# Patient Record
Sex: MALE | Race: WHITE | NOT HISPANIC OR LATINO | Employment: OTHER | URBAN - METROPOLITAN AREA
[De-identification: names, ages, dates, MRNs, and addresses within clinical notes are randomized per-mention and may not be internally consistent; named-entity substitution may affect disease eponyms.]

---

## 2017-02-21 ENCOUNTER — TRANSFERRED RECORDS (OUTPATIENT)
Dept: HEALTH INFORMATION MANAGEMENT | Facility: CLINIC | Age: 67
End: 2017-02-21

## 2017-02-22 ENCOUNTER — HOSPITAL ENCOUNTER (OUTPATIENT)
Dept: ULTRASOUND IMAGING | Facility: CLINIC | Age: 67
Discharge: HOME | End: 2017-02-22
Attending: FAMILY MEDICINE | Admitting: FAMILY MEDICINE
Payer: MEDICARE

## 2017-02-22 DIAGNOSIS — L08.9 LEFT FOOT INFECTION: ICD-10-CM

## 2017-02-22 DIAGNOSIS — F17.200 SMOKER: ICD-10-CM

## 2017-02-22 DIAGNOSIS — R03.0: ICD-10-CM

## 2017-02-22 DIAGNOSIS — M79.89 OTHER SPECIFIED SOFT TISSUE DISORDERS: ICD-10-CM

## 2017-02-22 PROCEDURE — 93925 LOWER EXTREMITY STUDY: CPT

## 2017-02-23 ENCOUNTER — TRANSFERRED RECORDS (OUTPATIENT)
Dept: HEALTH INFORMATION MANAGEMENT | Facility: CLINIC | Age: 67
End: 2017-02-23

## 2017-03-01 ENCOUNTER — TELEPHONE (OUTPATIENT)
Dept: OTHER | Facility: CLINIC | Age: 67
End: 2017-03-01

## 2017-03-01 DIAGNOSIS — I73.9 PAD (PERIPHERAL ARTERY DISEASE) (H): Primary | ICD-10-CM

## 2017-03-01 NOTE — TELEPHONE ENCOUNTER
Pt referred to [facility] by  ([facility]) for PAD.    Pt had bilateral LE arterial US 2/22/17 because of a non-healing sore on his left foot:  IMPRESSION:  1. Although per velocity criteria, no definite significant stenosis is  visualized in the sampled arteries of the left lower extremity,  poststenotic waveforms in the left popliteal artery and distally would  indicate a significant steno-occlusive lesion at the junction between  the superficial femoral artery and popliteal artery. Given nonhealing  wound in the left foot, consider further evaluation and possible  intervention with angiography.  2. Probable right anterior tibial arterial disease.    Pt needs to be scheduled for DAVID with exercise and consult with Vascular Surgery or IR.  Will route to scheduling to coordinate an appointment ASAP.    Rosalina Yeh RN BSN

## 2017-03-03 ENCOUNTER — OFFICE VISIT (OUTPATIENT)
Dept: OTHER | Facility: CLINIC | Age: 67
End: 2017-03-03
Attending: SURGERY
Payer: MEDICARE

## 2017-03-03 ENCOUNTER — HOSPITAL ENCOUNTER (OUTPATIENT)
Dept: ULTRASOUND IMAGING | Facility: CLINIC | Age: 67
Discharge: HOME | End: 2017-03-03
Attending: SURGERY | Admitting: SURGERY
Payer: MEDICARE

## 2017-03-03 VITALS
BODY MASS INDEX: 25.23 KG/M2 | WEIGHT: 157 LBS | HEIGHT: 66 IN | DIASTOLIC BLOOD PRESSURE: 93 MMHG | HEART RATE: 81 BPM | SYSTOLIC BLOOD PRESSURE: 182 MMHG

## 2017-03-03 DIAGNOSIS — I73.9 PAD (PERIPHERAL ARTERY DISEASE) (H): ICD-10-CM

## 2017-03-03 DIAGNOSIS — I70.229 CRITICAL LOWER LIMB ISCHEMIA (H): Primary | ICD-10-CM

## 2017-03-03 PROCEDURE — 99204 OFFICE O/P NEW MOD 45 MIN: CPT | Mod: ZP | Performed by: SURGERY

## 2017-03-03 PROCEDURE — 99211 OFF/OP EST MAY X REQ PHY/QHP: CPT

## 2017-03-03 PROCEDURE — 93922 UPR/L XTREMITY ART 2 LEVELS: CPT

## 2017-03-03 RX ORDER — SULFAMETHOXAZOLE/TRIMETHOPRIM 800-160 MG
TABLET ORAL
Refills: 0 | Status: ON HOLD | COMMUNITY
Start: 2017-02-21 | End: 2017-03-08

## 2017-03-03 RX ORDER — LOSARTAN POTASSIUM AND HYDROCHLOROTHIAZIDE 12.5; 5 MG/1; MG/1
TABLET ORAL
Refills: 0 | Status: ON HOLD | COMMUNITY
Start: 2017-02-20 | End: 2017-03-14

## 2017-03-03 RX ORDER — ATORVASTATIN CALCIUM 40 MG/1
TABLET, FILM COATED ORAL
Refills: 3 | Status: ON HOLD | COMMUNITY
Start: 2017-02-23 | End: 2017-03-14

## 2017-03-03 RX ORDER — TRIAMCINOLONE ACETONIDE 1 MG/G
CREAM TOPICAL
Refills: 5 | Status: ON HOLD | COMMUNITY
Start: 2016-11-29 | End: 2021-04-19

## 2017-03-03 RX ORDER — METOPROLOL SUCCINATE 50 MG/1
TABLET, EXTENDED RELEASE ORAL
Refills: 0 | Status: ON HOLD | COMMUNITY
Start: 2017-02-20 | End: 2017-03-14

## 2017-03-03 RX ORDER — METFORMIN HCL 500 MG
TABLET, EXTENDED RELEASE 24 HR ORAL
Refills: 0 | Status: ON HOLD | COMMUNITY
Start: 2017-02-23 | End: 2017-03-08

## 2017-03-03 NOTE — MR AVS SNAPSHOT
After Visit Summary   3/3/2017    Zia Hopkins    MRN: [id number]           Patient Information     Date Of Birth          1950        Visit Information        Provider Department      3/3/2017 8:45 AM Surjit Hamlin MD [facility]      Today's Diagnoses     Critical lower limb ischemia    -  1       Follow-ups after your visit        Your next 10 appointments already scheduled     Mar 08, 2017  8:00 AM CST   IR LOWER EXTREMITY ANGIOGRAM LEFT with SHIR2   [facility] ([facility])    [address] [place] [id number]   [phone]           1. Your doctor will need to do a history and physical within 30 days before this procedure. 2. Your doctor will determine whether you need a 12 lead EKG, as well as which medications should not be taken the morning of the exam. 3. Laboratory tests are to be obtained by your doctor prior to the exam (creatinine, Hgb/Hct, platelet count, and INR) 4. If you have allergies to x-ray contrast or iodine, contact your doctor or a Radiology nurse prior to the exam day for instructions. 5. Someone will need to drive you to and from the hospital. 6. Bring a list of all drugs you are taking; include supplements and over-the-counter medications. 7. Wear comfortable clothes and leave your valuables at home. 8. If you are or may be pregnant, contact your doctor or a Radiology nurse prior to the day of the exam. 9.  If you have diabetes, check with your doctor or a Radiology nurse to see if your insulin needs to be adjusted for the exam. 10. If you are taking Coumadin (to thin you blood) please contact your doctor or a Radiology nurse at least 5 days before the exam for special instructions. 11. You should not have received barium (x-ray contrast) within 48 hours of this exam. 12. The day before your exam you may eat your regular diet and  are encouraged to drink at least 2 quarts of clear liquids. Drink no alcoholic beverages for 24 hours prior to the exam. 13. If you have a colostomy you will need to irrigate it with tap water at 8PM the evening before and again at 6AM the morning of the exam. 14. Do not smoke for 24 hours prior to the procedure. 15. Do not eat any solid food or milk products for 6 hours prior to the exam. You may drink clear liquids until 2 hours prior to the exam. Clear liquids include the following: water, Jell-O, clear broth, apple juice or any non-carbonated drink that you can see through (no pop!) 16. The morning of the exam you may brush your teeth and take medications as directed with a sip of water. 17. Tell the Radiology nurse if you have any allergies. 18. You will be asked to empty your bladder before the exam begins. 19. Following the exam you will need to remain on complete bedrest for 4-6 hours. The nurse will monitor your vital signs, puncture site, and the pulses and temperature of the arm or leg that was punctured. 20. When discharged, you cannot drive until morning, and an adult must be with you until then. You should stay in the [facility] area overnight.            Mar 08, 2017  8:00 AM CST   [facility] IR Suite with Surjit Hamlin MD   [facility] ([facility])    [facility] ([facility])   [phone]              Who to contact     If you have questions or need follow up information about today's clinic visit or your schedule please contact [facility] directly at [phone].  Normal or non-critical lab and imaging results will be communicated to you by MyChart, letter or phone within 4 business days after the clinic has received the results. If you do not hear from us within 7 days, please contact the clinic through MyChart or phone. If you have a critical or abnormal lab result, we  "will notify you by phone as soon as possible.  Submit refill requests through Good Thing or call your pharmacy and they will forward the refill request to us. Please allow 3 business days for your refill to be completed.          Additional Information About Your Visit        [email or web address] Information     Good Thing lets you send messages to your doctor, view your test results, renew your prescriptions, schedule appointments and more. To sign up, go to [email or web address] . Click on \"Log in\" on the left side of the screen, which will take you to the Welcome page. Then click on \"Sign up Now\" on the right side of the page.     You will be asked to enter the access code listed below, as well as some personal information. Please follow the directions to create your username and password.     Your access code is: [email or web address]  Expires: 2017  8:28 AM     Your access code will  in 90 days. If you need help or a new code, please call your [place] clinic or [phone].        Care EveryWhere ID     This is your Care EveryWhere ID. This could be used by other organizations to access your [place] medical records  [id number]        Your Vitals Were     Pulse Height BMI (Body Mass Index)             81 5' 6\" (1.676 m) 25.34 kg/m2          Blood Pressure from Last 3 Encounters:   17 (!) 182/93    Weight from Last 3 Encounters:   17 157 lb (71.2 kg)              Today, you had the following     No orders found for display       Primary Care Provider Office Phone # Fax #    Reid Reese [phone]       [facility] [address]    [facility] [address]        Thank you!     Thank you for choosing [facility] [place]  for your care. Our goal is always to provide you with excellent care. Hearing back from our patients is one way we can continue to improve our services. Please take a few minutes to complete the written survey that you may receive in the mail after " your visit with us. Thank you!             Your Updated Medication List - Protect others around you: Learn how to safely use, store and throw away your medicines at www.disposemymeds.org.          This list is accurate as of: 3/3/17 11:59 PM.  Always use your most recent med list.                   Brand Name Dispense Instructions for use    aspirin 81 MG tablet      Take 81 mg by mouth daily       atorvastatin 40 MG tablet    LIPITOR     TK 1 T PO HS       losartan-hydrochlorothiazide 50-12.5 MG per tablet    HYZAAR     TK 1 T PO  D       metFORMIN 500 MG 24 hr tablet    GLUCOPHAGE-XR     TK 1 T PO D       metoprolol 50 MG 24 hr tablet    TOPROL-XL     TK 1 T PO  D       sulfamethoxazole-trimethoprim 800-160 MG per tablet    BACTRIM DS/SEPTRA DS     TK 1 T PO BID FOR 10 DAYS       triamcinolone 0.1 % cream    KENALOG     Reported on 3/3/2017

## 2017-03-03 NOTE — NURSING NOTE
"Chief Complaint   Patient presents with     Consult     PAD referred by gerry Vaca of non-healing left foot sore       Initial BP (!) 182/93 (BP Location: Left arm, Patient Position: Chair, Cuff Size: Adult Regular)  Pulse 81  Ht 5' 6\" (1.676 m)  Wt 157 lb (71.2 kg)  BMI 25.34 kg/m2 Estimated body mass index is 25.34 kg/(m^2) as calculated from the following:    Height as of this encounter: 5' 6\" (1.676 m).    Weight as of this encounter: 157 lb (71.2 kg).  Medication Reconciliation: complete    Face to face time: 7 minutes    Bertha Anaya CMA    "

## 2017-03-06 NOTE — PROGRESS NOTES
[facility]      PRESENTING COMPLAINT:  Nonhealing sore in the left foot.      HISTORY OF PRESENTING ILLNESS:  Mr. Zia Hopkins is a 67-year-old gentleman whom we have kindly been asked to see by Dr. Langford with regard to nonhealing wounds in his left foot.  The patient tells me that he has had palmar/plantar psoriasis for a long time and occasionally gets flareups when he will have significant desquamation of the skin on his toes.  During this last recent episode, he has had in addition to desquamation a significant amount of discoloration and pain along with progressive discoloration of his forefoot, which now bears a very significant red hue.  He does not have any problems with claudication.      PAST MEDICAL HISTORY:  Hypertension.      PAST SURGICAL HISTORY:  The patient has not had any previous surgeries.      SOCIAL HISTORY:  He continues to smoke.  He has quit many times and has resumed many times, and is smoking about 1 pack of cigarettes per day.  He is originally from [place].  He is a retired  by profession.  He also consumes wine on an occasional basis.      FAMILY HISTORY:  None relevant to the current presentation.      REVIEW OF SYSTEMS:  As noted in History of Presenting Illness, otherwise negative.      PHYSICAL EXAMINATION:   GENERAL:  He appears comfortable.  He is in no acute distress.   VITAL SIGNS:  Reviewed.  Blood pressure noted to be elevated.  His right arm blood pressure is 182/90, left 182/92, but he tells me at home it was 150/80 last night.   HEENT:  Head is atraumatic, normocephalic, mucosa are pink.   EYES:  Extraocular motions are intact.  Sclerae are anicteric.   MENTAL:  Alert and oriented.  Judgment and insight are good.   LYMPHATIC:  No supraclavicular or cervical adenopathy.   CARDIOVASCULAR:  Regular rate and rhythm.  S1 plus S2+0.   RESPIRATORY:  Good air entry bilaterally.  Good respiratory effort.  No accessory muscles are being used.   ABDOMEN:  Soft,  nontender, no hepatosplenomegaly noted.   EXTREMITIES:  No clubbing is noted.  There is desquamation of his left toes with escharification and dry gangrene changes. The left little toe has dry gangrene setting in. He also has a significant dependent rubor in his left foot.   VASCULAR:  No carotid bruits.  3+ bilateral radial and femoral pulses.  Right dorsalis pedis and posterior tibial are biphasic.  Left are monophasic.      IMAGING DATA:  He underwent noninvasive studies.  His left ankle-brachial index is 0.4 and on the right is 0.7.      DIAGNOSIS:  Peripheral arterial disease with critical limb ischemia, Witherbee grade 5, left lower extremity.      PLAN:  I explained to him the gravity of the situation.  He is on a beta blocker for his blood pressure.  He is also on a statin.  I have asked him to start taking an aspirin as well.  I am concerned about his nonhealing wounds and his risk of limb loss.  My recommendation would be to proceed with a left lower extremity arteriogram and intervention as necessary for limb salvage.  I have given him an absolute ultimatum with regard to his smoking habit, and that he absolutely needs to quit smoking if he is to have a chance at salvaging this situation.  He has verbalized understanding.  He will be scheduled for arteriogram next week.         IAN YATES MD             D: 2017 08:27   T: 2017 10:14   MT: west      Name:     SKY GAYTAN   MRN:      [id number]        Account:      [id number]   :      1950           Visit Date:   2017      Document: [id number]

## 2017-03-08 ENCOUNTER — APPOINTMENT (OUTPATIENT)
Dept: SURGERY | Facility: PHYSICIAN GROUP | Age: 67
End: 2017-03-08
Payer: COMMERCIAL

## 2017-03-08 ENCOUNTER — HOSPITAL ENCOUNTER (OUTPATIENT)
Facility: CLINIC | Age: 67
Discharge: HOME | End: 2017-03-08
Attending: SURGERY | Admitting: SURGERY
Payer: MEDICARE

## 2017-03-08 ENCOUNTER — APPOINTMENT (OUTPATIENT)
Dept: INTERVENTIONAL RADIOLOGY/VASCULAR | Facility: CLINIC | Age: 67
End: 2017-03-08
Attending: SURGERY
Payer: MEDICARE

## 2017-03-08 VITALS
DIASTOLIC BLOOD PRESSURE: 75 MMHG | HEART RATE: 71 BPM | BODY MASS INDEX: 24.93 KG/M2 | RESPIRATION RATE: 16 BRPM | WEIGHT: 155.1 LBS | TEMPERATURE: 98.1 F | SYSTOLIC BLOOD PRESSURE: 163 MMHG | HEIGHT: 66 IN | OXYGEN SATURATION: 97 %

## 2017-03-08 DIAGNOSIS — I70.245 ATHEROSCLEROSIS OF NATIVE ARTERIES OF LEFT LEG WITH ULCERATION OF OTHER PART OF FOOT (H): ICD-10-CM

## 2017-03-08 DIAGNOSIS — E78.5 HYPERLIPIDEMIA LDL GOAL <70: Primary | ICD-10-CM

## 2017-03-08 DIAGNOSIS — Z72.0 TOBACCO ABUSE: ICD-10-CM

## 2017-03-08 LAB
APTT PPP: 30 SEC (ref 22–37)
CHOLEST SERPL-MCNC: 170 MG/DL
CREAT SERPL-MCNC: 0.72 MG/DL (ref 0.66–1.25)
ERYTHROCYTE [DISTWIDTH] IN BLOOD BY AUTOMATED COUNT: 12.2 % (ref 10–15)
GFR SERPL CREATININE-BSD FRML MDRD: NORMAL ML/MIN/1.7M2
GLUCOSE BLDC GLUCOMTR-MCNC: 194 MG/DL (ref 70–99)
HBA1C MFR BLD: 8.6 % (ref 4.3–6)
HCT VFR BLD AUTO: 42.8 % (ref 40–53)
HDLC SERPL-MCNC: 53 MG/DL
HGB BLD-MCNC: 15.1 G/DL (ref 13.3–17.7)
INR PPP: 0.87 (ref 0.86–1.14)
LDLC SERPL CALC-MCNC: 95 MG/DL
MCH RBC QN AUTO: 31.9 PG (ref 26.5–33)
MCHC RBC AUTO-ENTMCNC: 35.3 G/DL (ref 31.5–36.5)
MCV RBC AUTO: 91 FL (ref 78–100)
NONHDLC SERPL-MCNC: 117 MG/DL
PLATELET # BLD AUTO: 240 10E9/L (ref 150–450)
RBC # BLD AUTO: 4.73 10E12/L (ref 4.4–5.9)
TRIGL SERPL-MCNC: 109 MG/DL
WBC # BLD AUTO: 8.4 10E9/L (ref 4–11)

## 2017-03-08 PROCEDURE — C1769 GUIDE WIRE: HCPCS

## 2017-03-08 PROCEDURE — 27210845 ZZH DEVICE INFLATION CR5

## 2017-03-08 PROCEDURE — 27210906 ZZH KIT CR8

## 2017-03-08 PROCEDURE — 27210886 ZZH ACCESSORY CR5

## 2017-03-08 PROCEDURE — 82565 ASSAY OF CREATININE: CPT | Performed by: SURGERY

## 2017-03-08 PROCEDURE — 85347 COAGULATION TIME ACTIVATED: CPT

## 2017-03-08 PROCEDURE — C1788 PORT, INDWELLING, IMP: HCPCS

## 2017-03-08 PROCEDURE — 40000853 ZZH STATISTIC ANGIOGRAM, STENT, VERTEBRO PLASTY

## 2017-03-08 PROCEDURE — 85730 THROMBOPLASTIN TIME PARTIAL: CPT | Performed by: SURGERY

## 2017-03-08 PROCEDURE — 99204 OFFICE O/P NEW MOD 45 MIN: CPT | Performed by: INTERNAL MEDICINE

## 2017-03-08 PROCEDURE — 80061 LIPID PANEL: CPT | Performed by: SURGERY

## 2017-03-08 PROCEDURE — 25000128 H RX IP 250 OP 636: Performed by: SURGERY

## 2017-03-08 PROCEDURE — 37226 ZZHC REVASCULARIZE FEM/POP ARTERY, ANGIOPLASTY/STENT: CPT | Mod: LT | Performed by: SURGERY

## 2017-03-08 PROCEDURE — 75710 ARTERY X-RAYS ARM/LEG: CPT | Mod: LT

## 2017-03-08 PROCEDURE — C1876 STENT, NON-COA/NON-COV W/DEL: HCPCS

## 2017-03-08 PROCEDURE — 85610 PROTHROMBIN TIME: CPT | Performed by: SURGERY

## 2017-03-08 PROCEDURE — 75625 CONTRAST EXAM ABDOMINL AORTA: CPT | Mod: 26 | Performed by: SURGERY

## 2017-03-08 PROCEDURE — 75710 ARTERY X-RAYS ARM/LEG: CPT | Mod: 26 | Performed by: SURGERY

## 2017-03-08 PROCEDURE — 83036 HEMOGLOBIN GLYCOSYLATED A1C: CPT | Performed by: SURGERY

## 2017-03-08 PROCEDURE — 27210808 ZZH SHEATH CR7

## 2017-03-08 PROCEDURE — 25500064 ZZH RX 255 OP 636: Performed by: SURGERY

## 2017-03-08 PROCEDURE — 27210742 ZZH CATH CR1

## 2017-03-08 PROCEDURE — 85027 COMPLETE CBC AUTOMATED: CPT | Performed by: SURGERY

## 2017-03-08 PROCEDURE — C2623 CATH, TRANSLUMIN, DRUG-COAT: HCPCS

## 2017-03-08 PROCEDURE — 82962 GLUCOSE BLOOD TEST: CPT

## 2017-03-08 PROCEDURE — 25000125 ZZHC RX 250: Performed by: SURGERY

## 2017-03-08 PROCEDURE — 76937 US GUIDE VASCULAR ACCESS: CPT | Mod: 26 | Performed by: SURGERY

## 2017-03-08 RX ORDER — METFORMIN HCL 500 MG
1000 TABLET, EXTENDED RELEASE 24 HR ORAL
Qty: 180 TABLET | Refills: 0 | Status: ON HOLD
Start: 2017-03-08 | End: 2017-03-14

## 2017-03-08 RX ORDER — HEPARIN SODIUM 1000 [USP'U]/ML
INJECTION, SOLUTION INTRAVENOUS; SUBCUTANEOUS
Status: DISCONTINUED
Start: 2017-03-08 | End: 2017-03-08 | Stop reason: HOSPADM

## 2017-03-08 RX ORDER — HEPARIN SODIUM 1000 [USP'U]/ML
500-6000 INJECTION, SOLUTION INTRAVENOUS; SUBCUTANEOUS
Status: COMPLETED | OUTPATIENT
Start: 2017-03-08 | End: 2017-03-08

## 2017-03-08 RX ORDER — ACETAMINOPHEN 500 MG
500-1000 TABLET ORAL EVERY 6 HOURS PRN
Status: DISCONTINUED | OUTPATIENT
Start: 2017-03-08 | End: 2017-03-08 | Stop reason: HOSPADM

## 2017-03-08 RX ORDER — FLUMAZENIL 0.1 MG/ML
0.2 INJECTION, SOLUTION INTRAVENOUS
Status: DISCONTINUED | OUTPATIENT
Start: 2017-03-08 | End: 2017-03-08 | Stop reason: HOSPADM

## 2017-03-08 RX ORDER — SODIUM CHLORIDE 9 MG/ML
INJECTION, SOLUTION INTRAVENOUS CONTINUOUS
Status: DISCONTINUED | OUTPATIENT
Start: 2017-03-08 | End: 2017-03-08 | Stop reason: HOSPADM

## 2017-03-08 RX ORDER — FENTANYL CITRATE 50 UG/ML
INJECTION, SOLUTION INTRAMUSCULAR; INTRAVENOUS
Status: DISCONTINUED
Start: 2017-03-08 | End: 2017-03-08 | Stop reason: HOSPADM

## 2017-03-08 RX ORDER — LIDOCAINE HYDROCHLORIDE 10 MG/ML
1-30 INJECTION, SOLUTION EPIDURAL; INFILTRATION; INTRACAUDAL; PERINEURAL
Status: COMPLETED | OUTPATIENT
Start: 2017-03-08 | End: 2017-03-08

## 2017-03-08 RX ORDER — ASPIRIN 325 MG
325 TABLET ORAL DAILY
Status: ON HOLD | COMMUNITY
End: 2017-03-08

## 2017-03-08 RX ORDER — PROTAMINE SULFATE 10 MG/ML
INJECTION, SOLUTION INTRAVENOUS
Status: DISCONTINUED
Start: 2017-03-08 | End: 2017-03-08 | Stop reason: HOSPADM

## 2017-03-08 RX ORDER — EZETIMIBE 10 MG/1
10 TABLET ORAL DAILY
Qty: 90 TABLET | Refills: 3 | Status: ON HOLD | OUTPATIENT
Start: 2017-03-08 | End: 2019-07-30

## 2017-03-08 RX ORDER — NALOXONE HYDROCHLORIDE 0.4 MG/ML
.1-.4 INJECTION, SOLUTION INTRAMUSCULAR; INTRAVENOUS; SUBCUTANEOUS
Status: DISCONTINUED | OUTPATIENT
Start: 2017-03-08 | End: 2017-03-08 | Stop reason: HOSPADM

## 2017-03-08 RX ORDER — CLOPIDOGREL BISULFATE 75 MG/1
75 TABLET ORAL DAILY
Qty: 90 TABLET | Refills: 1 | Status: ON HOLD | OUTPATIENT
Start: 2017-03-08 | End: 2017-03-19

## 2017-03-08 RX ORDER — PROTAMINE SULFATE 10 MG/ML
30 INJECTION, SOLUTION INTRAVENOUS ONCE
Status: COMPLETED | OUTPATIENT
Start: 2017-03-08 | End: 2017-03-08

## 2017-03-08 RX ORDER — IODIXANOL 320 MG/ML
150 INJECTION, SOLUTION INTRAVASCULAR ONCE
Status: COMPLETED | OUTPATIENT
Start: 2017-03-08 | End: 2017-03-08

## 2017-03-08 RX ORDER — FENTANYL CITRATE 50 UG/ML
25-50 INJECTION, SOLUTION INTRAMUSCULAR; INTRAVENOUS EVERY 5 MIN PRN
Status: DISCONTINUED | OUTPATIENT
Start: 2017-03-08 | End: 2017-03-08 | Stop reason: HOSPADM

## 2017-03-08 RX ORDER — CLOPIDOGREL BISULFATE 75 MG/1
300 TABLET ORAL ONCE
Status: DISCONTINUED | OUTPATIENT
Start: 2017-03-08 | End: 2017-03-08 | Stop reason: HOSPADM

## 2017-03-08 RX ORDER — NICOTINE 21 MG/24HR
1 PATCH, TRANSDERMAL 24 HOURS TRANSDERMAL EVERY 24 HOURS
Qty: 30 PATCH | Refills: 0 | Status: ON HOLD | OUTPATIENT
Start: 2017-03-08 | End: 2017-03-14

## 2017-03-08 RX ORDER — LIDOCAINE 40 MG/G
CREAM TOPICAL
Status: DISCONTINUED | OUTPATIENT
Start: 2017-03-08 | End: 2017-03-08 | Stop reason: HOSPADM

## 2017-03-08 RX ADMIN — MIDAZOLAM HYDROCHLORIDE 1 MG: 1 INJECTION, SOLUTION INTRAMUSCULAR; INTRAVENOUS at 08:48

## 2017-03-08 RX ADMIN — IODIXANOL 100 ML: 320 INJECTION, SOLUTION INTRAVASCULAR at 09:26

## 2017-03-08 RX ADMIN — LIDOCAINE HYDROCHLORIDE 100 MG: 10 INJECTION, SOLUTION EPIDURAL; INFILTRATION; INTRACAUDAL; PERINEURAL at 08:36

## 2017-03-08 RX ADMIN — FENTANYL CITRATE 50 MCG: 50 INJECTION, SOLUTION INTRAMUSCULAR; INTRAVENOUS at 08:29

## 2017-03-08 RX ADMIN — HEPARIN SODIUM 4000 UNITS: 1000 INJECTION, SOLUTION INTRAVENOUS; SUBCUTANEOUS at 08:35

## 2017-03-08 RX ADMIN — HEPARIN SODIUM 4000 UNITS: 1000 INJECTION, SOLUTION INTRAVENOUS; SUBCUTANEOUS at 08:44

## 2017-03-08 RX ADMIN — SODIUM CHLORIDE: 9 INJECTION, SOLUTION INTRAVENOUS at 07:36

## 2017-03-08 RX ADMIN — MIDAZOLAM HYDROCHLORIDE 1 MG: 1 INJECTION, SOLUTION INTRAMUSCULAR; INTRAVENOUS at 08:29

## 2017-03-08 RX ADMIN — FENTANYL CITRATE 50 MCG: 50 INJECTION, SOLUTION INTRAMUSCULAR; INTRAVENOUS at 08:48

## 2017-03-08 RX ADMIN — PROTAMINE SULFATE 30 MG: 10 INJECTION, SOLUTION INTRAVENOUS at 09:30

## 2017-03-08 RX ADMIN — MIDAZOLAM HYDROCHLORIDE 1 MG: 1 INJECTION, SOLUTION INTRAMUSCULAR; INTRAVENOUS at 08:23

## 2017-03-08 RX ADMIN — FENTANYL CITRATE 50 MCG: 50 INJECTION, SOLUTION INTRAMUSCULAR; INTRAVENOUS at 08:23

## 2017-03-08 NOTE — PROGRESS NOTES
Returned from IR with sheath in place to right groin. IR staff pulling line at this time. VSS, denies pain. Awake and alert. Doppler left pulses, DP is stronger with doppler.

## 2017-03-08 NOTE — PROGRESS NOTES
Interventional Radiology Intra-procedural Nursing Note    Patient Name: Zia Hopkins  Medical Record Number: [id number]  Today's Date: March 8, 2017    Start Time: 0823  End of procedure time: 0920   Procedure: Left Lower Extremity Angiogram with Possible Intervention with Dr. Hamlin.  Cordis Smart Stent 6 mm x 150 mm Lot [id number]/ Ref [id number] Exp 03/31/17  Report given to: CAIN Bailey  Time pt departs:  0945 0815 - Pt transferred to procedure table into supine position.  Skin prepped with chloroprep (CHG 2%) and draped.   0830 - Procedure started with Dr. Hamlin.  0920 - EOC.  Pt tolerated well.  VSS.  0921- ACT drawn and result was 213. Result given to Dr. Hamlin.   0935 - ACT drawn and result was 138. Result given to Dr. Hamlin.  0945 - Patient transferred to care suites via bed. Sheath remains in and covered with tegaderm.    0948 - Sheath discontinued, fully intact, manual pressure held.  1005 -Hemastasis achieved, site covered with gauze and tegaderm.  No oozing, bleeding, or hematoma noted to site. Pt educated on care site and importance to lay flat until further instructed. Report given and site checked with receiving RN.  No bleeding, oozing, or hematoma noted to site.  Pt denies any symptoms.        Other Notes:     Lety Montaño RN

## 2017-03-08 NOTE — DISCHARGE INSTRUCTIONS
Peripheral Angiogram Discharge Instructions - Femoral     After you go home:      Have an adult stay with you until tomorrow.    Drink extra fluids for 2 days.    You may resume your normal diet.    No smoking       For 24 hours - due to the sedation you received:    Relax and take it easy.    Do NOT make any important or legal decisions.    Do NOT drive or operate machines at home or at work.    Do NOT drink alcohol.    Care of Groin Puncture Site:      For the first 24 hrs - check the puncture site every 1-2 hours while awake.    For 2 days, when you cough, sneeze, laugh or move your bowels, hold your hand over the puncture site and press firmly.    Remove the bandaid after 24 hours. If there is minor oozing, apply another bandaid and remove it after 12 hours.    It is normal to have a small bruise or pea size lump at the site.    You may shower tomorrow.  Do NOT take a bath, or use a hot tub or pool for at least 3 days. Do NOT scrub the site. Do not use lotion or powder near the puncture site.     Activity:            For 2 days:    No stooping or squatting    Do NOT do any heavy activity such as exercise, lifting, or straining.     No housework, yard work or any activity that make you sweat    Do NOT lift more than 10 pounds    Bleeding:      If you start bleeding from the site in your groin, lie down flat and press firmly on/above the site for 10 minutes.     Once bleeding stops, lay flat for 2 hours.     Call the [facility] as soon as you can.       Call 911 right away if you have heavy bleeding or bleeding that does not stop.      Medicines:      Wait 48 hours before taking medicines that contain Metformin (Glucophage or Glucovance).    If you have kidney problems, do not restart the Metformin until you have a blood test (creatinine) within 2 to 3 days after discharge.  After you have your blood drawn, you may restart the Metformin.    If you are taking antiplatelet medications such as Plavix, do  not stop taking it until you talk to your provider.       Take your medications, including blood thinners, unless your provider tells you not to.  If you take Coumadin (Warfarin), have your INR checked by your provider in  3-5 days. Call your clinic to schedule this.    If you have stopped any other medicines, check with your provider about when to restart them.        Follow Up Appointments:      Follow up with [facility] as directed.    Call the clinic if:      You have increased pain or a large or growing hard lump around the site.    The site is red, swollen, hot or tender.    Blood or fluid is draining from the site.    You have chills or a fever greater than 101 F (38 C).    Your leg turns feels numb, cool or changes color.    You have hives, a rash or unusual itching.    New pain in the back or belly that you cannot control with Tylenol.    Any questions or concerns.    Other Instructions:      If you received a stent - carry your stent card with you at all times.      If you have questions or your original symptoms do not improve, call:         [facility] @ [phone]

## 2017-03-08 NOTE — CONSULTS
[facility]    Vascular Medicine Consultation     Attestation: I have examined the patient independently of Shaina Norton PA-C and agree with the examination and plan as delineated below.    Sohan Velazquez MD      Date of Admission:  3/8/2017  Date of Consult (When I saw the patient): 03/08/17    Assessment & Plan   1. Peripheral arterial disease with critical limb ischemia of a non-healing wound s/p angiogram today.    Post-procedure cares and follow-up per Dr. Hamlin. He has been initiated on Plavix. He should decrease his 325 mg aspirin to an 81 mg aspirin while on Plavix. He needs to quit smoking and get better control of his diabetes and lipids to prevent further progression of his peripheral arterial disease.     2. Ongoing tobacco use    He has been smoking approximately 1 pack of cigarettes per day. The importance of smoking cessation was stressed and he understands how critical it is for him to quit. He is willing to try quitting utilizing nicotine patches. He will be prescribed 21 mg patches daily for 30 days, followed by 14 mg daily for 30 days, and then 7 mg daily for 30 days.     3. Hyperlipidemia    His lipid panel revealed an LDL of 95, HDL 53, triglycerides 109, and total cholesterol 170. He was just started on Lipitor 40 mg daily about 2.5 weeks ago. Prior to being on this, his LDL was 160. Given his vascular disease and diabetes, he should ideally have an LDL of less than 70. It is felt to be very unlikely that Lipitor 40 mg daily will be able to reduce his LDL by over 70% overall to get to goal. Therefore, it is recommended that he also take Zetia 10 mg daily. He should then have a lipid panel repeated in 3 months through his primary care provider to ascertain whether or not he is at goal on this regimen.     4. Type 2 diabetes mellitus    He was just diagnosed with Type 2 diabetes a few weeks ago. At that time he was started on Metformin- mg daily. He is tolerating  this well. His A1C was obtained today and was 8.6%. Given his vascular disease, he should be treated more aggressively to an A1C of less than 7.0%. It is unlikely that Metformin at that low of a dose will get him to goal. As he is tolerating it well without side effects, it was recommended that he increased his dose to 1000 mg daily for one week. If he is tolerating that well, then he can increase this further to 2000 mg daily. He should have an A1C rechecked in 3 months through his primary care provider. If his A1C is still not at goal, Januvia 100 mg daily should be added to his regimen. It was advised that he get his creatinine rechecked in 2 days prior to restarting his metformin due to the contrast from the procedure.     Reason for Consult   Reason for consult: Asked by Dr. Hamlin to evaluate and help manage vascular risk factors in this 67 year old male smoker with recently diagnosed diabetes and hyperlipidemia who has developed critical limb ischemia of a non-healing left foot wound and presented for an angiogram today.     Primary Care Physician   Reid Reese      History of Present Illness   Zia Hopkins is a 67 year old male smoker who was recently evaluated for necrotic changes to his left toes. He had a history of palmar/plantar psoriasis for a long time and occasionally gets flare up with desquamation of the skin. During this last episode, he noted significant discoloration and pain. He had not been to a physician in years. At that visit he was noted to be a newly diagnosed diabetic and had hyperlipidemia and hypertension. He was placed on medication for all of this. DAVID's were undertaken and were significant for 0.4 on the left and 0.7 on the right at rest. He was evaluated by Dr. Hamlin and an angiogram was recommended for further evaluation. This was undertaken today.     Past Medical History   Past Medical History   Diagnosis Date     Diabetes mellitus type 2, noninsulin dependent (H)       Hyperlipidemia LDL goal <70      Hypertension      PAD (peripheral artery disease) (H)        Past Surgical History   No past surgical history on file.    Prior to Admission Medications   Prior to Admission Medications   Prescriptions Last Dose Informant Patient Reported? Taking?   aspirin 325 MG tablet 3/7/2017 at Unknown time  Yes Yes   Si mg daily   atorvastatin (LIPITOR) 40 MG tablet Past Week at Unknown time  Yes Yes   Sig: TK 1 T PO HS   losartan-hydrochlorothiazide (HYZAAR) 50-12.5 MG per tablet 3/7/2017 at Unknown time  Yes Yes   Sig: TK 1 T PO  D   metFORMIN (GLUCOPHAGE-XR) 500 MG 24 hr tablet 3/7/2017 at Unknown time  Yes Yes   Sig: TK 1 T PO D   metoprolol (TOPROL-XL) 50 MG 24 hr tablet 3/7/2017 at Unknown time  Yes Yes   Sig: TK 1 T PO  D   triamcinolone (KENALOG) 0.1 % cream More than a month at Unknown time  Yes No   Sig: Reported on 3/3/2017      Facility-Administered Medications: None     Allergies   No Known Allergies    Social History   Zia Hopkins  reports that he has been smoking Cigarettes.  He has been smoking about 0.75 - 1 pack per day. He does not have any smokeless tobacco history on file. He reports that he drinks alcohol. He reports that he does not use illicit drugs.    Family History   Family History   Problem Relation Age of Onset     DIABETES Father        Review of Systems   The 10 point Review of Systems is negative other than noted in the HPI or here.    Physical Exam   Temp: 98.1  F (36.7  C) Temp src: Oral BP: 163/82 Pulse: 71   Resp: 16 SpO2: 98 % O2 Device: None (Room air) Oxygen Delivery: 2 LPM  Vital Signs with Ranges  Temp:  [98.1  F (36.7  C)] 98.1  F (36.7  C)  Pulse:  [70-88] 71  Resp:  [16-18] 16  BP: (143-200)/() 163/82  SpO2:  [93 %-100 %] 98 %  155 lbs 1.6 oz    Constitutional: awake, alert, cooperative, no apparent distress, and appears stated age  Eyes: Lids and lashes normal, pupils equal, round and reactive to light, extra ocular muscles intact,  sclera clear, conjunctiva normal  ENT: normocepalic, without obvious abnormality, oropharynx pink and moist  Hematologic / Lymphatic: no lymphadenopathy  Respiratory: No increased work of breathing, good air exchange, clear to auscultation bilaterally, no crackles or wheezing  Cardiovascular: regular rate and rhythm, normal S1 and S2 and no murmur noted  GI: Normal bowel sounds, soft, non-distended, non-tender  Skin: no redness, warmth, or swelling, no rashes. Necrotic changes about toes of left foot.   Musculoskeletal: There is no redness, warmth, or swelling of the joints.    Neurologic: Awake, alert, oriented to name, place and time.  Cranial nerves II-XII are grossly intact.  Neuropsychiatric:  Normal affect, memory, insight.  Pulses: Biphasic DP/PT on left. No carotid bruits appreciated.     Data   Most Recent 3 CBC's:  Recent Labs   Lab Test  03/08/17   0720   WBC  8.4   HGB  15.1   MCV  91   PLT  240     Most Recent 3 BMP's:  Recent Labs   Lab Test  03/08/17   0720   CR  0.72     Most Recent 3 INR's:  Recent Labs   Lab Test  03/08/17   0720   INR  0.87     Most Recent Cholesterol Panel:  Recent Labs   Lab Test  03/08/17   0720   CHOL  170   LDL  95   HDL  53   TRIG  109     Most Recent Hemoglobin A1c:  Recent Labs   Lab Test  03/08/17   0720   A1C  8.6*

## 2017-03-08 NOTE — PROGRESS NOTES
1610 Drsg CDI to right groin puncture site. No oozing or hematoma noted. Area soft & flat. Pt denies pain. Pt instructed on activity restrictions while on bedrest. Verbal understanding received from pt. Pt's wife at bedside.   1615 OOB - steady on feet. Ambulated in halls to bathroom with good hussain. No change in puncture site assessment with activity.  1625 Discharge teaching & instructions reinforced with both pt & wife. Verbal understanding received.  1645 Pt discharged per w/c to private vehicle. All personal belongings sent w/ pt.

## 2017-03-08 NOTE — IP AVS SNAPSHOT
MRN:[id number]                      After Visit Summary   3/8/2017    Zia Hopkins    MRN: [id number]           Visit Information        Department      3/8/2017  6:24 AM [facility]          Review of your medicines      START taking        Dose / Directions    clopidogrel 75 MG tablet   Commonly known as:  PLAVIX   Used for:  Atherosclerosis of native arteries of left leg with ulceration of other part of foot (H)        Dose:  75 mg   Take 1 tablet (75 mg) by mouth daily Start taking medication the day after the procedure   Quantity:  90 tablet   Refills:  1       ezetimibe 10 MG tablet   Commonly known as:  ZETIA   Used for:  Hyperlipidemia LDL goal <70        Dose:  10 mg   Take 1 tablet (10 mg) by mouth daily   Quantity:  90 tablet   Refills:  3       * nicotine 21 MG/24HR 24 hr patch   Commonly known as:  NICODERM CQ   Used for:  Tobacco abuse        Dose:  1 patch   Place 1 patch onto the skin every 24 hours Step 1   Quantity:  30 patch   Refills:  0       * nicotine 14 MG/24HR 24 hr patch   Commonly known as:  NICODERM CQ   Used for:  Tobacco abuse        Dose:  1 patch   Place 1 patch onto the skin every 24 hours Step 2. Start after completing 21 mg patches.   Quantity:  30 patch   Refills:  0       * nicotine 7 MG/24HR 24 hr patch   Commonly known as:  NICODERM CQ   Used for:  Tobacco abuse        Dose:  1 patch   Place 1 patch onto the skin every 24 hours Step 3. Start after completing 14 mg patches.   Quantity:  30 patch   Refills:  0       * Notice:  This list has 3 medication(s) that are the same as other medications prescribed for you. Read the directions carefully, and ask your doctor or other care provider to review them with you.      CONTINUE these medicines which may have CHANGED, or have new prescriptions. If we are uncertain of the size of tablets/capsules you have at home, strength may be listed as something that might have changed.        Dose /  Directions    aspirin 81 MG tablet   This may have changed:    - medication strength  - how much to take  - how to take this   Used for:  Atherosclerosis of native arteries of left leg with ulceration of other part of foot (H)        Dose:  81 mg   Take 1 tablet (81 mg) by mouth daily   Quantity:  90 tablet   Refills:  1       metFORMIN 500 MG 24 hr tablet   Commonly known as:  GLUCOPHAGE-XR   This may have changed:  See the new instructions.   Used for:  Atherosclerosis of native arteries of left leg with ulceration of other part of foot (H)        Dose:  1000 mg   Take 2 tablets (1,000 mg) by mouth daily (with dinner)   Quantity:  180 tablet   Refills:  0         CONTINUE these medicines which have NOT CHANGED        Dose / Directions    atorvastatin 40 MG tablet   Commonly known as:  LIPITOR        TK 1 T PO HS   Refills:  3       losartan-hydrochlorothiazide 50-12.5 MG per tablet   Commonly known as:  HYZAAR        TK 1 T PO  D   Refills:  0       metoprolol 50 MG 24 hr tablet   Commonly known as:  TOPROL-XL        TK 1 T PO  D   Refills:  0       triamcinolone 0.1 % cream   Commonly known as:  KENALOG        Reported on 3/3/2017   Refills:  5            Where to get your medicines      These medications were sent to [facility] [id number]  [address] [facility] [facility] [place] [address]     Phone:  [phone]     aspirin 81 MG tablet    ezetimibe 10 MG tablet    nicotine 14 MG/24HR 24 hr patch    nicotine 21 MG/24HR 24 hr patch    nicotine 7 MG/24HR 24 hr patch         Some of these will need a paper prescription and others can be bought over the counter. Ask your nurse if you have questions.     Bring a paper prescription for each of these medications     clopidogrel 75 MG tablet       You don't need a prescription for these medications     metFORMIN 500 MG 24 hr tablet               Prescriptions were sent or printed at these locations (7  Prescriptions)                   [facility] [address]  [place], [place] - [address] [facility] [address] [place] [address]    Telephone:  [phone]   Fax:  [phone]   Hours:                  E-Prescribed (5 of 7)         aspirin 81 MG tablet               ezetimibe (ZETIA) 10 MG tablet               nicotine (NICODERM CQ) 21 MG/24HR 24 hr patch               nicotine (NICODERM CQ) 14 MG/24HR 24 hr patch               nicotine (NICODERM CQ) 7 MG/24HR 24 hr patch                 Not Printed or Sent (1 of 7)         metFORMIN (GLUCOPHAGE-XR) 500 MG 24 hr tablet                 Printed at Department/Unit printer (1 of 7)         clopidogrel (PLAVIX) 75 MG tablet                 Protect others around you: Learn how to safely use, store and throw away your medicines at www.disposemymeds.org.         Follow-ups after your visit         Care Instructions        After Care Instructions     Discharge Instructions       1. Start taking Zetia in addition to your Lipitor for your cholesterol. You should then have your cholesterol rechecked in 3 months through your primary care provider with a goal LDL (bad cholesterol) of less than 70.    2. Stop Smoking. Utilize nicotine patches to help with this. Start with 21 mg patches daily for 30 days, followed by 14 mg patches for 30 days, and then finally 7 mg patches for 30 days.     3. For your diabetes, increase your metformin to 1000 mg daily (take 2 tablets of what you have). Do this for one week. If you tolerate this, after one week increase to 2000 mg per day (4 tablets of what you have). You should follow up with your primary care provider regarding your diabetes. If your A1C is still above 7.0% in 3 months, you should have Januvia 100 mg daily added to your medication regimen.     4. You will need to go and have your kidney function checked through your primary care office on Friday, 3/10/17. Hold your metformin until then and then  ask them if you may resume it after your lab comes back.    5. Decrease your aspirin to an 81 mg aspirin (baby aspirin)daily while also on Plavix.                  Further instructions from your care team       Peripheral Angiogram Discharge Instructions - Femoral     After you go home:      Have an adult stay with you until tomorrow.    Drink extra fluids for 2 days.    You may resume your normal diet.    No smoking       For 24 hours - due to the sedation you received:    Relax and take it easy.    Do NOT make any important or legal decisions.    Do NOT drive or operate machines at home or at work.    Do NOT drink alcohol.    Care of Groin Puncture Site:      For the first 24 hrs - check the puncture site every 1-2 hours while awake.    For 2 days, when you cough, sneeze, laugh or move your bowels, hold your hand over the puncture site and press firmly.    Remove the bandaid after 24 hours. If there is minor oozing, apply another bandaid and remove it after 12 hours.    It is normal to have a small bruise or pea size lump at the site.    You may shower tomorrow.  Do NOT take a bath, or use a hot tub or pool for at least 3 days. Do NOT scrub the site. Do not use lotion or powder near the puncture site.     Activity:            For 2 days:    No stooping or squatting    Do NOT do any heavy activity such as exercise, lifting, or straining.     No housework, yard work or any activity that make you sweat    Do NOT lift more than 10 pounds    Bleeding:      If you start bleeding from the site in your groin, lie down flat and press firmly on/above the site for 10 minutes.     Once bleeding stops, lay flat for 2 hours.     [facility] as soon as you can.       Call 911 right away if you have heavy bleeding or bleeding that does not stop.      Medicines:      Wait 48 hours before taking medicines that contain Metformin (Glucophage or Glucovance).    If you have kidney problems, do not restart the  "Metformin until you have a blood test (creatinine) within 2 to 3 days after discharge.  After you have your blood drawn, you may restart the Metformin.    If you are taking antiplatelet medications such as Plavix, do not stop taking it until you talk to your provider.       Take your medications, including blood thinners, unless your provider tells you not to.  If you take Coumadin (Warfarin), have your INR checked by your provider in  3-5 days. Call your clinic to schedule this.    If you have stopped any other medicines, check with your provider about when to restart them.        Follow Up Appointments:      Follow up with [facility] as directed.    Call the clinic if:      You have increased pain or a large or growing hard lump around the site.    The site is red, swollen, hot or tender.    Blood or fluid is draining from the site.    You have chills or a fever greater than 101 F (38 C).    Your leg turns feels numb, cool or changes color.    You have hives, a rash or unusual itching.    New pain in the back or belly that you cannot control with Tylenol.    Any questions or concerns.    Other Instructions:      If you received a stent - carry your stent card with you at all times.      If you have questions or your original symptoms do not improve, call:         [facility] @ [phone]         Additional Information About Your Visit        [employer] Information     [employer] lets you send messages to your doctor, view your test results, renew your prescriptions, schedule appointments and more. To sign up, go to [email or web address] . Click on \"Log in\" on the left side of the screen, which will take you to the Welcome page. Then click on \"Sign up Now\" on the right side of the page.     You will be asked to enter the access code listed below, as well as some personal information. Please follow the directions to create your username and password.     Your access code is: " [email or web address]  Expires: 2017  8:28 AM     Your access code will  in 90 days. If you need help or a new code, please call your [facility] clinic or [phone].        Care EveryWhere ID     This is your Care EveryWhere ID. This could be used by other organizations to access your [facility] medical records  [id number]        Your Vitals Were     Blood Pressure Pulse Temperature Respirations Height Weight    136/68 71 98.1  F (36.7  C) (Oral) 18 1.676 m (5' 6\") 70.4 kg (155 lb 1.6 oz)    Pulse Oximetry BMI (Body Mass Index)                97% 25.03 kg/m2           Primary Care Provider Office Phone # Fax #    Reid Reese [phone]      Thank you!     Thank you for choosing [facility] for your care. Our goal is always to provide you with excellent care. Hearing back from our patients is one way we can continue to improve our services. Please take a few minutes to complete the written survey that you may receive in the mail after you visit with us. Thank you!             Medication List: This is a list of all your medications and when to take them. Check marks below indicate your daily home schedule. Keep this list as a reference.      Medications           Morning Afternoon Evening Bedtime As Needed    aspirin 81 MG tablet   Take 1 tablet (81 mg) by mouth daily                                atorvastatin 40 MG tablet   Commonly known as:  LIPITOR   TK 1 T PO HS                                clopidogrel 75 MG tablet   Commonly known as:  PLAVIX   Take 1 tablet (75 mg) by mouth daily Start taking medication the day after the procedure                                ezetimibe 10 MG tablet   Commonly known as:  ZETIA   Take 1 tablet (10 mg) by mouth daily                                losartan-hydrochlorothiazide 50-12.5 MG per tablet   Commonly known as:  HYZAAR   TK 1 T PO  D                                metFORMIN 500 MG 24 hr tablet   Commonly known as:  GLUCOPHAGE-XR   Take 2 tablets " (1,000 mg) by mouth daily (with dinner)                                metoprolol 50 MG 24 hr tablet   Commonly known as:  TOPROL-XL   TK 1 T PO  D                                * nicotine 21 MG/24HR 24 hr patch   Commonly known as:  NICODERM CQ   Place 1 patch onto the skin every 24 hours Step 1                                * nicotine 14 MG/24HR 24 hr patch   Commonly known as:  NICODERM CQ   Place 1 patch onto the skin every 24 hours Step 2. Start after completing 21 mg patches.                                * nicotine 7 MG/24HR 24 hr patch   Commonly known as:  NICODERM CQ   Place 1 patch onto the skin every 24 hours Step 3. Start after completing 14 mg patches.                                triamcinolone 0.1 % cream   Commonly known as:  KENALOG   Reported on 3/3/2017                                * Notice:  This list has 3 medication(s) that are the same as other medications prescribed for you. Read the directions carefully, and ask your doctor or other care provider to review them with you.

## 2017-03-08 NOTE — PROGRESS NOTES
Here with wife for Left leg angiogram. NPO, NKA, Not a fall risk. Reviewed contrast instructions states understanding. Doppler peripheral pulses, Last 2 toes on left foot are black, dry. Small area on anterior of middle toe has a black area. Reviewed procedure with pt and wife, questions answered. Consent obtained by Dr. Hamlin.

## 2017-03-08 NOTE — IP AVS SNAPSHOT
[place] [address]    [place] [address]    Phone:  [phone]                                       After Visit Summary   3/8/2017    Zia Hopkins    MRN: [id number]           After Visit Summary Signature Page     I have received my discharge instructions, and my questions have been answered. I have discussed any challenges I see with this plan with the nurse or doctor.    ..........................................................................................................................................  Patient/Patient Representative Signature      ..........................................................................................................................................  Patient Representative Print Name and Relationship to Patient    ..................................................               ................................................  Date                                            Time    ..........................................................................................................................................  Reviewed by Signature/Title    ...................................................              ..............................................  Date                                                            Time

## 2017-03-09 LAB
KCT BLD-ACNC: 138 SEC (ref 105–167)
KCT BLD-ACNC: 213 SEC (ref 105–167)

## 2017-03-14 ENCOUNTER — TELEPHONE (OUTPATIENT)
Dept: OTHER | Facility: CLINIC | Age: 67
End: 2017-03-14

## 2017-03-14 ENCOUNTER — OFFICE VISIT (OUTPATIENT)
Dept: PODIATRY | Facility: CLINIC | Age: 67
End: 2017-03-14
Payer: COMMERCIAL

## 2017-03-14 ENCOUNTER — RADIANT APPOINTMENT (OUTPATIENT)
Dept: GENERAL RADIOLOGY | Facility: CLINIC | Age: 67
End: 2017-03-14
Attending: PODIATRIST
Payer: COMMERCIAL

## 2017-03-14 ENCOUNTER — APPOINTMENT (OUTPATIENT)
Dept: MRI IMAGING | Facility: CLINIC | Age: 67
DRG: 617 | End: 2017-03-14
Attending: INTERNAL MEDICINE
Payer: MEDICARE

## 2017-03-14 ENCOUNTER — OFFICE VISIT (OUTPATIENT)
Dept: OTHER | Facility: CLINIC | Age: 67
DRG: 617 | End: 2017-03-14
Attending: SURGERY
Payer: MEDICARE

## 2017-03-14 ENCOUNTER — HOSPITAL ENCOUNTER (INPATIENT)
Facility: CLINIC | Age: 67
LOS: 5 days | Discharge: HOME | DRG: 617 | End: 2017-03-19
Attending: INTERNAL MEDICINE | Admitting: INTERNAL MEDICINE
Payer: MEDICARE

## 2017-03-14 VITALS — DIASTOLIC BLOOD PRESSURE: 101 MMHG | SYSTOLIC BLOOD PRESSURE: 185 MMHG | HEART RATE: 97 BPM

## 2017-03-14 DIAGNOSIS — Q74.2 TOE ANOMALY: Primary | ICD-10-CM

## 2017-03-14 DIAGNOSIS — I96 GANGRENE OF TOE (H): Primary | ICD-10-CM

## 2017-03-14 DIAGNOSIS — L97.529 FOOT ULCER, LEFT (H): Primary | ICD-10-CM

## 2017-03-14 DIAGNOSIS — I70.245 ATHEROSCLEROSIS OF NATIVE ARTERIES OF LEFT LEG WITH ULCERATION OF OTHER PART OF FOOT (H): ICD-10-CM

## 2017-03-14 DIAGNOSIS — I96 GANGRENE (H): Primary | ICD-10-CM

## 2017-03-14 DIAGNOSIS — I73.9 PAD (PERIPHERAL ARTERY DISEASE) (H): ICD-10-CM

## 2017-03-14 DIAGNOSIS — L03.119 CELLULITIS OF FOOT: ICD-10-CM

## 2017-03-14 DIAGNOSIS — I96 TOE GANGRENE (H): ICD-10-CM

## 2017-03-14 LAB
ANION GAP SERPL CALCULATED.3IONS-SCNC: 7 MMOL/L (ref 3–14)
BASOPHILS # BLD AUTO: 0 10E9/L (ref 0–0.2)
BASOPHILS NFR BLD AUTO: 0.2 %
BUN SERPL-MCNC: 15 MG/DL (ref 7–30)
CALCIUM SERPL-MCNC: 9.3 MG/DL (ref 8.5–10.1)
CHLORIDE SERPL-SCNC: 99 MMOL/L (ref 94–109)
CO2 SERPL-SCNC: 30 MMOL/L (ref 20–32)
CREAT SERPL-MCNC: 0.72 MG/DL (ref 0.66–1.25)
DIFFERENTIAL METHOD BLD: ABNORMAL
EOSINOPHIL # BLD AUTO: 0.2 10E9/L (ref 0–0.7)
EOSINOPHIL NFR BLD AUTO: 1.7 %
ERYTHROCYTE [DISTWIDTH] IN BLOOD BY AUTOMATED COUNT: 11.9 % (ref 10–15)
GFR SERPL CREATININE-BSD FRML MDRD: ABNORMAL ML/MIN/1.7M2
GLUCOSE BLDC GLUCOMTR-MCNC: 230 MG/DL (ref 70–99)
GLUCOSE BLDC GLUCOMTR-MCNC: 236 MG/DL (ref 70–99)
GLUCOSE SERPL-MCNC: 233 MG/DL (ref 70–99)
HCT VFR BLD AUTO: 39.6 % (ref 40–53)
HGB BLD-MCNC: 14.1 G/DL (ref 13.3–17.7)
IMM GRANULOCYTES # BLD: 0 10E9/L (ref 0–0.4)
IMM GRANULOCYTES NFR BLD: 0.3 %
LYMPHOCYTES # BLD AUTO: 1.4 10E9/L (ref 0.8–5.3)
LYMPHOCYTES NFR BLD AUTO: 11.9 %
MCH RBC QN AUTO: 32.3 PG (ref 26.5–33)
MCHC RBC AUTO-ENTMCNC: 35.6 G/DL (ref 31.5–36.5)
MCV RBC AUTO: 91 FL (ref 78–100)
MONOCYTES # BLD AUTO: 0.7 10E9/L (ref 0–1.3)
MONOCYTES NFR BLD AUTO: 6.3 %
NEUTROPHILS # BLD AUTO: 9.2 10E9/L (ref 1.6–8.3)
NEUTROPHILS NFR BLD AUTO: 79.6 %
NRBC # BLD AUTO: 0 10*3/UL
NRBC BLD AUTO-RTO: 0 /100
PLATELET # BLD AUTO: 237 10E9/L (ref 150–450)
POTASSIUM SERPL-SCNC: 4.8 MMOL/L (ref 3.4–5.3)
RBC # BLD AUTO: 4.37 10E12/L (ref 4.4–5.9)
SODIUM SERPL-SCNC: 136 MMOL/L (ref 133–144)
WBC # BLD AUTO: 11.5 10E9/L (ref 4–11)

## 2017-03-14 PROCEDURE — 85025 COMPLETE CBC W/AUTO DIFF WBC: CPT | Performed by: INTERNAL MEDICINE

## 2017-03-14 PROCEDURE — 99211 OFF/OP EST MAY X REQ PHY/QHP: CPT

## 2017-03-14 PROCEDURE — 73630 X-RAY EXAM OF FOOT: CPT | Mod: LT

## 2017-03-14 PROCEDURE — 25000132 ZZH RX MED GY IP 250 OP 250 PS 637: Mod: GY | Performed by: INTERNAL MEDICINE

## 2017-03-14 PROCEDURE — 99205 OFFICE O/P NEW HI 60 MIN: CPT | Performed by: PODIATRIST

## 2017-03-14 PROCEDURE — 80048 BASIC METABOLIC PNL TOTAL CA: CPT | Performed by: INTERNAL MEDICINE

## 2017-03-14 PROCEDURE — 73720 MRI LWR EXTREMITY W/O&W/DYE: CPT | Mod: LT

## 2017-03-14 PROCEDURE — A9270 NON-COVERED ITEM OR SERVICE: HCPCS | Mod: GY | Performed by: INTERNAL MEDICINE

## 2017-03-14 PROCEDURE — 99223 1ST HOSP IP/OBS HIGH 75: CPT | Mod: AI | Performed by: INTERNAL MEDICINE

## 2017-03-14 PROCEDURE — 25500064 ZZH RX 255 OP 636: Performed by: INTERNAL MEDICINE

## 2017-03-14 PROCEDURE — 00000146 ZZHCL STATISTIC GLUCOSE BY METER IP

## 2017-03-14 PROCEDURE — 99212 OFFICE O/P EST SF 10 MIN: CPT | Mod: ZP | Performed by: SURGERY

## 2017-03-14 PROCEDURE — 36415 COLL VENOUS BLD VENIPUNCTURE: CPT | Performed by: INTERNAL MEDICINE

## 2017-03-14 PROCEDURE — A9585 GADOBUTROL INJECTION: HCPCS | Performed by: INTERNAL MEDICINE

## 2017-03-14 PROCEDURE — 25000128 H RX IP 250 OP 636: Performed by: INTERNAL MEDICINE

## 2017-03-14 PROCEDURE — 12000007 ZZH R&B INTERMEDIATE

## 2017-03-14 PROCEDURE — 25000131 ZZH RX MED GY IP 250 OP 636 PS 637: Mod: GY | Performed by: INTERNAL MEDICINE

## 2017-03-14 PROCEDURE — 25000125 ZZHC RX 250: Performed by: INTERNAL MEDICINE

## 2017-03-14 RX ORDER — LOSARTAN POTASSIUM AND HYDROCHLOROTHIAZIDE 12.5; 5 MG/1; MG/1
1 TABLET ORAL DAILY
Status: ON HOLD | COMMUNITY
End: 2019-07-30

## 2017-03-14 RX ORDER — NALOXONE HYDROCHLORIDE 0.4 MG/ML
.1-.4 INJECTION, SOLUTION INTRAMUSCULAR; INTRAVENOUS; SUBCUTANEOUS
Status: DISCONTINUED | OUTPATIENT
Start: 2017-03-14 | End: 2017-03-19 | Stop reason: HOSPADM

## 2017-03-14 RX ORDER — AMOXICILLIN 250 MG
1-2 CAPSULE ORAL 2 TIMES DAILY
Status: DISCONTINUED | OUTPATIENT
Start: 2017-03-14 | End: 2017-03-19 | Stop reason: HOSPADM

## 2017-03-14 RX ORDER — HYDRALAZINE HYDROCHLORIDE 20 MG/ML
10 INJECTION INTRAMUSCULAR; INTRAVENOUS EVERY 4 HOURS PRN
Status: DISCONTINUED | OUTPATIENT
Start: 2017-03-14 | End: 2017-03-19 | Stop reason: HOSPADM

## 2017-03-14 RX ORDER — ONDANSETRON 4 MG/1
4 TABLET, ORALLY DISINTEGRATING ORAL EVERY 6 HOURS PRN
Status: DISCONTINUED | OUTPATIENT
Start: 2017-03-14 | End: 2017-03-19 | Stop reason: HOSPADM

## 2017-03-14 RX ORDER — ATORVASTATIN CALCIUM 40 MG/1
40 TABLET, FILM COATED ORAL AT BEDTIME
Status: DISCONTINUED | OUTPATIENT
Start: 2017-03-14 | End: 2017-03-19 | Stop reason: HOSPADM

## 2017-03-14 RX ORDER — HYDROMORPHONE HYDROCHLORIDE 1 MG/ML
0.2 INJECTION, SOLUTION INTRAMUSCULAR; INTRAVENOUS; SUBCUTANEOUS
Status: DISCONTINUED | OUTPATIENT
Start: 2017-03-14 | End: 2017-03-19 | Stop reason: HOSPADM

## 2017-03-14 RX ORDER — EZETIMIBE 10 MG/1
10 TABLET ORAL DAILY
Status: DISCONTINUED | OUTPATIENT
Start: 2017-03-14 | End: 2017-03-19 | Stop reason: HOSPADM

## 2017-03-14 RX ORDER — SULFAMETHOXAZOLE/TRIMETHOPRIM 800-160 MG
1 TABLET ORAL 2 TIMES DAILY
Qty: 14 TABLET | Refills: 0 | Status: ON HOLD | OUTPATIENT
Start: 2017-03-14 | End: 2017-03-14

## 2017-03-14 RX ORDER — METFORMIN HCL 500 MG
500 TABLET, EXTENDED RELEASE 24 HR ORAL
COMMUNITY
End: 2021-02-11 | Stop reason: DRUGHIGH

## 2017-03-14 RX ORDER — GADOBUTROL 604.72 MG/ML
7 INJECTION INTRAVENOUS ONCE
Status: COMPLETED | OUTPATIENT
Start: 2017-03-14 | End: 2017-03-14

## 2017-03-14 RX ORDER — OXYCODONE HYDROCHLORIDE 5 MG/1
5-10 TABLET ORAL
Status: DISCONTINUED | OUTPATIENT
Start: 2017-03-14 | End: 2017-03-19 | Stop reason: HOSPADM

## 2017-03-14 RX ORDER — ONDANSETRON 2 MG/ML
4 INJECTION INTRAMUSCULAR; INTRAVENOUS EVERY 6 HOURS PRN
Status: DISCONTINUED | OUTPATIENT
Start: 2017-03-14 | End: 2017-03-19 | Stop reason: HOSPADM

## 2017-03-14 RX ORDER — PIPERACILLIN SODIUM, TAZOBACTAM SODIUM 3; .375 G/15ML; G/15ML
3.38 INJECTION, POWDER, LYOPHILIZED, FOR SOLUTION INTRAVENOUS EVERY 6 HOURS
Status: DISCONTINUED | OUTPATIENT
Start: 2017-03-14 | End: 2017-03-19 | Stop reason: HOSPADM

## 2017-03-14 RX ORDER — METOPROLOL SUCCINATE 50 MG/1
50 TABLET, EXTENDED RELEASE ORAL DAILY
Status: DISCONTINUED | OUTPATIENT
Start: 2017-03-15 | End: 2017-03-19 | Stop reason: HOSPADM

## 2017-03-14 RX ORDER — NICOTINE POLACRILEX 4 MG
15-30 LOZENGE BUCCAL
Status: DISCONTINUED | OUTPATIENT
Start: 2017-03-14 | End: 2017-03-19 | Stop reason: HOSPADM

## 2017-03-14 RX ORDER — DEXTROSE MONOHYDRATE 25 G/50ML
25-50 INJECTION, SOLUTION INTRAVENOUS
Status: DISCONTINUED | OUTPATIENT
Start: 2017-03-14 | End: 2017-03-19 | Stop reason: HOSPADM

## 2017-03-14 RX ORDER — LOSARTAN POTASSIUM AND HYDROCHLOROTHIAZIDE 12.5; 5 MG/1; MG/1
1 TABLET ORAL DAILY
Status: DISCONTINUED | OUTPATIENT
Start: 2017-03-15 | End: 2017-03-19 | Stop reason: HOSPADM

## 2017-03-14 RX ORDER — DEXTROSE MONOHYDRATE, SODIUM CHLORIDE, AND POTASSIUM CHLORIDE 50; .745; 4.5 G/1000ML; G/1000ML; G/1000ML
INJECTION, SOLUTION INTRAVENOUS CONTINUOUS
Status: DISCONTINUED | OUTPATIENT
Start: 2017-03-15 | End: 2017-03-14

## 2017-03-14 RX ORDER — SODIUM CHLORIDE 9 MG/ML
INJECTION, SOLUTION INTRAVENOUS CONTINUOUS
Status: DISCONTINUED | OUTPATIENT
Start: 2017-03-14 | End: 2017-03-16

## 2017-03-14 RX ORDER — ACETAMINOPHEN 325 MG/1
650 TABLET ORAL EVERY 4 HOURS PRN
Status: DISCONTINUED | OUTPATIENT
Start: 2017-03-14 | End: 2017-03-19 | Stop reason: HOSPADM

## 2017-03-14 RX ADMIN — PIPERACILLIN SODIUM,TAZOBACTAM SODIUM 3.38 G: 3; .375 INJECTION, POWDER, FOR SOLUTION INTRAVENOUS at 19:59

## 2017-03-14 RX ADMIN — INSULIN ASPART 2 UNITS: 100 INJECTION, SOLUTION INTRAVENOUS; SUBCUTANEOUS at 19:08

## 2017-03-14 RX ADMIN — SODIUM CHLORIDE: 9 INJECTION, SOLUTION INTRAVENOUS at 14:43

## 2017-03-14 RX ADMIN — HYDRALAZINE HYDROCHLORIDE 10 MG: 20 INJECTION INTRAMUSCULAR; INTRAVENOUS at 17:47

## 2017-03-14 RX ADMIN — PIPERACILLIN SODIUM,TAZOBACTAM SODIUM 3.38 G: 3; .375 INJECTION, POWDER, FOR SOLUTION INTRAVENOUS at 14:42

## 2017-03-14 RX ADMIN — GADOBUTROL 7 ML: 604.72 INJECTION INTRAVENOUS at 22:44

## 2017-03-14 RX ADMIN — ATORVASTATIN CALCIUM 40 MG: 40 TABLET, FILM COATED ORAL at 22:55

## 2017-03-14 RX ADMIN — EZETIMIBE 10 MG: 10 TABLET ORAL at 22:55

## 2017-03-14 NOTE — PLAN OF CARE
Problem: Tissue Perfusion, Ineffective Peripheral (Adult)  Goal: Identify Related Risk Factors and Signs and Symptoms  Related risk factors and signs and symptoms are identified upon initiation of Human Response Clinical Practice Guideline (CPG)  Patient A&0x4. Up independently. Lungs diminished. +2 edema in left foot and ankle. +1 edema in left leg. Left foot is red and left little toe is necrotic. Scabs noted on other toes. Denies pain.

## 2017-03-14 NOTE — NURSING NOTE
"Chief Complaint   Patient presents with     RECHECK     concerns about left foot swelling and changes; history of left leg angiogram on 3/8/17       Initial BP (!) 200/98 (BP Location: Left arm, Patient Position: Chair, Cuff Size: Adult Regular)  Pulse 98 Estimated body mass index is 25.03 kg/(m^2) as calculated from the following:    Height as of 3/8/17: 5' 6\" (1.676 m).    Weight as of 3/8/17: 155 lb 1.6 oz (70.4 kg).  Medication Reconciliation: complete     Face to face nursing time: 8 minutes    Kyleigh Santoyo MA     "

## 2017-03-14 NOTE — Clinical Note
Dr. Hamlin,  Thank you for the consult.  My note is available for your review.   I did admit Mr. Hopkins to [facility] given extent of infection, need for more urgent surgery, HTN.   Nilay Ann, KEMAR, FACFAS

## 2017-03-14 NOTE — PHARMACY-ADMISSION MEDICATION HISTORY
Admission medication history interview status for the 3/14/2017  admission is complete. See EPIC admission navigator for prior to admission medications     Medication history source reliability:Good    Actions taken by pharmacist (provider contacted, etc):Interviewed pt     Additional medication history information not noted on PTA med list :Pt completed 7 day RX for Yousuf DS on 3-2-17    Medication reconciliation/reorder completed by provider prior to medication history? No    Time spent in this activity: 15 minutes    Prior to Admission medications    Medication Sig Last Dose Taking? Auth Provider   Atorvastatin Calcium (LIPITOR PO) Take 40 mg by mouth At Bedtime 3/13/2017 at HS Yes Unknown, Entered By History   losartan-hydrochlorothiazide (HYZAAR) 50-12.5 MG per tablet Take 1 tablet by mouth daily 3/14/2017 at AM Yes Unknown, Entered By History   metFORMIN (GLUCOPHAGE-XR) 500 MG 24 hr tablet Take 500 mg by mouth daily (with breakfast) 3/14/2017 at AM Yes Unknown, Entered By History   Metoprolol Succinate (TOPROL XL PO) Take 50 mg by mouth daily 3/14/2017 at AM Yes Unknown, Entered By History   clopidogrel (PLAVIX) 75 MG tablet Take 1 tablet (75 mg) by mouth daily Start taking medication the day after the procedure 3/14/2017 at AM Yes Surjit Hamlin MD   aspirin 81 MG tablet Take 1 tablet (81 mg) by mouth daily 3/14/2017 at AM Yes Shaina Norton PA-C   ezetimibe (ZETIA) 10 MG tablet Take 1 tablet (10 mg) by mouth daily 3/13/2017 at HS Yes Shaina Norton PA-C   triamcinolone (KENALOG) 0.1 % cream Apply to affected area as needed PRN Yes Reported, Patient

## 2017-03-14 NOTE — PROGRESS NOTES
Mr. Hopkins underwent left SFA and popliteal balloon agioplasty/stenting last week. He noticed increased redness and swelling. On exam he has significantly improved arterial signals in left PT and DP consistent with the recent revascularization. His left little toe has dry gangrene with increased surrounding erythema. Left little toe should be amputated and we will request Dr. Ann from podiatry and see him. We will get non invasive studies in two weeks as part of the surveillance. He will need antibiotics and I sent a prescription to his pharmacy.

## 2017-03-14 NOTE — CONSULTS
Vascular Surg Consult  Pt seen at 1500    HPI: Mr. Zia Hopkins is a 68yo male who presents with increasing L foot swelling and erythema. Underwent LLE angio w/ plasty and stenting without complication. Doing well at home until he noticed above changes over past several days. Saw Dr. Hamlin in the office today along with podiatry. Podiatry recommend admission and possible intervention (amputation) for necrotic changes presents    ROS:  Increased redness and swelling of left foot. Denies fever, chills, nausea, vomiting, chest pain, constipation, diarrhea, dysuria, neurological deficits, weight changes, and new skin lesions.    PMH/PSH: s/p left leg angiogram w/ plasty of popliteal and stenting of SFA March 8th, 2017  Past Medical History   Diagnosis Date     Diabetes mellitus type 2, noninsulin dependent (H)      Hyperlipidemia LDL goal <70      Hypertension      PAD (peripheral artery disease) (H)          Meds:   Current Facility-Administered Medications   Medication     atorvastatin (LIPITOR) tablet 40 mg     ezetimibe (ZETIA) tablet 10 mg     [START ON 3/15/2017] losartan-hydrochlorothiazide (HYZAAR) 50-12.5 MG per tablet 1 tablet     [START ON 3/15/2017] metoprolol (TOPROL-XL) 24 hr tablet 50 mg     glucose 40 % gel 15-30 g    Or     dextrose 50 % injection 25-50 mL    Or     glucagon injection 1 mg     naloxone (NARCAN) injection 0.1-0.4 mg     0.9% sodium chloride infusion     oxyCODONE (ROXICODONE) IR tablet 5-10 mg     acetaminophen (TYLENOL) tablet 650 mg     HYDROmorphone (PF) (DILAUDID) injection 0.2 mg     senna-docusate (SENOKOT-S;PERICOLACE) 8.6-50 MG per tablet 1-2 tablet     ondansetron (ZOFRAN-ODT) ODT tab 4 mg    Or     ondansetron (ZOFRAN) injection 4 mg     piperacillin-tazobactam (ZOSYN) 3.375 g vial to attach to  mL bag     insulin aspart (NovoLOG) inj (RAPID ACTING)     insulin aspart (NovoLOG) inj (RAPID ACTING)     vancomycin (VANCOCIN) 1500 mg in 0.9% NaCl 250 mL PREMIX      hydrALAZINE (APRESOLINE) injection 10 mg       Allg: NKDA     FH: DM    SH: Former smoker. Occ EtOH. No illicit drug use. .     PE  Vitals: B/P: 175/97, T: 98.4, P: Data Unavailable, R: Data Unavailable  Gen: middle age male, nad, wife present, both pleasant  HEENT: facial features symmetrical  CV: RRR  Resp: no labored breathing  Abd: s/nt/nd  Neuro: A&Ox4, gross motor/sensory intact  Ext: palpable bilateral femora/popliteal/R pedal pulses; multiphasic L DP/PT signal; left foot w/ distal erythema and associated necrosis/eschar to toes    Labs: Most recent reviewed.   Hemoglobin   Date Value Ref Range Status   03/08/2017 15.1 13.3 - 17.7 g/dL Final     Platelet Count   Date Value Ref Range Status   03/08/2017 240 150 - 450 10e9/L Final     INR   Date Value Ref Range Status   03/08/2017 0.87 0.86 - 1.14 Final     Creatinine   Date Value Ref Range Status   03/08/2017 0.72 0.66 - 1.25 mg/dL Final     Imaging: Most recent reviewed.     A/P: 68yo male with LLE CLI is currently stable and neurovascularly intact.  -discussed the following w/ staff  -no acute vascular intervention  -surgical intervention per podiatry; anticipate that current vascular exam would support adequate wound healing  -will follow peripherally; call w/ questions/concerns    Maurice Denis MD  Pg #[phone]     I met with Mr. Hopkins as he was on his way to his podiatry procedure. He is well known to me from his recent left lower extremity arterial intervention from critical limb ischemia. I also saw him in the office few hours prior to his current hospital admission. No further recommendations from my side at this point. OK to proceed with foot surgery as indicated.

## 2017-03-14 NOTE — IP AVS SNAPSHOT
[facility]    [address] [place] [address]    Phone:  [phone]                                       After Visit Summary   3/14/2017    Zia Hopkins    MRN: [id number]           After Visit Summary Signature Page     I have received my discharge instructions, and my questions have been answered. I have discussed any challenges I see with this plan with the nurse or doctor.    ..........................................................................................................................................  Patient/Patient Representative Signature      ..........................................................................................................................................  Patient Representative Print Name and Relationship to Patient    ..................................................               ................................................  Date                                            Time    ..........................................................................................................................................  Reviewed by Signature/Title    ...................................................              ..............................................  Date                                                            Time

## 2017-03-14 NOTE — IP AVS SNAPSHOT
MRN:[id number]                      After Visit Summary   3/14/2017    Zia Hopkins    MRN: [id number]           Thank you!     Thank you for choosing [facility] for your care. Our goal is always to provide you with excellent care. Hearing back from our patients is one way we can continue to improve our services. Please take a few minutes to complete the written survey that you may receive in the mail after you visit with us. Thank you!        Patient Information     Date Of Birth          1950        About your hospital stay     You were admitted on:  March 14, 2017 You last received care in the:  [facility] [address] [facility]    You were discharged on:  March 19, 2017        Reason for your hospital stay       Infected left 5th toe and cellulitis.                  Who to Call     For medical emergencies, please call 911.  For non-urgent questions about your medical care, please call your primary care provider or clinic, [phone]  For questions related to your surgery, please call your surgery clinic        Attending Provider     Provider Specialty    Emre Samuel MD Internal Medicine       Primary Care Provider Office Phone # Fax #    Reid Reese [phone]       [facility] [address]    [place] [address]        After Care Instructions     Activity       Your activity upon discharge: minimal heel weight bearing in post op boot/post op shoe            Diet       Follow this diet upon discharge:       Consistent Carbohydrate Diet 6504-4174 Calories: Moderate Consistent CHO (4-6 CHO units/meal)            Wound care and dressings       Instructions to care for your wound at home: Keep foot and dressing dry. Will change at patients first clinic visit.                  Follow-up Appointments     Follow Up and recommended labs and tests       Follow up with Dr. Mauricio in 1 week from discharge from hospital. To make appointment,  "call:  [phone].    For questions or concerns, call:  [phone]                  Your next 10 appointments already scheduled     Apr 07, 2017  9:00 AM CDT   (Arrive by 8:45 AM)   US DAVID DOPPLER NO EXERCISE with SHVUS1   [facility] ([facility])    [address]  [place] [address]   [phone]           Please bring a list of your medicines (including vitamins, minerals and over-the-counter drugs). Also, tell your doctor about any allergies you may have. Wear comfortable clothes and leave your valuables at home.  No caffeine or tobacco for 1 hour prior to exam.  Please call the Imaging Department at your exam site with any questions.            Apr 07, 2017  9:30 AM CDT   Post Op with Surjit Hamlin MD   [facility] ([facility])    [address]  [place] [id number] [phone]              Pending Results     Date and Time Order Name Status Description    3/15/2017 1613 Anaerobic bacterial culture Preliminary     3/15/2017 1610 Anaerobic bacterial culture Preliminary             Statement of Approval     Ordered          03/19/17 1235  I have reviewed and agree with all the recommendations and orders detailed in this document.  EFFECTIVE NOW     Approved and electronically signed by:  Dm Monroe MD             Admission Information     Date & Time Provider Department Dept. Phone    3/14/2017 Emre Samuel MD [facility] [phone]      Your Vitals Were     Blood Pressure Pulse Temperature Respirations Height Weight    151/78 (BP Location: Left arm) 66 98.8  F (37.1  C) (Oral) 16 1.676 m (5' 6\") 71.1 kg (156 lb 12 oz)    Pulse Oximetry BMI (Body Mass Index)                94% 25.3 kg/m2          MyChart Information     mYwindow lets you send messages to your doctor, view your test " "results, renew your prescriptions, schedule appointments and more. To sign up, go to [email or web address] . Click on \"Log in\" on the left side of the screen, which will take you to the Welcome page. Then click on \"Sign up Now\" on the right side of the page.     You will be asked to enter the access code listed below, as well as some personal information. Please follow the directions to create your username and password.     Your access code is: [email or web address]  Expires: 2017  9:28 AM     Your access code will  in 90 days. If you need help or a new code, please call your [facility] clinic or [phone].        Care EveryWhere ID     This is your Care EveryWhere ID. This could be used by other organizations to access your [facility] medical records  [id number]           Review of your medicines      START taking        Dose / Directions    cephALEXin 500 MG capsule   Commonly known as:  KEFLEX        Dose:  500 mg   Take 1 capsule (500 mg) by mouth 4 times daily for 14 days   Quantity:  56 capsule   Refills:  0       order for DME        Equipment being ordered: Crutches () Treatment Diagnosis: Impaired gait   Quantity:  1 each   Refills:  0         CONTINUE these medicines which have NOT CHANGED        Dose / Directions    aspirin 81 MG tablet   Used for:  Atherosclerosis of native arteries of left leg with ulceration of other part of foot (H)        Dose:  81 mg   Start taking on:  3/20/2017   Take 1 tablet (81 mg) by mouth daily   Quantity:  90 tablet   Refills:  1       clopidogrel 75 MG tablet   Commonly known as:  PLAVIX   Used for:  Atherosclerosis of native arteries of left leg with ulceration of other part of foot (H)        Dose:  75 mg   Start taking on:  3/20/2017   Take 1 tablet (75 mg) by mouth daily Start taking medication the day after the procedure   Quantity:  90 tablet   Refills:  1       ezetimibe 10 MG tablet   Commonly known as:  ZETIA   Used for:  Hyperlipidemia LDL goal <70     "    Dose:  10 mg   Take 1 tablet (10 mg) by mouth daily   Quantity:  90 tablet   Refills:  3       LIPITOR PO        Dose:  40 mg   Take 40 mg by mouth At Bedtime   Refills:  0       losartan-hydrochlorothiazide 50-12.5 MG per tablet   Commonly known as:  HYZAAR        Dose:  1 tablet   Take 1 tablet by mouth daily   Refills:  0       metFORMIN 500 MG 24 hr tablet   Commonly known as:  GLUCOPHAGE-XR        Dose:  500 mg   Take 500 mg by mouth daily (with breakfast)   Refills:  0       TOPROL XL PO        Dose:  50 mg   Take 50 mg by mouth daily   Refills:  0       triamcinolone 0.1 % cream   Commonly known as:  KENALOG        Apply to affected area as needed   Refills:  5            Where to get your medicines      These medications were sent to [facility] [address] [facility] [address] [facility] [place] [address]     Phone:  [phone]     aspirin 81 MG tablet    cephALEXin 500 MG capsule    clopidogrel 75 MG tablet         Some of these will need a paper prescription and others can be bought over the counter. Ask your nurse if you have questions.     Bring a paper prescription for each of these medications     order for DME                Protect others around you: Learn how to safely use, store and throw away your medicines at www.disposemymeds.org.             Medication List: This is a list of all your medications and when to take them. Check marks below indicate your daily home schedule. Keep this list as a reference.      Medications           Morning Afternoon Evening Bedtime As Needed    aspirin 81 MG tablet   Take 1 tablet (81 mg) by mouth daily   Start taking on:  3/20/2017   Next Dose Due:  3/20/17 morning                                   cephALEXin 500 MG capsule   Commonly known as:  KEFLEX   Take 1 capsule (500 mg) by mouth 4 times daily for 14 days   Next Dose Due:  Take 2 doses today, 3/19/17 afternoon and evening                                              clopidogrel 75 MG tablet   Commonly known as:  PLAVIX   Take 1 tablet (75 mg) by mouth daily Start taking medication the day after the procedure   Start taking on:  3/20/2017                                   ezetimibe 10 MG tablet   Commonly known as:  ZETIA   Take 1 tablet (10 mg) by mouth daily   Last time this was given:  10 mg on 3/18/2017  9:20 PM   Next Dose Due:  Tonight, 3/19/17 bedtime                                   LIPITOR PO   Take 40 mg by mouth At Bedtime   Last time this was given:  40 mg on 3/18/2017  9:20 PM   Next Dose Due:  Tonight, 3/19/17 bedtime                                   losartan-hydrochlorothiazide 50-12.5 MG per tablet   Commonly known as:  HYZAAR   Take 1 tablet by mouth daily   Last time this was given:  1 tablet on 3/19/2017  9:20 AM   Next Dose Due:  3/20/17 morning                                   metFORMIN 500 MG 24 hr tablet   Commonly known as:  GLUCOPHAGE-XR   Take 500 mg by mouth daily (with breakfast)   Next Dose Due:  3/20/17 morning                                   order for DME   Equipment being ordered: Crutches () Treatment Diagnosis: Impaired gait                                TOPROL XL PO   Take 50 mg by mouth daily   Last time this was given:  50 mg on 3/19/2017  9:20 AM   Next Dose Due:  3/20/17 morning                                   triamcinolone 0.1 % cream   Commonly known as:  KENALOG   Apply to affected area as needed                                             More Information        Crutch Walking    Crutch adjustment  Make sure the crutches you use are adjusted to fit you. When you stand, there should be room to fit 2 to 3 fingers between the top of the crutch and your armpit. Your elbow should be slightly bent when holding the hand . When your arms hang down, the crutch handle should be at the top of your hip.   Crutch walking  Place the crutches forward about 1 foot in front of you. The crutches should be a little  farther apart than your body. Lean your weight forward as you push down on the hand . Make sure your weight is on your hands and your strong leg, not your armpits. Let your body swing forward, landing on the strong leg. Move the crutches forward again. The crutch and your injured leg should move together.  Going up steps with no handrails  (Up with the good leg)    With both crutches (under each armpit) on the same step as your feet, push down on the hand .    Balancing with very light pressure on the weak leg, let your hands support your weight. Raise your strong leg onto the next higher step.    Transfer all your weight to your strong leg (still bent). Move the crutches up to the next step, next to your strong leg.    Keep your weight evenly balanced on the two crutches and your strong leg. Straighten your strong knee as you raise your weak leg up to the next step.  Going down steps with no handrails  (Down with the bad leg)    With both crutches (under each armpit) on the same step as your feet, push down on the hand .    Keep your weight evenly balanced on the two crutches and your strong leg. Bend your strong knee as you lower your weak leg down to the next step.    Let your strong leg support you (still bent) as you move the crutches down next to the weak leg.    Transfer your weight to your hands. Balance with very light pressure on your weak leg as you lower your strong leg next to your weak leg  Going up steps with handrails  (Up with the good leg)    Face the stairs, holding the handrail with one hand. Place both crutches under your armpit on the opposite side. Push down on the hand .    Balancing with very light pressure on the weak leg, let your hands support your weight. Raise your strong leg onto the next higher step.    Transfer all your weight to your strong leg (still bent) as you move the crutches up (while holding on to the handrail) to the next step next to the strong  leg.    Keep your weight evenly balanced on the handrail, the crutches (still under the same armpit opposite the handrail), and your strong leg. Straighten your strong knee as you raise the weak leg up to the next step.  Going down steps with handrails  (Down with the bad leg)    Face the stairs, holding the handrail with one hand. Place both crutches under your armpit on the opposite side. Push down on the hand .    Balance your weight evenly on the crutches, handrail, and your strong leg. Then bend your strong knee as you lower the weak leg down to the next step.    Let the handrail and your strong leg support you (still bent) as you move the crutches down alongside the weak leg.    While holding on to the handrail and crutches (under the same armpit on the other side), transfer your weight to your hands, balancing with very light pressure on the weak leg as you lower your strong leg alongside your weak leg  Tip: If you are worried about falling or you feel unsteady, try sitting when going up or down stairs instead. Sit on the bottom step and keep your injured leg out in front of you. Hold your crutches flat against the stairs. Then slide up to the next step on your bottom. Use your free hand and good leg for support. Face the same way when going down stairs.    8220-2824 [employer]. [address] [place] [address], [place] [address]. All rights reserved. This information is not intended as a substitute for professional medical care. Always follow your healthcare professional's instructions.

## 2017-03-14 NOTE — PROGRESS NOTES
Foot & Ankle Surgery  March 14, 2017    Admit from Dr Ann's clinic, referred from Dr Hamlin's clinic for gangrenous left 5th toe.  MRI pending. ID has seen the patient.    To OR tomorrow for partial left foot/5th toe amputation with Dr Mauricio, 2:00PM    I met with patient and family at bedside this PM, discussed in detail the pathology, surgery, pros/cons and risks.      PE - gangrenous L 5th toe, dry with slight serous drainage from medial aspect of toes.  Superficial eschar lesser toes and plantar medial 1st MPJ as well.  Pulses not readily palpable.  No purulence, no soft tissue crepitus.      NPO after midnight.    All questions answered, patient happy with plan.    Erik Baez DPM   Podiatric Foot & Ankle Surgeon  [facility]  [phone]

## 2017-03-14 NOTE — MR AVS SNAPSHOT
"              After Visit Summary   3/14/2017    Zia Hopkins    MRN: [id number]           Patient Information     Date Of Birth          1950        Visit Information        Provider Department      3/14/2017 11:00 AM Nilay Ann DPM [facility] [place]        Today's Diagnoses     Gangrene (H)    -  1    PAD (peripheral artery disease) (H)        Cellulitis of foot           Follow-ups after your visit        Who to contact     If you have questions or need follow up information about today's clinic visit or your schedule please contact [facility] directly at [phone].  Normal or non-critical lab and imaging results will be communicated to you by Vehrityhart, letter or phone within 4 business days after the clinic has received the results. If you do not hear from us within 7 days, please contact the clinic through Vehrityhart or phone. If you have a critical or abnormal lab result, we will notify you by phone as soon as possible.  Submit refill requests through Sinobpo or call your pharmacy and they will forward the refill request to us. Please allow 3 business days for your refill to be completed.          Additional Information About Your Visit        MyChart Information     Sinobpo lets you send messages to your doctor, view your test results, renew your prescriptions, schedule appointments and more. To sign up, go to [email or web address] . Click on \"Log in\" on the left side of the screen, which will take you to the Welcome page. Then click on \"Sign up Now\" on the right side of the page.     You will be asked to enter the access code listed below, as well as some personal information. Please follow the directions to create your username and password.     Your access code is: [email or web address]  Expires: 2017  9:28 AM     Your access code will  in 90 days. If you need help or a new code, please call your [facility] or [phone].        Care EveryWhere ID     This is " your Care EveryWhere ID. This could be used by other organizations to access your [facility] medical records  [id number]         Blood Pressure from Last 3 Encounters:   03/14/17 (!) (P) 190/104   03/14/17 (!) 185/101   03/08/17 163/75    Weight from Last 3 Encounters:   03/14/17 (P) 155 lb (70.3 kg)   03/08/17 155 lb 1.6 oz (70.4 kg)   03/03/17 157 lb (71.2 kg)              We Performed the Following     XR Foot Left G/E 3 Views          Today's Medication Changes          These changes are accurate as of: 3/14/17 12:04 PM.  If you have any questions, ask your nurse or doctor.               Start taking these medicines.        Dose/Directions    sulfamethoxazole-trimethoprim 800-160 MG per tablet   Commonly known as:  BACTRIM DS/SEPTRA DS   Used for:  PAD (peripheral artery disease) (H)   Started by:  Surjit Hamlin MD        Dose:  1 tablet   Take 1 tablet by mouth 2 times daily   Quantity:  14 tablet   Refills:  0            Where to get your medicines      These medications were sent to [facility] [address] [facility] [address] [facility]  [address]  [facility] [place] [address]     Phone:  [phone]     sulfamethoxazole-trimethoprim 800-160 MG per tablet                Primary Care Provider Office Phone # Fax #    Reid Reese [phone]       [facility] [address]    [facility] [address]        Thank you!     Thank you for choosing [facility]  for your care. Our goal is always to provide you with excellent care. Hearing back from our patients is one way we can continue to improve our services. Please take a few minutes to complete the written survey that you may receive in the mail after your visit with us. Thank you!             Your Updated Medication List - Protect others around you: Learn how to safely use, store and throw away your medicines at www.disposemymeds.org.          This list is accurate as of:  3/14/17 12:04 PM.  Always use your most recent med list.                   Brand Name Dispense Instructions for use    aspirin 81 MG tablet     90 tablet    Take 1 tablet (81 mg) by mouth daily       atorvastatin 40 MG tablet    LIPITOR     TK 1 T PO HS       clopidogrel 75 MG tablet    PLAVIX    90 tablet    Take 1 tablet (75 mg) by mouth daily Start taking medication the day after the procedure       ezetimibe 10 MG tablet    ZETIA    90 tablet    Take 1 tablet (10 mg) by mouth daily       losartan-hydrochlorothiazide 50-12.5 MG per tablet    HYZAAR     TK 1 T PO  D       metFORMIN 500 MG 24 hr tablet    GLUCOPHAGE-XR    180 tablet    Take 2 tablets (1,000 mg) by mouth daily (with dinner)       metoprolol 50 MG 24 hr tablet    TOPROL-XL     TK 1 T PO  D       * nicotine 21 MG/24HR 24 hr patch    NICODERM CQ    30 patch    Place 1 patch onto the skin every 24 hours Step 1       * nicotine 14 MG/24HR 24 hr patch    NICODERM CQ    30 patch    Place 1 patch onto the skin every 24 hours Step 2. Start after completing 21 mg patches.       * nicotine 7 MG/24HR 24 hr patch    NICODERM CQ    30 patch    Place 1 patch onto the skin every 24 hours Step 3. Start after completing 14 mg patches.       sulfamethoxazole-trimethoprim 800-160 MG per tablet    BACTRIM DS/SEPTRA DS    14 tablet    Take 1 tablet by mouth 2 times daily       triamcinolone 0.1 % cream    KENALOG     Reported on 3/3/2017       * Notice:  This list has 3 medication(s) that are the same as other medications prescribed for you. Read the directions carefully, and ask your doctor or other care provider to review them with you.

## 2017-03-14 NOTE — MR AVS SNAPSHOT
After Visit Summary   3/14/2017    Zia Hopkins    MRN: [id number]           Patient Information     Date Of Birth          1950        Visit Information        Provider Department      3/14/2017 10:00 AM Surjit Hamlin MD [facility]  [facility] Center      Today's Diagnoses     Toe anomaly    -  1    PAD (peripheral artery disease) (H)           Follow-ups after your visit        Additional Services     PODIATRY/FOOT & ANKLE SURGERY REFERRAL       Your provider has referred you to: [facility] [place] [phone]   [email or web address]    Please be aware that coverage of these services is subject to the terms and limitations of your health insurance plan.  Call member services at your health plan with any benefit or coverage questions.      Please bring the following to your appointment:  >>   Any x-rays, CTs or MRIs which have been performed.  Contact the facility where they were done to arrange for  prior to your scheduled appointment.    >>   List of current medications   >>   This referral request   >>   Any documents/labs given to you for this referral                  Future tests that were ordered for you today     Open Standing Orders        Priority Remaining Interval Expires Ordered    Creatinine Routine 4/4 DAILY  3/14/2017    Glucose monitor nursing POCT- IF PO (eating meals or on bolus enteral feedings), within 30 minutes prior to each meal and at bedtime. Routine 60/62 4 TIMES DAILY BEFORE MEALS & AT BEDTIME  3/14/2017    Activity: Up ad darshan Routine [id number] PRN  3/14/2017    Daily weights Routine 1/1 DAILY  3/14/2017    Glucose monitor nursing POCT Routine 100/100 CONDITIONAL (SPECIFY)  3/14/2017    Glucose monitor nursing POCT Routine 100/100 CONDITIONAL (SPECIFY)  3/14/2017    Notify Provider Routine [id number] PRN  3/14/2017            Who to contact     If you have  "questions or need follow up information about today's clinic visit or your schedule please contact [facility] directly at [phone].  Normal or non-critical lab and imaging results will be communicated to you by MyChart, letter or phone within 4 business days after the clinic has received the results. If you do not hear from us within 7 days, please contact the clinic through [employer] or phone. If you have a critical or abnormal lab result, we will notify you by phone as soon as possible.  Submit refill requests through [employer] or call your pharmacy and they will forward the refill request to us. Please allow 3 business days for your refill to be completed.          Additional Information About Your Visit        [employer] Information     [employer] lets you send messages to your doctor, view your test results, renew your prescriptions, schedule appointments and more. To sign up, go to [email or web address] . Click on \"Log in\" on the left side of the screen, which will take you to the Welcome page. Then click on \"Sign up Now\" on the right side of the page.     You will be asked to enter the access code listed below, as well as some personal information. Please follow the directions to create your username and password.     Your access code is: [email or web address]  Expires: 2017  9:28 AM     Your access code will  in 90 days. If you need help or a new code, please call your [place] clinic or [phone].        Care EveryWhere ID     This is your Care EveryWhere ID. This could be used by other organizations to access your [place] medical records  [id number]        Your Vitals Were     Pulse                   97            Blood Pressure from Last 3 Encounters:   17 (!) 175/97   17 (!) (P) 190/104   17 (!) 185/101    Weight from Last 3 Encounters:   17 (P) 155 lb (70.3 kg)   17 155 lb 1.6 oz (70.4 kg)   17 157 lb (71.2 kg)              We Performed the " Following     PODIATRY/FOOT & ANKLE SURGERY REFERRAL          Today's Medication Changes          These changes are accurate as of: 3/14/17 12:55 PM.  If you have any questions, ask your nurse or doctor.               Start taking these medicines.        Dose/Directions    sulfamethoxazole-trimethoprim 800-160 MG per tablet   Commonly known as:  BACTRIM DS/SEPTRA DS   Used for:  PAD (peripheral artery disease) (H)   Started by:  Surjit Hamlin MD        Dose:  1 tablet   Take 1 tablet by mouth 2 times daily   Quantity:  14 tablet   Refills:  0            Where to get your medicines      These medications were sent to [id number] - [place], [place] - [address]  [address] [place] [address]     Phone:  [phone]     sulfamethoxazole-trimethoprim 800-160 MG per tablet                Primary Care Provider Office Phone # Fax #    Reid Reese [phone]       [facility] [address]    [place] [address]        Thank you!     Thank you for choosing [facility] [place]  for your care. Our goal is always to provide you with excellent care. Hearing back from our patients is one way we can continue to improve our services. Please take a few minutes to complete the written survey that you may receive in the mail after your visit with us. Thank you!             Your Updated Medication List - Protect others around you: Learn how to safely use, store and throw away your medicines at www.disposemymeds.org.          This list is accurate as of: 3/14/17 12:55 PM.  Always use your most recent med list.                   Brand Name Dispense Instructions for use    aspirin 81 MG tablet     90 tablet    Take 1 tablet (81 mg) by mouth daily       clopidogrel 75 MG tablet    PLAVIX    90 tablet    Take 1 tablet (75 mg) by mouth daily Start taking medication the day after the procedure       ezetimibe 10 MG tablet     ZETIA    90 tablet    Take 1 tablet (10 mg) by mouth daily       sulfamethoxazole-trimethoprim 800-160 MG per tablet    BACTRIM DS/SEPTRA DS    14 tablet    Take 1 tablet by mouth 2 times daily       triamcinolone 0.1 % cream    KENALOG     Apply to affected area as needed

## 2017-03-14 NOTE — PROGRESS NOTES
PATIENT HISTORY:  Zia Hopkins is a 67 year old male who presents to clinic for left foot redness, nonhealing wounds.  Pt was referred urgently from Dr. Hamlin's clinic today for possible 5th toe amputation.  Pt reports 7 wks of worsening left foot wounds, redness, black tissue.  Pt has undergone revascularization on 3/8/16 by Dr. Hamlin with improvement of flow to foot noted, but the foot has been worsening per pt.  Reports new drainage from 2nd toe, 5th toe.  Pt denies pain in the foot currently.  Pt was prescribed Bactrim.  Hx of smoking, DM, HTN.        Review of Systems:  Patient denies fever, chills, rash, wound, stiffness, limping, numbness, weakness, heart burn, blood in stool, chest pain with activity, calf pain when walking, shortness of breath with activity, chronic cough, easy bleeding/bruising, swelling of ankles, excessive thirst, fatigue, depression.  Patient admits to anxiety.     PAST MEDICAL HISTORY:   Past Medical History   Diagnosis Date     Diabetes mellitus type 2, noninsulin dependent (H)      Hyperlipidemia LDL goal <70      Hypertension      PAD (peripheral artery disease) (H)      PAST SURGICAL HISTORY: LLE angio 3/2017     MEDICATIONS:   Current Outpatient Prescriptions:      clopidogrel (PLAVIX) 75 MG tablet, Take 1 tablet (75 mg) by mouth daily Start taking medication the day after the procedure, Disp: 90 tablet, Rfl: 1     aspirin 81 MG tablet, Take 1 tablet (81 mg) by mouth daily, Disp: 90 tablet, Rfl: 1     metFORMIN (GLUCOPHAGE-XR) 500 MG 24 hr tablet, Take 2 tablets (1,000 mg) by mouth daily (with dinner), Disp: 180 tablet, Rfl: 0     ezetimibe (ZETIA) 10 MG tablet, Take 1 tablet (10 mg) by mouth daily, Disp: 90 tablet, Rfl: 3     nicotine (NICODERM CQ) 21 MG/24HR 24 hr patch, Place 1 patch onto the skin every 24 hours Step 1, Disp: 30 patch, Rfl: 0     nicotine (NICODERM CQ) 14 MG/24HR 24 hr patch, Place 1 patch onto the skin every 24 hours Step 2. Start after completing 21 mg  "patches., Disp: 30 patch, Rfl: 0     nicotine (NICODERM CQ) 7 MG/24HR 24 hr patch, Place 1 patch onto the skin every 24 hours Step 3. Start after completing 14 mg patches., Disp: 30 patch, Rfl: 0     atorvastatin (LIPITOR) 40 MG tablet, TK 1 T PO HS, Disp: , Rfl: 3     losartan-hydrochlorothiazide (HYZAAR) 50-12.5 MG per tablet, TK 1 T PO  D, Disp: , Rfl: 0     metoprolol (TOPROL-XL) 50 MG 24 hr tablet, TK 1 T PO  D, Disp: , Rfl: 0     triamcinolone (KENALOG) 0.1 % cream, Reported on 3/3/2017, Disp: , Rfl: 5     ALLERGIES:  No Known Allergies     SOCIAL HISTORY:   Social History     Social History     Marital status:      Spouse name: N/A     Number of children: N/A     Years of education: N/A     Occupational History     Not on file.     Social History Main Topics     Smoking status: Current Every Day Smoker     Packs/day: 0.75     Types: Cigarettes     Smokeless tobacco: Not on file      Comment: Quit Friday March 3 2017     Alcohol use Yes      Comment: wine     Drug use: No     Sexual activity: Yes     Partners: Female     Other Topics Concern     Not on file     Social History Narrative        FAMILY HISTORY:   Family History   Problem Relation Age of Onset     DIABETES Father         EXAM:Vitals: BP (!) (P) 190/104  Ht (P) 5' 6\" (1.676 m)  Wt (P) 155 lb (70.3 kg)  BMI (P) 25.02 kg/m2  BMI= Body mass index is 25.02 kg/(m^2) (pended).    General appearance: Patient is alert and fully cooperative with history & exam.  No sign of distress is noted during the visit.     Psychiatric: Affect is pleasant & appropriate.  Patient appears motivated to improve health.     Respiratory: Breathing is regular & unlabored while sitting.     HEENT: Hearing is intact to spoken word.  Speech is clear.  No gross evidence of visual impairment that would impact ambulation.     Dermatologic: Left foot with dry gangrenous appearing eschars to dorsum of toes 2-4.  Very small eschar noted over plantar medial 1st MPJ area.  Left " "5th toe with more advanced dry gangrenous changes of the entire toe with some serous appearing drainage interdigitally.  Also some mild purulence from tip of left 2nd toe.  Cellulitis of forefoot noted that persists with elevation of the foot.     Vascular: I could not palpate pedal pulses on the left, but they are audible with doppler.  L foot edema. CFT delayed.     Neurologic: Lower extremity sensation is diminished to light touch.     Musculoskeletal: Patient is ambulatory without assistive device or brace.  No gross ankle deformity noted.  No foot or ankle joint effusion is noted.     XRs of left foot reviewed with pt.  No obvious bony erosions or gas noted.  Angiogram reviewed:  \"There was excellent blood flow throughout the  superficial femoral and popliteal arteries. And there was  significantly improved filling distally in the tibials as well with  much improved flow in the toes.\"    ASSESSMENT:   Left foot cellulitis, ulceration, gangrene  PAD  DM type II     PLAN:  Reviewed patient's chart in epic.    Discussed condition and treatment options including risks, benefits, alternatives.    Pt has a serious potentially limb threatening condition. Extensive PAD with smoking hx.  DM also complicates his ability to heal.    Pt will need at least the 5th toe amputated along with local debridement.  Question infection beyond this area.  Discussed potential for deep infection including bone infection.  Concern regarding some early wet gangrenous changes.  Also question of healing ability at the digital level despite image indicated improvement s/p revascularization.  Possibility of further amputation including more proximal amputation discussed.    I advised inpatient admission given extent of infection, need for surgery, acute HTN.  This will also facilitate IV abx.  Direct admit arranged.  I spoke with the hospitalist at [facility].  Recommended MRI of the foot, and Vascular consult so they are informed/involved.  My " colleague(s) will see him in house.    Pt agrees with plan for admission.      Nilay Ann DPM, FACFAS    Billing based on time today (>60 minutes) with >50% spent on counseling and coordination of care.

## 2017-03-14 NOTE — H&P
CODE STATUS:  Full code.      CHIEF COMPLAINT:  Left foot gangrene.      HISTORY OF PRESENT ILLNESS:  The patient Zia Hopkins is a pleasant 67-year-old male with a past medical history of peripheral artery disease, Type 2 diabetes mellitus, hypertension and hyperlipidemia.  He is also a current every-day smoker.  The patient has had developing redness    and areas of necrotic damage of the left foot, mainly the third through fifth digits.  He has numerous small ulcers not only in the gangrene areas, but in the non-necrotic areas as well.  He was diagnosed with peripheral artery disease and has seen Dr. Hamlin with Vascular Surgery.  Dr. Hamlin placed a stent in the patient's left leg approximately 5 days ago.  The patient reports he had improvement in his symptoms  Prior to the stenting he had decreased sensation in the foot, and now he has some sensation back.        The patient saw Dr. Ann from Podiatry today, and Dr. Peterson called me to directly admit    the patient.  He is pretty convinced that the patient has wet gangrene and likely osteomyelitis and needs work-up and possible surgical amputation of the affected area of the foot.  The patient denies fever    or chills.  He says that he is not in any significant pain secondary to this and has no complaints other than the foot wounds.  He tells me that he was recently diagnosed with Type 2 diabetes.  He has been    on metformin since approximately 02/22/2017.  He does not remember what his hemoglobin A1c was at that time, but he says it was very high.      ALLERGIES:  No known drug allergies.      HOME MEDICATIONS:   1.  Lipitor 40 mg at bedtime.   2.  Hyzaar 50/12.5 mg tablet daily.   3.  Metformin 500 mg daily.   4.  Topral-XL 50 mg daily.   5.  Plavix 75 mg a day.   6.  Aspirin 81 mg a day.   7.  Zetia 10 mg a day.   8.  Triamcinolone cream to the foot p.r.n.      PAST MEDICAL HISTORY:   1.  Peripheral artery disease.   2.  Hypertension.   3.   Hyperlipidemia.   4.  Type 2 non insulin-dependent diabetes mellitus.      PAST SURGICAL HISTORY:  None.        FAMILY HISTORY:  Father is , age unknown, had many complications related to diabetes.  The patient's family history is otherwise noncontributory.      SOCIAL HISTORY:  The patient smokes three-quarters of a pack of cigarettes a day, but says that    he quit on 2017 after smoking for many decades, but did not approximate for me.  He drinks    an occasional glass of wine once or twice a week with no history of abuse and no illicit drug use.     He is , retired, and the patient and his wife live independently.      REVIEW OF SYSTEMS:  A 10-system review was conducted with the patient and is negative other than those systems listed in the History of Present Illness above.      PHYSICAL EXAMINATION:   VITAL SIGNS:  Blood pressure 175/97, O2 saturations 98% on room air, heart rate 82,    temperature 98.4 degrees Fahrenheit orally.   GENERAL:  This is a well-nourished-appearing male who is in no apparent distress, alert and    oriented x4.   HEENT:  Head is normocephalic, atraumatic, no oropharyngeal erythema.   NECK:  No cervical lymphadenopathy, no thyromegaly.   RESPIRATORY:  Lungs clear to auscultation bilaterally, no wheezes, rales or rhonchi.   CARDIOVASCULAR:  Regular rate and rhythm, no murmurs, rubs or gallops, 2+ pulses palpable    in all 4 extremities.   ABDOMEN:  Soft, nontender, nondistended, no hepatosplenomegaly or masses palpable, positive    bowel sounds.   EXTREMITIES:  The left foot does display a very large area of necrotic tissue covering the fifth through the third digits and extending up onto the dorsum.  There is an area of erythema that extends across    the lateral dorsum of the foot to near the lateral malleolus.  There is no pus or exudate.  There    are several small, round, oval-appearing superficial ulcers in the non-necrotic areas of the medial aspect of the  dorsum of the left foot; these measure approximately 0.5 cm.  The right foot appears normal.  There is no edema of either leg.   NEUROLOGIC:  Cranial nerves II-XII intact, 5/5 strength in all 4 extremities, sensation intact diffusely, normal coordination on bilateral finger-nose test.      LABORATORY DATA/IMAGING (I do not have labs from today, and most recent labs are    from 03/08/2017):   1.  Hemoglobin A1c of 8.6.     2.  Complete blood count values all normal with white count at that time of 8.4, hemoglobin 15.1 and platelets 240.     3.  Nonfasting glucose 194, no metabolic panel ordered.   4.  X-ray of the left foot/3 view - There is no official interpretation available at this time.  It was communicated to me by the Podiatrist that there were signs of possible osteomyelitis.      ASSESSMENT:  The patient is a 67-year-old man with peripheral artery disease, hypertension, hyperlipidemia and Type 2 non insulin-dependent diabetes mellitus.  He has been developing increasing areas of necrosis on the left lateral foot which are suspicious for wet gangrene.  There is also evidence    of cellulitis in the areas adjacent to the gangrenous areas.  He is being admitted for further work-up    of the left foot with likely surgical amputation of affected areas.      PLAN:   1.  Wet gangrene and cellulitis of the left foot.  The patient has peripheral artery disease, hyperlipidemia and Type 2 diabetes which are all big risk factors.  His hemoglobin A1c is 8.6 which means he has    room for better control.  We will put the patient on IV vancomycin in case he has osteomyelitis    and IV Zosyn for the cellulitis.  We will get an MRI of the foot to see if there is progression to the bone.  We will ask Podiatry, Vascular Surgery and Infectious Disease to follow the patient while he is here.     I will ask Infectious Disease to help with antibiotic guidance for his Inpatient status and for his discharge in a few days.  I am not  sure at this time if the patient is going to need a PICC line.  We will get Physical Therapy and Occupational Therapy on board as the patient will most likely have some limitations afterwards and could possibly need placement or home or outpatient physical therapy.   2.  Type 2 diabetes mellitus.  The patient has just been placed on metformin.  His hemoglobin A1c    was 8.6 as of last week.  At this time we have to stop the metformin because we will be using    IV contrast.  We will put him on medium sliding scale insulin with meals.  I would like to try to gauge how much basal insulin we should order off of his NovoLog needs over the next 24 hours.  I will put him on a moderate calorie carbohydrate consistent diet.  The goal for his perioperative glucose should    be less than 180.   3.  Hypertension.  The patient is somewhat hypertensive today with systolic readings from 175 to 200.  He did miss some medication this morning and says he has some discomfort.  In addition to his home complement of Hyzaar And Topral-XL I will order IV hydralazine.   4.  Peripheral artery disease.  The patient had a stent placed in the left leg just 5 days ago by Dr. Hamlin from Vascular Surgery.  We will ask Dr. Hamlin to see the patient before any planned surgery    on the foot at the request of Podiatrist Dr. Ann.  We will also hold Plavix and aspirin for now    in case he needs to go to surgery tomorrow or in the immediate future.   5.  History of hyperlipidemia.  We will continue patient on home Zetia and Lipitor.   6.  Deep venous thrombosis prophylaxis.  I anticipate the patient is probably going to need surgery    in the next 1-2 days.  We will keep him on pneumatic compression devices for right now.   7.  Full code status.   8.  Disposition.  I anticipate 3-5 days for surgical stabilization and safe disposition planning.         POOJA BAXTER MD             D: 03/14/2017 14:58   T: 03/14/2017 16:12   MT: [id number]       Name:     SKY GAYTAN   MRN:      [id number]        Account:      [id number]   :      1950           Admitted:     [id number]      Document: [id number]       cc: Reid Hamlin MD

## 2017-03-15 ENCOUNTER — ANESTHESIA EVENT (OUTPATIENT)
Dept: SURGERY | Facility: CLINIC | Age: 67
DRG: 617 | End: 2017-03-15
Payer: MEDICARE

## 2017-03-15 ENCOUNTER — ANESTHESIA (OUTPATIENT)
Dept: SURGERY | Facility: CLINIC | Age: 67
DRG: 617 | End: 2017-03-15
Payer: MEDICARE

## 2017-03-15 DIAGNOSIS — I73.9 PAD (PERIPHERAL ARTERY DISEASE) (H): Primary | ICD-10-CM

## 2017-03-15 LAB
CREAT SERPL-MCNC: 0.82 MG/DL (ref 0.66–1.25)
GFR SERPL CREATININE-BSD FRML MDRD: NORMAL ML/MIN/1.7M2
GLUCOSE BLDC GLUCOMTR-MCNC: 193 MG/DL (ref 70–99)
GLUCOSE BLDC GLUCOMTR-MCNC: 217 MG/DL (ref 70–99)
GLUCOSE BLDC GLUCOMTR-MCNC: 252 MG/DL (ref 70–99)
GLUCOSE BLDC GLUCOMTR-MCNC: 262 MG/DL (ref 70–99)
GLUCOSE BLDC GLUCOMTR-MCNC: 265 MG/DL (ref 70–99)
GLUCOSE BLDC GLUCOMTR-MCNC: 290 MG/DL (ref 70–99)
GLUCOSE BLDC GLUCOMTR-MCNC: 298 MG/DL (ref 70–99)

## 2017-03-15 PROCEDURE — 36415 COLL VENOUS BLD VENIPUNCTURE: CPT | Performed by: INTERNAL MEDICINE

## 2017-03-15 PROCEDURE — 25000125 ZZHC RX 250: Performed by: NURSE ANESTHETIST, CERTIFIED REGISTERED

## 2017-03-15 PROCEDURE — 25000125 ZZHC RX 250: Performed by: PODIATRIST

## 2017-03-15 PROCEDURE — 88300 SURGICAL PATH GROSS: CPT | Mod: 26 | Performed by: PODIATRIST

## 2017-03-15 PROCEDURE — 25000128 H RX IP 250 OP 636: Performed by: ANESTHESIOLOGY

## 2017-03-15 PROCEDURE — 0Y6Y0Z0 DETACHMENT AT LEFT 5TH TOE, COMPLETE, OPEN APPROACH: ICD-10-PCS | Performed by: PODIATRIST

## 2017-03-15 PROCEDURE — 28820 AMPUTATION OF TOE: CPT | Mod: T4 | Performed by: PODIATRIST

## 2017-03-15 PROCEDURE — 36000067 ZZH SURGERY LEVEL 5 1ST 30 MIN: Performed by: PODIATRIST

## 2017-03-15 PROCEDURE — 40000170 ZZH STATISTIC PRE-PROCEDURE ASSESSMENT II: Performed by: PODIATRIST

## 2017-03-15 PROCEDURE — A9270 NON-COVERED ITEM OR SERVICE: HCPCS | Mod: GY | Performed by: INTERNAL MEDICINE

## 2017-03-15 PROCEDURE — 88300 SURGICAL PATH GROSS: CPT | Performed by: PODIATRIST

## 2017-03-15 PROCEDURE — 93005 ELECTROCARDIOGRAM TRACING: CPT

## 2017-03-15 PROCEDURE — 12000007 ZZH R&B INTERMEDIATE

## 2017-03-15 PROCEDURE — 27210995 ZZH RX 272: Performed by: PODIATRIST

## 2017-03-15 PROCEDURE — 27210794 ZZH OR GENERAL SUPPLY STERILE: Performed by: PODIATRIST

## 2017-03-15 PROCEDURE — 93010 ELECTROCARDIOGRAM REPORT: CPT | Performed by: INTERNAL MEDICINE

## 2017-03-15 PROCEDURE — S0020 INJECTION, BUPIVICAINE HYDRO: HCPCS | Performed by: PODIATRIST

## 2017-03-15 PROCEDURE — 25800025 ZZH RX 258: Performed by: PODIATRIST

## 2017-03-15 PROCEDURE — 87070 CULTURE OTHR SPECIMN AEROBIC: CPT | Performed by: PODIATRIST

## 2017-03-15 PROCEDURE — 99232 SBSQ HOSP IP/OBS MODERATE 35: CPT | Performed by: INTERNAL MEDICINE

## 2017-03-15 PROCEDURE — 71000012 ZZH RECOVERY PHASE 1 LEVEL 1 FIRST HR: Performed by: PODIATRIST

## 2017-03-15 PROCEDURE — 25000132 ZZH RX MED GY IP 250 OP 250 PS 637: Mod: GY | Performed by: INTERNAL MEDICINE

## 2017-03-15 PROCEDURE — 82565 ASSAY OF CREATININE: CPT | Performed by: INTERNAL MEDICINE

## 2017-03-15 PROCEDURE — 99207 ZZC CDG-CHARGE/DX NOT SELECTED BY PROVIDER: CPT | Performed by: INTERNAL MEDICINE

## 2017-03-15 PROCEDURE — 36000069 ZZH SURGERY LEVEL 5 EA 15 ADDTL MIN: Performed by: PODIATRIST

## 2017-03-15 PROCEDURE — 87186 SC STD MICRODIL/AGAR DIL: CPT | Performed by: PODIATRIST

## 2017-03-15 PROCEDURE — 87075 CULTR BACTERIA EXCEPT BLOOD: CPT | Performed by: INTERNAL MEDICINE

## 2017-03-15 PROCEDURE — 25000128 H RX IP 250 OP 636: Performed by: INTERNAL MEDICINE

## 2017-03-15 PROCEDURE — S5010 5% DEXTROSE AND 0.45% SALINE: HCPCS | Performed by: PODIATRIST

## 2017-03-15 PROCEDURE — 37000008 ZZH ANESTHESIA TECHNICAL FEE, 1ST 30 MIN: Performed by: PODIATRIST

## 2017-03-15 PROCEDURE — 00000146 ZZHCL STATISTIC GLUCOSE BY METER IP

## 2017-03-15 PROCEDURE — 87186 SC STD MICRODIL/AGAR DIL: CPT | Performed by: INTERNAL MEDICINE

## 2017-03-15 PROCEDURE — 87070 CULTURE OTHR SPECIMN AEROBIC: CPT | Performed by: INTERNAL MEDICINE

## 2017-03-15 PROCEDURE — 87176 TISSUE HOMOGENIZATION CULTR: CPT | Performed by: INTERNAL MEDICINE

## 2017-03-15 PROCEDURE — 87077 CULTURE AEROBIC IDENTIFY: CPT | Performed by: PODIATRIST

## 2017-03-15 PROCEDURE — 87075 CULTR BACTERIA EXCEPT BLOOD: CPT | Performed by: PODIATRIST

## 2017-03-15 PROCEDURE — 11042 DBRDMT SUBQ TIS 1ST 20SQCM/<: CPT | Mod: 59 | Performed by: PODIATRIST

## 2017-03-15 PROCEDURE — 0JBR0ZZ EXCISION OF LEFT FOOT SUBCUTANEOUS TISSUE AND FASCIA, OPEN APPROACH: ICD-10-PCS | Performed by: PODIATRIST

## 2017-03-15 PROCEDURE — 37000009 ZZH ANESTHESIA TECHNICAL FEE, EACH ADDTL 15 MIN: Performed by: PODIATRIST

## 2017-03-15 PROCEDURE — 87077 CULTURE AEROBIC IDENTIFY: CPT | Performed by: INTERNAL MEDICINE

## 2017-03-15 PROCEDURE — 25000128 H RX IP 250 OP 636: Performed by: NURSE ANESTHETIST, CERTIFIED REGISTERED

## 2017-03-15 RX ORDER — PROPOFOL 10 MG/ML
INJECTION, EMULSION INTRAVENOUS PRN
Status: DISCONTINUED | OUTPATIENT
Start: 2017-03-15 | End: 2017-03-15

## 2017-03-15 RX ORDER — MAGNESIUM HYDROXIDE 1200 MG/15ML
LIQUID ORAL PRN
Status: DISCONTINUED | OUTPATIENT
Start: 2017-03-15 | End: 2017-03-15 | Stop reason: HOSPADM

## 2017-03-15 RX ORDER — ONDANSETRON 2 MG/ML
INJECTION INTRAMUSCULAR; INTRAVENOUS PRN
Status: DISCONTINUED | OUTPATIENT
Start: 2017-03-15 | End: 2017-03-15

## 2017-03-15 RX ORDER — PROPOFOL 10 MG/ML
INJECTION, EMULSION INTRAVENOUS CONTINUOUS PRN
Status: DISCONTINUED | OUTPATIENT
Start: 2017-03-15 | End: 2017-03-15

## 2017-03-15 RX ORDER — HYDRALAZINE HYDROCHLORIDE 20 MG/ML
INJECTION INTRAMUSCULAR; INTRAVENOUS PRN
Status: DISCONTINUED | OUTPATIENT
Start: 2017-03-15 | End: 2017-03-15

## 2017-03-15 RX ORDER — SODIUM CHLORIDE 9 MG/ML
INJECTION, SOLUTION INTRAVENOUS CONTINUOUS
Status: DISCONTINUED | OUTPATIENT
Start: 2017-03-15 | End: 2017-03-15 | Stop reason: HOSPADM

## 2017-03-15 RX ORDER — HYDROMORPHONE HYDROCHLORIDE 1 MG/ML
.3-.5 INJECTION, SOLUTION INTRAMUSCULAR; INTRAVENOUS; SUBCUTANEOUS EVERY 5 MIN PRN
Status: DISCONTINUED | OUTPATIENT
Start: 2017-03-15 | End: 2017-03-15 | Stop reason: HOSPADM

## 2017-03-15 RX ORDER — BUPIVACAINE HYDROCHLORIDE 5 MG/ML
INJECTION, SOLUTION EPIDURAL; INTRACAUDAL PRN
Status: DISCONTINUED | OUTPATIENT
Start: 2017-03-15 | End: 2017-03-15 | Stop reason: HOSPADM

## 2017-03-15 RX ORDER — LABETALOL HYDROCHLORIDE 5 MG/ML
10 INJECTION, SOLUTION INTRAVENOUS
Status: DISCONTINUED | OUTPATIENT
Start: 2017-03-15 | End: 2017-03-15 | Stop reason: HOSPADM

## 2017-03-15 RX ORDER — FENTANYL CITRATE 50 UG/ML
25-50 INJECTION, SOLUTION INTRAMUSCULAR; INTRAVENOUS EVERY 5 MIN PRN
Status: DISCONTINUED | OUTPATIENT
Start: 2017-03-15 | End: 2017-03-15 | Stop reason: HOSPADM

## 2017-03-15 RX ORDER — SODIUM CHLORIDE, SODIUM LACTATE, POTASSIUM CHLORIDE, CALCIUM CHLORIDE 600; 310; 30; 20 MG/100ML; MG/100ML; MG/100ML; MG/100ML
INJECTION, SOLUTION INTRAVENOUS CONTINUOUS
Status: DISCONTINUED | OUTPATIENT
Start: 2017-03-15 | End: 2017-03-15 | Stop reason: ALTCHOICE

## 2017-03-15 RX ORDER — CEFAZOLIN SODIUM 2 G/100ML
2 INJECTION, SOLUTION INTRAVENOUS
Status: DISCONTINUED | OUTPATIENT
Start: 2017-03-15 | End: 2017-03-15 | Stop reason: HOSPADM

## 2017-03-15 RX ORDER — LIDOCAINE HYDROCHLORIDE 20 MG/ML
INJECTION, SOLUTION INFILTRATION; PERINEURAL PRN
Status: DISCONTINUED | OUTPATIENT
Start: 2017-03-15 | End: 2017-03-15

## 2017-03-15 RX ORDER — CEFAZOLIN SODIUM 1 G/3ML
1 INJECTION, POWDER, FOR SOLUTION INTRAMUSCULAR; INTRAVENOUS SEE ADMIN INSTRUCTIONS
Status: DISCONTINUED | OUTPATIENT
Start: 2017-03-15 | End: 2017-03-15 | Stop reason: HOSPADM

## 2017-03-15 RX ORDER — ONDANSETRON 4 MG/1
4 TABLET, ORALLY DISINTEGRATING ORAL EVERY 30 MIN PRN
Status: DISCONTINUED | OUTPATIENT
Start: 2017-03-15 | End: 2017-03-15 | Stop reason: HOSPADM

## 2017-03-15 RX ORDER — ONDANSETRON 2 MG/ML
4 INJECTION INTRAMUSCULAR; INTRAVENOUS EVERY 30 MIN PRN
Status: DISCONTINUED | OUTPATIENT
Start: 2017-03-15 | End: 2017-03-15 | Stop reason: HOSPADM

## 2017-03-15 RX ORDER — FENTANYL CITRATE 50 UG/ML
INJECTION, SOLUTION INTRAMUSCULAR; INTRAVENOUS PRN
Status: DISCONTINUED | OUTPATIENT
Start: 2017-03-15 | End: 2017-03-15

## 2017-03-15 RX ORDER — SODIUM CHLORIDE, SODIUM LACTATE, POTASSIUM CHLORIDE, CALCIUM CHLORIDE 600; 310; 30; 20 MG/100ML; MG/100ML; MG/100ML; MG/100ML
INJECTION, SOLUTION INTRAVENOUS CONTINUOUS
Status: DISCONTINUED | OUTPATIENT
Start: 2017-03-15 | End: 2017-03-15 | Stop reason: HOSPADM

## 2017-03-15 RX ADMIN — SENNOSIDES AND DOCUSATE SODIUM 1 TABLET: 8.6; 5 TABLET ORAL at 21:10

## 2017-03-15 RX ADMIN — PIPERACILLIN SODIUM,TAZOBACTAM SODIUM 3.38 G: 3; .375 INJECTION, POWDER, FOR SOLUTION INTRAVENOUS at 15:26

## 2017-03-15 RX ADMIN — FENTANYL CITRATE 50 MCG: 50 INJECTION, SOLUTION INTRAMUSCULAR; INTRAVENOUS at 15:47

## 2017-03-15 RX ADMIN — POTASSIUM CHLORIDE: 149 INJECTION, SOLUTION, CONCENTRATE INTRAVENOUS at 10:17

## 2017-03-15 RX ADMIN — ONDANSETRON 4 MG: 2 INJECTION INTRAMUSCULAR; INTRAVENOUS at 16:02

## 2017-03-15 RX ADMIN — PIPERACILLIN SODIUM,TAZOBACTAM SODIUM 3.38 G: 3; .375 INJECTION, POWDER, FOR SOLUTION INTRAVENOUS at 09:05

## 2017-03-15 RX ADMIN — FENTANYL CITRATE 50 MCG: 50 INJECTION, SOLUTION INTRAMUSCULAR; INTRAVENOUS at 15:59

## 2017-03-15 RX ADMIN — MIDAZOLAM HYDROCHLORIDE 2 MG: 1 INJECTION, SOLUTION INTRAMUSCULAR; INTRAVENOUS at 15:47

## 2017-03-15 RX ADMIN — HYDRALAZINE HYDROCHLORIDE 10 MG: 20 INJECTION INTRAMUSCULAR; INTRAVENOUS at 15:48

## 2017-03-15 RX ADMIN — SODIUM CHLORIDE: 9 INJECTION, SOLUTION INTRAVENOUS at 14:13

## 2017-03-15 RX ADMIN — INSULIN ASPART 2 UNITS: 100 INJECTION, SOLUTION INTRAVENOUS; SUBCUTANEOUS at 18:10

## 2017-03-15 RX ADMIN — SODIUM CHLORIDE: 9 INJECTION, SOLUTION INTRAVENOUS at 21:14

## 2017-03-15 RX ADMIN — LOSARTAN POTASSIUM AND HYDROCHLOROTHIAZIDE 1 TABLET: 50; 12.5 TABLET, FILM COATED ORAL at 09:18

## 2017-03-15 RX ADMIN — EZETIMIBE 10 MG: 10 TABLET ORAL at 21:10

## 2017-03-15 RX ADMIN — PIPERACILLIN SODIUM,TAZOBACTAM SODIUM 3.38 G: 3; .375 INJECTION, POWDER, FOR SOLUTION INTRAVENOUS at 02:15

## 2017-03-15 RX ADMIN — METOPROLOL SUCCINATE 50 MG: 50 TABLET, EXTENDED RELEASE ORAL at 09:17

## 2017-03-15 RX ADMIN — LIDOCAINE HYDROCHLORIDE 40 MG: 20 INJECTION, SOLUTION INFILTRATION; PERINEURAL at 15:47

## 2017-03-15 RX ADMIN — POTASSIUM CHLORIDE: 149 INJECTION, SOLUTION, CONCENTRATE INTRAVENOUS at 00:49

## 2017-03-15 RX ADMIN — ATORVASTATIN CALCIUM 40 MG: 40 TABLET, FILM COATED ORAL at 21:10

## 2017-03-15 RX ADMIN — PROPOFOL 30 MCG/KG/MIN: 10 INJECTION, EMULSION INTRAVENOUS at 15:57

## 2017-03-15 RX ADMIN — PIPERACILLIN SODIUM,TAZOBACTAM SODIUM 3.38 G: 3; .375 INJECTION, POWDER, FOR SOLUTION INTRAVENOUS at 21:10

## 2017-03-15 RX ADMIN — PROPOFOL 50 MG: 10 INJECTION, EMULSION INTRAVENOUS at 15:47

## 2017-03-15 ASSESSMENT — LIFESTYLE VARIABLES: TOBACCO_USE: 1

## 2017-03-15 NOTE — PLAN OF CARE
Problem: Goal Outcome Summary  Goal: Goal Outcome Summary  Outcome: No Change  Presently in OR.

## 2017-03-15 NOTE — PLAN OF CARE
Problem: Goal Outcome Summary  Goal: Goal Outcome Summary  OT: Order received, per chart review pt scheduled for surgery 3/15. Will hold OT eval until appropriate post-op surgery.

## 2017-03-15 NOTE — PLAN OF CARE
Problem: Goal Outcome Summary  Goal: Goal Outcome Summary  Outcome: No Change  A&O. SBA. VSS, /74. LS diminished. Groin incision WDL, open to air. NPO since midnight. L foot red, toes black/red.  mL/hr. Surgery 3/15 at 1400. Will continue to monitor.

## 2017-03-15 NOTE — BRIEF OP NOTE
[facility] Brief Operative Note    Pre-operative diagnosis: Gangrene, left 5th toe; ischemic wounds left foot   Post-operative diagnosis * No post-op diagnosis entered *   Procedure: Procedure(s):  PARTIAL LEFT FOOT AMPUTATION - 5TH TOE  DEBRIDMENT OF MULTIPLE WOUNDS LEFT FOOT - Wound Class: III-Contaminated   - Wound Class: III-Contaminated   Surgeon: Emre Mauricio DPM   Assistants(s):    Estimated blood loss: 1 ml    Specimens: Bone from the proximal phalanx was sent for aerobic and anaerobic culture.   Findings: Dry ischemic necrosis on the surface; extensive wet necrosis and purulence inside the toe near the interphalangeal joint.      I debrided the dry eschar off of the dorsal aspects of the 2nd, 3rd, 4th toes.  I did not visualized any exposed bones.  Wound on 2nd toe near proximal interphalangeal joint. Wound on 3rd near the distal joint. Wound on 4th spans both joints.       Emre Mauricio DPM, FACFAS, MS    [place] Department of Podiatry/Foot & Ankle Surgery

## 2017-03-15 NOTE — ANESTHESIA PREPROCEDURE EVALUATION
Anesthesia Evaluation     .        ROS/MED HX    ENT/Pulmonary:     (+)tobacco use, Current use , . .   (-) sleep apnea   Neurologic:       Cardiovascular:     (+) Dyslipidemia, hypertension-Peripheral Vascular Disease---. : . . . :. .       METS/Exercise Tolerance:     Hematologic:         Musculoskeletal:         GI/Hepatic:        (-) GERD   Renal/Genitourinary:         Endo:     (+) type II DM .      Psychiatric:         Infectious Disease:         Malignancy:         Other:               Physical Exam  Normal systems: cardiovascular, pulmonary and dental    Airway   Mallampati: II  TM distance: >3 FB  Neck ROM: full    Dental     Cardiovascular       Pulmonary                     Anesthesia Plan      History & Physical Review  History and physical reviewed and following examination; no interval change.    ASA Status:  3 .    NPO Status:  > 8 hours    Plan for MAC   PONV prophylaxis:  Ondansetron (or other 5HT-3)       Postoperative Care  Postoperative pain management:  IV analgesics.      Consents  Anesthetic plan, risks, benefits and alternatives discussed with:  Patient..                          .

## 2017-03-15 NOTE — PLAN OF CARE
Problem: Goal Outcome Summary  Goal: Goal Outcome Summary  Outcome: No Change  Pt is A/O. SBA, ambulated in bernal x 2. AVSS ex /89 hydralazine administered BP went down to 154/74. LS diminished, Pt was educated on IS. Using it up to 2500. BS are active, passing flatus, had a BM, voiding. Pt is scheduled to have a surgery tomorrow left toe amputation, NPO at midnight. Pulses are all intact with doppler. Left foot has some redness, and necrotic tissue on small toe. Pt received some education on insulin pens and diabetes, denied to continue due to overwhelming changes/news

## 2017-03-15 NOTE — PROGRESS NOTES
[facility]    Hospitalist Progress Note  Dm Monroe MD    Assessment & Plan      67-year-old man with peripheral artery disease, hypertension, hyperlipidemia, smoker and Type 2 non insulin-dependent diabetes mellitus, who was admitted on 3/14/17, due to left foot wounds. He was admitted due to suspicion for wet gangrene and cellulitis of the left lateral foot and osteomyelitis of the left foot. He underwent left lower extremity angiogram, balloon angioplasty and stent placement on 3/8/17. He was being admitted for further work-up   of the left foot with likely surgical amputation of affected areas. Work up done on 3/14/17 revealed, normal BMP. CBC revealed, WBC was 11.5. HbA1C was 8.6% on 3/8/17. Left foot x-rays on 3/14/17 revealed, no soft tissue air. Diffuse midfoot and forefoot soft tissue swelling. No bony abnormality         1. Left foot wet gangrene and cellulitis: for review by Podiatry, Vascular Surgery and ID. Continue IV Zosyn for the cellulitis. MRI left foot on 3/14/17 revealed, Fifth toe proximal phalangeal diffuse intramedullary edema and  enhancement with suspected adjacent soft tissue wound, highly suspicious for osteomyelitis. Mild ill-defined edema without enhancement, involving the fourth toe middle and distal phalanges and second/third toe distal phalanges.  This is nonspecific and could represent reactive edema versus additional sites of osteomyelitis. No kushal osseous destruction is demonstrated.No MR evidence of rim-enhancing fluid collection or abscess within the forefoot.    2. Type 2 diabetes mellitus: BG was 262 this am. HbA1C was 8.6% on 3/8/17. Metformin is on hold. Continue   Medium dose insulin aspart sliding scale prn. Continue low dose carbohydrate diet.    3. Hypertension: continue Toprol XL and Hyzaar. For IV Hydralazine prn.     4. Peripheral artery disease: S/P stent placement in left lower extremity on 3/18/17. Plavix and aspirin are on hold.      5. Hyperlipidemia: continue Zetia and Lipitor.          DVT Prophylaxis: Pneumatic Compression Devices  Code Status: Full Code    Disposition: Expected discharge in 2-3 days.      Interval History   The pt was taken to the OR today.     -Data reviewed today: I reviewed all new labs and imaging results over the last 24 hours. I personally reviewed no images or EKG's today.    Physical Exam   Temp: 98.4  F (36.9  C) Temp src: Oral BP: 147/83 Pulse: 82 Heart Rate: 74 Resp: 18 SpO2: 91 % O2 Device: None (Room air)    Vitals:    03/14/17 2034 03/15/17 0500   Weight: 69 kg (152 lb 1.6 oz) 68.9 kg (151 lb 14.4 oz)     Vital Signs with Ranges  Temp:  [98  F (36.7  C)-98.7  F (37.1  C)] 98.4  F (36.9  C)  Pulse:  [72-98] 82  Heart Rate:  [74-83] 74  Resp:  [16-18] 18  BP: (147-200)/() 147/83  SpO2:  [91 %-99 %] 91 %  I/O last 3 completed shifts:  In: 1797 [P.O.:400; I.V.:1397]  Out: -     Constitutional: Adult white male, awake, cooperative, no apparent distress, O2 Sats 95% on RA  Respiratory: BS vesicular bilaterally, no crackles or wheezing  Cardiovascular: S1 and S2, reg, no murmur noted  GI: Soft, non-distended, non-tender, no masses, BS present+  Skin/Integumen: No rashes  Other:    Medications     NaCl 100 mL/hr at 03/14/17 1443     IV infusion builder WITH LARGE additive list 125 mL/hr at 03/15/17 0049       atorvastatin (LIPITOR) tablet 40 mg  40 mg Oral At Bedtime     ezetimibe  10 mg Oral Daily     losartan-hydrochlorothiazide  1 tablet Oral Daily     metoprolol (TOPROL-XL) 24 hr tablet 50 mg  50 mg Oral Daily     senna-docusate  1-2 tablet Oral BID     piperacillin-tazobactam  3.375 g Intravenous Q6H     insulin aspart  1-7 Units Subcutaneous TID AC     insulin aspart  1-5 Units Subcutaneous At Bedtime       Data     Recent Labs  Lab 03/15/17  0710 03/14/17  1520   WBC  --  11.5*   HGB  --  14.1   MCV  --  91   PLT  --  237   NA  --  136   POTASSIUM  --  4.8   CHLORIDE  --  99   CO2  --  30   BUN  --  15    CR 0.82 0.72   ANIONGAP  --  7   TIN  --  9.3   GLC  --  233*       Recent Results (from the past 24 hour(s))   XR Foot Left G/E 3 Views    Narrative    LEFT FOOT FOUR VIEWS  3/14/2017 11:46 AM     HISTORY: Gangrene.    COMPARISON: None.      Impression    IMPRESSION: No soft tissue air. Diffuse midfoot and forefoot soft  tissue swelling. No bony abnormality.    BREEZY OLIVEIRA MD

## 2017-03-15 NOTE — PLAN OF CARE
Problem: Goal Outcome Summary  Goal: Goal Outcome Summary  PT: Order received, per chart review pt scheduled for surgery today, 3/15. Appropriate to hold PT evaluation until after surgery.

## 2017-03-15 NOTE — ANESTHESIA CARE TRANSFER NOTE
Patient: Zia Hopkins    Procedure(s):  PARTIAL LEFT FOOT AMPUTATION - 5TH TOE  DEBRIDMENT OF MULTIPLE WOUNDS LEFT FOOT - Wound Class: III-Contaminated   - Wound Class: III-Contaminated    Diagnosis: Unknown  Diagnosis Additional Information: No value filed.    Anesthesia Type:   MAC     Note:  Airway :Nasal Cannula  Patient transferred to:PACU  Comments: To PACU: Awake/alert, VSS  Report to RN      Vitals: (Last set prior to Anesthesia Care Transfer)    CRNA VITALS  3/15/2017 1558 - 3/15/2017 1628      3/15/2017             Resp Rate (set): 10                Electronically Signed By: CLAUDINE Hickey CRNA  March 15, 2017  4:28 PM

## 2017-03-15 NOTE — ANESTHESIA POSTPROCEDURE EVALUATION
Patient: Zia Hopkins    Procedure(s):  PARTIAL LEFT FOOT AMPUTATION - 5TH TOE  DEBRIDMENT OF MULTIPLE WOUNDS LEFT FOOT - Wound Class: III-Contaminated   - Wound Class: III-Contaminated    Diagnosis:Unknown  Diagnosis Additional Information: No value filed.    Anesthesia Type:  MAC    Note:  Anesthesia Post Evaluation    Patient location during evaluation: PACU  Patient participation: Able to fully participate in evaluation  Level of consciousness: awake  Pain management: adequate  Airway patency: patent  Cardiovascular status: acceptable  Respiratory status: acceptable  Hydration status: acceptable  PONV: none     Anesthetic complications: None          Last vitals:  Vitals:    03/15/17 1640 03/15/17 1650 03/15/17 1710   BP: 147/71 154/71 161/70   Pulse:      Resp: 12 15 16   Temp:   36.8  C (98.3  F)   SpO2: 98% 95% 96%         Electronically Signed By: MAE GIBBONS MD  March 15, 2017  5:48 PM

## 2017-03-15 NOTE — OP NOTE
DATE OF PROCEDURE:  03/15/2017       PREOPERATIVE DIAGNOSES:     1.  Ischemic necrosis of the left fifth toe.     2.  Ischemic wounds on toes 2, 3 and 4, left foot.      POSTOPERATIVE DIAGNOSES:     1.  Ischemic necrosis of the left fifth toe.     2.  Ischemic wound on toes 2, 3 and 4, left foot.       PROCEDURES:   1.  Amputation of the left fifth toe at the level of the metatarsophalangeal joint.   2.  Excisional debridement to the layer of the subcutaneous tissue, dorsal toes 2, 3, 4 on the left.      PATHOLOGY:  Bone from the left fifth toe was sent for aerobic and anaerobic culture.      ANESTHESIA:  MAC with local.      HEMOSTASIS:  None.  There was very little bleeding.      ESTIMATED BLOOD LOSS:  1  mL.      MATERIALS:  Nonabsorbable suture material.      INJECTABLES:  0.5% Marcaine plain injected preoperatively.      COMPLICATIONS:  None apparent.      INTRAOPERATIVE FINDINGS:  The majority of the exterior of the left fifth toe was a dry gangrene.  However, internally the toe showed wet necrosis with purulence.  This was largely around the interphalangeal joint.  All tissues at the level of amputation appeared viable and clear of infection.  However, there was very little bleeding.  The wounds debrided on toes 2, 3 and 4 did not reveal exposed bone.  No purulence.      INDICATIONS FOR SURGERY:  Zia Hopkins is a 67-year-old male with type 2 diabetes and peripheral neuropathy.  He is 1 week status post left lower extremity angioplasty with stenting by Dr. Hamlin.  He was referred to Dr. Ann due to dry gangrene of the left fifth toe.  On presentation to Dr. Ann's office yesterday, there was concern that the toe was converting to a wet gangrene.  The patient was admitted to [facility].  At this time, Vascular Surgery is not planning any additional intervention, but will monitor from the periphery.  Due to the appearance of the toe, amputation was recommended.  The patient was  agreeable to this.  An MRI was ordered supporting osteomyelitis of the left fifth toe.  It did not indicate any more proximal infection.  There was some marrow edema seen in the second and fourth toes, but not conclusive for osteomyelitis.  Prior to surgery, I reviewed the procedure with the patient and his wife.  Based on the necrosis of the left fifth toe alone, amputation is warranted.  I also consented him to debridement of the wounds on the other toes.  The purpose was to aid in healing and see if there is any hidden abscess or exposed bone.  He was agreeable to this.      DESCRIPTION OF PROCEDURE:  Zia Hopkins was transported to the operating room and placed supine on the operating table.  IV sedation was initiated.  Local anesthetic was injected into the left foot.  The left foot was then prepped and draped in the normal aseptic fashion.  The timeout was called.      The standard fish-mouth type incision was made around the base of the left fifth toe proximal to the necrotic tissues.  The incision was taken straight through full thickness to the level of bone.  The toe was disarticulated at the metatarsophalangeal joint level.  The area was irrigated with a copious amount of normal sterile saline.  Based on the viable and clean appearance of the soft tissues, primary closure was performed.  Skin edges were approximated using 3-0 Prolene.      Next, excisional debridement of the level of the subcutaneous tissue was performed on the dorsal aspect of the left second, third and fourth toes.  < 20 square cm debrided.  Using a #15 scalpel, I removed the necrotic eschar to better visualize the deeper tissues.  I did not find any purulence nor did I see exposed bone.  There was minimal bleeding.      A well-padded compressive dressing was placed to the left foot.  Mr. Hopkins tolerated the anesthesia and procedure well and was transported to the post-anesthesia care unit in.      PLAN:  We will need to continue  to monitor his toes and see if there is further demarcation.  Given MRI findings and some dry necrotic changes in the other toes, there is risk for additional amputations.  I harvested bone from the proximal phalanx of the left fifth toe for aerobic and anaerobic culture.  I am fairly confident all infection has been removed from that area.  We will continue to monitor.  If there are any ongoing signs of nonhealing or ischemia, Vascular Surgery will be notified.  Ongoing monitoring of his toes might involve weeks and certainly future dry gangrenous changes can be dealt with on an outpatient setting when appropriate.         POOJA TALBERT DPM             D: 03/15/2017 16:40   T: 03/15/2017 17:14   MT: [id number]      Name:     SKY GAYTAN   MRN:      [id number]        Account:        [id number]   :      1950           Procedure Date: 03/15/2017      Document: [id number]

## 2017-03-15 NOTE — PROGRESS NOTES
[facility]    Infectious Disease Progress Note    Date of Service (when I saw the patient): 03/15/2017     Assessment & Plan   Zia Hopkins is a 67 year old male who was admitted on 3/14/2017.    IMPRESSION:   1. A 67-year-old male with diabetes mellitus.   2. Peripheral vascular disease. Status post left SFA and popliteal balloon angioplasty and stenting 1 week ago.   3. Admitted on this occasion for development of dry gangrene of the left fifth toe with surrounding erythema.       RECOMMENDATIONS:   1. Will continue coverage with IV Zosyn.   2. Discontinued vancomycin to minimize the chance of nephrotoxicity.   3. Amputation planned for today.   4. Will follow up on cultures.    Дмитрий Moya MD    Interval History    Afebrile, to OR today     Physical Exam   Temp: 98.4  F (36.9  C) Temp src: Oral BP: 147/83 Pulse: 82 Heart Rate: 74 Resp: 18 SpO2: 91 % O2 Device: None (Room air)    Vitals:    03/14/17 2034 03/15/17 0500   Weight: 69 kg (152 lb 1.6 oz) 68.9 kg (151 lb 14.4 oz)     Vital Signs with Ranges  Temp:  [98  F (36.7  C)-98.7  F (37.1  C)] 98.4  F (36.9  C)  Pulse:  [72-98] 82  Heart Rate:  [74-83] 74  Resp:  [16-18] 18  BP: (147-200)/() 147/83  SpO2:  [91 %-99 %] 91 %    Constitutional: Awake, alert, cooperative, no apparent distress  Lungs: Clear to auscultation bilaterally, no crackles or wheezing  Cardiovascular: Regular rate and rhythm, normal S1 and S2, and no murmur noted  Abdomen: Normal bowel sounds, soft, non-distended, non-tender  Skin: No rashes, no cyanosis, no edema  Other:    Medications     NaCl 100 mL/hr at 03/14/17 1443     IV infusion builder WITH LARGE additive list 125 mL/hr at 03/15/17 0049       atorvastatin (LIPITOR) tablet 40 mg  40 mg Oral At Bedtime     ezetimibe  10 mg Oral Daily     losartan-hydrochlorothiazide  1 tablet Oral Daily     metoprolol (TOPROL-XL) 24 hr tablet 50 mg  50 mg Oral Daily     senna-docusate  1-2 tablet Oral BID      piperacillin-tazobactam  3.375 g Intravenous Q6H     insulin aspart  1-7 Units Subcutaneous TID AC     insulin aspart  1-5 Units Subcutaneous At Bedtime       Data   All microbiology laboratory data reviewed.  Recent Labs   Lab Test  03/14/17   1520  03/08/17   0720   WBC  11.5*  8.4   HGB  14.1  15.1   HCT  39.6*  42.8   MCV  91  91   PLT  237  240     Recent Labs   Lab Test  03/15/17   0710  03/14/17   1520  03/08/17   0720   CR  0.82  0.72  0.72     No lab results found.  No lab results found.    Invalid input(s): PRESLEY

## 2017-03-16 ENCOUNTER — APPOINTMENT (OUTPATIENT)
Dept: PHYSICAL THERAPY | Facility: CLINIC | Age: 67
DRG: 617 | End: 2017-03-16
Attending: PODIATRIST
Payer: MEDICARE

## 2017-03-16 ENCOUNTER — APPOINTMENT (OUTPATIENT)
Dept: PHYSICAL THERAPY | Facility: CLINIC | Age: 67
DRG: 617 | End: 2017-03-16
Attending: INTERNAL MEDICINE
Payer: MEDICARE

## 2017-03-16 DIAGNOSIS — Z53.9 ERRONEOUS ENCOUNTER--DISREGARD: Primary | ICD-10-CM

## 2017-03-16 LAB
ANION GAP SERPL CALCULATED.3IONS-SCNC: 7 MMOL/L (ref 3–14)
BUN SERPL-MCNC: 9 MG/DL (ref 7–30)
CALCIUM SERPL-MCNC: 8.3 MG/DL (ref 8.5–10.1)
CHLORIDE SERPL-SCNC: 104 MMOL/L (ref 94–109)
CO2 SERPL-SCNC: 26 MMOL/L (ref 20–32)
CREAT SERPL-MCNC: 0.71 MG/DL (ref 0.66–1.25)
ERYTHROCYTE [DISTWIDTH] IN BLOOD BY AUTOMATED COUNT: 12 % (ref 10–15)
GFR SERPL CREATININE-BSD FRML MDRD: ABNORMAL ML/MIN/1.7M2
GLUCOSE BLDC GLUCOMTR-MCNC: 192 MG/DL (ref 70–99)
GLUCOSE BLDC GLUCOMTR-MCNC: 219 MG/DL (ref 70–99)
GLUCOSE BLDC GLUCOMTR-MCNC: 268 MG/DL (ref 70–99)
GLUCOSE SERPL-MCNC: 182 MG/DL (ref 70–99)
HCT VFR BLD AUTO: 35.4 % (ref 40–53)
HGB BLD-MCNC: 12.3 G/DL (ref 13.3–17.7)
INTERPRETATION ECG - MUSE: NORMAL
MCH RBC QN AUTO: 31.5 PG (ref 26.5–33)
MCHC RBC AUTO-ENTMCNC: 34.7 G/DL (ref 31.5–36.5)
MCV RBC AUTO: 91 FL (ref 78–100)
PLATELET # BLD AUTO: 219 10E9/L (ref 150–450)
POTASSIUM SERPL-SCNC: 3.8 MMOL/L (ref 3.4–5.3)
RBC # BLD AUTO: 3.9 10E12/L (ref 4.4–5.9)
SODIUM SERPL-SCNC: 137 MMOL/L (ref 133–144)
WBC # BLD AUTO: 9.6 10E9/L (ref 4–11)

## 2017-03-16 PROCEDURE — 97116 GAIT TRAINING THERAPY: CPT | Mod: GP | Performed by: PHYSICAL THERAPIST

## 2017-03-16 PROCEDURE — 25000132 ZZH RX MED GY IP 250 OP 250 PS 637: Mod: GY | Performed by: INTERNAL MEDICINE

## 2017-03-16 PROCEDURE — 36415 COLL VENOUS BLD VENIPUNCTURE: CPT | Performed by: INTERNAL MEDICINE

## 2017-03-16 PROCEDURE — L3260 AMBULATORY SURGICAL BOOT EAC: HCPCS

## 2017-03-16 PROCEDURE — 00000146 ZZHCL STATISTIC GLUCOSE BY METER IP

## 2017-03-16 PROCEDURE — 25000128 H RX IP 250 OP 636: Performed by: INTERNAL MEDICINE

## 2017-03-16 PROCEDURE — 85027 COMPLETE CBC AUTOMATED: CPT | Performed by: INTERNAL MEDICINE

## 2017-03-16 PROCEDURE — 99232 SBSQ HOSP IP/OBS MODERATE 35: CPT | Performed by: INTERNAL MEDICINE

## 2017-03-16 PROCEDURE — A9270 NON-COVERED ITEM OR SERVICE: HCPCS | Mod: GY | Performed by: INTERNAL MEDICINE

## 2017-03-16 PROCEDURE — 99231 SBSQ HOSP IP/OBS SF/LOW 25: CPT | Performed by: PODIATRIST

## 2017-03-16 PROCEDURE — 40000193 ZZH STATISTIC PT WARD VISIT: Performed by: PHYSICAL THERAPIST

## 2017-03-16 PROCEDURE — 97161 PT EVAL LOW COMPLEX 20 MIN: CPT | Mod: GP | Performed by: PHYSICAL THERAPIST

## 2017-03-16 PROCEDURE — 12000007 ZZH R&B INTERMEDIATE

## 2017-03-16 PROCEDURE — 82565 ASSAY OF CREATININE: CPT | Performed by: INTERNAL MEDICINE

## 2017-03-16 PROCEDURE — 80048 BASIC METABOLIC PNL TOTAL CA: CPT | Performed by: INTERNAL MEDICINE

## 2017-03-16 RX ADMIN — OXYCODONE HYDROCHLORIDE 5 MG: 5 TABLET ORAL at 09:05

## 2017-03-16 RX ADMIN — EZETIMIBE 10 MG: 10 TABLET ORAL at 20:32

## 2017-03-16 RX ADMIN — SODIUM CHLORIDE: 9 INJECTION, SOLUTION INTRAVENOUS at 09:17

## 2017-03-16 RX ADMIN — SENNOSIDES AND DOCUSATE SODIUM 1 TABLET: 8.6; 5 TABLET ORAL at 09:05

## 2017-03-16 RX ADMIN — ACETAMINOPHEN 650 MG: 325 TABLET, FILM COATED ORAL at 20:32

## 2017-03-16 RX ADMIN — PIPERACILLIN SODIUM,TAZOBACTAM SODIUM 3.38 G: 3; .375 INJECTION, POWDER, FOR SOLUTION INTRAVENOUS at 14:40

## 2017-03-16 RX ADMIN — INSULIN ASPART 1 UNITS: 100 INJECTION, SOLUTION INTRAVENOUS; SUBCUTANEOUS at 09:13

## 2017-03-16 RX ADMIN — INSULIN ASPART 2 UNITS: 100 INJECTION, SOLUTION INTRAVENOUS; SUBCUTANEOUS at 18:41

## 2017-03-16 RX ADMIN — PIPERACILLIN SODIUM,TAZOBACTAM SODIUM 3.38 G: 3; .375 INJECTION, POWDER, FOR SOLUTION INTRAVENOUS at 01:43

## 2017-03-16 RX ADMIN — PIPERACILLIN SODIUM,TAZOBACTAM SODIUM 3.38 G: 3; .375 INJECTION, POWDER, FOR SOLUTION INTRAVENOUS at 09:09

## 2017-03-16 RX ADMIN — METOPROLOL SUCCINATE 50 MG: 50 TABLET, EXTENDED RELEASE ORAL at 09:03

## 2017-03-16 RX ADMIN — PIPERACILLIN SODIUM,TAZOBACTAM SODIUM 3.38 G: 3; .375 INJECTION, POWDER, FOR SOLUTION INTRAVENOUS at 20:38

## 2017-03-16 RX ADMIN — LOSARTAN POTASSIUM AND HYDROCHLOROTHIAZIDE 1 TABLET: 50; 12.5 TABLET, FILM COATED ORAL at 09:03

## 2017-03-16 RX ADMIN — ATORVASTATIN CALCIUM 40 MG: 40 TABLET, FILM COATED ORAL at 20:32

## 2017-03-16 NOTE — PROGRESS NOTES
[facility]    Infectious Disease Progress Note    Date of Service (when I saw the patient): 03/16/2017     Assessment & Plan   Zia Hopkins is a 67 year old male who was admitted on 3/14/2017.    IMPRESSION:   1. A 67-year-old male with diabetes mellitus.   2. Peripheral vascular disease. Status post left SFA and popliteal balloon angioplasty and stenting 1 week ago.   3. Admitted on this occasion for development of dry gangrene of the left fifth toe with surrounding erythema. S/P Amputation of the left fifth toe at the level of the metatarsophalangeal joint and Excisional debridement to the layer of the subcutaneous tissue, dorsal toes 2, 3, 4 on the left.       RECOMMENDATIONS:   1. Will continue coverage with IV Zosyn.   2. Discontinued vancomycin to minimize the chance of nephrotoxicity.   3. Will follow up on cultures.    Дмитрий Moya MD    Interval History    Afebrile, s/p Amputation of the left fifth toe at the level of the metatarsophalangeal joint and Excisional debridement to the layer of the subcutaneous tissue, dorsal toes 2, 3, 4 on the left.     Physical Exam   Temp: 99.2  F (37.3  C) Temp src: Oral BP: 176/82 Pulse: 72 Heart Rate: 73 Resp: 16 SpO2: 93 % O2 Device: None (Room air) Oxygen Delivery: 3 LPM  Vitals:    03/14/17 2034 03/15/17 0500 03/16/17 0556   Weight: 69 kg (152 lb 1.6 oz) 68.9 kg (151 lb 14.4 oz) 70.1 kg (154 lb 8.7 oz)     Vital Signs with Ranges  Temp:  [98  F (36.7  C)-100  F (37.8  C)] 99.2  F (37.3  C)  Pulse:  [72] 72  Heart Rate:  [66-79] 73  Resp:  [11-18] 16  BP: (140-176)/(65-82) 176/82  SpO2:  [92 %-99 %] 93 %    Constitutional: Awake, alert, cooperative, no apparent distress  Lungs: Clear to auscultation bilaterally, no crackles or wheezing  Cardiovascular: Regular rate and rhythm, normal S1 and S2, and no murmur noted  Abdomen: Normal bowel sounds, soft, non-distended, non-tender  Skin: No rashes, no cyanosis, no edema  Other:    Medications     NaCl  100 mL/hr at 03/15/17 2114     IV infusion builder WITH LARGE additive list 125 mL/hr at 03/15/17 1017       atorvastatin (LIPITOR) tablet 40 mg  40 mg Oral At Bedtime     ezetimibe  10 mg Oral Daily     losartan-hydrochlorothiazide  1 tablet Oral Daily     metoprolol (TOPROL-XL) 24 hr tablet 50 mg  50 mg Oral Daily     senna-docusate  1-2 tablet Oral BID     piperacillin-tazobactam  3.375 g Intravenous Q6H     insulin aspart  1-7 Units Subcutaneous TID AC     insulin aspart  1-5 Units Subcutaneous At Bedtime       Data   All microbiology laboratory data reviewed.  Recent Labs   Lab Test  03/16/17   0742  03/14/17   1520  03/08/17   0720   WBC  9.6  11.5*  8.4   HGB  12.3*  14.1  15.1   HCT  35.4*  39.6*  42.8   MCV  91  91  91   PLT  219  237  240     Recent Labs   Lab Test  03/16/17   0742  03/15/17   0710  03/14/17   1520   CR  0.71  0.82  0.72     No lab results found.  Recent Labs   Lab Test  03/15/17   1608   CULT  Pending  Pending     All cultures:    Recent Labs  Lab 03/15/17  1608   CULT Pending  Pending

## 2017-03-16 NOTE — PROGRESS NOTES
S: Pt was seen in the [facility] for del of offloading shoe dh2 shoe for the left foot due to 5th toe amputation.  O/G: Provide relief on the 5th toe.  A: Fit and del a large dh2 shoe for the left foot with relief made on the 5th toe. Pt was informed of proper don, doff and maintenance of shoe. Pt stated that his foot was very sensitive on the toes so the large size dh2 shoe was fitted. Pt is aware that when the bandages comes off, the shoe might be slightly looser. The pt is ok with this result.   P: Pt to wear the shoe and call with concerns.

## 2017-03-16 NOTE — PLAN OF CARE
Problem: Goal Outcome Summary  Goal: Goal Outcome Summary  OT: attempted but pt was busy speaking with MD and then nursing in for cares.

## 2017-03-16 NOTE — PROGRESS NOTES
03/16/17 1059   Quick Adds   Type of Visit Initial PT Evaluation   Living Environment   Lives With spouse   Living Arrangements house   Home Accessibility bed and bath are not on the first floor;stairs to enter home;stairs within home   Number of Stairs to Enter Home 2  (2 in front without rail. 4 to deck in back with rail. )   Number of Stairs Within Home (Patient and wife report multiple steps all with rails inside)   Transportation Available car;family or friend will provide   Living Environment Comment Patient is retired. Active and independent.    Self-Care   Usual Activity Tolerance good   Current Activity Tolerance fair  (Limited by left foot pain. )   Regular Exercise yes   Activity/Exercise Type walking   Equipment Currently Used at Home none   Functional Level Prior   Ambulation 0-->independent   Transferring 0-->independent   Toileting 0-->independent   Bathing 0-->independent   Dressing 0-->independent   Eating 0-->independent   Communication 0-->understands/communicates without difficulty   Swallowing 0-->swallows foods/liquids without difficulty   Cognition 0 - no cognition issues reported   Fall history within last six months no   Which of the above functional risks had a recent onset or change? ambulation   General Information   Onset of Illness/Injury or Date of Surgery - Date 03/14/17   Referring Physician Emre Samuel   Patient/Family Goals Statement To go home.    Pertinent History of Current Problem (include personal factors and/or comorbidities that impact the POC) Patient admitted with  ischemic necrosis in left fifth toe and ischemic wounds on left 2nd 3rd and 4th toes. Left 5th toe amputated and 2nd 3rd and 4th toes debrided.    Precautions/Limitations fall precautions   Weight-Bearing Status - LUE full weight-bearing   Weight-Bearing Status - RUE full weight-bearing   Weight-Bearing Status - LLE other (see comments)  (Heel weight bearing. )   Weight-Bearing Status - RLE full  weight-bearing   General Observations Patient very anxious to amb. Needed constant reminders to maintain weight bearing status.    Cognitive Status Examination   Orientation orientation to person, place and time   Level of Consciousness alert   Follows Commands and Answers Questions 100% of the time   Personal Safety and Judgment intact   Memory intact   Pain Assessment   Patient Currently in Pain Yes, see Vital Sign flowsheet  (Left foot that increased by end of session. )   Integumentary/Edema   Integumentary/Edema Comments Bandage on left foot clean and intact.    Posture    Posture Comments no   Range of Motion (ROM)   ROM Comment No deficits noted except left toes bandaged.    Strength   Strength Comments No deficits noted except unable to assess left toes.    Bed Mobility   Bed Mobility Comments independent   Transfer Skills   Transfer Comments Sit to stand with supervision and cues for weight bearing status.    Gait   Gait Comments Gait with ww with constant cues for WB status. Needed cues repeated constantly.    Balance   Balance Comments Good static and dynamic sitting balance and good static standing balance with ww.    Sensory Examination   Sensory Perception no deficits were identified   Clinical Impression   Criteria for Skilled Therapeutic Intervention yes, treatment indicated   PT Diagnosis impaired gait   Influenced by the following impairments Left foot pain, Limited WBing on the left.    Functional limitations due to impairments Needed assist and constant cues for maintaining WBing on the left.    Clinical Presentation Stable/Uncomplicated   Clinical Decision Making (Complexity) Low complexity   Therapy Frequency` 2 times/day   Predicted Duration of Therapy Intervention (days/wks) 3 days   Anticipated Equipment Needs at Discharge (May need an AD. Will continue to assess. )   Anticipated Discharge Disposition Home with Assist  (Wife to provide assist as needed. )   Risk & Benefits of therapy have  "been explained Yes   Patient, Family & other staff in agreement with plan of care Yes   [facility] AM-PAC TM \"6 Clicks\"   2016, Trustees of [facility], under license to [employer].  All rights reserved.   6 Clicks Short Forms Basic Mobility Inpatient Short Form   [facility] AM-PAC  \"6 Clicks\" V.2 Basic Mobility Inpatient Short Form   1. Turning from your back to your side while in a flat bed without using bedrails? 4 - None   2. Moving from lying on your back to sitting on the side of a flat bed without using bedrails? 4 - None   3. Moving to and from a bed to a chair (including a wheelchair)? 3 - A Little   4. Standing up from a chair using your arms (e.g., wheelchair, or bedside chair)? 3 - A Little   5. To walk in hospital room? 3 - A Little   6. Climbing 3-5 steps with a railing? 3 - A Little   Basic Mobility Raw Score (Score out of 24.Lower scores equate to lower levels of function) 20   Total Evaluation Time   Total Evaluation Time (Minutes) 18     "

## 2017-03-16 NOTE — PLAN OF CARE
Problem: Goal Outcome Summary  Goal: Goal Outcome Summary  Outcome: No Change  VSS, ex HTN. Dressing on left foot CDI. Denies any pain. BS hypo, no gas.  Bedrest tonight and using urinal. Urinating well. Tolerating diabetic diet. BG checks with coverage needed.

## 2017-03-16 NOTE — PLAN OF CARE
Problem: Goal Outcome Summary  Goal: Goal Outcome Summary  Outcome: No Change  A&Ox4. VSS. Assist of 1. Left foot dressing CDI. Denies any pain. Bedrest overnight. Utilized urinal at bedside with adequate urine output. Diet: diabetic consistant carb. Overnight .

## 2017-03-16 NOTE — PLAN OF CARE
Problem: Goal Outcome Summary  Goal: Goal Outcome Summary  PT-Patient seen for initial eval and treatment initiated. Patient lives in a multi-level house with his wife. There are no rails on the front outside steps but there are rails on the steps to the deck in the back. The patient is retired and typically active and independent. The patients wife works. Currently the patient is to be heel weight bearing only on the left. He is to get an off loading shoe but it was not in his room when therapist arrived and therapist called orthotics but got the answering machine. Educated patient on how to maintain heel weight bearing even without off loading shoe. Patient amb with a walker and therapist educated him consistently on how to keep weight off front half of foot. He needed constant reminders to keep his right foot behind the left. Anticipate patient will be safe for discharge home with wife. Unsure if he will need an AD. Will continue to assess.

## 2017-03-16 NOTE — PROGRESS NOTES
"[facility]  March 16, 2017    A/P:  68 y/o DM male with PAD, s/p:  1. Amputation of the left fifth toe at the level of the metatarsophalangeal joint.   2. Excisional debridement to the layer of the subcutaneous tissue, dorsal toes 2, 3, 4 on the left.   POD #1    Sterile dressing changed.  Minimal WBAT on heel in post op shoe.  Discussed possibility of further surgery pending progress of tissue viability/infection; this can include further amputation.  Discussed progress with Dr. Denis of Vascular Surgery.  Dr. Whelan will recheck tomorrow.    Nilay Ann, KEMAR, FACFAS  Pager: [phone]    S:  Pt seen at bedside.  No acute concerns.  Feeling well.    O:  /74 (BP Location: Right arm)  Pulse 72  Temp 98.4  F (36.9  C) (Oral)  Resp 16  Ht 1.676 m (5' 6\")  Wt 70.1 kg (154 lb 8.7 oz)  SpO2 92%  BMI 24.94 kg/m2  A&Ox3, NAD.  LLE:  Dressing c/d/i.  Minimal bleeding on dressing.  Dorsal toe wounds 2-4 with stable appearing eschars noted.  5th toe amputation site coapted with some mild early edge necrosis.  Minimal local erythema.  Foot is warm, appears perfused.    Lab Results   Component Value Date    WBC 9.6 03/16/2017     Lab Results   Component Value Date    RBC 3.90 03/16/2017     Lab Results   Component Value Date    HGB 12.3 03/16/2017     Lab Results   Component Value Date    HCT 35.4 03/16/2017     No components found for: MCT  Lab Results   Component Value Date    MCV 91 03/16/2017     Lab Results   Component Value Date    MCH 31.5 03/16/2017     Lab Results   Component Value Date    MCHC 34.7 03/16/2017     Lab Results   Component Value Date    RDW 12.0 03/16/2017     Lab Results   Component Value Date     03/16/2017                 "

## 2017-03-16 NOTE — PLAN OF CARE
Problem: Goal Outcome Summary  Goal: Goal Outcome Summary  Outcome: Improving  VSS, A &O X4, had prn pain med once, otherwise denies pain. Lung sounds clear. Left foot is still wrapped with dressing, no drainage noticed. IV  infusion is discontinued.

## 2017-03-16 NOTE — CONSULTS
"BRIEF NUTRITION ASSESSMENT      REASON FOR ASSESSMENT:    Admission screen - Reduced oral intake over the last month    NUTRITION HISTORY:  Pt follow a regular diet, denies decreased intake, \"that's all I do is eat!\"  He states he was recently dx'd with DM but hasn't had any education yet.    CURRENT DIET AND INTAKE:  Diet:  Mod consistent carb.  Good appetite, ate 100% at breakfast.                ANTHROPOMETRICS:  Height: 5' 6\"  Weight: 70.1 kg  BMI: 24.94 kg/m2  IBW:  64.5 kg +/- 10%  Weight Status: Normal BMI  %IBW: 109%  Weight History:   Wt Readings from Last 10 Encounters:   03/16/17 70.1 kg (154 lb 8.7 oz)   03/14/17 (P) 70.3 kg (155 lb)   03/08/17 70.4 kg (155 lb 1.6 oz)   03/03/17 71.2 kg (157 lb)     LABS:  Labs noted    MALNUTRITION:  Patient does not meet two of the following criteria necessary for diagnosing malnutrition: significant weight loss, reduced intake, subcutaneous fat loss, muscle loss or fluid retention    NUTRITION INTERVENTION:  Nutrition Diagnosis:  No nutrition diagnosis at this time.    Implementation:  Nutrition Education:   Recommend referral to OP diabetes education program    FOLLOW UP/MONITORING:   Will re-evaluate in 7 days, or sooner, if re-consulted.    Betty Dawn RD  Pager [phone] (M-F)            [phone] (W/E & Hol)          "

## 2017-03-17 ENCOUNTER — APPOINTMENT (OUTPATIENT)
Dept: PHYSICAL THERAPY | Facility: CLINIC | Age: 67
DRG: 617 | End: 2017-03-17
Attending: INTERNAL MEDICINE
Payer: MEDICARE

## 2017-03-17 ENCOUNTER — APPOINTMENT (OUTPATIENT)
Dept: OCCUPATIONAL THERAPY | Facility: CLINIC | Age: 67
DRG: 617 | End: 2017-03-17
Attending: INTERNAL MEDICINE
Payer: MEDICARE

## 2017-03-17 LAB
COPATH REPORT: NORMAL
CREAT SERPL-MCNC: 0.75 MG/DL (ref 0.66–1.25)
GFR SERPL CREATININE-BSD FRML MDRD: NORMAL ML/MIN/1.7M2
GLUCOSE BLDC GLUCOMTR-MCNC: 152 MG/DL (ref 70–99)
GLUCOSE BLDC GLUCOMTR-MCNC: 153 MG/DL (ref 70–99)
GLUCOSE BLDC GLUCOMTR-MCNC: 156 MG/DL (ref 70–99)
GLUCOSE BLDC GLUCOMTR-MCNC: 226 MG/DL (ref 70–99)
GLUCOSE BLDC GLUCOMTR-MCNC: 312 MG/DL (ref 70–99)

## 2017-03-17 PROCEDURE — 97535 SELF CARE MNGMENT TRAINING: CPT | Mod: GO | Performed by: OCCUPATIONAL THERAPIST

## 2017-03-17 PROCEDURE — A9270 NON-COVERED ITEM OR SERVICE: HCPCS | Mod: GY | Performed by: INTERNAL MEDICINE

## 2017-03-17 PROCEDURE — 12000007 ZZH R&B INTERMEDIATE

## 2017-03-17 PROCEDURE — 99232 SBSQ HOSP IP/OBS MODERATE 35: CPT | Performed by: INTERNAL MEDICINE

## 2017-03-17 PROCEDURE — 40000133 ZZH STATISTIC OT WARD VISIT: Performed by: OCCUPATIONAL THERAPIST

## 2017-03-17 PROCEDURE — 82565 ASSAY OF CREATININE: CPT | Performed by: PODIATRIST

## 2017-03-17 PROCEDURE — 36415 COLL VENOUS BLD VENIPUNCTURE: CPT | Performed by: PODIATRIST

## 2017-03-17 PROCEDURE — 25000128 H RX IP 250 OP 636: Performed by: INTERNAL MEDICINE

## 2017-03-17 PROCEDURE — 97165 OT EVAL LOW COMPLEX 30 MIN: CPT | Mod: GO | Performed by: OCCUPATIONAL THERAPIST

## 2017-03-17 PROCEDURE — 97116 GAIT TRAINING THERAPY: CPT | Mod: GP | Performed by: PHYSICAL THERAPIST

## 2017-03-17 PROCEDURE — 00000146 ZZHCL STATISTIC GLUCOSE BY METER IP

## 2017-03-17 PROCEDURE — 25000132 ZZH RX MED GY IP 250 OP 250 PS 637: Mod: GY | Performed by: INTERNAL MEDICINE

## 2017-03-17 PROCEDURE — 40000193 ZZH STATISTIC PT WARD VISIT: Performed by: PHYSICAL THERAPIST

## 2017-03-17 RX ADMIN — EZETIMIBE 10 MG: 10 TABLET ORAL at 21:15

## 2017-03-17 RX ADMIN — SENNOSIDES AND DOCUSATE SODIUM 1 TABLET: 8.6; 5 TABLET ORAL at 08:57

## 2017-03-17 RX ADMIN — METOPROLOL SUCCINATE 50 MG: 50 TABLET, EXTENDED RELEASE ORAL at 08:57

## 2017-03-17 RX ADMIN — ATORVASTATIN CALCIUM 40 MG: 40 TABLET, FILM COATED ORAL at 21:15

## 2017-03-17 RX ADMIN — PIPERACILLIN SODIUM,TAZOBACTAM SODIUM 3.38 G: 3; .375 INJECTION, POWDER, FOR SOLUTION INTRAVENOUS at 16:28

## 2017-03-17 RX ADMIN — PIPERACILLIN SODIUM,TAZOBACTAM SODIUM 3.38 G: 3; .375 INJECTION, POWDER, FOR SOLUTION INTRAVENOUS at 21:16

## 2017-03-17 RX ADMIN — PIPERACILLIN SODIUM,TAZOBACTAM SODIUM 3.38 G: 3; .375 INJECTION, POWDER, FOR SOLUTION INTRAVENOUS at 07:52

## 2017-03-17 RX ADMIN — INSULIN ASPART 1 UNITS: 100 INJECTION, SOLUTION INTRAVENOUS; SUBCUTANEOUS at 17:34

## 2017-03-17 RX ADMIN — PIPERACILLIN SODIUM,TAZOBACTAM SODIUM 3.38 G: 3; .375 INJECTION, POWDER, FOR SOLUTION INTRAVENOUS at 02:12

## 2017-03-17 RX ADMIN — LOSARTAN POTASSIUM AND HYDROCHLOROTHIAZIDE 1 TABLET: 50; 12.5 TABLET, FILM COATED ORAL at 08:57

## 2017-03-17 RX ADMIN — INSULIN ASPART 1 UNITS: 100 INJECTION, SOLUTION INTRAVENOUS; SUBCUTANEOUS at 07:52

## 2017-03-17 NOTE — PROGRESS NOTES
"Podiatry / Foot and Ankle Surgery Progress Note    March 17, 2017    Subject: Patient was seen at bedside.  Notes pain to foot. Wife at bedside. Notes he does not like the shoe. Denies fever, chills, nausa.     Objective:  Vitals: /88 (BP Location: Left arm)  Pulse 73  Temp 98.9  F (37.2  C) (Oral)  Resp 16  Ht 1.676 m (5' 6\")  Wt 71.1 kg (156 lb 12 oz)  SpO2 94%  BMI 25.3 kg/m2  BMI= Body mass index is 25.3 kg/(m^2).    General:  Patient is alert and orientated.  NAD  Dressing c/d/i. Minimal bleeding on dressing. Dorsal toe wounds 2-4 with stable appearing eschars noted. 5th toe amputation site coapted with some mild early edge necrosis. Minimal local erythema. Foot is warm, appears perfused.    Assessment: 68 y/o DM male with PAD, s/p:  1. Amputation of the left fifth toe at the level of the metatarsophalangeal joint.   2. Excisional debridement to the layer of the subcutaneous tissue, dorsal toes 2, 3, 4 on the left.   POD #2    Plan:    -Dressing changed  -Keep foot and dressing dry  -Minimal WBAT on heel in post op shoe.  -Discussed possibility of further surgery pending progress of tissue viability/infection; this can include further amputation, likely outpatient.  -At this point, no further surgery is planned.  Patient okay to discharge from podiatry standpoint. Discharge pending medicine, vascular, and PT.  -patient to follow up with Dr. Mauricio in 1 week from discharged. Orders and recs placed.  -Podiatry will sign off. Please call with questions or concerns.     Purvi Whelan DPM, Podiatry/Foot and Ankle Surgery  3:57 PM    "

## 2017-03-17 NOTE — PROGRESS NOTES
[facility]    Hospitalist Progress Note  Dm Monroe MD    Assessment & Plan      67-year-old man with peripheral artery disease, hypertension, hyperlipidemia, smoker and Type 2 non insulin-dependent diabetes mellitus, who was admitted on 3/14/17, due to left foot wounds. He was admitted due to suspicion for wet gangrene and cellulitis of the left lateral foot and osteomyelitis of the left foot. He underwent left lower extremity angiogram, balloon angioplasty and stent placement on 3/8/17. He was being admitted for further work-up   of the left foot with likely surgical amputation of affected areas. Work up done on 3/14/17 revealed, normal BMP. CBC revealed, WBC was 11.5. HbA1C was 8.6% on 3/8/17. Left foot x-rays on 3/14/17 revealed, no soft tissue air. Diffuse midfoot and forefoot soft tissue swelling. No bony abnormality         1. POD# 1 Amputation of left 5th toe at the metatarsophalangeal joint and debridement of the subcutaneous tissue: Appreciate Podiatry inputs, Vascular Surgery and ID. Continue IV Zosyn for the cellulitis. MRI left foot on 3/14/17 revealed, Fifth toe proximal phalangeal diffuse intramedullary edema and  enhancement with suspected adjacent soft tissue wound, highly suspicious for osteomyelitis. Mild ill-defined edema without enhancement, involving the fourth toe middle and distal phalanges and second/third toe distal phalanges. This is nonspecific and could represent reactive edema versus additional sites of osteomyelitis.   No kushal osseous destruction is demonstrated. No MR evidence of rim-enhancing fluid collection or abscess within the forefoot.    2. Type 2 diabetes mellitus: BG was 262 this am. HbA1C was 8.6% on 3/8/17. Metformin is on hold. Continue   Medium dose insulin aspart sliding scale prn. Continue low dose carbohydrate diet.    3. Hypertension: continue Toprol XL and Hyzaar. For IV Hydralazine prn.     4. Peripheral artery disease: S/P stent  "placement in left lower extremity on 3/18/17. Plavix and aspirin are on hold.     5. Hyperlipidemia: continue Zetia and Lipitor.      6. Tobacco use disorder: pt reported quitting smoking \"cold turkey\" in early March 2017.      DVT Prophylaxis: Pneumatic Compression Devices  Code Status: Full Code    Disposition: Expected discharge in 1-2 days.      Interval History   The pt was reported having intermittent mild twitches in the his left foot, but no overt pain      -Data reviewed today: I reviewed all new labs and imaging results over the last 24 hours. I personally reviewed no images or EKG's today.    Physical Exam   Temp: 99.6  F (37.6  C) Temp src: Oral BP: 174/78 (reported to nurse) Pulse: 77 Heart Rate: 78 Resp: 16 SpO2: 94 % O2 Device: None (Room air)    Vitals:    03/14/17 2034 03/15/17 0500 03/16/17 0556   Weight: 69 kg (152 lb 1.6 oz) 68.9 kg (151 lb 14.4 oz) 70.1 kg (154 lb 8.7 oz)     Vital Signs with Ranges  Temp:  [98  F (36.7  C)-100  F (37.8  C)] 99.6  F (37.6  C)  Pulse:  [72-77] 77  Heart Rate:  [72-80] 78  Resp:  [16] 16  BP: (157-191)/(65-89) 174/78  SpO2:  [92 %-98 %] 94 %  I/O last 3 completed shifts:  In: 2603 [P.O.:740; I.V.:1863]  Out: 1301 [Urine:1300; Blood:1]    Constitutional: Adult male, awake, cooperative, no apparent distress, O2 Sats 95% on RA  Respiratory: BS vesicular bilaterally, no crackles or wheezing  Cardiovascular: S1 and S2, reg, no murmur noted  GI: Soft, non-distended, non-tender, no masses, BS present+  Skin/Integumen: No rashes  Extremities: Guaze dressing over left foot+    Medications        atorvastatin (LIPITOR) tablet 40 mg  40 mg Oral At Bedtime     ezetimibe  10 mg Oral Daily     losartan-hydrochlorothiazide  1 tablet Oral Daily     metoprolol (TOPROL-XL) 24 hr tablet 50 mg  50 mg Oral Daily     senna-docusate  1-2 tablet Oral BID     piperacillin-tazobactam  3.375 g Intravenous Q6H     insulin aspart  1-7 Units Subcutaneous TID AC     insulin aspart  1-5 Units " Subcutaneous At Bedtime       Data     Recent Labs  Lab 03/16/17  0742 03/15/17  0710 03/14/17  1520   WBC 9.6  --  11.5*   HGB 12.3*  --  14.1   MCV 91  --  91     --  237     --  136   POTASSIUM 3.8  --  4.8   CHLORIDE 104  --  99   CO2 26  --  30   BUN 9  --  15   CR 0.71 0.82 0.72   ANIONGAP 7  --  7   TIN 8.3*  --  9.3   *  --  233*       No results found for this or any previous visit (from the past 24 hour(s)).

## 2017-03-17 NOTE — PLAN OF CARE
Problem: Goal Outcome Summary  Goal: Goal Outcome Summary  Outcome: No Change  VSS except HTN. Denies pain. L foot dressing CDI. Ambulating SBA. Voiding per urinal. LS clear, BS active, denies passing flatus.

## 2017-03-17 NOTE — PROGRESS NOTES
[facility]    Infectious Disease Progress Note    Date of Service (when I saw the patient): 03/17/2017     Assessment & Plan   Zia Hopkins is a 67 year old male who was admitted on 3/14/2017.    IMPRESSION:   1. A 67-year-old male with diabetes mellitus.   2. Peripheral vascular disease. Status post left SFA and popliteal balloon angioplasty and stenting 1 week ago.   3. Admitted on this occasion for development of dry gangrene of the left fifth toe with surrounding erythema. S/P Amputation of the left fifth toe at the level of the metatarsophalangeal joint and Excisional debridement to the layer of the subcutaneous tissue, dorsal toes 2, 3, 4 on the left.   4. Staph Aureus in the culture pending Irina.       RECOMMENDATIONS:   1. Will continue coverage with IV Zosyn.       Дмитрий Moya MD    Interval History    Afebrile, s/p Amputation of the left fifth toe at the level of the metatarsophalangeal joint and Excisional debridement to the layer of the subcutaneous tissue, dorsal toes 2, 3, 4 on the left.     Physical Exam   Temp: 97.9  F (36.6  C) Temp src: Oral BP: 164/75 Pulse: 71 Heart Rate: 69 Resp: 16 SpO2: 92 % O2 Device: None (Room air)    Vitals:    03/15/17 0500 03/16/17 0556 03/17/17 0717   Weight: 68.9 kg (151 lb 14.4 oz) 70.1 kg (154 lb 8.7 oz) 71.1 kg (156 lb 12 oz)     Vital Signs with Ranges  Temp:  [97.9  F (36.6  C)-99.6  F (37.6  C)] 97.9  F (36.6  C)  Pulse:  [71-77] 71  Heart Rate:  [64-80] 69  Resp:  [16] 16  BP: (152-191)/(66-89) 164/75  SpO2:  [92 %-95 %] 92 %    Constitutional: Awake, alert, cooperative, no apparent distress  Lungs: Clear to auscultation bilaterally, no crackles or wheezing  Cardiovascular: Regular rate and rhythm, normal S1 and S2, and no murmur noted  Abdomen: Normal bowel sounds, soft, non-distended, non-tender  Skin: No rashes, no cyanosis, no edema  Other:    Medications        atorvastatin (LIPITOR) tablet 40 mg  40 mg Oral At Bedtime     ezetimibe   10 mg Oral Daily     losartan-hydrochlorothiazide  1 tablet Oral Daily     metoprolol (TOPROL-XL) 24 hr tablet 50 mg  50 mg Oral Daily     senna-docusate  1-2 tablet Oral BID     piperacillin-tazobactam  3.375 g Intravenous Q6H     insulin aspart  1-7 Units Subcutaneous TID AC     insulin aspart  1-5 Units Subcutaneous At Bedtime       Data   All microbiology laboratory data reviewed.  Recent Labs   Lab Test  03/16/17   0742  03/14/17   1520  03/08/17   0720   WBC  9.6  11.5*  8.4   HGB  12.3*  14.1  15.1   HCT  35.4*  39.6*  42.8   MCV  91  91  91   PLT  219  237  240     Recent Labs   Lab Test  03/17/17   0734  03/16/17   0742  03/15/17   0710   CR  0.75  0.71  0.82     No lab results found.  Recent Labs   Lab Test  03/15/17   1610  03/15/17   1608   CULT  Moderate growth Gram positive cocci  Culture in progress  *  Culture negative monitoring continues  Moderate growth Staphylococcus aureus  Culture in progress  *  Culture negative monitoring continues     All cultures:    Recent Labs  Lab 03/15/17  1610 03/15/17  1608   CULT Moderate growth Gram positive cocciCulture in progress*  Culture negative monitoring continues Moderate growth Staphylococcus aureusCulture in progress*  Culture negative monitoring continues

## 2017-03-17 NOTE — PLAN OF CARE
Problem: Goal Outcome Summary  Goal: Goal Outcome Summary  Outcome: Improving  A/O, VSS ex htn, SBA, BS, no flatus (pt said normal for him), left foot dressing CDI, pt would like podiatry to file/cut toe nails if possible (sticky note), inspiratory wheezes, IS 2000, Tylenol for mild pain.

## 2017-03-17 NOTE — PLAN OF CARE
Problem: Perioperative Period (Adult)  Goal: Signs and Symptoms of Listed Potential Problems Will be Absent or Manageable (Perioperative Period)  Signs and symptoms of listed potential problems will be absent or manageable by discharge/transition of care (reference Perioperative Period (Adult) CPG).   Outcome: Improving  Afebrile, slightly hypertensive, prn hydralazine available, dressing CDI, dressing changed by podiatrist. Pt denies pain, CMS intact.

## 2017-03-17 NOTE — PLAN OF CARE
Problem: Goal Outcome Summary  Goal: Goal Outcome Summary  OT:Evaluation/treatment completed. Pt. resides w/his spouse, who is avaiable to assist at discharge. Pt. is retired, typically indep. W/ ADL's/IADL's. Currently, pt. doing well, has off-loading shoe for L foot. Pt. able to demo. bed mobility=indep.;sit-stand transfer=supervision/SBA. Pt. ambulated to bathroom w/ supervision/SBA, vc's for WW safety/hand placement w/transitions(new to walker, would like to trial crutches, will defer t PT);Pt. completed toilet transfer w/simulated home set-up=indep.;instructed in safe shower transfer tech.--able to verbalize understanding, has built-in shower bench, and plans to get grab bars(has own resource. Pt. Indep. W/dressing skills, minimal pain reported throughout, fatigue after activity. Ed. In EC/WS techs. To implement at home as needed. No further skilled OT intervention needed a this time--goals met. Rec. DC home w/spouse assist.    Occupational Therapy Discharge Summary    Reason for therapy discharge:    All goals and outcomes met, no further needs identified.    Progress towards therapy goal(s). See goals on Care Plan in [facility] electronic health record for goal details.  Goals met    Therapy recommendation(s):    No further therapy is recommended.

## 2017-03-17 NOTE — PROGRESS NOTES
03/17/17 1000   Quick Adds   Type of Visit Initial Occupational Therapy Evaluation   Living Environment   Lives With spouse   Living Arrangements house   Home Accessibility bed and bath are not on the first floor;stairs to enter home;stairs within home   Number of Stairs to Enter Home 2   Transportation Available car;family or friend will provide   Living Environment Comment Patient is retired. Active and independent. Pt. reports he has a walk-in shhower w/built-in bench, has resource to obtain grab bars;standard toilet. Pt. reports spouse is able to assist at home(does not have to go into work).   Self-Care   Dominant Hand right   Usual Activity Tolerance good   Current Activity Tolerance moderate   Regular Exercise yes   Activity/Exercise Type walking   Equipment Currently Used at Home none   Activity/Exercise/Self-Care Comment Pt. typically indep. w/ADL's/IADL's.    Functional Level Prior   Ambulation 0-->independent   Transferring 0-->independent   Toileting 0-->independent   Bathing 0-->independent   Dressing 0-->independent   Eating 0-->independent   Communication 0-->understands/communicates without difficulty   Swallowing 0-->swallows foods/liquids without difficulty   Cognition 0 - no cognition issues reported   Fall history within last six months no   Which of the above functional risks had a recent onset or change? ambulation;transferring;toileting;bathing   General Information   Onset of Illness/Injury or Date of Surgery - Date 03/14/17   Referring Physician Dr. Samuel   Patient/Family Goals Statement Plans to DC home   Additional Occupational Profile Info/Pertinent History of Current Problem OT:Patient admitted with  ischemic necrosis in left fifth toe and ischemic wounds on left 2nd 3rd and 4th toes. Left 5th toe amputated and 2nd 3rd and 4th toes debrided.    Precautions/Limitations fall precautions   Weight-Bearing Status - LLE other (see comments)  (heel-WB L;has off-loading shoe)   General  Observations Pt. pleasant and cooperative, agreeable to OT. No pain reported at rest.   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   Cognitive Status Examination   Orientation orientation to person, place and time   Level of Consciousness alert   Able to Follow Commands WNL/WFL   Personal Safety (Cognitive) WNL/WFL   Memory intact  (3/3 STM recall)   Visual Perception   Visual Perception Comments No vision problems noted/reported/has glasses   Sensory Examination   Sensory Comments No numbness/tingling reported;pt. does state t is hard to tell in L foot, as bandages/shoe re so compacting   Pain Assessment   Patient Currently in Pain No  (reports burning sensation w/movement)   Range of Motion (ROM)   ROM Comment WNL   Strength   Strength Comments WNL'good UE strength   Hand Strength   Hand Strength Comments WNL   Mobility   Bed Mobility Comments indep. w/bed mobility   Transfer Skill: Sit to Stand   Level of [place]: Sit/Stand stand-by assist  (supervision)   Physical Assist/Nonphysical Assist: Sit/Stand verbal cues   Assistive Device for Transfer: Sit/Stand rolling walker   Toilet Transfer   Toilet Transfer Comments pt. reports standard toilet at home, support on 1-side in 2/4 bathrooms   Tub/Shower Transfer   Tub/Shower Transfer Comments walk-inshower w/built-in bench;pt. is considering installation of grab bars(has resource).   Balance   Balance Comments Good balance w/use of WW--supervision/SBA for room mobility;pt. wouldl like o trial crutches--adeel defer to PT   Lower Body Dressing   Level of [place]: Dress Lower Body independent   Instrumental Activities of Daily Living (IADL)   Previous Responsibilities (spouse available to assist)   Activities of Daily Living Analysis   Impairments Contributing to Impaired Activities of Daily Living balance impaired;pain;post surgical precautions;strength decreased   General Therapy Interventions  "  Planned Therapy Interventions ADL retraining   Clinical Impression   Criteria for Skilled Therapeutic Interventions Met yes, treatment indicated   OT Diagnosis Decline in ADl performance   Influenced by the following impairments impaired balance/pot-urgical precs., pain, weakness   Assessment of Occupational Performance 1-3 Performance Deficits   Identified Performance Deficits Pt. overall supervision-SBA for ADL's/safe transfers, room mobility   Clinical Decision Making (Complexity) Low complexity   Therapy Frequency (Eval/1 session only)   Predicted Duration of Therapy Intervention (days/wks) (eval./1 session only)   Anticipated Discharge Disposition Home;Home with Assist   Risks and Benefits of Treatment have been explained. Yes   Patient, Family & other staff in agreement with plan of care Yes   [facility] TM \"6 Clicks\"   2016, Trustees of [facility], under license to [employer].  All rights reserved.   6 Clicks Short Forms Daily Activity Inpatient Short Form   [facility]  \"6 Clicks\" Daily Activity Inpatient Short Form   1. Putting on and taking off regular lower body clothing? 4 - None   2. Bathing (including washing, rinsing, drying)? 3 - A Little   3. Toileting, which includes using toilet, bedpan or urinal? 4 - None   4. Putting on and taking off regular upper body clothing? 4 - None   5. Taking care of personal grooming such as brushing teeth? 4 - None   6. Eating meals? 4 - None   Daily Activity Raw Score (Score out of 24.Lower scores equate to lower levels of function) 23   Total Evaluation Time   Total Evaluation Time (Minutes) 12     "

## 2017-03-18 ENCOUNTER — APPOINTMENT (OUTPATIENT)
Dept: PHYSICAL THERAPY | Facility: CLINIC | Age: 67
DRG: 617 | End: 2017-03-18
Attending: INTERNAL MEDICINE
Payer: MEDICARE

## 2017-03-18 LAB
BACTERIA SPEC CULT: ABNORMAL
CREAT SERPL-MCNC: 0.75 MG/DL (ref 0.66–1.25)
GFR SERPL CREATININE-BSD FRML MDRD: NORMAL ML/MIN/1.7M2
GLUCOSE BLDC GLUCOMTR-MCNC: 176 MG/DL (ref 70–99)
GLUCOSE BLDC GLUCOMTR-MCNC: 187 MG/DL (ref 70–99)
GLUCOSE BLDC GLUCOMTR-MCNC: 217 MG/DL (ref 70–99)
GLUCOSE BLDC GLUCOMTR-MCNC: 221 MG/DL (ref 70–99)
GLUCOSE BLDC GLUCOMTR-MCNC: 261 MG/DL (ref 70–99)
GLUCOSE BLDC GLUCOMTR-MCNC: 311 MG/DL (ref 70–99)
MICRO REPORT STATUS: ABNORMAL
MICROORGANISM SPEC CULT: ABNORMAL
MICROORGANISM SPEC CULT: ABNORMAL
SPECIMEN SOURCE: ABNORMAL

## 2017-03-18 PROCEDURE — 25000132 ZZH RX MED GY IP 250 OP 250 PS 637: Mod: GY | Performed by: INTERNAL MEDICINE

## 2017-03-18 PROCEDURE — 00000146 ZZHCL STATISTIC GLUCOSE BY METER IP

## 2017-03-18 PROCEDURE — 12000007 ZZH R&B INTERMEDIATE

## 2017-03-18 PROCEDURE — 36415 COLL VENOUS BLD VENIPUNCTURE: CPT | Performed by: PODIATRIST

## 2017-03-18 PROCEDURE — A9270 NON-COVERED ITEM OR SERVICE: HCPCS | Mod: GY | Performed by: INTERNAL MEDICINE

## 2017-03-18 PROCEDURE — 25000128 H RX IP 250 OP 636: Performed by: INTERNAL MEDICINE

## 2017-03-18 PROCEDURE — 82565 ASSAY OF CREATININE: CPT | Performed by: PODIATRIST

## 2017-03-18 PROCEDURE — 99232 SBSQ HOSP IP/OBS MODERATE 35: CPT | Performed by: INTERNAL MEDICINE

## 2017-03-18 PROCEDURE — 40000193 ZZH STATISTIC PT WARD VISIT: Performed by: PHYSICAL THERAPIST

## 2017-03-18 PROCEDURE — 97116 GAIT TRAINING THERAPY: CPT | Mod: GP | Performed by: PHYSICAL THERAPIST

## 2017-03-18 RX ADMIN — ACETAMINOPHEN 650 MG: 325 TABLET, FILM COATED ORAL at 09:57

## 2017-03-18 RX ADMIN — LOSARTAN POTASSIUM AND HYDROCHLOROTHIAZIDE 1 TABLET: 50; 12.5 TABLET, FILM COATED ORAL at 09:58

## 2017-03-18 RX ADMIN — PIPERACILLIN SODIUM,TAZOBACTAM SODIUM 3.38 G: 3; .375 INJECTION, POWDER, FOR SOLUTION INTRAVENOUS at 02:33

## 2017-03-18 RX ADMIN — ATORVASTATIN CALCIUM 40 MG: 40 TABLET, FILM COATED ORAL at 21:20

## 2017-03-18 RX ADMIN — INSULIN ASPART 4 UNITS: 100 INJECTION, SOLUTION INTRAVENOUS; SUBCUTANEOUS at 18:41

## 2017-03-18 RX ADMIN — INSULIN ASPART 1 UNITS: 100 INJECTION, SOLUTION INTRAVENOUS; SUBCUTANEOUS at 09:57

## 2017-03-18 RX ADMIN — METOPROLOL SUCCINATE 50 MG: 50 TABLET, EXTENDED RELEASE ORAL at 09:58

## 2017-03-18 RX ADMIN — PIPERACILLIN SODIUM,TAZOBACTAM SODIUM 3.38 G: 3; .375 INJECTION, POWDER, FOR SOLUTION INTRAVENOUS at 21:20

## 2017-03-18 RX ADMIN — PIPERACILLIN SODIUM,TAZOBACTAM SODIUM 3.38 G: 3; .375 INJECTION, POWDER, FOR SOLUTION INTRAVENOUS at 09:57

## 2017-03-18 RX ADMIN — EZETIMIBE 10 MG: 10 TABLET ORAL at 21:20

## 2017-03-18 RX ADMIN — INSULIN ASPART 3 UNITS: 100 INJECTION, SOLUTION INTRAVENOUS; SUBCUTANEOUS at 13:23

## 2017-03-18 RX ADMIN — PIPERACILLIN SODIUM,TAZOBACTAM SODIUM 3.38 G: 3; .375 INJECTION, POWDER, FOR SOLUTION INTRAVENOUS at 16:29

## 2017-03-18 NOTE — PLAN OF CARE
Problem: Perioperative Period (Adult)  Goal: Signs and Symptoms of Listed Potential Problems Will be Absent or Manageable (Perioperative Period)  Signs and symptoms of listed potential problems will be absent or manageable by discharge/transition of care (reference Perioperative Period (Adult) CPG).   Afebrile, minimal pain, dressing CDI, independent with activities using crutches. Possible d/c tomorrow.

## 2017-03-18 NOTE — PROGRESS NOTES
[facility]    Hospitalist Progress Note  Dm Monroe MD    Assessment & Plan      67-year-old man with peripheral artery disease, hypertension, hyperlipidemia, smoker and Type 2 non insulin-dependent diabetes mellitus, who was admitted on 3/14/17, due to left foot wounds. He was admitted due to suspicion for wet gangrene and cellulitis of the left lateral foot and osteomyelitis of the left foot. He underwent left lower extremity angiogram, balloon angioplasty and stent placement on 3/8/17. He was being admitted for further work-up of the left foot with likely surgical amputation of affected areas. Work up done on 3/14/17 revealed, normal BMP. CBC revealed, WBC was 11.5. HbA1C was 8.6% on 3/8/17. Left foot x-rays on 3/14/17 revealed, no soft tissue air. Diffuse midfoot and forefoot soft tissue swelling. No bony abnormality.       1. POD# 3 Amputation of left 5th toe at the metatarsophalangeal joint and debridement of the subcutaneous tissue: Appreciate Podiatry inputs, Vascular Surgery and ID. Continue IV Zosyn. MRI left foot   on 3/14/17 revealed, Fifth toe proximal phalangeal diffuse intramedullary edema and enhancement with suspected adjacent soft tissue wound, highly suspicious for osteomyelitis. Mild ill-defined edema without enhancement, involving the fourth toe middle and distal phalanges and second/third toe distal phalanges.   This is nonspecific and could represent reactive edema versus additional sites of osteomyelitis.   No kushal osseous destruction is demonstrated. No MR evidence of rim-enhancing fluid collection or abscess within the forefoot. Left 5th toe bone culture on 3/15/17 revealed, moderate growth Staph aureus. ID to please recommend oral antibiotics and duration of therapy.    2. Type 2 diabetes mellitus: BG was 176 this am. HbA1C was 8.6% on 3/8/17. Metformin is on hold. Continue   Medium dose insulin aspart sliding scale prn. Continue low dose carbohydrate  "diet. He is to minimal weightbear on his left foot in postop shoe.    3. Hypertension: continue Toprol XL and Hyzaar. For IV Hydralazine prn.     4. Peripheral artery disease: S/P stent placement in left lower extremity on 3/18/17. Plavix and aspirin are on hold.     5. Hyperlipidemia: continue Zetia and Lipitor.      6. Tobacco use disorder: pt reported quitting smoking \"cold turkey\" in early March 2017.      DVT Prophylaxis: Pneumatic Compression Devices  Code Status: Full Code    Disposition: Expected discharge in 1 day      Interval History   The pt had no complaints today.       -Data reviewed today: I reviewed all new labs and imaging results over the last 24 hours. I personally reviewed no images or EKG's today.    Physical Exam   Temp: 98.6  F (37  C) Temp src: Oral BP: 155/74 Pulse: 71 Heart Rate: 67 Resp: 16 SpO2: 96 % O2 Device: None (Room air)    Vitals:    03/15/17 0500 03/16/17 0556 03/17/17 0717   Weight: 68.9 kg (151 lb 14.4 oz) 70.1 kg (154 lb 8.7 oz) 71.1 kg (156 lb 12 oz)     Vital Signs with Ranges  Temp:  [98.6  F (37  C)-99.5  F (37.5  C)] 98.6  F (37  C)  Pulse:  [70-77] 71  Heart Rate:  [67-77] 67  Resp:  [16] 16  BP: (141-172)/(56-82) 155/74  SpO2:  [91 %-96 %] 96 %       Constitutional: Adult male, awake, cooperative, no apparent distress, O2 Sats 96% on RA  Respiratory: BS vesicular bilaterally, no crackles or wheezing  Cardiovascular: S1 and S2, reg, no murmur noted  GI: Soft, non-distended, non-tender, no masses, BS present+  Skin/Integumen: No rashes  Extremities: Ace bandage over left foot+    Medications        atorvastatin (LIPITOR) tablet 40 mg  40 mg Oral At Bedtime     ezetimibe  10 mg Oral Daily     losartan-hydrochlorothiazide  1 tablet Oral Daily     metoprolol (TOPROL-XL) 24 hr tablet 50 mg  50 mg Oral Daily     senna-docusate  1-2 tablet Oral BID     piperacillin-tazobactam  3.375 g Intravenous Q6H     insulin aspart  1-7 Units Subcutaneous TID AC     insulin aspart  1-5 " Units Subcutaneous At Bedtime       Data     Recent Labs  Lab 03/18/17  0740 03/17/17  0734 03/16/17  0742  03/14/17  1520   WBC  --   --  9.6  --  11.5*   HGB  --   --  12.3*  --  14.1   MCV  --   --  91  --  91   PLT  --   --  219  --  237   NA  --   --  137  --  136   POTASSIUM  --   --  3.8  --  4.8   CHLORIDE  --   --  104  --  99   CO2  --   --  26  --  30   BUN  --   --  9  --  15   CR 0.75 0.75 0.71  < > 0.72   ANIONGAP  --   --  7  --  7   TIN  --   --  8.3*  --  9.3   GLC  --   --  182*  --  233*   < > = values in this interval not displayed.    No results found for this or any previous visit (from the past 24 hour(s)).

## 2017-03-18 NOTE — PLAN OF CARE
Problem: Goal Outcome Summary  Goal: Goal Outcome Summary  Outcome: No Change  Tmax 99.5. A&O x4. Denies pain. Left foot dressing CDI. CMS intact. Minimal heel weight bearing in post-op shoe which pt refused to wear. Up SBA + crutches. Voiding adequately.

## 2017-03-18 NOTE — PLAN OF CARE
Problem: Goal Outcome Summary  Goal: Goal Outcome Summary  PT-Patient is modified independent with crutches on level surface and on steps with crutches and one rail. He is able to maintain WBing precautions on stairs and on level surface. Patient will need to be issued crutches prior to discharge. Order placed in computer. Please call [phone] to get therapy to issue crutches on day of discharge. Recommend disch to home with assist of wife as needed.

## 2017-03-18 NOTE — PROGRESS NOTES
[facility]    Hospitalist Progress Note  Dm Monroe MD    Assessment & Plan      67-year-old man with peripheral artery disease, hypertension, hyperlipidemia, smoker and Type 2 non insulin-dependent diabetes mellitus, who was admitted on 3/14/17, due to left foot wounds. He was admitted due to suspicion for wet gangrene and cellulitis of the left lateral foot and osteomyelitis of the left foot. He underwent left lower extremity angiogram, balloon angioplasty and stent placement on 3/8/17. He was being admitted for further work-up of the left foot with likely surgical amputation of affected areas. Work up done on 3/14/17 revealed, normal BMP. CBC revealed, WBC was 11.5. HbA1C was 8.6% on 3/8/17. Left foot x-rays on 3/14/17 revealed, no soft tissue air. Diffuse midfoot and forefoot soft tissue swelling. No bony abnormality         1. POD# 2 Amputation of left 5th toe at the metatarsophalangeal joint and debridement of the subcutaneous tissue: Appreciate Podiatry inputs, Vascular Surgery and ID. Continue IV Zosyn. MRI left foot   on 3/14/17 revealed, Fifth toe proximal phalangeal diffuse intramedullary edema and  enhancement with suspected adjacent soft tissue wound, highly suspicious for osteomyelitis. Mild ill-defined edema without enhancement, involving the fourth toe middle and distal phalanges and second/third toe distal phalanges. This is nonspecific and could represent reactive edema versus additional sites of osteomyelitis.   No kushal osseous destruction is demonstrated. No MR evidence of rim-enhancing fluid collection or abscess within the forefoot. Left 5th toe bone culture on 3/15/17 revealed, moderate growth Staph aureus    2. Type 2 diabetes mellitus: BG was 153 this am. HbA1C was 8.6% on 3/8/17. Metformin is on hold. Continue   Medium dose insulin aspart sliding scale prn. Continue low dose carbohydrate diet. He is to minimal weightbear on his left foot in postop  "shoe.    3. Hypertension: continue Toprol XL and Hyzaar. For IV Hydralazine prn.     4. Peripheral artery disease: S/P stent placement in left lower extremity on 3/18/17. Plavix and aspirin are on hold.     5. Hyperlipidemia: continue Zetia and Lipitor.      6. Tobacco use disorder: pt reported quitting smoking \"cold turkey\" in early March 2017.      DVT Prophylaxis: Pneumatic Compression Devices  Code Status: Full Code    Disposition: Expected discharge in 1 day      Interval History   The pt was reported having intermittent mild pain in the his left foot, when he stands.       -Data reviewed today: I reviewed all new labs and imaging results over the last 24 hours. I personally reviewed no images or EKG's today.    Physical Exam   Temp: 98.9  F (37.2  C) Temp src: Oral BP: 172/82 Pulse: 73 Heart Rate: 71 Resp: 16 SpO2: 94 % O2 Device: None (Room air)    Vitals:    03/15/17 0500 03/16/17 0556 03/17/17 0717   Weight: 68.9 kg (151 lb 14.4 oz) 70.1 kg (154 lb 8.7 oz) 71.1 kg (156 lb 12 oz)     Vital Signs with Ranges  Temp:  [97.9  F (36.6  C)-99  F (37.2  C)] 98.9  F (37.2  C)  Pulse:  [71-73] 73  Heart Rate:  [64-74] 71  Resp:  [16] 16  BP: (152-184)/(66-88) 172/82  SpO2:  [92 %-95 %] 94 %  I/O last 3 completed shifts:  In: 1521 [P.O.:300; I.V.:1221]  Out: 975 [Urine:975]    Constitutional: Adult male, awake, cooperative, no apparent distress, O2 Sats 96% on RA  Respiratory: BS vesicular bilaterally, no crackles or wheezing  Cardiovascular: S1 and S2, reg, no murmur noted  GI: Soft, non-distended, non-tender, no masses, BS present+  Skin/Integumen: No rashes  Extremities: Ace dressing over left foot+    Medications        atorvastatin (LIPITOR) tablet 40 mg  40 mg Oral At Bedtime     ezetimibe  10 mg Oral Daily     losartan-hydrochlorothiazide  1 tablet Oral Daily     metoprolol (TOPROL-XL) 24 hr tablet 50 mg  50 mg Oral Daily     senna-docusate  1-2 tablet Oral BID     piperacillin-tazobactam  3.375 g Intravenous " Q6H     insulin aspart  1-7 Units Subcutaneous TID AC     insulin aspart  1-5 Units Subcutaneous At Bedtime       Data     Recent Labs  Lab 03/17/17  0734 03/16/17  0742 03/15/17  0710 03/14/17  1520   WBC  --  9.6  --  11.5*   HGB  --  12.3*  --  14.1   MCV  --  91  --  91   PLT  --  219  --  237   NA  --  137  --  136   POTASSIUM  --  3.8  --  4.8   CHLORIDE  --  104  --  99   CO2  --  26  --  30   BUN  --  9  --  15   CR 0.75 0.71 0.82 0.72   ANIONGAP  --  7  --  7   TIN  --  8.3*  --  9.3   GLC  --  182*  --  233*       No results found for this or any previous visit (from the past 24 hour(s)).

## 2017-03-19 VITALS
TEMPERATURE: 98.8 F | HEART RATE: 66 BPM | SYSTOLIC BLOOD PRESSURE: 151 MMHG | RESPIRATION RATE: 16 BRPM | OXYGEN SATURATION: 94 % | DIASTOLIC BLOOD PRESSURE: 78 MMHG | WEIGHT: 156.75 LBS | HEIGHT: 66 IN | BODY MASS INDEX: 25.19 KG/M2

## 2017-03-19 PROBLEM — M86.172 ACUTE OSTEOMYELITIS OF LEFT FOOT (H): Status: ACTIVE | Noted: 2017-03-19

## 2017-03-19 LAB
BACTERIA SPEC CULT: ABNORMAL
GLUCOSE BLDC GLUCOMTR-MCNC: 170 MG/DL (ref 70–99)
GLUCOSE BLDC GLUCOMTR-MCNC: 175 MG/DL (ref 70–99)
GLUCOSE BLDC GLUCOMTR-MCNC: 250 MG/DL (ref 70–99)
Lab: ABNORMAL
MICRO REPORT STATUS: ABNORMAL
MICROORGANISM SPEC CULT: ABNORMAL
MICROORGANISM SPEC CULT: ABNORMAL
SPECIMEN SOURCE: ABNORMAL

## 2017-03-19 PROCEDURE — A9270 NON-COVERED ITEM OR SERVICE: HCPCS | Mod: GY | Performed by: INTERNAL MEDICINE

## 2017-03-19 PROCEDURE — 00000146 ZZHCL STATISTIC GLUCOSE BY METER IP

## 2017-03-19 PROCEDURE — 99238 HOSP IP/OBS DSCHRG MGMT 30/<: CPT | Performed by: INTERNAL MEDICINE

## 2017-03-19 PROCEDURE — 25000132 ZZH RX MED GY IP 250 OP 250 PS 637: Mod: GY | Performed by: INTERNAL MEDICINE

## 2017-03-19 PROCEDURE — 99207 ZZC CDG-CHARGE/DX NOT SELECTED BY PROVIDER: CPT | Performed by: INTERNAL MEDICINE

## 2017-03-19 PROCEDURE — 25000128 H RX IP 250 OP 636: Performed by: INTERNAL MEDICINE

## 2017-03-19 RX ORDER — CLOPIDOGREL BISULFATE 75 MG/1
75 TABLET ORAL DAILY
Qty: 90 TABLET | Refills: 1 | Status: SHIPPED | OUTPATIENT
Start: 2017-03-20 | End: 2018-05-04

## 2017-03-19 RX ORDER — CEPHALEXIN 500 MG/1
500 CAPSULE ORAL 4 TIMES DAILY
Qty: 56 CAPSULE | Refills: 0 | Status: SHIPPED | OUTPATIENT
Start: 2017-03-19 | End: 2017-04-02

## 2017-03-19 RX ADMIN — ACETAMINOPHEN 650 MG: 325 TABLET, FILM COATED ORAL at 10:34

## 2017-03-19 RX ADMIN — METOPROLOL SUCCINATE 50 MG: 50 TABLET, EXTENDED RELEASE ORAL at 09:20

## 2017-03-19 RX ADMIN — PIPERACILLIN SODIUM,TAZOBACTAM SODIUM 3.38 G: 3; .375 INJECTION, POWDER, FOR SOLUTION INTRAVENOUS at 09:21

## 2017-03-19 RX ADMIN — LOSARTAN POTASSIUM AND HYDROCHLOROTHIAZIDE 1 TABLET: 50; 12.5 TABLET, FILM COATED ORAL at 09:20

## 2017-03-19 RX ADMIN — INSULIN ASPART 1 UNITS: 100 INJECTION, SOLUTION INTRAVENOUS; SUBCUTANEOUS at 09:25

## 2017-03-19 RX ADMIN — PIPERACILLIN SODIUM,TAZOBACTAM SODIUM 3.38 G: 3; .375 INJECTION, POWDER, FOR SOLUTION INTRAVENOUS at 03:08

## 2017-03-19 NOTE — PLAN OF CARE
Problem: Goal Outcome Summary  Goal: Goal Outcome Summary  Outcome: Adequate for Discharge Date Met:  03/19/17  VSS, not in pain at time of discharge. Pt states all belongings and paperwork are accounted for. Medications have been e-scribed to [facility] per pt request. Crutches with pt at time of discharge. No further questions on discharge information. Family soon to arrive to drive pt home. Pt verbalizes understanding of plan of care and who to call if he has questions/concerns.

## 2017-03-19 NOTE — PLAN OF CARE
Problem: Goal Outcome Summary  Goal: Goal Outcome Summary  Outcome: No Change  VSS. A&O x4. Denies pain. Left foot dressing CDI. CMS intact. Pt refusing to wear post-op shoe. Up SBA + crutches. Awaiting infectious disease input regarding antibiotics prior to discharge.

## 2017-03-19 NOTE — DISCHARGE SUMMARY
[facility]    Discharge Summary  Hospitalist  Dm Monroe MD    Date of Admission:  3/14/2017  Date of Discharge:  3/19/2017  Discharging Provider: Dm Monroe    Discharge Diagnoses   1. Left 5th toe osteomyelitis, S/P amputation on 3/15/17.    History of Present Illness   Zia Hopkins is an 67 year old male, who presented with infected left foot wounds.    Hospital Course   67-year-old man, with peripheral artery disease, hypertension, hyperlipidemia, smoker and Type 2   diabetes mellitus, who was admitted on 3/14/17, due to infected left foot wounds. He was admitted due to suspicion for wet gangrene and cellulitis of the left lateral foot and osteomyelitis of the left foot.   He had underwent left lower extremity angiogram, balloon angioplasty and stent placement on 3/8/17. Work up done on 3/14/17 revealed, normal BMP. CBC revealed, WBC was 11.5. HbA1C was 8.6% on 3/8/17. Left foot x-rays on 3/14/17 revealed, no soft tissue air. Diffuse midfoot and forefoot soft tissue swelling. No bony abnormality.      He was reviewed by the Podiatrist. MRI left foot on 3/14/17 revealed, fifth toe proximal phalangeal diffuse intramedullary edema and enhancement with suspected adjacent soft tissue wound, highly suspicious for osteomyelitis. Mild ill-defined edema without enhancement, involving the fourth toe middle and distal phalanges and second/third toe distal phalanges. This is nonspecific and could represent reactive edema versus additional sites of osteomyelitis. No kushal osseous destruction is demonstrated. No MR evidence of rim-enhancing fluid collection or abscess within the forefoot. He underwent amputation of left 5th toe at the metatarsophalangeal joint and debridement of the subcutaneous tissue. He was reviewed by the Vascular Surgeon and Infectious disease Physician. He was managed with IV Zosyn.  Left 5th toe bone culture on 3/15/17 revealed, moderate growth Staph  aureus. He was fitted for a postop shoe. He was switched to Keflex for 2 weeks at discharge. He is to follow up with his Podiatrist and Vascular Surgeon as scheduled.     Significant Results and Procedures   See above.    Pending Results   None.  Unresulted Labs Ordered in the Past 30 Days of this Admission     Date and Time Order Name Status Description    3/15/2017 1613 Anaerobic bacterial culture Preliminary     3/15/2017 1610 Anaerobic bacterial culture Preliminary           Code Status   Full Code       Primary Care Physician   Reid Reese    Physical Exam   Temp: 98.8  F (37.1  C) Temp src: Oral BP: 151/78 Pulse: 66 Heart Rate: 67 Resp: 16 SpO2: 94 % O2 Device: None (Room air)    Vitals:    03/15/17 0500 03/16/17 0556 03/17/17 0717   Weight: 68.9 kg (151 lb 14.4 oz) 70.1 kg (154 lb 8.7 oz) 71.1 kg (156 lb 12 oz)     Constitutional: Adult male, awake, cooperative, no apparent distress, O2 Sats 94% on RA  Respiratory: BS vesicular bilaterally, no crackles or wheezing  Cardiovascular: S1 and S2, reg, no murmur noted  GI: Soft, non-distended, non-tender, no masses, BS present+  Skin/Integumen: No rashes  Extremities: Ace bandage over left foot+    Discharge Disposition   Discharged to home  Condition at discharge: Stable    Consultations This Hospital Stay   PHYSICAL THERAPY ADULT IP CONSULT  OCCUPATIONAL THERAPY ADULT IP CONSULT  PHARMACY TO DOSE VANCO  PODIATRY IP CONSULT  VASCULAR SURGERY IP CONSULT  INFECTIOUS DISEASES IP CONSULT  INFECTIOUS DISEASES IP CONSULT  ORTHOSIS EXTREMITY LOWER REFERRAL IP CONSULT    Time Spent on this Encounter   ДМИТРИЙ, Dm Monroe, personally saw the patient today and spent less than or equal to 30 minutes discharging this patient.    Discharge Orders     Follow Up and recommended labs and tests   Follow up with Dr. Mauricio in 1 week from discharge from hospital. To make appointment, call:  [phone].    For questions or concerns, call:  [phone]     Activity   Your  activity upon discharge: minimal heel weight bearing in post op boot/post op shoe     Wound care and dressings   Instructions to care for your wound at home: Keep foot and dressing dry. Will change at patients first clinic visit.     Reason for your hospital stay   Infected left 5th toe and cellulitis.     Full Code     Diet   Follow this diet upon discharge:      Consistent Carbohydrate Diet 3891-8485 Calories: Moderate Consistent CHO (4-6 CHO units/meal)       Discharge Medications   Current Discharge Medication List      START taking these medications    Details   cephALEXin (KEFLEX) 500 MG capsule Take 1 capsule (500 mg) by mouth 4 times daily for 14 days  Qty: 56 capsule, Refills: 0    Associated Diagnoses: Gangrene of toe (H); Toe amputation status, left (H)      order for DME Equipment being ordered: Crutches ()  Treatment Diagnosis: Impaired gait  Qty: 1 each, Refills: 0    Associated Diagnoses: Gangrene of toe (H)         CONTINUE these medications which have CHANGED    Details   aspirin 81 MG tablet Take 1 tablet (81 mg) by mouth daily  Qty: 90 tablet, Refills: 1    Associated Diagnoses: Atherosclerosis of native arteries of left leg with ulceration of other part of foot (H)      clopidogrel (PLAVIX) 75 MG tablet Take 1 tablet (75 mg) by mouth daily Start taking medication the day after the procedure  Qty: 90 tablet, Refills: 1    Associated Diagnoses: Atherosclerosis of native arteries of left leg with ulceration of other part of foot (H)         CONTINUE these medications which have NOT CHANGED    Details   Atorvastatin Calcium (LIPITOR PO) Take 40 mg by mouth At Bedtime      losartan-hydrochlorothiazide (HYZAAR) 50-12.5 MG per tablet Take 1 tablet by mouth daily      metFORMIN (GLUCOPHAGE-XR) 500 MG 24 hr tablet Take 500 mg by mouth daily (with breakfast)      Metoprolol Succinate (TOPROL XL PO) Take 50 mg by mouth daily      ezetimibe (ZETIA) 10 MG tablet Take 1 tablet (10 mg) by mouth daily  Qty:  90 tablet, Refills: 3    Associated Diagnoses: Hyperlipidemia LDL goal <70      triamcinolone (KENALOG) 0.1 % cream Apply to affected area as needed  Refills: 5           Allergies   No Known Allergies  Data   Most Recent 3 CBC's:  Recent Labs   Lab Test  03/16/17   0742  03/14/17   1520  03/08/17   0720   WBC  9.6  11.5*  8.4   HGB  12.3*  14.1  15.1   MCV  91  91  91   PLT  219  237  240      Most Recent 3 BMP's:  Recent Labs   Lab Test  03/18/17   0740  03/17/17   0734  03/16/17   0742   03/14/17   1520   NA   --    --   137   --   136   POTASSIUM   --    --   3.8   --   4.8   CHLORIDE   --    --   104   --   99   CO2   --    --   26   --   30   BUN   --    --   9   --   15   CR  0.75  0.75  0.71   < >  0.72   ANIONGAP   --    --   7   --   7   TIN   --    --   8.3*   --   9.3   GLC   --    --   182*   --   233*    < > = values in this interval not displayed.     Most Recent 2 LFT's:No lab results found.  Most Recent INR's and Anticoagulation Dosing History:  Anticoagulation Dose History     Recent Dosing and Labs Latest Ref Rng & Units 3/8/2017    INR 0.86 - 1.14 0.87        Most Recent 3 Troponin's:No lab results found.  Most Recent Cholesterol Panel:  Recent Labs   Lab Test  03/08/17   0720   CHOL  170   LDL  95   HDL  53   TRIG  109     Most Recent 6 Bacteria Isolates From Any Culture (See EPIC Reports for Culture Details):  Recent Labs   Lab Test  03/15/17   1610  03/15/17   1608   CULT  Moderate growth Staphylococcus aureus  Moderate growth Strain 2 Staphylococcus aureus  *  Culture negative monitoring continues  Moderate growth Staphylococcus aureus  Moderate growth Strain 2 Staphylococcus aureus  *  Culture negative monitoring continues     Most Recent TSH, T4 and A1c Labs:  Recent Labs   Lab Test  03/08/17   0720   A1C  8.6*       Results for orders placed or performed during the hospital encounter of 03/14/17   MR Foot Left w/o & w Contrast    Narrative    MR FOOT LEFT WITH AND WITHOUT CONTRAST March  14, 2017 10:33 PM     HISTORY: Wet gangrene of left foot, left third through fifth toes.    DOSE: 7mL Gadavist.    FINDINGS: Ill-defined marrow edema is noted in the fifth toe proximal  phalanx. This is associated with ill-defined intramedullary  enhancement as well. Given the presence of a probable overlying dorsal  soft tissue wound, this is suspicious for osteomyelitis. Mild marrow  edema without enhancement. Ill-defined subcutaneous edema is noted  along the dorsum of the foot which may be reactive. Edema is also  noted diffusely within the fourth toe middle and distal phalanges.  Ill-defined mild marrow edema may be present in the second and third  toe distal phalanges as well. Subchondral marrow edema is noted in the  plantar medial aspect of the first metatarsal head and within the  adjacent medial hallux sesamoid, likely reactive and degenerative in  origin. There is a small first metatarsophalangeal joint effusion.  There otherwise appears to be a physiologic amount of fluid in the  metatarsophalangeal joints. Soft tissue edema is noted within the  fifth toe and along the dorsum of the forefoot tracking proximally to  the midfoot. Deep soft tissue edema without enhancement is also noted  within the forefoot. No discrete rim-enhancing fluid collection is  demonstrated within the forefoot. Intermediate signal intensity within  the plantar fat beneath the fifth metatarsal head could represent  developing adventitious bursitis but is nonspecific. Small  talonavicular joint effusion is noted on the proximal edge of the  scan.      Impression    IMPRESSION:  1. Fifth toe proximal phalangeal diffuse intramedullary edema and  enhancement with suspected adjacent soft tissue wound, highly  suspicious for osteomyelitis.  2. Mild ill-defined edema without enhancement involving the fourth toe  middle and distal phalanges and second/third toe distal phalanges.  This is nonspecific and could represent reactive edema  versus  additional sites of osteomyelitis. No kushal osseous destruction is  demonstrated.  3. First metatarsophalangeal joint degenerative arthrosis with mild  subchondral marrow edema about the articulation between the first  metatarsal head and medial hallux sesamoid.  4. Dorsal and deeper soft tissue edema within the forefoot. MR cannot  distinguish reactive edema from cellulitis.  5. Decreased signal intensity within the plantar fat beneath the fifth  metatarsal head. This could represent developing adventitious bursitis  but is nonspecific.  6. No MR evidence of rim-enhancing fluid collection or abscess within  the forefoot.    OSWALDO DURAND MD

## 2017-03-20 ENCOUNTER — CARE COORDINATION (OUTPATIENT)
Dept: CASE MANAGEMENT | Facility: CLINIC | Age: 67
End: 2017-03-20

## 2017-03-22 LAB
BACTERIA SPEC CULT: NORMAL
BACTERIA SPEC CULT: NORMAL
Lab: NORMAL
Lab: NORMAL
MICRO REPORT STATUS: NORMAL
MICRO REPORT STATUS: NORMAL
SPECIMEN SOURCE: NORMAL
SPECIMEN SOURCE: NORMAL

## 2017-03-28 ENCOUNTER — OFFICE VISIT (OUTPATIENT)
Dept: PODIATRY | Facility: CLINIC | Age: 67
End: 2017-03-28
Payer: COMMERCIAL

## 2017-03-28 VITALS
TEMPERATURE: 97.2 F | DIASTOLIC BLOOD PRESSURE: 87 MMHG | HEIGHT: 66 IN | WEIGHT: 156.75 LBS | BODY MASS INDEX: 25.19 KG/M2 | HEART RATE: 77 BPM | SYSTOLIC BLOOD PRESSURE: 198 MMHG

## 2017-03-28 DIAGNOSIS — T81.31XA DEHISCENCE OF SURGICAL WOUND, INITIAL ENCOUNTER: ICD-10-CM

## 2017-03-28 DIAGNOSIS — Z98.890 POST-OPERATIVE STATE: ICD-10-CM

## 2017-03-28 DIAGNOSIS — I73.9 PAD (PERIPHERAL ARTERY DISEASE) (H): ICD-10-CM

## 2017-03-28 DIAGNOSIS — L97.522 ULCER OF FOOT, LEFT, WITH FAT LAYER EXPOSED (H): Primary | ICD-10-CM

## 2017-03-28 PROCEDURE — 11042 DBRDMT SUBQ TIS 1ST 20SQCM/<: CPT | Mod: 79 | Performed by: PODIATRIST

## 2017-03-28 PROCEDURE — 99024 POSTOP FOLLOW-UP VISIT: CPT | Performed by: PODIATRIST

## 2017-03-28 RX ORDER — CEPHALEXIN 500 MG/1
500 CAPSULE ORAL 4 TIMES DAILY
Qty: 28 CAPSULE | Refills: 0 | Status: SHIPPED | OUTPATIENT
Start: 2017-04-02 | End: 2017-04-09

## 2017-03-28 NOTE — MR AVS SNAPSHOT
After Visit Summary   3/28/2017    Zia Hopkins    MRN: [id number]           Patient Information     Date Of Birth          1950        Visit Information        Provider Department      3/28/2017 8:00 AM Nilay Ann DPM [facility]        Today's Diagnoses     Ulcer of foot, left, with fat layer exposed (H)    -  1    PAD (peripheral artery disease) (H)        Post-operative state          Care Instructions    Wound Care Recommendations:    1)  Keep the wound covered by a bandage when bathing.    2)  Gently clean the wound with soap water, separate from bath/shower water.  Dry well.      3)  Each day, apply dry gauze, gauze roll, ACE bandage.    4)  Please seek immediate medical attention if any increasing redness, drainage, smell, or pain related to the wound.    5)  Please return to clinic in the period of time requested.      Follow up with Dr. Mauricio next week.          Follow-ups after your visit        Your next 10 appointments already scheduled     Apr 07, 2017  9:00 AM CDT   (Arrive by 8:45 AM)   US DAVID DOPPLER NO EXERCISE with SHVUS1   [facility] ([facility])    [address]  [facility] [address]   [phone]           Please bring a list of your medicines (including vitamins, minerals and over-the-counter drugs). Also, tell your doctor about any allergies you may have. Wear comfortable clothes and leave your valuables at home.  No caffeine or tobacco for 1 hour prior to exam.  Please call the Imaging Department at your exam site with any questions.            Apr 07, 2017  9:30 AM CDT   Post Op with Surjit Hamlin MD   [facility] ([facility])    [address]  [facility] [address] [phone]              Who to contact     If you have questions or need follow up information about today's clinic  "visit or your schedule please contact [facility] directly at [phone].  Normal or non-critical lab and imaging results will be communicated to you by MyChart, letter or phone within 4 business days after the clinic has received the results. If you do not hear from us within 7 days, please contact the clinic through [employer] or phone. If you have a critical or abnormal lab result, we will notify you by phone as soon as possible.  Submit refill requests through [employer] or call your pharmacy and they will forward the refill request to us. Please allow 3 business days for your refill to be completed.          Additional Information About Your Visit        MyChart Information     [employer] lets you send messages to your doctor, view your test results, renew your prescriptions, schedule appointments and more. To sign up, go to [email or web address] . Click on \"Log in\" on the left side of the screen, which will take you to the Welcome page. Then click on \"Sign up Now\" on the right side of the page.     You will be asked to enter the access code listed below, as well as some personal information. Please follow the directions to create your username and password.     Your access code is: [email or web address]  Expires: 2017  9:28 AM     Your access code will  in 90 days. If you need help or a new code, please call your [place] clinic or [phone].        Care EveryWhere ID     This is your Care EveryWhere ID. This could be used by other organizations to access your [facility] medical records  [id number]        Your Vitals Were     Pulse Temperature Height BMI (Body Mass Index)          77 97.2  F (36.2  C) (Tympanic) 1.676 m (5' 6\") 25.3 kg/m2         Blood Pressure from Last 3 Encounters:   17 198/87   17 151/78   17 (!) (P) 190/104    Weight from Last 3 Encounters:   17 71.1 kg (156 lb 12 oz)   17 71.1 kg (156 lb 12 oz)   17 (P) 70.3 kg (155 lb)              Today, " you had the following     No orders found for display         Today's Medication Changes          These changes are accurate as of: 3/28/17  8:36 AM.  If you have any questions, ask your nurse or doctor.               These medicines have changed or have updated prescriptions.        Dose/Directions    * cephALEXin 500 MG capsule   Commonly known as:  KEFLEX   This may have changed:  Another medication with the same name was added. Make sure you understand how and when to take each.   Used for:  Gangrene of toe (H), Toe amputation status, left (H)        Dose:  500 mg   Take 1 capsule (500 mg) by mouth 4 times daily for 14 days   Quantity:  56 capsule   Refills:  0       * cephALEXin 500 MG capsule   Commonly known as:  KEFLEX   This may have changed:  You were already taking a medication with the same name, and this prescription was added. Make sure you understand how and when to take each.   Used for:  Ulcer of foot, left, with fat layer exposed (H), PAD (peripheral artery disease) (H), Post-operative state   Changed by:  Nilay Ann DPM        Dose:  500 mg   Start taking on:  4/2/2017   Take 1 capsule (500 mg) by mouth 4 times daily for 7 days   Quantity:  28 capsule   Refills:  0       * Notice:  This list has 2 medication(s) that are the same as other medications prescribed for you. Read the directions carefully, and ask your doctor or other care provider to review them with you.         Where to get your medicines      These medications were sent to [facility] [address] [facility] [place] - [address] AT [facility] [address]  [facility] [place] [address]     Phone:  [phone]     cephALEXin 500 MG capsule                Primary Care Provider Office Phone # Fax #    Reid Reese [phone]       [facility] [address]    [facility] [address]        Thank you!     Thank you for choosing [facility]  for your care. Our goal is  always to provide you with excellent care. Hearing back from our patients is one way we can continue to improve our services. Please take a few minutes to complete the written survey that you may receive in the mail after your visit with us. Thank you!             Your Updated Medication List - Protect others around you: Learn how to safely use, store and throw away your medicines at www.disposemymeds.org.          This list is accurate as of: 3/28/17  8:36 AM.  Always use your most recent med list.                   Brand Name Dispense Instructions for use    aspirin 81 MG tablet     90 tablet    Take 1 tablet (81 mg) by mouth daily       * cephALEXin 500 MG capsule    KEFLEX    56 capsule    Take 1 capsule (500 mg) by mouth 4 times daily for 14 days       * cephALEXin 500 MG capsule   Start taking on:  4/2/2017    KEFLEX    28 capsule    Take 1 capsule (500 mg) by mouth 4 times daily for 7 days       clopidogrel 75 MG tablet    PLAVIX    90 tablet    Take 1 tablet (75 mg) by mouth daily Start taking medication the day after the procedure       ezetimibe 10 MG tablet    ZETIA    90 tablet    Take 1 tablet (10 mg) by mouth daily       LIPITOR PO      Take 40 mg by mouth At Bedtime       losartan-hydrochlorothiazide 50-12.5 MG per tablet    HYZAAR     Take 1 tablet by mouth daily       metFORMIN 500 MG 24 hr tablet    GLUCOPHAGE-XR     Take 500 mg by mouth daily (with breakfast)       order for DME     1 each    Equipment being ordered: Bridger () Treatment Diagnosis: Impaired gait       TOPROL XL PO      Take 50 mg by mouth daily       triamcinolone 0.1 % cream    KENALOG     Apply to affected area as needed       * Notice:  This list has 2 medication(s) that are the same as other medications prescribed for you. Read the directions carefully, and ask your doctor or other care provider to review them with you.

## 2017-03-28 NOTE — NURSING NOTE
"Chief Complaint   Patient presents with     Surgical Followup     PO DOS: 3/15/17 L foot 5th toe amputation       Initial /87 (BP Location: Right arm, Patient Position: Chair, Cuff Size: Adult Large)  Pulse 77  Temp 97.2  F (36.2  C) (Tympanic)  Ht 1.676 m (5' 6\")  Wt 71.1 kg (156 lb 12 oz)  BMI 25.3 kg/m2 Estimated body mass index is 25.3 kg/(m^2) as calculated from the following:    Height as of this encounter: 1.676 m (5' 6\").    Weight as of this encounter: 71.1 kg (156 lb 12 oz).  Medication Reconciliation: tony El MA March 28, 2017 8:03 AM      "

## 2017-03-28 NOTE — PROGRESS NOTES
"PATIENT HISTORY:  Zia Hopkins is a 67 year old male who presents to clinic for recheck following L 5th toe amputation for gangrene/infection and wound debridements by Dr. Mauricio on 3/15/17.  Pt doing well overall.  Pain is minimal.  Reports some pain in the left heel with walking, 3/10.  On Keflex.  Denies calf pain, SOB, fever, chills.  Diabetic with significant PAD.  Pt reports getting the dressing wet this AM stepping into a puddle in his house and he changed it.     EXAM:Vitals: /87 (BP Location: Right arm, Patient Position: Chair, Cuff Size: Adult Large)  Pulse 77  Temp 97.2  F (36.2  C) (Tympanic)  Ht 5' 6\" (1.676 m)  Wt 156 lb 12 oz (71.1 kg)  BMI 25.3 kg/m2  BMI= Body mass index is 25.3 kg/(m^2).    General appearance: Patient is alert and fully cooperative with history & exam.  No sign of distress is noted during the visit.     Dermatologic:  Left 5th toe amputation site with partial dehiscence of medial portion with necrotic tissue noted.  Remainder appears to be healing.  Approx 5mm x 10mm wound area.  No deep probing.  Surrounding skin with dry, stable appearing dark eschar noted.  Dry dorsal eschars to lesser toes, left lateral heel area.   Erythema of foot appears very nearly resolved.  Some dependent rubor noted.       Vascular: DP & PT pulses are absent on the left. No significant edema or varicosities noted.  CFT delayed.     Neurologic: Lower extremity sensation is intact to light touch.  No evidence of weakness or contracture in the lower extremities.      Musculoskeletal: s/p left 5th toe amputation.  Patient is ambulatory without assistive device or brace.  No gross ankle deformity noted.  No foot or ankle joint effusion is noted.     ASSESSMENT:   S/p left 5th toe amputation and debridement of wounds DOS 3/15/17  Dehiscence of wound with exposed fat layer  PAD  DM     PLAN:  Reviewed patient's chart in epic.  Discussed condition and treatment options including pros and " cons.    Infection appears greatly improved, but pt is having clear difficulty with wound healing.  PAD remains a factor.  He has been revascularized, but has close f/u with Vascular.    With consent I removed 2 sutures from the area of dehiscence that were not functional, and performed excisional debridement on the wound up to and including sub q with a tissue nipper to healthy bleeding tissue.  Sterile gauze dressing applied.      I would like him to start daily dressing changes given open wound.  Instructions reviewed:    Wound Care Recommendations:    1)  Keep the wound covered by a bandage when bathing.    2)  Gently clean the wound with soap water, separate from bath/shower water.  Dry well.      3)  Each day, apply dry gauze, gauze roll, ACE bandage.    4)  Please seek immediate medical attention if any increasing redness, drainage, smell, or pain related to the wound.    5)  Please return to clinic in the period of time requested.      Pt has offloading shoe.  Minimize WB.  He has been using crutches.      Pt has f/u with Vascular next week.    Extended Keflex as a precaution to get him to f/u.    F/u with Dr. Mauricio next week.    We discussed risk of future infection, nonhealing wounds, amputation.       Nilay Ann DPM, FACFAS

## 2017-03-28 NOTE — LETTER
"  3/28/2017       RE: Zia Hopkins  [address] [place] [address]           Dear Colleague,    Thank you for referring your patient, Zia Hopkins, to the [facility]. Please see a copy of my visit note below.    PATIENT HISTORY:  Zia Hopkins is a 67 year old male who presents to clinic for recheck following L 5th toe amputation for gangrene/infection and wound debridements by Dr. Mauricio on 3/15/17.  Pt doing well overall.  Pain is minimal.  Reports some pain in the left heel with walking, 3/10.  On Keflex.  Denies calf pain, SOB, fever, chills.  Diabetic with significant PAD.  Pt reports getting the dressing wet this AM stepping into a puddle in his house and he changed it.     EXAM:Vitals: /87 (BP Location: Right arm, Patient Position: Chair, Cuff Size: Adult Large)  Pulse 77  Temp 97.2  F (36.2  C) (Tympanic)  Ht 5' 6\" (1.676 m)  Wt 156 lb 12 oz (71.1 kg)  BMI 25.3 kg/m2  BMI= Body mass index is 25.3 kg/(m^2).    General appearance: Patient is alert and fully cooperative with history & exam.  No sign of distress is noted during the visit.     Dermatologic:  Left 5th toe amputation site with partial dehiscence of medial portion with necrotic tissue noted.  Remainder appears to be healing.  Approx 5mm x 10mm wound area.  No deep probing.  Surrounding skin with dry, stable appearing dark eschar noted.  Dry dorsal eschars to lesser toes, left lateral heel area.   Erythema of foot appears very nearly resolved.  Some dependent rubor noted.       Vascular: DP & PT pulses are absent on the left. No significant edema or varicosities noted.  CFT delayed.     Neurologic: Lower extremity sensation is intact to light touch.  No evidence of weakness or contracture in the lower extremities.      Musculoskeletal: s/p left 5th toe amputation.  Patient is ambulatory without assistive device or brace.  No gross ankle deformity noted.  No foot or ankle joint effusion is noted.     ASSESSMENT: "   S/p left 5th toe amputation and debridement of wounds DOS 3/15/17  Dehiscence of wound with exposed fat layer  PAD  DM     PLAN:  Reviewed patient's chart in epic.  Discussed condition and treatment options including pros and cons.    Infection appears greatly improved, but pt is having clear difficulty with wound healing.  PAD remains a factor.  He has been revascularized, but has close f/u with Vascular.    With consent I removed 2 sutures from the area of dehiscence that were not functional, and performed excisional debridement on the wound up to and including sub q with a tissue nipper to healthy bleeding tissue.  Sterile gauze dressing applied.      I would like him to start daily dressing changes given open wound.  Instructions reviewed:    Wound Care Recommendations:    1)  Keep the wound covered by a bandage when bathing.    2)  Gently clean the wound with soap water, separate from bath/shower water.  Dry well.      3)  Each day, apply dry gauze, gauze roll, ACE bandage.    4)  Please seek immediate medical attention if any increasing redness, drainage, smell, or pain related to the wound.    5)  Please return to clinic in the period of time requested.      Pt has offloading shoe.  Minimize WB.  He has been using crutches.      Pt has f/u with Vascular next week.    Extended Keflex as a precaution to get him to f/u.    F/u with Dr. Mauricio next week.    We discussed risk of future infection, nonhealing wounds, amputation.       Nilay Ann DPM, FACFAS          BMI is normal.  CHAS El MA March 28, 2017 8:04 AM        Again, thank you for allowing me to participate in the care of your patient.        Sincerely,              Nilay Ann DPM

## 2017-03-28 NOTE — PATIENT INSTRUCTIONS
Wound Care Recommendations:    1)  Keep the wound covered by a bandage when bathing.    2)  Gently clean the wound with soap water, separate from bath/shower water.  Dry well.      3)  Each day, apply dry gauze, gauze roll, ACE bandage.    4)  Please seek immediate medical attention if any increasing redness, drainage, smell, or pain related to the wound.    5)  Please return to clinic in the period of time requested.      Follow up with Dr. Mauricio next week.

## 2017-04-03 ENCOUNTER — CARE COORDINATION (OUTPATIENT)
Dept: CASE MANAGEMENT | Facility: CLINIC | Age: 67
End: 2017-04-03

## 2017-04-03 ENCOUNTER — OFFICE VISIT (OUTPATIENT)
Dept: PODIATRY | Facility: CLINIC | Age: 67
End: 2017-04-03
Payer: COMMERCIAL

## 2017-04-03 VITALS
HEIGHT: 66 IN | DIASTOLIC BLOOD PRESSURE: 100 MMHG | WEIGHT: 156 LBS | SYSTOLIC BLOOD PRESSURE: 170 MMHG | BODY MASS INDEX: 25.07 KG/M2

## 2017-04-03 DIAGNOSIS — T81.31XD WOUND DEHISCENCE, SUBSEQUENT ENCOUNTER: ICD-10-CM

## 2017-04-03 DIAGNOSIS — Z09 SURGERY FOLLOW-UP EXAMINATION: Primary | ICD-10-CM

## 2017-04-03 DIAGNOSIS — I73.9 PAD (PERIPHERAL ARTERY DISEASE) (H): ICD-10-CM

## 2017-04-03 PROCEDURE — 99024 POSTOP FOLLOW-UP VISIT: CPT | Performed by: PODIATRIST

## 2017-04-03 NOTE — NURSING NOTE
"Chief Complaint   Patient presents with     Surgical Followup     PO DOS: 3/15/17 L foot 5th toe amputation       Initial BP (!) 170/100 (BP Location: Right arm, Patient Position: Chair, Cuff Size: Adult Regular)  Ht 5' 6\" (1.676 m)  Wt 156 lb (70.8 kg)  BMI 25.18 kg/m2 Estimated body mass index is 25.18 kg/(m^2) as calculated from the following:    Height as of this encounter: 5' 6\" (1.676 m).    Weight as of this encounter: 156 lb (70.8 kg).  Medication Reconciliation: complete   Nora Phoenix MA      "

## 2017-04-03 NOTE — LETTER
4/3/2017       RE: Zia Hopkins  [address] [place] [address]           Dear Colleague,    Thank you for referring your patient, Zia Hopkins, to the [facility]. Please see a copy of my visit note below.    Subjective:   Zia Hopkins is a 67 year old male who presents today in follow up after left 5th toe amputation.  He was hospitalized at the time.  Please see my summary/ indications for surgery below. This is from my post op note 3/15/17.      Zia Hopkins is a 67-year-old male with type 2 diabetes and peripheral neuropathy. He is 1 week status post left lower extremity angioplasty with stenting by Dr. Hamlin. He was referred to Dr. Ann due to dry gangrene of the left fifth toe. On presentation to Dr. Ann's office yesterday, there was concern that the toe was converting to a wet gangrene. The patient was admitted to [facility]. At this time, Vascular Surgery is not planning any additional intervention, but will monitor from the periphery. Due to the appearance of the toe, amputation was recommended. The patient was agreeable to this. An MRI was ordered supporting osteomyelitis of the left fifth toe. It did not indicate any more proximal infection. There was some marrow edema seen in the second and fourth toes, but not conclusive for osteomyelitis. Prior to surgery, I reviewed the procedure with the patient and his wife. Based on the necrosis of the left fifth toe alone, amputation is warranted. I also consented him to debridement of the wounds on the other toes. The purpose was to aid in healing and see if there is any hidden abscess or exposed bone. He was agreeable to this.      Since discharge, he followed up with my colleague, Dr. Ann.  There was some wound dehiscence noted.     Patient Active Problem List    Diagnosis Date Noted     Toe amputation status, left (H) 03/19/2017     Priority: Medium     Acute osteomyelitis of left  foot (H) 03/19/2017     Priority: Medium     Gangrene of toe (H) 03/14/2017     Priority: Medium         Objective:    B/P: 170/100, T: Data Unavailable, P: Data Unavailable, R: Data Unavailable  Body mass index is 25.18 kg/(m^2).    Constitutional:  Pt is in no apparent distress,  asks appropriate questions, appears well nourished and healthy.    Pedal pulses are non palpable on the left.    Light touch sensation itact b/l for the L4, L5, S1 distributions. No evidence of neuromuscular-related contractures or spasticity.     Skin:  Areas of skin exfoliation, peeling, left forefoot.  Incision remains coapted except for at the most medial aspect. At this location there is a 0.5cm round, granular open wound. No purulence. No malodor.  The plantar skin flap is dark.      Areas of dry eschar dorsal left 3rd and 4th toes.  There is also a dry ischemic type wound on the plantar heel and medial forefoot.      Normal LE major muscle group strength. No gross deformities.  Left 5th toe absent.  Adquate ankle and STJ ROM.    Assessment/Plan:     Encounter Diagnoses   Name Primary?     Surgery follow-up examination Yes     Wound dehiscence, subsequent encounter      PAD (peripheral artery disease) (H)      No clinical signs of infection.      His foot is stable.  My opinion on his prognosis is guarded.  I think he will likely need some more foot surgery, but we need to give the soft tissues more time to demarcate.  The surgical site is at risk, as are the 3rd and 4th toes.  I explained this to him.  If there is ongoing necrosis or re-infection, he might need additional toe amputations and/ or an transmetatarsal amputation.   There is not much more I can offer at this time beyond monitoring and wound cares.       Follow up with Vascular Surgery as scheduled.    Continue current wound cares.    Continue off loading shoe    Follow up in 2 weeks.    Body mass index is 25.18 kg/(m^2).      Emre Mauricio DPM, FACANGELA, MS [place]  Department of Podiatry/Foot & Ankle Surgery            Again, thank you for allowing me to participate in the care of your patient.        Sincerely,              Emre Mauricio DPM

## 2017-04-03 NOTE — MR AVS SNAPSHOT
After Visit Summary   4/3/2017    Zia Hopkins    MRN: [id number]           Patient Information     Date Of Birth          1950        Visit Information        Provider Department      4/3/2017 2:30 PM Emre Mauricio DPM [facility]        Today's Diagnoses     Surgery follow-up examination    -  1    Wound dehiscence, subsequent encounter        PAD (peripheral artery disease) (H)           Follow-ups after your visit        Your next 10 appointments already scheduled     Apr 07, 2017  9:00 AM CDT   (Arrive by 8:45 AM)   US DAVID DOPPLER NO EXERCISE with SHVUS1   [facility] ([facility])    [address]  [place] [address]   [phone]           Please bring a list of your medicines (including vitamins, minerals and over-the-counter drugs). Also, tell your doctor about any allergies you may have. Wear comfortable clothes and leave your valuables at home.  No caffeine or tobacco for 1 hour prior to exam.  Please call the Imaging Department at your exam site with any questions.            Apr 07, 2017  9:30 AM CDT   Post Op with Surjit Hamlin MD   [facility] ([facility])    [address]  [place] [id number] [phone]              Who to contact     If you have questions or need follow up information about today's clinic visit or your schedule please contact [facility] directly at [phone].  Normal or non-critical lab and imaging results will be communicated to you by MyChart, letter or phone within 4 business days after the clinic has received the results. If you do not hear from us within 7 days, please contact the clinic through MyChart or phone. If you have a critical or abnormal lab result, we will notify you by phone as soon as possible.  Submit refill requests  "through [employer] or call your pharmacy and they will forward the refill request to us. Please allow 3 business days for your refill to be completed.          Additional Information About Your Visit        [employer] Information     [employer] lets you send messages to your doctor, view your test results, renew your prescriptions, schedule appointments and more. To sign up, go to [email or web address] . Click on \"Log in\" on the left side of the screen, which will take you to the Welcome page. Then click on \"Sign up Now\" on the right side of the page.     You will be asked to enter the access code listed below, as well as some personal information. Please follow the directions to create your username and password.     Your access code is: [email or web address]  Expires: 2017  9:28 AM     Your access code will  in 90 days. If you need help or a new code, please call your [place] clinic or [phone].        Care EveryWhere ID     This is your Care EveryWhere ID. This could be used by other organizations to access your [place] medical records  [id number]        Your Vitals Were     Height BMI (Body Mass Index)                5' 6\" (1.676 m) 25.18 kg/m2           Blood Pressure from Last 3 Encounters:   17 (!) 170/100   17 198/87   17 151/78    Weight from Last 3 Encounters:   17 156 lb (70.8 kg)   17 156 lb 12 oz (71.1 kg)   17 156 lb 12 oz (71.1 kg)              Today, you had the following     No orders found for display       Primary Care Provider Office Phone # Fax #    Reid Reese [phone]       [facility] [address] [address]        Thank you!     Thank you for choosing [facility]  for your care. Our goal is always to provide you with excellent care. Hearing back from our patients is one way we can continue to improve our services. Please take a few minutes to complete the written survey that you " may receive in the mail after your visit with us. Thank you!             Your Updated Medication List - Protect others around you: Learn how to safely use, store and throw away your medicines at www.disposemymeds.org.          This list is accurate as of: 4/3/17 11:59 PM.  Always use your most recent med list.                   Brand Name Dispense Instructions for use    aspirin 81 MG tablet     90 tablet    Take 1 tablet (81 mg) by mouth daily       cephALEXin 500 MG capsule    KEFLEX    28 capsule    Take 1 capsule (500 mg) by mouth 4 times daily for 7 days       clopidogrel 75 MG tablet    PLAVIX    90 tablet    Take 1 tablet (75 mg) by mouth daily Start taking medication the day after the procedure       ezetimibe 10 MG tablet    ZETIA    90 tablet    Take 1 tablet (10 mg) by mouth daily       LIPITOR PO      Take 40 mg by mouth At Bedtime       losartan-hydrochlorothiazide 50-12.5 MG per tablet    HYZAAR     Take 1 tablet by mouth daily       metFORMIN 500 MG 24 hr tablet    GLUCOPHAGE-XR     Take 500 mg by mouth daily (with breakfast)       order for DME     1 each    Equipment being ordered: Bridger () Treatment Diagnosis: Impaired gait       TOPROL XL PO      Take 50 mg by mouth daily       triamcinolone 0.1 % cream    KENALOG     Apply to affected area as needed

## 2017-04-03 NOTE — PROGRESS NOTES
Subjective:   Zia Hopkins is a 67 year old male who presents today in follow up after left 5th toe amputation.  He was hospitalized at the time.  Please see my summary/ indications for surgery below. This is from my post op note 3/15/17.      Zia Hopkins is a 67-year-old male with type 2 diabetes and peripheral neuropathy. He is 1 week status post left lower extremity angioplasty with stenting by Dr. Hamlin. He was referred to Dr. Ann due to dry gangrene of the left fifth toe. On presentation to Dr. Ann's office yesterday, there was concern that the toe was converting to a wet gangrene. The patient was admitted to [facility]. At this time, Vascular Surgery is not planning any additional intervention, but will monitor from the periphery. Due to the appearance of the toe, amputation was recommended. The patient was agreeable to this. An MRI was ordered supporting osteomyelitis of the left fifth toe. It did not indicate any more proximal infection. There was some marrow edema seen in the second and fourth toes, but not conclusive for osteomyelitis. Prior to surgery, I reviewed the procedure with the patient and his wife. Based on the necrosis of the left fifth toe alone, amputation is warranted. I also consented him to debridement of the wounds on the other toes. The purpose was to aid in healing and see if there is any hidden abscess or exposed bone. He was agreeable to this.      Since discharge, he followed up with my colleague, Dr. Ann.  There was some wound dehiscence noted.     Patient Active Problem List    Diagnosis Date Noted     Toe amputation status, left (H) 03/19/2017     Priority: Medium     Acute osteomyelitis of left foot (H) 03/19/2017     Priority: Medium     Gangrene of toe (H) 03/14/2017     Priority: Medium         Objective:    B/P: 170/100, T: Data Unavailable, P: Data Unavailable, R: Data Unavailable  Body mass index is 25.18 kg/(m^2).    Constitutional:   Pt is in no apparent distress,  asks appropriate questions, appears well nourished and healthy.    Pedal pulses are non palpable on the left.    Light touch sensation itact b/l for the L4, L5, S1 distributions. No evidence of neuromuscular-related contractures or spasticity.     Skin:  Areas of skin exfoliation, peeling, left forefoot.  Incision remains coapted except for at the most medial aspect. At this location there is a 0.5cm round, granular open wound. No purulence. No malodor.  The plantar skin flap is dark.      Areas of dry eschar dorsal left 3rd and 4th toes.  There is also a dry ischemic type wound on the plantar heel and medial forefoot.      Normal LE major muscle group strength. No gross deformities.  Left 5th toe absent.  Adquate ankle and STJ ROM.    Assessment/Plan:     Encounter Diagnoses   Name Primary?     Surgery follow-up examination Yes     Wound dehiscence, subsequent encounter      PAD (peripheral artery disease) (H)      No clinical signs of infection.      His foot is stable.  My opinion on his prognosis is guarded.  I think he will likely need some more foot surgery, but we need to give the soft tissues more time to demarcate.  The surgical site is at risk, as are the 3rd and 4th toes.  I explained this to him.  If there is ongoing necrosis or re-infection, he might need additional toe amputations and/ or an transmetatarsal amputation.   There is not much more I can offer at this time beyond monitoring and wound cares.       Follow up with Vascular Surgery as scheduled.    Continue current wound cares.    Continue off loading shoe    Follow up in 2 weeks.    Body mass index is 25.18 kg/(m^2).      Emre Mauricio DPM, FACFAS, MS    [facility] Department of Podiatry/Foot & Ankle Surgery

## 2017-04-04 PROBLEM — I73.9 PAD (PERIPHERAL ARTERY DISEASE) (H): Status: ACTIVE | Noted: 2017-04-04

## 2017-04-07 ENCOUNTER — HOSPITAL ENCOUNTER (OUTPATIENT)
Dept: ULTRASOUND IMAGING | Facility: CLINIC | Age: 67
Discharge: HOME | End: 2017-04-07
Attending: SURGERY | Admitting: SURGERY
Payer: MEDICARE

## 2017-04-07 ENCOUNTER — OFFICE VISIT (OUTPATIENT)
Dept: OTHER | Facility: CLINIC | Age: 67
End: 2017-04-07
Attending: SURGERY
Payer: MEDICARE

## 2017-04-07 VITALS — HEART RATE: 73 BPM | SYSTOLIC BLOOD PRESSURE: 181 MMHG | DIASTOLIC BLOOD PRESSURE: 90 MMHG

## 2017-04-07 DIAGNOSIS — I73.9 PAD (PERIPHERAL ARTERY DISEASE) (H): ICD-10-CM

## 2017-04-07 DIAGNOSIS — I73.9 PAD (PERIPHERAL ARTERY DISEASE) (H): Primary | ICD-10-CM

## 2017-04-07 PROCEDURE — 99024 POSTOP FOLLOW-UP VISIT: CPT | Mod: ZP | Performed by: PHYSICIAN ASSISTANT

## 2017-04-07 PROCEDURE — 93922 UPR/L XTREMITY ART 2 LEVELS: CPT

## 2017-04-07 PROCEDURE — 99211 OFF/OP EST MAY X REQ PHY/QHP: CPT

## 2017-04-07 NOTE — NURSING NOTE
"Chief Complaint   Patient presents with     RECHECK     DAVID w/o exercise (VHC 9:00; KMK 9:30) *Status post superficial femoral artery angioplasty with stent; 2 weeks follow up to 3-14-17 appointment with Dr. Hamlin       Initial /90 (BP Location: Right arm, Patient Position: Chair, Cuff Size: Adult Regular)  Pulse 73 Estimated body mass index is 25.18 kg/(m^2) as calculated from the following:    Height as of 4/3/17: 5' 6\" (1.676 m).    Weight as of 4/3/17: 156 lb (70.8 kg).  Medication Reconciliation: complete     Face to face nursing time: 8 minutes    Kyleigh Santoyo MA     "

## 2017-04-07 NOTE — MR AVS SNAPSHOT
"              After Visit Summary   2017    iZa Hopkins    MRN: [id number]           Patient Information     Date Of Birth          1950        Visit Information        Provider Department      2017 9:30 AM Siva Sanders PA-C [facility]      Today's Diagnoses     PAD (peripheral artery disease) (H)    -  1       Follow-ups after your visit        Who to contact     If you have questions or need follow up information about today's clinic visit or your schedule please contact [facility] directly at [phone].  Normal or non-critical lab and imaging results will be communicated to you by [employer], letter or phone within 4 business days after the clinic has received the results. If you do not hear from us within 7 days, please contact the clinic through [employer] or phone. If you have a critical or abnormal lab result, we will notify you by phone as soon as possible.  Submit refill requests through [employer] or call your pharmacy and they will forward the refill request to us. Please allow 3 business days for your refill to be completed.          Additional Information About Your Visit        MyChart Information     [employer] lets you send messages to your doctor, view your test results, renew your prescriptions, schedule appointments and more. To sign up, go to [email or web address] . Click on \"Log in\" on the left side of the screen, which will take you to the Welcome page. Then click on \"Sign up Now\" on the right side of the page.     You will be asked to enter the access code listed below, as well as some personal information. Please follow the directions to create your username and password.     Your access code is: [email or web address]  Expires: 2017  9:28 AM     Your access code will  in 90 days. If you need help or a new code, please call your [place] clinic or [phone].        Care EveryWhere ID  "    This is your Care EveryWhere ID. This could be used by other organizations to access your [place] medical records  [id number]        Your Vitals Were     Pulse                   73            Blood Pressure from Last 3 Encounters:   04/17/17 (!) 218/84   04/07/17 181/90   04/03/17 (!) 170/100    Weight from Last 3 Encounters:   04/17/17 70.8 kg (156 lb)   04/03/17 70.8 kg (156 lb)   03/28/17 71.1 kg (156 lb 12 oz)              Today, you had the following     No orders found for display       Primary Care Provider Office Phone # Fax #    Reid Reese [phone]       [facility] [address]    [place] [address]        Thank you!     Thank you for choosing [facility] [place]  for your care. Our goal is always to provide you with excellent care. Hearing back from our patients is one way we can continue to improve our services. Please take a few minutes to complete the written survey that you may receive in the mail after your visit with us. Thank you!             Your Updated Medication List - Protect others around you: Learn how to safely use, store and throw away your medicines at www.disposemymeds.org.          This list is accurate as of: 4/7/17 11:59 PM.  Always use your most recent med list.                   Brand Name Dispense Instructions for use    aspirin 81 MG tablet     90 tablet    Take 1 tablet (81 mg) by mouth daily       cephALEXin 500 MG capsule    KEFLEX    28 capsule    Take 1 capsule (500 mg) by mouth 4 times daily for 7 days       clopidogrel 75 MG tablet    PLAVIX    90 tablet    Take 1 tablet (75 mg) by mouth daily Start taking medication the day after the procedure       ezetimibe 10 MG tablet    ZETIA    90 tablet    Take 1 tablet (10 mg) by mouth daily       LIPITOR PO      Take 40 mg by mouth At Bedtime       losartan-hydrochlorothiazide 50-12.5 MG per tablet    HYZAAR     Take 1 tablet by mouth daily       metFORMIN 500 MG 24  hr tablet    GLUCOPHAGE-XR     Take 500 mg by mouth daily (with breakfast)       order for DME     1 each    Equipment being ordered: Tamraches () Treatment Diagnosis: Impaired gait       TOPROL XL PO      Take 50 mg by mouth daily       triamcinolone 0.1 % cream    KENALOG     Apply to affected area as needed

## 2017-04-10 ENCOUNTER — TELEPHONE (OUTPATIENT)
Dept: PODIATRY | Facility: CLINIC | Age: 67
End: 2017-04-10

## 2017-04-10 NOTE — TELEPHONE ENCOUNTER
Reason for Call:  Other call back    Detailed comments: pt is done with Klefex for infection pt wants to know if he should have another round of medicine? Please call    Phone Number Patient can be reached at: Home number on file [phone] (home)    Best Time: any    Can we leave a detailed message on this number? YES    Call taken on 4/10/2017 at 11:30 AM by Kristel Hills

## 2017-04-10 NOTE — TELEPHONE ENCOUNTER
Spoke with pt and informed him of the note below. Also scheduled a follow up appt for 4/17/17 at 10:15am with Dr. Mauricio.  Mikala Magana CMA ([facility])  Podiatry/Foot & Ankle Surgery  [facility]

## 2017-04-10 NOTE — TELEPHONE ENCOUNTER
His foot looked stable when I saw him last week.  I think we should wait and see how the foot looks at his next appointment.  I do not want to place him on additional antibiotics if there are no clinical signs of infection.       Emre Mauricio DPM, FACFAS, MS [place] Department of Podiatry/Foot & Ankle Surgery

## 2017-04-17 ENCOUNTER — OFFICE VISIT (OUTPATIENT)
Dept: PODIATRY | Facility: CLINIC | Age: 67
End: 2017-04-17
Payer: COMMERCIAL

## 2017-04-17 ENCOUNTER — RADIANT APPOINTMENT (OUTPATIENT)
Dept: GENERAL RADIOLOGY | Facility: CLINIC | Age: 67
End: 2017-04-17
Attending: PODIATRIST
Payer: COMMERCIAL

## 2017-04-17 VITALS
BODY MASS INDEX: 25.07 KG/M2 | SYSTOLIC BLOOD PRESSURE: 218 MMHG | HEIGHT: 66 IN | DIASTOLIC BLOOD PRESSURE: 84 MMHG | WEIGHT: 156 LBS

## 2017-04-17 DIAGNOSIS — I73.9 PAD (PERIPHERAL ARTERY DISEASE) (H): ICD-10-CM

## 2017-04-17 DIAGNOSIS — Z89.422 STATUS POST AMPUTATION OF TOE OF LEFT FOOT (H): ICD-10-CM

## 2017-04-17 DIAGNOSIS — L97.522 ULCER OF LEFT FOOT, WITH FAT LAYER EXPOSED (H): ICD-10-CM

## 2017-04-17 DIAGNOSIS — Z09 SURGERY FOLLOW-UP EXAMINATION: Primary | ICD-10-CM

## 2017-04-17 PROCEDURE — 11042 DBRDMT SUBQ TIS 1ST 20SQCM/<: CPT | Performed by: PODIATRIST

## 2017-04-17 PROCEDURE — 73630 X-RAY EXAM OF FOOT: CPT | Mod: LT

## 2017-04-17 PROCEDURE — 99212 OFFICE O/P EST SF 10 MIN: CPT | Mod: 24 | Performed by: PODIATRIST

## 2017-04-17 NOTE — NURSING NOTE
"Chief Complaint   Patient presents with     Surgical Followup     PO DOS: 3/15/17 L foot 5th toe amputation       Initial BP (!) 218/84 (BP Location: Right arm, Patient Position: Chair, Cuff Size: Adult Regular)  Ht 5' 6\" (1.676 m)  Wt 156 lb (70.8 kg)  BMI 25.18 kg/m2 Estimated body mass index is 25.18 kg/(m^2) as calculated from the following:    Height as of this encounter: 5' 6\" (1.676 m).    Weight as of this encounter: 156 lb (70.8 kg).  Medication Reconciliation: complete   Nora Phoenix MA      "

## 2017-04-17 NOTE — MR AVS SNAPSHOT
"              After Visit Summary   4/17/2017    Zia Hopkins    MRN: [id number]           Patient Information     Date Of Birth          1950        Visit Information        Provider Department      4/17/2017 10:15 AM Emre Mauricio DPM [facility]        Care Instructions    Wound Care Recommendations:   1) Keep the wound covered by a bandage when bathing.   2) Gently clean the wound with soap water, separate from bath/shower water. May      also use baby wipe.  3) Each day, apply a topical antibiotic ointment to the wound (Neosporin, Triple antibiotic, Bacitracin). Cover with large band-aid or gauze.   5) Please seek immediate medical attention if any increasing redness, drainage, smell, or pain related to the wound.   6) Please return to clinic in the period of time requested by Dr. Mauricio.               Follow-ups after your visit        Who to contact     If you have questions or need follow up information about today's clinic visit or your schedule please contact [facility] directly at [phone].  Normal or non-critical lab and imaging results will be communicated to you by [employer], letter or phone within 4 business days after the clinic has received the results. If you do not hear from us within 7 days, please contact the clinic through [employer] or phone. If you have a critical or abnormal lab result, we will notify you by phone as soon as possible.  Submit refill requests through [employer] or call your pharmacy and they will forward the refill request to us. Please allow 3 business days for your refill to be completed.          Additional Information About Your Visit        [employer] Information     [employer] lets you send messages to your doctor, view your test results, renew your prescriptions, schedule appointments and more. To sign up, go to [email or web address] . Click on \"Log in\" on the left side of the screen, which will take you to the " "Welcome page. Then click on \"Sign up Now\" on the right side of the page.     You will be asked to enter the access code listed below, as well as some personal information. Please follow the directions to create your username and password.     Your access code is: [email or web address]  Expires: 2017  9:28 AM     Your access code will  in 90 days. If you need help or a new code, please call your [place] clinic or [phone].        Care EveryWhere ID     This is your Care EveryWhere ID. This could be used by other organizations to access your [place] medical records  [id number]        Your Vitals Were     Height BMI (Body Mass Index)                5' 6\" (1.676 m) 25.18 kg/m2           Blood Pressure from Last 3 Encounters:   17 (!) 218/84   17 181/90   17 (!) 170/100    Weight from Last 3 Encounters:   17 156 lb (70.8 kg)   17 156 lb (70.8 kg)   17 156 lb 12 oz (71.1 kg)              Today, you had the following     No orders found for display       Primary Care Provider Office Phone # Fax #    Reid Reese [phone]       [facility] [address]    [place] [address]        Thank you!     Thank you for choosing [facility]  for your care. Our goal is always to provide you with excellent care. Hearing back from our patients is one way we can continue to improve our services. Please take a few minutes to complete the written survey that you may receive in the mail after your visit with us. Thank you!             Your Updated Medication List - Protect others around you: Learn how to safely use, store and throw away your medicines at www.disposemymeds.org.          This list is accurate as of: 17 10:38 AM.  Always use your most recent med list.                   Brand Name Dispense Instructions for use    aspirin 81 MG tablet     90 tablet    Take 1 tablet (81 mg) by mouth daily       clopidogrel 75 MG tablet "    PLAVIX    90 tablet    Take 1 tablet (75 mg) by mouth daily Start taking medication the day after the procedure       ezetimibe 10 MG tablet    ZETIA    90 tablet    Take 1 tablet (10 mg) by mouth daily       LIPITOR PO      Take 40 mg by mouth At Bedtime       losartan-hydrochlorothiazide 50-12.5 MG per tablet    HYZAAR     Take 1 tablet by mouth daily       metFORMIN 500 MG 24 hr tablet    GLUCOPHAGE-XR     Take 500 mg by mouth daily (with breakfast)       order for DME     1 each    Equipment being ordered: Bridger () Treatment Diagnosis: Impaired gait       TOPROL XL PO      Take 50 mg by mouth daily       triamcinolone 0.1 % cream    KENALOG     Apply to affected area as needed

## 2017-04-17 NOTE — PATIENT INSTRUCTIONS
Wound Care Recommendations:   1) Keep the wound covered by a bandage when bathing.   2) Gently clean the wound with soap water, separate from bath/shower water. May      also use baby wipe.  3) Each day, apply a topical antibiotic ointment to the wound (Neosporin, Triple antibiotic, Bacitracin). Cover with large band-aid or gauze.   5) Please seek immediate medical attention if any increasing redness, drainage, smell, or pain related to the wound.   6) Please return to clinic in the period of time requested by Dr. Mauricio.

## 2017-04-17 NOTE — PROGRESS NOTES
Subjective:   Zia Hopkins is a 67 year old male who presents today in follow up after left 5th toe amputation.  He was hospitalized at the time.  Please see my summary/ indications for surgery below. This is from my post op note 3/15/17.      Zia Hopkins is a 67-year-old male with type 2 diabetes and peripheral neuropathy. He is 1 week status post left lower extremity angioplasty with stenting by Dr. Hamlin. He was referred to Dr. Ann due to dry gangrene of the left fifth toe. On presentation to Dr. Ann's office yesterday, there was concern that the toe was converting to a wet gangrene. The patient was admitted to [facility]. At this time, Vascular Surgery is not planning any additional intervention, but will monitor from the periphery. Due to the appearance of the toe, amputation was recommended. The patient was agreeable to this. An MRI was ordered supporting osteomyelitis of the left fifth toe. It did not indicate any more proximal infection. There was some marrow edema seen in the second and fourth toes, but not conclusive for osteomyelitis. Prior to surgery, I reviewed the procedure with the patient and his wife. Based on the necrosis of the left fifth toe alone, amputation is warranted. I also consented him to debridement of the wounds on the other toes. The purpose was to aid in healing and see if there is any hidden abscess or exposed bone. He was agreeable to this.      On follow up with me and Dr. Ann, there was a concern for wound dehiscence.  Mr. Hopkins followed up with Dr. Hamlin's office since his last visit.  I do not yet see any documentation available for my review.  Per patient report, the circulation in his left lower extremity is as good as the right.       Patient Active Problem List    Diagnosis Date Noted     PAD (peripheral artery disease) (H) 04/04/2017     Priority: Medium     Toe amputation status, left (H) 03/19/2017     Priority: Medium      Acute osteomyelitis of left foot (H) 03/19/2017     Priority: Medium     Gangrene of toe (H) 03/14/2017     Priority: Medium       Objective:      Pedal pulses are non palpable on the left.  Interval reduction in edema.    Light touch sensation itact b/l for the L4, L5, S1 distributions. No evidence of neuromuscular-related contractures or spasticity.     Skin:  Areas of skin exfoliation, peeling, left forefoot.  Incision remains coapted except for at the most medial aspect.  Post debridement, the wound is now 1.5cm L x 0.2cm D.  It does not probe to bone.     No purulence. No malodor.  Eschar around this area.  I was able to remove some of it, revealing healthier underlying skin.    Areas of dry eschar dorsal left 3rd and 4th toes.  There is also a dry ischemic type wound on the plantar heel and medial forefoot.  The eschar over the medial forefoot was sloughing and I removed it. This revealed a superficial ulceration.     Normal LE major muscle group strength. No gross deformities.  Left 5th toe absent.  Adquate ankle and STJ ROM.    X-ray, left foot:  There appears to be some erosive changes of the distal medial 5th met head. This is consistent with area of wound dehiscence/ ulceration.      Assessment/Plan:   Encounter Diagnoses   Name Primary?     Surgery follow-up examination Yes     PAD (peripheral artery disease) (H)      Status post amputation of toe of left foot (H)      Ulcer of left foot, with fat layer exposed (H)      No clinical signs of infection.  Foot is stable.  X-ray concerning for osteomyelitis, left 5th metatarsal head.     Multiple ischemic ulcers, left foot, separate from the surgical site.     His foot is stable.  My opinion on his prognosis is guarded.  I think he will likely need some more foot surgery, but we need to give the soft tissues more time to demarcate.  The surgical site is at risk, as are the 3rd and 4th toes.  I explained this to him.  If there is ongoing necrosis or  "re-infection, he might need additional toe amputations and/ or an transmetatarsal amputation.   There is not much more I can offer at this time beyond monitoring and wound cares.       Continue current wound cares:  Betadine and dry gauze to dry eschar areas; cleansing, topical antibiotic and gauze to the two open wounds.    Continue off loading shoe    Follow up in 2 weeks.      Excisional debridment procedure discussed.  Goals of removing non-viable tissue, evaluating full extent of the wounds, and promoting wound healing were explained.  Pt provided verbal and written consent.  A \"Time Out\" was called.     Using a sterile #15 blade and tissue nippers, excisional debridment of the ulcer and areas of sloughing eschar was performed.  Ulcer debridement was full-thickness to the level of, and including, the subcutaneous tissue.  The hyperkeratotic eschar was removed, by excising necrotic eschar off of healthier underlying skin .  Other non-viable tissue was excised. The ulcer base was scraped to remove bioburden and promote healing.  There was moderate bleeding with the procedure. .  Sterile dressing applied.      Body mass index is 25.18 kg/(m^2).    Emre Mauricio DPM, FACFAS, MS    [facility] of Podiatry/Foot & Ankle Surgery          "

## 2017-04-17 NOTE — LETTER
4/17/2017       RE: Zia Hopkins  [address] [place] [address]           Dear Colleague,    Thank you for referring your patient, Zia Hopkins, to the [facility]. Please see a copy of my visit note below.    Subjective:   Zia Hopkins is a 67 year old male who presents today in follow up after left 5th toe amputation.  He was hospitalized at the time.  Please see my summary/ indications for surgery below. This is from my post op note 3/15/17.      Zia Hopkins is a 67-year-old male with type 2 diabetes and peripheral neuropathy. He is 1 week status post left lower extremity angioplasty with stenting by Dr. Hamlin. He was referred to Dr. Ann due to dry gangrene of the left fifth toe. On presentation to Dr. Ann's office yesterday, there was concern that the toe was converting to a wet gangrene. The patient was admitted to [facility]. At this time, Vascular Surgery is not planning any additional intervention, but will monitor from the periphery. Due to the appearance of the toe, amputation was recommended. The patient was agreeable to this. An MRI was ordered supporting osteomyelitis of the left fifth toe. It did not indicate any more proximal infection. There was some marrow edema seen in the second and fourth toes, but not conclusive for osteomyelitis. Prior to surgery, I reviewed the procedure with the patient and his wife. Based on the necrosis of the left fifth toe alone, amputation is warranted. I also consented him to debridement of the wounds on the other toes. The purpose was to aid in healing and see if there is any hidden abscess or exposed bone. He was agreeable to this.      On follow up with me and Dr. Ann, there was a concern for wound dehiscence.  Mr. Hpokins followed up with Dr. Hamlin's office since his last visit.  I do not yet see any documentation available for my review.  Per patient report, the circulation in his  left lower extremity is as good as the right.       Patient Active Problem List    Diagnosis Date Noted     PAD (peripheral artery disease) (H) 04/04/2017     Priority: Medium     Toe amputation status, left (H) 03/19/2017     Priority: Medium     Acute osteomyelitis of left foot (H) 03/19/2017     Priority: Medium     Gangrene of toe (H) 03/14/2017     Priority: Medium       Objective:      Pedal pulses are non palpable on the left.  Interval reduction in edema.    Light touch sensation itact b/l for the L4, L5, S1 distributions. No evidence of neuromuscular-related contractures or spasticity.     Skin:  Areas of skin exfoliation, peeling, left forefoot.  Incision remains coapted except for at the most medial aspect.  Post debridement, the wound is now 1.5cm L x 0.2cm D.  It does not probe to bone.     No purulence. No malodor.  Eschar around this area.  I was able to remove some of it, revealing healthier underlying skin.    Areas of dry eschar dorsal left 3rd and 4th toes.  There is also a dry ischemic type wound on the plantar heel and medial forefoot.  The eschar over the medial forefoot was sloughing and I removed it. This revealed a superficial ulceration.     Normal LE major muscle group strength. No gross deformities.  Left 5th toe absent.  Adquate ankle and STJ ROM.    X-ray, left foot:  There appears to be some erosive changes of the distal medial 5th met head. This is consistent with area of wound dehiscence/ ulceration.      Assessment/Plan:   Encounter Diagnoses   Name Primary?     Surgery follow-up examination Yes     PAD (peripheral artery disease) (H)      Status post amputation of toe of left foot (H)      Ulcer of left foot, with fat layer exposed (H)      No clinical signs of infection.  Foot is stable.  X-ray concerning for osteomyelitis, left 5th metatarsal head.     Multiple ischemic ulcers, left foot, separate from the surgical site.     His foot is stable.  My opinion on his prognosis is  "guarded.  I think he will likely need some more foot surgery, but we need to give the soft tissues more time to demarcate.  The surgical site is at risk, as are the 3rd and 4th toes.  I explained this to him.  If there is ongoing necrosis or re-infection, he might need additional toe amputations and/ or an transmetatarsal amputation.   There is not much more I can offer at this time beyond monitoring and wound cares.       Continue current wound cares:  Betadine and dry gauze to dry eschar areas; cleansing, topical antibiotic and gauze to the two open wounds.    Continue off loading shoe    Follow up in 2 weeks.      Excisional debridment procedure discussed.  Goals of removing non-viable tissue, evaluating full extent of the wounds, and promoting wound healing were explained.  Pt provided verbal and written consent.  A \"Time Out\" was called.     Using a sterile #15 blade and tissue nippers, excisional debridment of the ulcer and areas of sloughing eschar was performed.  Ulcer debridement was full-thickness to the level of, and including, the subcutaneous tissue.  The hyperkeratotic eschar was removed, by excising necrotic eschar off of healthier underlying skin .  Other non-viable tissue was excised. The ulcer base was scraped to remove bioburden and promote healing.  There was moderate bleeding with the procedure. .  Sterile dressing applied.      Body mass index is 25.18 kg/(m^2).    Emre Mauricio DPM, FACFAS, MS    [place] [facility] of Podiatry/Foot & Ankle Surgery            Again, thank you for allowing me to participate in the care of your patient.        Sincerely,              Emre Mauricio DPM    "

## 2017-04-18 ENCOUNTER — TELEPHONE (OUTPATIENT)
Dept: PODIATRY | Facility: CLINIC | Age: 67
End: 2017-04-18

## 2017-04-18 DIAGNOSIS — M86.172 ACUTE OSTEOMYELITIS OF LEFT FOOT (H): Primary | ICD-10-CM

## 2017-04-18 DIAGNOSIS — L97.522 ULCER OF LEFT FOOT, WITH FAT LAYER EXPOSED (H): ICD-10-CM

## 2017-04-18 DIAGNOSIS — L08.9 FOOT INFECTION: Primary | ICD-10-CM

## 2017-04-19 RX ORDER — CLINDAMYCIN HCL 150 MG
150 CAPSULE ORAL 3 TIMES DAILY
Qty: 30 CAPSULE | Refills: 0 | Status: SHIPPED | OUTPATIENT
Start: 2017-04-19 | End: 2018-05-04

## 2017-04-19 NOTE — TELEPHONE ENCOUNTER
I called and spoke with Mr Hopkins.  Rx for Clindamycin 150mg TID x 10 days sent to his pharmacy.  All questions answered, patient happy with plan.    Erik Baez DPM   Podiatric Foot & Ankle Surgeon  [facility]

## 2017-04-21 ENCOUNTER — HOSPITAL ENCOUNTER (OUTPATIENT)
Dept: MRI IMAGING | Facility: CLINIC | Age: 67
Discharge: HOME | End: 2017-04-21
Attending: PODIATRIST | Admitting: PODIATRIST
Payer: MEDICARE

## 2017-04-21 DIAGNOSIS — M86.172 ACUTE OSTEOMYELITIS OF LEFT FOOT (H): ICD-10-CM

## 2017-04-21 DIAGNOSIS — L97.522 ULCER OF LEFT FOOT, WITH FAT LAYER EXPOSED (H): ICD-10-CM

## 2017-04-21 PROCEDURE — 73720 MRI LWR EXTREMITY W/O&W/DYE: CPT | Mod: LT

## 2017-04-21 PROCEDURE — A9585 GADOBUTROL INJECTION: HCPCS | Performed by: PODIATRIST

## 2017-04-21 PROCEDURE — 25500064 ZZH RX 255 OP 636: Performed by: PODIATRIST

## 2017-04-21 RX ORDER — GADOBUTROL 604.72 MG/ML
7 INJECTION INTRAVENOUS ONCE
Status: COMPLETED | OUTPATIENT
Start: 2017-04-21 | End: 2017-04-21

## 2017-04-21 RX ADMIN — GADOBUTROL 7 ML: 604.72 INJECTION INTRAVENOUS at 11:52

## 2017-04-24 NOTE — PROGRESS NOTES
[facility] Note     CC: Post-op check     Subjective:  Zia Hopkins returns following lower extremity angiogram by Dr. Hamlin on 2007.  He is a pleasant 67-year-old gentleman with multiple risk factors for atherosclerotic disease including diabetes type 2, hypertension and hyperlipidemia.  He has been walking more recently.  He denies any pain in his legs.  He sees Dr. He for his foot care.  He underwent amputation of his right fourth and fifth toes.  He reports achieving smoking cessation one month ago.    Objective:  Blood pressure 181/90, pulse 73.   Neck - No bruits bilaterally  Ext - 2+ radial pulses bilaterally.  Right le+ femoral pulse, 1+ popliteal pulse.  Left le+ femoral pulse, 2+ popliteal pulse.  Right fourth and fifth 20 patient's sutures are intact.  There is dry necrosis of the dorsal second and third toes.    ULTRASOUND DAVID DOPPLER NO EXERCISE 2017 at 0901 hours     HISTORY: 67-year-old patient with left mid SFA stent and left SFA and  popliteal arterial angioplasties.     COMPARISON: March 3, 2017.     FINDINGS: Resting DAVID in the right lower extremity is 0.72, previously  0.7. Resting DAVID in the left lower extremity is 0.57, previously 0.4.  Distal waveforms are biphasic/monophasic bilaterally, improved in the  left lower extremity relative to previous exam.     IMPRESSION:  1. Mild arterial insufficiency in the right lower extremity,  unchanged.  2. Mild to moderate arterial insufficiency in the left lower  extremity, though both resting DAVID value and waveforms improved since  previous exam.    Assessment:  S/p lower extremity angiogram for multilevel arterial insufficiency.     Plan:  Patient is recovering as anticipated.    He has optimized her lower extremity perfusion and I anticipate he'll be able to heal his toe wounds.  The ankle-brachial index performed today demonstrates improved indices, however he does have significant underlying  peripheral arterial disease.  He will follow-up in six months for a repeat ankle-brachial index.    Siva Sanders PA-C    Please route or send letter to:  *None*

## 2017-04-25 DIAGNOSIS — I73.9 PAD (PERIPHERAL ARTERY DISEASE) (H): Primary | ICD-10-CM

## 2017-05-05 ENCOUNTER — OFFICE VISIT (OUTPATIENT)
Dept: OTHER | Facility: CLINIC | Age: 67
End: 2017-05-05
Attending: SURGERY
Payer: MEDICARE

## 2017-05-05 VITALS — DIASTOLIC BLOOD PRESSURE: 99 MMHG | HEART RATE: 82 BPM | SYSTOLIC BLOOD PRESSURE: 182 MMHG

## 2017-05-05 DIAGNOSIS — S91.109A NON-HEALING OPEN WOUND OF TOE: Primary | ICD-10-CM

## 2017-05-05 DIAGNOSIS — I73.9 PAD (PERIPHERAL ARTERY DISEASE) (H): Primary | ICD-10-CM

## 2017-05-05 DIAGNOSIS — I73.9 PAD (PERIPHERAL ARTERY DISEASE) (H): ICD-10-CM

## 2017-05-05 PROCEDURE — 99211 OFF/OP EST MAY X REQ PHY/QHP: CPT

## 2017-05-05 PROCEDURE — 99212 OFFICE O/P EST SF 10 MIN: CPT | Mod: ZP | Performed by: SURGERY

## 2017-05-05 RX ORDER — CEPHALEXIN 500 MG/1
500 CAPSULE ORAL 4 TIMES DAILY
Qty: 30 CAPSULE | Refills: 0 | Status: SHIPPED | OUTPATIENT
Start: 2017-05-05 | End: 2018-05-04

## 2017-05-05 NOTE — MR AVS SNAPSHOT
"              After Visit Summary   2017    Zia Hopkins    MRN: [id number]           Patient Information     Date Of Birth          1950        Visit Information        Provider Department      2017 8:00 AM Surjit Hamlin MD [facility]      Today's Diagnoses     PAD (peripheral artery disease) (H)    -  1       Follow-ups after your visit        Who to contact     If you have questions or need follow up information about today's clinic visit or your schedule please contact [facility] directly at [phone].  Normal or non-critical lab and imaging results will be communicated to you by Hedvighart, letter or phone within 4 business days after the clinic has received the results. If you do not hear from us within 7 days, please contact the clinic through Hedvighart or phone. If you have a critical or abnormal lab result, we will notify you by phone as soon as possible.  Submit refill requests through mymxlog or call your pharmacy and they will forward the refill request to us. Please allow 3 business days for your refill to be completed.          Additional Information About Your Visit        MyChart Information     mymxlog lets you send messages to your doctor, view your test results, renew your prescriptions, schedule appointments and more. To sign up, go to [email or web address] . Click on \"Log in\" on the left side of the screen, which will take you to the Welcome page. Then click on \"Sign up Now\" on the right side of the page.     You will be asked to enter the access code listed below, as well as some personal information. Please follow the directions to create your username and password.     Your access code is: [email or web address]  Expires: 2017  9:28 AM     Your access code will  in 90 days. If you need help or a new code, please call your [place] clinic or [phone].        Care EveryWhere " ID     This is your Care EveryWhere ID. This could be used by other organizations to access your [facility] medical records  [id number]        Your Vitals Were     Pulse                   82            Blood Pressure from Last 3 Encounters:   05/05/17 (!) 182/99   04/17/17 (!) 218/84   04/07/17 181/90    Weight from Last 3 Encounters:   04/17/17 156 lb (70.8 kg)   04/03/17 156 lb (70.8 kg)   03/28/17 156 lb 12 oz (71.1 kg)              Today, you had the following     No orders found for display         Today's Medication Changes          These changes are accurate as of: 5/5/17  9:10 AM.  If you have any questions, ask your nurse or doctor.               Start taking these medicines.        Dose/Directions    cephALEXin 500 MG capsule   Commonly known as:  KEFLEX   Used for:  PAD (peripheral artery disease) (H)   Started by:  Surjit Hamlin MD        Dose:  500 mg   Take 1 capsule (500 mg) by mouth 4 times daily   Quantity:  30 capsule   Refills:  0            Where to get your medicines      These medications were sent to [facility] [id number]  [place] - [address] [facility]  [address]  [facility], [place] [address]     Phone:  [phone]     cephALEXin 500 MG capsule                Primary Care Provider Office Phone # Fax #    Reid Reese [phone]       [facility] [address]    [place] [address]        Thank you!     Thank you for choosing [facility]  for your care. Our goal is always to provide you with excellent care. Hearing back from our patients is one way we can continue to improve our services. Please take a few minutes to complete the written survey that you may receive in the mail after your visit with us. Thank you!             Your Updated Medication List - Protect others around you: Learn how to safely use, store and throw away your medicines at www.disposemymeds.org.          This list is  accurate as of: 5/5/17  9:10 AM.  Always use your most recent med list.                   Brand Name Dispense Instructions for use    aspirin 81 MG tablet     90 tablet    Take 1 tablet (81 mg) by mouth daily       cephALEXin 500 MG capsule    KEFLEX    30 capsule    Take 1 capsule (500 mg) by mouth 4 times daily       clindamycin 150 MG capsule    CLEOCIN    30 capsule    Take 1 capsule (150 mg) by mouth 3 times daily       clopidogrel 75 MG tablet    PLAVIX    90 tablet    Take 1 tablet (75 mg) by mouth daily Start taking medication the day after the procedure       ezetimibe 10 MG tablet    ZETIA    90 tablet    Take 1 tablet (10 mg) by mouth daily       LIPITOR PO      Take 40 mg by mouth At Bedtime       losartan-hydrochlorothiazide 50-12.5 MG per tablet    HYZAAR     Take 1 tablet by mouth daily       metFORMIN 500 MG 24 hr tablet    GLUCOPHAGE-XR     Take 500 mg by mouth daily (with breakfast)       order for DME     1 each    Equipment being ordered: Bridger () Treatment Diagnosis: Impaired gait       TOPROL XL PO      Take 50 mg by mouth daily       triamcinolone 0.1 % cream    KENALOG     Apply to affected area as needed

## 2017-05-05 NOTE — NURSING NOTE
"Chief Complaint   Patient presents with     RECHECK     Discuss options that were given prior to surgery        Initial BP (!) 182/99 (BP Location: Left arm, Patient Position: Chair, Cuff Size: Adult Large)  Pulse 82 Estimated body mass index is 25.18 kg/(m^2) as calculated from the following:    Height as of 4/17/17: 5' 6\" (1.676 m).    Weight as of 4/17/17: 156 lb (70.8 kg).  Medication Reconciliation: complete     Face to face nursing time: 8 minutes    Kyleigh Santoyo MA     "

## 2017-05-05 NOTE — PROGRESS NOTES
Mr. Hopkins returns to clinic today.  He is a very pleasant 67-year-old male that presented with non-healing wound in his left fourth.  This involved his little toe.  Since then he has undergone balloon angioplasty and stenting of the superficial femoral and popliteal arteries on the left side. He also has severe disease of his tibial vessels.  I again reviewed the arteriogram from his initial presentation.  His anterior tibial artery is occluded at its origin but reconstitutes in the mid leg and has flow into the foot.  His posterior tibial artery is heavily diseased at multiple points and there is a short segment of occlusion as well.  But does have in-line flow into the foot.  He has been doing well otherwise. Sensation in the foot is back, he is able to walk without any problems.  On examination he has a monophasic signal in his dorsalis pedis and a biphasic left posterior tibial.  He still has a small open area at the amputation site of his little toe.  There is some element of a cellulitis over there.  We will give one week of cephalexin and the antibiotic prescription was sent to his preferred pharmacy.  He will apply antibiotic ointment to that area and leave it open to air. He has remain tobacco free.  I think he'll certainly benefit from hyperbaric oxygen and different will be made for the same.  I'll see him back in about 6 weeks.

## 2017-06-02 ENCOUNTER — TELEPHONE (OUTPATIENT)
Dept: OTHER | Facility: CLINIC | Age: 67
End: 2017-06-02

## 2017-06-02 NOTE — TELEPHONE ENCOUNTER
Left VM for pt stating that pt needs to see a podiatrist before the f/u appt with Dr. Hamlin on 6/16/17 at 8am.     Nyasia Cee, RN, BSN.

## 2017-06-12 ENCOUNTER — OFFICE VISIT (OUTPATIENT)
Dept: PODIATRY | Facility: CLINIC | Age: 67
End: 2017-06-12
Payer: COMMERCIAL

## 2017-06-12 DIAGNOSIS — Z89.422 STATUS POST AMPUTATION OF TOE OF LEFT FOOT (H): ICD-10-CM

## 2017-06-12 DIAGNOSIS — I73.9 PAD (PERIPHERAL ARTERY DISEASE) (H): Primary | ICD-10-CM

## 2017-06-12 DIAGNOSIS — L97.422 ULCER OF HEEL AND MIDFOOT, LEFT, WITH FAT LAYER EXPOSED (H): ICD-10-CM

## 2017-06-12 PROCEDURE — 11042 DBRDMT SUBQ TIS 1ST 20SQCM/<: CPT | Mod: 79 | Performed by: PODIATRIST

## 2017-06-12 PROCEDURE — 99213 OFFICE O/P EST LOW 20 MIN: CPT | Mod: 24 | Performed by: PODIATRIST

## 2017-06-12 NOTE — PROGRESS NOTES
Subjective:   Zia Hopkins is a 67 year old male with PAD who presents today in follow up after left 5th toe amputation 3/15/17.  He was hospitalized at the time.  He has followed up with Dr. Hamlin of vascular surgery and is now enrolled in a hyperbaric oxygen program at [facility].  He reports interval resolution of foot pain and improvement in the appearance of his foot.  His last visit with me was 4/17/17.       Please see my summary/ indications for surgery below. This is from my post op note 3/15/17.      Zia Hopkins is a 67-year-old male with type 2 diabetes and peripheral neuropathy. He is 1 week status post left lower extremity angioplasty with stenting by Dr. Hamlin. He was referred to Dr. Ann due to dry gangrene of the left fifth toe. On presentation to Dr. Ann's office yesterday, there was concern that the toe was converting to a wet gangrene. The patient was admitted to [facility]. At this time, Vascular Surgery is not planning any additional intervention, but will monitor from the periphery. Due to the appearance of the toe, amputation was recommended. The patient was agreeable to this. An MRI was ordered supporting osteomyelitis of the left fifth toe. It did not indicate any more proximal infection. There was some marrow edema seen in the second and fourth toes, but not conclusive for osteomyelitis. Prior to surgery, I reviewed the procedure with the patient and his wife. Based on the necrosis of the left fifth toe alone, amputation is warranted. I also consented him to debridement of the wounds on the other toes. The purpose was to aid in healing and see if there is any hidden abscess or exposed bone. He was agreeable to this.        Patient Active Problem List    Diagnosis Date Noted     PAD (peripheral artery disease) (H) 04/04/2017     Priority: Medium     Toe amputation status, left (H) 03/19/2017     Priority: Medium     Acute osteomyelitis of left foot (H)  03/19/2017     Priority: Medium     Gangrene of toe (H) 03/14/2017     Priority: Medium       Objective:      Pedal pulses are non palpable on the left.  Interval reduction in edema.    Light touch sensation itact b/l for the L4, L5, S1 distributions. No evidence of neuromuscular-related contractures or spasticity.     Skin:  Most areas of previous, extensive skin exfoliation, peeling, left forefoot have resolved. Much of the skin is healthy, pink.      Areas of dry eschar dorsal left  4th toe and left 5th toe amputation site.    There is also a dry ischemic type wound on the plantar lateral left heel.   This eschar was noted to be loose and therefore it was debrided.  Post debridement, healthy, bleeding tissue.  It does not probe to bone.     Normal LE major muscle group strength. No gross deformities.  Left 5th toe absent.  Adquate ankle and STJ ROM.    4/17/17 X-ray, left foot:  There appears to be some erosive changes of the distal medial 5th met head. This is consistent with area of wound dehiscence/ ulceration.      4/21/17 MRI:  IMPRESSION:  1. Interval amputation of the fifth digit at the metatarsophalangeal  joint and interval development of bone marrow edema in the fifth  metatarsal bone suspicious for osteomyelitis.  2. Adjacent small fluid collection may represent an abscess.  3. Minimal new reactive bone marrow edema at the lateral aspect of the  fourth metatarsal head. Subtle, probably reactive bone marrow edema in  the fourth middle and distal phalanges is unchanged.     LEONIE FLORES MD    Assessment/Plan:   Encounter Diagnoses   Name Primary?     PAD (peripheral artery disease) (H) Yes     Status post amputation of toe of left foot (H)      Ulcer of heel and midfoot, left, with fat layer exposed (H)        NOTE:  At time of appointment, Epic was not working and I was not able to review his previous note.   I did not remember the MRI and need for repeat x-ray.  He will be asked to return for an  "x-ray.      No clinical signs of infection.  Foot is stable.  Previous X-ray concerning for osteomyelitis, left 5th metatarsal head.     Multiple ischemic areas, left foot, separate from the surgical site.     His foot is stable.  My opinion on his prognosis is guarded.  I think he will likely need some more foot surgery, but we need to give the soft tissues more time to demarcate.  The surgical site is at risk, as the 4th toe.  However, the overall appearance of his foot has improved - notably the 3rd toe.      Continue current wound cares:  Betadine and dry gauze to dry eschar areas; cleansing, topical antibiotic and gauze to the plantar lateral heel wound.    Continue off loading shoe    Follow up in 3 weeks.      Excisional debridment procedure discussed.  Goals of removing non-viable tissue, evaluating full extent of the wounds, and promoting wound healing were explained.  Pt provided verbal and written consent.  A \"Time Out\" was called.     Using a sterile #15 blade and tissue nippers, excisional debridment of the left heel ulcer was performed.  Ulcer debridement was full-thickness to the level of, and including, the subcutaneous tissue.  The hyperkeratotic eschar was removed, by excising necrotic eschar off of healthier underlying skin .  Other non-viable tissue was excised. The ulcer base was scraped to remove bioburden and promote healing.  There was moderate bleeding with the procedure.Sterile dressing applied.    There is no height or weight on file to calculate BMI.      Emre Mauricio DPM, FACFAS, MS    [place] [facility] Pod[facility]/Foot & Ankle Surgery          "

## 2017-06-12 NOTE — LETTER
6/12/2017       RE: Zia Hopkins  [address] [place] [address]           Dear Colleague,    Thank you for referring your patient, Zia Hopkins, to the [facility]. Please see a copy of my visit note below.    Subjective:   Zia Hopkins is a 67 year old male with PAD who presents today in follow up after left 5th toe amputation 3/15/17.  He was hospitalized at the time.  He has followed up with Dr. Hamlin of vascular surgery and is now enrolled in a hyperbaric oxygen program at [facility].  He reports interval resolution of foot pain and improvement in the appearance of his foot.  His last visit with me was 4/17/17.       Please see my summary/ indications for surgery below. This is from my post op note 3/15/17.      Zia Hopkins is a 67-year-old male with type 2 diabetes and peripheral neuropathy. He is 1 week status post left lower extremity angioplasty with stenting by Dr. Hamlin. He was referred to Dr. Ann due to dry gangrene of the left fifth toe. On presentation to Dr. Ann's office yesterday, there was concern that the toe was converting to a wet gangrene. The patient was admitted to [facility]. At this time, Vascular Surgery is not planning any additional intervention, but will monitor from the periphery. Due to the appearance of the toe, amputation was recommended. The patient was agreeable to this. An MRI was ordered supporting osteomyelitis of the left fifth toe. It did not indicate any more proximal infection. There was some marrow edema seen in the second and fourth toes, but not conclusive for osteomyelitis. Prior to surgery, I reviewed the procedure with the patient and his wife. Based on the necrosis of the left fifth toe alone, amputation is warranted. I also consented him to debridement of the wounds on the other toes. The purpose was to aid in healing and see if there is any hidden abscess or exposed bone. He was agreeable to this.         Patient Active Problem List    Diagnosis Date Noted     PAD (peripheral artery disease) (H) 04/04/2017     Priority: Medium     Toe amputation status, left (H) 03/19/2017     Priority: Medium     Acute osteomyelitis of left foot (H) 03/19/2017     Priority: Medium     Gangrene of toe (H) 03/14/2017     Priority: Medium       Objective:      Pedal pulses are non palpable on the left.  Interval reduction in edema.    Light touch sensation itact b/l for the L4, L5, S1 distributions. No evidence of neuromuscular-related contractures or spasticity.     Skin:  Most areas of previous, extensive skin exfoliation, peeling, left forefoot have resolved. Much of the skin is healthy, pink.      Areas of dry eschar dorsal left  4th toe and left 5th toe amputation site.    There is also a dry ischemic type wound on the plantar lateral left heel.   This eschar was noted to be loose and therefore it was debrided.  Post debridement, healthy, bleeding tissue.  It does not probe to bone.     Normal LE major muscle group strength. No gross deformities.  Left 5th toe absent.  Adquate ankle and STJ ROM.    4/17/17 X-ray, left foot:  There appears to be some erosive changes of the distal medial 5th met head. This is consistent with area of wound dehiscence/ ulceration.      4/21/17 MRI:  IMPRESSION:  1. Interval amputation of the fifth digit at the metatarsophalangeal  joint and interval development of bone marrow edema in the fifth  metatarsal bone suspicious for osteomyelitis.  2. Adjacent small fluid collection may represent an abscess.  3. Minimal new reactive bone marrow edema at the lateral aspect of the  fourth metatarsal head. Subtle, probably reactive bone marrow edema in  the fourth middle and distal phalanges is unchanged.     LEONIE FLORES MD    Assessment/Plan:   Encounter Diagnoses   Name Primary?     PAD (peripheral artery disease) (H) Yes     Status post amputation of toe of left foot (H)      Ulcer of heel and  "midfoot, left, with fat layer exposed (H)        NOTE:  At time of appointment, Epic was not working and I was not able to review his previous note.   I did not remember the MRI and need for repeat x-ray.  He will be asked to return for an x-ray.      No clinical signs of infection.  Foot is stable.  Previous X-ray concerning for osteomyelitis, left 5th metatarsal head.     Multiple ischemic areas, left foot, separate from the surgical site.     His foot is stable.  My opinion on his prognosis is guarded.  I think he will likely need some more foot surgery, but we need to give the soft tissues more time to demarcate.  The surgical site is at risk, as the 4th toe.  However, the overall appearance of his foot has improved - notably the 3rd toe.      Continue current wound cares:  Betadine and dry gauze to dry eschar areas; cleansing, topical antibiotic and gauze to the plantar lateral heel wound.    Continue off loading shoe    Follow up in 3 weeks.      Excisional debridment procedure discussed.  Goals of removing non-viable tissue, evaluating full extent of the wounds, and promoting wound healing were explained.  Pt provided verbal and written consent.  A \"Time Out\" was called.     Using a sterile #15 blade and tissue nippers, excisional debridment of the left heel ulcer was performed.  Ulcer debridement was full-thickness to the level of, and including, the subcutaneous tissue.  The hyperkeratotic eschar was removed, by excising necrotic eschar off of healthier underlying skin .  Other non-viable tissue was excised. The ulcer base was scraped to remove bioburden and promote healing.  There was moderate bleeding with the procedure.Sterile dressing applied.    There is no height or weight on file to calculate BMI.      Emre Mauricio DPM, FACFAS, MS    [place] [facility] Podiatry/Foot & Ankle Surgery            Again, thank you for allowing me to participate in the care of your patient.  "       Sincerely,              Emre Mauricio, RANULFOM

## 2017-06-12 NOTE — MR AVS SNAPSHOT
After Visit Summary   6/12/2017    Zia Hopkins    MRN: [id number]           Patient Information     Date Of Birth          1950        Visit Information        Provider Department      6/12/2017 1:45 PM Emre Mauricio DPM [facility]        Today's Diagnoses     PAD (peripheral artery disease) (H)    -  1    Status post amputation of toe of left foot (H)        Ulcer of heel and midfoot, left, with fat layer exposed (H)           Follow-ups after your visit        Your next 10 appointments already scheduled     Jun 16, 2017  8:00 AM CDT   Return Visit with Surjit Hamlin MD   [facility] ([facility])    [address]  [facility] [id number] [phone]              Future tests that were ordered for you today     Open Future Orders        Priority Expected Expires Ordered    XR Foot Left G/E 3 Views Routine 6/12/2017 7/3/2017 6/12/2017            Who to contact     If you have questions or need follow up information about today's clinic visit or your schedule please contact [facility] directly at [phone].  Normal or non-critical lab and imaging results will be communicated to you by MyChart, letter or phone within 4 business days after the clinic has received the results. If you do not hear from us within 7 days, please contact the clinic through [employer] or phone. If you have a critical or abnormal lab result, we will notify you by phone as soon as possible.  Submit refill requests through [employer] or call your pharmacy and they will forward the refill request to us. Please allow 3 business days for your refill to be completed.          Additional Information About Your Visit        [employer] Information     [employer] lets you send messages to your doctor, view your test results, renew your prescriptions, schedule appointments and more. To sign up,  "go to [email or web address] . Click on \"Log in\" on the left side of the screen, which will take you to the Welcome page. Then click on \"Sign up Now\" on the right side of the page.     You will be asked to enter the access code listed below, as well as some personal information. Please follow the directions to create your username and password.     Your access code is: [email or web address]  Expires: 9/10/2017  4:08 PM     Your access code will  in 90 days. If you need help or a new code, please call your [facility] clinic or [phone].        Care EveryWhere ID     This is your Care EveryWhere ID. This could be used by other organizations to access your [facility] medical records  [id number]         Blood Pressure from Last 3 Encounters:   17 (!) 182/99   17 (!) 218/84   17 181/90    Weight from Last 3 Encounters:   17 156 lb (70.8 kg)   17 156 lb (70.8 kg)   17 156 lb 12 oz (71.1 kg)              We Performed the Following     DEBRIDE SKIN/SUBQ TISSUE        Primary Care Provider Office Phone # Fax #    Reid Reese [phone]       [facility] [address]    [place] [address]        Thank you!     Thank you for choosing [facility]  for your care. Our goal is always to provide you with excellent care. Hearing back from our patients is one way we can continue to improve our services. Please take a few minutes to complete the written survey that you may receive in the mail after your visit with us. Thank you!             Your Updated Medication List - Protect others around you: Learn how to safely use, store and throw away your medicines at www.disposemymeds.org.          This list is accurate as of: 17  4:08 PM.  Always use your most recent med list.                   Brand Name Dispense Instructions for use    aspirin 81 MG tablet     90 tablet    Take 1 tablet (81 mg) by mouth daily       cephALEXin 500 MG " capsule    KEFLEX    30 capsule    Take 1 capsule (500 mg) by mouth 4 times daily       clindamycin 150 MG capsule    CLEOCIN    30 capsule    Take 1 capsule (150 mg) by mouth 3 times daily       clopidogrel 75 MG tablet    PLAVIX    90 tablet    Take 1 tablet (75 mg) by mouth daily Start taking medication the day after the procedure       ezetimibe 10 MG tablet    ZETIA    90 tablet    Take 1 tablet (10 mg) by mouth daily       LIPITOR PO      Take 40 mg by mouth At Bedtime       losartan-hydrochlorothiazide 50-12.5 MG per tablet    HYZAAR     Take 1 tablet by mouth daily       metFORMIN 500 MG 24 hr tablet    GLUCOPHAGE-XR     Take 500 mg by mouth daily (with breakfast)       order for DME     1 each    Equipment being ordered: Tamraches () Treatment Diagnosis: Impaired gait       TOPROL XL PO      Take 50 mg by mouth daily       triamcinolone 0.1 % cream    KENALOG     Apply to affected area as needed

## 2017-06-13 ENCOUNTER — RADIANT APPOINTMENT (OUTPATIENT)
Dept: GENERAL RADIOLOGY | Facility: CLINIC | Age: 67
End: 2017-06-13
Attending: PODIATRIST
Payer: COMMERCIAL

## 2017-06-13 PROCEDURE — 73630 X-RAY EXAM OF FOOT: CPT | Mod: LT

## 2017-06-23 ENCOUNTER — OFFICE VISIT (OUTPATIENT)
Dept: OTHER | Facility: CLINIC | Age: 67
End: 2017-06-23
Attending: SURGERY
Payer: MEDICARE

## 2017-06-23 VITALS — DIASTOLIC BLOOD PRESSURE: 103 MMHG | SYSTOLIC BLOOD PRESSURE: 207 MMHG | HEART RATE: 66 BPM

## 2017-06-23 DIAGNOSIS — L08.9 SOFT TISSUE INFECTION OF FOOT: Primary | ICD-10-CM

## 2017-06-23 DIAGNOSIS — T14.8XXD WOUND HEALING, DELAYED: Primary | ICD-10-CM

## 2017-06-23 PROCEDURE — 99211 OFF/OP EST MAY X REQ PHY/QHP: CPT

## 2017-06-23 PROCEDURE — 99212 OFFICE O/P EST SF 10 MIN: CPT | Mod: ZP | Performed by: SURGERY

## 2017-06-23 RX ORDER — CEPHALEXIN 500 MG/1
500 CAPSULE ORAL 3 TIMES DAILY
Qty: 30 CAPSULE | Refills: 0 | Status: SHIPPED | OUTPATIENT
Start: 2017-06-23 | End: 2018-05-04

## 2017-06-23 NOTE — PROGRESS NOTES
Mr. Hopkins returns to clinic today at the has completed his hyperbaric oxygen treatment.  I had discussed this with his hyperbaric treatment physician this week.  There is an excellent healing of all wounds in his left foot.  Today he has biphasic signal in dorsalis pedis and posterior tibial.  He is going to [place] for a month.  I will see him back after he comes back.

## 2017-06-23 NOTE — NURSING NOTE
"Chief Complaint   Patient presents with     RECHECK     f/u from 5/5 appt. Hx of  balloon angioplasty and stenting of the superficial femoral and popliteal arteries on the left side.       Initial BP (!) 207/103 (BP Location: Right arm, Patient Position: Chair, Cuff Size: Adult Regular)  Pulse 66 Estimated body mass index is 25.18 kg/(m^2) as calculated from the following:    Height as of 4/17/17: 5' 6\" (1.676 m).    Weight as of 4/17/17: 156 lb (70.8 kg).  Medication Reconciliation: complete     Face to face nursing time: 8 minutes    Kyleigh Santoyo MA     "

## 2017-06-23 NOTE — MR AVS SNAPSHOT
"              After Visit Summary   2017    Zia Hopkins    MRN: [id number]           Patient Information     Date Of Birth          1950        Visit Information        Provider Department      2017 9:30 AM Surjit Hamlin MD [facility]      Today's Diagnoses     Soft tissue infection of foot    -  1       Follow-ups after your visit        Who to contact     If you have questions or need follow up information about today's clinic visit or your schedule please contact [facility] directly at [phone].  Normal or non-critical lab and imaging results will be communicated to you by MyChart, letter or phone within 4 business days after the clinic has received the results. If you do not hear from us within 7 days, please contact the clinic through Dimehart or phone. If you have a critical or abnormal lab result, we will notify you by phone as soon as possible.  Submit refill requests through [employer] or call your pharmacy and they will forward the refill request to us. Please allow 3 business days for your refill to be completed.          Additional Information About Your Visit        MyChart Information     [employer] lets you send messages to your doctor, view your test results, renew your prescriptions, schedule appointments and more. To sign up, go to [email or web address] . Click on \"Log in\" on the left side of the screen, which will take you to the Welcome page. Then click on \"Sign up Now\" on the right side of the page.     You will be asked to enter the access code listed below, as well as some personal information. Please follow the directions to create your username and password.     Your access code is: [email or web address]  Expires: 9/10/2017  4:08 PM     Your access code will  in 90 days. If you need help or a new code, please call your [place] clinic or [phone].        Care EveryWhere ID "     This is your Care EveryWhere ID. This could be used by other organizations to access your [place] medical records  [id number]        Your Vitals Were     Pulse                   66            Blood Pressure from Last 3 Encounters:   06/23/17 (!) 207/103   05/05/17 (!) 182/99   04/17/17 (!) 218/84    Weight from Last 3 Encounters:   04/17/17 156 lb (70.8 kg)   04/03/17 156 lb (70.8 kg)   03/28/17 156 lb 12 oz (71.1 kg)              Today, you had the following     No orders found for display         Today's Medication Changes          These changes are accurate as of: 6/23/17 10:17 AM.  If you have any questions, ask your nurse or doctor.               Start taking these medicines.        Dose/Directions    Cadexomer Iodine (topical) 0.9% 0.9 % Gel gel   Commonly known as:  IODOSORB   Used for:  Wound healing, delayed   Started by:  Surjit Hamlin MD        Dose:  40 g   Apply 40 g topically daily as needed for other   Quantity:  1 Tube   Refills:  1         These medicines have changed or have updated prescriptions.        Dose/Directions    * cephALEXin 500 MG capsule   Commonly known as:  KEFLEX   This may have changed:  Another medication with the same name was added. Make sure you understand how and when to take each.   Used for:  PAD (peripheral artery disease) (H)   Changed by:  Surjit Hamlin MD        Dose:  500 mg   Take 1 capsule (500 mg) by mouth 4 times daily   Quantity:  30 capsule   Refills:  0       * cephALEXin 500 MG capsule   Commonly known as:  KEFLEX   This may have changed:  You were already taking a medication with the same name, and this prescription was added. Make sure you understand how and when to take each.   Used for:  Soft tissue infection of foot   Changed by:  Surjit Hamlin MD        Dose:  500 mg   Take 1 capsule (500 mg) by mouth 3 times daily   Quantity:  30 capsule   Refills:  0       * Notice:  This list has 2 medication(s) that are the same as  other medications prescribed for you. Read the directions carefully, and ask your doctor or other care provider to review them with you.         Where to get your medicines      These medications were sent to [id number] - [place], [place] - [address] AT [facility] [address]  [address] [address]     Phone:  [phone]     Cadexomer Iodine (topical) 0.9% 0.9 % Gel gel    cephALEXin 500 MG capsule                Primary Care Provider Office Phone # Fax #    Reid Reese [phone]       [facility] [address]    [place] [address]        Equal Access to Services     LISA PADILLA : Hadii irma kuhn hadasho Soverenaali, waaxda luqadaha, qaybta kaalmada adeegyada, cj mohan . So [facility] [phone].    ATENCIÓN: Si habla español, tiene a davidson disposición servicios gratuitos de asistencia lingüística. [facility] [phone].    We comply with applicable federal civil rights laws and [place] laws. We do not discriminate on the basis of race, color, national origin, age, disability sex, sexual orientation or gender identity.            Thank you!     Thank you for choosing [facility] [place]  for your care. Our goal is always to provide you with excellent care. Hearing back from our patients is one way we can continue to improve our services. Please take a few minutes to complete the written survey that you may receive in the mail after your visit with us. Thank you!             Your Updated Medication List - Protect others around you: Learn how to safely use, store and throw away your medicines at www.disposemymeds.org.          This list is accurate as of: 6/23/17 10:17 AM.  Always use your most recent med list.                   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)       Cadexomer Iodine (topical) 0.9% 0.9 % Gel gel    IODOSORB    1 Tube    Apply 40 g topically daily as needed for other    Wound healing, delayed       * cephALEXin 500 MG capsule    KEFLEX    30 capsule    Take 1 capsule (500 mg) by mouth 4 times daily    PAD (peripheral artery disease) (H)       * cephALEXin 500 MG capsule    KEFLEX    30 capsule    Take 1 capsule (500 mg) by mouth 3 times daily    Soft tissue infection of foot       clindamycin 150 MG capsule    CLEOCIN    30 capsule    Take 1 capsule (150 mg) by mouth 3 times daily    Foot infection       clopidogrel 75 MG tablet    PLAVIX    90 tablet    Take 1 tablet (75 mg) by mouth daily Start taking medication the day after the procedure    Atherosclerosis of native arteries of left leg with ulceration of other part of foot (H)       ezetimibe 10 MG tablet    ZETIA    90 tablet    Take 1 tablet (10 mg) by mouth daily    Hyperlipidemia LDL goal <70       LIPITOR PO      Take 40 mg by mouth At Bedtime        losartan-hydrochlorothiazide 50-12.5 MG per tablet    HYZAAR     Take 1 tablet by mouth daily        metFORMIN 500 MG 24 hr tablet    GLUCOPHAGE-XR     Take 500 mg by mouth daily (with breakfast)        order for DME     1 each    Equipment being ordered: Crutches () Treatment Diagnosis: Impaired gait    Gangrene of toe (H)       TOPROL XL PO      Take 100 mg by mouth daily        triamcinolone 0.1 % cream    KENALOG     Apply to affected area as needed        * Notice:  This list has 2 medication(s) that are the same as other medications prescribed for you. Read the directions carefully, and ask your doctor or other care provider to review them with you.

## 2017-10-20 ENCOUNTER — HOSPITAL ENCOUNTER (OUTPATIENT)
Dept: ULTRASOUND IMAGING | Facility: CLINIC | Age: 67
Discharge: HOME | End: 2017-10-20
Attending: PHYSICIAN ASSISTANT | Admitting: PHYSICIAN ASSISTANT
Payer: MEDICARE

## 2017-10-20 ENCOUNTER — OFFICE VISIT (OUTPATIENT)
Dept: OTHER | Facility: CLINIC | Age: 67
End: 2017-10-20
Attending: SURGERY
Payer: MEDICARE

## 2017-10-20 VITALS — DIASTOLIC BLOOD PRESSURE: 116 MMHG | SYSTOLIC BLOOD PRESSURE: 219 MMHG | HEART RATE: 79 BPM

## 2017-10-20 DIAGNOSIS — I73.9 PAD (PERIPHERAL ARTERY DISEASE) (H): Primary | ICD-10-CM

## 2017-10-20 DIAGNOSIS — I73.9 PAD (PERIPHERAL ARTERY DISEASE) (H): ICD-10-CM

## 2017-10-20 PROCEDURE — 99211 OFF/OP EST MAY X REQ PHY/QHP: CPT

## 2017-10-20 PROCEDURE — 93922 UPR/L XTREMITY ART 2 LEVELS: CPT

## 2017-10-20 PROCEDURE — 99212 OFFICE O/P EST SF 10 MIN: CPT | Mod: ZP | Performed by: SURGERY

## 2017-10-20 RX ORDER — CLOPIDOGREL BISULFATE 75 MG/1
75 TABLET ORAL DAILY
Qty: 90 TABLET | Refills: 3 | Status: SHIPPED | OUTPATIENT
Start: 2017-10-20 | End: 2019-01-07

## 2017-10-20 NOTE — PROGRESS NOTES
Mr. Hopkins is doing quite well and his left foot wounds have healed and he does not have any pain. Left ABIs are 0.58, so unchanged. I will see him back in 6 months with ABIs of both feet as part of his surveillance. We will also duplex the left SFA stent in 6 months.

## 2017-10-20 NOTE — LETTER
2017    Re: Zia Hopkins - 1950    Mr. Hopkins is doing quite well and his left foot wounds have healed and he does not have any pain. Left ABIs are 0.58, so unchanged. I will see him back in 6 months with ABIs of both feet as part of his surveillance. We will also duplex the left SFA stent in 6 months.    IAN YATES MD

## 2017-10-20 NOTE — MR AVS SNAPSHOT
"              After Visit Summary   10/20/2017    Zia Hopkins    MRN: [id number]           Patient Information     Date Of Birth          1950        Visit Information        Provider Department      10/20/2017 9:30 AM Surjit Hamlin MD [facility] [place] [facility] [place] [facility]      Today's Diagnoses     PAD (peripheral artery disease) (H)    -  1       Follow-ups after your visit        Who to contact     If you have questions or need follow up information about today's clinic visit or your schedule please contact [facility] directly at [phone].  Normal or non-critical lab and imaging results will be communicated to you by [email or web address], letter or phone within 4 business days after the clinic has received the results. If you do not hear from us within 7 days, please contact the clinic through [email or web address] or phone. If you have a critical or abnormal lab result, we will notify you by phone as soon as possible.  Submit refill requests through [employer] or call your pharmacy and they will forward the refill request to us. Please allow 3 business days for your refill to be completed.          Additional Information About Your Visit        MyChart Information     [employer] lets you send messages to your doctor, view your test results, renew your prescriptions, schedule appointments and more. To sign up, go to [email or web address] . Click on \"Log in\" on the left side of the screen, which will take you to the Welcome page. Then click on \"Sign up Now\" on the right side of the page.     You will be asked to enter the access code listed below, as well as some personal information. Please follow the directions to create your username and password.     Your access code is: [email or web address]  Expires: 2018 12:42 PM     Your access code will  in 90 days. If you need help or a new code, please call your [place] clinic or [phone].        Care " EveryWhere ID     This is your Care EveryWhere ID. This could be used by other organizations to access your [place] medical records  [id number]        Your Vitals Were     Pulse                   79            Blood Pressure from Last 3 Encounters:   10/20/17 (!) 219/116   06/23/17 (!) 207/103   05/05/17 (!) 182/99    Weight from Last 3 Encounters:   04/17/17 156 lb (70.8 kg)   04/03/17 156 lb (70.8 kg)   03/28/17 156 lb 12 oz (71.1 kg)              Today, you had the following     No orders found for display         Today's Medication Changes          These changes are accurate as of: 10/20/17 12:42 PM.  If you have any questions, ask your nurse or doctor.               These medicines have changed or have updated prescriptions.        Dose/Directions    * clopidogrel 75 MG tablet   Commonly known as:  PLAVIX   This may have changed:  Another medication with the same name was added. Make sure you understand how and when to take each.   Used for:  Atherosclerosis of native arteries of left leg with ulceration of other part of foot (H)        Dose:  75 mg   Take 1 tablet (75 mg) by mouth daily Start taking medication the day after the procedure   Quantity:  90 tablet   Refills:  1       * clopidogrel 75 MG tablet   Commonly known as:  PLAVIX   This may have changed:  You were already taking a medication with the same name, and this prescription was added. Make sure you understand how and when to take each.   Used for:  PAD (peripheral artery disease) (H)   Changed by:  Surjit Hamlin MD        Dose:  75 mg   Take 1 tablet (75 mg) by mouth daily   Quantity:  90 tablet   Refills:  3       * Notice:  This list has 2 medication(s) that are the same as other medications prescribed for you. Read the directions carefully, and ask your doctor or other care provider to review them with you.         Where to get your medicines      [facility] [address] [place] [address] [address] AT [facility] [address]  [address] [place] [address]     Phone:  [phone]     clopidogrel 75 MG tablet                Primary Care Provider Office Phone # Fax #    Reid Reese [phone] [address]  [place] [address]        Equal Access to Services     [facility] : Hadii aad ku hadasho Soomaali, waaxda luqadaha, qaybta kaalmada adeegyada, waxay idiin hayaan adeeg kharash la'aan ah. So [facility] [phone].    ATENCIÓN: Si habla español, tiene a davidson disposición servicios gratuitos de asistencia lingüística. [facility] [phone].    We comply with applicable federal civil rights laws and [place] laws. We do not discriminate on the basis of race, color, national origin, age, disability, sex, sexual orientation, or gender identity.            Thank you!     Thank you for choosing [facility] [place]  for your care. Our goal is always to provide you with excellent care. Hearing back from our patients is one way we can continue to improve our services. Please take a few minutes to complete the written survey that you may receive in the mail after your visit with us. Thank you!             Your Updated Medication List - Protect others around you: Learn how to safely use, store and throw away your medicines at www.disposemymeds.org.          This list is accurate as of: 10/20/17 12:42 PM.  Always use your most recent med list.                   Brand Name Dispense Instructions for use Diagnosis    aspirin 81 MG tablet     90 tablet    Take 1 tablet (81 mg) by mouth daily    Atherosclerosis of native arteries of left leg with ulceration of other part of foot (H)       Cadexomer Iodine (topical) 0.9% 0.9 % Gel gel    IODOSORB    1 Tube    Apply 40 g topically daily as needed for other    Wound healing, delayed       * cephALEXin 500 MG capsule    KEFLEX    30 capsule    Take 1 capsule (500 mg) by mouth 4 times daily    PAD (peripheral artery  disease) (H)       * cephALEXin 500 MG capsule    KEFLEX    30 capsule    Take 1 capsule (500 mg) by mouth 3 times daily    Soft tissue infection of foot       clindamycin 150 MG capsule    CLEOCIN    30 capsule    Take 1 capsule (150 mg) by mouth 3 times daily    Foot infection       * clopidogrel 75 MG tablet    PLAVIX    90 tablet    Take 1 tablet (75 mg) by mouth daily Start taking medication the day after the procedure    Atherosclerosis of native arteries of left leg with ulceration of other part of foot (H)       * clopidogrel 75 MG tablet    PLAVIX    90 tablet    Take 1 tablet (75 mg) by mouth daily    PAD (peripheral artery disease) (H)       ezetimibe 10 MG tablet    ZETIA    90 tablet    Take 1 tablet (10 mg) by mouth daily    Hyperlipidemia LDL goal <70       LIPITOR PO      Take 40 mg by mouth At Bedtime        losartan-hydrochlorothiazide 50-12.5 MG per tablet    HYZAAR     Take 1 tablet by mouth daily        metFORMIN 500 MG 24 hr tablet    GLUCOPHAGE-XR     Take 500 mg by mouth daily (with breakfast)        order for DME     1 each    Equipment being ordered: Crutches () Treatment Diagnosis: Impaired gait    Gangrene of toe (H)       TOPROL XL PO      Take 100 mg by mouth daily        triamcinolone 0.1 % cream    KENALOG     Apply to affected area as needed        * Notice:  This list has 4 medication(s) that are the same as other medications prescribed for you. Read the directions carefully, and ask your doctor or other care provider to review them with you.

## 2017-10-20 NOTE — NURSING NOTE
"Chief Complaint   Patient presents with     RECHECK     6 month F/U. (9:00 [facility], 9:30 KMK) History of left superficial femoral artery stent placement 3/8/17.       Initial BP (!) 219/116 (BP Location: Right arm, Patient Position: Chair, Cuff Size: Adult Regular)  Pulse 79 Estimated body mass index is 25.18 kg/(m^2) as calculated from the following:    Height as of 4/17/17: 5' 6\" (1.676 m).    Weight as of 4/17/17: 156 lb (70.8 kg).  Medication Reconciliation: complete     Face to face nursing time: 8 minutes    Kyleigh Santoyo MA     "

## 2017-11-01 DIAGNOSIS — I73.9 PAD (PERIPHERAL ARTERY DISEASE) (H): Primary | ICD-10-CM

## 2018-05-04 ENCOUNTER — HOSPITAL ENCOUNTER (OUTPATIENT)
Dept: ULTRASOUND IMAGING | Facility: CLINIC | Age: 68
End: 2018-05-04
Attending: SURGERY
Payer: MEDICARE

## 2018-05-04 ENCOUNTER — HOSPITAL ENCOUNTER (OUTPATIENT)
Dept: ULTRASOUND IMAGING | Facility: CLINIC | Age: 68
Discharge: HOME | End: 2018-05-04
Attending: SURGERY | Admitting: SURGERY
Payer: MEDICARE

## 2018-05-04 ENCOUNTER — OFFICE VISIT (OUTPATIENT)
Dept: OTHER | Facility: CLINIC | Age: 68
End: 2018-05-04
Attending: SURGERY
Payer: MEDICARE

## 2018-05-04 VITALS — SYSTOLIC BLOOD PRESSURE: 190 MMHG | HEART RATE: 80 BPM | DIASTOLIC BLOOD PRESSURE: 90 MMHG

## 2018-05-04 DIAGNOSIS — I73.9 PAD (PERIPHERAL ARTERY DISEASE) (H): Primary | ICD-10-CM

## 2018-05-04 DIAGNOSIS — I73.9 PAD (PERIPHERAL ARTERY DISEASE) (H): ICD-10-CM

## 2018-05-04 PROCEDURE — 99213 OFFICE O/P EST LOW 20 MIN: CPT | Mod: ZP | Performed by: SURGERY

## 2018-05-04 PROCEDURE — G0463 HOSPITAL OUTPT CLINIC VISIT: HCPCS

## 2018-05-04 PROCEDURE — 93922 UPR/L XTREMITY ART 2 LEVELS: CPT

## 2018-05-04 PROCEDURE — 93926 LOWER EXTREMITY STUDY: CPT | Mod: LT

## 2018-05-04 NOTE — MR AVS SNAPSHOT
"              After Visit Summary   5/4/2018    Zia Hopkins    MRN: [id number]           Patient Information     Date Of Birth          1950        Visit Information        Provider Department      5/4/2018 9:15 AM Surjit Hamlin MD [facility] [place] [facility]  [facility] Center      Today's Diagnoses     PAD (peripheral artery disease) (H)    -  1      Care Instructions    Patient to follow up with Primary Care provider regarding elevated blood pressure.  Stephany Cavazos MA          Follow-ups after your visit        Future tests that were ordered for you today     Open Future Orders        Priority Expected Expires Ordered    US DAVID Doppler No Exercise Routine 4/1/2018 11/1/2018 11/1/2017            Who to contact     If you have questions or need follow up information about today's clinic visit or your schedule please contact [facility] [place] [facility] directly at [phone].  Normal or non-critical lab and imaging results will be communicated to you by MyChart, letter or phone within 4 business days after the clinic has received the results. If you do not hear from us within 7 days, please contact the clinic through MyChart or phone. If you have a critical or abnormal lab result, we will notify you by phone as soon as possible.  Submit refill requests through [employer] or call your pharmacy and they will forward the refill request to us. Please allow 3 business days for your refill to be completed.          Additional Information About Your Visit        MyChart Information     [employer] lets you send messages to your doctor, view your test results, renew your prescriptions, schedule appointments and more. To sign up, go to [email or web address] . Click on \"Log in\" on the left side of the screen, which will take you to the Welcome page. Then click on \"Sign up Now\" on the right side of the page.     You will be asked to enter the access code listed below, as " well as some personal information. Please follow the directions to create your username and password.     Your access code is: [email or web address]  Expires: 2018 11:24 AM     Your access code will  in 90 days. If you need help or a new code, please call your [place] clinic or [phone].        Care EveryWhere ID     This is your Care EveryWhere ID. This could be used by other organizations to access your [place] medical records  [id number]        Your Vitals Were     Pulse                   80            Blood Pressure from Last 3 Encounters:   18 190/90   10/20/17 (!) 219/116   17 (!) 207/103    Weight from Last 3 Encounters:   17 156 lb (70.8 kg)   17 156 lb (70.8 kg)   17 156 lb 12 oz (71.1 kg)              Today, you had the following     No orders found for display       Primary Care Provider Office Phone # Fax #    Reid Reese [phone] [address] [address]        Equal Access to Services     [facility]: Hadii aad ku hadasho Soomaali, waaxda luqadaha, qaybta kaalmada adedanielayada, cj mohan . So [facility] [phone].    ATENCIÓN: Si habla español, tiene a davidson disposición servicios gratuitos de asistencia lingüística. Llame al [phone].    We comply with applicable federal civil rights laws and [place] laws. We do not discriminate on the basis of race, color, national origin, age, disability, sex, sexual orientation, or gender identity.            Thank you!     Thank you for choosing [facility] [place]  for your care. Our goal is always to provide you with excellent care. Hearing back from our patients is one way we can continue to improve our services. Please take a few minutes to complete the written survey that you may receive in the mail after your visit with us. Thank you!             Your Updated Medication List - Protect others around you: Learn how to safely use, store  and throw away your medicines at www.disposemymeds.org.          This list is accurate as of 5/4/18 11:24 AM.  Always use your most recent med list.                   Brand Name Dispense Instructions for use Diagnosis    aspirin 81 MG tablet     90 tablet    Take 1 tablet (81 mg) by mouth daily    Atherosclerosis of native arteries of left leg with ulceration of other part of foot (H)       Cadexomer Iodine (topical) 0.9% 0.9 % Gel gel    IODOSORB    1 Tube    Apply 40 g topically daily as needed for other    Wound healing, delayed       clopidogrel 75 MG tablet    PLAVIX    90 tablet    Take 1 tablet (75 mg) by mouth daily    PAD (peripheral artery disease) (H)       ezetimibe 10 MG tablet    ZETIA    90 tablet    Take 1 tablet (10 mg) by mouth daily    Hyperlipidemia LDL goal <70       LIPITOR PO      Take 40 mg by mouth At Bedtime        losartan-hydrochlorothiazide 50-12.5 MG per tablet    HYZAAR     Take 1 tablet by mouth daily        metFORMIN 500 MG 24 hr tablet    GLUCOPHAGE-XR     Take 500 mg by mouth daily (with breakfast)        TOPROL XL PO      Take 100 mg by mouth daily        triamcinolone 0.1 % cream    KENALOG     Apply to affected area as needed

## 2018-05-04 NOTE — PATIENT INSTRUCTIONS
Patient to follow up with Primary Care provider regarding elevated blood pressure.  Stephany Cavazos MA

## 2018-05-04 NOTE — PROGRESS NOTES
Mr. Hopkins is a 68-year-old male who was undergone left superficial femoral artery and popliteal artery intervention for nonhealing wounds.  Acidophilus significant amount of tibial disease.  And he is able to walk without any problems.    I personally reviewed all the imaging myself.  His ankle-brachial indices remain stable and there is patent.  Left mid superficial femoral stent.  We will see him back in 6 months with repeat arterial duplex of his left femoral artery stent and ankle-brachial indices.     It should be noted that his blood pressure was quite elevated.  When he was in the clinic and we repeated his blood pressure prior to him leaving and it was somewhat less at he tells me that he recently been also started on amlodipine for his blood pressure.  He is now on 3 drugs.  I went back and looked at his aortogram and there is no evidence of renal artery stenosis to attribute to the high blood pressure. Basically, this is essential hypertension.  Asked him to check her blood pressure again at home when he is more relaxed.  He also knows that his blood pressure goes up every time he has a visit with the physician.  I did my best to make him feel relaxed and sooth his anxiety regarding his peripheral arterial disease.

## 2018-05-04 NOTE — LETTER
[facility] [address]  [place] [address]  Phone: [phone]  Fax: [phone]    May 4, 2018    Re: Zia Hopkins, : 1950    Mr. Hopkins is a 68-year-old male who was undergone left superficial femoral artery and popliteal artery intervention for nonhealing wounds.  Acidophilus significant amount of tibial disease.  And he is able to walk without any problems.     I personally reviewed all the imaging myself.  His ankle-brachial indices remain stable and there is patent.  Left mid superficial femoral stent.  We will see him back in 6 months with repeat arterial duplex of his left femoral artery stent and ankle-brachial indices.      It should be noted that his blood pressure was quite elevated.  When he was in the clinic and we repeated his blood pressure prior to him leaving and it was somewhat less at he tells me that he recently been also started on amlodipine for his blood pressure.  He is now on 3 drugs. I went back and looked at his aortogram and there is no evidence of renal artery stenosis to attribute to the high blood pressure. Basically, this is essential hypertension.  Asked him to check her blood pressure again at home when he is more relaxed.  He also knows that his blood pressure goes up every time he has a visit with the physician.  I did my best to make him feel relaxed and sooth his anxiety regarding his peripheral arterial disease.    Surjit Hamlin MD

## 2018-05-07 DIAGNOSIS — I73.9 PAD (PERIPHERAL ARTERY DISEASE) (H): Primary | ICD-10-CM

## 2018-05-07 NOTE — NURSING NOTE
"Zia Hopkins is a 68 year old male who presents for:  Chief Complaint   Patient presents with     RECHECK     L SFA & DAVID w/ ex (8:00 [facility]; 9:15 [facility]) History of Peripheral artery disease, left superficial femoral artery stent         Initial Vitals:  /90 (BP Location: Right arm, Patient Position: Chair, Cuff Size: Adult Regular)  Pulse 80 Estimated body mass index is 25.18 kg/(m^2) as calculated from the following:    Height as of 4/17/17: 5' 6\" (1.676 m).    Weight as of 4/17/17: 156 lb (70.8 kg).         Kristel Parkinson CMA       "

## 2018-12-04 DIAGNOSIS — I73.9 PAD (PERIPHERAL ARTERY DISEASE) (H): ICD-10-CM

## 2018-12-04 NOTE — TELEPHONE ENCOUNTER
Routing to Dr. Hamlin for approval as pt refill request does not meet refill protocol.     Alanna Fam, JOHNN, RN

## 2018-12-11 RX ORDER — CLOPIDOGREL BISULFATE 75 MG/1
75 TABLET ORAL DAILY
Qty: 90 TABLET | Refills: 3 | OUTPATIENT
Start: 2018-12-11

## 2019-01-07 ENCOUNTER — HOSPITAL ENCOUNTER (OUTPATIENT)
Dept: ULTRASOUND IMAGING | Facility: CLINIC | Age: 69
Discharge: HOME | End: 2019-01-07
Attending: SURGERY | Admitting: SURGERY
Payer: COMMERCIAL

## 2019-01-07 ENCOUNTER — OFFICE VISIT (OUTPATIENT)
Dept: OTHER | Facility: CLINIC | Age: 69
End: 2019-01-07
Attending: SURGERY
Payer: COMMERCIAL

## 2019-01-07 ENCOUNTER — HOSPITAL ENCOUNTER (OUTPATIENT)
Dept: ULTRASOUND IMAGING | Facility: CLINIC | Age: 69
End: 2019-01-07
Attending: SURGERY
Payer: COMMERCIAL

## 2019-01-07 VITALS — DIASTOLIC BLOOD PRESSURE: 102 MMHG | SYSTOLIC BLOOD PRESSURE: 198 MMHG | HEART RATE: 98 BPM

## 2019-01-07 DIAGNOSIS — I73.9 PAD (PERIPHERAL ARTERY DISEASE) (H): ICD-10-CM

## 2019-01-07 DIAGNOSIS — I73.9 PAD (PERIPHERAL ARTERY DISEASE) (H): Primary | ICD-10-CM

## 2019-01-07 PROCEDURE — 93926 LOWER EXTREMITY STUDY: CPT | Mod: LT

## 2019-01-07 PROCEDURE — G0463 HOSPITAL OUTPT CLINIC VISIT: HCPCS

## 2019-01-07 PROCEDURE — 93922 UPR/L XTREMITY ART 2 LEVELS: CPT

## 2019-01-07 PROCEDURE — 99213 OFFICE O/P EST LOW 20 MIN: CPT | Mod: ZP | Performed by: SURGERY

## 2019-01-07 RX ORDER — CLOPIDOGREL BISULFATE 75 MG/1
75 TABLET ORAL DAILY
Qty: 90 TABLET | Refills: 3 | Status: SHIPPED | OUTPATIENT
Start: 2019-01-07 | End: 2020-01-14

## 2019-01-07 NOTE — LETTER
[facility] [address]  [place] [address]  Phone: [phone]  Fax: [phone]      2019       Re: Zia Hopkins - 1950     Mr. Hopkins is a very pleasant 68-year-old gentleman who has been under my care since 2017.  At that time he presented with nonhealing wounds in his left foot.  He underwent balloon angioplasty of the left popliteal and superficial femoral artery followed by placement of a self-expanding stent.     He has no complaints.  He does not have any claudication or recurrence of ulcers.     He happily recalls a recent wedding of his son.  He is also looking forward to a trip back to his home in [place] in 2019.     I reviewed the noninvasive study with him in detail.  His ABIs as well as the duplex sonography of his left lower extremity are stable.     I renewed his Plavix.     We will see him back in 6 months with arterial duplex of the left lower extremity and bilateral resting ankle-brachial indices.  All questions answered.      IAN YATES MD

## 2019-01-07 NOTE — PROGRESS NOTES
Mr. Hopkins is a very pleasant 68-year-old gentleman who has been under my care since March 2017.  At that time he presented with nonhealing wounds in his left foot.  He underwent balloon angioplasty of the left popliteal and superficial femoral artery followed by placement of a self-expanding stent.    He has no complaints.  He does not have any claudication or recurrence of ulcers.    He happily recalls a recent wedding of his son.  He is also looking forward to a trip back to his home in [place] in February 2019.    I reviewed the noninvasive study with him in detail.  His ABIs as well as the duplex sonography of his left lower extremity are stable.    I renewed his Plavix.    We will see him back in 6 months with arterial duplex of the left lower extremity and bilateral resting ankle-brachial indices.  All questions answered.

## 2019-01-08 DIAGNOSIS — I73.9 PAD (PERIPHERAL ARTERY DISEASE) (H): Primary | ICD-10-CM

## 2019-07-15 ENCOUNTER — HOSPITAL ENCOUNTER (OUTPATIENT)
Dept: ULTRASOUND IMAGING | Facility: CLINIC | Age: 69
Discharge: HOME | End: 2019-07-15
Attending: SURGERY | Admitting: SURGERY
Payer: COMMERCIAL

## 2019-07-15 ENCOUNTER — HOSPITAL ENCOUNTER (OUTPATIENT)
Dept: ULTRASOUND IMAGING | Facility: CLINIC | Age: 69
End: 2019-07-15
Attending: SURGERY
Payer: COMMERCIAL

## 2019-07-15 ENCOUNTER — OFFICE VISIT (OUTPATIENT)
Dept: OTHER | Facility: CLINIC | Age: 69
End: 2019-07-15
Attending: SURGERY
Payer: COMMERCIAL

## 2019-07-15 VITALS — HEART RATE: 63 BPM | OXYGEN SATURATION: 97 % | DIASTOLIC BLOOD PRESSURE: 76 MMHG | SYSTOLIC BLOOD PRESSURE: 180 MMHG

## 2019-07-15 DIAGNOSIS — I73.9 PAD (PERIPHERAL ARTERY DISEASE) (H): ICD-10-CM

## 2019-07-15 DIAGNOSIS — I73.9 PAD (PERIPHERAL ARTERY DISEASE) (H): Primary | ICD-10-CM

## 2019-07-15 PROBLEM — E11.9 DIABETES MELLITUS, TYPE 2 (H): Status: ACTIVE | Noted: 2019-07-15

## 2019-07-15 PROCEDURE — 99214 OFFICE O/P EST MOD 30 MIN: CPT | Mod: ZP | Performed by: SURGERY

## 2019-07-15 PROCEDURE — 93926 LOWER EXTREMITY STUDY: CPT | Mod: LT

## 2019-07-15 PROCEDURE — 93922 UPR/L XTREMITY ART 2 LEVELS: CPT

## 2019-07-15 PROCEDURE — G0463 HOSPITAL OUTPT CLINIC VISIT: HCPCS | Mod: 25

## 2019-07-15 RX ORDER — LOSARTAN POTASSIUM AND HYDROCHLOROTHIAZIDE 12.5; 5 MG/1; MG/1
1 TABLET ORAL DAILY
Qty: 90 TABLET | Refills: 3 | Status: ON HOLD | OUTPATIENT
Start: 2019-07-15 | End: 2019-07-30

## 2019-07-15 RX ORDER — METOPROLOL SUCCINATE 100 MG/1
100 TABLET, EXTENDED RELEASE ORAL DAILY
Qty: 30 TABLET | Refills: 11 | Status: ON HOLD | OUTPATIENT
Start: 2019-07-15 | End: 2019-07-30

## 2019-07-15 RX ORDER — METOPROLOL SUCCINATE 100 MG/1
100 TABLET, EXTENDED RELEASE ORAL DAILY
Qty: 90 TABLET | Refills: 3 | Status: ON HOLD | OUTPATIENT
Start: 2019-07-15 | End: 2024-07-21

## 2019-07-15 NOTE — PROGRESS NOTES
"Zia Hopkins is a 69 year old male who presents for:  Chief Complaint   Patient presents with     RECHECK     L fem-pop & DAVID (8:00 [facility]; 9:00 [facility]) Hx of Left Superficial femoral artery to popliteal stent; 6 month follow up to 1-7-19 appointment with Dr. Hamlin *phoebe        Vitals:    Vitals:    07/15/19 0840   BP: (!) 229/93   BP Location: Left arm   Patient Position: Chair   Cuff Size: Adult Regular   Pulse: 86       BMI:  Estimated body mass index is 25.18 kg/m  as calculated from the following:    Height as of 4/17/17: 5' 6\" (1.676 m).    Weight as of 4/17/17: 156 lb (70.8 kg).    Pain Score:  Data Unavailable        Jenifer Little    "

## 2019-07-15 NOTE — PROGRESS NOTES
Mr. Hopkins is a very pleasant 69-year-old male who is known well to myself.  He underwent left surgical femoral artery balloon angioplasty and stenting in March 2017.  He returns to clinic today for follow-up.  He tells me that he has no complaints pertaining to the left lower extremity.    When we took his initial blood pressure in the clinic it was 229 x 93 mmHg.  We checked it again and it was greater than 230 robin-meters of mercury systolic.  He reports that he is asymptomatic from that.  He denies any headache, chest pain, visual disturbance or shortness of breath.  He also tells me that he has been under a lot of stress due to various life situations.  He had been on Hyzaar  Which he took earlier today.  He has previously been on metoprolol 100 mg daily.  He tells me that that prescription was not renewed and he has not been on it for quite some time.  He also tells me that at home his blood pressure has been around the 160-170 systolic range.    I reordered his 100 mg metoprolol and sent it down to the pharmacy downstairs from our office.  He went and collected it and took it with a sip of water.  After that we kept him in our clinic and his blood pressure gradually came down to where it is now less than 180 mmHg systolic.    We are going to let him go and I have asked him to carefully monitor blood pressure.  Even at 160 mmHg, which he claims to be his baseline at home, it is unacceptably high.  We will check with him next week to see how his blood pressures have been running.  It is quite likely that we will be adding a third agent to his regimen.    Duplex sonography shows that there is in-stent stenosis in the proximal aspect of the left superficial femoral artery stent.  The particular area in question measured 3.1 mm 6 months ago and now measures 2.5 mm.  In regards to that I would recommend arteriogram with intravascular ultrasonography and intervention to prolong patency.    Total time spent: 50  minutes, > 50 % on face to face counseling.

## 2019-07-15 NOTE — NURSING NOTE
Vitals:    Vitals:    07/15/19 1000 07/15/19 1015 07/15/19 1044 07/15/19 1115   BP: (!) 212/84 200/89 193/85 180/76   BP Location: Left arm Left arm Left arm Left arm   Patient Position: Chair Chair Chair Chair   Cuff Size: Adult Regular Adult Regular Adult Regular Adult Regular   Pulse: 84 84 84 63   SpO2:    97%     Pts BP now 180/76, HR63, 97% RA    Reviewed with Dr. Hamlin, he spoke with pt and pt now able to go home. I will call pt tomorrow to check status of BP.  Pt notes understanding.     Alanna Fam, JOHNN, RN

## 2019-07-15 NOTE — LETTER
[facility] [address]  [place] [address]  Phone: [phone]  Fax: [phone]      July 15, 2019       Re: Zia Hopkins - 1950     Mr. Hopkins is a very pleasant 69-year-old male who is known well to myself.  He underwent left surgical femoral artery balloon angioplasty and stenting in 2017.  He returns to clinic today for follow-up.  He tells me that he has no complaints pertaining to the left lower extremity.     When we took his initial blood pressure in the clinic it was 229 x 93 mmHg.  We checked it again and it was greater than 230 robin-meters of mercury systolic.  He reports that he is asymptomatic from that.  He denies any headache, chest pain, visual disturbance or shortness of breath.  He also tells me that he has been under a lot of stress due to various life situations.  He had been on Hyzaar  Which he took earlier today.  He has previously been on metoprolol 100 mg daily.  He tells me that that prescription was not renewed and he has not been on it for quite some time.  He also tells me that at home his blood pressure has been around the 160-170 systolic range.     I reordered his 100 mg metoprolol and sent it down to the pharmacy downstairs from our office. He went and collected it and took it with a sip of water.  After that we kept him in our clinic and his blood pressure gradually came down to where it is now less than 180 mmHg systolic.     We are going to let him go and I have asked him to carefully monitor blood pressure.  Even at 160 mmHg, which he claims to be his baseline at home, it is unacceptably high.  We will check with him next week to see how his blood pressures have been running.  It is quite likely that we will be adding a third agent to his regimen.     Duplex sonography shows that there is in-stent stenosis in the proximal aspect of the left superficial femoral artery stent.  The particular area in question  measured 3.1 mm 6 months ago and now measures 2.5 mm.  In regards to that I would recommend arteriogram with intravascular ultrasonography and intervention to prolong patency.    IAN YATES MD

## 2019-07-15 NOTE — NURSING NOTE
Patient Education    Procedure: left lower extremity angiogram  Diagnosis: PAD  Anticoagulation Instruction: continue meds. No hold.  Pre-Operative Physical Exam: You need to have a pre-op physical exam within 30 days of your procedure. Your procedure may be cancelled if you do not have a current History and Physical. Call your PCP's office to schedule.  Allergies:  Updated in Epic  Bowel Prep: n/a  Post Procedure Education: [facility] post-procedure fact sheet reviewed with patient.    Learner(s):patient  Method: Listening  Barriers to Learning:No Barrier  Outcome: Patient did verbalize understanding of above education.    Alanna Fam, JOHNN, RN

## 2019-07-15 NOTE — NURSING NOTE
Obtaining frequent BP checks to monitor pts hypertension while in clinic.     Pt obtained MD ordered prescription for [facility]oprolol at [facility] pharmacy, took Metoprolol at 10:15a.m.   Pt in chair with legs elevated, BP coming down post Metoprolol intake.     Per Dr. Hamlin once BP at 180 or less and asymptomatic, pt may then go home.     Alanna Fam, JOHNN, RN

## 2019-07-16 ENCOUNTER — TELEPHONE (OUTPATIENT)
Dept: OTHER | Facility: CLINIC | Age: 69
End: 2019-07-16

## 2019-07-16 NOTE — TELEPHONE ENCOUNTER
Pt called to f/u and report on BP readings.   7/15/19:   259pm 183/87  338pm 173/85  5pm 172/78  6pm 167/84    Pt took Metoprolol and also started Cozaar this morning.     7/16/19:  944am 160/70  1224pm 163/79  144pm 159/78    Pt reports HR maintaining at 60-63 each check.     Routing to Dr. Hamlin to update him of this.     Alanna Fam, BSN, RN

## 2019-07-17 ENCOUNTER — TELEPHONE (OUTPATIENT)
Dept: OTHER | Facility: CLINIC | Age: 69
End: 2019-07-17

## 2019-07-17 NOTE — TELEPHONE ENCOUNTER
Type of surgery: LEFT LOWER EXTREMITY ARTERIOGRAM, IVUS, POSSIBLE INTERVENTION  Location of surgery: [facility]  Date and time of surgery: 073019 at 8am  Surgeon: DR IAN YATES  Pre-Op Appt Date: UNK  Post-Op Appt Date: UNK   Packet sent out: GIVEN 7/15/19  Pre-cert/Authorization completed:  SENT  Date: [id number] Maryellen Raymundo -  at [facility]

## 2019-07-18 NOTE — TELEPHONE ENCOUNTER
Discussed with Dr. Hamlin and he is aware of this. No further orders at this time.     Alanna Fam, BSN, RN

## 2019-07-19 ENCOUNTER — TELEPHONE (OUTPATIENT)
Dept: OTHER | Facility: CLINIC | Age: 69
End: 2019-07-19

## 2019-07-19 NOTE — TELEPHONE ENCOUNTER
Late charting    Pt called with BP readings:    7/17/19:  715am 158/70, HR 70  256pm 160/77 HR 62  402pm 162/83 HR61    7/18/19:  917am 163/79 HR 59  1120am 166/75 HR 64  1129am 163/79 HR 61    Pt denies HA/dizziness/lightheadedness or any other symptoms.     Pt reports his pre-op exam is on 7/23/19.     Routing to Dr. Hamlin as crispin.     Alanna Fam, JOHNN, RN

## 2019-07-22 ENCOUNTER — TRANSFERRED RECORDS (OUTPATIENT)
Dept: HEALTH INFORMATION MANAGEMENT | Facility: CLINIC | Age: 69
End: 2019-07-22

## 2019-07-22 LAB
CREAT SERPL-MCNC: 0.74 MG/DL (ref 0.76–1.27)
GFR SERPL CREATININE-BSD FRML MDRD: 93 ML/MIN/1.73M2
GLUCOSE SERPL-MCNC: 170 MG/DL (ref 65–99)
HBA1C MFR BLD: 6.7 % (ref 4.8–5.6)
POTASSIUM SERPL-SCNC: 4.9 MMOL/L (ref 3.5–5.2)

## 2019-07-29 ENCOUNTER — TRANSFERRED RECORDS (OUTPATIENT)
Dept: HEALTH INFORMATION MANAGEMENT | Facility: CLINIC | Age: 69
End: 2019-07-29

## 2019-07-30 ENCOUNTER — TELEPHONE (OUTPATIENT)
Dept: OTHER | Facility: CLINIC | Age: 69
End: 2019-07-30

## 2019-07-30 ENCOUNTER — APPOINTMENT (OUTPATIENT)
Dept: SURGERY | Facility: PHYSICIAN GROUP | Age: 69
End: 2019-07-30
Payer: COMMERCIAL

## 2019-07-30 ENCOUNTER — APPOINTMENT (OUTPATIENT)
Dept: INTERVENTIONAL RADIOLOGY/VASCULAR | Facility: CLINIC | Age: 69
End: 2019-07-30
Attending: SURGERY
Payer: COMMERCIAL

## 2019-07-30 ENCOUNTER — HOSPITAL ENCOUNTER (OUTPATIENT)
Facility: CLINIC | Age: 69
Discharge: HOME | End: 2019-07-30
Attending: SURGERY | Admitting: SURGERY
Payer: COMMERCIAL

## 2019-07-30 VITALS
HEIGHT: 66 IN | BODY MASS INDEX: 24.75 KG/M2 | OXYGEN SATURATION: 95 % | SYSTOLIC BLOOD PRESSURE: 133 MMHG | DIASTOLIC BLOOD PRESSURE: 63 MMHG | WEIGHT: 154 LBS | HEART RATE: 59 BPM | RESPIRATION RATE: 16 BRPM

## 2019-07-30 DIAGNOSIS — T82.856A STENOSIS OF PERIPHERAL VASCULAR STENT, INITIAL ENCOUNTER (H): ICD-10-CM

## 2019-07-30 DIAGNOSIS — E78.5 HYPERLIPIDEMIA LDL GOAL <70: ICD-10-CM

## 2019-07-30 DIAGNOSIS — I70.245 ATHEROSCLEROSIS OF NATIVE ARTERY OF LEFT LOWER EXTREMITY WITH ULCERATION OF OTHER PART OF FOOT (H): ICD-10-CM

## 2019-07-30 DIAGNOSIS — I70.245 ATHEROSCLEROSIS OF NATIVE ARTERY OF LEFT LOWER EXTREMITY WITH ULCERATION OF OTHER PART OF FOOT (H): Primary | ICD-10-CM

## 2019-07-30 DIAGNOSIS — I10 BENIGN ESSENTIAL HYPERTENSION: Primary | ICD-10-CM

## 2019-07-30 DIAGNOSIS — I73.9 PAD (PERIPHERAL ARTERY DISEASE) (H): ICD-10-CM

## 2019-07-30 LAB
APTT PPP: 28 SEC (ref 22–37)
CHOLEST SERPL-MCNC: 196 MG/DL
CREAT SERPL-MCNC: 0.8 MG/DL (ref 0.66–1.25)
ERYTHROCYTE [DISTWIDTH] IN BLOOD BY AUTOMATED COUNT: 12.5 % (ref 10–15)
GFR SERPL CREATININE-BSD FRML MDRD: 90 ML/MIN/{1.73_M2}
GLUCOSE BLDC GLUCOMTR-MCNC: 197 MG/DL (ref 70–99)
HBA1C MFR BLD: 6.9 % (ref 0–5.6)
HCT VFR BLD AUTO: 43.5 % (ref 40–53)
HDLC SERPL-MCNC: 57 MG/DL
HGB BLD-MCNC: 15 G/DL (ref 13.3–17.7)
INR PPP: 0.9 (ref 0.86–1.14)
KCT BLD-ACNC: 215 SEC (ref 75–150)
LDLC SERPL CALC-MCNC: 116 MG/DL
MCH RBC QN AUTO: 31.8 PG (ref 26.5–33)
MCHC RBC AUTO-ENTMCNC: 34.5 G/DL (ref 31.5–36.5)
MCV RBC AUTO: 92 FL (ref 78–100)
NONHDLC SERPL-MCNC: 139 MG/DL
PLATELET # BLD AUTO: 215 10E9/L (ref 150–450)
RBC # BLD AUTO: 4.71 10E12/L (ref 4.4–5.9)
TRIGL SERPL-MCNC: 113 MG/DL
WBC # BLD AUTO: 5.7 10E9/L (ref 4–11)

## 2019-07-30 PROCEDURE — 27210804 ZZH SHEATH CR3

## 2019-07-30 PROCEDURE — 85347 COAGULATION TIME ACTIVATED: CPT

## 2019-07-30 PROCEDURE — 27210742 ZZH CATH CR1

## 2019-07-30 PROCEDURE — 75710 ARTERY X-RAYS ARM/LEG: CPT | Mod: LT

## 2019-07-30 PROCEDURE — C1760 CLOSURE DEV, VASC: HCPCS

## 2019-07-30 PROCEDURE — 27210743 ZZH CATH CR11

## 2019-07-30 PROCEDURE — 36415 COLL VENOUS BLD VENIPUNCTURE: CPT | Performed by: SURGERY

## 2019-07-30 PROCEDURE — 27210808 ZZH SHEATH CR7

## 2019-07-30 PROCEDURE — 83036 HEMOGLOBIN GLYCOSYLATED A1C: CPT | Performed by: SURGERY

## 2019-07-30 PROCEDURE — 25000125 ZZHC RX 250: Performed by: SURGERY

## 2019-07-30 PROCEDURE — 37226 ZZHC REVASCULARIZE FEM/POP ARTERY, ANGIOPLASTY/STENT: CPT | Mod: LT | Performed by: SURGERY

## 2019-07-30 PROCEDURE — 85027 COMPLETE CBC AUTOMATED: CPT | Performed by: SURGERY

## 2019-07-30 PROCEDURE — C1769 GUIDE WIRE: HCPCS

## 2019-07-30 PROCEDURE — 75710 ARTERY X-RAYS ARM/LEG: CPT | Mod: 26 | Performed by: SURGERY

## 2019-07-30 PROCEDURE — 25500064 ZZH RX 255 OP 636: Performed by: SURGERY

## 2019-07-30 PROCEDURE — 27210886 ZZH ACCESSORY CR5

## 2019-07-30 PROCEDURE — 40000853 ZZH STATISTIC ANGIOGRAM, STENT, VERTEBRO PLASTY

## 2019-07-30 PROCEDURE — C1874 STENT, COATED/COV W/DEL SYS: HCPCS

## 2019-07-30 PROCEDURE — 82565 ASSAY OF CREATININE: CPT | Performed by: SURGERY

## 2019-07-30 PROCEDURE — 76937 US GUIDE VASCULAR ACCESS: CPT | Mod: 26 | Performed by: SURGERY

## 2019-07-30 PROCEDURE — 80061 LIPID PANEL: CPT | Performed by: SURGERY

## 2019-07-30 PROCEDURE — 85730 THROMBOPLASTIN TIME PARTIAL: CPT | Performed by: SURGERY

## 2019-07-30 PROCEDURE — 27210845 ZZH DEVICE INFLATION CR5

## 2019-07-30 PROCEDURE — 36415 COLL VENOUS BLD VENIPUNCTURE: CPT

## 2019-07-30 PROCEDURE — 99152 MOD SED SAME PHYS/QHP 5/>YRS: CPT | Performed by: SURGERY

## 2019-07-30 PROCEDURE — 82962 GLUCOSE BLOOD TEST: CPT

## 2019-07-30 PROCEDURE — 25800030 ZZH RX IP 258 OP 636: Performed by: SURGERY

## 2019-07-30 PROCEDURE — 85610 PROTHROMBIN TIME: CPT | Performed by: SURGERY

## 2019-07-30 PROCEDURE — 25000128 H RX IP 250 OP 636: Performed by: SURGERY

## 2019-07-30 PROCEDURE — 99214 OFFICE O/P EST MOD 30 MIN: CPT | Performed by: INTERNAL MEDICINE

## 2019-07-30 RX ORDER — NALOXONE HYDROCHLORIDE 0.4 MG/ML
.1-.4 INJECTION, SOLUTION INTRAMUSCULAR; INTRAVENOUS; SUBCUTANEOUS
Status: DISCONTINUED | OUTPATIENT
Start: 2019-07-30 | End: 2019-07-30 | Stop reason: HOSPADM

## 2019-07-30 RX ORDER — ATORVASTATIN CALCIUM 80 MG/1
80 TABLET, FILM COATED ORAL AT BEDTIME
Qty: 90 TABLET | Refills: 3 | Status: SHIPPED | OUTPATIENT
Start: 2019-07-30 | End: 2020-08-03

## 2019-07-30 RX ORDER — NICOTINE POLACRILEX 4 MG
15-30 LOZENGE BUCCAL
Status: DISCONTINUED | OUTPATIENT
Start: 2019-07-30 | End: 2019-07-30 | Stop reason: HOSPADM

## 2019-07-30 RX ORDER — FLUMAZENIL 0.1 MG/ML
0.2 INJECTION, SOLUTION INTRAVENOUS
Status: DISCONTINUED | OUTPATIENT
Start: 2019-07-30 | End: 2019-07-30 | Stop reason: HOSPADM

## 2019-07-30 RX ORDER — HEPARIN SODIUM 1000 [USP'U]/ML
5000 INJECTION, SOLUTION INTRAVENOUS; SUBCUTANEOUS ONCE
Status: COMPLETED | OUTPATIENT
Start: 2019-07-30 | End: 2019-07-30

## 2019-07-30 RX ORDER — FENTANYL CITRATE 50 UG/ML
INJECTION, SOLUTION INTRAMUSCULAR; INTRAVENOUS
Status: DISCONTINUED
Start: 2019-07-30 | End: 2019-07-30 | Stop reason: HOSPADM

## 2019-07-30 RX ORDER — PROTAMINE SULFATE 10 MG/ML
INJECTION, SOLUTION INTRAVENOUS
Status: DISCONTINUED
Start: 2019-07-30 | End: 2019-07-30 | Stop reason: HOSPADM

## 2019-07-30 RX ORDER — LIDOCAINE 40 MG/G
CREAM TOPICAL
Status: DISCONTINUED | OUTPATIENT
Start: 2019-07-30 | End: 2019-07-30 | Stop reason: HOSPADM

## 2019-07-30 RX ORDER — FENTANYL CITRATE 50 UG/ML
25-50 INJECTION, SOLUTION INTRAMUSCULAR; INTRAVENOUS EVERY 5 MIN PRN
Status: DISCONTINUED | OUTPATIENT
Start: 2019-07-30 | End: 2019-07-30 | Stop reason: HOSPADM

## 2019-07-30 RX ORDER — SODIUM CHLORIDE 9 MG/ML
INJECTION, SOLUTION INTRAVENOUS CONTINUOUS
Status: DISCONTINUED | OUTPATIENT
Start: 2019-07-30 | End: 2019-07-30 | Stop reason: HOSPADM

## 2019-07-30 RX ORDER — DEXTROSE MONOHYDRATE 25 G/50ML
25-50 INJECTION, SOLUTION INTRAVENOUS
Status: DISCONTINUED | OUTPATIENT
Start: 2019-07-30 | End: 2019-07-30 | Stop reason: HOSPADM

## 2019-07-30 RX ORDER — EZETIMIBE 10 MG/1
10 TABLET ORAL DAILY
Qty: 90 TABLET | Refills: 3 | Status: SHIPPED | OUTPATIENT
Start: 2019-07-30 | End: 2020-10-19

## 2019-07-30 RX ORDER — LIDOCAINE HYDROCHLORIDE 10 MG/ML
INJECTION, SOLUTION INFILTRATION; PERINEURAL
Status: DISCONTINUED
Start: 2019-07-30 | End: 2019-07-30 | Stop reason: HOSPADM

## 2019-07-30 RX ORDER — ACETAMINOPHEN 500 MG
500 TABLET ORAL EVERY 6 HOURS PRN
Status: DISCONTINUED | OUTPATIENT
Start: 2019-07-30 | End: 2019-07-30 | Stop reason: HOSPADM

## 2019-07-30 RX ORDER — HEPARIN SODIUM 1000 [USP'U]/ML
INJECTION, SOLUTION INTRAVENOUS; SUBCUTANEOUS
Status: DISCONTINUED
Start: 2019-07-30 | End: 2019-07-30 | Stop reason: HOSPADM

## 2019-07-30 RX ORDER — IODIXANOL 320 MG/ML
150 INJECTION, SOLUTION INTRAVASCULAR ONCE
Status: COMPLETED | OUTPATIENT
Start: 2019-07-30 | End: 2019-07-30

## 2019-07-30 RX ORDER — LOSARTAN POTASSIUM AND HYDROCHLOROTHIAZIDE 25; 100 MG/1; MG/1
1 TABLET ORAL DAILY
Qty: 90 TABLET | Refills: 3 | Status: ON HOLD | OUTPATIENT
Start: 2019-07-30 | End: 2021-02-03

## 2019-07-30 RX ORDER — PROTAMINE SULFATE 10 MG/ML
30 INJECTION, SOLUTION INTRAVENOUS ONCE
Status: COMPLETED | OUTPATIENT
Start: 2019-07-30 | End: 2019-07-30

## 2019-07-30 RX ADMIN — HEPARIN SODIUM 5000 UNITS: 1000 INJECTION INTRAVENOUS; SUBCUTANEOUS at 08:43

## 2019-07-30 RX ADMIN — MIDAZOLAM HYDROCHLORIDE 0.5 MG: 1 INJECTION, SOLUTION INTRAMUSCULAR; INTRAVENOUS at 09:18

## 2019-07-30 RX ADMIN — MIDAZOLAM HYDROCHLORIDE 0.5 MG: 1 INJECTION, SOLUTION INTRAMUSCULAR; INTRAVENOUS at 08:33

## 2019-07-30 RX ADMIN — FENTANYL CITRATE 25 MCG: 50 INJECTION, SOLUTION INTRAMUSCULAR; INTRAVENOUS at 08:56

## 2019-07-30 RX ADMIN — FENTANYL CITRATE 25 MCG: 50 INJECTION, SOLUTION INTRAMUSCULAR; INTRAVENOUS at 08:39

## 2019-07-30 RX ADMIN — PROTAMINE SULFATE 30 MG: 10 INJECTION, SOLUTION INTRAVENOUS at 09:15

## 2019-07-30 RX ADMIN — FENTANYL CITRATE 25 MCG: 50 INJECTION, SOLUTION INTRAMUSCULAR; INTRAVENOUS at 08:33

## 2019-07-30 RX ADMIN — LIDOCAINE HYDROCHLORIDE 10 ML: 10 INJECTION, SOLUTION INFILTRATION; PERINEURAL at 09:16

## 2019-07-30 RX ADMIN — MIDAZOLAM HYDROCHLORIDE 0.5 MG: 1 INJECTION, SOLUTION INTRAMUSCULAR; INTRAVENOUS at 08:39

## 2019-07-30 RX ADMIN — FENTANYL CITRATE 25 MCG: 50 INJECTION, SOLUTION INTRAMUSCULAR; INTRAVENOUS at 09:18

## 2019-07-30 RX ADMIN — SODIUM CHLORIDE: 9 INJECTION, SOLUTION INTRAVENOUS at 07:32

## 2019-07-30 RX ADMIN — HEPARIN SODIUM 10000 UNITS: 10000 INJECTION INTRAVENOUS; SUBCUTANEOUS at 08:28

## 2019-07-30 RX ADMIN — MIDAZOLAM HYDROCHLORIDE 0.5 MG: 1 INJECTION, SOLUTION INTRAMUSCULAR; INTRAVENOUS at 08:56

## 2019-07-30 RX ADMIN — IODIXANOL 36 ML: 320 INJECTION, SOLUTION INTRAVASCULAR at 09:24

## 2019-07-30 ASSESSMENT — MIFFLIN-ST. JEOR: SCORE: 1396.29

## 2019-07-30 NOTE — CONSULTS
[facility]    Vascular Medicine Consultation     Date of Admission:  7/30/2019  Date of Consult (When I saw the patient): 07/30/19         Physician Supervisory Attestation:   I have reviewed and discussed with the physician assistant their history, physical and plan and independently interviewed and examined Zia Hopkins and agree with the plan as stated in the physician assistant note.    Zia Hopkins is a 69 year old male former smoker with hypertension, hyperlipidemia, and diabetes who in 2017 developed necrotic changes to his left toes. He underwent an angiogram on 3/8/17 with left SFA and popliteal artery angioplasty and left SFA stenting by Dr. Hamlin. He appeared to do well and continued to follow up on a routine basis. He presented for his routine follow up with Dr. Hamlin on 7/15/19 and was noted to have in-stent stenosis on arterial duplex. ABIs were unable to be obtained due to non-compressible vessels.  It was recommended that he undergo an angiogram for further evaluation and possible treatment of his focal in-stent stenosis. He denies any claudication.      It has been noted that his blood pressure has been running high. It was elevated to over 220 systolic when he just recently saw Dr. Hamlin. He had apparently ran out of his metoprolol at that time so more was ordered for him with some improvement. However, his blood pressure still remains higher than desired today. He denies any signs/symptoms of stroke.    He developed in-stent stenosis underwent angiogram and stent inside the stent today .  Groin access site looks good  Tolerated the procedure well    At present our recommendations,    Follow post angiogram protocol  Monitor access site  IV fluids  He will benefit with aggressive control of blood pressure and increase losartan/hydrochlorothiazide dose 100/25 daily  Continue remaining outpatient blood pressure medications same  Continue aspirin Plavix  Educated on  benefits of walking  Increase atorvastatin dose to 80 mg daily and continue Zetia.  Therapeutic lifestyle modification suggested  Hold metformin per angiogram protocol and restart same  Follow-up with Dr. Hamlin  for stent surveillance.    This note was dictated by utilizing dragon software    Thank you for the consultation  Copy of this dictation to primary care physician and Dr. Yakelin Juan MD, [facility]  Vascular Medicine   7/30/2019          Assessment & Plan   1. Peripheral arterial disease s/p left popliteal angioplasty and left SFA stenting 2017 now with in-stent stenosis s/p stent placement 7/30/19     Post-procedure cares and follow-up per Dr. Hamlin. He will be continued on Plavix and aspirin 81 mg daily. He needs to get better control of his blood pressure and lipids to prevent further progression of his peripheral arterial disease.      2. Hyperlipidemia     His lipid panel revealed an LDL of 116, HDL 57, triglycerides 113, and total cholesterol 196. He has been on Lipitor 40 mg daily. Zetia had been prescribed in past, but he had run out of it and stopped it recently. Given his vascular disease and diabetes, he should ideally have an LDL of less than 70 while being on a high dose of a maximally efficacious statin. Therefore, his Atorvastatin dose was increased to 80 mg daily and Zetia 10 mg daily will be prescribed once again. He should then have a lipid panel repeated in 3 months through his primary care provider to ascertain whether or not he is at goal on this regimen.      3. Type 2 diabetes mellitus     He was diagnosed with Type 2 diabetes in 2017. His A1C is 6.9% this admission, indicating that his diabetes is well controlled as an outpatient on Metformin. He should continue the same, with the exception of holding his metformin for the next 48 hours given the contrast he received for his procedure.      4. History of tobacco use    He quit smoking just over 2 years ago.  He was congratulated and the importance of smoking cessation was stressed.     5. Hypertension    His blood pressure has been severely elevated, with systolic blood pressure the other week of over 220. He had stopped taking his metoprolol as he had run out. He had continued on his losartan-hydrochlorothiazide. His blood pressure has been monitored today while in the hospital and has remained higher than desired. As such, his metoprolol dose will remain the same but it is recommended that he increase his Losartan-Hydrochlorothiazide to .       Reason for Consult   Reason for consult: Asked by Dr. Hamlin to evaluate and help manage vascular risk factors in this 69 year old male former smoker with hyperlipidemia, hypertension, diabetes, and PAD s/p angioplasty of left popliteal artery and SFA with stenting of left SFA 7/15/19 now presenting with in-stent stenosis.     Primary Care Physician   Reid Reese      History of Present Illness   Zia Hopkins is a 69 year old male former smoker with hypertension, hyperlipidemia, and diabetes who in 2017 developed necrotic changes to his left toes. He underwent an angiogram on 3/8/17 with left SFA and popliteal artery angioplasty and left SFA stenting by Dr. Hamlin. He appeared to do well and continued to follow up on a routine basis. He presented for his routine follow up with Dr. Hamlin on 7/15/19 and was noted to have in-stent stenosis on arterial duplex. ABIs were unable to be obtained due to non-compressible vessels.  It was recommended that he undergo an angiogram for further evaluation and possible treatment of his focal in-stent stenosis. He denies any claudication.      It has been noted that his blood pressure has been running high. It was elevated to over 220 systolic when he just recently saw Dr. Hamlin. He had apparently ran out of his metoprolol at that time so more was ordered for him with some improvement. However, his blood pressure still remains  higher than desired today. He denies any signs/symptoms of stroke.     Past Medical History   Past Medical History:   Diagnosis Date     Diabetes mellitus type 2, noninsulin dependent (H)      Hyperlipidemia LDL goal <70      Hypertension      PAD (peripheral artery disease) (H)        Past Surgical History   Past Surgical History:   Procedure Laterality Date     AMPUTATE FOOT Left 3/15/2017    Procedure: AMPUTATE FOOT;  Surgeon: Emre Mauricio DPM;  Location: [facility] OR     IR LOWER EXTREMITY ANGIOGRAM LEFT  7/30/2019     IRRIGATION AND DEBRIDEMENT FOOT, COMBINED Left 3/15/2017    Procedure: COMBINED IRRIGATION AND DEBRIDEMENT FOOT;  Surgeon: Emre Mauricio DPM;  Location: [facility] OR       Prior to Admission Medications   Prior to Admission Medications   Prescriptions Last Dose Informant Patient Reported? Taking?   Cadexomer Iodine, topical, 0.9% (IODOSORB) 0.9 % GEL gel Unknown at Unknown time  No No   Sig: Apply 40 g topically daily as needed for other   Patient not taking: Reported on 7/15/2019   aspirin 81 MG tablet 7/30/2019 at 0330  No Yes   Sig: Take 1 tablet (81 mg) by mouth daily   clopidogrel (PLAVIX) 75 MG tablet 7/30/2019 at 0330  No Yes   Sig: Take 1 tablet (75 mg) by mouth daily   losartan-hydrochlorothiazide (HYZAAR) 50-12.5 MG tablet 7/30/2019 at 0330  No No   Sig: Take 1 tablet by mouth daily   metFORMIN (GLUCOPHAGE-XR) 500 MG 24 hr tablet 7/29/2019 at Unknown time Self Yes Yes   Sig: Take 500 mg by mouth daily (with breakfast)   metoprolol succinate ER (TOPROL-XL) 100 MG 24 hr tablet 7/30/2019 at 0330  No Yes   Sig: Take 1 tablet (100 mg) by mouth daily   triamcinolone (KENALOG) 0.1 % cream Past Week at Unknown time Self Yes Yes   Sig: Apply to affected area as needed      Facility-Administered Medications: None     Allergies   No Known Allergies    Social History   Zia Hopkins  reports that he quit smoking about 2 years ago. His smoking use included cigarettes. He smoked 0.75 packs per day.  He has never used smokeless tobacco. He reports that he drinks alcohol. He reports that he does not use drugs.    Family History   Family History   Problem Relation Age of Onset     Diabetes Father        Review of Systems   The 10 point Review of Systems is negative other than noted in the HPI or here.     Physical Exam       BP: 139/70 Pulse: 58 Heart Rate: 56 Resp: 16 SpO2: 94 % O2 Device: Nasal cannula Oxygen Delivery: 2 LPM  Vital Signs with Ranges  Pulse:  [55-62] 58  Heart Rate:  [56-60] 56  Resp:  [16-27] 16  BP: (135-194)/(66-94) 139/70  SpO2:  [93 %-100 %] 94 %  154 lbs 0 oz    Constitutional: awake, alert, cooperative, no apparent distress, and appears stated age  Eyes: Lids and lashes normal, pupils equal, round and reactive to light, extra ocular muscles intact, sclera clear, conjunctiva normal  ENT: normocepalic, without obvious abnormality, oropharynx pink and moist  Hematologic / Lymphatic: no lymphadenopathy  Respiratory: No increased work of breathing, good air exchange, clear to auscultation bilaterally, no crackles or wheezing  Cardiovascular: regular rate and rhythm, normal S1 and S2 and no murmur noted  GI: Normal bowel sounds, soft, non-distended, non-tender  Skin: no redness, warmth, or swelling, no rashes and no lesions  Musculoskeletal: There is no redness, warmth, or swelling of the joints.  Full range of motion noted.  Motor strength is 5 out of 5 all extremities bilaterally.  Tone is normal.  Neurologic: Awake, alert, oriented to name, place and time.  Cranial nerves II-XII are grossly intact.  Motor is 5 out of 5 bilaterally.    Neuropsychiatric:  Normal affect, memory, insight.  Pulses: Palpable DP pulses bilaterally. No carotid bruits appreciated.     Data   Most Recent 3 CBC's:  Recent Labs   Lab Test 07/30/19  0712 03/16/17  0742 03/14/17  1520   WBC 5.7 9.6 11.5*   HGB 15.0 12.3* 14.1   MCV 92 91 91    219 237     Most Recent 3 BMP's:  Recent Labs   Lab Test 07/30/19  0712  03/18/17  0740 03/17/17  0734 03/16/17  0742  03/14/17  1520   NA  --   --   --  137  --  136   POTASSIUM  --   --   --  3.8  --  4.8   CHLORIDE  --   --   --  104  --  99   CO2  --   --   --  26  --  30   BUN  --   --   --  9  --  15   CR 0.80 0.75 0.75 0.71   < > 0.72   ANIONGAP  --   --   --  7  --  7   TIN  --   --   --  8.3*  --  9.3   GLC  --   --   --  182*  --  233*    < > = values in this interval not displayed.     Most Recent 3 INR's:  Recent Labs   Lab Test 07/30/19  0712 03/08/17  0720   INR 0.90 0.87     Most Recent Cholesterol Panel:  Recent Labs   Lab Test 07/30/19 0712   CHOL 196   *   HDL 57   TRIG 113     Most Recent Hemoglobin A1c:  Recent Labs   Lab Test 07/30/19 0712   A1C 6.9*

## 2019-07-30 NOTE — TELEPHONE ENCOUNTER
Per Dr. Hamlin, pt to f/u on 8/12/19 with left LE arterial duplex and OV.     Routing to  to coordinate.     Pt currently inpt.     JOHN العراقيN, RN

## 2019-07-30 NOTE — DISCHARGE INSTRUCTIONS
Peripheral Angiogram Discharge Instructions - Femoral     After you go home:      Have an adult stay with you until tomorrow.    Drink extra fluids for 2 days.    You may resume your normal diet.    No smoking       For 24 hours - due to the sedation you received:    Relax and take it easy.    Do NOT make any important or legal decisions.    Do NOT drive or operate machines at home or at work.    Do NOT drink alcohol.    Care of Groin Puncture Site:      For the first 24 hrs - check the puncture site every 1-2 hours while awake.    For 2 days, when you cough, sneeze, laugh or move your bowels, hold your hand over the puncture site and press firmly.    Remove the bandaid after 24 hours. If there is minor oozing, apply another bandaid and remove it after 12 hours.    It is normal to have a small bruise or pea size lump at the site.    You may shower tomorrow.  Do NOT take a bath, or use a hot tub or pool for at least 3 days. Do NOT scrub the site. Do not use lotion or powder near the puncture site.     Activity:            For 2 days:    No stooping or squatting    Do NOT do any heavy activity such as exercise, lifting, or straining.     No housework, yard work or any activity that make you sweat    Do NOT lift more than 10 pounds    Bleeding:      If you start bleeding from the site in your groin, lie down flat and press firmly on/above the site for 10 minutes.     Once bleeding stops, lay flat for 2 hours.     [facility] as soon as you can.       Call 911 right away if you have heavy bleeding or bleeding that does not stop.      Medicines:      If you are on Metformin (Glucophage) and your GFR (kidney function level) is >30, you may continue taking your Metformin.    If you are on Metformin (Glucophage) and your GFR (kidney function level) is <30, do not restart the Metformin for 48 hours after your procedure. Check with your primary care giver before restarting the Metformin to see if you need  to have blood drawn to recheck your kidney function (GFR).    If you are taking an antiplatelet medication such as Plavix, do not stop taking it until you talk to your provider.       Take your medications, including blood thinners, unless your provider tells you not to.  If you take Coumadin (Warfarin), have your INR checked by your provider in  3-5 days. Call your clinic to schedule this.    If you have stopped any medicines, check with your provider about when to restart them.        Follow Up Appointments:      Follow up with [facility] as directed.    Call the clinic if:      You have increased pain or a large or growing hard lump around the site.    The site is red, swollen, hot or tender.    Blood or fluid is draining from the site.    You have chills or a fever greater than 101 F (38 C).    Your leg feels numb, cool or changes color.    You have hives, a rash or unusual itching.    New pain in the back or belly that you cannot control with Tylenol.    Any questions or concerns.    Other Instructions:      If you received a stent - carry your stent card with you at all times.      If you have questions or your original symptoms do not improve, call:         [facility] @ [phone]

## 2019-07-30 NOTE — PROGRESS NOTES
Care Suites Discharge Nursing Note    Education/questions answered: Yes.  AVS/Discharge instructions, stent card, angioseal pamphlet given and reviewed and patient verbalized understanding.  Up to bathroom, voided, tolerated PO.  Ambulated around nurses station.  No dizziness/lightheadedness and steady on feet.  Right groin access site remained C/D/I, no swelling.  Patient DC location: Home  Accompanied by: Spouse, Le  CS discharge time: 1240  Via WC to .

## 2019-07-30 NOTE — PROGRESS NOTES
patient Name:  Zia Hopkins    Medical Record Number: [id number]  Today's Date:  July 30, 2019  Procedure: left lower extremity angiogram with stenting of left superficial femoral artery   Start Time: 0833  End of procedure time: 0917    Report given to: care suite RN   Time pt departs:  0930       Notes:       Pt transferred to IR table. Prepped and draped appropriately. Monitoring equipment applied. NC applied. No complaints of pain at this time. Timeout recorded.       VSS. Pt remains on RA. No c/o pain at this time. RtGroin site closed with angioseal, site CDI, soft. Bedrest for 2 hours till 1115. No further questions from RN or pt.    Versed 2mg, Fentanyl 100mcg.

## 2019-07-30 NOTE — PROGRESS NOTES
Care Suites Admission Nursing Note    Reason for admission: Left leg angiogram and possible angioplasty/stent  CS arrival time: 0635  Accompanied by: Le (Maria De Jesus) spouse dropped him off and will pick him up when ready to discharge to home  Name/phone of DC : Maria De Jesus [phone]  Medications held: Metformin  Consent signed: Yes  Abnormal assessment/labs: No  If abnormal, provider notified: NA  Education/questions answered: Yes.  Discussed/reviewed Left leg angiogram procedure as well as Contrast dye discharge/home care instructions.  All questions and concerns addressed.  Patient verbalizes understanding.  Plan: Proceed.

## 2019-07-30 NOTE — PROGRESS NOTES
Care Suites Post-Procedure Note    Procedure: left leg angiogram and left superficial femoral artery stent.  CS arrival time: 0940  Accompanied by: Eder  Concerns/abnormal assessment after procedure: None.  Denies pain.  Pulses are present with doppler.  VSS.  Plan: Bedrest until 1145.  Reviewed post procedure activity restrictions.  Patient verbalizes understanding.  Plan discharge today around 1215.

## 2019-08-07 ENCOUNTER — TELEPHONE (OUTPATIENT)
Dept: OTHER | Facility: CLINIC | Age: 69
End: 2019-08-07

## 2019-08-07 NOTE — TELEPHONE ENCOUNTER
Patient called and is wondering if he should be concerned about a bruise on the inside of his leg?  He had a stent procedure done on 07/30/19 with Dr Hamlin.  The bruising around the incision is gone and no pain except for maybe at the end of the day.

## 2019-08-12 ENCOUNTER — HOSPITAL ENCOUNTER (OUTPATIENT)
Dept: ULTRASOUND IMAGING | Facility: CLINIC | Age: 69
Discharge: HOME | End: 2019-08-12
Attending: SURGERY | Admitting: SURGERY
Payer: COMMERCIAL

## 2019-08-12 ENCOUNTER — OFFICE VISIT (OUTPATIENT)
Dept: OTHER | Facility: CLINIC | Age: 69
End: 2019-08-12
Attending: SURGERY
Payer: COMMERCIAL

## 2019-08-12 VITALS
HEIGHT: 66 IN | HEART RATE: 68 BPM | WEIGHT: 154 LBS | BODY MASS INDEX: 24.75 KG/M2 | RESPIRATION RATE: 16 BRPM | DIASTOLIC BLOOD PRESSURE: 76 MMHG | SYSTOLIC BLOOD PRESSURE: 186 MMHG

## 2019-08-12 DIAGNOSIS — T82.856A STENOSIS OF PERIPHERAL VASCULAR STENT, INITIAL ENCOUNTER (H): ICD-10-CM

## 2019-08-12 DIAGNOSIS — I73.9 PAD (PERIPHERAL ARTERY DISEASE) (H): Primary | ICD-10-CM

## 2019-08-12 DIAGNOSIS — I70.245 ATHEROSCLEROSIS OF NATIVE ARTERY OF LEFT LOWER EXTREMITY WITH ULCERATION OF OTHER PART OF FOOT (H): ICD-10-CM

## 2019-08-12 PROCEDURE — 93926 LOWER EXTREMITY STUDY: CPT | Mod: LT

## 2019-08-12 PROCEDURE — G0463 HOSPITAL OUTPT CLINIC VISIT: HCPCS

## 2019-08-12 PROCEDURE — 99212 OFFICE O/P EST SF 10 MIN: CPT | Mod: ZP | Performed by: SURGERY

## 2019-08-12 ASSESSMENT — MIFFLIN-ST. JEOR: SCORE: 1396.29

## 2019-08-12 NOTE — PROGRESS NOTES
"Zia Hopkins is a 69 year old male who presents for:  Chief Complaint   Patient presents with     RECHECK     MANNY ORTIZ (10:00 [facility]; 11:00 KMK) Hx of in-stent stenosis in the proximal aspect of the left superficial femoral artery stent, angiogram; follow up on 8/12/19 to 7/30/19 angiogram with Dr. Hamlin *jmw        Vitals:    Vitals:    08/12/19 1044   BP: (!) 186/76   BP Location: Left arm   Patient Position: Chair   Cuff Size: Adult Regular   Pulse: 68   Resp: 16   Weight: 154 lb (69.9 kg)   Height: 5' 6\" (1.676 m)       BMI:  Estimated body mass index is 24.86 kg/m  as calculated from the following:    Height as of this encounter: 5' 6\" (1.676 m).    Weight as of this encounter: 154 lb (69.9 kg).    Pain Score:  Data Unavailable        Jenifer Little    "

## 2019-08-13 NOTE — PROGRESS NOTES
Mr. Hopkins is a very pleasant 69-year-old gentleman who has been under my care for left lower extremity peripheral arterial disease.  He has undergone a left superficial femoral artery balloon angioplasty and stenting in March 2017.  On follow-up imaging recently he was found to have high-grade stenosis within the stent.  On 30 July 2019 he underwent left lower extremity arteriogram and stenting with a 6.5 cm [place] Vi[place]ahn stent graft to treat the in-stent stenosis.    He has no complaints.    Duplex sonography today shows widely patent left superficial femoral artery stent.    I have reassured him of the findings.  We will see him back in 6 months with left lower extremity arterial duplex.

## 2019-08-15 DIAGNOSIS — I70.245 ATHEROSCLEROSIS OF NATIVE ARTERY OF LEFT LOWER EXTREMITY WITH ULCERATION OF OTHER PART OF FOOT (H): Primary | ICD-10-CM

## 2019-08-15 DIAGNOSIS — I73.9 PAD (PERIPHERAL ARTERY DISEASE) (H): ICD-10-CM

## 2019-08-15 DIAGNOSIS — T82.856A STENOSIS OF PERIPHERAL VASCULAR STENT, INITIAL ENCOUNTER (H): ICD-10-CM

## 2019-08-16 DIAGNOSIS — I70.245 ATHEROSCLEROSIS OF NATIVE ARTERY OF LEFT LOWER EXTREMITY WITH ULCERATION OF OTHER PART OF FOOT (H): ICD-10-CM

## 2019-08-16 DIAGNOSIS — T82.856A STENOSIS OF PERIPHERAL VASCULAR STENT, INITIAL ENCOUNTER (H): Primary | ICD-10-CM

## 2020-01-14 ENCOUNTER — TELEPHONE (OUTPATIENT)
Dept: OTHER | Facility: CLINIC | Age: 70
End: 2020-01-14

## 2020-01-14 DIAGNOSIS — I73.9 PAD (PERIPHERAL ARTERY DISEASE) (H): ICD-10-CM

## 2020-01-14 RX ORDER — CLOPIDOGREL BISULFATE 75 MG/1
75 TABLET ORAL DAILY
Qty: 90 TABLET | Refills: 0 | Status: SHIPPED | OUTPATIENT
Start: 2020-01-14 | End: 2020-04-13

## 2020-01-14 NOTE — TELEPHONE ENCOUNTER
Requested Prescriptions   Pending Prescriptions Disp Refills     clopidogrel (PLAVIX) 75 MG tablet 90 tablet 3     Sig: Take 1 tablet (75 mg) by mouth daily       There is no refill protocol information for this order          DAFNE العراقي, RN  [facility]

## 2020-01-14 NOTE — TELEPHONE ENCOUNTER
Pt due for follow up with Dr. Hamlin in 2/2020.     History of left superficial femoral artery stents; 6 month follow up to 8/12/19 appointment with Dr. Hamlin.    Routing to  to coordinate.     JOHN العراقيN, RN  [facility]

## 2020-01-14 NOTE — TELEPHONE ENCOUNTER
Pt due for follow up with Dr. Hamlin in 2/2020.     History of left superficial femoral artery stents.    Dr. Hamlin to recommend if Plavix to continue past next office visit.    JOHN العراقيN, RN  [facility]

## 2020-01-15 NOTE — TELEPHONE ENCOUNTER
January 15, 2020    Spoke with Zia regarding six month follow-up appointment with Dr. Hamlin.     Patient explained that since his visit in August 2019, he is feeling much better.   He further explained that he is leaving the country for a trip at the time his follow-up appointment is due and is requesting that he schedule the appointment in April when he returns (returning 4/6/2020).     Upon looking at Dr. Hamlin's schedule, it appears that he may be off the schedule on Monday, April 13, 2020 and will not be back until April 20, 2020.     Requesting RN review of patient s case and advise scheduling on whether or not it would be appropriate to schedule this patient this far out or if patient needs to be scheduled for follow-up appointment before leaving the country in February 2020.     Dianna Linares  Appointment Scheduling   [facility]  Office: [phone]  Fax: [phone]  Direct: [phone]

## 2020-01-16 NOTE — TELEPHONE ENCOUNTER
January 16, 2020    Patient is scheduled for Follow-Up appointment with Dr. Hamlin on 4/27/2020 at [facility].     Dianna Linares  Appointment Scheduling   [facility]  Office: [phone]  Fax: [phone]  Direct: [phone]

## 2020-01-16 NOTE — TELEPHONE ENCOUNTER
Dr. Hamlin has availability in February for pt to see him. If pt declines and wants to wait until April, that is per pt request.     Pt has not had any recent interventions nor per tele enc is stating concerns/symtpoms.     Thus, if pt declines February appt and requesting April then scheduled for April.     Alanna Fam, JOHNN, RN  [facility] [place]

## 2020-02-21 NOTE — TELEPHONE ENCOUNTER
9 yr old male arrives to ED with his mother with a left wrist injury. Injured the left wrist 1 week ago and then today while playing football in the street he fell and another kid fell on top of him. Swelling to left wrist area.   Per Dr. Hamlin, pt to be scheduled with podiatrist as soon as possible for eval and treat for potential little toe amputation.     Called and spoke to  Denisa, pt appt set for today at 11:00am with Dr. Ann.     Pt notified and notes understanding.     Alanna Fam, RN, BSN.

## 2020-04-13 DIAGNOSIS — I73.9 PAD (PERIPHERAL ARTERY DISEASE) (H): ICD-10-CM

## 2020-04-13 RX ORDER — CLOPIDOGREL BISULFATE 75 MG/1
75 TABLET ORAL DAILY
Qty: 90 TABLET | Refills: 1 | Status: SHIPPED | OUTPATIENT
Start: 2020-04-13 | End: 2020-10-19

## 2020-04-13 NOTE — TELEPHONE ENCOUNTER
"Last Written Prescription Date:  1/14/20  Last Fill Quantity: 90,  # refills: 0   Last office visit: No previous visit found with prescribing provider:  8/12/19   Future Office Visit:    Requested Prescriptions   Pending Prescriptions Disp Refills     clopidogrel (PLAVIX) 75 MG tablet 90 tablet 1     Sig: Take 1 tablet (75 mg) by mouth daily       Plavix Passed - 4/13/2020 11:26 AM        Passed - No active PPI on record unless is Protonix        Passed - Normal HGB on file in past 12 months     Recent Labs   Lab Test 07/30/19  0712   HGB 15.0               Passed - Normal Platelets on file in past 12 months     Recent Labs   Lab Test 07/30/19  0712                  Passed - Recent (12 mo) or future (30 days) visit within the authorizing provider's specialty     Patient has had an office visit with the authorizing provider or a provider within the authorizing providers department within the previous 12 mos or has a future within next 30 days. See \"Patient Info\" tab in inbasket, or \"Choose Columns\" in Meds & Orders section of the refill encounter.              Passed - Medication is active on med list        Passed - Patient is age 18 or older           Prescription approved per [facility] Refill Protocol.    Deepali LEOS, RN    [facility]  Office: [phone]  Fax: [phone]        "

## 2020-05-11 ENCOUNTER — TELEPHONE (OUTPATIENT)
Dept: OTHER | Facility: CLINIC | Age: 70
End: 2020-05-11

## 2020-05-11 NOTE — TELEPHONE ENCOUNTER
May 11, 2020    Patient called [facility] requesting a refill of his 100mg Metoprolol.     Routing to RN for review and correspondence.     Dianna Linares    [facility]  Office: [phone]  Fax [phone]

## 2020-05-11 NOTE — TELEPHONE ENCOUNTER
I called pt back, explained Metoprolol will need to be refilled by PCP. Pt notes understanding.     JOHN العراقيN, RN  [facility] [place]

## 2020-07-19 DIAGNOSIS — I10 BENIGN ESSENTIAL HYPERTENSION: ICD-10-CM

## 2020-07-20 RX ORDER — LOSARTAN POTASSIUM AND HYDROCHLOROTHIAZIDE 25; 100 MG/1; MG/1
1 TABLET ORAL DAILY
Qty: 90 TABLET | Refills: 0 | OUTPATIENT
Start: 2020-07-20

## 2020-07-20 NOTE — TELEPHONE ENCOUNTER
Patient chamberlain not appear to being followed by Shaina Norton PA-C. Med refused- pt needs to go through PCP now.     Deepali LOPEZN, RN    [facility]  [facility]  Office: [phone]  Fax: [phone]

## 2020-08-03 DIAGNOSIS — E78.5 HYPERLIPIDEMIA LDL GOAL <70: ICD-10-CM

## 2020-08-03 RX ORDER — ATORVASTATIN CALCIUM 80 MG/1
TABLET, FILM COATED ORAL
Qty: 90 TABLET | Refills: 3 | Status: SHIPPED | OUTPATIENT
Start: 2020-08-03

## 2020-08-03 NOTE — TELEPHONE ENCOUNTER
atorvastatin (LIPITOR) 80 MG tablet  Last Written Prescription Date:  7/30/19  Last Fill Quantity: 90,  # refills: 3     Last office visit: 8/12/2019     Follow up due:   6 months     Unable to fill per [facility] protocol.  Statins Protocol Failed    08/03/2020 11:06 AM    LDL on file in past 12 months      Medication and pharmacy loaded.    Lluvia BARLOW BSN  [facility]  [phone]

## 2020-08-10 DIAGNOSIS — E78.5 HYPERLIPIDEMIA LDL GOAL <70: ICD-10-CM

## 2020-08-10 RX ORDER — EZETIMIBE 10 MG/1
TABLET ORAL
Qty: 90 TABLET | Refills: 3 | OUTPATIENT
Start: 2020-08-10

## 2020-08-10 NOTE — TELEPHONE ENCOUNTER
ezetimibe (ZETIA) 10 MG tablet  Last Written Prescription Date:  7/30/19  Last Fill Quantity: 90,  # refills: 3     Patient needs to go through PCP.    Lluvia BARLOW BSN  [facility]  [phone]

## 2020-08-26 DIAGNOSIS — I10 BENIGN ESSENTIAL HYPERTENSION: ICD-10-CM

## 2020-08-26 RX ORDER — LOSARTAN POTASSIUM AND HYDROCHLOROTHIAZIDE 25; 100 MG/1; MG/1
1 TABLET ORAL DAILY
Qty: 90 TABLET | Refills: 3 | OUTPATIENT
Start: 2020-08-26

## 2020-08-26 NOTE — TELEPHONE ENCOUNTER
losartan-hydrochlorothiazide (HYZAAR) 100-25 MG tablet  Last Written Prescription Date:  7/30/19  Last Fill Quantity: 90,  # refills: 3     Last office visit: 7/30/19 (in hospital consult)    Rx denied - defer to primary care provider.    Lluvia BARLOW BSN  [facility]  [phone]

## 2020-10-05 DIAGNOSIS — I73.9 PAD (PERIPHERAL ARTERY DISEASE) (H): ICD-10-CM

## 2020-10-05 RX ORDER — CLOPIDOGREL BISULFATE 75 MG/1
TABLET ORAL
Qty: 90 TABLET | Refills: 1 | OUTPATIENT
Start: 2020-10-05

## 2020-10-05 NOTE — TELEPHONE ENCOUNTER
clopidogrel (PLAVIX) 75 MG tablet  Last Written Prescription Date:  4/13/20  Last Fill Quantity: 90,  # refills: 1     Last office visit: 8/12/2019   Follow up due:  February 2020 with Dr. Hamlin    Rx denied - needs appointment.    Lluvia BARLOW BSN  [facility]  [phone]

## 2020-10-15 DIAGNOSIS — I73.9 PAD (PERIPHERAL ARTERY DISEASE) (H): ICD-10-CM

## 2020-10-15 DIAGNOSIS — E78.5 HYPERLIPIDEMIA LDL GOAL <70: ICD-10-CM

## 2020-10-16 NOTE — TELEPHONE ENCOUNTER
Unable to refill per [facility] protocol.  Med and pharm loaded.    Deepali LOPEZN, RN    [facility]  [facility]  Office: [phone]  Fax: [phone]

## 2020-10-19 RX ORDER — CLOPIDOGREL BISULFATE 75 MG/1
TABLET ORAL
Qty: 90 TABLET | Refills: 1 | Status: SHIPPED | OUTPATIENT
Start: 2020-10-19 | End: 2021-04-16

## 2020-10-19 RX ORDER — EZETIMIBE 10 MG/1
TABLET ORAL
Qty: 90 TABLET | Refills: 3 | Status: SHIPPED | OUTPATIENT
Start: 2020-10-19

## 2021-02-01 ENCOUNTER — OFFICE VISIT (OUTPATIENT)
Dept: OTHER | Facility: CLINIC | Age: 71
End: 2021-02-01
Attending: SURGERY
Payer: COMMERCIAL

## 2021-02-01 ENCOUNTER — TELEPHONE (OUTPATIENT)
Dept: OTHER | Facility: CLINIC | Age: 71
End: 2021-02-01

## 2021-02-01 ENCOUNTER — HOSPITAL ENCOUNTER (OUTPATIENT)
Dept: ULTRASOUND IMAGING | Facility: CLINIC | Age: 71
End: 2021-02-01
Attending: SURGERY
Payer: COMMERCIAL

## 2021-02-01 VITALS — HEART RATE: 81 BPM | DIASTOLIC BLOOD PRESSURE: 75 MMHG | TEMPERATURE: 97.7 F | SYSTOLIC BLOOD PRESSURE: 170 MMHG

## 2021-02-01 DIAGNOSIS — I70.245 ATHEROSCLEROSIS OF NATIVE ARTERY OF LEFT LOWER EXTREMITY WITH ULCERATION OF OTHER PART OF FOOT (H): ICD-10-CM

## 2021-02-01 DIAGNOSIS — I73.9 PAD (PERIPHERAL ARTERY DISEASE) (H): Primary | ICD-10-CM

## 2021-02-01 DIAGNOSIS — T82.856A STENOSIS OF PERIPHERAL VASCULAR STENT, INITIAL ENCOUNTER (H): ICD-10-CM

## 2021-02-01 DIAGNOSIS — Z11.59 ENCOUNTER FOR SCREENING FOR OTHER VIRAL DISEASES: Primary | ICD-10-CM

## 2021-02-01 DIAGNOSIS — I73.9 PAD (PERIPHERAL ARTERY DISEASE) (H): ICD-10-CM

## 2021-02-01 DIAGNOSIS — I70.229 CRITICAL LOWER LIMB ISCHEMIA (H): ICD-10-CM

## 2021-02-01 PROCEDURE — G0463 HOSPITAL OUTPT CLINIC VISIT: HCPCS | Mod: 25

## 2021-02-01 PROCEDURE — 93924 LWR XTR VASC STDY BILAT: CPT | Mod: 26 | Performed by: SURGERY

## 2021-02-01 PROCEDURE — 93926 LOWER EXTREMITY STUDY: CPT | Mod: 26 | Performed by: SURGERY

## 2021-02-01 PROCEDURE — 93924 LWR XTR VASC STDY BILAT: CPT

## 2021-02-01 PROCEDURE — 99213 OFFICE O/P EST LOW 20 MIN: CPT | Performed by: SURGERY

## 2021-02-01 PROCEDURE — 93926 LOWER EXTREMITY STUDY: CPT | Mod: LT

## 2021-02-01 RX ORDER — AMLODIPINE BESYLATE 10 MG/1
10 TABLET ORAL DAILY
COMMUNITY

## 2021-02-01 RX ORDER — LOSARTAN POTASSIUM 100 MG/1
100 TABLET ORAL DAILY
COMMUNITY
Start: 2020-11-23

## 2021-02-01 RX ORDER — CHLORTHALIDONE 50 MG/1
50 TABLET ORAL DAILY
COMMUNITY
Start: 2020-11-23

## 2021-02-01 NOTE — TELEPHONE ENCOUNTER
Pt history left SFA balloon and Stent angioplasty on 3/2017, left LE stent angioplasty with [place] in 7/2019.     Left leg discoloration Started five days ago, darker pink.   Psoriasis toenail on left shifted, black around it, keeps it clean.     Denies LE numbness/tingling.  No difficulty walking.     DAVID/TBI needed as well.       JOHN العراقيN, RN    [facility]  Office:  [phone] Fax: [phone]

## 2021-02-01 NOTE — NURSING NOTE
"Zia Hopkins is a 70 year old male who presents for:  Chief Complaint   Patient presents with     RECHECK     History of left SFA balloon and Stent angioplasty on 3/2017, left LE stent angioplasty with [place] in 7/2019; Pt history left SFA balloon and Stent angioplasty on 3/2017, left LE stent angioplasty with [place] in 7/2019. Left leg discoloration Started five days ago, darker pink. Psoriasis toenail on left shifted, black around it, keeps it clean.        Vitals:    Vitals:    02/01/21 1507   BP: (!) 170/75   BP Location: Right arm   Patient Position: Chair   Cuff Size: Adult Regular   Pulse: 81   Temp: 97.7  F (36.5  C)   TempSrc: Temporal       BMI:  Estimated body mass index is 24.86 kg/m  as calculated from the following:    Height as of 8/12/19: 5' 6\" (1.676 m).    Weight as of 8/12/19: 154 lb (69.9 kg).    Pain Score:  Data Unavailable        Stephany Cavazos MA    "

## 2021-02-01 NOTE — TELEPHONE ENCOUNTER
Patient called to report discoloration in skin for about 7days ago. Patient stated he was scheduled in 4/2020 and was told appt will be postponed and he would get a call back to schedule at a later time.     Informed will route new symptom to nurse to review and call back when information/instruction is given.       Luz ESCALERA

## 2021-02-01 NOTE — PROGRESS NOTES
Mr. Hopkins returns to clinic today.  He is a very pleasant 70-year-old male with left lower extremity critical limb ischemia.  I have known him since March 2017.  He had presented at that time with a nonhealing sore in his left foot.  He underwent an arteriogram which showed multilevel femoral-popliteal and tibial vessel disease.  He underwent femoropopliteal balloon angioplasty with stent placement.  This improved the circulation and that he was able to heal sores in his feet.  When he came back in the summer 2019 he had developed stenosis in the previously placed superficial femoral artery stent.  We had to place an additional stent in that.    His follow-up in the year 2020 was delayed due to him being in [place] and then because of the pandemic.  Now he comes back reporting that his left great toe has turned black.  On noninvasive imaging that great toe pressure on the left side is 0.  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    I have alerted him to the very possibility of him having developed recurrent left lower extremity critical limb ischemia.  We will proceed with vein mapping and arteriogram of the left lower extremity.

## 2021-02-01 NOTE — TELEPHONE ENCOUNTER
See previous tele encounter, pt had pushed out follow up due to travel plan and COVID concerns.     Pt is overdue for 6 month follow up from 8/12/19 with left lower extremity arterial duplex.     Also needs DAVID/TBI.     Routing to  to coordinate in person OV and left lower extrmeity arterial duplex, DAVID/TBI as soon as possible.     JOHN العراقيN, RN    [facility]  Office:  [phone] Fax: [phone]

## 2021-02-02 ENCOUNTER — HOSPITAL ENCOUNTER (OUTPATIENT)
Dept: ULTRASOUND IMAGING | Facility: CLINIC | Age: 71
Discharge: HOME | End: 2021-02-02
Attending: SURGERY | Admitting: SURGERY
Payer: COMMERCIAL

## 2021-02-02 ENCOUNTER — TELEPHONE (OUTPATIENT)
Dept: INTERVENTIONAL RADIOLOGY/VASCULAR | Facility: CLINIC | Age: 71
End: 2021-02-02

## 2021-02-02 ENCOUNTER — TELEPHONE (OUTPATIENT)
Dept: OTHER | Facility: CLINIC | Age: 71
End: 2021-02-02

## 2021-02-02 DIAGNOSIS — Z11.59 ENCOUNTER FOR SCREENING FOR OTHER VIRAL DISEASES: ICD-10-CM

## 2021-02-02 DIAGNOSIS — I73.9 PAD (PERIPHERAL ARTERY DISEASE) (H): ICD-10-CM

## 2021-02-02 LAB
LABORATORY COMMENT REPORT: NORMAL
SARS-COV-2 RNA RESP QL NAA+PROBE: NEGATIVE
SARS-COV-2 RNA RESP QL NAA+PROBE: NORMAL
SPECIMEN SOURCE: NORMAL
SPECIMEN SOURCE: NORMAL

## 2021-02-02 PROCEDURE — 93970 EXTREMITY STUDY: CPT | Mod: 26 | Performed by: SURGERY

## 2021-02-02 PROCEDURE — U0005 INFEC AGEN DETEC AMPLI PROBE: HCPCS | Performed by: SPECIALIST

## 2021-02-02 PROCEDURE — 99207 PR NO CHARGE LOS: CPT

## 2021-02-02 PROCEDURE — U0003 INFECTIOUS AGENT DETECTION BY NUCLEIC ACID (DNA OR RNA); SEVERE ACUTE RESPIRATORY SYNDROME CORONAVIRUS 2 (SARS-COV-2) (CORONAVIRUS DISEASE [COVID-19]), AMPLIFIED PROBE TECHNIQUE, MAKING USE OF HIGH THROUGHPUT TECHNOLOGIES AS DESCRIBED BY CMS-2020-01-R: HCPCS | Performed by: SPECIALIST

## 2021-02-02 PROCEDURE — 93970 EXTREMITY STUDY: CPT

## 2021-02-02 RX ORDER — HEPARIN SODIUM 200 [USP'U]/100ML
1 INJECTION, SOLUTION INTRAVENOUS CONTINUOUS PRN
Status: CANCELLED | OUTPATIENT
Start: 2021-02-02

## 2021-02-02 NOTE — TELEPHONE ENCOUNTER
Pt called for pre-procedure/covid. Covid test today and is in process. Pt had all questions answered and had no more questions at this time.

## 2021-02-02 NOTE — TELEPHONE ENCOUNTER
Type of procedure: LEFT LOWER EXTREMITY ARTERIOGRAM WITH POSSIBLE INTERVENTION  Location of procedure: [facility]  Date and time of procedure: 2/3/21 @ 11:00 AM  Requesting Surgeon: DR. YATES  Performing Surgeon/IR: DR. YATES  Pre-Op Appt Date: 2/1/21 DR. YATES  Post-Op Appt Date: PT TO SCHEDULE   Packet sent out: Yes  Pre-cert/Authorization completed:  Yes  Date: 2/2/21

## 2021-02-02 NOTE — NURSING NOTE
Patient Education    Procedure: Left lower extremity arteriogram with possible intervention.  Diagnosis: Left lower extremity critical limb ischemia  Anticoagulation Instruction: Continue Plavix and Aspirin.  Pre-Operative Physical Exam: You need to have a pre-op physical exam within 30 days of your procedure. Your procedure may be cancelled if you do not have a current History and Physical. Call your PCP's office to schedule.  Allergies:  Updated in Epic  Bowel Prep: n/a  NPO after midnight the night before procedure.   Post Procedure Education: [facility] patient post-procedure fact sheet reviewed with patient.    COVID-19 instructions for isolation and pre testing reviewed with pt.    Learner(s):patient  Method: Listening  Barriers to Learning:No Barrier  Outcome: Patient did verbalize understanding of above education.    DAFNE العراقي, RN    [facility]  Office:  [phone] Fax: [phone]

## 2021-02-03 ENCOUNTER — APPOINTMENT (OUTPATIENT)
Dept: SURGERY | Facility: PHYSICIAN GROUP | Age: 71
End: 2021-02-03
Payer: COMMERCIAL

## 2021-02-03 ENCOUNTER — APPOINTMENT (OUTPATIENT)
Dept: INTERVENTIONAL RADIOLOGY/VASCULAR | Facility: CLINIC | Age: 71
End: 2021-02-03
Attending: SURGERY
Payer: COMMERCIAL

## 2021-02-03 ENCOUNTER — HOSPITAL ENCOUNTER (OUTPATIENT)
Facility: CLINIC | Age: 71
Discharge: HOME | End: 2021-02-03
Attending: SURGERY | Admitting: SURGERY
Payer: COMMERCIAL

## 2021-02-03 ENCOUNTER — TELEPHONE (OUTPATIENT)
Dept: OTHER | Facility: CLINIC | Age: 71
End: 2021-02-03

## 2021-02-03 VITALS
HEART RATE: 67 BPM | BODY MASS INDEX: 22.71 KG/M2 | DIASTOLIC BLOOD PRESSURE: 65 MMHG | WEIGHT: 141.3 LBS | OXYGEN SATURATION: 98 % | SYSTOLIC BLOOD PRESSURE: 145 MMHG | HEIGHT: 66 IN | RESPIRATION RATE: 16 BRPM | TEMPERATURE: 97.5 F

## 2021-02-03 DIAGNOSIS — I73.9 PAD (PERIPHERAL ARTERY DISEASE) (H): Primary | ICD-10-CM

## 2021-02-03 DIAGNOSIS — E11.52 TYPE 2 DIABETES MELLITUS WITH DIABETIC PERIPHERAL ANGIOPATHY AND GANGRENE, WITHOUT LONG-TERM CURRENT USE OF INSULIN (H): ICD-10-CM

## 2021-02-03 DIAGNOSIS — I70.229 CRITICAL LOWER LIMB ISCHEMIA (H): ICD-10-CM

## 2021-02-03 PROBLEM — T81.89XA NONHEALING SURGICAL WOUND, INITIAL ENCOUNTER: Status: ACTIVE | Noted: 2017-05-11

## 2021-02-03 PROBLEM — L97.529 DIABETIC ULCER OF LEFT FOOT ASSOCIATED WITH TYPE 2 DIABETES MELLITUS (H): Status: ACTIVE | Noted: 2017-05-11

## 2021-02-03 PROBLEM — L97.823: Status: ACTIVE | Noted: 2017-05-11

## 2021-02-03 PROBLEM — E11.621 DIABETIC ULCER OF LEFT FOOT ASSOCIATED WITH TYPE 2 DIABETES MELLITUS (H): Status: ACTIVE | Noted: 2017-05-11

## 2021-02-03 LAB
APTT PPP: 32 SEC (ref 22–37)
CHOLEST SERPL-MCNC: 108 MG/DL
CREAT SERPL-MCNC: 0.95 MG/DL (ref 0.66–1.25)
ERYTHROCYTE [DISTWIDTH] IN BLOOD BY AUTOMATED COUNT: 12.3 % (ref 10–15)
GFR SERPL CREATININE-BSD FRML MDRD: 79 ML/MIN/{1.73_M2}
GLUCOSE SERPL-MCNC: 158 MG/DL (ref 70–99)
HBA1C MFR BLD: 7.4 % (ref 0–5.6)
HCT VFR BLD AUTO: 37.5 % (ref 40–53)
HDLC SERPL-MCNC: 55 MG/DL
HGB BLD-MCNC: 12.2 G/DL (ref 13.3–17.7)
INR PPP: 0.95 (ref 0.86–1.14)
LDLC SERPL CALC-MCNC: 35 MG/DL
MCH RBC QN AUTO: 30.4 PG (ref 26.5–33)
MCHC RBC AUTO-ENTMCNC: 32.5 G/DL (ref 31.5–36.5)
MCV RBC AUTO: 94 FL (ref 78–100)
NONHDLC SERPL-MCNC: 53 MG/DL
PLATELET # BLD AUTO: 377 10E9/L (ref 150–450)
RBC # BLD AUTO: 4.01 10E12/L (ref 4.4–5.9)
TRIGL SERPL-MCNC: 91 MG/DL
WBC # BLD AUTO: 12 10E9/L (ref 4–11)

## 2021-02-03 PROCEDURE — 272N000123 HC CATH CR9

## 2021-02-03 PROCEDURE — 99152 MOD SED SAME PHYS/QHP 5/>YRS: CPT | Performed by: SURGERY

## 2021-02-03 PROCEDURE — 37226 IR LOWER EXTREMITY ANGIOGRAM LEFT: CPT | Mod: LT | Performed by: SURGERY

## 2021-02-03 PROCEDURE — 258N000003 HC RX IP 258 OP 636: Performed by: SURGERY

## 2021-02-03 PROCEDURE — 272N000116 HC CATH CR1

## 2021-02-03 PROCEDURE — 75710 ARTERY X-RAYS ARM/LEG: CPT | Mod: 26 | Performed by: SURGERY

## 2021-02-03 PROCEDURE — 272N000124 HC CATH CR11

## 2021-02-03 PROCEDURE — 36415 COLL VENOUS BLD VENIPUNCTURE: CPT

## 2021-02-03 PROCEDURE — 272N000567 HC SHEATH CR4

## 2021-02-03 PROCEDURE — 82947 ASSAY GLUCOSE BLOOD QUANT: CPT | Performed by: SURGERY

## 2021-02-03 PROCEDURE — C1876 STENT, NON-COA/NON-COV W/DEL: HCPCS

## 2021-02-03 PROCEDURE — 82565 ASSAY OF CREATININE: CPT | Performed by: SURGERY

## 2021-02-03 PROCEDURE — 255N000002 HC RX 255 OP 636: Performed by: SURGERY

## 2021-02-03 PROCEDURE — 250N000009 HC RX 250: Performed by: SURGERY

## 2021-02-03 PROCEDURE — 85610 PROTHROMBIN TIME: CPT | Performed by: SURGERY

## 2021-02-03 PROCEDURE — 999N000012 HC STATISTIC ANGIOGRAM, STENT, VERTEBRO PLASTY

## 2021-02-03 PROCEDURE — 85027 COMPLETE CBC AUTOMATED: CPT | Performed by: SURGERY

## 2021-02-03 PROCEDURE — 272N000196 HC ACCESSORY CR5

## 2021-02-03 PROCEDURE — C1760 CLOSURE DEV, VASC: HCPCS

## 2021-02-03 PROCEDURE — 83036 HEMOGLOBIN GLYCOSYLATED A1C: CPT | Performed by: SURGERY

## 2021-02-03 PROCEDURE — 85730 THROMBOPLASTIN TIME PARTIAL: CPT | Performed by: SURGERY

## 2021-02-03 PROCEDURE — 80061 LIPID PANEL: CPT | Performed by: SURGERY

## 2021-02-03 PROCEDURE — C2623 CATH, TRANSLUMIN, DRUG-COAT: HCPCS

## 2021-02-03 PROCEDURE — 37228 PR REVASC TIB/PERON ART, ANGIOPLASTY INIT VESSEL: CPT | Mod: LT | Performed by: SURGERY

## 2021-02-03 PROCEDURE — C1769 GUIDE WIRE: HCPCS

## 2021-02-03 PROCEDURE — 272N000569 HC SHEATH CR6

## 2021-02-03 PROCEDURE — 250N000011 HC RX IP 250 OP 636: Performed by: SURGERY

## 2021-02-03 PROCEDURE — 99204 OFFICE O/P NEW MOD 45 MIN: CPT | Performed by: INTERNAL MEDICINE

## 2021-02-03 PROCEDURE — C1725 CATH, TRANSLUMIN NON-LASER: HCPCS

## 2021-02-03 PROCEDURE — 76937 US GUIDE VASCULAR ACCESS: CPT | Mod: 26 | Performed by: SURGERY

## 2021-02-03 PROCEDURE — 75710 ARTERY X-RAYS ARM/LEG: CPT | Mod: LT

## 2021-02-03 RX ORDER — ACETAMINOPHEN 500 MG
500 TABLET ORAL EVERY 6 HOURS PRN
Status: DISCONTINUED | OUTPATIENT
Start: 2021-02-03 | End: 2021-02-03 | Stop reason: HOSPADM

## 2021-02-03 RX ORDER — FENTANYL CITRATE 50 UG/ML
25-50 INJECTION, SOLUTION INTRAMUSCULAR; INTRAVENOUS EVERY 5 MIN PRN
Status: DISCONTINUED | OUTPATIENT
Start: 2021-02-03 | End: 2021-02-03 | Stop reason: HOSPADM

## 2021-02-03 RX ORDER — DEXTROSE MONOHYDRATE 25 G/50ML
25-50 INJECTION, SOLUTION INTRAVENOUS
Status: DISCONTINUED | OUTPATIENT
Start: 2021-02-03 | End: 2021-02-03 | Stop reason: HOSPADM

## 2021-02-03 RX ORDER — NALOXONE HYDROCHLORIDE 0.4 MG/ML
0.4 INJECTION, SOLUTION INTRAMUSCULAR; INTRAVENOUS; SUBCUTANEOUS
Status: DISCONTINUED | OUTPATIENT
Start: 2021-02-03 | End: 2021-02-03 | Stop reason: HOSPADM

## 2021-02-03 RX ORDER — NICOTINE POLACRILEX 4 MG
15-30 LOZENGE BUCCAL
Status: DISCONTINUED | OUTPATIENT
Start: 2021-02-03 | End: 2021-02-03 | Stop reason: HOSPADM

## 2021-02-03 RX ORDER — HEPARIN SODIUM 1000 [USP'U]/ML
3000 INJECTION, SOLUTION INTRAVENOUS; SUBCUTANEOUS ONCE
Status: COMPLETED | OUTPATIENT
Start: 2021-02-03 | End: 2021-02-03

## 2021-02-03 RX ORDER — SODIUM CHLORIDE 9 MG/ML
INJECTION, SOLUTION INTRAVENOUS CONTINUOUS
Status: DISCONTINUED | OUTPATIENT
Start: 2021-02-03 | End: 2021-02-03 | Stop reason: HOSPADM

## 2021-02-03 RX ORDER — NALOXONE HYDROCHLORIDE 0.4 MG/ML
0.2 INJECTION, SOLUTION INTRAMUSCULAR; INTRAVENOUS; SUBCUTANEOUS
Status: DISCONTINUED | OUTPATIENT
Start: 2021-02-03 | End: 2021-02-03 | Stop reason: HOSPADM

## 2021-02-03 RX ORDER — FLUMAZENIL 0.1 MG/ML
0.2 INJECTION, SOLUTION INTRAVENOUS
Status: DISCONTINUED | OUTPATIENT
Start: 2021-02-03 | End: 2021-02-03 | Stop reason: HOSPADM

## 2021-02-03 RX ORDER — HEPARIN SODIUM 1000 [USP'U]/ML
4000 INJECTION, SOLUTION INTRAVENOUS; SUBCUTANEOUS ONCE
Status: COMPLETED | OUTPATIENT
Start: 2021-02-03 | End: 2021-02-03

## 2021-02-03 RX ORDER — HEPARIN SODIUM 200 [USP'U]/100ML
1 INJECTION, SOLUTION INTRAVENOUS CONTINUOUS PRN
Status: DISCONTINUED | OUTPATIENT
Start: 2021-02-03 | End: 2021-02-03 | Stop reason: HOSPADM

## 2021-02-03 RX ORDER — IODIXANOL 320 MG/ML
150 INJECTION, SOLUTION INTRAVASCULAR ONCE
Status: COMPLETED | OUTPATIENT
Start: 2021-02-03 | End: 2021-02-03

## 2021-02-03 RX ORDER — PROTAMINE SULFATE 10 MG/ML
30 INJECTION, SOLUTION INTRAVENOUS ONCE
Status: COMPLETED | OUTPATIENT
Start: 2021-02-03 | End: 2021-02-03

## 2021-02-03 RX ORDER — LIDOCAINE 40 MG/G
CREAM TOPICAL
Status: DISCONTINUED | OUTPATIENT
Start: 2021-02-03 | End: 2021-02-03 | Stop reason: HOSPADM

## 2021-02-03 RX ADMIN — FENTANYL CITRATE 50 MCG: 50 INJECTION, SOLUTION INTRAMUSCULAR; INTRAVENOUS at 12:28

## 2021-02-03 RX ADMIN — FENTANYL CITRATE 25 MCG: 50 INJECTION, SOLUTION INTRAMUSCULAR; INTRAVENOUS at 12:40

## 2021-02-03 RX ADMIN — MIDAZOLAM HYDROCHLORIDE 0.5 MG: 1 INJECTION, SOLUTION INTRAMUSCULAR; INTRAVENOUS at 12:40

## 2021-02-03 RX ADMIN — FENTANYL CITRATE 50 MCG: 50 INJECTION, SOLUTION INTRAMUSCULAR; INTRAVENOUS at 11:51

## 2021-02-03 RX ADMIN — FENTANYL CITRATE 50 MCG: 50 INJECTION, SOLUTION INTRAMUSCULAR; INTRAVENOUS at 12:01

## 2021-02-03 RX ADMIN — MIDAZOLAM HYDROCHLORIDE 1 MG: 1 INJECTION, SOLUTION INTRAMUSCULAR; INTRAVENOUS at 12:01

## 2021-02-03 RX ADMIN — MIDAZOLAM HYDROCHLORIDE 0.5 MG: 1 INJECTION, SOLUTION INTRAMUSCULAR; INTRAVENOUS at 12:09

## 2021-02-03 RX ADMIN — IODIXANOL 76 ML: 320 INJECTION, SOLUTION INTRAVASCULAR at 13:42

## 2021-02-03 RX ADMIN — PROTAMINE SULFATE 30 MG: 10 INJECTION, SOLUTION INTRAVENOUS at 13:38

## 2021-02-03 RX ADMIN — MIDAZOLAM HYDROCHLORIDE 0.5 MG: 1 INJECTION, SOLUTION INTRAMUSCULAR; INTRAVENOUS at 12:14

## 2021-02-03 RX ADMIN — SODIUM CHLORIDE: 9 INJECTION, SOLUTION INTRAVENOUS at 10:21

## 2021-02-03 RX ADMIN — MIDAZOLAM HYDROCHLORIDE 0.5 MG: 1 INJECTION, SOLUTION INTRAMUSCULAR; INTRAVENOUS at 13:31

## 2021-02-03 RX ADMIN — FENTANYL CITRATE 50 MCG: 50 INJECTION, SOLUTION INTRAMUSCULAR; INTRAVENOUS at 13:08

## 2021-02-03 RX ADMIN — MIDAZOLAM HYDROCHLORIDE 1 MG: 1 INJECTION, SOLUTION INTRAMUSCULAR; INTRAVENOUS at 12:28

## 2021-02-03 RX ADMIN — MIDAZOLAM HYDROCHLORIDE 0.5 MG: 1 INJECTION, SOLUTION INTRAMUSCULAR; INTRAVENOUS at 12:51

## 2021-02-03 RX ADMIN — FENTANYL CITRATE 25 MCG: 50 INJECTION, SOLUTION INTRAMUSCULAR; INTRAVENOUS at 12:09

## 2021-02-03 RX ADMIN — HEPARIN SODIUM 6000 UNITS: 200 INJECTION, SOLUTION INTRAVENOUS at 11:40

## 2021-02-03 RX ADMIN — LIDOCAINE HYDROCHLORIDE 27 ML: 10 INJECTION, SOLUTION INFILTRATION; PERINEURAL at 12:57

## 2021-02-03 RX ADMIN — HEPARIN SODIUM 4000 UNITS: 1000 INJECTION INTRAVENOUS; SUBCUTANEOUS at 12:08

## 2021-02-03 RX ADMIN — HEPARIN SODIUM 3000 UNITS: 1000 INJECTION INTRAVENOUS; SUBCUTANEOUS at 12:17

## 2021-02-03 RX ADMIN — FENTANYL CITRATE 25 MCG: 50 INJECTION, SOLUTION INTRAMUSCULAR; INTRAVENOUS at 13:31

## 2021-02-03 RX ADMIN — FENTANYL CITRATE 25 MCG: 50 INJECTION, SOLUTION INTRAMUSCULAR; INTRAVENOUS at 12:14

## 2021-02-03 RX ADMIN — MIDAZOLAM HYDROCHLORIDE 1 MG: 1 INJECTION, SOLUTION INTRAMUSCULAR; INTRAVENOUS at 13:08

## 2021-02-03 RX ADMIN — MIDAZOLAM HYDROCHLORIDE 1 MG: 1 INJECTION, SOLUTION INTRAMUSCULAR; INTRAVENOUS at 11:51

## 2021-02-03 RX ADMIN — FENTANYL CITRATE 25 MCG: 50 INJECTION, SOLUTION INTRAMUSCULAR; INTRAVENOUS at 12:51

## 2021-02-03 ASSESSMENT — MIFFLIN-ST. JEOR: SCORE: 1333.68

## 2021-02-03 NOTE — CONSULTS
[facility]    Vascular Medicine Consultation     Date of Admission:  2/3/2021  Date of Consult (When I saw the patient): 02/03/21    Attestation: I have examined the patient independently of Shaina Norton PA-C and agree with the examination and plan as delineated below.    Sohan Velazquez MD      Assessment & Plan   1. Peripheral arterial disease s/p left popliteal angioplasty and left SFA stenting 2017 followed by stenting of in-stent stenosis 7/30/19 now presenting with critical limb ischemia of left foot of non-healing great toe      He presented today for an angiogram. Post-procedure cares and follow-up per Dr. Hamlin. He will be continued on Plavix and aspirin 81 mg daily. He needs to get better control of his diabetes to prevent further progression of his peripheral arterial disease.      2. Hyperlipidemia     His lipid panel revealed an LDL of 35, HDL 55, triglycerides 91, and total cholesterol 108, indicating his lipids are very well controlled on Atorvastatin 80 mg daily and Zetia 10 mg daily. He should continue the same.      3. Type 2 diabetes mellitus     He was diagnosed with Type 2 diabetes in 2017. His A1C is 7.4% this admission. Given his PAD, he should optimally be treated more aggressively to an A1C of less than 7.0%. As such, he should continue his prior to admission metformin but also add Jardiance 10 mg daily. Jardiance has been shown to decrease CV mortality in patients with, which would be of benefit to him with his health history. He should have an A1C repeated in three months through his PCP to assure his A1C is at goal on this regimen.       4. History of tobacco use     He quit smoking just over 3 years ago. He was congratulated and the importance of smoking cessation was stressed.          Reason for Consult   Reason for consult: Asked by Dr. Hamlin to evaluate and help modify vascular risk factors in this 70 year old male former smoker with  hyperlipidemia, hypertension, diabetes, and PAD s/p angioplasty of left popliteal artery and SFA with stenting of left SFA 3/8/17 followed by stenting of in-stent stenosis 7/30/19 now presenting for an angiogram due to worsening wound of left great toe.     Primary Care Physician   Reid Reese      History of Present Illness   Zia Hopkins is a 70 year old male former smoker with hypertension, hyperlipidemia, and diabetes who in 2017 developed necrotic changes to his left toes. He underwent an angiogram on 3/8/17 with left SFA and popliteal artery angioplasty and left SFA stenting by Dr. Hamlin. He appeared to do well and continued to follow up on a routine basis. He presented for his routine follow up with Dr. Hamlin on 7/15/19 and was noted to have in-stent stenosis on arterial duplex. ABIs were unable to be obtained due to non-compressible vessels.  He underwent an angiogram on 7/30/19 and an additional stent was placed inside his previous stent.     His follow-up in the year 2020 was delayed due to him being in [place] and then because of the pandemic.  Now he comes back reporting that his left great toe has turned black.  On noninvasive imaging that great toe pressure on the left side is 0. 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 An angiogram was recommended for further evaluation and possible intervention and he presented for this today.        Past Medical History   Past Medical History:   Diagnosis Date     Diabetes mellitus type 2, noninsulin dependent (H)      Hyperlipidemia LDL goal <70      Hypertension      PAD (peripheral artery disease) (H)        Past Surgical History   Past Surgical History:   Procedure Laterality Date     AMPUTATE FOOT Left 3/15/2017    Procedure: AMPUTATE FOOT;  Surgeon: Emre Mauricio DPM;  Location: [facility] OR     IR LOWER EXTREMITY ANGIOGRAM LEFT  7/30/2019     IRRIGATION AND DEBRIDEMENT FOOT, COMBINED Left 3/15/2017    Procedure: COMBINED IRRIGATION AND DEBRIDEMENT  FOOT;  Surgeon: Emre Mauricio DPM;  Location:  OR       Prior to Admission Medications   Prior to Admission Medications   Prescriptions Last Dose Informant Patient Reported? Taking?   Cadexomer Iodine, topical, 0.9% (IODOSORB) 0.9 % GEL gel   No No   Sig: Apply 40 g topically daily as needed for other   amLODIPine (NORVASC) 10 MG tablet 2/2/2021 at Unknown time  Yes Yes   Sig: Take 10 mg by mouth daily   aspirin 81 MG tablet 2/3/2021 at Unknown time  No Yes   Sig: Take 1 tablet (81 mg) by mouth daily   atorvastatin (LIPITOR) 80 MG tablet 2/2/2021 at Unknown time  No Yes   Sig: TAKE 1 TABLET(80 MG) BY MOUTH AT BEDTIME   chlorthalidone (HYGROTON) 50 MG tablet 2/2/2021 at Unknown time  Yes Yes   Sig: TK 1 T PO QD FOR BP   clopidogrel (PLAVIX) 75 MG tablet 2/3/2021 at Unknown time  No Yes   Sig: TAKE 1 TABLET(75 MG) BY MOUTH DAILY   ezetimibe (ZETIA) 10 MG tablet 2/2/2021 at Unknown time  No Yes   Sig: TAKE 1 TABLET(10 MG) BY MOUTH DAILY   losartan (COZAAR) 100 MG tablet 2/2/2021 at Unknown time  Yes Yes   Sig: TK 1 T TABLET QD   losartan-hydrochlorothiazide (HYZAAR) 100-25 MG tablet   No No   Sig: Take 1 tablet by mouth daily   Patient not taking: Reported on 2/1/2021   metFORMIN (GLUCOPHAGE) 1000 MG tablet 2/2/2021 at Unknown time  Yes Yes   Sig: Take 1,000 mg by mouth 2 times daily (with meals)    metFORMIN (GLUCOPHAGE-XR) 500 MG 24 hr tablet  Self Yes No   Sig: Take 500 mg by mouth daily (with breakfast)   metoprolol succinate ER (TOPROL-XL) 100 MG 24 hr tablet 2/2/2021 at Unknown time  No Yes   Sig: Take 1 tablet (100 mg) by mouth daily   triamcinolone (KENALOG) 0.1 % cream Unknown at Unknown time Self Yes No   Sig: Apply to affected area as needed      Facility-Administered Medications: None     Allergies   No Known Allergies    Social History   Zia Hopkins  reports that he quit smoking about 3 years ago. His smoking use included cigarettes. He smoked 0.75 packs per day. He has never used smokeless  tobacco. He reports current alcohol use. He reports that he does not use drugs.    Family History   Family History   Problem Relation Age of Onset     Diabetes Father        Review of Systems   The 10 point Review of Systems is negative other than noted in the HPI or here.     Physical Exam   Temp: 97.5  F (36.4  C) Temp src: Oral BP: (!) 174/75 Pulse: 75   Resp: 16 SpO2: 98 % O2 Device: None (Room air)    Vital Signs with Ranges  Temp:  [97.5  F (36.4  C)] 97.5  F (36.4  C)  Pulse:  [75] 75  Resp:  [16] 16  BP: (174-193)/(75-82) 174/75  SpO2:  [98 %] 98 %  141 lbs 4.8 oz    Constitutional: awake, alert, cooperative, no apparent distress, and appears stated age  Eyes: Lids and lashes normal, pupils equal, round and reactive to light, extra ocular muscles intact, sclera clear, conjunctiva normal  ENT: normocepalic, without obvious abnormality, oropharynx pink and moist  Hematologic / Lymphatic: no lymphadenopathy  Respiratory: No increased work of breathing, good air exchange, clear to auscultation bilaterally, no crackles or wheezing  Cardiovascular: regular rate and rhythm, normal S1 and S2 and no murmur noted. No carotid bruits noted.   GI: Normal bowel sounds, soft, non-distended, non-tender  Skin: no redness, warmth, or swelling, no rashes. Left great toe wound dressed.   Musculoskeletal: There is no redness, warmth, or swelling of the joints.  Full range of motion noted.  Motor strength is 5 out of 5 all extremities bilaterally.  Tone is normal.  Neurologic: Awake, alert, oriented to name, place and time.  Cranial nerves II-XII are grossly intact.  Motor is 5 out of 5 bilaterally.    Neuropsychiatric:  Normal affect, memory, insight.      Data   Most Recent 3 CBC's:  Recent Labs   Lab Test 02/03/21  1003 07/30/19  0712 03/16/17  0742   WBC 12.0* 5.7 9.6   HGB 12.2* 15.0 12.3*   MCV 94 92 91    215 219     Most Recent 3 BMP's:  Recent Labs   Lab Test 02/03/21  1003 07/30/19  0712 07/22/19 03/16/17  0742  03/16/17  0742 03/14/17  1520 03/14/17  1520   NA  --   --   --   --  137  --  136   POTASSIUM  --   --  4.9  --  3.8  --  4.8   CHLORIDE  --   --   --   --  104  --  99   CO2  --   --   --   --  26  --  30   BUN  --   --   --   --  9  --  15   CR 0.95 0.80 0.74*   < > 0.71   < > 0.72   ANIONGAP  --   --   --   --  7  --  7   TIN  --   --   --   --  8.3*  --  9.3   *  --  170*  --  182*  --  233*    < > = values in this interval not displayed.     Most Recent 3 INR's:  Recent Labs   Lab Test 02/03/21  1003 07/30/19  0712 03/08/17  0720   INR 0.95 0.90 0.87     Most Recent Cholesterol Panel:  Recent Labs   Lab Test 02/03/21  1003   CHOL 108   LDL 35   HDL 55   TRIG 91     Most Recent Hemoglobin A1c:  Recent Labs   Lab Test 02/03/21  1003   A1C 7.4*

## 2021-02-03 NOTE — CONFIDENTIAL NOTE
Prior Authorization Retail Medication Request    Medication/Dose:   empagliflozin (JARDIANCE) 10 MG TABS tablet  Take 1 tablet (10 mg) by mouth daily    ICD code:   Type 2 diabetes mellitus with diabetic peripheral angiopathy and gangrene, without long-term current use of insulin (H) [E11.52]       Previously Tried and Failed: metformin    Rationale:    Dx:  DM 2 in 2017.  A1C 02/03/21 7.4%  Severe PAD (history of amputation in 2017)    Should optimally be treated more aggressively to an A1C of less than 7.0%.  He should continue his prior to admission metformin but add Jardiance 10 mg daily. Jardiance has been shown to decrease CV mortality in patients with, which would be of benefit to him with his health history of CVD.    Insurance:  Member ID: [id number]     Payor: [employer] Ph: [phone]     Benefit plan: [employer] Ph: [phone]     Group number:           Pharmacy:  [place] [address]  Telephone Fax   [phone]       Routing to MARIBELL Fernandes.  Lluvia Hays RN BSN  [facility]

## 2021-02-03 NOTE — PROGRESS NOTES
Care Suites Admission Nursing Note    Patient Information  Name: Zia Hopkins  Age: 70 year old  Reason for admission: 0945  Care Suites arrival time: LE angiogram    Patient Admission/Assessment   Pre-procedure assessment complete: Yes  If abnormal assessment/labs, provider notified: N/A  NPO: Yes  Medications held per instructions/orders: N/A  Consent: deferred  If applicable, pregnancy test status: deferred  Patient oriented to room: Yes  Education/questions answered: Yes  Plan/other: MD here to sign consent.  Groin shave and dayami wipe prep on right site complete.    Discharge Planning  Discharge name/phone number: Le [phone]  Overnight post sedation caregiver: wife  Discharge location: home    Nisa Johnson RN     PATIENT/VISITOR WELLNESS SCREENING    Step 1 Patient Screening    1. In the last month, have you been in contact with someone who was confirmed or suspected to have Coronavirus/COVID-19? No    2. Do you have the following symptoms?  Fever/Chills? No   Cough? No   Shortness of breath? No   New loss of taste or smell? No  Sore throat? No  Muscle or body aches? No  Headaches? No  Fatigue? No  Vomiting or diarrhea? No

## 2021-02-03 NOTE — LETTER
2021    To:   [employer]  Medicare Clinical Appeals  [address] [address]  [place] [address] [phone]  Fax [phone]    RE: Zia Hopkins  [address]  [facility] [place] [address]  : 1950  MRN: [id number]  Member ID [id number]    Case/Reference: ***    To Whom It May Concern,    I am writing on behalf of my patient, Zia Hopkins to document the medical necessity of empagliflozin (JARDIANCE) 10 MG  for the treatment of Type 2 diabetes mellitus with diabetic peripheral angiopathy and gangrene, without long-term current use of insulin (H) [E11.52] .     Zia Hopkins has cardiovascular disease and suboptimally controlled diabetes.  Jardiance was specifically chosen not just to reduce glycemic burden, but rather to decrease cardiovascular mortality.    Jardiance is the only SGLT2 inhibitor with cardiovascular mortality reduction data. Not optimally glycemically controlled, the patient is having progression of clinical cardiovascular disease. As such, it is important to choose an SGLT2 inhibitor with CV mortality reduction.     In summary, empagliflozin (JARDIANCE) is medically necessary for this patient s medical condition. Please call my office at [phone] if I can provide you with any additional information to approve my request. I look forward to receiving your timely response and approval of this request.     Sincerely,      Sohan Velazquez MD

## 2021-02-03 NOTE — PROGRESS NOTES
Care Suites Admission Nursing Note    Patient Information  Name: Zia Hopkins  Age: 70 year old  Reason for admission: LE angiogram  Care Suites arrival time: 0945    Visitor Information  Name:  Informed of visitor restrictions:   1 visitor allowed per patient   Visitor must screen negative for COVID symptoms   Visitor must wear a mask  Waiting rooms closed to visitors    Patient Admission/Assessment   Pre-procedure assessment complete:   If abnormal assessment/labs, provider notified:  NPO:   Medications held per instructions/orders:   Consent:   If applicable, pregnancy test status:  Patient oriented to room:   Education/questions answered:   Plan/other:     Discharge Planning  Discharge name/phone number:   Overnight post sedation caregiver:  Discharge location    Nisa Johnson RN

## 2021-02-03 NOTE — PRE-PROCEDURE
GENERAL PRE-PROCEDURE:   Procedure:  LEFT LOWER EXTREMITY ARTERIORAM WITH POSSIBLE INTERVENTION   Date/Time:  2/3/2021 10:17 AM    Written consent obtained?: Yes    Risks and benefits: Risks, benefits and alternatives were discussed    Consent given by:  Patient  Patient states understanding of procedure being performed: Yes    Patient's understanding of procedure matches consent: Yes    Procedure consent matches procedure scheduled: Yes    Expected level of sedation:  Moderate  Appropriately NPO:  Yes  ASA Class:  Class 2- mild systemic disease, no acute problems, no functional limitations  Mallampati  :  Grade 2- soft palate, base of uvula, tonsillar pillars, and portion of posterior pharyngeal wall visible  Lungs:  Lungs clear with good breath sounds bilaterally  Heart:  Normal heart sounds and rate  History & Physical reviewed:  History and physical reviewed and no updates needed  Statement of review:  I have reviewed the lab findings, diagnostic data, medications, and the plan for sedation

## 2021-02-03 NOTE — IR NOTE
Patient Name: Zia Hopkins  Medical Record Number: [id number]  Today's Date: February 3, 2021    Start Time: 1153  End of procedure time: 1344  Procedure: left lower extremity arteriogram with possible intervention  Report given to: CS RN  Time pt departs:  1355    Other Notes: Pt into IR suite 1 via cart IVF infusing. Pt awake and alert. To table in supine position prepped and draped with 2%. VSS. Tele NSR. Dr. Hamlin in room. Time out and procedure started. Pt tolerated procedure well. Debrief with Dr. Hamlin. Pressure held until hemostasis achieved. Dressing CDI with angioseal. No complications. Pt transferred back CS. Report given CS RN    Medications: Last sedation given at 1331    Versed 6.5mg  Fentanyl 375mcg  Lidocaine 1% 27ml  Heparin 7000units  Protamine 30mg    Alissa Higuera RN

## 2021-02-03 NOTE — PROGRESS NOTES
Care Suites Post Procedure Note    Patient Information  Name: Zia Hopkins  Age: 70 year old    Post Procedure  Time patient returned to Care Suites: 1350  Concerns/abnormal assessment: none  If abnormal assessment, provider notified: N/A  Plan/Other: Right groin site with angioseal, 2x2 with Tegaderm dressing.  Per report- angioplasy and stent to L SFA.  Groin site CDI, soft and flat.  Denies any pain.  Call light in reach, will cont to monitor.  Wife has left to go home, will return for .    1525- Call placed to Dr Velazquez.  Pt has a new Rx for Jardiance to .  Per Dr Velazquez pt will not start this medication at this time d/t cost.  Pt will be contacted from the clinic on when Jardiance is accessible for pt to start.    Nisa Johnson RN

## 2021-02-04 ENCOUNTER — TELEPHONE (OUTPATIENT)
Dept: OTHER | Facility: CLINIC | Age: 71
End: 2021-02-04

## 2021-02-04 DIAGNOSIS — S91.102A OPEN WOUND OF LEFT GREAT TOE: Primary | ICD-10-CM

## 2021-02-04 NOTE — TELEPHONE ENCOUNTER
I called podiatry  to expedite referral, staff reports pt just got scheduled. Per [facility] Pt is already scheduled with podiatry, Dr. Lin tomorrow (2/5/21).    JOHN العراقيN, RN  [facility]  Office:  [phone] Fax: [phone]

## 2021-02-04 NOTE — PROGRESS NOTES
Per Dr. Hamlin, pt needs to see podiatry soon.     Referral placed to podiatry for left toe wound, referral marked urgent.     JOHN العراقيN, RN    [facility]  Office:  [phone] Fax: [phone]

## 2021-02-04 NOTE — TELEPHONE ENCOUNTER
Patient had an angiogram yesterday with Dr. Hamlin. Patient is calling and would like an update from Dr. Hamlin's nurse about the results. Patient can be reached at [phone]

## 2021-02-04 NOTE — TELEPHONE ENCOUNTER
PA Initiation    Medication: JARDIANCE 10 MG TABS tablet  Insurance Company: [employer] - Phone [phone] Fax [phone]  Pharmacy Filling the Rx: [facility] - [address] [facility]  Filling Pharmacy Phone: [phone]  Filling Pharmacy Fax: [phone]  Start Date: 2/4/2021

## 2021-02-04 NOTE — PROGRESS NOTES
Care Suites Discharge Nursing Note    Patient Information  Name: Zia Hopkins  Age: 70 year old    Discharge Education:  Discharge instructions reviewed: Yes  Additional education/resources provided: n/a  Patient/patient representative verbalizes understanding: Yes  Patient discharging on new medications: No  Medication education completed: N/A    Discharge Plans:   Discharge location: home  Discharge ride contacted: Yes  Approximate discharge time: 1820    Discharge Criteria:  Discharge criteria met and vital signs stable: Yes    Patient Belongs:  Patient belongings returned to patient: Yes    Nisa Chi RN

## 2021-02-04 NOTE — TELEPHONE ENCOUNTER
Wife, Le, is asking if the request for appt with podiatrist can be expedited.       Call Le at:  [phone]

## 2021-02-05 ENCOUNTER — OFFICE VISIT (OUTPATIENT)
Dept: PODIATRY | Facility: CLINIC | Age: 71
End: 2021-02-05
Attending: SURGERY
Payer: COMMERCIAL

## 2021-02-05 VITALS — SYSTOLIC BLOOD PRESSURE: 160 MMHG | HEART RATE: 73 BPM | DIASTOLIC BLOOD PRESSURE: 63 MMHG

## 2021-02-05 DIAGNOSIS — E11.621 DIABETIC ULCER OF TOE OF LEFT FOOT ASSOCIATED WITH TYPE 2 DIABETES MELLITUS, UNSPECIFIED ULCER STAGE (H): ICD-10-CM

## 2021-02-05 DIAGNOSIS — I96 GANGRENE OF TOE OF LEFT FOOT (H): ICD-10-CM

## 2021-02-05 DIAGNOSIS — E11.51 TYPE II DIABETES MELLITUS WITH PERIPHERAL ARTERY DISEASE (H): ICD-10-CM

## 2021-02-05 DIAGNOSIS — L03.119 CELLULITIS AND ABSCESS OF FOOT, EXCEPT TOES: Primary | ICD-10-CM

## 2021-02-05 DIAGNOSIS — S91.209A AVULSION OF TOENAIL, INITIAL ENCOUNTER: ICD-10-CM

## 2021-02-05 DIAGNOSIS — L02.619 CELLULITIS AND ABSCESS OF FOOT, EXCEPT TOES: Primary | ICD-10-CM

## 2021-02-05 DIAGNOSIS — L97.529 DIABETIC ULCER OF TOE OF LEFT FOOT ASSOCIATED WITH TYPE 2 DIABETES MELLITUS, UNSPECIFIED ULCER STAGE (H): ICD-10-CM

## 2021-02-05 PROCEDURE — 87077 CULTURE AEROBIC IDENTIFY: CPT | Performed by: PODIATRIST

## 2021-02-05 PROCEDURE — 87070 CULTURE OTHR SPECIMN AEROBIC: CPT | Performed by: PODIATRIST

## 2021-02-05 PROCEDURE — 87186 SC STD MICRODIL/AGAR DIL: CPT | Performed by: PODIATRIST

## 2021-02-05 PROCEDURE — 11730 AVULSION NAIL PLATE SIMPLE 1: CPT | Mod: TA | Performed by: PODIATRIST

## 2021-02-05 PROCEDURE — 99204 OFFICE O/P NEW MOD 45 MIN: CPT | Mod: 25 | Performed by: PODIATRIST

## 2021-02-05 RX ORDER — AMOXICILLIN 500 MG/1
500 CAPSULE ORAL 2 TIMES DAILY
Qty: 28 CAPSULE | Refills: 0 | Status: SHIPPED | OUTPATIENT
Start: 2021-02-05 | End: 2021-02-25

## 2021-02-05 NOTE — PROGRESS NOTES
Subjective:    Pt is seen today in consult from Dr. Hamlin with the c/c of gangrene left first and second toes.  Patient presented to vascular surgery on February 1, 2021.  At that time he was diagnosed with a gangrenous first and second toes.  On 2/3/2021 patient underwent vascular intervention which improved his circulation.  Patient states that his foot is feeling better since this intervention 2 days ago.  He has a history of a left fifth toe amputation.  The patient states that at time he had a strep infection in this toe.  He states that his left foot did not become swollen until approximately a week ago.  Patient states approximately 2 weeks ago he caught his posterior left rear foot on a door and has developed a wound here.  He is dressing this with a Band-Aid.  He denies any fever chills or shortness of breath.  He denies any drainage from the wound.  He states it is somewhat odorous.  Patient has a past history of smoking.  He has diabetes that is somewhat poorly controlled.    ROS:  A 10-point review of systems was performed and is positive for that noted in the HPI and as seen below.  All other areas are negative.        No Known Allergies    Current Outpatient Medications   Medication Sig Dispense Refill     amoxicillin (AMOXIL) 500 MG capsule Take 1 capsule (500 mg) by mouth 2 times daily 28 capsule 0     amoxicillin-clavulanate (AUGMENTIN) 875-125 MG tablet Take 1 tablet by mouth 2 times daily 28 tablet 0     amLODIPine (NORVASC) 10 MG tablet Take 10 mg by mouth daily       aspirin 81 MG tablet Take 1 tablet (81 mg) by mouth daily 90 tablet 1     atorvastatin (LIPITOR) 80 MG tablet TAKE 1 TABLET(80 MG) BY MOUTH AT BEDTIME 90 tablet 3     Cadexomer Iodine, topical, 0.9% (IODOSORB) 0.9 % GEL gel Apply 40 g topically daily as needed for other 1 Tube 1     chlorthalidone (HYGROTON) 50 MG tablet TK 1 T PO QD FOR BP       clopidogrel (PLAVIX) 75 MG tablet TAKE 1 TABLET(75 MG) BY MOUTH DAILY 90 tablet 1      empagliflozin (JARDIANCE) 10 MG TABS tablet Take 1 tablet (10 mg) by mouth daily 30 tablet 3     ezetimibe (ZETIA) 10 MG tablet TAKE 1 TABLET(10 MG) BY MOUTH DAILY 90 tablet 3     losartan (COZAAR) 100 MG tablet TK 1 T TABLET QD       metFORMIN (GLUCOPHAGE) 1000 MG tablet Take 1,000 mg by mouth 2 times daily (with meals)        metFORMIN (GLUCOPHAGE-XR) 500 MG 24 hr tablet Take 500 mg by mouth daily (with breakfast)       metoprolol succinate ER (TOPROL-XL) 100 MG 24 hr tablet Take 1 tablet (100 mg) by mouth daily 90 tablet 3     triamcinolone (KENALOG) 0.1 % cream Apply to affected area as needed  5       Patient Active Problem List   Diagnosis     Gangrene of toe (H)     Toe amputation status, left     Acute osteomyelitis of left foot (H)     PAD (peripheral artery disease) (H)     Diabetes mellitus, type 2 (H)     Diabetic ulcer of left foot associated with type 2 diabetes mellitus (H)     Nonhealing surgical wound, initial encounter     Non-pressure chronic ulcer of other part of left lower leg with necrosis of muscle (H)       Past Medical History:   Diagnosis Date     Diabetes mellitus type 2, noninsulin dependent (H)      Hyperlipidemia LDL goal <70      Hypertension      PAD (peripheral artery disease) (H)        Past Surgical History:   Procedure Laterality Date     AMPUTATE FOOT Left 3/15/2017    Procedure: AMPUTATE FOOT;  Surgeon: Emre Mauricio DPM;  Location: [facility] OR     IR LOWER EXTREMITY ANGIOGRAM LEFT  7/30/2019     IRRIGATION AND DEBRIDEMENT FOOT, COMBINED Left 3/15/2017    Procedure: COMBINED IRRIGATION AND DEBRIDEMENT FOOT;  Surgeon: Emre Mauricio DPM;  Location: [facility] OR       Family History   Problem Relation Age of Onset     Diabetes Father        Social History     Tobacco Use     Smoking status: Former Smoker     Packs/day: 0.75     Types: Cigarettes     Quit date: 3/3/2017     Years since quitting: 3.9     Smokeless tobacco: Never Used     Tobacco comment: Quit Friday March 3 2017    Substance Use Topics     Alcohol use: Yes     Comment: Wine 3/4 bottle a day         Exam:    Vitals: BP (!) 160/63   Pulse 73   BMI: There is no height or weight on file to calculate BMI.  Height: Data Unavailable    Constitutional/ general:  Pt is in no apparent distress, appears well-nourished.  Cooperative with history and physical exam.  Patient seen with female today.       Psych:  The patient answered questions appropriately.  Normal affect.  Seems to have reasonable expectations, in terms of treatment.     Eyes:  Visual scanning/ tracking without deficit.     Ears:  Response to auditory stimuli is normal.   Auricles in proper alignment.     Lymphatic:  Popliteal lymph nodes not enlarged.     Lungs:  Non labored breathing, non labored speech. No cough.  No audible wheezing. Even, quiet breathing.       Vascular: Patient has nonpalpable pedal pulses.  No edema is noted on the right ankle.  Edema noted on the left foot and ankle.  Patient states about 2 weeks ago he caught his posterior    Neuro:  Alert and oriented x 3. Coordinated gait.  Light touch sensation is intact to the L4, L5, S1 distributions. No obvious deficits.  No evidence of neurological-based weakness, spasticity, or contracture in the lower extremities.  Monofilament intact on digits    Derm: Thin and shiny with no hair growth noted    Musculoskeletal:    Lower extremity muscle strength is normal.  Patient is ambulatory without an assistive device or brace.  The right foot exam is unremarkable.  On the left foot there is a well-healed amputation of the fifth toe.    On the left Achilles tendon watershed area there is a wound that measures 7 x 4 mm.  The base is somewhat fibrotic.  It appears to be full-thickness.  There is no erythema edema.  There are some peeling skin around this which appears to be from the adhesive.    On the plantar posterior central portion of the left heel there is a wound that measures 13 x 7 mm.  The base is a dry  leathery eschar.  There is no erythema edema or drainage.  This appears to be a fissure that has developed into a wound.    On the left second toe medial there is a dry leathery eschar.  There is no erythema or edema here.  There is no drainage or odor.    The left hallux has gangrenous tissue lateral and dorsal lateral.  The dorsal medial medial and plantar portions of the left hallux show no signs of gangrene at this time.  The left hallux nail is loose.  With removing this some odor is noted.  There is no purulence.  At this time there is no sinus tracts.  There is edema on the dorsum of the left foot.  There is slight erythema.  There is no crepitus with palpation.  A:  Diabetes mellitus with peripheral arterial disease status post recent intervention  Left rear foot ulcers x2  Left second toe ulcer  Left hallux gangrene  Left hallux loose nail    P:  Discussed with patient left hallux nail is loose.  Discussed avulsion and they are in agreement.  They gave verbal consent.  We prepped this.  We avulsed the left hallux nail.  We noticed that the nailbed was somewhat moist and odorous.  We took a culture.  We dressed the left hallux nailbed wound with gauze and alcohol.  They will dress this 3 times a day with gauze and alcohol to dry this out and to kill bacteria.  They will address the second toe over the posterior heel wound.  Explained how the wound is over the Achilles has peeling skin probably from irritation from the adhesive.  They will just put antibiotic ointment on this.  They will make sure there is nothing tight in his foot.  He will wear loose sock.  We gave him a DH shoe today to keep his toes protected.  We are going to start the patient on Augmentin 875 mg 1 p.o. twice daily and amoxicillin 500 mg 1 p.o. twice daily.  Discussed with patient we will watch all his wounds and hopefully they will heal.  Discussed there is a chance for amputation.  He will try to keep his blood sugars as well  controlled as possible.  Return to the clinic in 6 days for reevaluation to discuss culture results.  Thank you for allowing me participate in the care of this patient.        Cornel Lin, KEMAR REINOSO, FACFAS

## 2021-02-05 NOTE — PATIENT INSTRUCTIONS
We wish you continued good healing. If you have any questions or concerns, please do not hesitate to contact us at [phone]    evlyt (secure e-mail communication and access to your chart) to send a message or to make an appointment.    Please remember to call and schedule a follow up appointment if one was recommended at your earliest convenience.     +++OF MARCH 2020+++ LOCATION AND HOURS HAVE CHANGED    PLEASE CALL CLINICS TO VERIFY DAYS AND TIMES  PODIATRY CLINIC HOURS  TELEPHONE NUMBER    Dr. Cornel COLBERTPYANNICK formerly [facility]        Clinics:  [facility]   Tuesday 1PM-6PM  [place]  Wednesday 745AM-330PM  [place]  Thursday/Friday 745AM-230PM  [place] APPOINTMENTS  [phone]    [place] APPOINTMENTS  [phone]          If you need a medication refill, please contact us you may need lab work and/or a follow up visit prior to your refill (i.e. Antifungal medications).    If MRI needed please call Imaging at [phone] or [phone]    HOW DO I GET MY KNEE SCOOTER? Knee scooters can be picked up at ANY Medical Supply stores with your knee scooter Prescription.  OR    Bring your signed prescription to an [facility] showroom.

## 2021-02-05 NOTE — LETTER
2/5/2021         RE: Zia Hopkins  [address] [facility] [address]        Dear Colleague,    Thank you for referring your patient, Zia Hopkins, to the [facility]. Please see a copy of my visit note below.    Subjective:    Pt is seen today in consult from Dr. Hamlin with the c/c of gangrene left first and second toes.  Patient presented to vascular surgery on February 1, 2021.  At that time he was diagnosed with a gangrenous first and second toes.  On 2/3/2021 patient underwent vascular intervention which improved his circulation.  Patient states that his foot is feeling better since this intervention 2 days ago.  He has a history of a left fifth toe amputation.  The patient states that at time he had a strep infection in this toe.  He states that his left foot did not become swollen until approximately a week ago.  Patient states approximately 2 weeks ago he caught his posterior left rear foot on a door and has developed a wound here.  He is dressing this with a Band-Aid.  He denies any fever chills or shortness of breath.  He denies any drainage from the wound.  He states it is somewhat odorous.  Patient has a past history of smoking.  He has diabetes that is somewhat poorly controlled.    ROS:  A 10-point review of systems was performed and is positive for that noted in the HPI and as seen below.  All other areas are negative.        No Known Allergies    Current Outpatient Medications   Medication Sig Dispense Refill     amoxicillin (AMOXIL) 500 MG capsule Take 1 capsule (500 mg) by mouth 2 times daily 28 capsule 0     amoxicillin-clavulanate (AUGMENTIN) 875-125 MG tablet Take 1 tablet by mouth 2 times daily 28 tablet 0     amLODIPine (NORVASC) 10 MG tablet Take 10 mg by mouth daily       aspirin 81 MG tablet Take 1 tablet (81 mg) by mouth daily 90 tablet 1     atorvastatin (LIPITOR) 80 MG tablet TAKE 1 TABLET(80 MG) BY MOUTH AT BEDTIME 90 tablet 3     Cadexomer  Iodine, topical, 0.9% (IODOSORB) 0.9 % GEL gel Apply 40 g topically daily as needed for other 1 Tube 1     chlorthalidone (HYGROTON) 50 MG tablet TK 1 T PO QD FOR BP       clopidogrel (PLAVIX) 75 MG tablet TAKE 1 TABLET(75 MG) BY MOUTH DAILY 90 tablet 1     empagliflozin (JARDIANCE) 10 MG TABS tablet Take 1 tablet (10 mg) by mouth daily 30 tablet 3     ezetimibe (ZETIA) 10 MG tablet TAKE 1 TABLET(10 MG) BY MOUTH DAILY 90 tablet 3     losartan (COZAAR) 100 MG tablet TK 1 T TABLET QD       metFORMIN (GLUCOPHAGE) 1000 MG tablet Take 1,000 mg by mouth 2 times daily (with meals)        metFORMIN (GLUCOPHAGE-XR) 500 MG 24 hr tablet Take 500 mg by mouth daily (with breakfast)       metoprolol succinate ER (TOPROL-XL) 100 MG 24 hr tablet Take 1 tablet (100 mg) by mouth daily 90 tablet 3     triamcinolone (KENALOG) 0.1 % cream Apply to affected area as needed  5       Patient Active Problem List   Diagnosis     Gangrene of toe (H)     Toe amputation status, left     Acute osteomyelitis of left foot (H)     PAD (peripheral artery disease) (H)     Diabetes mellitus, type 2 (H)     Diabetic ulcer of left foot associated with type 2 diabetes mellitus (H)     Nonhealing surgical wound, initial encounter     Non-pressure chronic ulcer of other part of left lower leg with necrosis of muscle (H)       Past Medical History:   Diagnosis Date     Diabetes mellitus type 2, noninsulin dependent (H)      Hyperlipidemia LDL goal <70      Hypertension      PAD (peripheral artery disease) (H)        Past Surgical History:   Procedure Laterality Date     AMPUTATE FOOT Left 3/15/2017    Procedure: AMPUTATE FOOT;  Surgeon: Emre Mauricio DPM;  Location:  OR     IR LOWER EXTREMITY ANGIOGRAM LEFT  7/30/2019     IRRIGATION AND DEBRIDEMENT FOOT, COMBINED Left 3/15/2017    Procedure: COMBINED IRRIGATION AND DEBRIDEMENT FOOT;  Surgeon: Emre Mauricio DPM;  Location:  OR       Family History   Problem Relation Age of Onset     Diabetes  Father        Social History     Tobacco Use     Smoking status: Former Smoker     Packs/day: 0.75     Types: Cigarettes     Quit date: 3/3/2017     Years since quitting: 3.9     Smokeless tobacco: Never Used     Tobacco comment: Quit Friday March 3 2017   Substance Use Topics     Alcohol use: Yes     Comment: Wine 3/4 bottle a day         Exam:    Vitals: BP (!) 160/63   Pulse 73   BMI: There is no height or weight on file to calculate BMI.  Height: Data Unavailable    Constitutional/ general:  Pt is in no apparent distress, appears well-nourished.  Cooperative with history and physical exam.  Patient seen with female today.       Psych:  The patient answered questions appropriately.  Normal affect.  Seems to have reasonable expectations, in terms of treatment.     Eyes:  Visual scanning/ tracking without deficit.     Ears:  Response to auditory stimuli is normal.   Auricles in proper alignment.     Lymphatic:  Popliteal lymph nodes not enlarged.     Lungs:  Non labored breathing, non labored speech. No cough.  No audible wheezing. Even, quiet breathing.       Vascular: Patient has nonpalpable pedal pulses.  No edema is noted on the right ankle.  Edema noted on the left foot and ankle.  Patient states about 2 weeks ago he caught his posterior    Neuro:  Alert and oriented x 3. Coordinated gait.  Light touch sensation is intact to the L4, L5, S1 distributions. No obvious deficits.  No evidence of neurological-based weakness, spasticity, or contracture in the lower extremities.  Monofilament intact on digits    Derm: Thin and shiny with no hair growth noted    Musculoskeletal:    Lower extremity muscle strength is normal.  Patient is ambulatory without an assistive device or brace.  The right foot exam is unremarkable.  On the left foot there is a well-healed amputation of the fifth toe.    On the left Achilles tendon watershed area there is a wound that measures 7 x 4 mm.  The base is somewhat fibrotic.  It appears  to be full-thickness.  There is no erythema edema.  There are some peeling skin around this which appears to be from the adhesive.    On the plantar posterior central portion of the left heel there is a wound that measures 13 x 7 mm.  The base is a dry leathery eschar.  There is no erythema edema or drainage.  This appears to be a fissure that has developed into a wound.    On the left second toe medial there is a dry leathery eschar.  There is no erythema or edema here.  There is no drainage or odor.    The left hallux has gangrenous tissue lateral and dorsal lateral.  The dorsal medial medial and plantar portions of the left hallux show no signs of gangrene at this time.  The left hallux nail is loose.  With removing this some odor is noted.  There is no purulence.  At this time there is no sinus tracts.  There is edema on the dorsum of the left foot.  There is slight erythema.  There is no crepitus with palpation.  A:  Diabetes mellitus with peripheral arterial disease status post recent intervention  Left rear foot ulcers x2  Left second toe ulcer  Left hallux gangrene  Left hallux loose nail    P:  Discussed with patient left hallux nail is loose.  Discussed avulsion and they are in agreement.  They gave verbal consent.  We prepped this.  We avulsed the left hallux nail.  We noticed that the nailbed was somewhat moist and odorous.  We took a culture.  We dressed the left hallux nailbed wound with gauze and alcohol.  They will dress this 3 times a day with gauze and alcohol to dry this out and to kill bacteria.  They will address the second toe over the posterior heel wound.  Explained how the wound is over the Achilles has peeling skin probably from irritation from the adhesive.  They will just put antibiotic ointment on this.  They will make sure there is nothing tight in his foot.  He will wear loose sock.  We gave him a DH shoe today to keep his toes protected.  We are going to start the patient on Augmentin  875 mg 1 p.o. twice daily and amoxicillin 500 mg 1 p.o. twice daily.  Discussed with patient we will watch all his wounds and hopefully they will heal.  Discussed there is a chance for amputation.  He will try to keep his blood sugars as well controlled as possible.  Return to the clinic in 6 days for reevaluation to discuss culture results.  Thank you for allowing me participate in the care of this patient.        Cornel Lin DPM DPM, FACFAS              Again, thank you for allowing me to participate in the care of your patient.        Sincerely,        Cornel Lin DPM

## 2021-02-08 NOTE — TELEPHONE ENCOUNTER
Routing to DR. Velazquez to determine if one of the above is appropriate or if a letter of medical necessity should be submitted.

## 2021-02-08 NOTE — TELEPHONE ENCOUNTER
PRIOR AUTHORIZATION DENIED    Medication: JARDIANCE 10 MG TABS tablet    Denial Date: 2/5/2021    Denial Rational: Patient has to first try/fail: Farxiga, Invokana, Steglatro        Appeal Information: If provider would like to appeal this decision we will need a detailed letter of medical necessity to start the process. Then re-route this request back to the PA pool.

## 2021-02-08 NOTE — TELEPHONE ENCOUNTER
Pt has CV disease and suboptimally controlled diabetes. Jardiance was specifically chosen not just to reduce glycemic burden, but rather to decrease CV mortality. Jardiance is the only SGLT2 inhibitor with CV mortality reduction data. Not optimally glycemically controlled, he is having progression of clinical CV ds. As such it is important to choose an SGLT2 inhibitor with CV mortality reduction.

## 2021-02-09 LAB
BACTERIA SPEC CULT: ABNORMAL
Lab: ABNORMAL
SPECIMEN SOURCE: ABNORMAL

## 2021-02-09 RX ORDER — LEVOFLOXACIN 500 MG/1
500 TABLET, FILM COATED ORAL DAILY
Qty: 14 TABLET | Refills: 0 | Status: ON HOLD | OUTPATIENT
Start: 2021-02-09 | End: 2021-04-19

## 2021-02-09 NOTE — TELEPHONE ENCOUNTER
Letter of medical necessity drafted.    Routing to MARIBELL Fernandes.    Lluvia Hays RN BSN  [facility]  [phone]

## 2021-02-09 NOTE — TELEPHONE ENCOUNTER
Medication Appeal Initiation    We have initiated an appeal for the requested medication:  Medication: JARDIANCE 10 MG TABS tablet  Appeal Start Date:  2/9/2021  Insurance Company: [employer] - Phone [phone] Fax [phone]  Comments:       Faxed letter of medical necessity to insurance.

## 2021-02-11 ENCOUNTER — TELEPHONE (OUTPATIENT)
Dept: OTHER | Facility: CLINIC | Age: 71
End: 2021-02-11

## 2021-02-11 ENCOUNTER — OFFICE VISIT (OUTPATIENT)
Dept: PODIATRY | Facility: CLINIC | Age: 71
End: 2021-02-11
Payer: COMMERCIAL

## 2021-02-11 VITALS
HEART RATE: 82 BPM | WEIGHT: 142 LBS | BODY MASS INDEX: 22.92 KG/M2 | SYSTOLIC BLOOD PRESSURE: 154 MMHG | DIASTOLIC BLOOD PRESSURE: 64 MMHG

## 2021-02-11 DIAGNOSIS — E11.51 TYPE II DIABETES MELLITUS WITH PERIPHERAL ARTERY DISEASE (H): ICD-10-CM

## 2021-02-11 DIAGNOSIS — L02.619 CELLULITIS AND ABSCESS OF FOOT, EXCEPT TOES: Primary | ICD-10-CM

## 2021-02-11 DIAGNOSIS — L97.529 DIABETIC ULCER OF TOE OF LEFT FOOT ASSOCIATED WITH TYPE 2 DIABETES MELLITUS, UNSPECIFIED ULCER STAGE (H): ICD-10-CM

## 2021-02-11 DIAGNOSIS — E11.621 DIABETIC ULCER OF TOE OF LEFT FOOT ASSOCIATED WITH TYPE 2 DIABETES MELLITUS, UNSPECIFIED ULCER STAGE (H): ICD-10-CM

## 2021-02-11 DIAGNOSIS — I96 GANGRENE OF TOE OF LEFT FOOT (H): ICD-10-CM

## 2021-02-11 DIAGNOSIS — L03.119 CELLULITIS AND ABSCESS OF FOOT, EXCEPT TOES: Primary | ICD-10-CM

## 2021-02-11 PROCEDURE — 99214 OFFICE O/P EST MOD 30 MIN: CPT | Performed by: PODIATRIST

## 2021-02-11 RX ORDER — AMOXICILLIN 500 MG/1
500 CAPSULE ORAL 2 TIMES DAILY
Qty: 20 CAPSULE | Refills: 0 | Status: ON HOLD | OUTPATIENT
Start: 2021-02-11 | End: 2021-04-19

## 2021-02-11 NOTE — PROGRESS NOTES
Subjective:    2/5/21   Pt is seen today in consult from Dr. Hamlin with the c/c of gangrene left first and second toes.  Patient presented to vascular surgery on February 1, 2021.  At that time he was diagnosed with a gangrenous first and second toes.  On 2/3/2021 patient underwent vascular intervention which improved his circulation.  Patient states that his foot is feeling better since this intervention 2 days ago.  He has a history of a left fifth toe amputation.  The patient states that at time he had a strep infection in this toe.  He states that his left foot did not become swollen until approximately a week ago.  Patient states approximately 2 weeks ago he caught his posterior left rear foot on a door and has developed a wound here.  He is dressing this with a Band-Aid.  He denies any fever chills or shortness of breath.  He denies any drainage from the wound.  He states it is somewhat odorous.  Patient has a past history of smoking.  He has diabetes that is somewhat poorly controlled.    2/11/21 patient returns for evaluation of numerous wounds on his left foot and gangrenous hallux.  He states the wound on his posterior heel no longer hurts.  He is not having any wound on the wound plantar central posterior heel.  No pain in the second toe.  He states edema and erythema is greatly decreased in his left foot.  He says he can move his toes much better.  He is not having any pain.  He is dressing the hallux with gauze soaked in alcohol.  Is taking Augmentin, amoxicillin, Levaquin,.  He is not having any GI distress.  He denies any fever or chills.  He has no other new complaints.    ROS:  See above        No Known Allergies    Current Outpatient Medications   Medication Sig Dispense Refill     amLODIPine (NORVASC) 10 MG tablet Take 10 mg by mouth daily       amoxicillin (AMOXIL) 500 MG capsule Take 1 capsule (500 mg) by mouth 2 times daily 28 capsule 0     amoxicillin-clavulanate (AUGMENTIN) 875-125 MG tablet  Take 1 tablet by mouth 2 times daily 28 tablet 0     aspirin 81 MG tablet Take 1 tablet (81 mg) by mouth daily 90 tablet 1     atorvastatin (LIPITOR) 80 MG tablet TAKE 1 TABLET(80 MG) BY MOUTH AT BEDTIME 90 tablet 3     Cadexomer Iodine, topical, 0.9% (IODOSORB) 0.9 % GEL gel Apply 40 g topically daily as needed for other 1 Tube 1     chlorthalidone (HYGROTON) 50 MG tablet TK 1 T PO QD FOR BP       clopidogrel (PLAVIX) 75 MG tablet TAKE 1 TABLET(75 MG) BY MOUTH DAILY 90 tablet 1     empagliflozin (JARDIANCE) 10 MG TABS tablet Take 1 tablet (10 mg) by mouth daily 30 tablet 3     ezetimibe (ZETIA) 10 MG tablet TAKE 1 TABLET(10 MG) BY MOUTH DAILY 90 tablet 3     levofloxacin (LEVAQUIN) 500 MG tablet Take 1 tablet (500 mg) by mouth daily 14 tablet 0     losartan (COZAAR) 100 MG tablet TK 1 T TABLET QD       metFORMIN (GLUCOPHAGE) 1000 MG tablet Take 1,000 mg by mouth 2 times daily (with meals)        metoprolol succinate ER (TOPROL-XL) 100 MG 24 hr tablet Take 1 tablet (100 mg) by mouth daily 90 tablet 3     triamcinolone (KENALOG) 0.1 % cream Apply to affected area as needed  5       Patient Active Problem List   Diagnosis     Gangrene of toe (H)     Toe amputation status, left     Acute osteomyelitis of left foot (H)     PAD (peripheral artery disease) (H)     Diabetes mellitus, type 2 (H)     Diabetic ulcer of left foot associated with type 2 diabetes mellitus (H)     Nonhealing surgical wound, initial encounter     Non-pressure chronic ulcer of other part of left lower leg with necrosis of muscle (H)       Past Medical History:   Diagnosis Date     Diabetes mellitus type 2, noninsulin dependent (H)      Hyperlipidemia LDL goal <70      Hypertension      PAD (peripheral artery disease) (H)        Past Surgical History:   Procedure Laterality Date     AMPUTATE FOOT Left 3/15/2017    Procedure: AMPUTATE FOOT;  Surgeon: Emre Mauricio DPM;  Location: [facility]     IR LOWER EXTREMITY ANGIOGRAM LEFT  7/30/2019     IR  LOWER EXTREMITY ANGIOGRAM LEFT  2/3/2021     IRRIGATION AND DEBRIDEMENT FOOT, COMBINED Left 3/15/2017    Procedure: COMBINED IRRIGATION AND DEBRIDEMENT FOOT;  Surgeon: Emre Mauricio DPM;  Location:  OR       Family History   Problem Relation Age of Onset     Diabetes Father        Social History     Tobacco Use     Smoking status: Former Smoker     Packs/day: 0.75     Types: Cigarettes     Quit date: 3/3/2017     Years since quitting: 3.9     Smokeless tobacco: Never Used     Tobacco comment: Quit Friday March 3 2017   Substance Use Topics     Alcohol use: Yes     Comment: Wine 3/4 bottle a day         Exam:    Vitals: BP (!) 154/64   Pulse 82   Wt 64.4 kg (142 lb)   BMI 22.92 kg/m    BMI: Body mass index is 22.92 kg/m .  Height: Data Unavailable    Constitutional/ general:  Pt is in no apparent distress, appears well-nourished.  Cooperative with history and physical exam.  Patient seen with female today.       Psych:  The patient answered questions appropriately.  Normal affect.  Seems to have reasonable expectations, in terms of treatment.     Eyes:  Visual scanning/ tracking without deficit.     Ears:  Response to auditory stimuli is normal.   Auricles in proper alignment.     Lymphatic:  Popliteal lymph nodes not enlarged.     Lungs:  Non labored breathing, non labored speech. No cough.  No audible wheezing. Even, quiet breathing.       Vascular: Patient has nonpalpable pedal pulses.  No edema is noted on the right ankle.  Edema noted on the left foot and ankle.  Patient states about 2 weeks ago he caught his posterior    Neuro:  Alert and oriented x 3. Coordinated gait.  Light touch sensation is intact to the L4, L5, S1 distributions. No obvious deficits.  No evidence of neurological-based weakness, spasticity, or contracture in the lower extremities.  Monofilament intact on digits    Derm: Thin and shiny with no hair growth noted    Musculoskeletal:    Lower extremity muscle strength is normal.   Patient is ambulatory without an assistive device or brace.  The right foot exam is unremarkable.  On the left foot there is a well-healed amputation of the fifth toe.    On the left Achilles tendon watershed area that in the past measured 7 x 4 mm and today measures 6 x 3 mm.  The base has increased healthy tissue and show signs of healing.    On the plantar posterior central portion of the left heel there is a wound that still measures 13 x 7 mm.  The base is a dry leathery eschar.  There is no erythema edema or drainage.  This appears to be a fissure that has developed into a wound.    On the left second toe medial there is a dry leathery eschar.  There is no erythema or edema here.  There is no drainage or odor.  Can see some granulation tissue around this wound now.    The left hallux has gangrenous tissue lateral and dorsal lateral.  The dorsal medial medial and plantar portions of the left hallux show no signs of gangrene at this time.  Only slight odor now.  The erythema and edema on the foot are greatly decreased.  He can move his toes now much more normal.    ago    Specimen Description Toe    Special Requests Specimen collected in eSwab transport (white cap)    Culture Micro Abnormal   Heavy growth   Proteus mirabilis     Culture Micro Abnormal   Heavy growth   Stenotrophomonas maltophilia     Culture Micro Abnormal   Heavy growth   beta hemolytic   Streptococcus constellatus   This organism is susceptible to ampicillin, penicillin, vancomycin and the cephalosporins.    If treatment is required AND your patient is allergic to penicillin, contact the   Microbiology Lab within 5 days to request susceptibility testing.   Testing unavailable due to 's backorder.     Culture Micro Abnormal   Moderate growth   Staphylococcus aureus     Culture Micro Heavy growth   Normal skin ervin    [facility]   Susceptibility     Proteus mirabilis Stenotrophomonas maltophilia     MENG MENG      AMPICILLIN  Resistant       AMPICILLIN/SULBACTAM <=2 ug/mL Sensitive       CEFEPIME <=1 ug/mL Sensitive       CEFTAZIDIME <=1 ug/mL Sensitive >32.0 ug/mL Resistant     CEFTRIAXONE <=1 ug/mL Sensitive       CIPROFLOXACIN <=0.25 ug/mL Sensitive       GENTAMICIN <=1 ug/mL Sensitive       LEVOFLOXACIN <=0.12 ug/mL Sensitive 0.5 ug/mL Sensitive     MEROPENEM 1 ug/mL Sensitive       MINOCYCLINE   <=2.0 ug/mL Sensitive     Piperacillin/Tazo <=4 ug/mL Sensitive       TOBRAMYCIN <=1 ug/mL Sensitive       Trimethoprim/Sulfa <=1/19 ug/mL Sensitive <=0.5/9.5 u... Sensitive             Susceptibility     Staphylococcus aureus     MENG     CLINDAMYCIN  Resistant1     ERYTHROMYCIN >=8 ug/mL Resistant     GENTAMICIN <=0.5 ug/mL Sensitive     OXACILLIN 0.5 ug/mL Sensitive     TETRACYCLINE <=1 ug/mL Sensitive     Trimethoprim/Sulfa <=0.5/9.5 u... Sensitive     VANCOMYCIN 1 ug/mL Sensitive           A:  Diabetes mellitus with peripheral arterial disease status post recent intervention  Left rear foot ulcers x2  Left second toe ulcer  Left hallux gangrene  resolving cellulitis    P:  Discussed with patient left rear foot wounds are stable.  His skin is very dry.  Patient will get AmLactin and put on dry skin twice a day.  Continue dressing and posterior heel wound.  Patient has gauze around second toe wound.  He will stop this as I believe is causing pressure here.  He will just place Santyl on here daily.  Continue dressing hallux wound with alcohol soaked gauze.  Discussed erythema and edema significantly decreased.  We had already called the patient with culture results.  He will continue the Levaquin and then stop.  We are going to continue the amoxicillin and Augmentin for an additional 10 days we wrote a prescription for this.  Return to the clinic in 2 weeks for reevaluation.    Cornel Lin, DPM DPM, FACFAS

## 2021-02-11 NOTE — LETTER
2/11/2021         RE: Zia Hopkins  [address] [facility] [address]        Dear Colleague,    Thank you for referring your patient, Zia Hopkins, to the [facility]. Please see a copy of my visit note below.    Subjective:    2/5/21   Pt is seen today in consult from Dr. Hamlin with the c/c of gangrene left first and second toes.  Patient presented to vascular surgery on February 1, 2021.  At that time he was diagnosed with a gangrenous first and second toes.  On 2/3/2021 patient underwent vascular intervention which improved his circulation.  Patient states that his foot is feeling better since this intervention 2 days ago.  He has a history of a left fifth toe amputation.  The patient states that at time he had a strep infection in this toe.  He states that his left foot did not become swollen until approximately a week ago.  Patient states approximately 2 weeks ago he caught his posterior left rear foot on a door and has developed a wound here.  He is dressing this with a Band-Aid.  He denies any fever chills or shortness of breath.  He denies any drainage from the wound.  He states it is somewhat odorous.  Patient has a past history of smoking.  He has diabetes that is somewhat poorly controlled.    2/11/21 patient returns for evaluation of numerous wounds on his left foot and gangrenous hallux.  He states the wound on his posterior heel no longer hurts.  He is not having any wound on the wound plantar central posterior heel.  No pain in the second toe.  He states edema and erythema is greatly decreased in his left foot.  He says he can move his toes much better.  He is not having any pain.  He is dressing the hallux with gauze soaked in alcohol.  Is taking Augmentin, amoxicillin, Levaquin,.  He is not having any GI distress.  He denies any fever or chills.  He has no other new complaints.    ROS:  See above        No Known Allergies    Current Outpatient  Medications   Medication Sig Dispense Refill     amLODIPine (NORVASC) 10 MG tablet Take 10 mg by mouth daily       amoxicillin (AMOXIL) 500 MG capsule Take 1 capsule (500 mg) by mouth 2 times daily 28 capsule 0     amoxicillin-clavulanate (AUGMENTIN) 875-125 MG tablet Take 1 tablet by mouth 2 times daily 28 tablet 0     aspirin 81 MG tablet Take 1 tablet (81 mg) by mouth daily 90 tablet 1     atorvastatin (LIPITOR) 80 MG tablet TAKE 1 TABLET(80 MG) BY MOUTH AT BEDTIME 90 tablet 3     Cadexomer Iodine, topical, 0.9% (IODOSORB) 0.9 % GEL gel Apply 40 g topically daily as needed for other 1 Tube 1     chlorthalidone (HYGROTON) 50 MG tablet TK 1 T PO QD FOR BP       clopidogrel (PLAVIX) 75 MG tablet TAKE 1 TABLET(75 MG) BY MOUTH DAILY 90 tablet 1     empagliflozin (JARDIANCE) 10 MG TABS tablet Take 1 tablet (10 mg) by mouth daily 30 tablet 3     ezetimibe (ZETIA) 10 MG tablet TAKE 1 TABLET(10 MG) BY MOUTH DAILY 90 tablet 3     levofloxacin (LEVAQUIN) 500 MG tablet Take 1 tablet (500 mg) by mouth daily 14 tablet 0     losartan (COZAAR) 100 MG tablet TK 1 T TABLET QD       metFORMIN (GLUCOPHAGE) 1000 MG tablet Take 1,000 mg by mouth 2 times daily (with meals)        metoprolol succinate ER (TOPROL-XL) 100 MG 24 hr tablet Take 1 tablet (100 mg) by mouth daily 90 tablet 3     triamcinolone (KENALOG) 0.1 % cream Apply to affected area as needed  5       Patient Active Problem List   Diagnosis     Gangrene of toe (H)     Toe amputation status, left     Acute osteomyelitis of left foot (H)     PAD (peripheral artery disease) (H)     Diabetes mellitus, type 2 (H)     Diabetic ulcer of left foot associated with type 2 diabetes mellitus (H)     Nonhealing surgical wound, initial encounter     Non-pressure chronic ulcer of other part of left lower leg with necrosis of muscle (H)       Past Medical History:   Diagnosis Date     Diabetes mellitus type 2, noninsulin dependent (H)      Hyperlipidemia LDL goal <70      Hypertension       PAD (peripheral artery disease) (H)        Past Surgical History:   Procedure Laterality Date     AMPUTATE FOOT Left 3/15/2017    Procedure: AMPUTATE FOOT;  Surgeon: Emre Mauricio DPM;  Location: [facility] OR     IR LOWER EXTREMITY ANGIOGRAM LEFT  7/30/2019     IR LOWER EXTREMITY ANGIOGRAM LEFT  2/3/2021     IRRIGATION AND DEBRIDEMENT FOOT, COMBINED Left 3/15/2017    Procedure: COMBINED IRRIGATION AND DEBRIDEMENT FOOT;  Surgeon: Emre Mauricio DPM;  Location: [facility] OR       Family History   Problem Relation Age of Onset     Diabetes Father        Social History     Tobacco Use     Smoking status: Former Smoker     Packs/day: 0.75     Types: Cigarettes     Quit date: 3/3/2017     Years since quitting: 3.9     Smokeless tobacco: Never Used     Tobacco comment: Quit Friday March 3 2017   Substance Use Topics     Alcohol use: Yes     Comment: Wine 3/4 bottle a day         Exam:    Vitals: BP (!) 154/64   Pulse 82   Wt 64.4 kg (142 lb)   BMI 22.92 kg/m    BMI: Body mass index is 22.92 kg/m .  Height: Data Unavailable    Constitutional/ general:  Pt is in no apparent distress, appears well-nourished.  Cooperative with history and physical exam.  Patient seen with female today.       Psych:  The patient answered questions appropriately.  Normal affect.  Seems to have reasonable expectations, in terms of treatment.     Eyes:  Visual scanning/ tracking without deficit.     Ears:  Response to auditory stimuli is normal.   Auricles in proper alignment.     Lymphatic:  Popliteal lymph nodes not enlarged.     Lungs:  Non labored breathing, non labored speech. No cough.  No audible wheezing. Even, quiet breathing.       Vascular: Patient has nonpalpable pedal pulses.  No edema is noted on the right ankle.  Edema noted on the left foot and ankle.  Patient states about 2 weeks ago he caught his posterior    Neuro:  Alert and oriented x 3. Coordinated gait.  Light touch sensation is intact to the L4, L5, S1 distributions. No  obvious deficits.  No evidence of neurological-based weakness, spasticity, or contracture in the lower extremities.  Monofilament intact on digits    Derm: Thin and shiny with no hair growth noted    Musculoskeletal:    Lower extremity muscle strength is normal.  Patient is ambulatory without an assistive device or brace.  The right foot exam is unremarkable.  On the left foot there is a well-healed amputation of the fifth toe.    On the left Achilles tendon watershed area that in the past measured 7 x 4 mm and today measures 6 x 3 mm.  The base has increased healthy tissue and show signs of healing.    On the plantar posterior central portion of the left heel there is a wound that still measures 13 x 7 mm.  The base is a dry leathery eschar.  There is no erythema edema or drainage.  This appears to be a fissure that has developed into a wound.    On the left second toe medial there is a dry leathery eschar.  There is no erythema or edema here.  There is no drainage or odor.  Can see some granulation tissue around this wound now.    The left hallux has gangrenous tissue lateral and dorsal lateral.  The dorsal medial medial and plantar portions of the left hallux show no signs of gangrene at this time.  Only slight odor now.  The erythema and edema on the foot are greatly decreased.  He can move his toes now much more normal.    ago    Specimen Description Toe    Special Requests Specimen collected in eSwab transport (white cap)    Culture Micro Abnormal   Heavy growth   Proteus mirabilis     Culture Micro Abnormal   Heavy growth   Stenotrophomonas maltophilia     Culture Micro Abnormal   Heavy growth   beta hemolytic   Streptococcus constellatus   This organism is susceptible to ampicillin, penicillin, vancomycin and the cephalosporins.    If treatment is required AND your patient is allergic to penicillin, contact the   Microbiology Lab within 5 days to request susceptibility testing.   Testing unavailable due to  's backorder.     Culture Micro Abnormal   Moderate growth   Staphylococcus aureus     Culture Micro Heavy growth   Normal skin ervin    Resulting Agency [facility]   Susceptibility     Proteus mirabilis Stenotrophomonas maltophilia     MENG MENG     AMPICILLIN  Resistant       AMPICILLIN/SULBACTAM <=2 ug/mL Sensitive       CEFEPIME <=1 ug/mL Sensitive       CEFTAZIDIME <=1 ug/mL Sensitive >32.0 ug/mL Resistant     CEFTRIAXONE <=1 ug/mL Sensitive       CIPROFLOXACIN <=0.25 ug/mL Sensitive       GENTAMICIN <=1 ug/mL Sensitive       LEVOFLOXACIN <=0.12 ug/mL Sensitive 0.5 ug/mL Sensitive     MEROPENEM 1 ug/mL Sensitive       MINOCYCLINE   <=2.0 ug/mL Sensitive     Piperacillin/Tazo <=4 ug/mL Sensitive       TOBRAMYCIN <=1 ug/mL Sensitive       Trimethoprim/Sulfa <=1/19 ug/mL Sensitive <=0.5/9.5 u... Sensitive             Susceptibility     Staphylococcus aureus     MENG     CLINDAMYCIN  Resistant1     ERYTHROMYCIN >=8 ug/mL Resistant     GENTAMICIN <=0.5 ug/mL Sensitive     OXACILLIN 0.5 ug/mL Sensitive     TETRACYCLINE <=1 ug/mL Sensitive     Trimethoprim/Sulfa <=0.5/9.5 u... Sensitive     VANCOMYCIN 1 ug/mL Sensitive           A:  Diabetes mellitus with peripheral arterial disease status post recent intervention  Left rear foot ulcers x2  Left second toe ulcer  Left hallux gangrene  resolving cellulitis    P:  Discussed with patient left rear foot wounds are stable.  His skin is very dry.  Patient will get AmLactin and put on dry skin twice a day.  Continue dressing and posterior heel wound.  Patient has gauze around second toe wound.  He will stop this as I believe is causing pressure here.  He will just place Santyl on here daily.  Continue dressing hallux wound with alcohol soaked gauze.  Discussed erythema and edema significantly decreased.  We had already called the patient with culture results.  He will continue the Levaquin and then stop.  We are going to continue the amoxicillin and Augmentin for an  additional 10 days we wrote a prescription for this.  Return to the clinic in 2 weeks for reevaluation.    Cornel Lin DPM DPM, FACFAS              Again, thank you for allowing me to participate in the care of your patient.        Sincerely,        Cornel Lin DPM

## 2021-02-11 NOTE — TELEPHONE ENCOUNTER
Called Le to let her know that a letter of medical necessity has been submitted in appeal to the denial of coverage for Jardiance.    Lluvia Hays RN BSN  [facility]  [phone]

## 2021-02-11 NOTE — PATIENT INSTRUCTIONS
We wish you continued good healing. If you have any questions or concerns, please do not hesitate to contact us at [phone]    [employer] (secure e-mail communication and access to your chart) to send a message or to make an appointment.    Please remember to call and schedule a follow up appointment if one was recommended at your earliest convenience.     +++OF MARCH 2020+++ LOCATION AND HOURS HAVE CHANGED    PLEASE CALL CLINICS TO VERIFY DAYS AND TIMES  PODIATRY CLINIC HOURS  TELEPHONE NUMBER    Dr. Cornel COLBERTPYANNICK [facility]        Clinics:  [place] [facility]   Tuesday 1PM-6PM  [place]  Wednesday 745AM-330PM  [place]  Thursday/Friday 745AM-230PM  [place] APPOINTMENTS  [phone]    [place] APPOINTMENTS  [phone]          If you need a medication refill, please contact us you may need lab work and/or a follow up visit prior to your refill (i.e. Antifungal medications).    If MRI needed please call Imaging at [phone] or [phone]    HOW DO I GET MY KNEE SCOOTER? Knee scooters can be picked up at ANY Medical Supply stores with your knee scooter Prescription.  OR    Bring your signed prescription to an [facility] Medical Equipment showroom.

## 2021-02-11 NOTE — TELEPHONE ENCOUNTER
Patient wife Le requesting for nurse to call regarding questions about medication Jardiance that was recommended.     Informed Le will send message and have nurse call back.       Luz ESCALERA

## 2021-02-17 NOTE — TELEPHONE ENCOUNTER
MEDICATION APPEAL APPROVED    Medication: JARDIANCE 10 MG TABS tablet  Authorization Effective Date: 1/1/2021  Authorization Expiration Date: 2/16/2022  Approved Dose/Quantity:   Reference #: [id number]   Insurance Company: [employer] - Phone [phone] Fax [phone]  Expected CoPay:       CoPay Card Available:      Foundation Assistance Needed:    Which Pharmacy is filling the prescription (Not needed for infusion/clinic administered): [facility]  [address] [place], [place] [address] [address]

## 2021-02-17 NOTE — TELEPHONE ENCOUNTER
Consult faxed to [facility] where Zia has an appointment tomorrow.  (per spouse's request) Fax: [phone]    [employer] 02/17/21 11:09      Lluvia Hays RN BSN  [facility]  [phone]

## 2021-02-25 ENCOUNTER — OFFICE VISIT (OUTPATIENT)
Dept: PODIATRY | Facility: CLINIC | Age: 71
End: 2021-02-25
Payer: COMMERCIAL

## 2021-02-25 ENCOUNTER — TELEPHONE (OUTPATIENT)
Dept: OTHER | Facility: CLINIC | Age: 71
End: 2021-02-25

## 2021-02-25 VITALS — DIASTOLIC BLOOD PRESSURE: 68 MMHG | SYSTOLIC BLOOD PRESSURE: 155 MMHG | HEART RATE: 80 BPM

## 2021-02-25 DIAGNOSIS — E11.51 TYPE II DIABETES MELLITUS WITH PERIPHERAL ARTERY DISEASE (H): ICD-10-CM

## 2021-02-25 DIAGNOSIS — I96 GANGRENE OF TOE OF LEFT FOOT (H): Primary | ICD-10-CM

## 2021-02-25 DIAGNOSIS — E11.621 DIABETIC ULCER OF TOE OF LEFT FOOT ASSOCIATED WITH TYPE 2 DIABETES MELLITUS, UNSPECIFIED ULCER STAGE (H): ICD-10-CM

## 2021-02-25 DIAGNOSIS — L97.529 DIABETIC ULCER OF TOE OF LEFT FOOT ASSOCIATED WITH TYPE 2 DIABETES MELLITUS, UNSPECIFIED ULCER STAGE (H): ICD-10-CM

## 2021-02-25 DIAGNOSIS — E11.621 DIABETIC ULCER OF LEFT HEEL ASSOCIATED WITH TYPE 2 DIABETES MELLITUS, UNSPECIFIED ULCER STAGE (H): ICD-10-CM

## 2021-02-25 DIAGNOSIS — L97.429 DIABETIC ULCER OF LEFT HEEL ASSOCIATED WITH TYPE 2 DIABETES MELLITUS, UNSPECIFIED ULCER STAGE (H): ICD-10-CM

## 2021-02-25 DIAGNOSIS — I73.9 PAD (PERIPHERAL ARTERY DISEASE) (H): Primary | ICD-10-CM

## 2021-02-25 PROCEDURE — 11042 DBRDMT SUBQ TIS 1ST 20SQCM/<: CPT | Performed by: PODIATRIST

## 2021-02-25 PROCEDURE — 99214 OFFICE O/P EST MOD 30 MIN: CPT | Mod: 25 | Performed by: PODIATRIST

## 2021-02-25 NOTE — PATIENT INSTRUCTIONS
We wish you continued good healing. If you have any questions or concerns, please do not hesitate to contact us at [phone]    [employer] (secure e-mail communication and access to your chart) to send a message or to make an appointment.    Please remember to call and schedule a follow up appointment if one was recommended at your earliest convenience.     +++OF MARCH 2020+++ LOCATION AND HOURS HAVE CHANGED    PLEASE CALL CLINICS TO VERIFY DAYS AND TIMES  PODIATRY CLINIC HOURS  TELEPHONE NUMBER    Dr. Cornel COLBERTPYANNICK [facility]        Clinics:  [place] [facility]   Tuesday 1PM-6PM  [place]  Wednesday 745AM-330PM  [place]  Thursday/Friday 745AM-230PM  [place] APPOINTMENTS  [phone]    [place] APPOINTMENTS  [phone]          If you need a medication refill, please contact us you may need lab work and/or a follow up visit prior to your refill (i.e. Antifungal medications).    If MRI needed please call Imaging at [phone] or [phone]    HOW DO I GET MY KNEE SCOOTER? Knee scooters can be picked up at ANY Medical Supply stores with your knee scooter Prescription.  OR    Bring your signed prescription to an [facility] Medical Equipment showroom.

## 2021-02-25 NOTE — LETTER
2/25/2021         RE: Zia Hopkins  [address] [facility] [address]        Dear Colleague,    Thank you for referring your patient, Zia Hopkins, to the [facility]. Please see a copy of my visit note below.    Subjective:    2/5/21   Pt is seen today in consult from Dr. Hamlin with the c/c of gangrene left first and second toes.  Patient presented to vascular surgery on February 1, 2021.  At that time he was diagnosed with a gangrenous first and second toes.  On 2/3/2021 patient underwent vascular intervention which improved his circulation.  Patient states that his foot is feeling better since this intervention 2 days ago.  He has a history of a left fifth toe amputation.  The patient states that at time he had a strep infection in this toe.  He states that his left foot did not become swollen until approximately a week ago.  Patient states approximately 2 weeks ago he caught his posterior left rear foot on a door and has developed a wound here.  He is dressing this with a Band-Aid.  He denies any fever chills or shortness of breath.  He denies any drainage from the wound.  He states it is somewhat odorous.  Patient has a past history of smoking.  He has diabetes that is somewhat poorly controlled.    2/11/21 patient returns for evaluation of numerous wounds on his left foot and gangrenous hallux.  He states the wound on his posterior heel no longer hurts.  He is not having any wound on the wound plantar central posterior heel.  No pain in the second toe.  He states edema and erythema is greatly decreased in his left foot.  He says he can move his toes much better.  He is not having any pain.  He is dressing the hallux with gauze soaked in alcohol.  Is taking Augmentin, amoxicillin, Levaquin,.  He is not having any GI distress.  He denies any fever or chills.  He has no other new complaints.    2/25/21 patient returns for gangrenous left hallux and numerous left foot  ulcers.  Patient has peripheral arterial disease and underwent vascular intervention on 2/3/2021.  He is unsure when his next visit is with his vascular surgeon.  He has finished the Levaquin.  He has a few days of Augmentin and amoxicillin.  He denies any GI distress.  States his foot is feeling well and is able to move this around.  No new complaints    ROS:  See above        No Known Allergies    Current Outpatient Medications   Medication Sig Dispense Refill     amLODIPine (NORVASC) 10 MG tablet Take 10 mg by mouth daily       amoxicillin (AMOXIL) 500 MG capsule Take 1 capsule (500 mg) by mouth 2 times daily 20 capsule 0     amoxicillin (AMOXIL) 500 MG capsule Take 1 capsule (500 mg) by mouth 2 times daily 28 capsule 0     amoxicillin-clavulanate (AUGMENTIN) 875-125 MG tablet Take 1 tablet by mouth 2 times daily 20 tablet 0     amoxicillin-clavulanate (AUGMENTIN) 875-125 MG tablet Take 1 tablet by mouth 2 times daily 28 tablet 0     aspirin 81 MG tablet Take 1 tablet (81 mg) by mouth daily 90 tablet 1     atorvastatin (LIPITOR) 80 MG tablet TAKE 1 TABLET(80 MG) BY MOUTH AT BEDTIME 90 tablet 3     Cadexomer Iodine, topical, 0.9% (IODOSORB) 0.9 % GEL gel Apply 40 g topically daily as needed for other 1 Tube 1     chlorthalidone (HYGROTON) 50 MG tablet TK 1 T PO QD FOR BP       clopidogrel (PLAVIX) 75 MG tablet TAKE 1 TABLET(75 MG) BY MOUTH DAILY 90 tablet 1     empagliflozin (JARDIANCE) 10 MG TABS tablet Take 1 tablet (10 mg) by mouth daily (Patient not taking: Reported on 2/11/2021) 30 tablet 3     ezetimibe (ZETIA) 10 MG tablet TAKE 1 TABLET(10 MG) BY MOUTH DAILY 90 tablet 3     levofloxacin (LEVAQUIN) 500 MG tablet Take 1 tablet (500 mg) by mouth daily 14 tablet 0     losartan (COZAAR) 100 MG tablet TK 1 T TABLET QD       metFORMIN (GLUCOPHAGE) 1000 MG tablet Take 1,000 mg by mouth 2 times daily (with meals)        metoprolol succinate ER (TOPROL-XL) 100 MG 24 hr tablet Take 1 tablet (100 mg) by mouth daily 90  tablet 3     triamcinolone (KENALOG) 0.1 % cream Apply to affected area as needed  5       Patient Active Problem List   Diagnosis     Gangrene of toe (H)     Toe amputation status, left     Acute osteomyelitis of left foot (H)     PAD (peripheral artery disease) (H)     Diabetes mellitus, type 2 (H)     Diabetic ulcer of left foot associated with type 2 diabetes mellitus (H)     Nonhealing surgical wound, initial encounter     Non-pressure chronic ulcer of other part of left lower leg with necrosis of muscle (H)       Past Medical History:   Diagnosis Date     Diabetes mellitus type 2, noninsulin dependent (H)      Hyperlipidemia LDL goal <70      Hypertension      PAD (peripheral artery disease) (H)        Past Surgical History:   Procedure Laterality Date     AMPUTATE FOOT Left 3/15/2017    Procedure: AMPUTATE FOOT;  Surgeon: Emre Mauricio DPM;  Location: [facility] OR     IR LOWER EXTREMITY ANGIOGRAM LEFT  7/30/2019     IR LOWER EXTREMITY ANGIOGRAM LEFT  2/3/2021     IRRIGATION AND DEBRIDEMENT FOOT, COMBINED Left 3/15/2017    Procedure: COMBINED IRRIGATION AND DEBRIDEMENT FOOT;  Surgeon: Emre Mauricio DPM;  Location:  OR       Family History   Problem Relation Age of Onset     Diabetes Father        Social History     Tobacco Use     Smoking status: Former Smoker     Packs/day: 0.75     Types: Cigarettes     Quit date: 3/3/2017     Years since quitting: 3.9     Smokeless tobacco: Never Used     Tobacco comment: Quit Friday March 3 2017   Substance Use Topics     Alcohol use: Yes     Comment: Wine 3/4 bottle a day         Exam:    Vitals: There were no vitals taken for this visit.  BMI: There is no height or weight on file to calculate BMI.  Height: Data Unavailable    Constitutional/ general:  Pt is in no apparent distress, appears well-nourished.  Cooperative with history and physical exam.  Patient seen with female today.       Psych:  The patient answered questions appropriately.  Normal affect.  Seems  to have reasonable expectations, in terms of treatment.     Eyes:  Visual scanning/ tracking without deficit.     Ears:  Response to auditory stimuli is normal.   Auricles in proper alignment.     Lymphatic:  Popliteal lymph nodes not enlarged.     Lungs:  Non labored breathing, non labored speech. No cough.  No audible wheezing. Even, quiet breathing.       Vascular: Patient has nonpalpable pedal pulses.  No edema is noted on the right ankle.     Neuro:  Alert and oriented x 3. Coordinated gait.  Light touch sensation is intact to the L4, L5, S1 distributions. No obvious deficits.  No evidence of neurological-based weakness, spasticity, or contracture in the lower extremities.  Monofilament intact on digits    Derm: Thin and shiny with no hair growth noted    Musculoskeletal:    Lower extremity muscle strength is normal.  Patient is ambulatory without an assistive device or brace.  The right foot exam is unremarkable.  On the left foot there is a well-healed amputation of the fifth toe.    On the left Achilles tendon watershed area that in the past measured 7 x 4 mm, 6 x 3 mm and today measures 5 x 3 mm and is dry eschar.  No erythema or edema.    On the plantar posterior central portion of the left heel there is a wound that in the past measured 13 x 7 mm and today measures 13 x 7 mm.  The base is fibrotic tissue slightly moist.  There is no erythema edema or drainage.  This appears to be a fissure that has developed into a wound.    On the left second toe medial the dry leathery eschar has sloughed.  There is now a wound here that measures 26 x 13 mm that goes down to the subcutaneous tissue.  The base of the wound is 50% granulation tissue.    There is no erythema or edema here.  There is no drainage or odor.      The left hallux has gangrenous tissue lateral and dorsal lateral.  The dorsal medial medial and plantar portions of the left hallux show no signs of gangrene at this time.  Only slight odor now.   Perhaps slight healing proximal.  The erythema and edema on the foot are greatly decreased.  He can move his toes now much more normal.    ago could better    Specimen Description Toe    Special Requests Specimen collected in eSwab transport (white cap)    Culture Micro Abnormal   Heavy growth   Proteus mirabilis     Culture Micro Abnormal   Heavy growth   Stenotrophomonas maltophilia     Culture Micro Abnormal   Heavy growth   beta hemolytic   Streptococcus constellatus   This organism is susceptible to ampicillin, penicillin, vancomycin and the cephalosporins.    If treatment is required AND your patient is allergic to penicillin, contact the   Microbiology Lab within 5 days to request susceptibility testing.   Testing unavailable due to 's backorder.     Culture Micro Abnormal   Moderate growth   Staphylococcus aureus     Culture Micro Heavy growth   Normal skin ervin    [facility]   Susceptibility     Proteus mirabilis Stenotrophomonas maltophilia     MENG MENG     AMPICILLIN  Resistant       AMPICILLIN/SULBACTAM <=2 ug/mL Sensitive       CEFEPIME <=1 ug/mL Sensitive       CEFTAZIDIME <=1 ug/mL Sensitive >32.0 ug/mL Resistant     CEFTRIAXONE <=1 ug/mL Sensitive       CIPROFLOXACIN <=0.25 ug/mL Sensitive       GENTAMICIN <=1 ug/mL Sensitive       LEVOFLOXACIN <=0.12 ug/mL Sensitive 0.5 ug/mL Sensitive     MEROPENEM 1 ug/mL Sensitive       MINOCYCLINE   <=2.0 ug/mL Sensitive     Piperacillin/Tazo <=4 ug/mL Sensitive       TOBRAMYCIN <=1 ug/mL Sensitive       Trimethoprim/Sulfa <=1/19 ug/mL Sensitive <=0.5/9.5 u... Sensitive             Susceptibility     Staphylococcus aureus     MENG     CLINDAMYCIN  Resistant1     ERYTHROMYCIN >=8 ug/mL Resistant     GENTAMICIN <=0.5 ug/mL Sensitive     OXACILLIN 0.5 ug/mL Sensitive     TETRACYCLINE <=1 ug/mL Sensitive     Trimethoprim/Sulfa <=0.5/9.5 u... Sensitive     VANCOMYCIN 1 ug/mL Sensitive           A:  Diabetes mellitus with peripheral arterial  disease status post recent intervention  Left rear foot ulcers x2  Left second toe ulcer  Left hallux gangrene  resolving cellulitis    P:  Discussed with patient left Achilles wound now smaller dry eschar.  He will just watch this and keep this offloaded.  Discussed the plantar posterior heel wound is approximately the same size but now has fibrotic base and a dry leathery eschar has sloughed.  He states it is feeling better and offloading this with a donut pad.  He will continue to do this.  His dry skin laterally and fissures are improved with AmLactin.  He will continue this.  Discussed the second toe wound is healthier.  Discussed abrading some of the sloughing tissue he is in agreement he gave verbal consent.  With a tissue nipper and a 15 blade we divided this all the way down to the base.  Patient tolerated procedure well.  He will continue just put antibiotic ointment on this and continue to keep offloaded.  Discussed perhaps slight healing proximal portion of gangrenous hallux.  He will continue to dress this.  We discussed with the patient that he should make sure he returns to see vascular surgery for any follow-ups.  We discussed that the procedure done on his leg will give us a certain window of opportunity to heal this.  I discussed that I am doubtful that this entire toe will resolve.  We discussed amputation with hopes that he still has adequate perfusion to heal this.  He may not have adequate perfusion and even this may necrosis..  For now he would just like to continue watching this.  Return to clinic in 3 weeks    Cornel Lin DPM DPM, FACFAS              Again, thank you for allowing me to participate in the care of your patient.        Sincerely,        Cornel Lin DPM

## 2021-02-25 NOTE — PROGRESS NOTES
Subjective:    2/5/21   Pt is seen today in consult from Dr. Hamlin with the c/c of gangrene left first and second toes.  Patient presented to vascular surgery on February 1, 2021.  At that time he was diagnosed with a gangrenous first and second toes.  On 2/3/2021 patient underwent vascular intervention which improved his circulation.  Patient states that his foot is feeling better since this intervention 2 days ago.  He has a history of a left fifth toe amputation.  The patient states that at time he had a strep infection in this toe.  He states that his left foot did not become swollen until approximately a week ago.  Patient states approximately 2 weeks ago he caught his posterior left rear foot on a door and has developed a wound here.  He is dressing this with a Band-Aid.  He denies any fever chills or shortness of breath.  He denies any drainage from the wound.  He states it is somewhat odorous.  Patient has a past history of smoking.  He has diabetes that is somewhat poorly controlled.    2/11/21 patient returns for evaluation of numerous wounds on his left foot and gangrenous hallux.  He states the wound on his posterior heel no longer hurts.  He is not having any wound on the wound plantar central posterior heel.  No pain in the second toe.  He states edema and erythema is greatly decreased in his left foot.  He says he can move his toes much better.  He is not having any pain.  He is dressing the hallux with gauze soaked in alcohol.  Is taking Augmentin, amoxicillin, Levaquin,.  He is not having any GI distress.  He denies any fever or chills.  He has no other new complaints.    2/25/21 patient returns for gangrenous left hallux and numerous left foot ulcers.  Patient has peripheral arterial disease and underwent vascular intervention on 2/3/2021.  He is unsure when his next visit is with his vascular surgeon.  He has finished the Levaquin.  He has a few days of Augmentin and amoxicillin.  He denies any  GI distress.  States his foot is feeling well and is able to move this around.  No new complaints    ROS:  See above        No Known Allergies    Current Outpatient Medications   Medication Sig Dispense Refill     amLODIPine (NORVASC) 10 MG tablet Take 10 mg by mouth daily       amoxicillin (AMOXIL) 500 MG capsule Take 1 capsule (500 mg) by mouth 2 times daily 20 capsule 0     amoxicillin (AMOXIL) 500 MG capsule Take 1 capsule (500 mg) by mouth 2 times daily 28 capsule 0     amoxicillin-clavulanate (AUGMENTIN) 875-125 MG tablet Take 1 tablet by mouth 2 times daily 20 tablet 0     amoxicillin-clavulanate (AUGMENTIN) 875-125 MG tablet Take 1 tablet by mouth 2 times daily 28 tablet 0     aspirin 81 MG tablet Take 1 tablet (81 mg) by mouth daily 90 tablet 1     atorvastatin (LIPITOR) 80 MG tablet TAKE 1 TABLET(80 MG) BY MOUTH AT BEDTIME 90 tablet 3     Cadexomer Iodine, topical, 0.9% (IODOSORB) 0.9 % GEL gel Apply 40 g topically daily as needed for other 1 Tube 1     chlorthalidone (HYGROTON) 50 MG tablet TK 1 T PO QD FOR BP       clopidogrel (PLAVIX) 75 MG tablet TAKE 1 TABLET(75 MG) BY MOUTH DAILY 90 tablet 1     empagliflozin (JARDIANCE) 10 MG TABS tablet Take 1 tablet (10 mg) by mouth daily (Patient not taking: Reported on 2/11/2021) 30 tablet 3     ezetimibe (ZETIA) 10 MG tablet TAKE 1 TABLET(10 MG) BY MOUTH DAILY 90 tablet 3     levofloxacin (LEVAQUIN) 500 MG tablet Take 1 tablet (500 mg) by mouth daily 14 tablet 0     losartan (COZAAR) 100 MG tablet TK 1 T TABLET QD       metFORMIN (GLUCOPHAGE) 1000 MG tablet Take 1,000 mg by mouth 2 times daily (with meals)        metoprolol succinate ER (TOPROL-XL) 100 MG 24 hr tablet Take 1 tablet (100 mg) by mouth daily 90 tablet 3     triamcinolone (KENALOG) 0.1 % cream Apply to affected area as needed  5       Patient Active Problem List   Diagnosis     Gangrene of toe (H)     Toe amputation status, left     Acute osteomyelitis of left foot (H)     PAD (peripheral artery  disease) (H)     Diabetes mellitus, type 2 (H)     Diabetic ulcer of left foot associated with type 2 diabetes mellitus (H)     Nonhealing surgical wound, initial encounter     Non-pressure chronic ulcer of other part of left lower leg with necrosis of muscle (H)       Past Medical History:   Diagnosis Date     Diabetes mellitus type 2, noninsulin dependent (H)      Hyperlipidemia LDL goal <70      Hypertension      PAD (peripheral artery disease) (H)        Past Surgical History:   Procedure Laterality Date     AMPUTATE FOOT Left 3/15/2017    Procedure: AMPUTATE FOOT;  Surgeon: Emre Mauricio DPM;  Location: [facility] OR     IR LOWER EXTREMITY ANGIOGRAM LEFT  7/30/2019     IR LOWER EXTREMITY ANGIOGRAM LEFT  2/3/2021     IRRIGATION AND DEBRIDEMENT FOOT, COMBINED Left 3/15/2017    Procedure: COMBINED IRRIGATION AND DEBRIDEMENT FOOT;  Surgeon: Emre Mauricio DPM;  Location:  OR       Family History   Problem Relation Age of Onset     Diabetes Father        Social History     Tobacco Use     Smoking status: Former Smoker     Packs/day: 0.75     Types: Cigarettes     Quit date: 3/3/2017     Years since quitting: 3.9     Smokeless tobacco: Never Used     Tobacco comment: Quit Friday March 3 2017   Substance Use Topics     Alcohol use: Yes     Comment: Wine 3/4 bottle a day         Exam:    Vitals: There were no vitals taken for this visit.  BMI: There is no height or weight on file to calculate BMI.  Height: Data Unavailable    Constitutional/ general:  Pt is in no apparent distress, appears well-nourished.  Cooperative with history and physical exam.  Patient seen with female today.       Psych:  The patient answered questions appropriately.  Normal affect.  Seems to have reasonable expectations, in terms of treatment.     Eyes:  Visual scanning/ tracking without deficit.     Ears:  Response to auditory stimuli is normal.   Auricles in proper alignment.     Lymphatic:  Popliteal lymph nodes not enlarged.     Lungs:   Non labored breathing, non labored speech. No cough.  No audible wheezing. Even, quiet breathing.       Vascular: Patient has nonpalpable pedal pulses.  No edema is noted on the right ankle.     Neuro:  Alert and oriented x 3. Coordinated gait.  Light touch sensation is intact to the L4, L5, S1 distributions. No obvious deficits.  No evidence of neurological-based weakness, spasticity, or contracture in the lower extremities.  Monofilament intact on digits    Derm: Thin and shiny with no hair growth noted    Musculoskeletal:    Lower extremity muscle strength is normal.  Patient is ambulatory without an assistive device or brace.  The right foot exam is unremarkable.  On the left foot there is a well-healed amputation of the fifth toe.    On the left Achilles tendon watershed area that in the past measured 7 x 4 mm, 6 x 3 mm and today measures 5 x 3 mm and is dry eschar.  No erythema or edema.    On the plantar posterior central portion of the left heel there is a wound that in the past measured 13 x 7 mm and today measures 13 x 7 mm.  The base is fibrotic tissue slightly moist.  There is no erythema edema or drainage.  This appears to be a fissure that has developed into a wound.    On the left second toe medial the dry leathery eschar has sloughed.  There is now a wound here that measures 26 x 13 mm that goes down to the subcutaneous tissue.  The base of the wound is 50% granulation tissue.    There is no erythema or edema here.  There is no drainage or odor.      The left hallux has gangrenous tissue lateral and dorsal lateral.  The dorsal medial medial and plantar portions of the left hallux show no signs of gangrene at this time.  Only slight odor now.  Perhaps slight healing proximal.  The erythema and edema on the foot are greatly decreased.  He can move his toes now much more normal.    ago could better    Specimen Description Toe    Special Requests Specimen collected in eSwab transport (white cap)     Culture Micro Abnormal   Heavy growth   Proteus mirabilis     Culture Micro Abnormal   Heavy growth   Stenotrophomonas maltophilia     Culture Micro Abnormal   Heavy growth   beta hemolytic   Streptococcus constellatus   This organism is susceptible to ampicillin, penicillin, vancomycin and the cephalosporins.    If treatment is required AND your patient is allergic to penicillin, contact the   Microbiology Lab within 5 days to request susceptibility testing.   Testing unavailable due to 's backorder.     Culture Micro Abnormal   Moderate growth   Staphylococcus aureus     Culture Micro Heavy growth   Normal skin ervin    [facility]   Susceptibility     Proteus mirabilis Stenotrophomonas maltophilia     MENG MENG     AMPICILLIN  Resistant       AMPICILLIN/SULBACTAM <=2 ug/mL Sensitive       CEFEPIME <=1 ug/mL Sensitive       CEFTAZIDIME <=1 ug/mL Sensitive >32.0 ug/mL Resistant     CEFTRIAXONE <=1 ug/mL Sensitive       CIPROFLOXACIN <=0.25 ug/mL Sensitive       GENTAMICIN <=1 ug/mL Sensitive       LEVOFLOXACIN <=0.12 ug/mL Sensitive 0.5 ug/mL Sensitive     MEROPENEM 1 ug/mL Sensitive       MINOCYCLINE   <=2.0 ug/mL Sensitive     Piperacillin/Tazo <=4 ug/mL Sensitive       TOBRAMYCIN <=1 ug/mL Sensitive       Trimethoprim/Sulfa <=1/19 ug/mL Sensitive <=0.5/9.5 u... Sensitive             Susceptibility     Staphylococcus aureus     MENG     CLINDAMYCIN  Resistant1     ERYTHROMYCIN >=8 ug/mL Resistant     GENTAMICIN <=0.5 ug/mL Sensitive     OXACILLIN 0.5 ug/mL Sensitive     TETRACYCLINE <=1 ug/mL Sensitive     Trimethoprim/Sulfa <=0.5/9.5 u... Sensitive     VANCOMYCIN 1 ug/mL Sensitive           A:  Diabetes mellitus with peripheral arterial disease status post recent intervention  Left rear foot ulcers x2  Left second toe ulcer  Left hallux gangrene  resolving cellulitis    P:  Discussed with patient left Achilles wound now smaller dry eschar.  He will just watch this and keep this  offloaded.  Discussed the plantar posterior heel wound is approximately the same size but now has fibrotic base and a dry leathery eschar has sloughed.  He states it is feeling better and offloading this with a donut pad.  He will continue to do this.  His dry skin laterally and fissures are improved with AmLactin.  He will continue this.  Discussed the second toe wound is healthier.  Discussed abrading some of the sloughing tissue he is in agreement he gave verbal consent.  With a tissue nipper and a 15 blade we divided this all the way down to the base.  Patient tolerated procedure well.  He will continue just put antibiotic ointment on this and continue to keep offloaded.  Discussed perhaps slight healing proximal portion of gangrenous hallux.  He will continue to dress this.  We discussed with the patient that he should make sure he returns to see vascular surgery for any follow-ups.  We discussed that the procedure done on his leg will give us a certain window of opportunity to heal this.  I discussed that I am doubtful that this entire toe will resolve.  We discussed amputation with hopes that he still has adequate perfusion to heal this.  He may not have adequate perfusion and even this may necrosis..  For now he would just like to continue watching this.  Return to clinic in 3 weeks    Cornel Lin, KEMAR REINOSO, FACFAS

## 2021-02-25 NOTE — TELEPHONE ENCOUNTER
Patient's wife (Le) called sury Lizarraga's follow up appointment from his DR. YATES's  LEFT LOWER EXTREMITY ARTERIOGRAM WITH POSSIBLE INTERVENTION procedure on 02/03/21. They have not heard when or if they need an appointment. Please call Le at [phone].

## 2021-02-26 DIAGNOSIS — I73.9 PAD (PERIPHERAL ARTERY DISEASE) (H): Primary | ICD-10-CM

## 2021-02-26 NOTE — TELEPHONE ENCOUNTER
New order placed and old order cancelled.     JOHN العراقيN, RN  [facility]  Office:  [phone] Fax: [phone]

## 2021-03-01 ENCOUNTER — OFFICE VISIT (OUTPATIENT)
Dept: OTHER | Facility: CLINIC | Age: 71
End: 2021-03-01
Attending: SURGERY
Payer: COMMERCIAL

## 2021-03-01 ENCOUNTER — HOSPITAL ENCOUNTER (OUTPATIENT)
Dept: ULTRASOUND IMAGING | Facility: CLINIC | Age: 71
End: 2021-03-01
Attending: SURGERY
Payer: COMMERCIAL

## 2021-03-01 VITALS — HEART RATE: 84 BPM | DIASTOLIC BLOOD PRESSURE: 73 MMHG | SYSTOLIC BLOOD PRESSURE: 155 MMHG

## 2021-03-01 DIAGNOSIS — I73.9 PAD (PERIPHERAL ARTERY DISEASE) (H): ICD-10-CM

## 2021-03-01 DIAGNOSIS — Z98.890 CRITICAL ISCHEMIA OF EXTREMITY WITH HISTORY OF REVASCULARIZATION OF SAME EXTREMITY (H): Primary | ICD-10-CM

## 2021-03-01 DIAGNOSIS — I70.229 CRITICAL ISCHEMIA OF EXTREMITY WITH HISTORY OF REVASCULARIZATION OF SAME EXTREMITY (H): Primary | ICD-10-CM

## 2021-03-01 PROCEDURE — 99213 OFFICE O/P EST LOW 20 MIN: CPT | Performed by: SURGERY

## 2021-03-01 PROCEDURE — G0463 HOSPITAL OUTPT CLINIC VISIT: HCPCS | Mod: 25

## 2021-03-01 PROCEDURE — 93926 LOWER EXTREMITY STUDY: CPT | Mod: LT

## 2021-03-01 PROCEDURE — 93926 LOWER EXTREMITY STUDY: CPT | Mod: 26 | Performed by: SURGERY

## 2021-03-01 NOTE — PROGRESS NOTES
Mr. Hopkins returns to clinic today. He is a very pleasant 71-year-old male who has been under my care for left lower extremity peripheral arterial disease.  During his last visit which was on 1 February 2021 he had noticed developing gangrene of the left great toe.  He was taken to the radiology suite and underwent balloon angioplasty of a high-grade stenosis in the mid popliteal artery.  Proximal posterior tibial artery was occluded and was recanalized.  An additional stent was placed in the proximal superficial femoral artery as well.  His anterior tibial artery was occluded at its origin.  His peroneal artery is also occluded at its origin.  Distal posterior tibial artery and proximal common plantar artery also have significant amount of atherosclerotic disease.  With improvement in the proximal posterior tibial and popliteal artery my hope was this would push more blood down his foot for healing.    He still has the gangrene in his left great toe.  My sense is that he will end up losing that toe.  But I am still concerned if the circulation in his foot is sufficient to support the wound healing from that.  I fully anticipate having to reintervene and try and working all his common plantar artery to further improve the circulation going into his forefoot.    I have asked him to come back and see me in another 2 weeks and if there is no improvement in the toe then we should proceed with a great toe amputation and I would like to further revascularize his left foot.  This would of course be an even higher risk intervention.

## 2021-03-01 NOTE — PROGRESS NOTES
"Zia Hopkins is a 71 year old male who presents for:  Chief Complaint   Patient presents with     RECHECK     US LWR EXT ARTERIAL DUPLEX LT(1:15), KMK(2:30). f/u s/p left lower extremity angiogram 2/3/21. (US in Epic).        Vitals:    Vitals:    03/01/21 1334   BP: (!) 155/73   BP Location: Right arm   Patient Position: Chair   Cuff Size: Adult Regular   Pulse: 84       BMI:  Estimated body mass index is 22.92 kg/m  as calculated from the following:    Height as of 2/3/21: 5' 6\" (1.676 m).    Weight as of 2/11/21: 142 lb (64.4 kg).    Pain Score:  Data Unavailable        Stephany Cavazos MA    "

## 2021-03-18 ENCOUNTER — OFFICE VISIT (OUTPATIENT)
Dept: PODIATRY | Facility: CLINIC | Age: 71
End: 2021-03-18
Payer: COMMERCIAL

## 2021-03-18 VITALS
BODY MASS INDEX: 22.92 KG/M2 | DIASTOLIC BLOOD PRESSURE: 63 MMHG | HEART RATE: 69 BPM | WEIGHT: 142 LBS | SYSTOLIC BLOOD PRESSURE: 162 MMHG

## 2021-03-18 DIAGNOSIS — E11.621 DIABETIC ULCER OF LEFT HEEL ASSOCIATED WITH TYPE 2 DIABETES MELLITUS, UNSPECIFIED ULCER STAGE (H): ICD-10-CM

## 2021-03-18 DIAGNOSIS — L97.529 DIABETIC ULCER OF TOE OF LEFT FOOT ASSOCIATED WITH TYPE 2 DIABETES MELLITUS, UNSPECIFIED ULCER STAGE (H): ICD-10-CM

## 2021-03-18 DIAGNOSIS — E11.621 DIABETIC ULCER OF TOE OF LEFT FOOT ASSOCIATED WITH TYPE 2 DIABETES MELLITUS, UNSPECIFIED ULCER STAGE (H): ICD-10-CM

## 2021-03-18 DIAGNOSIS — E11.51 TYPE II DIABETES MELLITUS WITH PERIPHERAL ARTERY DISEASE (H): ICD-10-CM

## 2021-03-18 DIAGNOSIS — I96 GANGRENE OF TOE OF LEFT FOOT (H): Primary | ICD-10-CM

## 2021-03-18 DIAGNOSIS — L97.429 DIABETIC ULCER OF LEFT HEEL ASSOCIATED WITH TYPE 2 DIABETES MELLITUS, UNSPECIFIED ULCER STAGE (H): ICD-10-CM

## 2021-03-18 PROCEDURE — 11042 DBRDMT SUBQ TIS 1ST 20SQCM/<: CPT | Performed by: PODIATRIST

## 2021-03-18 PROCEDURE — 99214 OFFICE O/P EST MOD 30 MIN: CPT | Mod: 25 | Performed by: PODIATRIST

## 2021-03-18 NOTE — PROGRESS NOTES
Subjective:    2/5/21   Pt is seen today in consult from Dr. Hamlin with the c/c of gangrene left first and second toes.  Patient presented to vascular surgery on February 1, 2021.  At that time he was diagnosed with a gangrenous first and second toes.  On 2/3/2021 patient underwent vascular intervention which improved his circulation.  Patient states that his foot is feeling better since this intervention 2 days ago.  He has a history of a left fifth toe amputation.  The patient states that at time he had a strep infection in this toe.  He states that his left foot did not become swollen until approximately a week ago.  Patient states approximately 2 weeks ago he caught his posterior left rear foot on a door and has developed a wound here.  He is dressing this with a Band-Aid.  He denies any fever chills or shortness of breath.  He denies any drainage from the wound.  He states it is somewhat odorous.  Patient has a past history of smoking.  He has diabetes that is somewhat poorly controlled.    2/11/21 patient returns for evaluation of numerous wounds on his left foot and gangrenous hallux.  He states the wound on his posterior heel no longer hurts.  He is not having any wound on the wound plantar central posterior heel.  No pain in the second toe.  He states edema and erythema is greatly decreased in his left foot.  He says he can move his toes much better.  He is not having any pain.  He is dressing the hallux with gauze soaked in alcohol.  Is taking Augmentin, amoxicillin, Levaquin,.  He is not having any GI distress.  He denies any fever or chills.  He has no other new complaints.    2/25/21 patient returns for gangrenous left hallux and numerous left foot ulcers.  Patient has peripheral arterial disease and underwent vascular intervention on 2/3/2021.  He is unsure when his next visit is with his vascular surgeon.  He has finished the Levaquin.  He has a few days of Augmentin and amoxicillin.  He denies any  GI distress.  States his foot is feeling well and is able to move this around.  No new complaints    3/18/21 patient returns for evaluation of his left foot.  Patient saw vascular surgery on 3/1/2021.  At that time they were recommending possibly further vascular intervention and amputation of his hallux.  The patient was.  A call back from vascular and follow-up in 2 weeks but this never happened.  The patient states that few days ago his left hallux has changed.  He notices that the dark tissue is further away from his skin.  It is more odorous.  They deny any increase in pain.  Denies any increase in edema.  They have noted no purulence.  Patient states the wound on his right heel now no longer hurts.  He has been using AmLactin on this.       ROS:  See above        No Known Allergies    Current Outpatient Medications   Medication Sig Dispense Refill     amLODIPine (NORVASC) 10 MG tablet Take 10 mg by mouth daily       amoxicillin (AMOXIL) 500 MG capsule Take 1 capsule (500 mg) by mouth 2 times daily 20 capsule 0     amoxicillin-clavulanate (AUGMENTIN) 875-125 MG tablet Take 1 tablet by mouth 2 times daily 20 tablet 0     aspirin 81 MG tablet Take 1 tablet (81 mg) by mouth daily 90 tablet 1     atorvastatin (LIPITOR) 80 MG tablet TAKE 1 TABLET(80 MG) BY MOUTH AT BEDTIME 90 tablet 3     Cadexomer Iodine, topical, 0.9% (IODOSORB) 0.9 % GEL gel Apply 40 g topically daily as needed for other 1 Tube 1     chlorthalidone (HYGROTON) 50 MG tablet TK 1 T PO QD FOR BP       clopidogrel (PLAVIX) 75 MG tablet TAKE 1 TABLET(75 MG) BY MOUTH DAILY 90 tablet 1     empagliflozin (JARDIANCE) 10 MG TABS tablet Take 1 tablet (10 mg) by mouth daily 30 tablet 3     ezetimibe (ZETIA) 10 MG tablet TAKE 1 TABLET(10 MG) BY MOUTH DAILY 90 tablet 3     levofloxacin (LEVAQUIN) 500 MG tablet Take 1 tablet (500 mg) by mouth daily 14 tablet 0     losartan (COZAAR) 100 MG tablet TK 1 T TABLET QD       metFORMIN (GLUCOPHAGE) 1000 MG tablet Take  1,000 mg by mouth 2 times daily (with meals)        metoprolol succinate ER (TOPROL-XL) 100 MG 24 hr tablet Take 1 tablet (100 mg) by mouth daily 90 tablet 3     triamcinolone (KENALOG) 0.1 % cream Apply to affected area as needed  5       Patient Active Problem List   Diagnosis     Gangrene of toe (H)     Toe amputation status, left     Acute osteomyelitis of left foot (H)     PAD (peripheral artery disease) (H)     Diabetes mellitus, type 2 (H)     Diabetic ulcer of left foot associated with type 2 diabetes mellitus (H)     Nonhealing surgical wound, initial encounter     Non-pressure chronic ulcer of other part of left lower leg with necrosis of muscle (H)       Past Medical History:   Diagnosis Date     Diabetes mellitus type 2, noninsulin dependent (H)      Hyperlipidemia LDL goal <70      Hypertension      PAD (peripheral artery disease) (H)        Past Surgical History:   Procedure Laterality Date     AMPUTATE FOOT Left 3/15/2017    Procedure: AMPUTATE FOOT;  Surgeon: Emre Mauricio DPM;  Location: [facility] OR     IR LOWER EXTREMITY ANGIOGRAM LEFT  2019     IR LOWER EXTREMITY ANGIOGRAM LEFT  2/3/2021     IRRIGATION AND DEBRIDEMENT FOOT, COMBINED Left 3/15/2017    Procedure: COMBINED IRRIGATION AND DEBRIDEMENT FOOT;  Surgeon: Emre Mauricio DPM;  Location: [facility] OR       Family History   Problem Relation Age of Onset     Diabetes Father        Social History     Tobacco Use     Smoking status: Former Smoker     Packs/day: 0.75     Types: Cigarettes     Quit date: 3/3/2017     Years since quittin.0     Smokeless tobacco: Never Used     Tobacco comment: Quit Friday March 3 2017   Substance Use Topics     Alcohol use: Yes     Comment: Wine 3/4 bottle a day         Exam:    Vitals: There were no vitals taken for this visit.  BMI: There is no height or weight on file to calculate BMI.  Height: Data Unavailable    Constitutional/ general:  Pt is in no apparent distress, appears well-nourished.  Cooperative  with history and physical exam.  Patient seen with female today.       Psych:  The patient answered questions appropriately.  Normal affect.  Seems to have reasonable expectations, in terms of treatment.     Eyes:  Visual scanning/ tracking without deficit.     Ears:  Response to auditory stimuli is normal.   Auricles in proper alignment.     Lymphatic:  Popliteal lymph nodes not enlarged.     Lungs:  Non labored breathing, non labored speech. No cough.  No audible wheezing. Even, quiet breathing.       Vascular: Patient has nonpalpable pedal pulses.  No edema is noted on the right ankle.     Neuro:  Alert and oriented x 3. Coordinated gait.  Light touch sensation is intact to the L4, L5, S1 distributions. No obvious deficits.  No evidence of neurological-based weakness, spasticity, or contracture in the lower extremities.  Monofilament intact on digits    Derm: Thin and shiny with no hair growth noted    Musculoskeletal:    Lower extremity muscle strength is normal.  Patient is ambulatory without an assistive device or brace.  The right foot exam is unremarkable.  On the left foot there is a well-healed amputation of the fifth toe.    On the left Achilles tendon watershed area the wound is now healed.  No erythema or edema.    On the plantar posterior central portion of the left heel there is a wound that in the past measured 13 x 7 mm, 13 x 7 mm, and today measures 10 x 5 mm.  The base is fibrotic tissue slightly moist.  There is no erythema edema or drainage.      On the left second toe lateral ulcer in the past measured 26 x 13 mm, and today measures 25 x 9 mm.  The base goes down to the subcutaneous tissue.  The base of the wound is 50% granulation tissue.    There is no erythema or edema here.  There is no drainage or odor.      The left hallux has gangrenous tissue lateral and dorsal lateral.  The dorsal medial medial and plantar portions of the left hallux show no signs of gangrene at this time.  The wound  is definitely more odorous today.  We note on the lateral plantar portion of the hallux there appears to be yeast growing on the dead skin.  With palpation of this area it is somewhat fluctuant.  There is no purulence.    ago could better    Specimen Description Toe    Special Requests Specimen collected in eSwab transport (white cap)    Culture Micro Abnormal   Heavy growth   Proteus mirabilis     Culture Micro Abnormal   Heavy growth   Stenotrophomonas maltophilia     Culture Micro Abnormal   Heavy growth   beta hemolytic   Streptococcus constellatus   This organism is susceptible to ampicillin, penicillin, vancomycin and the cephalosporins.    If treatment is required AND your patient is allergic to penicillin, contact the   Microbiology Lab within 5 days to request susceptibility testing.   Testing unavailable due to 's backorder.     Culture Micro Abnormal   Moderate growth   Staphylococcus aureus     Culture Micro Heavy growth   Normal skin ervin    Resulting Agency [facility]   Susceptibility     Proteus mirabilis Stenotrophomonas maltophilia     MENG MENG     AMPICILLIN  Resistant       AMPICILLIN/SULBACTAM <=2 ug/mL Sensitive       CEFEPIME <=1 ug/mL Sensitive       CEFTAZIDIME <=1 ug/mL Sensitive >32.0 ug/mL Resistant     CEFTRIAXONE <=1 ug/mL Sensitive       CIPROFLOXACIN <=0.25 ug/mL Sensitive       GENTAMICIN <=1 ug/mL Sensitive       LEVOFLOXACIN <=0.12 ug/mL Sensitive 0.5 ug/mL Sensitive     MEROPENEM 1 ug/mL Sensitive       MINOCYCLINE   <=2.0 ug/mL Sensitive     Piperacillin/Tazo <=4 ug/mL Sensitive       TOBRAMYCIN <=1 ug/mL Sensitive       Trimethoprim/Sulfa <=1/19 ug/mL Sensitive <=0.5/9.5 u... Sensitive             Susceptibility     Staphylococcus aureus     MENG     CLINDAMYCIN  Resistant1     ERYTHROMYCIN >=8 ug/mL Resistant     GENTAMICIN <=0.5 ug/mL Sensitive     OXACILLIN 0.5 ug/mL Sensitive     TETRACYCLINE <=1 ug/mL Sensitive     Trimethoprim/Sulfa <=0.5/9.5 u... Sensitive      VANCOMYCIN 1 ug/mL Sensitive           A:  Diabetes mellitus with peripheral arterial disease status post recent intervention  Left rear foot ulcer  Left second toe ulcer  Left hallux gangrene  resolving cellulitis    P:  Discussed with patient left Achilles wound is healed.  He will keep this offloaded.   Discussed the plantar posterior heel wound is smaller and now no pain on palpation.  He is no longer using a donut pad.  He will continue to dress this and watch this.  His dry skin laterally with fissures resolved and he will continue AmLactin.    Discussed the second toe wound is healthier.  Discussed debriding some of the sloughing tissue he is in agreement he gave verbal consent.  With a tissue nipper and a 15 blade we divided this all the way down to the base.  Patient tolerated procedure well.  He will continue just put antibiotic ointment on this and continue to keep offloaded.  Discussed hallux is definitely more odorous and pointed out yeast growing on dead tissue laterally.  Discussed that I have little hope of saving this toe and agree that amputating this sooner would be the best.  Patient is hesitant to do this.  I discussed that if he waits he could get an infection which could result in amputation of even a larger part of his foot and impede healing.  I recommend that he call his vascular surgeon and try to make an appointment within the week for reevaluation.  He will come see me after he seen vascular surgery    Cornel Lin DPM DPM, [facility]

## 2021-03-18 NOTE — LETTER
3/18/2021         RE: Zia Hopkins  [address] [facility], [place] [address]        Dear Colleague,    Thank you for referring your patient, Zia Hopkins, to the [facility]. Please see a copy of my visit note below.    Subjective:    2/5/21   Pt is seen today in consult from Dr. Hamlin with the c/c of gangrene left first and second toes.  Patient presented to vascular surgery on February 1, 2021.  At that time he was diagnosed with a gangrenous first and second toes.  On 2/3/2021 patient underwent vascular intervention which improved his circulation.  Patient states that his foot is feeling better since this intervention 2 days ago.  He has a history of a left fifth toe amputation.  The patient states that at time he had a strep infection in this toe.  He states that his left foot did not become swollen until approximately a week ago.  Patient states approximately 2 weeks ago he caught his posterior left rear foot on a door and has developed a wound here.  He is dressing this with a Band-Aid.  He denies any fever chills or shortness of breath.  He denies any drainage from the wound.  He states it is somewhat odorous.  Patient has a past history of smoking.  He has diabetes that is somewhat poorly controlled.    2/11/21 patient returns for evaluation of numerous wounds on his left foot and gangrenous hallux.  He states the wound on his posterior heel no longer hurts.  He is not having any wound on the wound plantar central posterior heel.  No pain in the second toe.  He states edema and erythema is greatly decreased in his left foot.  He says he can move his toes much better.  He is not having any pain.  He is dressing the hallux with gauze soaked in alcohol.  Is taking Augmentin, amoxicillin, Levaquin,.  He is not having any GI distress.  He denies any fever or chills.  He has no other new complaints.    2/25/21 patient returns for gangrenous left hallux and numerous left foot  ulcers.  Patient has peripheral arterial disease and underwent vascular intervention on 2/3/2021.  He is unsure when his next visit is with his vascular surgeon.  He has finished the Levaquin.  He has a few days of Augmentin and amoxicillin.  He denies any GI distress.  States his foot is feeling well and is able to move this around.  No new complaints    3/18/21 patient returns for evaluation of his left foot.  Patient saw vascular surgery on 3/1/2021.  At that time they were recommending possibly further vascular intervention and amputation of his hallux.  The patient was.  A call back from vascular and follow-up in 2 weeks but this never happened.  The patient states that few days ago his left hallux has changed.  He notices that the dark tissue is further away from his skin.  It is more odorous.  They deny any increase in pain.  Denies any increase in edema.  They have noted no purulence.  Patient states the wound on his right heel now no longer hurts.  He has been using AmLactin on this.       ROS:  See above        No Known Allergies    Current Outpatient Medications   Medication Sig Dispense Refill     amLODIPine (NORVASC) 10 MG tablet Take 10 mg by mouth daily       amoxicillin (AMOXIL) 500 MG capsule Take 1 capsule (500 mg) by mouth 2 times daily 20 capsule 0     amoxicillin-clavulanate (AUGMENTIN) 875-125 MG tablet Take 1 tablet by mouth 2 times daily 20 tablet 0     aspirin 81 MG tablet Take 1 tablet (81 mg) by mouth daily 90 tablet 1     atorvastatin (LIPITOR) 80 MG tablet TAKE 1 TABLET(80 MG) BY MOUTH AT BEDTIME 90 tablet 3     Cadexomer Iodine, topical, 0.9% (IODOSORB) 0.9 % GEL gel Apply 40 g topically daily as needed for other 1 Tube 1     chlorthalidone (HYGROTON) 50 MG tablet TK 1 T PO QD FOR BP       clopidogrel (PLAVIX) 75 MG tablet TAKE 1 TABLET(75 MG) BY MOUTH DAILY 90 tablet 1     empagliflozin (JARDIANCE) 10 MG TABS tablet Take 1 tablet (10 mg) by mouth daily 30 tablet 3     ezetimibe (ZETIA)  10 MG tablet TAKE 1 TABLET(10 MG) BY MOUTH DAILY 90 tablet 3     levofloxacin (LEVAQUIN) 500 MG tablet Take 1 tablet (500 mg) by mouth daily 14 tablet 0     losartan (COZAAR) 100 MG tablet TK 1 T TABLET QD       metFORMIN (GLUCOPHAGE) 1000 MG tablet Take 1,000 mg by mouth 2 times daily (with meals)        metoprolol succinate ER (TOPROL-XL) 100 MG 24 hr tablet Take 1 tablet (100 mg) by mouth daily 90 tablet 3     triamcinolone (KENALOG) 0.1 % cream Apply to affected area as needed  5       Patient Active Problem List   Diagnosis     Gangrene of toe (H)     Toe amputation status, left     Acute osteomyelitis of left foot (H)     PAD (peripheral artery disease) (H)     Diabetes mellitus, type 2 (H)     Diabetic ulcer of left foot associated with type 2 diabetes mellitus (H)     Nonhealing surgical wound, initial encounter     Non-pressure chronic ulcer of other part of left lower leg with necrosis of muscle (H)       Past Medical History:   Diagnosis Date     Diabetes mellitus type 2, noninsulin dependent (H)      Hyperlipidemia LDL goal <70      Hypertension      PAD (peripheral artery disease) (H)        Past Surgical History:   Procedure Laterality Date     AMPUTATE FOOT Left 3/15/2017    Procedure: AMPUTATE FOOT;  Surgeon: Emre Mauricio DPM;  Location: [facility] OR     IR LOWER EXTREMITY ANGIOGRAM LEFT  2019     IR LOWER EXTREMITY ANGIOGRAM LEFT  2/3/2021     IRRIGATION AND DEBRIDEMENT FOOT, COMBINED Left 3/15/2017    Procedure: COMBINED IRRIGATION AND DEBRIDEMENT FOOT;  Surgeon: Emre Mauricio DPM;  Location: [facility] OR       Family History   Problem Relation Age of Onset     Diabetes Father        Social History     Tobacco Use     Smoking status: Former Smoker     Packs/day: 0.75     Types: Cigarettes     Quit date: 3/3/2017     Years since quittin.0     Smokeless tobacco: Never Used     Tobacco comment: Quit Friday March 3 2017   Substance Use Topics     Alcohol use: Yes     Comment: Wine 3/4 bottle a  day         Exam:    Vitals: There were no vitals taken for this visit.  BMI: There is no height or weight on file to calculate BMI.  Height: Data Unavailable    Constitutional/ general:  Pt is in no apparent distress, appears well-nourished.  Cooperative with history and physical exam.  Patient seen with female today.       Psych:  The patient answered questions appropriately.  Normal affect.  Seems to have reasonable expectations, in terms of treatment.     Eyes:  Visual scanning/ tracking without deficit.     Ears:  Response to auditory stimuli is normal.   Auricles in proper alignment.     Lymphatic:  Popliteal lymph nodes not enlarged.     Lungs:  Non labored breathing, non labored speech. No cough.  No audible wheezing. Even, quiet breathing.       Vascular: Patient has nonpalpable pedal pulses.  No edema is noted on the right ankle.     Neuro:  Alert and oriented x 3. Coordinated gait.  Light touch sensation is intact to the L4, L5, S1 distributions. No obvious deficits.  No evidence of neurological-based weakness, spasticity, or contracture in the lower extremities.  Monofilament intact on digits    Derm: Thin and shiny with no hair growth noted    Musculoskeletal:    Lower extremity muscle strength is normal.  Patient is ambulatory without an assistive device or brace.  The right foot exam is unremarkable.  On the left foot there is a well-healed amputation of the fifth toe.    On the left Achilles tendon watershed area the wound is now healed.  No erythema or edema.    On the plantar posterior central portion of the left heel there is a wound that in the past measured 13 x 7 mm, 13 x 7 mm, and today measures 10 x 5 mm.  The base is fibrotic tissue slightly moist.  There is no erythema edema or drainage.      On the left second toe lateral ulcer in the past measured 26 x 13 mm, and today measures 25 x 9 mm.  The base goes down to the subcutaneous tissue.  The base of the wound is 50% granulation tissue.     There is no erythema or edema here.  There is no drainage or odor.      The left hallux has gangrenous tissue lateral and dorsal lateral.  The dorsal medial medial and plantar portions of the left hallux show no signs of gangrene at this time.  The wound is definitely more odorous today.  We note on the lateral plantar portion of the hallux there appears to be yeast growing on the dead skin.  With palpation of this area it is somewhat fluctuant.  There is no purulence.    ago could better    Specimen Description Toe    Special Requests Specimen collected in eSwab transport (white cap)    Culture Micro Abnormal   Heavy growth   Proteus mirabilis     Culture Micro Abnormal   Heavy growth   Stenotrophomonas maltophilia     Culture Micro Abnormal   Heavy growth   beta hemolytic   Streptococcus constellatus   This organism is susceptible to ampicillin, penicillin, vancomycin and the cephalosporins.    If treatment is required AND your patient is allergic to penicillin, contact the   Microbiology Lab within 5 days to request susceptibility testing.   Testing unavailable due to 's backorder.     Culture Micro Abnormal   Moderate growth   Staphylococcus aureus     Culture Micro Heavy growth   Normal skin ervin    [facility]   Susceptibility     Proteus mirabilis Stenotrophomonas maltophilia     MENG MENG     AMPICILLIN  Resistant       AMPICILLIN/SULBACTAM <=2 ug/mL Sensitive       CEFEPIME <=1 ug/mL Sensitive       CEFTAZIDIME <=1 ug/mL Sensitive >32.0 ug/mL Resistant     CEFTRIAXONE <=1 ug/mL Sensitive       CIPROFLOXACIN <=0.25 ug/mL Sensitive       GENTAMICIN <=1 ug/mL Sensitive       LEVOFLOXACIN <=0.12 ug/mL Sensitive 0.5 ug/mL Sensitive     MEROPENEM 1 ug/mL Sensitive       MINOCYCLINE   <=2.0 ug/mL Sensitive     Piperacillin/Tazo <=4 ug/mL Sensitive       TOBRAMYCIN <=1 ug/mL Sensitive       Trimethoprim/Sulfa <=1/19 ug/mL Sensitive <=0.5/9.5 u... Sensitive             Susceptibility      Staphylococcus aureus     MENG     CLINDAMYCIN  Resistant1     ERYTHROMYCIN >=8 ug/mL Resistant     GENTAMICIN <=0.5 ug/mL Sensitive     OXACILLIN 0.5 ug/mL Sensitive     TETRACYCLINE <=1 ug/mL Sensitive     Trimethoprim/Sulfa <=0.5/9.5 u... Sensitive     VANCOMYCIN 1 ug/mL Sensitive           A:  Diabetes mellitus with peripheral arterial disease status post recent intervention  Left rear foot ulcer  Left second toe ulcer  Left hallux gangrene  resolving cellulitis    P:  Discussed with patient left Achilles wound is healed.  He will keep this offloaded.   Discussed the plantar posterior heel wound is smaller and now no pain on palpation.  He is no longer using a donut pad.  He will continue to dress this and watch this.  His dry skin laterally with fissures resolved and he will continue AmLactin.    Discussed the second toe wound is healthier.  Discussed debriding some of the sloughing tissue he is in agreement he gave verbal consent.  With a tissue nipper and a 15 blade we divided this all the way down to the base.  Patient tolerated procedure well.  He will continue just put antibiotic ointment on this and continue to keep offloaded.  Discussed hallux is definitely more odorous and pointed out yeast growing on dead tissue laterally.  Discussed that I have little hope of saving this toe and agree that amputating this sooner would be the best.  Patient is hesitant to do this.  I discussed that if he waits he could get an infection which could result in amputation of even a larger part of his foot and impede healing.  I recommend that he call his vascular surgeon and try to make an appointment within the week for reevaluation.  He will come see me after he seen vascular surgery    Cornel Lin DPM DPM, FACFAS              Again, thank you for allowing me to participate in the care of your patient.        Sincerely,        Cornel Lin DPM

## 2021-03-21 ENCOUNTER — HEALTH MAINTENANCE LETTER (OUTPATIENT)
Age: 71
End: 2021-03-21

## 2021-04-16 ENCOUNTER — TELEPHONE (OUTPATIENT)
Dept: OTHER | Facility: CLINIC | Age: 71
End: 2021-04-16

## 2021-04-16 DIAGNOSIS — I73.9 PAD (PERIPHERAL ARTERY DISEASE) (H): ICD-10-CM

## 2021-04-16 DIAGNOSIS — I96 GANGRENE, NOT ELSEWHERE CLASSIFIED (H): ICD-10-CM

## 2021-04-16 DIAGNOSIS — Z98.890 CRITICAL ISCHEMIA OF EXTREMITY WITH HISTORY OF REVASCULARIZATION OF SAME EXTREMITY (H): Primary | ICD-10-CM

## 2021-04-16 DIAGNOSIS — I70.229 CRITICAL ISCHEMIA OF EXTREMITY WITH HISTORY OF REVASCULARIZATION OF SAME EXTREMITY (H): Primary | ICD-10-CM

## 2021-04-16 RX ORDER — CLOPIDOGREL BISULFATE 75 MG/1
TABLET ORAL
Qty: 90 TABLET | Refills: 1 | Status: SHIPPED | OUTPATIENT
Start: 2021-04-16 | End: 2021-08-12

## 2021-04-16 NOTE — TELEPHONE ENCOUNTER
"Pt hx of PAD, left lower extremity angiogram, balloon angioplasty of a high-grade stenosis in the mid popliteal artery, on 2/3/21 with Dr. Hamlin.   Pt called, left vm noting toe on left side has worsened, \"infection expanded to whole toe, not just the 1st Phalanx\".   Pt denies pain.   Has been following with Podiatry in [place].   Inquiring if he should have follow up with Dr. Hamlin.     Chart review:  LOV 3/1/21 with Dr. Hamlin.   LOV 3/18/21 with Dr. Lin,   Discussed toe amputation soon, pt hesitant, informed to call vascular sx within week for re-evaluation, then follow up again with podiatry after.     Alanna Fam, JOHNN, RN    "

## 2021-04-16 NOTE — TELEPHONE ENCOUNTER
Routing to  to coordinate in person OV with Dr. Hamlin as soon as possible.     Appt set for 12:00 on Monday with Dr. Hamlin.   Pt notes understanding.     Per Dr. Hamlin, pt needs repeat DAVID/TBI prior. Routing to  to coordinate.     JOHN العراقيN, RN  [facility]  Office:  [phone] Fax: [phone]

## 2021-04-16 NOTE — TELEPHONE ENCOUNTER
"Requested Prescriptions   Pending Prescriptions Disp Refills     clopidogrel (PLAVIX) 75 MG tablet 90 tablet 1     Sig: TAKE 1 TABLET(75 MG) BY MOUTH DAILY       Plavix Failed - 4/16/2021  8:58 AM        Failed - Normal HGB on file in past 12 months     Recent Labs   Lab Test 02/03/21  1003   HGB 12.2*               Passed - No active PPI on record unless is Protonix        Passed - Normal Platelets on file in past 12 months     Recent Labs   Lab Test 02/03/21  1003                  Passed - Recent (12 mo) or future (30 days) visit within the authorizing provider's specialty     Patient has had an office visit with the authorizing provider or a provider within the authorizing providers department within the previous 12 mos or has a future within next 30 days. See \"Patient Info\" tab in inbasket, or \"Choose Columns\" in Meds & Orders section of the refill encounter.              Passed - Medication is active on med list        Passed - Patient is age 18 or older           Routing refill request to provider for review/approval because:  Labs out of range:  Failed HGB    JOHN SommersN, RN-[facility]        "

## 2021-04-19 ENCOUNTER — APPOINTMENT (OUTPATIENT)
Dept: GENERAL RADIOLOGY | Facility: CLINIC | Age: 71
DRG: 252 | End: 2021-04-19
Attending: PODIATRIST
Payer: COMMERCIAL

## 2021-04-19 ENCOUNTER — HOSPITAL ENCOUNTER (OUTPATIENT)
Dept: ULTRASOUND IMAGING | Facility: CLINIC | Age: 71
DRG: 252 | End: 2021-04-19
Attending: SURGERY
Payer: COMMERCIAL

## 2021-04-19 ENCOUNTER — HOSPITAL ENCOUNTER (INPATIENT)
Facility: CLINIC | Age: 71
LOS: 4 days | Discharge: HOME | DRG: 252 | End: 2021-04-23
Attending: INTERNAL MEDICINE | Admitting: HOSPITALIST
Payer: COMMERCIAL

## 2021-04-19 ENCOUNTER — APPOINTMENT (OUTPATIENT)
Dept: MRI IMAGING | Facility: CLINIC | Age: 71
DRG: 252 | End: 2021-04-19
Attending: PODIATRIST
Payer: COMMERCIAL

## 2021-04-19 ENCOUNTER — OFFICE VISIT (OUTPATIENT)
Dept: OTHER | Facility: CLINIC | Age: 71
DRG: 252 | End: 2021-04-19
Attending: SURGERY
Payer: COMMERCIAL

## 2021-04-19 VITALS — SYSTOLIC BLOOD PRESSURE: 202 MMHG | HEART RATE: 75 BPM | DIASTOLIC BLOOD PRESSURE: 50 MMHG | TEMPERATURE: 97.7 F

## 2021-04-19 DIAGNOSIS — I96 GANGRENE, NOT ELSEWHERE CLASSIFIED (H): ICD-10-CM

## 2021-04-19 DIAGNOSIS — I70.229 CRITICAL ISCHEMIA OF EXTREMITY WITH HISTORY OF REVASCULARIZATION OF SAME EXTREMITY (H): Primary | ICD-10-CM

## 2021-04-19 DIAGNOSIS — Z98.890 CRITICAL ISCHEMIA OF EXTREMITY WITH HISTORY OF REVASCULARIZATION OF SAME EXTREMITY (H): ICD-10-CM

## 2021-04-19 DIAGNOSIS — I70.229 CRITICAL ISCHEMIA OF EXTREMITY WITH HISTORY OF REVASCULARIZATION OF SAME EXTREMITY (H): ICD-10-CM

## 2021-04-19 DIAGNOSIS — I73.9 PAD (PERIPHERAL ARTERY DISEASE) (H): ICD-10-CM

## 2021-04-19 DIAGNOSIS — M86.172 ACUTE OSTEOMYELITIS OF LEFT FOOT (H): Primary | ICD-10-CM

## 2021-04-19 DIAGNOSIS — Z98.890 CRITICAL ISCHEMIA OF EXTREMITY WITH HISTORY OF REVASCULARIZATION OF SAME EXTREMITY (H): Primary | ICD-10-CM

## 2021-04-19 LAB
ANION GAP SERPL CALCULATED.3IONS-SCNC: 3 MMOL/L (ref 3–14)
BUN SERPL-MCNC: 25 MG/DL (ref 7–30)
CALCIUM SERPL-MCNC: 8.9 MG/DL (ref 8.5–10.1)
CHLORIDE SERPL-SCNC: 104 MMOL/L (ref 94–109)
CO2 SERPL-SCNC: 29 MMOL/L (ref 20–32)
CREAT SERPL-MCNC: 0.96 MG/DL (ref 0.66–1.25)
ERYTHROCYTE [DISTWIDTH] IN BLOOD BY AUTOMATED COUNT: 13.5 % (ref 10–15)
GFR SERPL CREATININE-BSD FRML MDRD: 78 ML/MIN/{1.73_M2}
GLUCOSE BLDC GLUCOMTR-MCNC: 132 MG/DL (ref 70–99)
GLUCOSE BLDC GLUCOMTR-MCNC: 154 MG/DL (ref 70–99)
GLUCOSE SERPL-MCNC: 242 MG/DL (ref 70–99)
HCT VFR BLD AUTO: 37.9 % (ref 40–53)
HGB BLD-MCNC: 12.2 G/DL (ref 13.3–17.7)
LABORATORY COMMENT REPORT: NORMAL
MCH RBC QN AUTO: 30.3 PG (ref 26.5–33)
MCHC RBC AUTO-ENTMCNC: 32.2 G/DL (ref 31.5–36.5)
MCV RBC AUTO: 94 FL (ref 78–100)
PLATELET # BLD AUTO: 310 10E9/L (ref 150–450)
POTASSIUM SERPL-SCNC: 4 MMOL/L (ref 3.4–5.3)
RBC # BLD AUTO: 4.03 10E12/L (ref 4.4–5.9)
SARS-COV-2 RNA RESP QL NAA+PROBE: NEGATIVE
SODIUM SERPL-SCNC: 136 MMOL/L (ref 133–144)
SPECIMEN SOURCE: NORMAL
WBC # BLD AUTO: 8.5 10E9/L (ref 4–11)

## 2021-04-19 PROCEDURE — 73630 X-RAY EXAM OF FOOT: CPT | Mod: LT

## 2021-04-19 PROCEDURE — A9585 GADOBUTROL INJECTION: HCPCS | Performed by: INTERNAL MEDICINE

## 2021-04-19 PROCEDURE — 87635 SARS-COV-2 COVID-19 AMP PRB: CPT | Performed by: NURSE PRACTITIONER

## 2021-04-19 PROCEDURE — 99213 OFFICE O/P EST LOW 20 MIN: CPT | Performed by: SURGERY

## 2021-04-19 PROCEDURE — 93924 LWR XTR VASC STDY BILAT: CPT

## 2021-04-19 PROCEDURE — 99223 1ST HOSP IP/OBS HIGH 75: CPT | Mod: AI | Performed by: NURSE PRACTITIONER

## 2021-04-19 PROCEDURE — 250N000013 HC RX MED GY IP 250 OP 250 PS 637: Performed by: NURSE PRACTITIONER

## 2021-04-19 PROCEDURE — 36415 COLL VENOUS BLD VENIPUNCTURE: CPT | Performed by: NURSE PRACTITIONER

## 2021-04-19 PROCEDURE — 99222 1ST HOSP IP/OBS MODERATE 55: CPT | Performed by: SURGERY

## 2021-04-19 PROCEDURE — 255N000002 HC RX 255 OP 636: Performed by: INTERNAL MEDICINE

## 2021-04-19 PROCEDURE — 93005 ELECTROCARDIOGRAM TRACING: CPT

## 2021-04-19 PROCEDURE — 93924 LWR XTR VASC STDY BILAT: CPT | Mod: 26 | Performed by: SURGERY

## 2021-04-19 PROCEDURE — 120N000001 HC R&B MED SURG/OB

## 2021-04-19 PROCEDURE — 80048 BASIC METABOLIC PNL TOTAL CA: CPT | Performed by: NURSE PRACTITIONER

## 2021-04-19 PROCEDURE — 250N000011 HC RX IP 250 OP 636

## 2021-04-19 PROCEDURE — G0463 HOSPITAL OUTPT CLINIC VISIT: HCPCS

## 2021-04-19 PROCEDURE — 73720 MRI LWR EXTREMITY W/O&W/DYE: CPT | Mod: LT

## 2021-04-19 PROCEDURE — 85027 COMPLETE CBC AUTOMATED: CPT | Performed by: NURSE PRACTITIONER

## 2021-04-19 PROCEDURE — 999N001017 HC STATISTIC GLUCOSE BY METER IP

## 2021-04-19 PROCEDURE — 250N000011 HC RX IP 250 OP 636: Performed by: NURSE PRACTITIONER

## 2021-04-19 RX ORDER — ATORVASTATIN CALCIUM 40 MG/1
80 TABLET, FILM COATED ORAL EVERY EVENING
Status: DISCONTINUED | OUTPATIENT
Start: 2021-04-19 | End: 2021-04-23 | Stop reason: HOSPADM

## 2021-04-19 RX ORDER — AMOXICILLIN 250 MG
1 CAPSULE ORAL 2 TIMES DAILY PRN
Status: DISCONTINUED | OUTPATIENT
Start: 2021-04-19 | End: 2021-04-23 | Stop reason: HOSPADM

## 2021-04-19 RX ORDER — ACETAMINOPHEN 325 MG/1
650 TABLET ORAL EVERY 4 HOURS PRN
Status: DISCONTINUED | OUTPATIENT
Start: 2021-04-19 | End: 2021-04-20

## 2021-04-19 RX ORDER — OXYCODONE HYDROCHLORIDE 5 MG/1
5 TABLET ORAL EVERY 6 HOURS PRN
Status: DISCONTINUED | OUTPATIENT
Start: 2021-04-19 | End: 2021-04-20

## 2021-04-19 RX ORDER — EZETIMIBE 10 MG/1
10 TABLET ORAL AT BEDTIME
Status: DISCONTINUED | OUTPATIENT
Start: 2021-04-19 | End: 2021-04-23 | Stop reason: HOSPADM

## 2021-04-19 RX ORDER — POLYETHYLENE GLYCOL 3350 17 G/17G
17 POWDER, FOR SOLUTION ORAL DAILY PRN
Status: DISCONTINUED | OUTPATIENT
Start: 2021-04-19 | End: 2021-04-23 | Stop reason: HOSPADM

## 2021-04-19 RX ORDER — NALOXONE HYDROCHLORIDE 0.4 MG/ML
0.4 INJECTION, SOLUTION INTRAMUSCULAR; INTRAVENOUS; SUBCUTANEOUS
Status: DISCONTINUED | OUTPATIENT
Start: 2021-04-19 | End: 2021-04-23 | Stop reason: HOSPADM

## 2021-04-19 RX ORDER — PIPERACILLIN SODIUM, TAZOBACTAM SODIUM 3; .375 G/15ML; G/15ML
3.38 INJECTION, POWDER, LYOPHILIZED, FOR SOLUTION INTRAVENOUS EVERY 6 HOURS
Status: DISCONTINUED | OUTPATIENT
Start: 2021-04-19 | End: 2021-04-23 | Stop reason: HOSPADM

## 2021-04-19 RX ORDER — LIDOCAINE 40 MG/G
CREAM TOPICAL
Status: DISCONTINUED | OUTPATIENT
Start: 2021-04-19 | End: 2021-04-23 | Stop reason: HOSPADM

## 2021-04-19 RX ORDER — NALOXONE HYDROCHLORIDE 0.4 MG/ML
0.2 INJECTION, SOLUTION INTRAMUSCULAR; INTRAVENOUS; SUBCUTANEOUS
Status: DISCONTINUED | OUTPATIENT
Start: 2021-04-19 | End: 2021-04-23 | Stop reason: HOSPADM

## 2021-04-19 RX ORDER — ONDANSETRON 4 MG/1
4 TABLET, ORALLY DISINTEGRATING ORAL EVERY 6 HOURS PRN
Status: DISCONTINUED | OUTPATIENT
Start: 2021-04-19 | End: 2021-04-23 | Stop reason: HOSPADM

## 2021-04-19 RX ORDER — LOSARTAN POTASSIUM 100 MG/1
100 TABLET ORAL DAILY
Status: DISCONTINUED | OUTPATIENT
Start: 2021-04-20 | End: 2021-04-23 | Stop reason: HOSPADM

## 2021-04-19 RX ORDER — VANCOMYCIN HYDROCHLORIDE 1 G/200ML
1000 INJECTION, SOLUTION INTRAVENOUS EVERY 12 HOURS
Status: DISCONTINUED | OUTPATIENT
Start: 2021-04-19 | End: 2021-04-20

## 2021-04-19 RX ORDER — METOPROLOL SUCCINATE 100 MG/1
100 TABLET, EXTENDED RELEASE ORAL DAILY
Status: DISCONTINUED | OUTPATIENT
Start: 2021-04-20 | End: 2021-04-23 | Stop reason: HOSPADM

## 2021-04-19 RX ORDER — GADOBUTROL 604.72 MG/ML
6 INJECTION INTRAVENOUS ONCE
Status: COMPLETED | OUTPATIENT
Start: 2021-04-19 | End: 2021-04-19

## 2021-04-19 RX ORDER — ASPIRIN 81 MG/1
81 TABLET, CHEWABLE ORAL DAILY
Status: DISCONTINUED | OUTPATIENT
Start: 2021-04-20 | End: 2021-04-23 | Stop reason: HOSPADM

## 2021-04-19 RX ORDER — ONDANSETRON 2 MG/ML
4 INJECTION INTRAMUSCULAR; INTRAVENOUS EVERY 6 HOURS PRN
Status: DISCONTINUED | OUTPATIENT
Start: 2021-04-19 | End: 2021-04-23 | Stop reason: HOSPADM

## 2021-04-19 RX ORDER — CHLORTHALIDONE 25 MG/1
50 TABLET ORAL DAILY
Status: DISCONTINUED | OUTPATIENT
Start: 2021-04-20 | End: 2021-04-23 | Stop reason: HOSPADM

## 2021-04-19 RX ORDER — ACETAMINOPHEN 650 MG/1
650 SUPPOSITORY RECTAL EVERY 4 HOURS PRN
Status: DISCONTINUED | OUTPATIENT
Start: 2021-04-19 | End: 2021-04-20

## 2021-04-19 RX ORDER — AMOXICILLIN 250 MG
2 CAPSULE ORAL 2 TIMES DAILY PRN
Status: DISCONTINUED | OUTPATIENT
Start: 2021-04-19 | End: 2021-04-23 | Stop reason: HOSPADM

## 2021-04-19 RX ORDER — NICOTINE POLACRILEX 4 MG
15-30 LOZENGE BUCCAL
Status: DISCONTINUED | OUTPATIENT
Start: 2021-04-19 | End: 2021-04-23 | Stop reason: HOSPADM

## 2021-04-19 RX ORDER — CLOPIDOGREL BISULFATE 75 MG/1
75 TABLET ORAL DAILY
Status: DISCONTINUED | OUTPATIENT
Start: 2021-04-20 | End: 2021-04-23 | Stop reason: HOSPADM

## 2021-04-19 RX ORDER — AMLODIPINE BESYLATE 10 MG/1
10 TABLET ORAL DAILY
Status: DISCONTINUED | OUTPATIENT
Start: 2021-04-20 | End: 2021-04-23 | Stop reason: HOSPADM

## 2021-04-19 RX ORDER — DEXTROSE MONOHYDRATE 25 G/50ML
25-50 INJECTION, SOLUTION INTRAVENOUS
Status: DISCONTINUED | OUTPATIENT
Start: 2021-04-19 | End: 2021-04-23 | Stop reason: HOSPADM

## 2021-04-19 RX ADMIN — ATORVASTATIN CALCIUM 80 MG: 40 TABLET, FILM COATED ORAL at 20:46

## 2021-04-19 RX ADMIN — GADOBUTROL 6 ML: 604.72 INJECTION INTRAVENOUS at 21:32

## 2021-04-19 RX ADMIN — VANCOMYCIN HYDROCHLORIDE 1000 MG: 1 INJECTION, SOLUTION INTRAVENOUS at 15:11

## 2021-04-19 RX ADMIN — PIPERACILLIN SODIUM AND TAZOBACTAM SODIUM 3.38 G: 3; .375 INJECTION, POWDER, LYOPHILIZED, FOR SOLUTION INTRAVENOUS at 20:46

## 2021-04-19 RX ADMIN — PIPERACILLIN SODIUM AND TAZOBACTAM SODIUM 3.38 G: 3; .375 INJECTION, POWDER, LYOPHILIZED, FOR SOLUTION INTRAVENOUS at 14:31

## 2021-04-19 RX ADMIN — EZETIMIBE 10 MG: 10 TABLET ORAL at 22:56

## 2021-04-19 SDOH — HEALTH STABILITY: MENTAL HEALTH: HOW MANY STANDARD DRINKS CONTAINING ALCOHOL DO YOU HAVE ON A TYPICAL DAY?: NOT ASKED

## 2021-04-19 SDOH — HEALTH STABILITY: MENTAL HEALTH: HOW OFTEN DO YOU HAVE 6 OR MORE DRINKS ON ONE OCCASION?: NOT ASKED

## 2021-04-19 SDOH — HEALTH STABILITY: MENTAL HEALTH: HOW OFTEN DO YOU HAVE A DRINK CONTAINING ALCOHOL?: NOT ASKED

## 2021-04-19 ASSESSMENT — ACTIVITIES OF DAILY LIVING (ADL)
DIFFICULTY_COMMUNICATING: NO
FALL_HISTORY_WITHIN_LAST_SIX_MONTHS: NO
WALKING_OR_CLIMBING_STAIRS_DIFFICULTY: NO
WEAR_GLASSES_OR_BLIND: YES
PATIENT_/_FAMILY_COMMUNICATION_STYLE: SPOKEN LANGUAGE (ENGLISH OR BILINGUAL)
ADLS_ACUITY_SCORE: 11
ADLS_ACUITY_SCORE: 11
DIFFICULTY_EATING/SWALLOWING: NO
CONCENTRATING,_REMEMBERING_OR_MAKING_DECISIONS_DIFFICULTY: NO
DOING_ERRANDS_INDEPENDENTLY_DIFFICULTY: NO
HEARING_DIFFICULTY_OR_DEAF: NO
DRESSING/BATHING_DIFFICULTY: NO
TOILETING_ISSUES: NO
VISION_MANAGEMENT: GLASSES

## 2021-04-19 ASSESSMENT — MIFFLIN-ST. JEOR: SCORE: 1331.86

## 2021-04-19 NOTE — CONSULTS
[facility] PATIENT CONSULTATION NOTE  Consulted by: Hospitalist  Reason for consultation: PAD, gangrene of L great toe    HPI:  Zia Hopkins is a 71 year old year old male who has a PMH significant for HTN, DM, HLD, PAD w/ wet gangrene of the L great toe. Most recent angiogram on 2/3/21 revealed multilevel PAD s/p angioplasty of distal SFA, popliteal, PT and stenting of proximal SFA. ABIs on affected side prior to this were L 0.43 w/ digit pressures 0 and post-angio ABIs improved to 0.86 but with toe pressures 28mmHg inadequate for wound healing. He was admitted from clinic for repeat angiogram with planned pedal arch angioplasty to improve blood flow in the foot. Continues on ASA/statin/ezetimibe/plavix. Started on Vanc/Zosyn.     Review Of Systems:   General: Denies F/C  Respiratory: Denies SOB  Cardio: Denies CP  Gastrointestinal: Denies N/V  Genitourinary: Denies recent change in urination  Musculoskeletal: See HPI  Neurologic: Denies HA  Psychiatric: Denies confusion  Hematology/immunology: no unexpected bruising    PAST MEDICAL HISTORY:  Past Medical History:   Diagnosis Date     Diabetes mellitus type 2, noninsulin dependent (H)      Hyperlipidemia LDL goal <70      Hypertension      PAD (peripheral artery disease) (H)        PAST SURGICAL HISTORY:  Past Surgical History:   Procedure Laterality Date     AMPUTATE FOOT Left 3/15/2017    Procedure: AMPUTATE FOOT;  Surgeon: Emre Mauricio DPM;  Location:  OR     IR LOWER EXTREMITY ANGIOGRAM LEFT  7/30/2019     IR LOWER EXTREMITY ANGIOGRAM LEFT  2/3/2021     IRRIGATION AND DEBRIDEMENT FOOT, COMBINED Left 3/15/2017    Procedure: COMBINED IRRIGATION AND DEBRIDEMENT FOOT;  Surgeon: Emre Mauricio DPM;  Location:  OR       FAMILY HISTORY:  Family History   Problem Relation Age of Onset     Diabetes Father        SOCIAL HISTORY:   Social History     Tobacco Use     Smoking status: Former Smoker     Packs/day: 0.75     Types: Cigarettes  "    Quit date: 3/3/2017     Years since quittin.1     Smokeless tobacco: Never Used     Tobacco comment: Quit Friday March 3 2017   Substance Use Topics     Alcohol use: Yes     Comment: Wine one bottle a day       HOME MEDICATIONS:  Prior to Admission medications    Medication Sig Start Date End Date Taking? Authorizing Provider   amLODIPine (NORVASC) 10 MG tablet Take 10 mg by mouth daily   Yes Reported, Patient   aspirin 81 MG tablet Take 1 tablet (81 mg) by mouth daily 3/20/17  Yes Dm Monroe MD   atorvastatin (LIPITOR) 80 MG tablet TAKE 1 TABLET(80 MG) BY MOUTH AT BEDTIME 8/3/20  Yes Surjit Hamlin MD   Cadexomer Iodine, topical, 0.9% (IODOSORB) 0.9 % GEL gel Apply 40 g topically daily as needed for other 17  Yes Surjit Hamlin MD   chlorthalidone (HYGROTON) 50 MG tablet TK 1 T PO QD FOR BP 20  Yes Reported, Patient   clopidogrel (PLAVIX) 75 MG tablet TAKE 1 TABLET(75 MG) BY MOUTH DAILY 21  Yes Surjit Hamlin MD   empagliflozin (JARDIANCE) 10 MG TABS tablet Take 1 tablet (10 mg) by mouth daily 2/3/21  Yes Shaina Norton PA-C   ezetimibe (ZETIA) 10 MG tablet TAKE 1 TABLET(10 MG) BY MOUTH DAILY 10/19/20  Yes Surjit Hamlin MD   losartan (COZAAR) 100 MG tablet TK 1 T TABLET QD 20  Yes Reported, Patient   metFORMIN (GLUCOPHAGE) 1000 MG tablet Take 1,000 mg by mouth 2 times daily (with meals)  21  Yes Reported, Patient   metoprolol succinate ER (TOPROL-XL) 100 MG 24 hr tablet Take 1 tablet (100 mg) by mouth daily 7/15/19  Yes Surjit Hamlin MD       VITAL SIGNS:  Ht 1.676 m (5' 6\")   BMI 22.92 kg/m    No intake or output data in the 24 hours ending 21 1356    Labs:  ROUTINE IP LABS (Last four results)  BMP  Recent Labs   Lab 21  1151      POTASSIUM 4.0   CHLORIDE 104   TIN 8.9   CO2 29   BUN 25   CR 0.96   *     CBC  Recent Labs   Lab 21  1151   WBC 8.5   RBC 4.03*   HGB 12.2*   HCT 37.9* "   MCV 94   MCH 30.3   MCHC 32.2   RDW 13.5        INRNo lab results found in last 7 days.    PHYSICAL EXAM:  Constitutional: healthy, alert, no acute distress and cooperative   Cardiovascular: RRR  Respiratory: CTAB anteriorly, breathing unlabored without secondary muscle use  Psychiatric: mentation appears normal and affect normal/bright  Neck: no asymmetry  GI/Abdomen: +BS, abdomen soft, non-tender. No masses, no CVAT  MSK: able to move all extremities without weakness or ataxia  Extremities: L great toe wet gangrene, monophasic distal signals  Hematology: no bruising on visible skin    Patient Active Problem List   Diagnosis     Gangrene of toe (H)     Toe amputation status, left     Acute osteomyelitis of left foot (H)     PAD (peripheral artery disease) (H)     Diabetes mellitus, type 2 (H)     Diabetic ulcer of left foot associated with type 2 diabetes mellitus (H)     Nonhealing surgical wound, initial encounter     Non-pressure chronic ulcer of other part of left lower leg with necrosis of muscle (H)     Gangrene (H)       ASSESSMENT:  71 year old year old male who has a PMH significant for HTN, DM, HLD, PAD w/ wet gangrene of the L great toe.    S/p angiogram 2/3/21 w/ angioplasty of distal SFA, popliteal, PT and stenting of proximal SFA  Recent L ABIs 0.86 but with toe pressures 28mmHg inadequate for wound healing  Cr 0.96    PLAN:  - Diet as tolerated; NPO after midnight for LLE angiogram   - Continue IV abx   - Continue ASA/plavix/statin/ezetimibe    Seen and examined with Dr. Hamlin.    Marlin Hills MD  Vascular Surgery Fellow  Pager: [phone]    Vascular surgery attending staff note: I have seen and examined the patient.  I have discussed the plan with the patient and his family.  This is a 71-year-old male who is well-known to myself and has had multiple left lower extremity intervention for peripheral arterial disease.  He was admitted from the clinic for further management.  I have also  discussed the case in detail with Dr. Whelan from podiatry.  We will proceed with left lower extremity revascularization and toe amputation.  Will obtain MRI of the left foot prior to deciding which order we will proceed on with.    IAN YATES M.D.

## 2021-04-19 NOTE — NURSING NOTE
Pt direct admission:  Station [facility], Rm [id number]  I called report to RN at station 33.   Dr. Hamlin gave report to Hospitalist.   Pt called and notified to check in at [employer] desk, bed ready at 11am pt notes understanding.      Alanna Fam, JOHNN, RN

## 2021-04-19 NOTE — H&P
[facility]    History and Physical - Hospitalist Service       Date of Admission:  4/19/2021    Assessment & Plan   Zia Hopkins is a 71 year old male with a past medical history of hypertension, DMII, PAD, s/p left 5 metatarsal amputation, and hyperlipidemia admitted on 4/19/2021 with left great toe gangrene. He was seen in Vascular Clinic by Dr. Mead on 4/19/2021 with recommendation to proceed with inpatient admission in the setting of wet gangrene.    Left Great Toe Gangrene  Peripheral Artery Disease  S/p left popliteal angioplasty and left SFA stenting 2017 followed by stenting of in-stent stenosis 7/30/19   S/p left lower extremity arteriogram with balloon angioplasty of the left popliteal artery, recanalization of the posterior tibial artery, and extension of the stent in the left superficial femoral artery 2/3/2021.  He had developed infection and gangrene of the left great and second toe toe.  He was seen by Vascular Surgery on 2/1/2021 and at that time his left ankle-brachial index was 0.43 and toe pressures could not be obtained.  2/3/2021 he underwent left lower extremity arteriogram with balloon angioplasty of the left popliteal artery. He also had recanalization of the posterior tibial artery.  He also had extension of the stent in the left superficial femoral artery.  At the level of his ankle he had severe disease at the branch point between the posterior tibial and common plantar artery.  No further intervention at that time due to the high risk given that this was a very small blood vessel.  He has continued to deal with his great toe and now is to the point where the gangrene has completely set in.  He also reports foul smell. Dr. Mead obtained noninvasive imaging 4/19/2021 which showed that the ankle-brachial index at the posterior tibial artery is 0.86.  The second toe pressure is 28 mmHg. He is unsure if that would be sufficient for wound healing if he has to  proceed with great toe amputation.  With the development of wet gangrene of the great toe the plan is for hospital admission so this can be taken care of in expedient fashion. Vascular surgery will have to do an arteriogram to intervene on the common plantar artery and further increase the blood flow into the forefoot.  -Admit to inpatient  -Vascular surgery consult  -Podiatry consult  -Zosyn and Vanco IV for left great toe gangrene  -NPO at midnight  -Angiogram with vascular planned on 4/20/2021  -WOCN consult  -ASA 81mg PO daily  -Plavix 75mg PO daily  -BMP, CBC now    Hypertension, History of  -Continue PTA Amlodipine 10mg PO daily  -Continue PTA Chlorthalidone 50mg PO daily  -Continue PTA Losartan 100mg PO daily  -Continue PTA Metoprolol Succinate ER 100mg PO daily    Hyperlipidemia, History of   -Continue PTA Atorvastatin 80mg PO daily  -Continue PTA Ezetimibe 10mg PO daily    Diabetes Mellitus Type 2  HgbA1C 7.4 2/3/2021  -Hold PTA Empagliflozin 10mg PO daily  -Hold PTA Metformin 1000mg PO BID  -Blood glucose monitoring QID and 0200  -Medium resistance sliding scale insulin  -Hypoglycemia protocol    Alcohol Dependence  Reports drinking a bottle of wine a day. He denies any prior history of alcohol withdrawal during periods without alcohol use.  -CIWA monitoring       Diet:   Moderate CHO consistent; NPO at midnight  DVT Prophylaxis: Pneumatic Compression Devices  Velazquez Catheter: not present  Code Status:   Full Code         Disposition Plan   Expected discharge: >2 midnights pending angio results with vascular surgery and IV antibiotics  Entered: CLAUDINE Celaya CNP 04/19/2021, 10:59 AM     The patient's care was discussed with the Attending Physician, Dr. Gould.    CLAUDINE Celaya CNP  [facility]  Contact information available via [facility] Paging/Directory      ______________________________________________________________________    Chief Complaint   Left great toe  gangrene    History is obtained from the patient and electronic health record    History of Present Illness   Zia Hopkins is a 71 year old male with a past medical history of hypertension, DMII, PAD, and hyperlipidemia admitted on 4/19/2021 with left great toe gangrene. He was seen in Vascular Clinic by Dr. Mead on 4/19/2021 with recommendation to proceed with inpatient admission in the setting of wet gangrene.  He had developed infection and gangrene of the left great and second toe toe. He was seen by Vascular Surgery on 2/1/2021 and at that time his left ankle-brachial index was 0.43 and toe pressures could not be obtained.  2/3/2021 he underwent left lower extremity arteriogram with balloon angioplasty of the left popliteal artery.  He also had recanalization of the posterior tibial artery.  He also had extension of the stent in the left superficial femoral artery.  At the level of his ankle he had severe disease at the branch point between the posterior tibial and common plantar artery.  No further intervention at that time due to the high risk given that this was a very small blood vessel.  He has continued to deal with his great toe following with podiatry and vascular outpatient and now is to the point where the gangrene has completely set in.  He also reports foul smell and numbness to the left great toe and super sensitivity to second toe on the left. He had been on oral antibiotics until 2.5 weeks ago. Dr. Mead obtained noninvasive imaging 4/19/2021 which showed that the ankle-brachial index at the posterior tibial artery is 0.86.  The second toe pressure is 28 mmHg. He is unsure if that would be sufficient for wound healing if he has to proceed with great toe amputation. He presents for inpatient admission to expedite care with plan for vascular surgery to do an arteriogram, podiatry evaluation, and IV antibiotics.  He reports receiving both doses of his COVID 19 vaccination with his second dose  administered on 3/18/2021.   He denies any fever, chills, sore throat, headache, shortness of breath, nausea, vomiting, diarrhea, tingling, dizziness, lightheadedness, chest pain, or palpations.    Review of Systems    The 10 point Review of Systems is negative other than noted in the HPI or here.     Past Medical History    I have reviewed this patient's medical history and updated it with pertinent information if needed.   Past Medical History:   Diagnosis Date     Diabetes mellitus type 2, noninsulin dependent (H)      Hyperlipidemia LDL goal <70      Hypertension      PAD (peripheral artery disease) (H)        Past Surgical History   I have reviewed this patient's surgical history and updated it with pertinent information if needed.  Past Surgical History:   Procedure Laterality Date     AMPUTATE FOOT Left 3/15/2017    Procedure: AMPUTATE FOOT;  Surgeon: Emre Mauricio DPM;  Location:  OR     IR LOWER EXTREMITY ANGIOGRAM LEFT  2019     IR LOWER EXTREMITY ANGIOGRAM LEFT  2/3/2021     IRRIGATION AND DEBRIDEMENT FOOT, COMBINED Left 3/15/2017    Procedure: COMBINED IRRIGATION AND DEBRIDEMENT FOOT;  Surgeon: Emre Mauricio DPM;  Location:  OR       Social History   I have reviewed this patient's social history and updated it with pertinent information if needed.  Social History     Tobacco Use     Smoking status: Former Smoker     Packs/day: 0.75     Types: Cigarettes     Quit date: 3/3/2017     Years since quittin.1     Smokeless tobacco: Never Used     Tobacco comment: Quit Friday March 3 2017   Substance Use Topics     Alcohol use: Yes     Comment: Wine one bottle a day     Drug use: No       Family History   I have reviewed this patient's family history and updated it with pertinent information if needed.  Family History   Problem Relation Age of Onset     Diabetes Father        Prior to Admission Medications   Prior to Admission Medications   Prescriptions Last Dose Informant Patient  Reported? Taking?   Cadexomer Iodine, topical, 0.9% (IODOSORB) 0.9 % GEL gel   No No   Sig: Apply 40 g topically daily as needed for other   amLODIPine (NORVASC) 10 MG tablet   Yes No   Sig: Take 10 mg by mouth daily   amoxicillin (AMOXIL) 500 MG capsule   No No   Sig: Take 1 capsule (500 mg) by mouth 2 times daily   amoxicillin-clavulanate (AUGMENTIN) 875-125 MG tablet   No No   Sig: Take 1 tablet by mouth 2 times daily   aspirin 81 MG tablet   No No   Sig: Take 1 tablet (81 mg) by mouth daily   atorvastatin (LIPITOR) 80 MG tablet   No No   Sig: TAKE 1 TABLET(80 MG) BY MOUTH AT BEDTIME   chlorthalidone (HYGROTON) 50 MG tablet   Yes No   Sig: TK 1 T PO QD FOR BP   clopidogrel (PLAVIX) 75 MG tablet   No No   Sig: TAKE 1 TABLET(75 MG) BY MOUTH DAILY   empagliflozin (JARDIANCE) 10 MG TABS tablet   No No   Sig: Take 1 tablet (10 mg) by mouth daily   ezetimibe (ZETIA) 10 MG tablet   No No   Sig: TAKE 1 TABLET(10 MG) BY MOUTH DAILY   levofloxacin (LEVAQUIN) 500 MG tablet   No No   Sig: Take 1 tablet (500 mg) by mouth daily   losartan (COZAAR) 100 MG tablet   Yes No   Sig: TK 1 T TABLET QD   metFORMIN (GLUCOPHAGE) 1000 MG tablet   Yes No   Sig: Take 1,000 mg by mouth 2 times daily (with meals)    metoprolol succinate ER (TOPROL-XL) 100 MG 24 hr tablet   No No   Sig: Take 1 tablet (100 mg) by mouth daily   triamcinolone (KENALOG) 0.1 % cream  Self Yes No   Sig: Apply to affected area as needed      Facility-Administered Medications: None     Allergies   No Known Allergies    Physical Exam   Vital Signs:                    Weight: 0 lbs 0 oz    Constitutional: Awake, alert, cooperative, no apparent distress.  Eyes: Conjunctiva and pupils examined and normal.  HEENT: Moist mucous membranes, normal dentition.  Respiratory: Clear to auscultation bilaterally, no crackles or wheezing.  Cardiovascular: Regular rate and rhythm, normal S1 and S2, and no murmur noted.  GI: Soft, non-distended, non-tender, normal bowel sounds.  Skin:  Left great toe black with yellow slough in nail bed and eschar tissue. Ulceration around nail bed on second left toe. Callus to plantar area bilaterally. No rashes, no cyanosis, no edema.  Musculoskeletal: No joint swelling, erythema or tenderness.  Neurologic: Cranial nerves 2-12 intact, normal strength and sensation.  Psychiatric: Alert, oriented to person, place and time, no obvious anxiety or depression.    Data   Data reviewed today: I reviewed all medications, new labs and imaging results over the last 24 hours. I personally reviewed the EKG tracing showing normal sinus rhythm.    Recent Labs   Lab 04/19/21  1151   WBC 8.5   HGB 12.2*   MCV 94         POTASSIUM 4.0   CHLORIDE 104   CO2 29   BUN 25   CR 0.96   ANIONGAP 3   TIN 8.9   *     No results found for this or any previous visit (from the past 24 hour(s)).

## 2021-04-19 NOTE — PROGRESS NOTES
Mr. Hopkins returns to clinic today.  He is a very pleasant 71-year-old male with left lower extremity peripheral arterial disease.  He had developed infection and gangrene of the left great toe. I had seen him on 1st February 2021.  His left ankle-brachial index was 0.43 and toe pressures could not be obtained.  Then on 3rd February he underwent left lower extremity arteriogram with balloon angioplasty of the left popliteal artery.  He also had recanalization of the posterior tibial artery.  He also had extension of the stent in the left superficial femoral artery.  At the level of his ankle he had severe disease at the branch point between the posterior tibial and common plantar artery.  I had not intervened on that due to the high risk given that this was a very small blood vessel.    He has continued to deal with his great toe and he tells me that this is to the point where the gangrene has completely set in.  He also reports foul smell.    We obtained noninvasive imaging today which showed that the ankle-brachial index at the posterior tibial artery is 0.86.  The second toe pressure is 28 mmHg.    I do not believe that this would be sufficient for wound healing if he has to proceed with great toe amputation.    He has setting in of wet gangrene of the great toe.  My proposal to him was to get admitted to the hospital so this can be taken care of in expedient fashion.  We will have to do an arteriogram to intervene on the common plantar artery and further increase the blood flow into the forefoot.  As such I went over the plan and I showed him all the images of his last arteriogram.  He has discussed this with his wife and is agreeable.

## 2021-04-19 NOTE — PROVIDER NOTIFICATION
MD Notification    Notified Person: Dr. Skinner and Dr. Hamlin    Notification Date/Time: 4/19/2021 11:24 AM     Notification Interaction: paged    Purpose of Notification: FYI pt has arrived, awaiting orders. Thanks    Orders Received:     Comments:

## 2021-04-19 NOTE — PHARMACY-VANCOMYCIN DOSING SERVICE
Pharmacy Vancomycin Initial Note  Date of Service 2021  Patient's  1950  71 year old, male    Indication: Skin and Soft Tissue Infection    Current estimated CrCl = Estimated Creatinine Clearance: 62.4 mL/min (based on SCr of 0.96 mg/dL).    Creatinine for last 3 days  2021: 11:51 AM Creatinine 0.96 mg/dL    Recent Vancomycin Level(s) for last 3 days  No results found for requested labs within last 72 hours.      Vancomycin IV Administrations (past 72 hours)      No vancomycin orders with administrations in past 72 hours.                Nephrotoxins and other renal medications (From now, onward)    Start     Dose/Rate Route Frequency Ordered Stop    21 1400  vancomycin (VANCOCIN) 1000 mg in dextrose 5% 200 mL PREMIX      1,000 mg  200 mL/hr over 1 Hours Intravenous EVERY 12 HOURS 21 1314      21 1300  piperacillin-tazobactam (ZOSYN) 3.375 g vial to attach to  mL bag      3.375 g  over 30 Minutes Intravenous EVERY 6 HOURS 21 1234            Contrast Orders - past 72 hours (72h ago, onward)    None              Plan:  1. Start vancomycin  1000 mg IV q12h.   2. Vancomycin monitoring method: Trough (Method 2 = manual dose calculation)  3. Vancomycin therapeutic monitoring goal: 10-15 mg/L  4. Pharmacy will check vancomycin levels as appropriate in 1-3 Days.    5. Serum creatinine levels will be ordered daily for the first week of therapy and at least twice weekly for subsequent weeks.      Lori Willoughby, HumaD, BCPS

## 2021-04-19 NOTE — PROGRESS NOTES
"Zia Hopkins is a 71 year old male who presents for:  Chief Complaint   Patient presents with     RECHECK     f/u Eval prior to toe amputation, Hx of critical limb ischemia, right LE angiogram.        Vitals:    Vitals:    04/19/21 0834   BP: (!) 202/50   BP Location: Right arm   Patient Position: Chair   Cuff Size: Adult Regular   Pulse: 75   Temp: 97.7  F (36.5  C)   TempSrc: Temporal       BMI:  Estimated body mass index is 22.92 kg/m  as calculated from the following:    Height as of 2/3/21: 5' 6\" (1.676 m).    Weight as of 3/18/21: 142 lb (64.4 kg).    Pain Score:  Data Unavailable        Stephany Cavazos MA    "

## 2021-04-19 NOTE — PHARMACY-ADMISSION MEDICATION HISTORY
Pharmacy Medication History  Admission medication history interview status for the 4/19/2021  admission is complete. See EPIC admission navigator for prior to admission medications     Location of Interview: Patient room  Medication history sources: Patient    In the past week, patient estimated taking medication this percent of the time: greater than 90%    Medication reconciliation completed by provider prior to medication history? No    Time spent in this activity: 20 minutes    Prior to Admission medications    Medication Sig Last Dose Taking? Auth Provider   amLODIPine (NORVASC) 10 MG tablet Take 10 mg by mouth daily 4/19/2021 at AM Yes Reported, Patient   aspirin 81 MG tablet Take 1 tablet (81 mg) by mouth daily 4/19/2021 at AM Yes Dm Monroe MD   atorvastatin (LIPITOR) 80 MG tablet TAKE 1 TABLET(80 MG) BY MOUTH AT BEDTIME 4/18/2021 at hs Yes Surjit Hamlin MD   Cadexomer Iodine, topical, 0.9% (IODOSORB) 0.9 % GEL gel Apply 40 g topically daily as needed for other 4/18/2021 at Unknown time Yes Surjit Hamlin MD   chlorthalidone (HYGROTON) 50 MG tablet TK 1 T PO QD FOR BP 4/19/2021 at AM Yes Reported, Patient   clopidogrel (PLAVIX) 75 MG tablet TAKE 1 TABLET(75 MG) BY MOUTH DAILY 4/19/2021 at AM Yes Surjit Hamlin MD   empagliflozin (JARDIANCE) 10 MG TABS tablet Take 1 tablet (10 mg) by mouth daily 4/19/2021 at AM Yes Shaina Norton PA-C   ezetimibe (ZETIA) 10 MG tablet TAKE 1 TABLET(10 MG) BY MOUTH DAILY 4/18/2021 at HS Yes Surjit Hamlin MD   losartan (COZAAR) 100 MG tablet TK 1 T TABLET QD 4/19/2021 at AM Yes Reported, Patient   metFORMIN (GLUCOPHAGE) 1000 MG tablet Take 1,000 mg by mouth 2 times daily (with meals)  4/19/2021 at AM Yes Reported, Patient   metoprolol succinate ER (TOPROL-XL) 100 MG 24 hr tablet Take 1 tablet (100 mg) by mouth daily 4/19/2021 at AM Yes Surjit Hamlin MD       The information provided in this note is only as accurate  as the sources available at the time of update(s)

## 2021-04-20 ENCOUNTER — ANESTHESIA (OUTPATIENT)
Dept: SURGERY | Facility: CLINIC | Age: 71
DRG: 252 | End: 2021-04-20
Payer: COMMERCIAL

## 2021-04-20 ENCOUNTER — ANESTHESIA EVENT (OUTPATIENT)
Dept: SURGERY | Facility: CLINIC | Age: 71
DRG: 252 | End: 2021-04-20
Payer: COMMERCIAL

## 2021-04-20 ENCOUNTER — APPOINTMENT (OUTPATIENT)
Dept: GENERAL RADIOLOGY | Facility: CLINIC | Age: 71
DRG: 252 | End: 2021-04-20
Attending: PODIATRIST
Payer: COMMERCIAL

## 2021-04-20 LAB
ANION GAP SERPL CALCULATED.3IONS-SCNC: 3 MMOL/L (ref 3–14)
BUN SERPL-MCNC: 22 MG/DL (ref 7–30)
CALCIUM SERPL-MCNC: 8.6 MG/DL (ref 8.5–10.1)
CHLORIDE SERPL-SCNC: 106 MMOL/L (ref 94–109)
CO2 SERPL-SCNC: 29 MMOL/L (ref 20–32)
CREAT SERPL-MCNC: 0.9 MG/DL (ref 0.66–1.25)
ERYTHROCYTE [DISTWIDTH] IN BLOOD BY AUTOMATED COUNT: 13.4 % (ref 10–15)
GFR SERPL CREATININE-BSD FRML MDRD: 84 ML/MIN/{1.73_M2}
GLUCOSE BLDC GLUCOMTR-MCNC: 138 MG/DL (ref 70–99)
GLUCOSE BLDC GLUCOMTR-MCNC: 145 MG/DL (ref 70–99)
GLUCOSE BLDC GLUCOMTR-MCNC: 175 MG/DL (ref 70–99)
GLUCOSE BLDC GLUCOMTR-MCNC: 94 MG/DL (ref 70–99)
GLUCOSE SERPL-MCNC: 149 MG/DL (ref 70–99)
HCT VFR BLD AUTO: 33.6 % (ref 40–53)
HGB BLD-MCNC: 11 G/DL (ref 13.3–17.7)
MCH RBC QN AUTO: 30.6 PG (ref 26.5–33)
MCHC RBC AUTO-ENTMCNC: 32.7 G/DL (ref 31.5–36.5)
MCV RBC AUTO: 94 FL (ref 78–100)
PLATELET # BLD AUTO: 239 10E9/L (ref 150–450)
POTASSIUM SERPL-SCNC: 3.7 MMOL/L (ref 3.4–5.3)
RBC # BLD AUTO: 3.59 10E12/L (ref 4.4–5.9)
SODIUM SERPL-SCNC: 138 MMOL/L (ref 133–144)
WBC # BLD AUTO: 6.6 10E9/L (ref 4–11)

## 2021-04-20 PROCEDURE — 88311 DECALCIFY TISSUE: CPT | Mod: 26 | Performed by: PATHOLOGY

## 2021-04-20 PROCEDURE — 99223 1ST HOSP IP/OBS HIGH 75: CPT | Mod: 57 | Performed by: PODIATRIST

## 2021-04-20 PROCEDURE — 87077 CULTURE AEROBIC IDENTIFY: CPT | Performed by: PODIATRIST

## 2021-04-20 PROCEDURE — 88311 DECALCIFY TISSUE: CPT | Mod: TC | Performed by: PODIATRIST

## 2021-04-20 PROCEDURE — 0Y6Q0Z0 DETACHMENT AT LEFT 1ST TOE, COMPLETE, OPEN APPROACH: ICD-10-PCS | Performed by: PODIATRIST

## 2021-04-20 PROCEDURE — 710N000009 HC RECOVERY PHASE 1, LEVEL 1, PER MIN: Performed by: PODIATRIST

## 2021-04-20 PROCEDURE — 250N000011 HC RX IP 250 OP 636: Performed by: NURSE PRACTITIONER

## 2021-04-20 PROCEDURE — 87075 CULTR BACTERIA EXCEPT BLOOD: CPT | Performed by: PODIATRIST

## 2021-04-20 PROCEDURE — 88305 TISSUE EXAM BY PATHOLOGIST: CPT | Mod: 26 | Performed by: PATHOLOGY

## 2021-04-20 PROCEDURE — 250N000013 HC RX MED GY IP 250 OP 250 PS 637: Performed by: NURSE PRACTITIONER

## 2021-04-20 PROCEDURE — 120N000001 HC R&B MED SURG/OB

## 2021-04-20 PROCEDURE — 87186 SC STD MICRODIL/AGAR DIL: CPT | Performed by: PODIATRIST

## 2021-04-20 PROCEDURE — 250N000009 HC RX 250: Performed by: PODIATRIST

## 2021-04-20 PROCEDURE — 87176 TISSUE HOMOGENIZATION CULTR: CPT | Performed by: PODIATRIST

## 2021-04-20 PROCEDURE — 87070 CULTURE OTHR SPECIMN AEROBIC: CPT | Performed by: PODIATRIST

## 2021-04-20 PROCEDURE — 999N001017 HC STATISTIC GLUCOSE BY METER IP

## 2021-04-20 PROCEDURE — 272N000001 HC OR GENERAL SUPPLY STERILE: Performed by: PODIATRIST

## 2021-04-20 PROCEDURE — 258N000003 HC RX IP 258 OP 636: Performed by: ANESTHESIOLOGY

## 2021-04-20 PROCEDURE — 250N000011 HC RX IP 250 OP 636

## 2021-04-20 PROCEDURE — 99232 SBSQ HOSP IP/OBS MODERATE 35: CPT | Performed by: HOSPITALIST

## 2021-04-20 PROCEDURE — 87205 SMEAR GRAM STAIN: CPT | Performed by: PODIATRIST

## 2021-04-20 PROCEDURE — 250N000011 HC RX IP 250 OP 636: Performed by: PODIATRIST

## 2021-04-20 PROCEDURE — 87076 CULTURE ANAEROBE IDENT EACH: CPT | Performed by: PODIATRIST

## 2021-04-20 PROCEDURE — 80048 BASIC METABOLIC PNL TOTAL CA: CPT | Performed by: NURSE PRACTITIONER

## 2021-04-20 PROCEDURE — 370N000017 HC ANESTHESIA TECHNICAL FEE, PER MIN: Performed by: PODIATRIST

## 2021-04-20 PROCEDURE — 88305 TISSUE EXAM BY PATHOLOGIST: CPT | Mod: TC | Performed by: PODIATRIST

## 2021-04-20 PROCEDURE — 36415 COLL VENOUS BLD VENIPUNCTURE: CPT | Performed by: NURSE PRACTITIONER

## 2021-04-20 PROCEDURE — 28820 AMPUTATION OF TOE: CPT | Mod: TA | Performed by: PODIATRIST

## 2021-04-20 PROCEDURE — 999N000141 HC STATISTIC PRE-PROCEDURE NURSING ASSESSMENT: Performed by: PODIATRIST

## 2021-04-20 PROCEDURE — 85027 COMPLETE CBC AUTOMATED: CPT | Performed by: NURSE PRACTITIONER

## 2021-04-20 PROCEDURE — 360N000075 HC SURGERY LEVEL 2, PER MIN: Performed by: PODIATRIST

## 2021-04-20 PROCEDURE — 999N000063 XR FOOT PORT LEFT 3 VIEWS: Mod: LT

## 2021-04-20 RX ORDER — ONDANSETRON 2 MG/ML
4 INJECTION INTRAMUSCULAR; INTRAVENOUS EVERY 6 HOURS PRN
Status: DISCONTINUED | OUTPATIENT
Start: 2021-04-20 | End: 2021-04-20

## 2021-04-20 RX ORDER — DOCUSATE SODIUM 100 MG/1
100 CAPSULE, LIQUID FILLED ORAL 2 TIMES DAILY
Status: DISCONTINUED | OUTPATIENT
Start: 2021-04-20 | End: 2021-04-23 | Stop reason: HOSPADM

## 2021-04-20 RX ORDER — ONDANSETRON 4 MG/1
4 TABLET, ORALLY DISINTEGRATING ORAL EVERY 6 HOURS PRN
Status: DISCONTINUED | OUTPATIENT
Start: 2021-04-20 | End: 2021-04-20

## 2021-04-20 RX ORDER — PROPOFOL 10 MG/ML
INJECTION, EMULSION INTRAVENOUS PRN
Status: DISCONTINUED | OUTPATIENT
Start: 2021-04-20 | End: 2021-04-20

## 2021-04-20 RX ORDER — LIDOCAINE 40 MG/G
CREAM TOPICAL
Status: DISCONTINUED | OUTPATIENT
Start: 2021-04-20 | End: 2021-04-20

## 2021-04-20 RX ORDER — ONDANSETRON 2 MG/ML
4 INJECTION INTRAMUSCULAR; INTRAVENOUS EVERY 30 MIN PRN
Status: DISCONTINUED | OUTPATIENT
Start: 2021-04-20 | End: 2021-04-20 | Stop reason: HOSPADM

## 2021-04-20 RX ORDER — HEPARIN SODIUM 200 [USP'U]/100ML
1 INJECTION, SOLUTION INTRAVENOUS CONTINUOUS PRN
Status: DISCONTINUED | OUTPATIENT
Start: 2021-04-20 | End: 2021-04-20

## 2021-04-20 RX ORDER — AMOXICILLIN 250 MG
1 CAPSULE ORAL 2 TIMES DAILY
Status: DISCONTINUED | OUTPATIENT
Start: 2021-04-20 | End: 2021-04-23 | Stop reason: HOSPADM

## 2021-04-20 RX ORDER — ONDANSETRON 2 MG/ML
INJECTION INTRAMUSCULAR; INTRAVENOUS PRN
Status: DISCONTINUED | OUTPATIENT
Start: 2021-04-20 | End: 2021-04-20

## 2021-04-20 RX ORDER — OXYCODONE HYDROCHLORIDE 5 MG/1
10 TABLET ORAL EVERY 4 HOURS PRN
Status: DISCONTINUED | OUTPATIENT
Start: 2021-04-20 | End: 2021-04-23 | Stop reason: HOSPADM

## 2021-04-20 RX ORDER — BISACODYL 10 MG
10 SUPPOSITORY, RECTAL RECTAL DAILY PRN
Status: DISCONTINUED | OUTPATIENT
Start: 2021-04-20 | End: 2021-04-23 | Stop reason: HOSPADM

## 2021-04-20 RX ORDER — BUPIVACAINE HYDROCHLORIDE 5 MG/ML
INJECTION, SOLUTION EPIDURAL; INTRACAUDAL PRN
Status: DISCONTINUED | OUTPATIENT
Start: 2021-04-20 | End: 2021-04-20 | Stop reason: HOSPADM

## 2021-04-20 RX ORDER — HYDROMORPHONE HYDROCHLORIDE 1 MG/ML
.3-.5 INJECTION, SOLUTION INTRAMUSCULAR; INTRAVENOUS; SUBCUTANEOUS EVERY 5 MIN PRN
Status: DISCONTINUED | OUTPATIENT
Start: 2021-04-20 | End: 2021-04-20 | Stop reason: HOSPADM

## 2021-04-20 RX ORDER — ACETAMINOPHEN 325 MG/1
650 TABLET ORAL EVERY 4 HOURS PRN
Status: DISCONTINUED | OUTPATIENT
Start: 2021-04-23 | End: 2021-04-23 | Stop reason: HOSPADM

## 2021-04-20 RX ORDER — SODIUM CHLORIDE, SODIUM LACTATE, POTASSIUM CHLORIDE, CALCIUM CHLORIDE 600; 310; 30; 20 MG/100ML; MG/100ML; MG/100ML; MG/100ML
INJECTION, SOLUTION INTRAVENOUS CONTINUOUS
Status: DISCONTINUED | OUTPATIENT
Start: 2021-04-20 | End: 2021-04-20 | Stop reason: HOSPADM

## 2021-04-20 RX ORDER — POLYETHYLENE GLYCOL 3350 17 G/17G
17 POWDER, FOR SOLUTION ORAL DAILY
Status: DISCONTINUED | OUTPATIENT
Start: 2021-04-21 | End: 2021-04-23 | Stop reason: HOSPADM

## 2021-04-20 RX ORDER — PROPOFOL 10 MG/ML
INJECTION, EMULSION INTRAVENOUS CONTINUOUS PRN
Status: DISCONTINUED | OUTPATIENT
Start: 2021-04-20 | End: 2021-04-20

## 2021-04-20 RX ORDER — ACETAMINOPHEN 325 MG/1
975 TABLET ORAL EVERY 8 HOURS
Status: DISCONTINUED | OUTPATIENT
Start: 2021-04-20 | End: 2021-04-23 | Stop reason: HOSPADM

## 2021-04-20 RX ORDER — OXYCODONE HYDROCHLORIDE 5 MG/1
5 TABLET ORAL EVERY 4 HOURS PRN
Status: DISCONTINUED | OUTPATIENT
Start: 2021-04-20 | End: 2021-04-23 | Stop reason: HOSPADM

## 2021-04-20 RX ORDER — PROCHLORPERAZINE MALEATE 5 MG
5 TABLET ORAL EVERY 6 HOURS PRN
Status: DISCONTINUED | OUTPATIENT
Start: 2021-04-20 | End: 2021-04-23 | Stop reason: HOSPADM

## 2021-04-20 RX ORDER — ONDANSETRON 4 MG/1
4 TABLET, ORALLY DISINTEGRATING ORAL EVERY 30 MIN PRN
Status: DISCONTINUED | OUTPATIENT
Start: 2021-04-20 | End: 2021-04-20 | Stop reason: HOSPADM

## 2021-04-20 RX ORDER — FENTANYL CITRATE 50 UG/ML
25-50 INJECTION, SOLUTION INTRAMUSCULAR; INTRAVENOUS
Status: DISCONTINUED | OUTPATIENT
Start: 2021-04-20 | End: 2021-04-20 | Stop reason: HOSPADM

## 2021-04-20 RX ORDER — FENTANYL CITRATE 50 UG/ML
INJECTION, SOLUTION INTRAMUSCULAR; INTRAVENOUS PRN
Status: DISCONTINUED | OUTPATIENT
Start: 2021-04-20 | End: 2021-04-20

## 2021-04-20 RX ADMIN — PIPERACILLIN SODIUM AND TAZOBACTAM SODIUM 3.38 G: 3; .375 INJECTION, POWDER, LYOPHILIZED, FOR SOLUTION INTRAVENOUS at 14:16

## 2021-04-20 RX ADMIN — EZETIMIBE 10 MG: 10 TABLET ORAL at 22:57

## 2021-04-20 RX ADMIN — SODIUM CHLORIDE, POTASSIUM CHLORIDE, SODIUM LACTATE AND CALCIUM CHLORIDE: 600; 310; 30; 20 INJECTION, SOLUTION INTRAVENOUS at 14:58

## 2021-04-20 RX ADMIN — PROPOFOL 40 MG: 10 INJECTION, EMULSION INTRAVENOUS at 16:00

## 2021-04-20 RX ADMIN — VANCOMYCIN HYDROCHLORIDE 1000 MG: 1 INJECTION, SOLUTION INTRAVENOUS at 03:18

## 2021-04-20 RX ADMIN — ASPIRIN 81 MG CHEWABLE TABLET 81 MG: 81 TABLET CHEWABLE at 08:36

## 2021-04-20 RX ADMIN — SODIUM CHLORIDE, POTASSIUM CHLORIDE, SODIUM LACTATE AND CALCIUM CHLORIDE: 600; 310; 30; 20 INJECTION, SOLUTION INTRAVENOUS at 15:54

## 2021-04-20 RX ADMIN — CHLORTHALIDONE 50 MG: 25 TABLET ORAL at 08:35

## 2021-04-20 RX ADMIN — SODIUM CHLORIDE, POTASSIUM CHLORIDE, SODIUM LACTATE AND CALCIUM CHLORIDE: 600; 310; 30; 20 INJECTION, SOLUTION INTRAVENOUS at 18:00

## 2021-04-20 RX ADMIN — PIPERACILLIN SODIUM AND TAZOBACTAM SODIUM 3.38 G: 3; .375 INJECTION, POWDER, LYOPHILIZED, FOR SOLUTION INTRAVENOUS at 08:35

## 2021-04-20 RX ADMIN — SODIUM CHLORIDE, POTASSIUM CHLORIDE, SODIUM LACTATE AND CALCIUM CHLORIDE: 600; 310; 30; 20 INJECTION, SOLUTION INTRAVENOUS at 15:44

## 2021-04-20 RX ADMIN — AMLODIPINE BESYLATE 10 MG: 10 TABLET ORAL at 08:36

## 2021-04-20 RX ADMIN — ATORVASTATIN CALCIUM 80 MG: 40 TABLET, FILM COATED ORAL at 20:31

## 2021-04-20 RX ADMIN — ONDANSETRON 4 MG: 2 INJECTION INTRAMUSCULAR; INTRAVENOUS at 16:26

## 2021-04-20 RX ADMIN — PIPERACILLIN SODIUM AND TAZOBACTAM SODIUM 3.38 G: 3; .375 INJECTION, POWDER, LYOPHILIZED, FOR SOLUTION INTRAVENOUS at 20:31

## 2021-04-20 RX ADMIN — PIPERACILLIN SODIUM AND TAZOBACTAM SODIUM 3.38 G: 3; .375 INJECTION, POWDER, LYOPHILIZED, FOR SOLUTION INTRAVENOUS at 02:45

## 2021-04-20 RX ADMIN — FENTANYL CITRATE 25 MCG: 50 INJECTION, SOLUTION INTRAMUSCULAR; INTRAVENOUS at 15:55

## 2021-04-20 RX ADMIN — CLOPIDOGREL BISULFATE 75 MG: 75 TABLET ORAL at 08:36

## 2021-04-20 RX ADMIN — PROPOFOL 200 MCG/KG/MIN: 10 INJECTION, EMULSION INTRAVENOUS at 15:58

## 2021-04-20 RX ADMIN — METOPROLOL SUCCINATE 100 MG: 100 TABLET, EXTENDED RELEASE ORAL at 08:35

## 2021-04-20 RX ADMIN — LOSARTAN POTASSIUM 100 MG: 100 TABLET, FILM COATED ORAL at 08:36

## 2021-04-20 ASSESSMENT — ACTIVITIES OF DAILY LIVING (ADL)
ADLS_ACUITY_SCORE: 11

## 2021-04-20 ASSESSMENT — LIFESTYLE VARIABLES: TOBACCO_USE: 0

## 2021-04-20 NOTE — ANESTHESIA POSTPROCEDURE EVALUATION
Patient: Zia Hopkins    Procedure(s):  LEFT GREAT TOE AMPUTATION.    Diagnosis:Gas gangrene (H) [A48.0]  Diagnosis Additional Information: No value filed.    Anesthesia Type:  MAC    Note:  Disposition: Inpatient   Postop Pain Control: Uneventful            Sign Out: Well controlled pain   PONV: No   Neuro/Psych: Uneventful            Sign Out: Acceptable/Baseline neuro status   Airway/Respiratory: Uneventful            Sign Out: Acceptable/Baseline resp. status   CV/Hemodynamics: Uneventful            Sign Out: Acceptable CV status   Other NRE: NONE   DID A NON-ROUTINE EVENT OCCUR? No         Last vitals:  Vitals:    04/20/21 1720 04/20/21 1730 04/20/21 1800   BP: (!) 146/71 138/70 (!) 142/75   Pulse: 59 58 58   Resp: 14 12 14   Temp:   36.6  C (97.9  F)   SpO2: 94% 94% 97%       Last vitals prior to Anesthesia Care Transfer:  CRNA VITALS  4/20/2021 1607 - 4/20/2021 1707      4/20/2021             Resp Rate (set):  10          Electronically Signed By: Alcon Kelley MD  April 20, 2021  6:13 PM

## 2021-04-20 NOTE — ANESTHESIA CARE TRANSFER NOTE
Patient: Zia Hopkins    Procedure(s):  LEFT GREAT TOE AMPUTATION.    Diagnosis: Gas gangrene (H) [A48.0]  Diagnosis Additional Information: No value filed.    Anesthesia Type:   MAC     Note:    Oropharynx: oropharynx clear of all foreign objects  Level of Consciousness: awake  Oxygen Supplementation: face mask  Level of Supplemental Oxygen (L/min / FiO2): 6  Independent Airway: airway patency satisfactory and stable  Dentition: dentition unchanged  Vital Signs Stable: post-procedure vital signs reviewed and stable  Report to RN Given: handoff report given  Patient transferred to: PACU    Handoff Report: Identifed the Patient, Identified the Reponsible Provider, Reviewed the pertinent medical history, Discussed the surgical course, Reviewed Intra-OP anesthesia mangement and issues during anesthesia, Set expectations for post-procedure period and Allowed opportunity for questions and acknowledgement of understanding      Vitals: (Last set prior to Anesthesia Care Transfer)  CRNA VITALS  4/20/2021 1607 - 4/20/2021 1639      4/20/2021             Resp Rate (set):  10        Electronically Signed By: CLAUDINE Starks CRNA  April 20, 2021  4:39 PM

## 2021-04-20 NOTE — PLAN OF CARE
Direct admit from vascular surgery clinic. A&O x 4. hypertensive, otherwise VSS on room air. independent. Mod carb diet, tolerating well, plan NPO at midnight for possible arteriogram tomorrow. PIV saline locked, intermittent abx. L great toe black, not draining, malodorous, dressing in place. L foot sadaf vs dusky, cool. Denies numbness or tingling. Denies nausea, SOB. Voiding spontaneously. BS active, +flatus. Plan for possible amp after arteriogram. Continue plan of care.

## 2021-04-20 NOTE — UTILIZATION REVIEW
Admission Status; Secondary Review Determination       Under the authority of the Utilization Management Committee, the utilization review process indicated a secondary review on the above patient. The review outcome is based on review of the medical records, discussions with staff, and applying clinical experience noted on the date of the review.     (x) Inpatient Status Appropriate - This patient's medical care is consistent with medical management for inpatient care and reasonable inpatient medical practice.     RATIONALE FOR DETERMINATION   71 year old male with a past medical history of hypertension, DMII, PAD, s/p left 5 metatarsal amputation, and hyperlipidemia admitted on 4/19/2021 with left great toe gangrene. He was seen in Vascular Clinic by Dr. Mead on 4/19/2021 with recommendation to proceed with inpatient admission in the setting of wet gangrene.   Patient is on broad-spectrum antibiotic coverage with complex work-up anticipated, with gangrene is an emergency and can result in significant risk of increased morbidity and mortality    The expected length of stay at the time of admission was more than 2 nights because of the severity of illness, intensity of service provided, and risk for adverse outcome. Inpatient admission is appropriate.     This document was produced using voice recognition software       The information on this document is developed by the utilization review team in order for the business office to ensure compliance. This only denotes the appropriateness of proper admission status and does not reflect the quality of care rendered.   The definitions of Inpatient Status and Observation Status used in making the determination above are those provided in the CMS Coverage Manual, Chapter 1 and Chapter 6, section 70.4.   Sincerely,   NORMA MENDEZ MD   System Medical Director   Utilization Management   [facility].

## 2021-04-20 NOTE — CONSULTS
PATIENT HISTORY:  Zia Hopkins is a 71 year old male who was admitted for poor bloodflow and black left great toe.      I was asked to see Zia Hopkins  by  for left great toe infection.    Patient was seen at bedside.  Wife at bedside as well.  He states that this started about 2 1/2 - 3 months ago.  They noticed his toe starting to turn color and saw Dr. Hamlin back in February 2021.  He had an angiogram at that time.  The toe continue to progress to black.  He was then direct admitted from clinic a few days ago due to to redness around the left great black toe.  He notes that he currently does not have any pain but sometimes will have intermittent pain.  He is diabetic.  Denies injury to the area.    Review of Systems:  Patient denies fever, chills, rash, wound, stiffness, limping, numbness, weakness, heart burn, blood in stool, chest pain with activity, calf pain when walking, shortness of breath with activity, chronic cough, easy bleeding/bruising, swelling of ankles, excessive thirst, fatigue, depression, anxiety.  .     PAST MEDICAL HISTORY:   Past Medical History:   Diagnosis Date     Diabetes mellitus type 2, noninsulin dependent (H)      Hyperlipidemia LDL goal <70      Hypertension      PAD (peripheral artery disease) (H)         PAST SURGICAL HISTORY:   Past Surgical History:   Procedure Laterality Date     AMPUTATE FOOT Left 3/15/2017    Procedure: AMPUTATE FOOT;  Surgeon: Emre Mauricio DPM;  Location:  OR     IR LOWER EXTREMITY ANGIOGRAM LEFT  7/30/2019     IR LOWER EXTREMITY ANGIOGRAM LEFT  2/3/2021     IRRIGATION AND DEBRIDEMENT FOOT, COMBINED Left 3/15/2017    Procedure: COMBINED IRRIGATION AND DEBRIDEMENT FOOT;  Surgeon: Emre Mauricio DPM;  Location:  OR        MEDICATIONS:   Current Facility-Administered Medications:      acetaminophen (TYLENOL) Suppository 650 mg, 650 mg, Rectal, Q4H PRN, Shahida Starkey APRN CNP     acetaminophen (TYLENOL) tablet 650 mg, 650  mg, Oral, Q4H PRN, Shahida Starkey APRN CNP     amLODIPine (NORVASC) tablet 10 mg, 10 mg, Oral, Daily, Shahida Starkey APRN CNP, 10 mg at 04/20/21 0836     aspirin (ASA) chewable tablet 81 mg, 81 mg, Oral, Daily, Shahida Starkey APRN CNP, 81 mg at 04/20/21 0836     atorvastatin (LIPITOR) tablet 80 mg, 80 mg, Oral, QPM, Shahida Starkey APRN CNP, 80 mg at 04/19/21 2046     chlorthalidone (HYGROTON) tablet 50 mg, 50 mg, Oral, Daily, Shahida Starkey APRN CNP, 50 mg at 04/20/21 0835     clopidogrel (PLAVIX) tablet 75 mg, 75 mg, Oral, Daily, Shahida Starkey APRN CNP, 75 mg at 04/20/21 0836     glucose gel 15-30 g, 15-30 g, Oral, Q15 Min PRN **OR** dextrose 50 % injection 25-50 mL, 25-50 mL, Intravenous, Q15 Min PRN **OR** glucagon injection 1 mg, 1 mg, Subcutaneous, Q15 Min PRN, Shahida Starkey APRN CNP     ezetimibe (ZETIA) tablet 10 mg, 10 mg, Oral, At Bedtime, Shahida Starkey APRN CNP, 10 mg at 04/19/21 2256     insulin aspart (NovoLOG) injection (RAPID ACTING), 1-7 Units, Subcutaneous, TID AC, Shahida Starkey APRN CNP     insulin aspart (NovoLOG) injection (RAPID ACTING), 1-5 Units, Subcutaneous, At Bedtime, Shahida Starkey APRN CNP     lidocaine (LMX4) cream, , Topical, Q1H PRN, Shahida Starkey APRN CNP     lidocaine 1 % 0.1-1 mL, 0.1-1 mL, Other, Q1H PRN, Shahida Starkey APRN CNP     losartan (COZAAR) tablet 100 mg, 100 mg, Oral, Daily, Shahida Starkey APRN CNP, 100 mg at 04/20/21 0836     melatonin tablet 1 mg, 1 mg, Oral, At Bedtime PRN, Shahida Starkey APRN CNP     metoprolol succinate ER (TOPROL-XL) 24 hr tablet 100 mg, 100 mg, Oral, Daily, Shahida Starkey APRN CNP, 100 mg at 04/20/21 0835     naloxone (NARCAN) injection 0.2 mg, 0.2 mg, Intravenous, Q2 Min PRN **OR** naloxone (NARCAN) injection 0.4 mg, 0.4 mg, Intravenous, Q2 Min PRN **OR** naloxone (NARCAN) injection 0.2 mg, 0.2 mg, Intramuscular, Q2 Min PRN **OR** naloxone (NARCAN) injection 0.4 mg,  0.4 mg, Intramuscular, Q2 Min PRN, Lori Willoughby, [facility]     ondansetron (ZOFRAN-ODT) ODT tab 4 mg, 4 mg, Oral, Q6H PRN **OR** ondansetron (ZOFRAN) injection 4 mg, 4 mg, Intravenous, Q6H PRN, Shahida Starkey APRN CNP     oxyCODONE (ROXICODONE) tablet 5 mg, 5 mg, Oral, Q6H PRN, Shahida Starkey APRN CNP     piperacillin-tazobactam (ZOSYN) 3.375 g vial to attach to  mL bag, 3.375 g, Intravenous, Q6H, Shahida Starkey APRN CNP, Last Rate: 100 mL/hr at 21 0835, 3.375 g at 21 0835     polyethylene glycol (MIRALAX) Packet 17 g, 17 g, Oral, Daily PRN, Shahida Starkey APRN CNP     senna-docusate (SENOKOT-S/PERICOLACE) 8.6-50 MG per tablet 1 tablet, 1 tablet, Oral, BID PRN **OR** senna-docusate (SENOKOT-S/PERICOLACE) 8.6-50 MG per tablet 2 tablet, 2 tablet, Oral, BID PRN, Shahida Starkey APRN CNP     sodium chloride (PF) 0.9% PF flush 3 mL, 3 mL, Intracatheter, Q8H, Shahida Starkey APRN CNP, 3 mL at 21     sodium chloride (PF) 0.9% PF flush 3 mL, 3 mL, Intracatheter, Q1H PRN, Shahida Starkey APRN CNP     sodium chloride (PF) 0.9% PF flush 3 mL, 3 mL, Intracatheter, q1 min prn, Shahida Starkey APRN CNP     ALLERGIES:  No Known Allergies     SOCIAL HISTORY:   Social History     Socioeconomic History     Marital status:      Spouse name: Not on file     Number of children: Not on file     Years of education: Not on file     Highest education level: Not on file   Occupational History     Not on file   Social Needs     Financial resource strain: Not on file     Food insecurity     Worry: Not on file     Inability: Not on file     Transportation needs     Medical: Not on file     Non-medical: Not on file   Tobacco Use     Smoking status: Former Smoker     Packs/day: 0.75     Types: Cigarettes     Quit date: 3/3/2017     Years since quittin.1     Smokeless tobacco: Never Used     Tobacco comment: Quit Friday March 3 2017   Substance and Sexual Activity      "Alcohol use: Yes     Comment: Wine one bottle a day     Drug use: No     Sexual activity: Yes     Partners: Female   Lifestyle     Physical activity     Days per week: Not on file     Minutes per session: Not on file     Stress: Not on file   Relationships     Social connections     Talks on phone: Not on file     Gets together: Not on file     Attends [employer] service: Not on file     Active member of club or organization: Not on file     Attends meetings of clubs or organizations: Not on file     Relationship status: Not on file     Intimate partner violence     Fear of current or ex partner: Not on file     Emotionally abused: Not on file     Physically abused: Not on file     Forced sexual activity: Not on file   Other Topics Concern     Parent/sibling w/ CABG, MI or angioplasty before 65F 55M? Not Asked   Social History Narrative     Not on file        FAMILY HISTORY:   Family History   Problem Relation Age of Onset     Diabetes Father         EXAM:Vitals: /74 (BP Location: Left arm)   Pulse 58   Temp 98  F (36.7  C) (Oral)   Resp 18   Ht 1.676 m (5' 6\")   Wt 64.4 kg (142 lb)   SpO2 92%   BMI 22.92 kg/m    BMI= Body mass index is 22.92 kg/m .    LABS:    WBC   Date Value Ref Range Status   04/20/2021 6.6 4.0 - 11.0 10e9/L Final     HA1C: 7.4 (2/2021)    General appearance: Patient is alert and fully cooperative with history & exam.  No sign of distress is noted during the visit.      Psychiatric: Affect is pleasant & appropriate.  Patient appears motivated to improve health.       Respiratory: Breathing is regular & unlabored while sitting.      HEENT: Hearing is intact to spoken word.  Speech is clear.  No gross evidence of visual impairment that would impact ambulation.       Dermatologic: Black ischemic eschar to the left great toe from the tip to the proximal one third of the proximal phalanx.  This is malodorous.  There is some purulent drainage and some fluctuance noted.  Minimal surrounding " erythema.     Vascular: DP & PT pulses are not palpable bilaterally.  No significant edema or varicosities noted.  CFT and skin temperature is normal to both lower extremities.       Neurologic: Lower extremity sensation is diminished to feet.       Musculoskeletal: Patient is ambulatory without assistive device or brace.  Previous left fifth toe amputation.    IMAGING: MRI left foot : I personally reviewed images - 1.  Advanced changes of osteomyelitis involving the left great toe at the proximal and distal phalanx as detailed above. There is diffuse replacement of T1 signal abnormality throughout the proximal and distal phalanx with cortical destruction.   Additionally, there is diffuse gas in the soft tissues as shown on the recent radiograph compatible with soft tissue infection with underlying subcutaneous edema.     2.  No evidence for osteomyelitis involving the remaining toes or the metatarsals.     3.  Diffuse signal abnormality throughout the intrinsic muscles of the left foot compatible with myositis.     4.  Diffuse subcutaneous edema left foot. No drainable fluid collections.    Left foot xray: There does appear to be air within the soft tissues of the  great toe most prominent distally. The distal phalanx is tilted  laterally with widening of the IP joint of the great toe medially. Due  to overlying soft tissue air it is difficult to exclude lytic or  destructive lesion distal phalanx great toe which I suspect is  present.     Previous surgery versus resorption of the distal aspect of the middle  phalanx of the fourth toe at the PIP joint. Previous fifth toe  amputation. Vascular calcifications.     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      PLAN:  Reviewed patient's chart in [facility].  -Spoke with patient and patient's wife about the toe and the MRI results.  Discussed that given there is some subcutaneous gas in  the tissue there is concern for a more aggressive of infection.  Recommend going in emergently to remove the dead tissue and drain the foot to try to help prevent spreading of ischemic tissue.  Discussed that we would likely leave this open today and let it drain and then bring him back in a few days after he gets his angiogram to try to close the wound as long as the infection is under control.  Discussed that this may take multiple trips to the operating room depending on the infection.  We discussed worse case scenario which would be partial foot amputation or below the knee amputation.  At this time we are not planning those.    -We also discussed that after the removal of his left great toe that we will need to keep a close eye on the second through fourth toes as they will be more prone to ulceration and possible infection.    - All questions were answered to patient and patient's wife satisfaction and the agreed to proceed with surgery.  They are scheduled for roughly 4:00 this afternoon.  Patient has been n.p.o. since midnight.        Purvi Whelan DPM, Podiatry/Foot and Ankle Surgery    10:27 AM

## 2021-04-20 NOTE — PLAN OF CARE
"Summary: Gangrene of L great toe  Neuro: Aox4, calm cooperative  Resp: on RA  GI: bowel sounds active  : voiding adequate amounts in toilet  CMS: Pulses weak but palpable, numbness LLE  Mobility: ind.  Pain: Denies  Diet: NPO since midnight  Test Procedures: Possible surgery today   Plan: Surgical intervention, podiatry consult      /63 (BP Location: Left arm)   Pulse 61   Temp 97.8  F (36.6  C) (Oral)   Resp 18   Ht 1.676 m (5' 6\")   Wt 64.4 kg (142 lb)   SpO2 97%   BMI 22.92 kg/m       "

## 2021-04-20 NOTE — ANESTHESIA PREPROCEDURE EVALUATION
Anesthesia Pre-Procedure Evaluation    Patient: Zia Hopkins   MRN: [id number] : 1950        Preoperative Diagnosis: Gas gangrene (H) [A48.0]   Procedure : Procedure(s):  LEFT GREAT TOE AMPUTATION.     Past Medical History:   Diagnosis Date     Diabetes mellitus type 2, noninsulin dependent (H)      Hyperlipidemia LDL goal <70      Hypertension      PAD (peripheral artery disease) (H)       Past Surgical History:   Procedure Laterality Date     AMPUTATE FOOT Left 3/15/2017    Procedure: AMPUTATE FOOT;  Surgeon: Emre Mauricio DPM;  Location: [facility] OR     IR LOWER EXTREMITY ANGIOGRAM LEFT  2019     IR LOWER EXTREMITY ANGIOGRAM LEFT  2/3/2021     IRRIGATION AND DEBRIDEMENT FOOT, COMBINED Left 3/15/2017    Procedure: COMBINED IRRIGATION AND DEBRIDEMENT FOOT;  Surgeon: Emre Mauricio DPM;  Location: [facility] OR      No Known Allergies   Social History     Tobacco Use     Smoking status: Former Smoker     Packs/day: 0.75     Types: Cigarettes     Quit date: 3/3/2017     Years since quittin.1     Smokeless tobacco: Never Used     Tobacco comment: Quit Friday March 3 2017   Substance Use Topics     Alcohol use: Yes     Comment: Wine one bottle a day      Wt Readings from Last 1 Encounters:   21 64.4 kg (142 lb)        Anesthesia Evaluation   Pt has had prior anesthetic.     No history of anesthetic complications       ROS/MED HX  ENT/Pulmonary:    (-) tobacco use and asthma   Neurologic:       Cardiovascular:     (+) Dyslipidemia hypertension-----    METS/Exercise Tolerance:     Hematologic:       Musculoskeletal:       GI/Hepatic:    (-) GERD   Renal/Genitourinary:       Endo:     (+) type II DM,     Psychiatric/Substance Use:       Infectious Disease:       Malignancy:       Other:            Physical Exam    Airway        Mallampati: II   TM distance: > 3 FB   Neck ROM: full     Respiratory Devices and Support         Dental  no notable dental history         Cardiovascular   cardiovascular  exam normal          Pulmonary   pulmonary exam normal                OUTSIDE LABS:  CBC:   Lab Results   Component Value Date    WBC 6.6 04/20/2021    WBC 8.5 04/19/2021    HGB 11.0 (L) 04/20/2021    HGB 12.2 (L) 04/19/2021    HCT 33.6 (L) 04/20/2021    HCT 37.9 (L) 04/19/2021     04/20/2021     04/19/2021     BMP:   Lab Results   Component Value Date     04/20/2021     04/19/2021    POTASSIUM 3.7 04/20/2021    POTASSIUM 4.0 04/19/2021    CHLORIDE 106 04/20/2021    CHLORIDE 104 04/19/2021    CO2 29 04/20/2021    CO2 29 04/19/2021    BUN 22 04/20/2021    BUN 25 04/19/2021    CR 0.90 04/20/2021    CR 0.96 04/19/2021     (H) 04/20/2021     (H) 04/19/2021     COAGS:   Lab Results   Component Value Date    PTT 32 02/03/2021    INR 0.95 02/03/2021     POC:   Lab Results   Component Value Date     (H) 04/20/2021     HEPATIC: No results found for: ALBUMIN, PROTTOTAL, ALT, AST, GGT, ALKPHOS, BILITOTAL, BILIDIRECT, CAMMIE  OTHER:   Lab Results   Component Value Date    A1C 7.4 (H) 02/03/2021    TIN 8.6 04/20/2021       Anesthesia Plan    ASA Status:  2      Anesthesia Type: MAC.              Consents    Anesthesia Plan(s) and associated risks, benefits, and realistic alternatives discussed. Questions answered and patient/representative(s) expressed understanding.     - Discussed with:  Patient         Postoperative Care    Pain management: IV analgesics, Oral pain medications.   PONV prophylaxis: Ondansetron (or other 5HT-3), Dexamethasone or Solumedrol     Comments:                Vianey Lopez

## 2021-04-20 NOTE — CONSULTS
[facility]    Infectious Disease Consultation     Date of Admission:  4/19/2021  Date of Consult (When I saw the patient): 04/20/21    Assessment & Plan   Zia Hopkins is a 71 year old male who was admitted on 4/19/2021.     Impression:  1. 71 y.o male with diabetes.   2. PAD   3. Previous history of left 5th metatarsal amputation.   4. Admitted with left great toe gangrene.   5. Seen by podiatry plan for urgent surgical intervention.   6. No history of MRSA previous cultures with GNR and MSSA.     Recommendations:   Continue on zosyn alone   Will follow up on the podiatry and vascular work up.        Дмитрий Moya MD    Reason for Consult   Reason for consult: I was asked to evaluate this patient for left great toe gangrene.     Primary Care Physician   Reid Reese    Chief Complaint   Toe gangrene     History is obtained from the patient and medical records    History of Present Illness   Zia Hopkins is a 71 year old male with DMII, PAD, s/p left 5 metatarsal amputation, and hyperlipidemia admitted on 4/19/2021 with left great toe gangrene    Past Medical History   I have reviewed this patient's medical history and updated it with pertinent information if needed.   Past Medical History:   Diagnosis Date     Diabetes mellitus type 2, noninsulin dependent (H)      Hyperlipidemia LDL goal <70      Hypertension      PAD (peripheral artery disease) (H)        Past Surgical History   I have reviewed this patient's surgical history and updated it with pertinent information if needed.  Past Surgical History:   Procedure Laterality Date     AMPUTATE FOOT Left 3/15/2017    Procedure: AMPUTATE FOOT;  Surgeon: Emre Mauricio DPM;  Location: [facility] OR     IR LOWER EXTREMITY ANGIOGRAM LEFT  7/30/2019     IR LOWER EXTREMITY ANGIOGRAM LEFT  2/3/2021     IRRIGATION AND DEBRIDEMENT FOOT, COMBINED Left 3/15/2017    Procedure: COMBINED IRRIGATION AND DEBRIDEMENT FOOT;  Surgeon: Emre Mauricio  KEMAR Palencia;  Location:  OR       Prior to Admission Medications   Prior to Admission Medications   Prescriptions Last Dose Informant Patient Reported? Taking?   Cadexomer Iodine, topical, 0.9% (IODOSORB) 0.9 % GEL gel 4/18/2021 at Unknown time Self No Yes   Sig: Apply 40 g topically daily as needed for other   amLODIPine (NORVASC) 10 MG tablet 4/19/2021 at AM Self Yes Yes   Sig: Take 10 mg by mouth daily   aspirin 81 MG tablet 4/19/2021 at AM Self No Yes   Sig: Take 1 tablet (81 mg) by mouth daily   atorvastatin (LIPITOR) 80 MG tablet 4/18/2021 at hs Self No Yes   Sig: TAKE 1 TABLET(80 MG) BY MOUTH AT BEDTIME   chlorthalidone (HYGROTON) 50 MG tablet 4/19/2021 at AM Self Yes Yes   Sig: TK 1 T PO QD FOR BP   clopidogrel (PLAVIX) 75 MG tablet 4/19/2021 at AM Self No Yes   Sig: TAKE 1 TABLET(75 MG) BY MOUTH DAILY   empagliflozin (JARDIANCE) 10 MG TABS tablet 4/19/2021 at AM Self No Yes   Sig: Take 1 tablet (10 mg) by mouth daily   ezetimibe (ZETIA) 10 MG tablet 4/18/2021 at HS Self No Yes   Sig: TAKE 1 TABLET(10 MG) BY MOUTH DAILY   losartan (COZAAR) 100 MG tablet 4/19/2021 at AM Self Yes Yes   Sig: TK 1 T TABLET QD   metFORMIN (GLUCOPHAGE) 1000 MG tablet 4/19/2021 at AM Self Yes Yes   Sig: Take 1,000 mg by mouth 2 times daily (with meals)    metoprolol succinate ER (TOPROL-XL) 100 MG 24 hr tablet 4/19/2021 at AM Self No Yes   Sig: Take 1 tablet (100 mg) by mouth daily      Facility-Administered Medications: None     Allergies   No Known Allergies    Immunization History     There is no immunization history on file for this patient.    Social History   I have reviewed this patient's social history and updated it with pertinent information if needed. Zia Hopkins  reports that he quit smoking about 4 years ago. His smoking use included cigarettes. He smoked 0.75 packs per day. He has never used smokeless tobacco. He reports current alcohol use. He reports that he does not use drugs.    Family History   I have  reviewed this patient's family history and updated it with pertinent information if needed.   Family History   Problem Relation Age of Onset     Diabetes Father        Review of Systems   The 10 point Review of Systems is negative other than noted in the HPI or here.     Physical Exam   Temp: 98  F (36.7  C) Temp src: Oral BP: 139/74 Pulse: 58   Resp: 18 SpO2: 92 % O2 Device: None (Room air)    Vital Signs with Ranges  Temp:  [97.5  F (36.4  C)-98.5  F (36.9  C)] 98  F (36.7  C)  Pulse:  [58-61] 58  Resp:  [16-18] 18  BP: (130-151)/(63-74) 139/74  SpO2:  [92 %-100 %] 92 %  142 lbs 0 oz  Body mass index is 22.92 kg/m .    GENERAL APPEARANCE:  awake  EYES: Eyes grossly normal to inspection, PERRL and conjunctivae and sclerae normal  HENT: ear canals and TM's normal and nose and mouth without ulcers or lesions  NECK: no adenopathy, no asymmetry, masses, or scars and thyroid normal to palpation  RESP: lungs clear to auscultation - no rales, rhonchi or wheezes  CV: regular rates and rhythm, normal S1 S2, no S3 or S4 and no murmur, click or rub  LYMPHATICS: normal ant/post cervical and supraclavicular nodes  ABDOMEN: soft, nontender, without hepatosplenomegaly or masses and bowel sounds normal  MS: eschar on the great toe with drainage   SKIN: no suspicious lesions or rashes      Data   Lab Results   Component Value Date    WBC 6.6 04/20/2021    HGB 11.0 (L) 04/20/2021    HCT 33.6 (L) 04/20/2021     04/20/2021     04/20/2021    POTASSIUM 3.7 04/20/2021    CHLORIDE 106 04/20/2021    CO2 29 04/20/2021    BUN 22 04/20/2021    CR 0.90 04/20/2021     (H) 04/20/2021    INR 0.95 02/03/2021     No results for input(s): CULT in the last 168 hours.  Recent Labs   Lab Test 02/05/21  1325 03/15/17  1610 03/15/17  1608   CULT Heavy growth  Proteus mirabilis  *  Heavy growth  Stenotrophomonas maltophilia  *  Heavy growth  beta hemolytic   Streptococcus constellatus  This organism is susceptible to ampicillin,  penicillin, vancomycin and the cephalosporins.   If treatment is required AND your patient is allergic to penicillin, contact the   Microbiology Lab within 5 days to request susceptibility testing.  Testing unavailable due to 's backorder.  *  Moderate growth  Staphylococcus aureus  *  Heavy growth  Normal skin ervin   No anaerobes isolated  Moderate growth Staphylococcus aureus  Moderate growth Strain 2 Staphylococcus aureus  * No anaerobes isolated  Moderate growth Staphylococcus aureus  Moderate growth Strain 2 Staphylococcus aureus  *

## 2021-04-20 NOTE — BRIEF OP NOTE
[facility]    Brief Operative Note    Pre-operative diagnosis: Gas gangrene (H) [A48.0]  Post-operative diagnosis Same as pre-operative diagnosis    Procedure: Procedure(s):  LEFT GREAT TOE AMPUTATION.  Surgeon: Surgeon(s) and Role:     * Purvi Whelan DPM, Podiatry/Foot and Ankle Surgery - Primary  Anesthesia: General   Estimated blood loss: Less than 10 ml  Drains: None  Specimens:   ID Type Source Tests Collected by Time Destination   1 : left great toe cultures Wound Foot, Left ANAEROBIC BACTERIAL CULTURE, GRAM STAIN, WOUND CULTURE AEROBIC BACTERIAL Purvi Whelan DPM, Podiatry/Foot and Ankle Surgery 4/20/2021  4:15 PM    A : left great toe  Tissue Foot, Left SURGICAL PATHOLOGY EXAM Purvi Whelan DPM, Podiatry/Foot and Ankle Surgery 4/20/2021  4:17 PM      Findings:   None.  Complications: None.  Implants: * No implants in log *

## 2021-04-20 NOTE — PROGRESS NOTES
[facility]  Hospitalist Progress Note        Mohit Gould MD   04/20/2021        Interval History:      - admitted from vascular clinic for left great toe wet gangrene with plans for angiogram and podiatry eval  - Remains afebrile, no leukocytosis  - MRI left foot 4/19 noted with advanced changes of osteomyelitis involving the left great toe at the proximal and distal phalanx  - evaluated by podiatry and plan for surgical debridement 4/20  - also evaluated by ID, to continue Zosyn alone; vancomycin d/rama (4/20)           Assessment and Plan:        Zia Hopkins is a 71 year old male with a PMH of hypertension, DMII, PAD, s/p left 5th metatarsal amputation, and hyperlipidemia admitted on 4/19/2021 with left great toe gangrene, sent from vascular surgery clinic by Dr. Mead .     Left Great Toe wet Gangrene  Peripheral Artery Disease  # h/o left popliteal angioplasty and left SFA stenting 2017 followed by stenting of in-stent stenosis 7/30/19   # h/o left lower extremity arteriogram with balloon angioplasty of the left popliteal artery, recanalization of the posterior tibial artery, and extension of the stent in the left superficial femoral artery 2/3/2021  # dyslipidemia.  - Remains afebrile, no leukocytosis  - MRI left foot 4/19 noted with advanced changes of osteomyelitis involving the left great toe at the proximal and distal phalanx  - evaluated by podiatry and plan for surgical debridement 4/20  - also evaluated by ID, to continue Zosyn alone; vancomycin d/rama (4/20)  -vascular surgery following and plan to do LLE angiogram following debridement by podiatry  -continue aspirin, Plavix, statin, and Zetia    Hypertension  -continue amlodipine, losartan, Chlorthalidone , Lopressor    Diabetes mellitus type II  - HgbA1C 7.4 2/3/2021  - Hold PTA Empagliflozin 10mg PO daily, Metformin 1000mg PO BID  - Blood glucose monitoring QID and 0200  - Medium resistance sliding scale insulin  -  "Hypoglycemia protocol     Alcohol Dependence  Reports drinking a bottle of wine a day. He denies any prior history of alcohol withdrawal during periods without alcohol use.  -CIWA monitoring; no clinical concerns for alcohol withdrawal at this time    DVT Prophylaxis: Pneumatic Compression Devices    Code Status:   Full Code       Disposition Plan     Expected discharge: >3-5 days pending podiatry and vascular surgery clearance     Care plan discussed with patient and his wife present by the bedside                   Physical Exam:      Blood pressure 139/74, pulse 58, temperature 98  F (36.7  C), temperature source Oral, resp. rate 18, height 1.676 m (5' 6\"), weight 64.4 kg (142 lb), SpO2 92 %.  Vitals:    04/19/21 1600   Weight: 64.4 kg (142 lb)     Vital Signs with Ranges  Temp:  [97.5  F (36.4  C)-98.5  F (36.9  C)] 98  F (36.7  C)  Pulse:  [58-61] 58  Resp:  [16-18] 18  BP: (130-151)/(63-74) 139/74  SpO2:  [92 %-100 %] 92 %  I/O's Last 24 hours  I/O last 3 completed shifts:  In: 500 [P.O.:500]  Out: -     Constitutional: Alert, awake and oriented X 3; resting comfortably in no apparent distress   HEENT: Pupils equal and reactive to light and accomodation, neck supple    Oral cavity: Moist mucosa   Cardiovascular: Normal s1 s2, regular rate and rhythm, no murmur   Lungs: B/l clear to auscultation, no wheezes or crepitations   Abdomen: Soft, nt, nd, no guarding, rigidity or rebound; BS +   LE : No edema   Musculoskeletal: Power 5/5 in all extremities; left great toe noted with foul-smelling open wound    Neuro: No focal neurological deficits noted   Psychiatry: normal mood and affect                Medications:          amLODIPine  10 mg Oral Daily     aspirin  81 mg Oral Daily     atorvastatin  80 mg Oral QPM     chlorthalidone  50 mg Oral Daily     clopidogrel  75 mg Oral Daily     ezetimibe  10 mg Oral At Bedtime     insulin aspart  1-7 Units Subcutaneous TID AC     insulin aspart  1-5 Units Subcutaneous At " Bedtime     losartan  100 mg Oral Daily     metoprolol succinate ER  100 mg Oral Daily     piperacillin-tazobactam  3.375 g Intravenous Q6H     sodium chloride (PF)  3 mL Intracatheter Q8H     vancomycin (VANCOCIN) IV  1,000 mg Intravenous Q12H     PRN Meds: acetaminophen, acetaminophen, glucose **OR** dextrose **OR** glucagon, lidocaine 4%, lidocaine (buffered or not buffered), melatonin, naloxone **OR** naloxone **OR** naloxone **OR** naloxone, ondansetron **OR** ondansetron, oxyCODONE, polyethylene glycol, senna-docusate **OR** senna-docusate, sodium chloride (PF), sodium chloride (PF)         Data:      All new lab and imaging data was reviewed.   Recent Labs   Lab Test 04/20/21  0704 04/19/21  1151 02/03/21  1003 07/30/19  0712 03/08/17  0720 03/08/17  0720   WBC 6.6 8.5 12.0* 5.7   < > 8.4   HGB 11.0* 12.2* 12.2* 15.0   < > 15.1   MCV 94 94 94 92   < > 91    310 377 215   < > 240   INR  --   --  0.95 0.90  --  0.87    < > = values in this interval not displayed.      Recent Labs   Lab Test 04/20/21  0704 04/19/21  1151 02/03/21  1003 07/22/19  0000 07/22/19 03/16/17  0742 03/16/17  0742    136  --   --   --   --  137   POTASSIUM 3.7 4.0  --   --  4.9  --  3.8   CHLORIDE 106 104  --   --   --   --  104   CO2 29 29  --   --   --   --  26   BUN 22 25  --   --   --   --  9   CR 0.90 0.96 0.95   < > 0.74*   < > 0.71   ANIONGAP 3 3  --   --   --   --  7   TIN 8.6 8.9  --   --   --   --  8.3*   * 242* 158*  --  170*  --  182*    < > = values in this interval not displayed.     No lab results found.    Invalid input(s): TROP, TROPONINIES

## 2021-04-21 ENCOUNTER — APPOINTMENT (OUTPATIENT)
Dept: INTERVENTIONAL RADIOLOGY/VASCULAR | Facility: CLINIC | Age: 71
DRG: 252 | End: 2021-04-21
Attending: SURGERY
Payer: COMMERCIAL

## 2021-04-21 LAB
CREAT SERPL-MCNC: 0.94 MG/DL (ref 0.66–1.25)
GFR SERPL CREATININE-BSD FRML MDRD: 80 ML/MIN/{1.73_M2}
GLUCOSE BLDC GLUCOMTR-MCNC: 111 MG/DL (ref 70–99)
GLUCOSE BLDC GLUCOMTR-MCNC: 140 MG/DL (ref 70–99)
GLUCOSE BLDC GLUCOMTR-MCNC: 250 MG/DL (ref 70–99)

## 2021-04-21 PROCEDURE — 272N000124 HC CATH CR11

## 2021-04-21 PROCEDURE — 250N000011 HC RX IP 250 OP 636: Performed by: PODIATRIST

## 2021-04-21 PROCEDURE — 272N000123 HC CATH CR9

## 2021-04-21 PROCEDURE — 75710 ARTERY X-RAYS ARM/LEG: CPT | Mod: LT

## 2021-04-21 PROCEDURE — 82565 ASSAY OF CREATININE: CPT | Performed by: PODIATRIST

## 2021-04-21 PROCEDURE — L4361 PNEUMA/VAC WALK BOOT PRE OTS: HCPCS

## 2021-04-21 PROCEDURE — 250N000013 HC RX MED GY IP 250 OP 250 PS 637: Performed by: PODIATRIST

## 2021-04-21 PROCEDURE — 272N000571 HC SHEATH CR8

## 2021-04-21 PROCEDURE — 272N000302 HC DEVICE INFLATION CR5

## 2021-04-21 PROCEDURE — C1769 GUIDE WIRE: HCPCS

## 2021-04-21 PROCEDURE — 272N000196 HC ACCESSORY CR5

## 2021-04-21 PROCEDURE — 250N000013 HC RX MED GY IP 250 OP 250 PS 637: Performed by: STUDENT IN AN ORGANIZED HEALTH CARE EDUCATION/TRAINING PROGRAM

## 2021-04-21 PROCEDURE — 258N000003 HC RX IP 258 OP 636: Performed by: STUDENT IN AN ORGANIZED HEALTH CARE EDUCATION/TRAINING PROGRAM

## 2021-04-21 PROCEDURE — C1725 CATH, TRANSLUMIN NON-LASER: HCPCS

## 2021-04-21 PROCEDURE — 047Q3ZZ DILATION OF LEFT ANTERIOR TIBIAL ARTERY, PERCUTANEOUS APPROACH: ICD-10-PCS | Performed by: SURGERY

## 2021-04-21 PROCEDURE — 37232 PR REVASC TIB/PERON ART,ANGIOPLASTY, EA ADL VESSEL: CPT | Mod: LT | Performed by: SURGERY

## 2021-04-21 PROCEDURE — 250N000011 HC RX IP 250 OP 636: Performed by: SURGERY

## 2021-04-21 PROCEDURE — 99152 MOD SED SAME PHYS/QHP 5/>YRS: CPT | Performed by: SURGERY

## 2021-04-21 PROCEDURE — 99232 SBSQ HOSP IP/OBS MODERATE 35: CPT | Performed by: HOSPITALIST

## 2021-04-21 PROCEDURE — C1760 CLOSURE DEV, VASC: HCPCS

## 2021-04-21 PROCEDURE — 36415 COLL VENOUS BLD VENIPUNCTURE: CPT | Performed by: PODIATRIST

## 2021-04-21 PROCEDURE — 76937 US GUIDE VASCULAR ACCESS: CPT | Mod: 26 | Performed by: SURGERY

## 2021-04-21 PROCEDURE — 272N000116 HC CATH CR1

## 2021-04-21 PROCEDURE — 250N000009 HC RX 250: Performed by: SURGERY

## 2021-04-21 PROCEDURE — 75710 ARTERY X-RAYS ARM/LEG: CPT | Mod: 26 | Performed by: SURGERY

## 2021-04-21 PROCEDURE — 272N000567 HC SHEATH CR4

## 2021-04-21 PROCEDURE — 999N001017 HC STATISTIC GLUCOSE BY METER IP

## 2021-04-21 PROCEDURE — 255N000002 HC RX 255 OP 636: Performed by: SURGERY

## 2021-04-21 PROCEDURE — 250N000011 HC RX IP 250 OP 636: Performed by: STUDENT IN AN ORGANIZED HEALTH CARE EDUCATION/TRAINING PROGRAM

## 2021-04-21 PROCEDURE — 047S3ZZ DILATION OF LEFT POSTERIOR TIBIAL ARTERY, PERCUTANEOUS APPROACH: ICD-10-PCS | Performed by: SURGERY

## 2021-04-21 PROCEDURE — 37228 IR LOWER EXTREMITY ANGIOGRAM LEFT: CPT | Mod: LT | Performed by: SURGERY

## 2021-04-21 PROCEDURE — 99231 SBSQ HOSP IP/OBS SF/LOW 25: CPT | Performed by: PODIATRIST

## 2021-04-21 PROCEDURE — 120N000001 HC R&B MED SURG/OB

## 2021-04-21 RX ORDER — IODIXANOL 320 MG/ML
150 INJECTION, SOLUTION INTRAVASCULAR ONCE
Status: COMPLETED | OUTPATIENT
Start: 2021-04-21 | End: 2021-04-21

## 2021-04-21 RX ORDER — ACETAMINOPHEN 500 MG
500 TABLET ORAL EVERY 6 HOURS PRN
Status: DISCONTINUED | OUTPATIENT
Start: 2021-04-21 | End: 2021-04-23 | Stop reason: HOSPADM

## 2021-04-21 RX ORDER — HEPARIN SODIUM 1000 [USP'U]/ML
3000 INJECTION, SOLUTION INTRAVENOUS; SUBCUTANEOUS ONCE
Status: COMPLETED | OUTPATIENT
Start: 2021-04-21 | End: 2021-04-21

## 2021-04-21 RX ORDER — PROTAMINE SULFATE 10 MG/ML
30 INJECTION, SOLUTION INTRAVENOUS ONCE
Status: COMPLETED | OUTPATIENT
Start: 2021-04-21 | End: 2021-04-21

## 2021-04-21 RX ORDER — HEPARIN SODIUM 1000 [USP'U]/ML
5000 INJECTION, SOLUTION INTRAVENOUS; SUBCUTANEOUS ONCE
Status: COMPLETED | OUTPATIENT
Start: 2021-04-21 | End: 2021-04-21

## 2021-04-21 RX ORDER — NALOXONE HYDROCHLORIDE 0.4 MG/ML
0.2 INJECTION, SOLUTION INTRAMUSCULAR; INTRAVENOUS; SUBCUTANEOUS
Status: DISCONTINUED | OUTPATIENT
Start: 2021-04-21 | End: 2021-04-21 | Stop reason: HOSPADM

## 2021-04-21 RX ORDER — FLUMAZENIL 0.1 MG/ML
0.2 INJECTION, SOLUTION INTRAVENOUS
Status: DISCONTINUED | OUTPATIENT
Start: 2021-04-21 | End: 2021-04-21 | Stop reason: HOSPADM

## 2021-04-21 RX ORDER — HEPARIN SODIUM 200 [USP'U]/100ML
1 INJECTION, SOLUTION INTRAVENOUS CONTINUOUS PRN
Status: DISCONTINUED | OUTPATIENT
Start: 2021-04-21 | End: 2021-04-21 | Stop reason: HOSPADM

## 2021-04-21 RX ORDER — NALOXONE HYDROCHLORIDE 0.4 MG/ML
0.4 INJECTION, SOLUTION INTRAMUSCULAR; INTRAVENOUS; SUBCUTANEOUS
Status: DISCONTINUED | OUTPATIENT
Start: 2021-04-21 | End: 2021-04-21 | Stop reason: HOSPADM

## 2021-04-21 RX ORDER — NITROGLYCERIN 5 MG/ML
100-1000 VIAL (ML) INTRAVENOUS EVERY 5 MIN PRN
Status: DISCONTINUED | OUTPATIENT
Start: 2021-04-21 | End: 2021-04-21 | Stop reason: HOSPADM

## 2021-04-21 RX ORDER — FENTANYL CITRATE 50 UG/ML
25-50 INJECTION, SOLUTION INTRAMUSCULAR; INTRAVENOUS EVERY 5 MIN PRN
Status: DISCONTINUED | OUTPATIENT
Start: 2021-04-21 | End: 2021-04-21 | Stop reason: HOSPADM

## 2021-04-21 RX ORDER — SODIUM CHLORIDE 9 MG/ML
INJECTION, SOLUTION INTRAVENOUS CONTINUOUS
Status: DISCONTINUED | OUTPATIENT
Start: 2021-04-21 | End: 2021-04-23 | Stop reason: HOSPADM

## 2021-04-21 RX ADMIN — PIPERACILLIN SODIUM AND TAZOBACTAM SODIUM 3.38 G: 3; .375 INJECTION, POWDER, LYOPHILIZED, FOR SOLUTION INTRAVENOUS at 22:29

## 2021-04-21 RX ADMIN — FENTANYL CITRATE 50 MCG: 50 INJECTION, SOLUTION INTRAMUSCULAR; INTRAVENOUS at 08:51

## 2021-04-21 RX ADMIN — AMLODIPINE BESYLATE 10 MG: 10 TABLET ORAL at 12:43

## 2021-04-21 RX ADMIN — SODIUM CHLORIDE: 9 INJECTION, SOLUTION INTRAVENOUS at 13:48

## 2021-04-21 RX ADMIN — CLOPIDOGREL BISULFATE 75 MG: 75 TABLET ORAL at 12:43

## 2021-04-21 RX ADMIN — ACETAMINOPHEN 975 MG: 325 TABLET, FILM COATED ORAL at 12:43

## 2021-04-21 RX ADMIN — HEPARIN SODIUM 3000 UNITS: 1000 INJECTION INTRAVENOUS; SUBCUTANEOUS at 10:04

## 2021-04-21 RX ADMIN — MIDAZOLAM HYDROCHLORIDE 0.5 MG: 1 INJECTION, SOLUTION INTRAMUSCULAR; INTRAVENOUS at 09:19

## 2021-04-21 RX ADMIN — ATORVASTATIN CALCIUM 80 MG: 40 TABLET, FILM COATED ORAL at 19:53

## 2021-04-21 RX ADMIN — FENTANYL CITRATE 25 MCG: 50 INJECTION, SOLUTION INTRAMUSCULAR; INTRAVENOUS at 09:19

## 2021-04-21 RX ADMIN — ASPIRIN 81 MG CHEWABLE TABLET 81 MG: 81 TABLET CHEWABLE at 12:44

## 2021-04-21 RX ADMIN — NITROGLYCERIN 750 MCG: 10 INJECTION INTRAVENOUS at 09:56

## 2021-04-21 RX ADMIN — MIDAZOLAM HYDROCHLORIDE 0.5 MG: 1 INJECTION, SOLUTION INTRAMUSCULAR; INTRAVENOUS at 09:40

## 2021-04-21 RX ADMIN — PROTAMINE SULFATE 30 MG: 10 INJECTION, SOLUTION INTRAVENOUS at 10:20

## 2021-04-21 RX ADMIN — PIPERACILLIN SODIUM AND TAZOBACTAM SODIUM 3.38 G: 3; .375 INJECTION, POWDER, LYOPHILIZED, FOR SOLUTION INTRAVENOUS at 17:06

## 2021-04-21 RX ADMIN — METOPROLOL SUCCINATE 100 MG: 100 TABLET, EXTENDED RELEASE ORAL at 12:44

## 2021-04-21 RX ADMIN — PIPERACILLIN SODIUM AND TAZOBACTAM SODIUM 3.38 G: 3; .375 INJECTION, POWDER, LYOPHILIZED, FOR SOLUTION INTRAVENOUS at 02:11

## 2021-04-21 RX ADMIN — PIPERACILLIN SODIUM AND TAZOBACTAM SODIUM 3.38 G: 3; .375 INJECTION, POWDER, LYOPHILIZED, FOR SOLUTION INTRAVENOUS at 11:15

## 2021-04-21 RX ADMIN — HEPARIN SODIUM 3 BAG: 200 INJECTION, SOLUTION INTRAVENOUS at 09:18

## 2021-04-21 RX ADMIN — MIDAZOLAM HYDROCHLORIDE 1 MG: 1 INJECTION, SOLUTION INTRAMUSCULAR; INTRAVENOUS at 08:51

## 2021-04-21 RX ADMIN — LIDOCAINE HYDROCHLORIDE 10 ML: 10 INJECTION, SOLUTION INFILTRATION; PERINEURAL at 08:59

## 2021-04-21 RX ADMIN — EZETIMIBE 10 MG: 10 TABLET ORAL at 22:29

## 2021-04-21 RX ADMIN — FENTANYL CITRATE 25 MCG: 50 INJECTION, SOLUTION INTRAMUSCULAR; INTRAVENOUS at 09:39

## 2021-04-21 RX ADMIN — CHLORTHALIDONE 50 MG: 25 TABLET ORAL at 12:44

## 2021-04-21 RX ADMIN — HEPARIN SODIUM 5000 UNITS: 1000 INJECTION INTRAVENOUS; SUBCUTANEOUS at 09:05

## 2021-04-21 RX ADMIN — ACETAMINOPHEN 975 MG: 325 TABLET, FILM COATED ORAL at 02:11

## 2021-04-21 RX ADMIN — LOSARTAN POTASSIUM 100 MG: 100 TABLET, FILM COATED ORAL at 12:44

## 2021-04-21 RX ADMIN — IODIXANOL 68 ML: 320 INJECTION, SOLUTION INTRAVASCULAR at 10:32

## 2021-04-21 ASSESSMENT — ACTIVITIES OF DAILY LIVING (ADL)
ADLS_ACUITY_SCORE: 11
ADLS_ACUITY_SCORE: 13
ADLS_ACUITY_SCORE: 11
ADLS_ACUITY_SCORE: 13

## 2021-04-21 NOTE — PLAN OF CARE
4/20/21 [id number]    POD 1. A&Ox4. VSS RA. TIFFANIE MCCOY SL. Int. Abx, zosyn. SATYA mobility. Assigned Pt around midnight and Pt has been requesting to sleep since. Ace wrap on L foot. Using a urinal. Mod carb diet. AM staff should call vascular for advice on Pt's morning anti-coagulation meds that may need to be adjusted or not given, per evening staff on 4/20. Lungs clear and equal bilaterally.

## 2021-04-21 NOTE — PROGRESS NOTES
[facility]  Hospitalist Progress Note        Mohit Gould MD   04/21/2021        Interval History:        - s/p left great toe amputation at the metatarsophalangeal joint on 4/20  - got LLE angiogram 4/21 with balloon angioplasty  -denies any other active complaints         Assessment and Plan:        Zia Hopkins is a 71 year old male with a PMH of hypertension, DMII, PAD, s/p left 5th metatarsal amputation, and hyperlipidemia admitted on 4/19/2021 with left great toe gangrene, sent from vascular surgery clinic by Dr. Mead .     Left Great Toe wet Gangrene, s/p left great toe amputation 4/20/21  Peripheral Artery Disease, s/p LLE angiogram and balloon angioplasty 4/21/21    # h/o left popliteal angioplasty and left SFA stenting 2017 followed by stenting of in-stent stenosis 7/30/19   # h/o left lower extremity arteriogram with balloon angioplasty of the left popliteal artery, recanalization of the posterior tibial artery, and extension of the stent in the left superficial femoral artery 2/3/2021  # dyslipidemia.  - Remains afebrile, no leukocytosis  - MRI left foot 4/19 noted with advanced changes of osteomyelitis involving the left great toe at the proximal and distal phalanx  - evaluated by podiatry and left great toe amputation 4/20, per podiatry all bone infection fell to be removed--- suggested minimal heel weight-bearing in postop boot; to follow-up with Dr. Whelan in one week  - PT evaluation pending  - ID following; on Zosyn -- defer PO antibiotics to ID; vancomycin d/rama (4/20)  - vascular surgery following and had LLE angiogram with balloon angioplasty, PT angioplasty on 4/21  -continue aspirin, Plavix, statin, and Zetia    Hypertension  -continue amlodipine, losartan, Chlorthalidone , Lopressor    Diabetes mellitus type II  - HgbA1C 7.4 2/3/2021  - Hold PTA Empagliflozin 10mg PO daily, Metformin 1000mg PO BID  - Blood glucose monitoring QID and 0200  - Medium resistance  "sliding scale insulin  - Hypoglycemia protocol     Alcohol Dependence  Reports drinking a bottle of wine a day. He denies any prior history of alcohol withdrawal during periods without alcohol use.  -CIWA monitoring; no clinical concerns for alcohol withdrawal at this time    DVT Prophylaxis: Pneumatic Compression Devices    Code Status:   Full Code       Disposition Plan     Expected discharge: in 1-2 days if cleared by vascular surgery , pending PT evaluation and discharge antibiotic plans per ID      Care plan discussed with patient and his wife present by the bedside                   Physical Exam:      Blood pressure (!) 168/78, pulse 60, temperature 98.1  F (36.7  C), temperature source Oral, resp. rate 15, height 1.676 m (5' 6\"), weight 64.4 kg (142 lb), SpO2 97 %.  Vitals:    04/19/21 1600   Weight: 64.4 kg (142 lb)     Vital Signs with Ranges  Temp:  [96.9  F (36.1  C)-98.4  F (36.9  C)] 98.1  F (36.7  C)  Pulse:  [52-67] 60  Resp:  [11-18] 15  BP: (124-179)/(65-81) 168/78  SpO2:  [94 %-99 %] 97 %  I/O's Last 24 hours  I/O last 3 completed shifts:  In: 400 [I.V.:400]  Out: 155 [Urine:150; Blood:5]    Constitutional: Alert, awake and oriented X 3; resting comfortably in no apparent distress   HEENT: Pupils equal and reactive to light and accomodation, neck supple    Oral cavity: Moist mucosa   Cardiovascular: Normal s1 s2, regular rate and rhythm, no murmur   Lungs: B/l clear to auscultation, no wheezes or crepitations   Abdomen: Soft, nt, nd, no guarding, rigidity or rebound; BS +   LE : No edema   Musculoskeletal: Power 5/5 in all extremities; left foot in postop bandage   Neuro: No focal neurological deficits noted   Psychiatry: normal mood and affect                Medications:          acetaminophen  975 mg Oral Q8H     amLODIPine  10 mg Oral Daily     aspirin  81 mg Oral Daily     atorvastatin  80 mg Oral QPM     chlorthalidone  50 mg Oral Daily     clopidogrel  75 mg Oral Daily     docusate sodium  " 100 mg Oral BID     ezetimibe  10 mg Oral At Bedtime     insulin aspart  1-7 Units Subcutaneous TID AC     insulin aspart  1-5 Units Subcutaneous At Bedtime     iodixanol  150 mL INTRA-ARTERIAL Once     losartan  100 mg Oral Daily     metoprolol succinate ER  100 mg Oral Daily     piperacillin-tazobactam  3.375 g Intravenous Q6H     polyethylene glycol  17 g Oral Daily     senna-docusate  1 tablet Oral BID     sodium chloride (PF)  3 mL Intracatheter Q8H     PRN Meds: [START ON 4/23/2021] acetaminophen, sore throat lozenge, bisacodyl, glucose **OR** dextrose **OR** glucagon, fentaNYL, flumazenil, heparin, lidocaine 4%, lidocaine (buffered or not buffered), magnesium hydroxide, melatonin, midazolam, naloxone **OR** naloxone **OR** naloxone **OR** naloxone, naloxone **OR** naloxone **OR** naloxone **OR** naloxone, ondansetron **OR** ondansetron, oxyCODONE **OR** oxyCODONE, polyethylene glycol, prochlorperazine **OR** prochlorperazine, senna-docusate **OR** senna-docusate, sodium chloride (PF), sodium chloride (PF), sodium phosphate         Data:      All new lab and imaging data was reviewed.   Recent Labs   Lab Test 04/20/21  0704 04/19/21  1151 02/03/21  1003 07/30/19  0712 03/08/17  0720 03/08/17  0720   WBC 6.6 8.5 12.0* 5.7   < > 8.4   HGB 11.0* 12.2* 12.2* 15.0   < > 15.1   MCV 94 94 94 92   < > 91    310 377 215   < > 240   INR  --   --  0.95 0.90  --  0.87    < > = values in this interval not displayed.      Recent Labs   Lab Test 04/21/21  0726 04/20/21  0704 04/19/21  1151 02/03/21  1003 07/22/19  0000 07/22/19 03/16/17  0742 03/16/17  0742   NA  --  138 136  --   --   --   --  137   POTASSIUM  --  3.7 4.0  --   --  4.9  --  3.8   CHLORIDE  --  106 104  --   --   --   --  104   CO2  --  29 29  --   --   --   --  26   BUN  --  22 25  --   --   --   --  9   CR 0.94 0.90 0.96 0.95   < > 0.74*   < > 0.71   ANIONGAP  --  3 3  --   --   --   --  7   TIN  --  8.6 8.9  --   --   --   --  8.3*   GLC  --  149*  242* 158*  --  170*  --  182*    < > = values in this interval not displayed.     No lab results found.    Invalid input(s): TROP, TROPONINIES

## 2021-04-21 NOTE — PROGRESS NOTES
"Podiatry / Foot and Ankle Surgery Progress Note    April 21, 2021    Subject: Patient was seen at bedside.  Notes really no pain to the left foot. Can have intermittent pain at times.     Objective:  Vitals: BP (!) 141/65 (BP Location: Left arm)   Pulse 62   Temp 98.1  F (36.7  C) (Oral)   Resp 14   Ht 1.676 m (5' 6\")   Wt 64.4 kg (142 lb)   SpO2 95%   BMI 22.92 kg/m    BMI= Body mass index is 22.92 kg/m .     WBC   Date Value Ref Range Status   04/20/2021 6.6 4.0 - 11.0 10e9/L Final     A1C: 7.4 (2/2021)    General:  Patient is alert and orientated.  NAD.    Vascular:  DP and PT pulses are not palpable.   CFT's < 3secs.  Skin temp is normal.    Neuro:  Light and gross touch sensation diminished to feet.    Derm:  Dressing is c/d/i. Sutures intact. No redness, dehiscence or purulent drainage noted.     Musculoskeletal:  Now left 1st and 5th toe amputations.      Imaging: left foot post op - I personally reviewed images - Great toe amputation at the metatarsal phalangeal joint, new since yesterday. Fifth toe amputation at the metatarsophalangeal joint, unchanged. Foot radiographs are otherwise unchanged    Cultures:  Gram positive cocci    Pathology: pending    Assessment: 71 yr old diabetic male with PAD, s/p left great toe amputation due to wet gangrene.     Plan:    -POD#1  -Dressing changed at bedside.   -Keep foot and dressing dry.  -Minimal heel weight bearing in post op boot. No more than 15 minutes every 2 hours.   -boot and physical therapy ordered.   -All bone infection is felt to be removed.   -okay to go home on oral antibiotics. Appreciate ID recs.   -No further surgery planned for the foot.   -okay to be discharged from podiatry standpoint. Discharge recs placed.   -recommend follow up with Dr. Whelan in 1 week after discharge from hospital.   -Podiatry will sign off. Please call with questions or concerns.    Purvi Whelan DPM, Podiatry/Foot and Ankle Surgery  8:21 AM    "

## 2021-04-21 NOTE — OP NOTE
Procedure Date: 04/20/2021      SURGEON:  Purvi Whelan DPM      PREOPERATIVE DIAGNOSES:   1.  Diabetic with neuropathy.   2.  Ischemic left great toe.   3.  Wet gas gangrene, left foot.      POSTOPERATIVE DIAGNOSES:   1.  Diabetic with neuropathy.   2.  Ischemic left great toe.   3.  Wet gas gangrene, left foot.      PROCEDURE:  Left great toe amputation at the metatarsophalangeal joint.      ANESTHESIA:  MAC with local.      HEMOSTASIS:  Electrocautery.      ESTIMATED BLOOD LOSS:  5 mL.      SPECIMENS:  Left great toe for pathology and cultures.      INDICATIONS:  Mr. Hopkins is a 71-year-old diabetic male who was admitted to the hospital for vascular workup.  He developed color changes to his left great toe about 3 months ago.  He underwent an angiogram, and this did not improve.  He started to get worsening redness and was admitted for antibiotics and further workup.  On physical exam, the patient does not quite have palpable pulses.  The distal two-thirds of the left great toe are ischemic.  It is very malodorous, with minimal purulent drainage.  The dorsal area is a little fluctuant.  X-rays and MRI showed gas on tissue.  It was discussed with the patient and the patient's wife that given the gas on tissue, this could be more of an aggressive infection, and we want to get it under control more emergently.  This does require removing the right great toe.  We discussed that if the wound had significant infection, we would likely leave it open, and that this may take multiple trips to the operating room to get it under control.  We also discussed that he does have poor blood flow to the foot, and they are going to be doing an angiogram tomorrow to try to improve this, but the goal today is to try to get the infection to prevent it from possibly spreading and removing more of the foot at this time.  Risks, benefits and complications were discussed with the patient and the patient's wife, and no guarantees were  made.  They wished to proceed with surgery.      OPERATIVE PROCEDURE:  The patient was brought into the operating room, placed on the operating table in supine position.  Anesthesia was administered, and local was injected.  The foot was prepped and draped using sterile technique.  Attention was directed to the distal aspect of the left great toe.  A fishmouth incision was made around the base of the left great toe, excising all of the necrotic tissue and preserving the viable tissue for possible flap closure.  The tissue was dissected off of the base of the proximal phalanx, and this was disarticulated at the metatarsophalangeal joint.  Surprisingly, there was more bleeding than anticipated.  Bleeding was controlled with electrocautery.  The wound was flushed with copious amounts of double antibiotic solution.  The wound was probed, and this did not appear to track, and there were no areas of purulent drainage noted.  The cartilage on the first metatarsal was noted to be intact and not breached.  The plantar skin flap from the toe was then rotated dorsally, and the skin was reapproximated loosely with 3-0 Prolene.  The patient's foot was placed in a dry sterile dressing.  The patient tolerated the procedure and anesthesia well and was transferred to recovery with vital signs stable and vascular status intact.  Left great toe was sent for pathology and culture.  We will continue to monitor the patient's foot while in house.  Discussed that he may need further surgery.         DEUCE SAVAGE DPM             D: 2021   T: 2021   MT: TARYN      Name:     SKY GAYTAN   MRN:      [id number]        Account:        [id number]   :      1950           Procedure Date: 2021      Document: [id number]

## 2021-04-21 NOTE — PLAN OF CARE
Pt A&Ox 4 VSS RA. Lungs clear. BS present. Denies pain.  ACE wrap on the foot. Angio-seal on the R. Groin and dressing is CDI. Standby assist with gait bet. Voiding in urinals. Mod CHO diet. Will continue to monitor.

## 2021-04-21 NOTE — PROGRESS NOTES
[facility]    Infectious Disease Progress Note    Date of Service (when I saw the patient): 04/21/2021     Assessment & Plan   Zia Hopkins is a 71 year old male who was admitted on 4/19/2021.     Impression:  1. 71 y.o male with diabetes.   2. PAD   3. Previous history of left 5th metatarsal amputation.   4. Admitted with left great toe gangrene.   5. Seen by podiatry plan for urgent surgical intervention.   6. No history of MRSA previous cultures with GNR and MSSA.      Recommendations:   Continue on zosyn alone. S/P Left great toe amputation at the metatarsophalangeal joint. Noted all bony infection deemed surgically removed.   Noted tissue cultures positive for GPC and proteus will follow.          Дмитрий Moya MD    Interval History   Afebrile   No new acute complaints   S/p above mentioned surgery     Physical Exam   Temp: 98.1  F (36.7  C) Temp src: Oral BP: 139/86 Pulse: 59   Resp: 12 SpO2: 94 % O2 Device: None (Room air) Oxygen Delivery: 1 LPM  Vitals:    04/19/21 1600   Weight: 64.4 kg (142 lb)     Vital Signs with Ranges  Temp:  [96.9  F (36.1  C)-98.1  F (36.7  C)] 98.1  F (36.7  C)  Pulse:  [52-67] 59  Resp:  [8-17] 12  BP: (130-180)/(65-86) 139/86  SpO2:  [92 %-100 %] 94 %    Constitutional: Awake, alert, cooperative, no apparent distress  Lungs: Clear to auscultation bilaterally, no crackles or wheezing  Cardiovascular: Regular rate and rhythm, normal S1 and S2, and no murmur noted  Abdomen: Normal bowel sounds, soft, non-distended, non-tender  Skin: No rashes, no cyanosis, no edema  Other:    Medications     sodium chloride         acetaminophen  975 mg Oral Q8H     amLODIPine  10 mg Oral Daily     aspirin  81 mg Oral Daily     atorvastatin  80 mg Oral QPM     chlorthalidone  50 mg Oral Daily     clopidogrel  75 mg Oral Daily     docusate sodium  100 mg Oral BID     ezetimibe  10 mg Oral At Bedtime     insulin aspart  1-7 Units Subcutaneous TID AC     insulin aspart   1-5 Units Subcutaneous At Bedtime     losartan  100 mg Oral Daily     metoprolol succinate ER  100 mg Oral Daily     piperacillin-tazobactam  3.375 g Intravenous Q6H     polyethylene glycol  17 g Oral Daily     senna-docusate  1 tablet Oral BID     sodium chloride (PF)  3 mL Intracatheter Q8H       Data   All microbiology laboratory data reviewed.  Recent Labs   Lab Test 04/20/21  0704 04/19/21  1151 02/03/21  1003   WBC 6.6 8.5 12.0*   HGB 11.0* 12.2* 12.2*   HCT 33.6* 37.9* 37.5*   MCV 94 94 94    310 377     Recent Labs   Lab Test 04/21/21  0726 04/20/21  0704 04/19/21  1151   CR 0.94 0.90 0.96     No lab results found.  Recent Labs   Lab Test 04/20/21  1615 02/05/21  1325 03/15/17  1610 03/15/17  1608   CULT Light growth  Proteus mirabilis  *  Culture in progress  Culture negative monitoring continues Heavy growth  Proteus mirabilis  *  Heavy growth  Stenotrophomonas maltophilia  *  Heavy growth  beta hemolytic   Streptococcus constellatus  This organism is susceptible to ampicillin, penicillin, vancomycin and the cephalosporins.   If treatment is required AND your patient is allergic to penicillin, contact the   Microbiology Lab within 5 days to request susceptibility testing.  Testing unavailable due to 's backorder.  *  Moderate growth  Staphylococcus aureus  *  Heavy growth  Normal skin ervin   No anaerobes isolated  Moderate growth Staphylococcus aureus  Moderate growth Strain 2 Staphylococcus aureus  * No anaerobes isolated  Moderate growth Staphylococcus aureus  Moderate growth Strain 2 Staphylococcus aureus  *       Attestation:  Total time on the floor involved in the patient's care: 35 minutes. Total time spent in counseling/care coordination: >50%

## 2021-04-21 NOTE — PROGRESS NOTES
Podiatry managing this wound. If podiatry wants us involved they can reconsult us. Will sign off.

## 2021-04-21 NOTE — PLAN OF CARE
Vital Signs stable. Alert and Oriented, very pleasant. Lung sounds clear throughout. Bowel sounds active and audible. Passing flatus. Mod Carb diet. Denies nausea. Adequate urinary output via rivas catheter. CMS to baseline, numbness and tingling present in upper extremities.  Left foot incision dressing is clean dry and intact. Pt bedrest until POD 1. Pt has denied pain.

## 2021-04-21 NOTE — PROGRESS NOTES
S.  Order received for short Aircast room 335-02.  O:  I called up to the floor and the patient is away not in his room.  I stopped up later and the patient has been moved to room 330-01.  The patient was in his bed alert and was fit with an Aircast without incident.  A:  Patient was fit with a size large short pneumatic Aircast for the LLE.  The velcro straps were unfastened, and the front panel removed.  The soft liner was opened, the patients foot and leg was positioned into the Aircast with the heel firmly sealed.  The liner was closed around the LLE.  The front panel was attached and secured with the Velcro closure.  Donning and doffing of the Aircast was explained.  Wear and care instructions were left with the Pt.    P.   Patient was advised to contact this office with any problems or questions.  /G. Help stabilize foot and ankle.  Hi Fajardo CO LO

## 2021-04-21 NOTE — PROGRESS NOTES
VASCULAR SURGERY BRIEF NOTE:    S/p LLE angiogram with AT recanalization and balloon angioplasty, PT angioplasty 4/21  Findings: AT/PT arteries runoff to foot, stenosis in the common plantar artery (no intervention performed on this), peroneal artery occluded proximal calf, SFA and popliteal arteries patent  Blood flow optimized     Plan:  - Bed rest for 2hrs  - R groin check  - Diet as tolerated  - Continue ASA/plavix/statin/ezetimibe  - Appreciate Podiatry care    Marlin Hills MD  Vascular Surgery Fellow  Pager [phone]

## 2021-04-21 NOTE — IR NOTE
Interventional Radiology Intra-procedural Nursing Note    Patient Name: Zia Hopkins  Medical Record Number: [id number]  Today's Date: April 21, 2021    Start Time: 856  End of procedure time: 1029  Procedure: LEFT LOWER EXTREMITY ARTERIOGRAM WITH INTERVENTION   Report given to: 33 RN  Time pt departs:  1035    Other Notes: Pt into IR suite 1 via cart. Pt awake and alert. To table in supine position. Monitoring equipment applied. VSS. Tele SR. Dr. Hamlin in room. Time out and procedure started. Pt tolerated procedure well. Debrief with Dr. Hamlin. Angio-seal placed and pressure held until hemostasis achieved. Dressing clean, dry and intact. No complications. Pt transferred back to Station [facility].    Medications:    Versed 2 mg  Fentanyl 100 mcg  Lidocaine 1% 5 ml  Heparin 8,000 units  Nitroglycerin 750 mcg Intra-Arterial    Eber Mittal RN

## 2021-04-21 NOTE — PRE-PROCEDURE
GENERAL PRE-PROCEDURE:   Procedure:  LEFT LOWER EXTREMITY ARTERIOGRAM WITH INTERVENTION   Date/Time:  4/21/2021 6:51 AM    Written consent obtained?: Yes    Risks and benefits: Risks, benefits and alternatives were discussed    Consent given by:  Patient  Patient states understanding of procedure being performed: Yes    Patient's understanding of procedure matches consent: Yes    Procedure consent matches procedure scheduled: Yes    Expected level of sedation:  Moderate  Appropriately NPO:  Yes  ASA Class:  Class 2- mild systemic disease, no acute problems, no functional limitations  Mallampati  :  Grade 2- soft palate, base of uvula, tonsillar pillars, and portion of posterior pharyngeal wall visible  Lungs:  Lungs clear with good breath sounds bilaterally  Heart:  Normal heart sounds and rate  History & Physical reviewed:  History and physical reviewed and no updates needed  Statement of review:  I have reviewed the lab findings, diagnostic data, medications, and the plan for sedation

## 2021-04-21 NOTE — PLAN OF CARE
[id number]: A&O x4. VSS on RA. Patient refuses pain. LR @ 100. CMS intact, ex baseline numbness and tingling in hands. LS clear. BS hypoactive, BM-, flatus-. Left foot dressing CDI. Tolerating mod carb diet. Denies N/V. BGs 175. Voiding adequately. Bedrest until AM, baseline independent.

## 2021-04-22 ENCOUNTER — APPOINTMENT (OUTPATIENT)
Dept: PHYSICAL THERAPY | Facility: CLINIC | Age: 71
DRG: 252 | End: 2021-04-22
Attending: PODIATRIST
Payer: COMMERCIAL

## 2021-04-22 LAB
ANION GAP SERPL CALCULATED.3IONS-SCNC: 5 MMOL/L (ref 3–14)
BUN SERPL-MCNC: 15 MG/DL (ref 7–30)
CALCIUM SERPL-MCNC: 8.5 MG/DL (ref 8.5–10.1)
CHLORIDE SERPL-SCNC: 106 MMOL/L (ref 94–109)
CO2 SERPL-SCNC: 26 MMOL/L (ref 20–32)
CREAT SERPL-MCNC: 0.91 MG/DL (ref 0.66–1.25)
ERYTHROCYTE [DISTWIDTH] IN BLOOD BY AUTOMATED COUNT: 12.9 % (ref 10–15)
GFR SERPL CREATININE-BSD FRML MDRD: 83 ML/MIN/{1.73_M2}
GLUCOSE BLDC GLUCOMTR-MCNC: 136 MG/DL (ref 70–99)
GLUCOSE BLDC GLUCOMTR-MCNC: 137 MG/DL (ref 70–99)
GLUCOSE BLDC GLUCOMTR-MCNC: 156 MG/DL (ref 70–99)
GLUCOSE BLDC GLUCOMTR-MCNC: 208 MG/DL (ref 70–99)
GLUCOSE BLDC GLUCOMTR-MCNC: 254 MG/DL (ref 70–99)
GLUCOSE SERPL-MCNC: 127 MG/DL (ref 70–99)
GRAM STN SPEC: ABNORMAL
HCT VFR BLD AUTO: 33.2 % (ref 40–53)
HGB BLD-MCNC: 10.9 G/DL (ref 13.3–17.7)
INTERPRETATION ECG - MUSE: NORMAL
Lab: ABNORMAL
MCH RBC QN AUTO: 30.7 PG (ref 26.5–33)
MCHC RBC AUTO-ENTMCNC: 32.8 G/DL (ref 31.5–36.5)
MCV RBC AUTO: 94 FL (ref 78–100)
PLATELET # BLD AUTO: 237 10E9/L (ref 150–450)
POTASSIUM SERPL-SCNC: 3.3 MMOL/L (ref 3.4–5.3)
POTASSIUM SERPL-SCNC: 3.5 MMOL/L (ref 3.4–5.3)
RBC # BLD AUTO: 3.55 10E12/L (ref 4.4–5.9)
SODIUM SERPL-SCNC: 137 MMOL/L (ref 133–144)
SPECIMEN SOURCE: ABNORMAL
WBC # BLD AUTO: 7.7 10E9/L (ref 4–11)

## 2021-04-22 PROCEDURE — 97530 THERAPEUTIC ACTIVITIES: CPT | Mod: GP | Performed by: PHYSICAL THERAPIST

## 2021-04-22 PROCEDURE — 258N000003 HC RX IP 258 OP 636: Performed by: STUDENT IN AN ORGANIZED HEALTH CARE EDUCATION/TRAINING PROGRAM

## 2021-04-22 PROCEDURE — 36415 COLL VENOUS BLD VENIPUNCTURE: CPT | Performed by: STUDENT IN AN ORGANIZED HEALTH CARE EDUCATION/TRAINING PROGRAM

## 2021-04-22 PROCEDURE — 250N000013 HC RX MED GY IP 250 OP 250 PS 637: Performed by: STUDENT IN AN ORGANIZED HEALTH CARE EDUCATION/TRAINING PROGRAM

## 2021-04-22 PROCEDURE — 99232 SBSQ HOSP IP/OBS MODERATE 35: CPT | Performed by: HOSPITALIST

## 2021-04-22 PROCEDURE — 85027 COMPLETE CBC AUTOMATED: CPT | Performed by: STUDENT IN AN ORGANIZED HEALTH CARE EDUCATION/TRAINING PROGRAM

## 2021-04-22 PROCEDURE — 80048 BASIC METABOLIC PNL TOTAL CA: CPT | Performed by: STUDENT IN AN ORGANIZED HEALTH CARE EDUCATION/TRAINING PROGRAM

## 2021-04-22 PROCEDURE — 250N000013 HC RX MED GY IP 250 OP 250 PS 637: Performed by: HOSPITALIST

## 2021-04-22 PROCEDURE — 97116 GAIT TRAINING THERAPY: CPT | Mod: GP | Performed by: PHYSICAL THERAPIST

## 2021-04-22 PROCEDURE — 250N000011 HC RX IP 250 OP 636: Performed by: STUDENT IN AN ORGANIZED HEALTH CARE EDUCATION/TRAINING PROGRAM

## 2021-04-22 PROCEDURE — 84132 ASSAY OF SERUM POTASSIUM: CPT | Performed by: STUDENT IN AN ORGANIZED HEALTH CARE EDUCATION/TRAINING PROGRAM

## 2021-04-22 PROCEDURE — 999N001017 HC STATISTIC GLUCOSE BY METER IP

## 2021-04-22 PROCEDURE — 120N000001 HC R&B MED SURG/OB

## 2021-04-22 PROCEDURE — 97161 PT EVAL LOW COMPLEX 20 MIN: CPT | Mod: GP | Performed by: PHYSICAL THERAPIST

## 2021-04-22 PROCEDURE — 250N000012 HC RX MED GY IP 250 OP 636 PS 637: Performed by: STUDENT IN AN ORGANIZED HEALTH CARE EDUCATION/TRAINING PROGRAM

## 2021-04-22 PROCEDURE — 84132 ASSAY OF SERUM POTASSIUM: CPT | Performed by: HOSPITALIST

## 2021-04-22 RX ORDER — POTASSIUM CHLORIDE 1.5 G/1.58G
20 POWDER, FOR SOLUTION ORAL ONCE
Status: COMPLETED | OUTPATIENT
Start: 2021-04-22 | End: 2021-04-22

## 2021-04-22 RX ADMIN — EZETIMIBE 10 MG: 10 TABLET ORAL at 22:24

## 2021-04-22 RX ADMIN — INSULIN ASPART 1 UNITS: 100 INJECTION, SOLUTION INTRAVENOUS; SUBCUTANEOUS at 22:23

## 2021-04-22 RX ADMIN — CLOPIDOGREL BISULFATE 75 MG: 75 TABLET ORAL at 09:24

## 2021-04-22 RX ADMIN — EMPAGLIFLOZIN 10 MG: 10 TABLET, FILM COATED ORAL at 16:10

## 2021-04-22 RX ADMIN — LOSARTAN POTASSIUM 100 MG: 100 TABLET, FILM COATED ORAL at 09:24

## 2021-04-22 RX ADMIN — INSULIN ASPART 3 UNITS: 100 INJECTION, SOLUTION INTRAVENOUS; SUBCUTANEOUS at 12:22

## 2021-04-22 RX ADMIN — METOPROLOL SUCCINATE 100 MG: 100 TABLET, EXTENDED RELEASE ORAL at 09:25

## 2021-04-22 RX ADMIN — POLYETHYLENE GLYCOL 3350 17 G: 17 POWDER, FOR SOLUTION ORAL at 07:47

## 2021-04-22 RX ADMIN — INSULIN ASPART 2 UNITS: 100 INJECTION, SOLUTION INTRAVENOUS; SUBCUTANEOUS at 00:22

## 2021-04-22 RX ADMIN — PIPERACILLIN SODIUM AND TAZOBACTAM SODIUM 3.38 G: 3; .375 INJECTION, POWDER, LYOPHILIZED, FOR SOLUTION INTRAVENOUS at 05:05

## 2021-04-22 RX ADMIN — SODIUM CHLORIDE: 9 INJECTION, SOLUTION INTRAVENOUS at 22:23

## 2021-04-22 RX ADMIN — PIPERACILLIN SODIUM AND TAZOBACTAM SODIUM 3.38 G: 3; .375 INJECTION, POWDER, LYOPHILIZED, FOR SOLUTION INTRAVENOUS at 22:23

## 2021-04-22 RX ADMIN — PIPERACILLIN SODIUM AND TAZOBACTAM SODIUM 3.38 G: 3; .375 INJECTION, POWDER, LYOPHILIZED, FOR SOLUTION INTRAVENOUS at 16:10

## 2021-04-22 RX ADMIN — DOCUSATE SODIUM 100 MG: 100 CAPSULE, LIQUID FILLED ORAL at 07:48

## 2021-04-22 RX ADMIN — SENNOSIDES AND DOCUSATE SODIUM 1 TABLET: 8.6; 5 TABLET ORAL at 07:47

## 2021-04-22 RX ADMIN — SODIUM CHLORIDE: 9 INJECTION, SOLUTION INTRAVENOUS at 11:20

## 2021-04-22 RX ADMIN — CHLORTHALIDONE 50 MG: 25 TABLET ORAL at 09:23

## 2021-04-22 RX ADMIN — AMLODIPINE BESYLATE 10 MG: 10 TABLET ORAL at 09:23

## 2021-04-22 RX ADMIN — SODIUM CHLORIDE: 9 INJECTION, SOLUTION INTRAVENOUS at 00:22

## 2021-04-22 RX ADMIN — ASPIRIN 81 MG CHEWABLE TABLET 81 MG: 81 TABLET CHEWABLE at 09:23

## 2021-04-22 RX ADMIN — ATORVASTATIN CALCIUM 80 MG: 40 TABLET, FILM COATED ORAL at 22:24

## 2021-04-22 RX ADMIN — PIPERACILLIN SODIUM AND TAZOBACTAM SODIUM 3.38 G: 3; .375 INJECTION, POWDER, LYOPHILIZED, FOR SOLUTION INTRAVENOUS at 11:14

## 2021-04-22 RX ADMIN — POTASSIUM CHLORIDE 20 MEQ: 1.5 POWDER, FOR SOLUTION ORAL at 07:47

## 2021-04-22 ASSESSMENT — ACTIVITIES OF DAILY LIVING (ADL)
ADLS_ACUITY_SCORE: 13
ADLS_ACUITY_SCORE: 11
ADLS_ACUITY_SCORE: 11
ADLS_ACUITY_SCORE: 13
ADLS_ACUITY_SCORE: 11
ADLS_ACUITY_SCORE: 11

## 2021-04-22 NOTE — PROGRESS NOTES
"VASCULAR SURGERY PROGRESS NOTE    Subjective:  No acute events overnight. R groin dressing removed, no hematoma. Ambulating to bathroom.    Objective:    Intake/Output Summary (Last 24 hours) at 4/22/2021 0707  Last data filed at 4/22/2021 0600  Gross per 24 hour   Intake 680 ml   Output 1400 ml   Net -720 ml     Labs:  ROUTINE IP LABS (Last four results)  BMP  Recent Labs   Lab 04/21/21  0726 04/20/21  0704 04/19/21  1151   NA  --  138 136   POTASSIUM  --  3.7 4.0   CHLORIDE  --  106 104   TIN  --  8.6 8.9   CO2  --  29 29   BUN  --  22 25   CR 0.94 0.90 0.96   GLC  --  149* 242*     CBC  Recent Labs   Lab 04/22/21  0638 04/20/21  0704 04/19/21  1151   WBC 7.7 6.6 8.5   RBC 3.55* 3.59* 4.03*   HGB 10.9* 11.0* 12.2*   HCT 33.2* 33.6* 37.9*   MCV 94 94 94   MCH 30.7 30.6 30.3   MCHC 32.8 32.7 32.2   RDW 12.9 13.4 13.5    239 310     INRNo lab results found in last 7 days.  PHYSICAL EXAM:  /57 (BP Location: Left arm)   Pulse 69   Temp 99.3  F (37.4  C) (Oral)   Resp 16   Ht 1.676 m (5' 6\")   Wt 64.4 kg (142 lb)   SpO2 94%   BMI 22.92 kg/m    General: The patient is alert and oriented. Appropriate. No acute distress  Psych: Pleasant affect, Answers questions appropriately  Skin: Color appropriate for race, warm, dry.  Respiratory: Breathing unlabored  GI:  Abdomen soft, nontender to light palpation.  Extremities: extremities warm and well perfused    Imaging:   No new imaging.    ASSESSMENT:  71 year old year old male who has a PMH significant for HTN, DM, HLD, PAD w/ wet gangrene of the L great toe.    S/p LLE angiogram with AT recanalization and balloon angioplasty, PT angioplasty 4/21  Findings: AT/PT arteries runoff to foot, stenosis in the common plantar artery (no intervention performed on this), peroneal artery occluded proximal calf, SFA and popliteal arteries patent  Blood flow optimized      PLAN:  - Diet as tolerated  - Continue ASA/plavix/statin/ezetimibe  - Appreciate Podiatry care  - " Stable for discharge from vascular surgery standpoint; f/u w/ Dr. Hamlin in 2 wks    Marlin Hills MD  Vascular Surgery Fellow  Pager: [phone]

## 2021-04-22 NOTE — PLAN OF CARE
Pt A&Ox 4 VSS RA. Lungs clear. BS present. Denies pain.  ACE wrap on the foot.  Standby assist with gait bet. Voiding BR. Mod CHO diet. Will continue to monitor.

## 2021-04-22 NOTE — PROGRESS NOTES
04/22/21 1003   Quick Adds   Type of Visit Initial PT Evaluation   Living Environment   People in home spouse   Current Living Arrangements house   Home Accessibility stairs to enter home;stairs within home   Number of Stairs, Main Entrance 1   Number of Stairs, Within Home, Primary   (11)   Stair Railings, Within Home, Primary railing on right side (ascending)   Transportation Anticipated family or friend will provide   Self-Care   Usual Activity Tolerance good   Current Activity Tolerance good   Equipment Currently Used at Home none   Disability/Function   Hearing Difficulty or Deaf no   Wear Glasses or Blind yes   Concentrating, Remembering or Making Decisions Difficulty no   Difficulty Communicating no   Difficulty Eating/Swallowing no   Walking or Climbing Stairs Difficulty no   Dressing/Bathing Difficulty no   Toileting issues no   Doing Errands Independently Difficulty (such as shopping) no   Fall history within last six months no   General Information   Onset of Illness/Injury or Date of Surgery 04/20/21   Referring Physician Tip   Pertinent History of Current Problem (include personal factors and/or comorbidities that impact the POC) s/p left great toe amputation at the metatarsophalangeal joint on 4/20   Existing Precautions/Restrictions fall   Cognition   Orientation Status (Cognition) oriented x 4   Affect/Mental Status (Cognition) WNL   Follows Commands (Cognition) WNL   Safety Deficit (Cognition) minimal deficit   Pain Assessment   Patient Currently in Pain No   Posture    Posture Forward head position;Protracted shoulders   Range of Motion (ROM)   ROM Comment L ankle wrapped limited DF/PF, remaining WFL   Strength   Strength Comments Grossly 4/5 LE HF, KE, mod reliance on UE to assist with transfers   Bed Mobility   Comment (Bed Mobility) independent   Transfers   Transfer Safety Comments SBA w WW   Gait/Stairs (Locomotion)   Assistive Device (Gait) walker, front-wheeled   Pattern (Gait) step-to    Deviations/Abnormal Patterns (Gait) stride length decreased   Comment (Gait/Stairs) sBA w/ WW L PWB in boot   Balance   Balance Comments impaired with current WB restrictions s/p  toe amputation   Clinical Impression   Criteria for Skilled Therapeutic Intervention yes, treatment indicated   PT Diagnosis (PT) difficulty walking   Influenced by the following impairments pain, dec WB, dec strength   Functional limitations due to impairments impaired gait   Clinical Presentation Stable/Uncomplicated   Clinical Decision Making (Complexity) low complexity   Therapy Frequency (PT) One time eval and treatment only   Planned Therapy Interventions (PT) bed mobility training;gait training;stair training;transfer training   Anticipated Equipment Needs at Discharge (PT) walker, rolling   Risk & Benefits of therapy have been explained evaluation/treatment results reviewed;care plan/treatment goals reviewed;risks/benefits reviewed;patient   PT Discharge Planning    PT Discharge Recommendation (DC Rec) home with assist;home with home care physical therapy   PT Rationale for DC Rec Pt moblizng well with WW and L Boot, PWB w/ min heel WB on left, SBA with mobility, able to navigate stairs safely, anticipate spouse to provide SBA on stairs initially for safey, pt will benefit from home PT.   PT Brief overview of current status  Pt able to ambulate and transfer with SBA or less, will benefit to cont mobilizing with supervision of nursing PT to issue WW for home use at DC, PT goals met, PT orders complete   Total Evaluation Time   Total Evaluation Time (Minutes) 15

## 2021-04-22 NOTE — PLAN OF CARE
A&Ox4. VSS on RA. SBA. Tolerating mod carb diet. Lung sounds clear. Bowel sounds active. Adequate urine output. L foot ace wrapped. Baseline numbness/tingling on extremities. R angioseal CDI, soft. Denies pain.

## 2021-04-22 NOTE — PROGRESS NOTES
[facility]    Infectious Disease Progress Note    Date of Service (when I saw the patient): 04/22/2021     Assessment & Plan   Zia Hopkins is a 71 year old male who was admitted on 4/19/2021.     Impression:  1. 71 y.o male with diabetes.   2. PAD   3. Previous history of left 5th metatarsal amputation.   4. Admitted with left great toe gangrene.   5. Seen by podiatry plan for urgent surgical intervention.   6. No history of MRSA previous cultures with GNR and MSSA.      Recommendations:   Continue on zosyn alone. S/P Left great toe amputation at the metatarsophalangeal joint. Noted all bony infection deemed surgically removed.   Noted tissue cultures positive for GPC and proteus will follow. MENG still pending.          Дмитрий Moya MD    Interval History   Afebrile   No new acute complaints   S/p above mentioned surgery     Physical Exam   Temp: 98.1  F (36.7  C) Temp src: Oral BP: (!) 143/60 Pulse: 56   Resp: 16 SpO2: 96 % O2 Device: None (Room air)    Vitals:    04/19/21 1600   Weight: 64.4 kg (142 lb)     Vital Signs with Ranges  Temp:  [98.1  F (36.7  C)-99.3  F (37.4  C)] 98.1  F (36.7  C)  Pulse:  [56-70] 56  Resp:  [16] 16  BP: (132-159)/(57-72) 143/60  SpO2:  [90 %-96 %] 96 %    Constitutional: Awake, alert, cooperative, no apparent distress  Lungs: Clear to auscultation bilaterally, no crackles or wheezing  Cardiovascular: Regular rate and rhythm, normal S1 and S2, and no murmur noted  Abdomen: Normal bowel sounds, soft, non-distended, non-tender  Skin: No rashes, no cyanosis, no edema  Other:    Medications     sodium chloride 100 mL/hr at 04/22/21 1120       acetaminophen  975 mg Oral Q8H     amLODIPine  10 mg Oral Daily     aspirin  81 mg Oral Daily     atorvastatin  80 mg Oral QPM     chlorthalidone  50 mg Oral Daily     clopidogrel  75 mg Oral Daily     docusate sodium  100 mg Oral BID     ezetimibe  10 mg Oral At Bedtime     insulin aspart  1-7 Units Subcutaneous TID  AC     insulin aspart  1-5 Units Subcutaneous At Bedtime     losartan  100 mg Oral Daily     metoprolol succinate ER  100 mg Oral Daily     piperacillin-tazobactam  3.375 g Intravenous Q6H     polyethylene glycol  17 g Oral Daily     senna-docusate  1 tablet Oral BID     sodium chloride (PF)  3 mL Intracatheter Q8H       Data   All microbiology laboratory data reviewed.  Recent Labs   Lab Test 04/22/21  0638 04/20/21  0704 04/19/21  1151   WBC 7.7 6.6 8.5   HGB 10.9* 11.0* 12.2*   HCT 33.2* 33.6* 37.9*   MCV 94 94 94    239 310     Recent Labs   Lab Test 04/22/21  0638 04/21/21  0726 04/20/21  0704   CR 0.91 0.94 0.90     No lab results found.  Recent Labs   Lab Test 04/20/21  1615 02/05/21  1325 03/15/17  1610 03/15/17  1608   CULT Light growth  Bacteroides fragilis  Susceptibility testing not routinely done  *  Culture in progress  Light growth  Proteus mirabilis  Susceptibility testing in progress  *  Culture in progress Heavy growth  Proteus mirabilis  *  Heavy growth  Stenotrophomonas maltophilia  *  Heavy growth  beta hemolytic   Streptococcus constellatus  This organism is susceptible to ampicillin, penicillin, vancomycin and the cephalosporins.   If treatment is required AND your patient is allergic to penicillin, contact the   Microbiology Lab within 5 days to request susceptibility testing.  Testing unavailable due to 's backorder.  *  Moderate growth  Staphylococcus aureus  *  Heavy growth  Normal skin ervin   No anaerobes isolated  Moderate growth Staphylococcus aureus  Moderate growth Strain 2 Staphylococcus aureus  * No anaerobes isolated  Moderate growth Staphylococcus aureus  Moderate growth Strain 2 Staphylococcus aureus  *       Attestation:  Total time on the floor involved in the patient's care: 35 minutes. Total time spent in counseling/care coordination: >50%

## 2021-04-22 NOTE — PROGRESS NOTES
[facility]  Hospitalist Progress Note        Mohit Gould MD   04/22/2021        Interval History:        - Cleared by vascular and podiatry for discharge; to follow-up with Dr. Hamlin in 2 weeks;   - evaluated and fitted with pneumatic Aircast by orthotist  - wound cultures growing Proteus, susceptibilities pending   - therapy rec home with assist  - K 3.3-- supplement per protocol         Assessment and Plan:        Zia Hopkins is a 71 year old male with a PMH of hypertension, DMII, PAD, s/p left 5th metatarsal amputation, and hyperlipidemia admitted on 4/19/2021 with left great toe gangrene, sent from vascular surgery clinic by Dr. Mead .     # Left Great Toe wet Gangrene, s/p left great toe amputation 4/20/21  # Peripheral Artery Disease, s/p LLE angiogram and balloon angioplasty of anterior and posterior tibial artery 4/21/21    # h/o left popliteal angioplasty and left SFA stenting 2017 followed by stenting of in-stent stenosis 7/30/19   # h/o left lower extremity arteriogram with balloon angioplasty of the left popliteal artery, recanalization of the posterior tibial artery, and extension of the stent in the left superficial femoral artery 2/3/2021  # dyslipidemia.  - MRI left foot 4/19 noted with advanced changes of osteomyelitis involving the left great toe at the proximal and distal phalanx  - evaluated by podiatry and left great toe amputation 4/20, per podiatry all bone infection fell to be removed--- suggested minimal heel weight-bearing in postop boot; to follow-up with Dr. Whelan in one week  - vascular surgery evaluated and and had LLE angiogram with balloon angioplasty, PT angioplasty on 4/21  -continue aspirin, Plavix, statin, and Zetia  - Cleared by vascular and podiatry for discharge; to follow-up with Dr. Hamlin in 2 weeks  - evaluated and fitted with pneumatic Aircast by orthotist  - wound cultures growing Proteus, susceptibilities pending; on Zosyn; oral  "antibiotics recs per ID pending final c/s   - therapy rec home with assist    Hypertension  - continue amlodipine, losartan, Chlorthalidone , Lopressor    Diabetes mellitus type II  - HgbA1C 7.4 2/3/2021  - Hold PTA Metformin 1000mg PO BID  - Blood glucose monitoring QID and 0200  - BS in 200s; will resume PTA Empagliflozin 10mg PO daily,  - Medium resistance sliding scale insulin  - Hypoglycemia protocol     Alcohol Dependence  Reports drinking a bottle of wine a day. He denies any prior history of alcohol withdrawal during periods without alcohol use.  - no clinical concerns for alcohol withdrawal at this time    DVT Prophylaxis: Pneumatic Compression Devices    Code Status:   Full Code       Disposition Plan     Expected discharge: likely 4/23 pending final wound cultures to home with assist    Care plan discussed with patient and his wife present by the bedside                   Physical Exam:      Blood pressure 139/69, pulse 64, temperature 98.3  F (36.8  C), temperature source Oral, resp. rate 16, height 1.676 m (5' 6\"), weight 64.4 kg (142 lb), SpO2 90 %.  Vitals:    04/19/21 1600   Weight: 64.4 kg (142 lb)     Vital Signs with Ranges  Temp:  [98.1  F (36.7  C)-99.3  F (37.4  C)] 98.3  F (36.8  C)  Pulse:  [59-70] 64  Resp:  [8-17] 16  BP: (132-174)/(57-86) 139/69  SpO2:  [90 %-99 %] 90 %  I/O's Last 24 hours  I/O last 3 completed shifts:  In: 680 [P.O.:680]  Out: 1400 [Urine:1400]    Constitutional: Alert, awake and oriented X 3; resting comfortably in no apparent distress   HEENT: Pupils equal and reactive to light and accomodation, neck supple    Oral cavity: Moist mucosa   Cardiovascular: Normal s1 s2, regular rate and rhythm, no murmur   Lungs: B/l clear to auscultation, no wheezes or crepitations   Abdomen: Soft, nt, nd, no guarding, rigidity or rebound; BS +   LE : No edema   Musculoskeletal: Power 5/5 in all extremities; left foot in postop bandage   Neuro: No focal neurological deficits noted "   Psychiatry: normal mood and affect                Medications:          acetaminophen  975 mg Oral Q8H     amLODIPine  10 mg Oral Daily     aspirin  81 mg Oral Daily     atorvastatin  80 mg Oral QPM     chlorthalidone  50 mg Oral Daily     clopidogrel  75 mg Oral Daily     docusate sodium  100 mg Oral BID     ezetimibe  10 mg Oral At Bedtime     insulin aspart  1-7 Units Subcutaneous TID AC     insulin aspart  1-5 Units Subcutaneous At Bedtime     losartan  100 mg Oral Daily     metoprolol succinate ER  100 mg Oral Daily     piperacillin-tazobactam  3.375 g Intravenous Q6H     polyethylene glycol  17 g Oral Daily     senna-docusate  1 tablet Oral BID     sodium chloride (PF)  3 mL Intracatheter Q8H     PRN Meds: acetaminophen, [START ON 4/23/2021] acetaminophen, sore throat lozenge, bisacodyl, glucose **OR** dextrose **OR** glucagon, lidocaine 4%, lidocaine (buffered or not buffered), magnesium hydroxide, melatonin, naloxone **OR** naloxone **OR** naloxone **OR** naloxone, ondansetron **OR** ondansetron, oxyCODONE **OR** oxyCODONE, polyethylene glycol, prochlorperazine **OR** prochlorperazine, senna-docusate **OR** senna-docusate, sodium chloride (PF), sodium chloride (PF), sodium phosphate         Data:      All new lab and imaging data was reviewed.   Recent Labs   Lab Test 04/22/21  0638 04/20/21  0704 04/19/21  1151 02/03/21  1003 07/30/19  0712 03/08/17  0720 03/08/17  0720   WBC 7.7 6.6 8.5 12.0* 5.7   < > 8.4   HGB 10.9* 11.0* 12.2* 12.2* 15.0   < > 15.1   MCV 94 94 94 94 92   < > 91    239 310 377 215   < > 240   INR  --   --   --  0.95 0.90  --  0.87    < > = values in this interval not displayed.      Recent Labs   Lab Test 04/22/21  0638 04/21/21  0726 04/20/21  0704 04/19/21  1151     --  138 136   POTASSIUM 3.3*  --  3.7 4.0   CHLORIDE 106  --  106 104   CO2 26  --  29 29   BUN 15  --  22 25   CR 0.91 0.94 0.90 0.96   ANIONGAP 5  --  3 3   TIN 8.5  --  8.6 8.9   *  --  149* 242*      No lab results found.    Invalid input(s): TROP, TROPONINIES

## 2021-04-23 VITALS
DIASTOLIC BLOOD PRESSURE: 66 MMHG | BODY MASS INDEX: 22.82 KG/M2 | TEMPERATURE: 98.8 F | WEIGHT: 142 LBS | HEART RATE: 55 BPM | OXYGEN SATURATION: 98 % | HEIGHT: 66 IN | SYSTOLIC BLOOD PRESSURE: 144 MMHG | RESPIRATION RATE: 16 BRPM

## 2021-04-23 LAB
ANION GAP SERPL CALCULATED.3IONS-SCNC: 5 MMOL/L (ref 3–14)
BUN SERPL-MCNC: 14 MG/DL (ref 7–30)
CALCIUM SERPL-MCNC: 8.5 MG/DL (ref 8.5–10.1)
CHLORIDE SERPL-SCNC: 107 MMOL/L (ref 94–109)
CO2 SERPL-SCNC: 26 MMOL/L (ref 20–32)
COPATH REPORT: NORMAL
CREAT SERPL-MCNC: 0.95 MG/DL (ref 0.66–1.25)
GFR SERPL CREATININE-BSD FRML MDRD: 79 ML/MIN/{1.73_M2}
GLUCOSE BLDC GLUCOMTR-MCNC: 136 MG/DL (ref 70–99)
GLUCOSE SERPL-MCNC: 121 MG/DL (ref 70–99)
POTASSIUM SERPL-SCNC: 3.4 MMOL/L (ref 3.4–5.3)
SODIUM SERPL-SCNC: 138 MMOL/L (ref 133–144)

## 2021-04-23 PROCEDURE — 80048 BASIC METABOLIC PNL TOTAL CA: CPT | Performed by: HOSPITALIST

## 2021-04-23 PROCEDURE — 250N000011 HC RX IP 250 OP 636: Performed by: STUDENT IN AN ORGANIZED HEALTH CARE EDUCATION/TRAINING PROGRAM

## 2021-04-23 PROCEDURE — 999N001017 HC STATISTIC GLUCOSE BY METER IP

## 2021-04-23 PROCEDURE — 99239 HOSP IP/OBS DSCHRG MGMT >30: CPT | Performed by: HOSPITALIST

## 2021-04-23 PROCEDURE — 258N000003 HC RX IP 258 OP 636: Performed by: STUDENT IN AN ORGANIZED HEALTH CARE EDUCATION/TRAINING PROGRAM

## 2021-04-23 PROCEDURE — 250N000013 HC RX MED GY IP 250 OP 250 PS 637: Performed by: HOSPITALIST

## 2021-04-23 PROCEDURE — 36415 COLL VENOUS BLD VENIPUNCTURE: CPT | Performed by: HOSPITALIST

## 2021-04-23 PROCEDURE — 250N000013 HC RX MED GY IP 250 OP 250 PS 637: Performed by: STUDENT IN AN ORGANIZED HEALTH CARE EDUCATION/TRAINING PROGRAM

## 2021-04-23 RX ORDER — POTASSIUM CHLORIDE 1500 MG/1
20 TABLET, EXTENDED RELEASE ORAL ONCE
Status: COMPLETED | OUTPATIENT
Start: 2021-04-23 | End: 2021-04-23

## 2021-04-23 RX ADMIN — CHLORTHALIDONE 50 MG: 25 TABLET ORAL at 08:18

## 2021-04-23 RX ADMIN — CLOPIDOGREL BISULFATE 75 MG: 75 TABLET ORAL at 08:18

## 2021-04-23 RX ADMIN — ASPIRIN 81 MG CHEWABLE TABLET 81 MG: 81 TABLET CHEWABLE at 08:18

## 2021-04-23 RX ADMIN — POTASSIUM CHLORIDE 20 MEQ: 1500 TABLET, EXTENDED RELEASE ORAL at 11:09

## 2021-04-23 RX ADMIN — AMLODIPINE BESYLATE 10 MG: 10 TABLET ORAL at 08:18

## 2021-04-23 RX ADMIN — PIPERACILLIN SODIUM AND TAZOBACTAM SODIUM 3.38 G: 3; .375 INJECTION, POWDER, LYOPHILIZED, FOR SOLUTION INTRAVENOUS at 05:13

## 2021-04-23 RX ADMIN — METOPROLOL SUCCINATE 100 MG: 100 TABLET, EXTENDED RELEASE ORAL at 08:19

## 2021-04-23 RX ADMIN — SODIUM CHLORIDE: 9 INJECTION, SOLUTION INTRAVENOUS at 09:37

## 2021-04-23 RX ADMIN — LOSARTAN POTASSIUM 100 MG: 100 TABLET, FILM COATED ORAL at 08:18

## 2021-04-23 RX ADMIN — EMPAGLIFLOZIN 10 MG: 10 TABLET, FILM COATED ORAL at 09:57

## 2021-04-23 RX ADMIN — PIPERACILLIN SODIUM AND TAZOBACTAM SODIUM 3.38 G: 3; .375 INJECTION, POWDER, LYOPHILIZED, FOR SOLUTION INTRAVENOUS at 11:09

## 2021-04-23 ASSESSMENT — ACTIVITIES OF DAILY LIVING (ADL)
ADLS_ACUITY_SCORE: 11
ADLS_ACUITY_SCORE: 13

## 2021-04-23 NOTE — DISCHARGE SUMMARY
[facility]  Discharge Summary        Zia Hopkins MRN# [id number]   YOB: 1950 Age: 71 year old     Date of Admission:  4/19/2021  Date of Discharge:  4/23/2021  Admitting Physician:  Mohit Gould MD  Discharge Physician: Mohit Gould MD  Discharging Service: Hospitalist     Primary Provider: Reid Reese  Primary Care Physician Phone Number: [phone]         Discharge Diagnoses/Problem Oriented Hospital Course (Providers):    Zia Hopkins was admitted on 4/19/2021 by Mohit Gould MD and I would refer you to their history and physical.  The following problems were addressed during his hospitalization:    Zia Hopkins is a 71 year old male with a PMH of hypertension, DMII, PAD, s/p left 5th metatarsal amputation, and hyperlipidemia admitted on 4/19/2021 with left great toe gangrene, sent from vascular surgery clinic by Dr. Mead .     # Left Great Toe wet Gangrene, s/p left great toe amputation 4/20/21  # Peripheral Artery Disease, s/p LLE angiogram and balloon angioplasty of anterior and posterior tibial artery 4/21/21     # h/o left popliteal angioplasty and left SFA stenting 2017 followed by stenting of in-stent stenosis 7/30/19   # h/o left lower extremity arteriogram with balloon angioplasty of the left popliteal artery, recanalization of the posterior tibial artery, and extension of the stent in the left superficial femoral artery 2/3/2021  # dyslipidemia.  - MRI left foot 4/19 noted with advanced changes of osteomyelitis involving the left great toe at the proximal and distal phalanx  - evaluated by podiatry and left great toe amputation 4/20, per podiatry all bone infection fell to be removed--- suggested minimal heel weight-bearing in postop boot; to follow-up with Dr. Whelan in 1 week  - vascular surgery evaluated and and had LLE angiogram with balloon angioplasty, PT angioplasty on 4/21;  to follow-up with Dr. Hamlin in 2 weeks  -  continue aspirin, Plavix, statin, and Zetia  - evaluated and fitted with pneumatic Aircast by orthotist  - wound cultures growing Proteus, pan sensitive; discussed with ID-- will discharge on PO Augmentin for 7 days   - therapy rec home with assist     Hypertension  - continue amlodipine, losartan, Chlorthalidone , Lopressor     Diabetes mellitus type II  - HgbA1C 7.4 2/3/2021  - resumed PTA Metformin 1000mg PO BID and Empagliflozin 10mg PO daily     Alcohol Dependence  Reports drinking a bottle of wine a day. He denies any prior history of alcohol withdrawal during periods without alcohol use.  - no clinical concerns for alcohol withdrawal at this time      Code Status:   Full Code        Brief Hospital Stay Summary Sent Home With Patient in AVS:               Pending Results:        Unresulted Labs Ordered in the Past 30 Days of this Admission     Date and Time Order Name Status Description    4/20/2021 1616 Anaerobic bacterial culture Preliminary     4/20/2021 1615 Tissue Culture Aerobic Bacterial Preliminary             Discharge Instructions and Follow-Up:      Follow-up Appointments     Follow-up and recommended labs and tests       Follow up with Dr. Whelan in 1 week from discharge from the hospital at the   [facility].     For APPOINTMENTS: [phone].      For questions or concerns: call: [phone]    Office fax number:  [phone]         Follow-up and recommended labs and tests       Please call [phone] to setup a followup appointment with Dr. Hamlin   (Vascular Surgery) in 2 weeks, unless previously scheduled.    [facility] [place] [address]  T: [phone]         Follow-up and recommended labs and tests       Follow up with primary care provider, Reid Reese, within 7 days for   hospital follow- up.  The following labs/tests are recommended: repeat CBC   and BMP.      Follow-up with podiatry and vascular  surgery as instructed.                 Discharge Disposition:      Discharged to home        Discharge Medications:        Current Discharge Medication List      START taking these medications    Details   amoxicillin-clavulanate (AUGMENTIN) 875-125 MG tablet Take 1 tablet by mouth 2 times daily for 7 days  Qty: 14 tablet, Refills: 0    Associated Diagnoses: Acute osteomyelitis of left foot (H)         CONTINUE these medications which have NOT CHANGED    Details   amLODIPine (NORVASC) 10 MG tablet Take 10 mg by mouth daily      aspirin 81 MG tablet Take 1 tablet (81 mg) by mouth daily  Qty: 90 tablet, Refills: 1    Associated Diagnoses: Atherosclerosis of native arteries of left leg with ulceration of other part of foot (H)      atorvastatin (LIPITOR) 80 MG tablet TAKE 1 TABLET(80 MG) BY MOUTH AT BEDTIME  Qty: 90 tablet, Refills: 3    Associated Diagnoses: Hyperlipidemia LDL goal <70      Cadexomer Iodine, topical, 0.9% (IODOSORB) 0.9 % GEL gel Apply 40 g topically daily as needed for other  Qty: 1 Tube, Refills: 1    Associated Diagnoses: Wound healing, delayed      chlorthalidone (HYGROTON) 50 MG tablet TK 1 T PO QD FOR BP      clopidogrel (PLAVIX) 75 MG tablet TAKE 1 TABLET(75 MG) BY MOUTH DAILY  Qty: 90 tablet, Refills: 1    Associated Diagnoses: PAD (peripheral artery disease) (H)      empagliflozin (JARDIANCE) 10 MG TABS tablet Take 1 tablet (10 mg) by mouth daily  Qty: 30 tablet, Refills: 3    Comments: If prior authorization needed, please route to Llu[facility] Hays [facility]. Thanks.  Associated Diagnoses: Type 2 diabetes mellitus with diabetic peripheral angiopathy and gangrene, without long-term current use of insulin (H)      ezetimibe (ZETIA) 10 MG tablet TAKE 1 TABLET(10 MG) BY MOUTH DAILY  Qty: 90 tablet, Refills: 3    Associated Diagnoses: Hyperlipidemia LDL goal <70      losartan (COZAAR) 100 MG tablet TK 1 T TABLET QD      metFORMIN (GLUCOPHAGE) 1000 MG tablet Take 1,000 mg by  "mouth 2 times daily (with meals)       metoprolol succinate ER (TOPROL-XL) 100 MG 24 hr tablet Take 1 tablet (100 mg) by mouth daily  Qty: 90 tablet, Refills: 3    Associated Diagnoses: PAD (peripheral artery disease) (H)               Allergies:       No Known Allergies        Consultations This Hospital Stay:      Consultation during this admission received from infectious disease, podiatry and vascular surgery        Condition and Physical on Discharge:      Discharge condition: Stable   Vitals: Blood pressure (!) 149/61, pulse 58, temperature 98  F (36.7  C), temperature source Oral, resp. rate 16, height 1.676 m (5' 6\"), weight 64.4 kg (142 lb), SpO2 97 %.     Constitutional: Alert, awake and oriented X 3; resting comfortably in no apparent distress   HEENT: Pupils equal and reactive to light and accomodation, neck supple    Oral cavity: Moist mucosa   Cardiovascular: Normal s1 s2, regular rate and rhythm, no murmur   Lungs: B/l clear to auscultation, no wheezes or crepitations   Abdomen: Soft, nt, nd, no guarding, rigidity or rebound; BS +   LE : No edema   Musculoskeletal: Power 5/5 in all extremities; left foot in postop bandage   Neuro: No focal neurological deficits noted   Psychiatry: normal mood and affect               Discharge Time:      Greater than 30 minutes.        Image Results From This Hospital Stay (For Non-EPIC Providers):        Results for orders placed or performed during the hospital encounter of 04/19/21   XR Foot Left G/E 3 Views    Narrative    XR LEFT FOOT THREE OR MORE VIEWS   4/19/2021 4:28 PM     HISTORY: Gangrene left great toe.    COMPARISON: None.      Impression    IMPRESSION: There does appear to be air within the soft tissues of the  great toe most prominent distally. The distal phalanx is tilted  laterally with widening of the IP joint of the great toe medially. Due  to overlying soft tissue air it is difficult to exclude lytic or  destructive lesion distal phalanx great toe " which I suspect is  present.    Previous surgery versus resorption of the distal aspect of the middle  phalanx of the fourth toe at the PIP joint. Previous fifth toe  amputation. Vascular calcifications.    MARY GARCIA MD   MR Foot Left w/o & w Contrast    Narrative    EXAM: MR FOOT LEFT W/O and W CONTRAST  LOCATION: [facility]  DATE/TIME: 4/19/2021 9:32 PM    INDICATION: Evaluate for bone infection in a patient with peripheral arterial vascular disease with wet gangrene of the left great toe.    COMPARISON: 3/14/2017, 4/21/2017.    TECHNIQUE: Routine. Additional postgadolinium T1 sequences were obtained.    IV CONTRAST: 6 mL Gadavist.    FINDINGS:  JOINTS AND BONES: Advanced changes of osteomyelitis involving the distal aspect of the proximal phalanx of the left great toe with replacement of normal T1 signal and cortical destruction of the distal phalanx at the interphalangeal joint. Diffuse   reactive edema involves the proximal aspect of the proximal phalanx left great toe. Cortical destruction and replacement of normal T1 signal involving the entirety of the distal phalanx of the left great toe. No evidence for abnormal signal involving the   phalanges of the left second, third or fourth toes. Amputation left fifth toe. No evidence for abnormal signal involving the metatarsals of the left foot. No significant fluid in the joint spaces. No fracture or dislocation. Articular cartilage is   preserved.    TENDONS: No tendon tear, tendinopathy, or tenosynovitis.    LIGAMENTS: Lisfranc ligament intact. No subluxation.    MUSCLES AND SOFT TISSUES: Diffuse muscular edema and gas in the soft tissues involving in the left great toe. Edema involving subcutaneous tissues diffusely in the left foot. Diffuse intrinsic muscular edema throughout the left foot compatible with   myositis. No drainable subcutaneous fluid collections.      Impression    IMPRESSION:  1.  Advanced changes of osteomyelitis involving  the left great toe at the proximal and distal phalanx as detailed above. There is diffuse replacement of T1 signal abnormality throughout the proximal and distal phalanx with cortical destruction.   Additionally, there is diffuse gas in the soft tissues as shown on the recent radiograph compatible with soft tissue infection with underlying subcutaneous edema.    2.  No evidence for osteomyelitis involving the remaining toes or the metatarsals.    3.  Diffuse signal abnormality throughout the intrinsic muscles of the left foot compatible with myositis.    4.  Diffuse subcutaneous edema left foot. No drainable fluid collections.     IR Lower Extremity Angiogram Left    Narrative    Preprocedure diagnosis: Left lower extremity peripheral arterial  disease with critical limb ischemia and progressive gangrene of left  great toe.    Postprocedure diagnosis: Same.    PROCEDURE:  1. Ultrasound guided right common femoral artery access placement.  2. Aortoiliac arteriogram with selective catheterization of left  common iliac, external iliac, common femoral, superficial femoral,  popliteal, anterior tibial and posterior tibial arteries.  3. Left lower extremity arteriogram with runoff.  4. Left proximal anterior tibial artery balloon angioplasty using 2.5  mm x 220 mm angioplasty balloon with completion arteriogram.  5. Left proximal anterior tibial artery balloon angioplasty using 3 mm  x 4 cm angioplasty balloon with completion arteriogram.  6. Left posterior tibial artery balloon angioplasty using 3 mm x 4 cm  angioplasty balloon with completion arteriogram.  7. Right femoral arteriogram with right common femoral artery access  site closure with 6 Lithuanian Angio-Seal device.    Surgeon: Surjit Hamlin M.D.  Assistant: Marlin Hills M.D.     Sedation: Patient received 2 mg of intravenous Versed and 100 mcg of  intravenous fentanyl. Medication was administered under my direct  supervision. Medication was administered by  registered nurse in the  Department of interventional radiology. Patient was continuously  monitored.  Heparin: 8000 units.  Nitroglycerin: 750 mcg was given intra-arterially into the left  popliteal artery (3 doses of 250 micrograms each spaced out over 12  minutes).  Sedation time: 93 minutes.  Fluoroscopy time: 21 minutes.  Total fluoroscopy dose: 104.63 mGy  Contrast: 68 mls.    Indication for procedure: This is a 71-year-old male with left lower  extremity peripheral arterial disease and gangrene of the great toe.  He has previously undergone left superficial femoral artery stenting  with popliteal artery angioplasty and recanalization and balloon  angioplasty of the posterior tibial artery. His toe pressures are  still not suitable for wound healing. We had previously contemplated  doing and angioplasty of the distal posterior tibial artery. Patient  has gangrene of the left great toe which has necessitated amputation.  We will proceed with arteriogram to further explore options of  revascularization of the foot.    Procedure details:     Patient was identified and then taken to the operating room and placed  in supine position. Preprocedure timeout was conducted. Both groins  were prepped and a sterile surgical field was created. With the help  of fluoroscopy the right common femoral artery was located anterior to  the right femoral head. It was further localized with ultrasonography.  Images were stored. Local anesthetic was given in the right groin and  the right common femoral artery was accessed with a micropuncture  needle in retrograde fashion. This was followed by placement of a  microwire. This was then converted to a 0.035 inch wire system and a  short 5 Slovenian sheath was placed. Wire and catheter were advanced into  the distal aorta.     Aortoiliac arteriogram confirmed that the common iliac arteries,  distal aorta, hypogastric and both external iliac arteries were  patent. 5000 units of heparin  were administered. With the help of the  Omni Flush catheter the aortic bifurcation was crossed and the wire  and catheter were advanced into the distal left external iliac artery.  Arteriogram at this level showed that the distal left external iliac  artery, common femoral artery, superficial femoral and deep femoral  arteries were patent. Wire was then advanced into the proximal  superficial femoral artery and catheter was converted to an angled  Leonardo cross catheter. Arteriogram at this level showed that the  superficial femoral and popliteal arteries were patent. The  superficial femoral and popliteal arteries are heavily calcified but  nevertheless patent. Previous angioplasty site of the popliteal artery  was patent. The posterior tibial artery which had been intervened on  previously was also patent. There was high-grade stenosis of the  distal posterior tibial artery where it continued as the common  plantar artery and its bifurcation. We decided that this would be a  high risk lesion and we should attempt to recanalize the occluded  proximal anterior tibial artery. The origin of the anterior tibial  artery was patent but it was occluded thereafter for a short segment  and reconstituted by way of intramuscular collaterals. We converted to  a crossover 6 Romanian sheath which was positioned in the popliteal  artery. Using a 0.014 inch Glidewire the orifice of the anterior  tibial artery was engaged. Then a [place] catheter was placed and with  the same wire the artery was recanalized down to the distal anterior  tibial artery. Wire was removed and arteriogram through the [place]  catheter confirmed intraluminal position. Then we exchanged wire to a  0.014 inch mailman wire for additional support for the passage of the  balloon. A 2.5 mm x 220 mm angioplasty balloon was advanced into the  anterior tibial artery and positioned at its takeoff from the  popliteal artery. Balloon angioplasty was performed to 12 panda  with 3  minute inflation time. Balloon was deflated and removed and there was  excellent recanalization of the anterior tibial artery. However, in  the proximal anterior tibial artery there was one area of residual 70%  stenosis. Then the 2.5 mm balloon was removed and then we went in with  a 3 mm x 4 cm angioplasty balloon. This balloon was inflated to 12 panda  for 3 minutes and it showed that the residual stenosis and the waist  we saw with the 2.5 mm balloon had been angioplastied as the balloon  was inflated. Balloon was then deflated and removed. Arteriogram  showed there was no residual stenosis in the anterior tibial artery  with in-line flow into the foot. There was some slow flow in the  posterior tibial artery and the decided that this was spasm and  therefore nitroglycerin was administered. There was one area of  residual stenosis in the mid posterior tibial artery which stood out  even after the rest of the artery had vasodilated. The wire and  catheter was redirected into the posterior tibial artery. Wire was  directed down to the distal posterior tibial artery followed by the  [place] catheter. Wire was removed and arteriogram through the  catheter confirmed intraluminal position. Then the 3 mm x 4 cm  angioplasty balloon was advanced across the area of interest and  angioplastied to 6 panda for 1 minute. Balloon was deflated and removed  and which showed excellent result without any evidence of dissection,  embolization of perforation. Runoff arteriogram showed that there was  robust flow going down to the foot from anterior and posterior tibial  artery then towards the great toe and the rest of the forefoot. We had  given additional 3000 units of heparin, total of 8000 units. This was  reversed with 30 mg of protamine. Right femoral arteriogram was  performed and showed that the right common femoral artery was suitable  for a closure device and a 6 [place]araguan Angio-Seal device was deployed  without  complication.     Patient tolerated the procedure well.    IAN YATES MD   X-ray lt Foot 3 vw port: In PACU    Narrative    EXAM: XR FOOT PORT LEFT 3 VIEWS  LOCATION: [facility]  DATE/TIME: 4/20/2021 4:49 PM    INDICATION: Postop  COMPARISON: 04/19/2021      Impression    IMPRESSION: Great toe amputation at the metatarsal phalangeal joint, new since yesterday. Fifth toe amputation at the metatarsophalangeal joint, unchanged. Foot radiographs are otherwise unchanged.           Most Recent Lab Results In EPIC (For Non-EPIC Providers):    Most Recent 3 CBC's:  Recent Labs   Lab Test 04/22/21  0638 04/20/21  0704 04/19/21  1151   WBC 7.7 6.6 8.5   HGB 10.9* 11.0* 12.2*   MCV 94 94 94    239 310      Most Recent 3 BMP's:  Recent Labs   Lab Test 04/23/21  0636 04/22/21  1213 04/22/21  0638 04/21/21  0726 04/20/21  0704     --  137  --  138   POTASSIUM 3.4 3.5 3.3*  --  3.7   CHLORIDE 107  --  106  --  106   CO2 26  --  26  --  29   BUN 14  --  15  --  22   CR 0.95  --  0.91 0.94 0.90   ANIONGAP 5  --  5  --  3   TIN 8.5  --  8.5  --  8.6   *  --  127*  --  149*     Most Recent 3 Troponin's:No lab results found.    Invalid input(s): TROP, TROPONINIES  Most Recent 3 INR's:  Recent Labs   Lab Test 02/03/21  1003 07/30/19  0712 03/08/17  0720   INR 0.95 0.90 0.87     Most Recent 2 LFT's:No lab results found.  Most Recent Cholesterol Panel:  Recent Labs   Lab Test 02/03/21  1003   CHOL 108   LDL 35   HDL 55   TRIG 91     Most Recent 6 Bacteria Isolates From Any Culture (See EPIC Reports for Culture Details):  Recent Labs   Lab Test 04/20/21  1615 02/05/21  1325 03/15/17  1610 03/15/17  1608   CULT Light growth  Proteus mirabilis  *  Culture in progress  Light growth  Bacteroides fragilis  Susceptibility testing not routinely done  *  Culture in progress Heavy growth  Proteus mirabilis  *  Heavy growth  Stenotrophomonas maltophilia  *  Heavy growth  beta hemolytic   Streptococcus  constellatus  This organism is susceptible to ampicillin, penicillin, vancomycin and the cephalosporins.   If treatment is required AND your patient is allergic to penicillin, contact the   Microbiology Lab within 5 days to request susceptibility testing.  Testing unavailable due to 's backorder.  *  Moderate growth  Staphylococcus aureus  *  Heavy growth  Normal skin ervin   No anaerobes isolated  Moderate growth Staphylococcus aureus  Moderate growth Strain 2 Staphylococcus aureus  * No anaerobes isolated  Moderate growth Staphylococcus aureus  Moderate growth Strain 2 Staphylococcus aureus  *     Most Recent TSH, T4 and HgbA1c:   Recent Labs   Lab Test 02/03/21  1003   A1C 7.4*

## 2021-04-23 NOTE — PROGRESS NOTES
Patient seen and examined    - Cultures back with Proteus pansensitive; discussed with ID-- will discharge on PO Augmentin for 7 days  - K 3.4-- will give 20 mEq PO potassium today; follow BMP with PCP    Discharge home today    Please see discharge summary for details

## 2021-04-23 NOTE — PLAN OF CARE
A/O x4. AVSS. Pt denies pain. Discussed discharge instructions with pt and spouse, verbalized understanding. Pt discharged to home with belongings and medication. Transportation provided by spouse.

## 2021-04-23 NOTE — PLAN OF CARE
A+Ox4 VSS on room air. LS clear. Bowels active, flatus+, BM+. Voiding adequately. L foot ACE wrapped, dressing CDI. CMS/neuros intact. Denies pain. Up SBA. CHO diet.

## 2021-04-23 NOTE — PROGRESS NOTES
[facility]    Infectious Disease Progress Note    Date of Service (when I saw the patient): 04/23/2021     Assessment & Plan   Zia Hopkins is a 71 year old male who was admitted on 4/19/2021.     Impression:  1. 71 y.o male with diabetes.   2. PAD   3. Previous history of left 5th metatarsal amputation.   4. Admitted with left great toe gangrene.   5. Seen by podiatry plan for urgent surgical intervention.   6. No history of MRSA previous cultures with GNR and MSSA.      Recommendations:   S/P Left great toe amputation at the metatarsophalangeal joint. Noted all bony infection deemed surgically removed.   Noted tissue cultures positive for GPC and proteus ok discharge in 7 days of oral augmentin, plan discussed with Dr. Gould.          Дмитрий Moya MD    Interval History   Afebrile   No new acute complaints   S/p above mentioned surgery     Physical Exam   Temp: 98.8  F (37.1  C) Temp src: Oral BP: (!) 144/66 Pulse: 55   Resp: 16 SpO2: 98 % O2 Device: None (Room air)    Vitals:    04/19/21 1600   Weight: 64.4 kg (142 lb)     Vital Signs with Ranges  Temp:  [97.8  F (36.6  C)-98.8  F (37.1  C)] 98.8  F (37.1  C)  Pulse:  [54-61] 55  Resp:  [16-18] 16  BP: (118-149)/(55-71) 144/66  SpO2:  [92 %-98 %] 98 %    Constitutional: Awake, alert, cooperative, no apparent distress  Lungs: Clear to auscultation bilaterally, no crackles or wheezing  Cardiovascular: Regular rate and rhythm, normal S1 and S2, and no murmur noted  Abdomen: Normal bowel sounds, soft, non-distended, non-tender  Skin: No rashes, no cyanosis, no edema  Other:    Medications     sodium chloride 100 mL/hr at 04/23/21 0937       acetaminophen  975 mg Oral Q8H     amLODIPine  10 mg Oral Daily     aspirin  81 mg Oral Daily     atorvastatin  80 mg Oral QPM     chlorthalidone  50 mg Oral Daily     clopidogrel  75 mg Oral Daily     docusate sodium  100 mg Oral BID     empagliflozin  10 mg Oral Daily     ezetimibe  10 mg Oral  At Bedtime     insulin aspart  1-7 Units Subcutaneous TID AC     insulin aspart  1-5 Units Subcutaneous At Bedtime     losartan  100 mg Oral Daily     metoprolol succinate ER  100 mg Oral Daily     piperacillin-tazobactam  3.375 g Intravenous Q6H     polyethylene glycol  17 g Oral Daily     senna-docusate  1 tablet Oral BID     sodium chloride (PF)  3 mL Intracatheter Q8H       Data   All microbiology laboratory data reviewed.  Recent Labs   Lab Test 04/22/21  0638 04/20/21  0704 04/19/21  1151   WBC 7.7 6.6 8.5   HGB 10.9* 11.0* 12.2*   HCT 33.2* 33.6* 37.9*   MCV 94 94 94    239 310     Recent Labs   Lab Test 04/23/21  0636 04/22/21  0638 04/21/21  0726   CR 0.95 0.91 0.94     No lab results found.  Recent Labs   Lab Test 04/20/21  1615 02/05/21  1325 03/15/17  1610 03/15/17  1608   CULT Light growth  Proteus mirabilis  *  Light growth  Bacteroides fragilis  *  Light growth  Finegoldia magna (Peptostreptococcus axel)  *  Light growth  Anaerococcus species  *  Susceptibility testing not routinely done Heavy growth  Proteus mirabilis  *  Heavy growth  Stenotrophomonas maltophilia  *  Heavy growth  beta hemolytic   Streptococcus constellatus  This organism is susceptible to ampicillin, penicillin, vancomycin and the cephalosporins.   If treatment is required AND your patient is allergic to penicillin, contact the   Microbiology Lab within 5 days to request susceptibility testing.  Testing unavailable due to 's backorder.  *  Moderate growth  Staphylococcus aureus  *  Heavy growth  Normal skin ervin   No anaerobes isolated  Moderate growth Staphylococcus aureus  Moderate growth Strain 2 Staphylococcus aureus  * No anaerobes isolated  Moderate growth Staphylococcus aureus  Moderate growth Strain 2 Staphylococcus aureus  *       Attestation:  Total time on the floor involved in the patient's care: 35 minutes. Total time spent in counseling/care coordination: >50%

## 2021-04-24 LAB
BACTERIA SPEC CULT: ABNORMAL
Lab: ABNORMAL
SPECIMEN SOURCE: ABNORMAL

## 2021-04-26 ENCOUNTER — TELEPHONE (OUTPATIENT)
Dept: WOUND CARE | Facility: CLINIC | Age: 71
End: 2021-04-26

## 2021-04-26 NOTE — TELEPHONE ENCOUNTER
No he should see me at [place] since it is a post op and he does not have a wound anymore.     Will forward to the podiatry pool to schedule.     Please schedule patient for post op visit with me in 1 week.     Thanks,    Purvi Whelan DPM

## 2021-04-26 NOTE — TELEPHONE ENCOUNTER
Appears as post op appointment. Patient [place] have [place] [facility] Routing to Dr. Whelan to clarify.

## 2021-04-26 NOTE — TELEPHONE ENCOUNTER
[facility] Wound    Who is the name of the provider?:  Tip      What is the location you see this provider at?: [place]    Reason for call:  Surgery 4/20.  Needs post op appt w Dr. Whelan on 4/30.     Can we leave a detailed message on this number?  YES

## 2021-04-27 LAB
BACTERIA SPEC CULT: ABNORMAL
Lab: ABNORMAL
SPECIMEN SOURCE: ABNORMAL

## 2021-04-27 NOTE — TELEPHONE ENCOUNTER
I called and spoke with patient. Patient insisted on being seen earlier than 5/7. I assisted patient with appointment to be seen on 5/5 in [place]. Address given to patient.    Gena FELDMAN CMA

## 2021-05-03 ENCOUNTER — TELEPHONE (OUTPATIENT)
Dept: OTHER | Facility: CLINIC | Age: 71
End: 2021-05-03

## 2021-05-03 NOTE — TELEPHONE ENCOUNTER
Patient states he received a call on Friday regarding details of today's visit. When he called back the clinic was closed already. He is requesting for nurse to call back.     Informed will send message to nurse for call back.       Luz ESCALERA

## 2021-05-03 NOTE — TELEPHONE ENCOUNTER
Returned patient's call.  Confirmed it is okay for him to wear long pants as Dr. Hamlin only needs to see his foot today.    Lluvia Hays RN BSN  [facility]  [phone]

## 2021-05-05 ENCOUNTER — OFFICE VISIT (OUTPATIENT)
Dept: PODIATRY | Facility: CLINIC | Age: 71
End: 2021-05-05
Payer: COMMERCIAL

## 2021-05-05 VITALS
SYSTOLIC BLOOD PRESSURE: 168 MMHG | DIASTOLIC BLOOD PRESSURE: 84 MMHG | WEIGHT: 137 LBS | BODY MASS INDEX: 22.02 KG/M2 | HEIGHT: 66 IN

## 2021-05-05 DIAGNOSIS — I73.9 PAD (PERIPHERAL ARTERY DISEASE) (H): ICD-10-CM

## 2021-05-05 DIAGNOSIS — E11.42 DIABETIC POLYNEUROPATHY ASSOCIATED WITH TYPE 2 DIABETES MELLITUS (H): Primary | ICD-10-CM

## 2021-05-05 DIAGNOSIS — S98.112A AMPUTATION OF LEFT GREAT TOE (H): ICD-10-CM

## 2021-05-05 PROCEDURE — 99212 OFFICE O/P EST SF 10 MIN: CPT | Performed by: PODIATRIST

## 2021-05-05 ASSESSMENT — MIFFLIN-ST. JEOR: SCORE: 1309.18

## 2021-05-05 NOTE — PROGRESS NOTES
"Podiatry / Foot and Ankle Surgery Progress Note    May 5, 2021    Subject: Patient was seen for follow up on 2 weeks status post left great toe amputation.  Patient denies fever, nausea, vomiting.  Notes no pain.  He does have some baseline neuropathy.  No concerns today.    Objective:  Vitals: BP (!) 168/84   Ht 1.676 m (5' 6\")   Wt 62.1 kg (137 lb)   BMI 22.11 kg/m    BMI= Body mass index is 22.11 kg/m .    A1C: 7.4 (2/2021)    General:  Patient is alert and orientated.  NAD.    Vascular:  DP and PT pulses are palpable.  No edema or varicosities noted.  CFT's < 3secs.  Skin temp is normal.    Neuro:  Light and gross touch sensation diminished to feet.    Derm: Dressing is clean dry and intact.  Sutures are intact.  No redness dehiscence or signs of acute infection noted..    Musculoskeletal:  Left great toe now amputated.       Assessment:    Diabetic polyneuropathy associated with type 2 diabetes mellitus (H)  Amputation of left great toe (H)  PAD (peripheral artery disease) (H)      Medical Decision Making/Plan: At this time the dressing was changed.  Patient will continue to keep the foot and dressing dry.  He will continue minimal weightbearing in his diabetic healing shoe.  We will have him follow-up in 2 weeks for reassessment and possible suture removal.  All questions were answered to patient satisfaction he will call further questions or concerns.      Patient Risk Factor:  Patient is a high risk factor for infection.     Purvi Whelan DPM, Podiatry/Foot and Ankle Surgery    Recommended to Zia Hopkins to follow up with Primary Care provider regarding elevated blood pressure.    "

## 2021-05-05 NOTE — PATIENT INSTRUCTIONS
Thank you for choosing [facility] Podiatry / Foot & Ankle Surgery!    DR. SAVAGE'S CLINIC:  [place] [facility] [place] SCHEDULE SURGERY: [phone] [address] BILLING QUESTIONS: [phone]   [place] [address] APPOINTMENTS: [phone]   PH: [phone] CONSUMER PRICE LINE:[phone]   FAX: [phone]      Follow up: 2 weeks      Zia to follow up with Primary Care provider regarding elevated blood pressure. (if equal or above 140/90)

## 2021-05-05 NOTE — LETTER
"    5/5/2021         RE: Zia Hopkins  [address]  [facility] [address]        Dear Colleague,    Thank you for referring your patient, Zia Hopkins, to the [facility] PODIATRY. Please see a copy of my visit note below.    Podiatry / Foot and Ankle Surgery Progress Note    May 5, 2021    Subject: Patient was seen for follow up on 2 weeks status post left great toe amputation.  Patient denies fever, nausea, vomiting.  Notes no pain.  He does have some baseline neuropathy.  No concerns today.    Objective:  Vitals: BP (!) 168/84   Ht 1.676 m (5' 6\")   Wt 62.1 kg (137 lb)   BMI 22.11 kg/m    BMI= Body mass index is 22.11 kg/m .    A1C: 7.4 (2/2021)    General:  Patient is alert and orientated.  NAD.    Vascular:  DP and PT pulses are palpable.  No edema or varicosities noted.  CFT's < 3secs.  Skin temp is normal.    Neuro:  Light and gross touch sensation diminished to feet.    Derm: Dressing is clean dry and intact.  Sutures are intact.  No redness dehiscence or signs of acute infection noted..    Musculoskeletal:  Left great toe now amputated.       Assessment:    Diabetic polyneuropathy associated with type 2 diabetes mellitus (H)  Amputation of left great toe (H)  PAD (peripheral artery disease) (H)      Medical Decision Making/Plan: At this time the dressing was changed.  Patient will continue to keep the foot and dressing dry.  He will continue minimal weightbearing in his diabetic healing shoe.  We will have him follow-up in 2 weeks for reassessment and possible suture removal.  All questions were answered to patient satisfaction he will call further questions or concerns.      Patient Risk Factor:  Patient is a high risk factor for infection.     Purvi Whelan DPM, Podiatry/Foot and Ankle Surgery    Recommended to Zia Hopkins to follow up with Primary Care provider regarding elevated blood pressure.        Again, thank you for allowing me to participate in " the care of your patient.        Sincerely,        Purvi Whelan DPM, Podiatry/Foot and Ankle Surgery

## 2021-05-10 ENCOUNTER — OFFICE VISIT (OUTPATIENT)
Dept: OTHER | Facility: CLINIC | Age: 71
End: 2021-05-10
Attending: SURGERY
Payer: COMMERCIAL

## 2021-05-10 VITALS — HEART RATE: 69 BPM | DIASTOLIC BLOOD PRESSURE: 77 MMHG | SYSTOLIC BLOOD PRESSURE: 179 MMHG | TEMPERATURE: 97.5 F

## 2021-05-10 DIAGNOSIS — I73.9 PAD (PERIPHERAL ARTERY DISEASE) (H): Primary | ICD-10-CM

## 2021-05-10 DIAGNOSIS — I70.229 CRITICAL ISCHEMIA OF EXTREMITY WITH HISTORY OF REVASCULARIZATION OF SAME EXTREMITY (H): ICD-10-CM

## 2021-05-10 DIAGNOSIS — Z98.890 CRITICAL ISCHEMIA OF EXTREMITY WITH HISTORY OF REVASCULARIZATION OF SAME EXTREMITY (H): ICD-10-CM

## 2021-05-10 PROCEDURE — 99213 OFFICE O/P EST LOW 20 MIN: CPT | Performed by: SURGERY

## 2021-05-10 PROCEDURE — G0463 HOSPITAL OUTPT CLINIC VISIT: HCPCS

## 2021-05-10 NOTE — PROGRESS NOTES
Mr. Hopkins returns to clinic today.  He is a very pleasant 71-year-old male with left lower extremity peripheral arterial disease.  Recently he was admitted to the hospital where he underwent left great toe amputation.  Following that he underwent arteriogram with recanalization of the anterior tibial artery with balloon angioplasty.      He has no complaints.  He has been following with Dr. Whelan.    On examination he has monophasic signals in the dorsalis pedis and posterior tibial arteries.  The wound at the great toe amputation site is showing granulation tissue.    I have suggested that we apply Aquacel to that.  I provided him with the instructions as well as the materials.  I showed him how to apply Aquacel to the wound on the great toe amputation site.  I applied Aquacel moistened with saline followed by gauze.    My understanding is that he will be following up with Dr. Whelan on the 19th of this month.  I will see him back on the 24th.  We will also get ankle-brachial indices and second toe pressure on that day.  If there is slow progression of healing then I will send him back to hyperbaric oxygen treatment.

## 2021-05-10 NOTE — PROGRESS NOTES
"Zia Hopkins is a 71 year old male who presents for:  Chief Complaint   Patient presents with     RECHECK     1st PO LLE angiogram with balloon angioplasty, PT angioplasty on 4/21        Vitals:    Vitals:    05/10/21 0928   BP: (!) 179/77   BP Location: Left arm   Patient Position: Chair   Cuff Size: Adult Regular   Pulse: 69   Temp: 97.5  F (36.4  C)   TempSrc: Temporal       BMI:  Estimated body mass index is 22.11 kg/m  as calculated from the following:    Height as of 5/5/21: 5' 6\" (1.676 m).    Weight as of 5/5/21: 137 lb (62.1 kg).    Pain Score:  Data Unavailable        Stephany Cavazos MA    "

## 2021-05-15 ENCOUNTER — HEALTH MAINTENANCE LETTER (OUTPATIENT)
Age: 71
End: 2021-05-15

## 2021-05-19 ENCOUNTER — OFFICE VISIT (OUTPATIENT)
Dept: PODIATRY | Facility: CLINIC | Age: 71
End: 2021-05-19
Payer: COMMERCIAL

## 2021-05-19 VITALS
BODY MASS INDEX: 22.02 KG/M2 | DIASTOLIC BLOOD PRESSURE: 68 MMHG | SYSTOLIC BLOOD PRESSURE: 140 MMHG | WEIGHT: 137 LBS | HEIGHT: 66 IN

## 2021-05-19 DIAGNOSIS — I73.9 PAD (PERIPHERAL ARTERY DISEASE) (H): ICD-10-CM

## 2021-05-19 DIAGNOSIS — S98.112A AMPUTATION OF LEFT GREAT TOE (H): ICD-10-CM

## 2021-05-19 DIAGNOSIS — E11.42 DIABETIC POLYNEUROPATHY ASSOCIATED WITH TYPE 2 DIABETES MELLITUS (H): Primary | ICD-10-CM

## 2021-05-19 DIAGNOSIS — Z98.890 POST-OPERATIVE STATE: ICD-10-CM

## 2021-05-19 PROCEDURE — 99212 OFFICE O/P EST SF 10 MIN: CPT | Performed by: PODIATRIST

## 2021-05-19 ASSESSMENT — MIFFLIN-ST. JEOR: SCORE: 1309.18

## 2021-05-19 NOTE — PROGRESS NOTES
"Podiatry / Foot and Ankle Surgery Progress Note    May 19, 2021    Subject: Patient was seen for follow up on right 4 weeks status post left great toe amputation.  Notes he has been doing well.  Has been doing Aquacel Ag dressing changes every other day with gauze and tape.  Denies fever, nausea, vomiting.    Objective:  Vitals: BP (!) 140/68   Ht 1.676 m (5' 6\")   Wt 62.1 kg (137 lb)   BMI 22.11 kg/m      A1C: 7.4 (2/2021)     General:  Patient is alert and orientated.  NAD.     Vascular:  DP and PT pulses are palpable.  No edema or varicosities noted.  CFT's < 3secs.  Skin temp is normal.     Neuro:  Light and gross touch sensation diminished to feet.     Derm: Dressing is clean dry and intact.  Sutures are intact.  There is some dehiscence of the wound that measures approximately 1.5 cm x 1.5 cm x 0.2 cm.  Base of the wound is granular.  Moderate clear serous drainage noted but no purulent drainage or signs of acute infection noted.     Musculoskeletal:  Left great toe now amputated.        Assessment:     Diabetic polyneuropathy associated with type 2 diabetes mellitus (H)  Amputation of left great toe (H)  PAD (peripheral artery disease) (H)  Post-operative state     Medical Decision Making/Plan: At this time, sutures were removed.  We will have him continue with the Aquacel Ag dressing changes every other day and Santyl dressing changes the opposite days.  He will apply a large Band-Aid or gauze and tape over the area after the dressing.  He can get the foot wet in the shower but we do not want him to soak the foot and he needs to pat the foot dry well and then apply the bandage.  Continue in the postop shoe.  We will have him follow-up in 3 to 4 weeks for reassessment.  He was told to call if he needed an order for more dressing supplies. All questions were answered to patient satisfaction he will call further questions or concerns.        Patient Risk Factor:  Patient is a high risk factor for infection. "       Purvi Whelan DPM, Podiatry/Foot and Ankle Surgery    Recommended to Zia Hopkins to follow up with Primary Care provider regarding elevated blood pressure.

## 2021-05-19 NOTE — LETTER
"    5/19/2021         RE: Zia Hopkins  [address] [place] [facility] [address]        Dear Colleague,    Thank you for referring your patient, Zia Hopkins, to the [facility] PODIATRY. Please see a copy of my visit note below.    Podiatry / Foot and Ankle Surgery Progress Note    May 19, 2021    Subject: Patient was seen for follow up on right 4 weeks status post left great toe amputation.  Notes he has been doing well.  Has been doing Aquacel Ag dressing changes every other day with gauze and tape.  Denies fever, nausea, vomiting.    Objective:  Vitals: BP (!) 140/68   Ht 1.676 m (5' 6\")   Wt 62.1 kg (137 lb)   BMI 22.11 kg/m      A1C: 7.4 (2/2021)     General:  Patient is alert and orientated.  NAD.     Vascular:  DP and PT pulses are palpable.  No edema or varicosities noted.  CFT's < 3secs.  Skin temp is normal.     Neuro:  Light and gross touch sensation diminished to feet.     Derm: Dressing is clean dry and intact.  Sutures are intact.  There is some dehiscence of the wound that measures approximately 1.5 cm x 1.5 cm x 0.2 cm.  Base of the wound is granular.  Moderate clear serous drainage noted but no purulent drainage or signs of acute infection noted.     Musculoskeletal:  Left great toe now amputated.        Assessment:     Diabetic polyneuropathy associated with type 2 diabetes mellitus (H)  Amputation of left great toe (H)  PAD (peripheral artery disease) (H)  Post-operative state     Medical Decision Making/Plan: At this time, sutures were removed.  We will have him continue with the Aquacel Ag dressing changes every other day and Santyl dressing changes the opposite days.  He will apply a large Band-Aid or gauze and tape over the area after the dressing.  He can get the foot wet in the shower but we do not want him to soak the foot and he needs to pat the foot dry well and then apply the bandage.  Continue in the postop shoe.  We will have him follow-up in 3 " to 4 weeks for reassessment.  He was told to call if he needed an order for more dressing supplies. All questions were answered to patient satisfaction he will call further questions or concerns.        Patient Risk Factor:  Patient is a high risk factor for infection.       Purvi Whelan DPM, Podiatry/Foot and Ankle Surgery    Recommended to Zia Hopkins to follow up with Primary Care provider regarding elevated blood pressure.        Again, thank you for allowing me to participate in the care of your patient.        Sincerely,        Purvi Whelan DPM, Podiatry/Foot and Ankle Surgery

## 2021-05-19 NOTE — PATIENT INSTRUCTIONS
Thank you for choosing [facility] Podiatry / Foot & Ankle Surgery!    DR. SAVAGE'S CLINIC:  [place] [facility] [place] SCHEDULE SURGERY: [phone] [address] BILLING QUESTIONS: [phone]   [place] [address] APPOINTMENTS: [phone]   PH: [phone] CONSUMER PRICE LINE:[phone]   FAX: [phone]      Follow up: in 3-4 weeks    Next steps: can stop at the  and make your follow up appointment.             Zia to follow up with Primary Care provider regarding elevated blood pressure. (if equal or above 140/90)

## 2021-05-20 ENCOUNTER — HOSPITAL ENCOUNTER (OUTPATIENT)
Dept: ULTRASOUND IMAGING | Facility: CLINIC | Age: 71
Discharge: HOME | End: 2021-05-20
Attending: SURGERY | Admitting: SURGERY
Payer: COMMERCIAL

## 2021-05-20 DIAGNOSIS — Z98.890 CRITICAL ISCHEMIA OF EXTREMITY WITH HISTORY OF REVASCULARIZATION OF SAME EXTREMITY (H): ICD-10-CM

## 2021-05-20 DIAGNOSIS — I70.229 CRITICAL ISCHEMIA OF EXTREMITY WITH HISTORY OF REVASCULARIZATION OF SAME EXTREMITY (H): ICD-10-CM

## 2021-05-20 DIAGNOSIS — I73.9 PAD (PERIPHERAL ARTERY DISEASE) (H): ICD-10-CM

## 2021-05-20 PROCEDURE — 93922 UPR/L XTREMITY ART 2 LEVELS: CPT | Mod: 52

## 2021-05-20 PROCEDURE — 93922 UPR/L XTREMITY ART 2 LEVELS: CPT | Mod: 26 | Performed by: SURGERY

## 2021-05-24 ENCOUNTER — OFFICE VISIT (OUTPATIENT)
Dept: OTHER | Facility: CLINIC | Age: 71
End: 2021-05-24
Attending: SURGERY
Payer: COMMERCIAL

## 2021-05-24 VITALS — DIASTOLIC BLOOD PRESSURE: 82 MMHG | SYSTOLIC BLOOD PRESSURE: 195 MMHG | HEART RATE: 68 BPM

## 2021-05-24 DIAGNOSIS — Z98.890 CRITICAL ISCHEMIA OF EXTREMITY WITH HISTORY OF REVASCULARIZATION OF SAME EXTREMITY (H): Primary | ICD-10-CM

## 2021-05-24 DIAGNOSIS — I70.229 CRITICAL ISCHEMIA OF EXTREMITY WITH HISTORY OF REVASCULARIZATION OF SAME EXTREMITY (H): Primary | ICD-10-CM

## 2021-05-24 PROCEDURE — G0463 HOSPITAL OUTPT CLINIC VISIT: HCPCS

## 2021-05-24 PROCEDURE — 99213 OFFICE O/P EST LOW 20 MIN: CPT | Performed by: SURGERY

## 2021-05-24 NOTE — PROGRESS NOTES
Mr. Hopkins returns to [facility] today. He is a very pleasant 71-year-old male with left lower extremity peripheral arterial disease.  Recently he was admitted to the hospital where he underwent left great toe amputation.  Following that he underwent arteriogram with recanalization of the anterior tibial artery with balloon angioplasty.      Great toe amputation site wound had some slough but has a base of granulation tissue.  I debrided it and then applied antibiotic ointment.  We had been using Aquacel but now we will switch to Santyl.    Left ankle-brachial indices are 142 mm Hg at the posterior tibial and 166 mm Hg at the dorsalis pedis.  Second toe pressures could not be obtained.    I have advised him that after he comes back from [place] he should come back and visit me.  If there is still not ideal wound healing then we will send him back to hyperbaric oxygen therapy.

## 2021-05-24 NOTE — PROGRESS NOTES
[facility] Vascular & Wound [facility]      Patient is in clinic today to see .      Patient is here for a  follow up  to discuss Wound.    Pt is currently taking Aspirin and Statin.    Patient's condition is stable.    BP (!) 195/82 (BP Location: Left arm, Patient Position: Chair, Cuff Size: Adult Regular)   Pulse 68     The provider has been notified that the patient has no concerns.     Questions patient would like addressed today are: N/A.    Refills are needed: No    At the end of the visit the patient was provided with education both verbally and on their AVS regarding follow up appointment(s).        Stephany Cavazos MA

## 2021-06-09 ENCOUNTER — OFFICE VISIT (OUTPATIENT)
Dept: PODIATRY | Facility: CLINIC | Age: 71
End: 2021-06-09
Payer: COMMERCIAL

## 2021-06-09 VITALS
HEIGHT: 66 IN | SYSTOLIC BLOOD PRESSURE: 144 MMHG | DIASTOLIC BLOOD PRESSURE: 70 MMHG | BODY MASS INDEX: 22.5 KG/M2 | WEIGHT: 140 LBS

## 2021-06-09 DIAGNOSIS — I73.9 PAD (PERIPHERAL ARTERY DISEASE) (H): ICD-10-CM

## 2021-06-09 DIAGNOSIS — E11.42 DIABETIC POLYNEUROPATHY ASSOCIATED WITH TYPE 2 DIABETES MELLITUS (H): Primary | ICD-10-CM

## 2021-06-09 DIAGNOSIS — L97.522 ULCER OF LEFT FOOT WITH FAT LAYER EXPOSED (H): ICD-10-CM

## 2021-06-09 DIAGNOSIS — S98.112A AMPUTATION OF LEFT GREAT TOE (H): ICD-10-CM

## 2021-06-09 PROCEDURE — 11042 DBRDMT SUBQ TIS 1ST 20SQCM/<: CPT | Performed by: PODIATRIST

## 2021-06-09 PROCEDURE — 99212 OFFICE O/P EST SF 10 MIN: CPT | Mod: 25 | Performed by: PODIATRIST

## 2021-06-09 ASSESSMENT — MIFFLIN-ST. JEOR: SCORE: 1322.79

## 2021-06-09 NOTE — LETTER
"    6/9/2021         RE: Zia Hopkins  [address]  [facility] [address]        Dear Colleague,    Thank you for referring your patient, Zia Hopkins, to the [facility] PODIATRY. Please see a copy of my visit note below.    Podiatry / Foot and Ankle Surgery Progress Note    June 9, 2021    Subject: Patient was seen for follow up on 8 week post left  great toe amputation due to issues with ischemia and bone infection.  He does still have a wound where the incision once.  Denies fever, nausea, vomiting.  No pain.  No issues today.    Objective:  Vitals: BP (!) 144/70   Ht 1.676 m (5' 6\")   Wt 63.5 kg (140 lb)   BMI 22.60 kg/m    BMI= Body mass index is 22.6 kg/m .    A1C: 7.4 (2/2021)     General:  Patient is alert and orientated.  NAD.     Vascular:  DP and PT pulses are palpable.  No edema or varicosities noted.  CFT's < 3secs.  Skin temp is normal.     Neuro:  Light and gross touch sensation diminished to feet.     Derm: Dressing is clean dry and intact.  Sutures are intact.  There is some dehiscence of the left foot incision that measures approximately 0.8 cm x 0.9 cm x 0.2 cm after debridement.  Base of the wound is granular.  Moderate clear serous drainage noted but no purulent drainage or signs of acute infection noted.     Musculoskeletal:  Left great toe now amputated.        Assessment:      Diabetic polyneuropathy associated with type 2 diabetes mellitus (H)  PAD (peripheral artery disease) (H)  Amputation of left great toe (H)  Ulcer of left foot with fat layer exposed (H)     Medical Decision Making/Plan: At this time the wounds debrided.  The incision is provided.  However it is still progressing and getting smaller.  We will have him put Iodosorb gel on the area daily with a Band-Aid.  He can continue normal weightbearing in his shoes and inserts.  He is going to to the end of the summer and we will have him follow-up when he gets back.  all questions were " answered to patient satisfaction he will call further questions or concerns.     Procedure; After verbal consent, excisional debridement was performed on ulcer.  #15 blade was used to debride ulcer down to and including subcutaneous tissue. Bleeding controlled with light pressure.   No drainage noted.  No anesthesia was used due to neuropathy. Dry dressing applied to foot.  Patient tolerated procedure well.       Patient Risk Factor:  Patient is a high risk factor for infection.    Purvi Whelan DPM, Podiatry/Foot and Ankle Surgery    Recommended to Zia Hopkins to follow up with Primary Care provider regarding elevated blood pressure.        Again, thank you for allowing me to participate in the care of your patient.        Sincerely,        Purvi Whelan DPM, Podiatry/Foot and Ankle Surgery

## 2021-06-09 NOTE — PATIENT INSTRUCTIONS
Thank you for choosing [facility] Podiatry / Foot & Ankle Surgery!    DR. SAVAGE'S CLINIC:  [place] [facility] [place] SCHEDULE SURGERY: [phone] [address] BILLING QUESTIONS: [phone]   [place] [address] APPOINTMENTS: [phone]   PH: [phone] CONSUMER PRICE LINE:[phone]   FAX: [phone]      Follow up: when you return from your trip after [place] to follow up with Primary Care provider regarding elevated blood pressure. (if equal or above 140/90)

## 2021-06-09 NOTE — PROGRESS NOTES
"Podiatry / Foot and Ankle Surgery Progress Note    June 9, 2021    Subject: Patient was seen for follow up on 8 week post left  great toe amputation due to issues with ischemia and bone infection.  He does still have a wound where the incision once.  Denies fever, nausea, vomiting.  No pain.  No issues today.    Objective:  Vitals: BP (!) 144/70   Ht 1.676 m (5' 6\")   Wt 63.5 kg (140 lb)   BMI 22.60 kg/m    BMI= Body mass index is 22.6 kg/m .    A1C: 7.4 (2/2021)     General:  Patient is alert and orientated.  NAD.     Vascular:  DP and PT pulses are palpable.  No edema or varicosities noted.  CFT's < 3secs.  Skin temp is normal.     Neuro:  Light and gross touch sensation diminished to feet.     Derm: Dressing is clean dry and intact.  Sutures are intact.  There is some dehiscence of the left foot incision that measures approximately 0.8 cm x 0.9 cm x 0.2 cm after debridement.  Base of the wound is granular.  Moderate clear serous drainage noted but no purulent drainage or signs of acute infection noted.     Musculoskeletal:  Left great toe now amputated.        Assessment:      Diabetic polyneuropathy associated with type 2 diabetes mellitus (H)  PAD (peripheral artery disease) (H)  Amputation of left great toe (H)  Ulcer of left foot with fat layer exposed (H)     Medical Decision Making/Plan: At this time the wounds debrided.  The incision is provided.  However it is still progressing and getting smaller.  We will have him put Iodosorb gel on the area daily with a Band-Aid.  He can continue normal weightbearing in his shoes and inserts.  He is going to to the end of the summer and we will have him follow-up when he gets back.  all questions were answered to patient satisfaction he will call further questions or concerns.     Procedure; After verbal consent, excisional debridement was performed on ulcer.  #15 blade was used to debride ulcer down to and including subcutaneous tissue. Bleeding controlled with " light pressure.   No drainage noted.  No anesthesia was used due to neuropathy. Dry dressing applied to foot.  Patient tolerated procedure well.       Patient Risk Factor:  Patient is a high risk factor for infection.    Purvi Whelan DPM, Podiatry/Foot and Ankle Surgery    Recommended to Zia Hopkins to follow up with Primary Care provider regarding elevated blood pressure.

## 2021-08-12 DIAGNOSIS — I73.9 PAD (PERIPHERAL ARTERY DISEASE) (H): ICD-10-CM

## 2021-08-12 RX ORDER — CLOPIDOGREL BISULFATE 75 MG/1
TABLET ORAL
Qty: 90 TABLET | Refills: 3 | Status: ON HOLD | OUTPATIENT
Start: 2021-08-12 | End: 2024-07-22

## 2021-08-12 NOTE — TELEPHONE ENCOUNTER
Last Written Prescription Date:  4/16/21  Last Fill Quantity: 90,  # refills: 1   Last office visit: 5/24/2021 with prescribing provider:  Dr. Hamlin.   Future Office Visit:   Next 5 appointments (look out 90 days)    Aug 24, 2021  3:00 PM  Return Visit with Purvi Whelan DPM, Podiatry/Foot and Ankle Surgery  [facility] ([facility] ) [address] [address]  [phone]           Requested Prescriptions   Pending Prescriptions Disp Refills     clopidogrel (PLAVIX) 75 MG tablet 90 tablet 3     Sig: TAKE 1 TABLET(75 MG) BY MOUTH DAILY       There is no refill protocol information for this order          Unable to refill per RN protocol. Routing to MD for review and approval.     DAFNE العراقي, RN  [facility]  Office:  [phone] Fax: [phone]

## 2021-08-24 ENCOUNTER — OFFICE VISIT (OUTPATIENT)
Dept: PODIATRY | Facility: CLINIC | Age: 71
End: 2021-08-24
Payer: COMMERCIAL

## 2021-08-24 VITALS
HEIGHT: 66 IN | DIASTOLIC BLOOD PRESSURE: 72 MMHG | BODY MASS INDEX: 22.02 KG/M2 | SYSTOLIC BLOOD PRESSURE: 130 MMHG | WEIGHT: 137 LBS

## 2021-08-24 DIAGNOSIS — I73.9 PAD (PERIPHERAL ARTERY DISEASE) (H): ICD-10-CM

## 2021-08-24 DIAGNOSIS — S98.112A AMPUTATION OF LEFT GREAT TOE (H): ICD-10-CM

## 2021-08-24 DIAGNOSIS — L84 PRE-ULCERATIVE CALLUSES: ICD-10-CM

## 2021-08-24 DIAGNOSIS — E11.42 DIABETIC POLYNEUROPATHY ASSOCIATED WITH TYPE 2 DIABETES MELLITUS (H): Primary | ICD-10-CM

## 2021-08-24 DIAGNOSIS — M20.42 HAMMERTOE OF LEFT FOOT: ICD-10-CM

## 2021-08-24 PROCEDURE — 11055 PARING/CUTG B9 HYPRKER LES 1: CPT | Performed by: PODIATRIST

## 2021-08-24 PROCEDURE — 99213 OFFICE O/P EST LOW 20 MIN: CPT | Mod: 25 | Performed by: PODIATRIST

## 2021-08-24 ASSESSMENT — MIFFLIN-ST. JEOR: SCORE: 1309.18

## 2021-08-24 NOTE — LETTER
"    8/24/2021         RE: Zia Hopkins  [address]  [facility] [address]        Dear Colleague,    Thank you for referring your patient, Zia Hopkins, to the [facility] PODIATRY. Please see a copy of my visit note below.    Podiatry / Foot and Ankle Surgery Progress Note    August 24, 2021    Subject: Patient was seen for follow up on ulceration left foot after left great toe amputation.  Notes he is doing well.  States no drainage from the top part of his left foot for the last few weeks.  Denies fever, nausea, vomiting.    Objective:  Vitals: /72   Ht 1.676 m (5' 6\")   Wt 62.1 kg (137 lb)   BMI 22.11 kg/m    BMI= Body mass index is 22.11 kg/m .     A1C: 7.4 (2/2021)     General:  Patient is alert and orientated.  NAD.     Vascular:  DP and PT pulses are palpable.  No edema or varicosities noted.  CFT's < 3secs.  Skin temp is normal.     Neuro:  Light and gross touch sensation diminished to feet.     Derm: left great incision area is healed.  No open lesions or signs of infection noted.  Thickened preulcerative callus to the distal aspect of the left second toe.  Small open area 0.1 cm x 0.2 cm x 0.1 cm.  Base of the wound is granular.  No signs of acute infection noted.     Musculoskeletal:  Left great toe now amputated.   Semiflexible contracture of toes two through four left foot.     Assessment:       Diabetic polyneuropathy associated with type 2 diabetes mellitus (H)  Pre-ulcerative calluses  Hammertoe of left foot  PAD (peripheral artery disease) (H)  Amputation of left great toe (H)     Medical Decision Making/Plan: At this time the preulcerative callus was debrided on the left second toe. Reviewed and discussed causes of hammertoes with patient.  Explained that this can be caused by an overpowering of muscles or by the way we walk.  Discussed conservative treatments such as orthotics, pads, shoe gear.  Explained that sometimes the flexor tendons can be " cut to try and straighten the toe and reduce rubbing. This is normally done in office and patient is weight bearing in postop she for 1-2 weeks.  We also discussed surgical intervention to remove the joint and possibly fuse the toe.  Normally patient has a pin sticking out of the toe for about 6 weeks and can not get the foot wet. Patient would have to be minimal weight bearing in cam boot.      He is not interested in a tenotomy at this time.  He will apply iodine and a Band-Aid to the left second toe for the next week.  He will continue wearing his offloading shoes.  We will have him follow-up in 2 months for reassessment of the foot and if is doing well at that time we will have him follow-up as needed.  All questions were answered to patient satisfaction he will call for the questions or concerns.     Procedure; After verbal consent, the hyperkeratotic lesion was debrided using a #15 blade without incident. Patient tolerated procedure well.         Patient Risk Factor:  Patient is a high risk factor for infection.    Purvi Whelan DPM, Podiatry/Foot and Ankle Surgery        Again, thank you for allowing me to participate in the care of your patient.        Sincerely,        Purvi Whelan DPM, Podiatry/Foot and Ankle Surgery

## 2021-08-24 NOTE — PATIENT INSTRUCTIONS
Thank you for choosing [facility] Podiatry / Foot & Ankle Surgery!    DR. SAVAGE'S CLINIC:  [facility]   [id number]   [place] [address]   [phone]  [phone]      SCHEDULE SURGERY: [phone]   BILLING QUESTIONS: [phone]   APPOINTMENTS: [phone]   CONSUMER PRICE LINE: [phone]      Follow up:  2months    Next steps: iodine/betadine to left 2nd toe daily with bandaid for next 2 weeks.

## 2021-08-24 NOTE — PROGRESS NOTES
"Podiatry / Foot and Ankle Surgery Progress Note    August 24, 2021    Subject: Patient was seen for follow up on ulceration left foot after left great toe amputation.  Notes he is doing well.  States no drainage from the top part of his left foot for the last few weeks.  Denies fever, nausea, vomiting.    Objective:  Vitals: /72   Ht 1.676 m (5' 6\")   Wt 62.1 kg (137 lb)   BMI 22.11 kg/m    BMI= Body mass index is 22.11 kg/m .     A1C: 7.4 (2/2021)     General:  Patient is alert and orientated.  NAD.     Vascular:  DP and PT pulses are palpable.  No edema or varicosities noted.  CFT's < 3secs.  Skin temp is normal.     Neuro:  Light and gross touch sensation diminished to feet.     Derm: left great incision area is healed.  No open lesions or signs of infection noted.  Thickened preulcerative callus to the distal aspect of the left second toe.  Small open area 0.1 cm x 0.2 cm x 0.1 cm.  Base of the wound is granular.  No signs of acute infection noted.     Musculoskeletal:  Left great toe now amputated.   Semiflexible contracture of toes two through four left foot.     Assessment:       Diabetic polyneuropathy associated with type 2 diabetes mellitus (H)  Pre-ulcerative calluses  Hammertoe of left foot  PAD (peripheral artery disease) (H)  Amputation of left great toe (H)     Medical Decision Making/Plan: At this time the preulcerative callus was debrided on the left second toe. Reviewed and discussed causes of hammertoes with patient.  Explained that this can be caused by an overpowering of muscles or by the way we walk.  Discussed conservative treatments such as orthotics, pads, shoe gear.  Explained that sometimes the flexor tendons can be cut to try and straighten the toe and reduce rubbing. This is normally done in office and patient is weight bearing in postop she for 1-2 weeks.  We also discussed surgical intervention to remove the joint and possibly fuse the toe.  Normally patient has a pin sticking " out of the toe for about 6 weeks and can not get the foot wet. Patient would have to be minimal weight bearing in cam boot.      He is not interested in a tenotomy at this time.  He will apply iodine and a Band-Aid to the left second toe for the next week.  He will continue wearing his offloading shoes.  We will have him follow-up in 2 months for reassessment of the foot and if is doing well at that time we will have him follow-up as needed.  All questions were answered to patient satisfaction he will call for the questions or concerns.     Procedure; After verbal consent, the hyperkeratotic lesion was debrided using a #15 blade without incident. Patient tolerated procedure well.         Patient Risk Factor:  Patient is a high risk factor for infection.    Purvi Whelan DPM, Podiatry/Foot and Ankle Surgery

## 2021-09-04 ENCOUNTER — HEALTH MAINTENANCE LETTER (OUTPATIENT)
Age: 71
End: 2021-09-04

## 2021-10-25 ENCOUNTER — TELEPHONE (OUTPATIENT)
Dept: PODIATRY | Facility: CLINIC | Age: 71
End: 2021-10-25

## 2021-10-25 NOTE — TELEPHONE ENCOUNTER
Pt has appt Tues 10/26 at 3:00. However in Aug Pt was told that he didn't need to follow up anymore unless there was a problem. Please call Pt before tomorrows appt and let him know if he needs to come in. Pt stated he's doing great and doesn't need to see her.

## 2021-10-26 NOTE — TELEPHONE ENCOUNTER
If patient does not have any wound or issues, he can cancel his appointment.     Please let him know.     Purvi Whelan DPM

## 2021-12-07 NOTE — TELEPHONE ENCOUNTER
-- DO NOT REPLY / DO NOT REPLY ALL --  -- Message is from the [facility]--    General Patient Message      Reason for Call: Patient is returning a call and would like another call back. Please advise.    Caller Information       Type Contact Phone    12/01/2021 12:31 PM CST Phone (Incoming) Chris Ludwig (Self) [phone] (M)    12/03/2021 02:36 PM CST Phone (Outgoing) Chris Ludwig (Self) [phone] (M)    Left Message     12/07/2021 12:41 PM CST Phone (Outgoing) Chris Ludwig (Self) [phone] (M)          Alternative phone number: n/a    Turnaround time given to caller:   \"This message will be sent to [state Provider's name]. The clinical team will fulfill your request as soon as they review your message.\"     Patient scheduled for US and follow up on 8/12/19

## 2021-12-25 ENCOUNTER — HEALTH MAINTENANCE LETTER (OUTPATIENT)
Age: 71
End: 2021-12-25

## 2022-01-01 NOTE — NURSING NOTE
"Zia Hopkins is a 68 year old male who presents for:  Chief Complaint   Patient presents with     RECHECK     Left leg arterial imaging and DAVID (FSH 8:45; KMK 10:00) *History of left SFA stent; 6 month follow up to 5-4-18 appointment with Dr. Hamlin*[facility] 12/26/18        Vitals:    Vitals:    01/07/19 0955 01/07/19 0959   BP: (!) 235/123 (!) 198/102   BP Location: Left arm Left arm   Patient Position: Chair Chair   Cuff Size: Adult Regular Adult Regular   Pulse: 98        BMI:  Estimated body mass index is 25.18 kg/m  as calculated from the following:    Height as of 4/17/17: 5' 6\" (1.676 m).    Weight as of 4/17/17: 156 lb (70.8 kg).    Pain Score:  Data Unavailable        Stephany Cavazos .MA    "
Pupils equal, round and reactive to light, Extra-ocular movement intact, eyes are clear b/l

## 2022-02-15 ENCOUNTER — TRANSFERRED RECORDS (OUTPATIENT)
Dept: HEALTH INFORMATION MANAGEMENT | Facility: CLINIC | Age: 72
End: 2022-02-15
Payer: COMMERCIAL

## 2022-02-15 LAB
ALT SERPL-CCNC: 21 LU/L (ref 0–44)
AST SERPL-CCNC: 18 LU/L (ref 0–40)
CREATININE (EXTERNAL): 1.16 MG/DL (ref 0.76–1.27)
GFR ESTIMATED (EXTERNAL): 62 ML/MIN/1.73
GFR ESTIMATED (IF AFRICAN AMERICAN) (EXTERNAL): 72 ML/MIN/1.73
GLUCOSE (EXTERNAL): 139 MG/DL (ref 65–99)
HBA1C MFR BLD: 7.4 % (ref 4.8–5.6)
POTASSIUM (EXTERNAL): 4.9 MMOL/L (ref 3.5–5.2)

## 2022-02-16 ENCOUNTER — OFFICE VISIT (OUTPATIENT)
Dept: PODIATRY | Facility: CLINIC | Age: 72
End: 2022-02-16
Payer: COMMERCIAL

## 2022-02-16 ENCOUNTER — TELEPHONE (OUTPATIENT)
Dept: OTHER | Facility: CLINIC | Age: 72
End: 2022-02-16

## 2022-02-16 VITALS
WEIGHT: 136.4 LBS | DIASTOLIC BLOOD PRESSURE: 60 MMHG | SYSTOLIC BLOOD PRESSURE: 130 MMHG | HEIGHT: 66 IN | BODY MASS INDEX: 21.92 KG/M2

## 2022-02-16 DIAGNOSIS — S98.112A AMPUTATION OF LEFT GREAT TOE (H): ICD-10-CM

## 2022-02-16 DIAGNOSIS — L97.502: ICD-10-CM

## 2022-02-16 DIAGNOSIS — I73.9 PAD (PERIPHERAL ARTERY DISEASE) (H): Primary | ICD-10-CM

## 2022-02-16 DIAGNOSIS — E11.42 DIABETIC POLYNEUROPATHY ASSOCIATED WITH TYPE 2 DIABETES MELLITUS (H): ICD-10-CM

## 2022-02-16 PROBLEM — Z89.412 ACQUIRED ABSENCE OF LEFT GREAT TOE (H): Status: ACTIVE | Noted: 2022-02-16

## 2022-02-16 PROCEDURE — 11042 DBRDMT SUBQ TIS 1ST 20SQCM/<: CPT | Performed by: PODIATRIST

## 2022-02-16 PROCEDURE — 99214 OFFICE O/P EST MOD 30 MIN: CPT | Mod: 25 | Performed by: PODIATRIST

## 2022-02-16 NOTE — PROGRESS NOTES
"Podiatry / Foot and Ankle Surgery Progress Note    February 16, 2022    Subject: Patient was seen for new ulcerations to the right foot.    Objective:  Vitals: /60   Ht 1.676 m (5' 6\")   Wt 61.9 kg (136 lb 6.4 oz)   BMI 22.02 kg/m    BMI= Body mass index is 22.02 kg/m .    A1C: 7.4 (2/2021)    General:  Patient is alert and orientated.  NAD.    Vascular:  DP and PT pulses are nonpalpable on the right foot..  No edema or varicosities noted.  CFT's < 3secs.  Skin temp is normal.    Neuro:  Light and gross touch sensation diminished to feet.    Derm: Ischemic ulcerations to the right 5th toe laterally the right 4th toe laterally and the left great toe medially.  Right great toe measurements after debridement are 3 cm x 2-1/2 cm x 0.2 cm.  Right 5th toe after debridement ulceration measures approximately 1.5 cm x 1.5cm x 0.1 cm in the right 4th toe ulceration measures 1-1/2 cm x 1 cm x 0.1 cm.    Musculoskeletal: Left great toe amputation.    Assessment:    PAD (peripheral artery disease) (H)  Ischemic ulcer of foot with fat layer exposed, unspecified laterality (H)  Diabetic polyneuropathy associated with type 2 diabetes mellitus (H)  Amputation of left great toe (H)      Medical Decision Making/Plan: At this time the ulcers were debrided.  Please see procedure note below.  Discussed that I am very concerned about blood flow to the foot given the ischemic nature of the ulcers.  We will put in a stat referral to vascular for assessment and likely angiogram of the right leg.  Discussed that he is at high risk of amputation if this does not heal or gets infected.  We will have him do iodine dressing changes with gauze and gauze rolls.  We will have him follow-up in 3 weeks.  Discussed that if he develops fevers, nausea, streaking redness or other signs of infection that he needs to go to the [facility] emergency department to get admitted.  All questions were answered to patient satisfaction and he will call for " the questions or concerns.  We will have him wear his postop boot that he had for his left foot on his right foot.    Procedure: After verbal consent, excisional debridement was performed on ulcer.  #15 blade was used to debride ulcer down to and including subcutaneous tissue. Bleeding controlled with light pressure.   No drainage noted.  No anesthesia was used due to neuropathy. Dry dressing applied to foot.  Patient tolerated procedure well.    Patient Risk Factor:  Patient is a high risk factor for infection.     Purvi Whelan DPM, Podiatry/Foot and Ankle Surgery

## 2022-02-16 NOTE — PATIENT INSTRUCTIONS
Thank you for choosing [facility] Podiatry / Foot & Ankle Surgery!    DR SAVAGE'S CLINIC:  [facility]   [address]   [place] [address]      TRIAGE LINE: [phone] - [address]  APPOINTMENTS: [phone]  RADIOLOGY: [phone]  SET UP SURGERY: [phone]  BILLING QUESTIONS: [phone]  FAX: [phone]       Follow up: with vascular.

## 2022-02-16 NOTE — TELEPHONE ENCOUNTER
Previous patient of Dr. Hamlin:    Referred to [facility] by ISAIAH Whelan DPM for Ischemic right first fourth and fifth toes    Last office visit with Dr. Hamlin 5/24/21 - patient was advised to call when he got back from [place] and if there was not ideal wound healing, he would refer him for hyperbaric oxygen therapy.    Pt needs to be scheduled for DAVID with exercise and return visit with Dr. Hamlin.  Will route to scheduling to coordinate an appointment as soon as possible.    Appt Note:  Hx: Left LE angiogram, Left proximal anterior tibial artery balloon angioplasty, Left posterior tibial artery balloon angioplasty on 4/21/21.  Referred by Dr. Whelan for followup.  DAVID w/exercise prior.    Lluvia Hays, JOHNN, RN  [facility] [place]

## 2022-02-16 NOTE — LETTER
"    2/16/2022         RE: Zia Hopkins  [address] [facility] [address]        Dear Colleague,    Thank you for referring your patient, Zia Hopkins, to the [facility] PODI[facility]RY. Please see a copy of my visit note below.    Podiatry / Foot and Ankle Surgery Progress Note    February 16, 2022    Subject: Patient was seen for new ulcerations to the right foot.    Objective:  Vitals: /60   Ht 1.676 m (5' 6\")   Wt 61.9 kg (136 lb 6.4 oz)   BMI 22.02 kg/m    BMI= Body mass index is 22.02 kg/m .    A1C: 7.4 (2/2021)    General:  Patient is alert and orientated.  NAD.    Vascular:  DP and PT pulses are nonpalpable on the right foot..  No edema or varicosities noted.  CFT's < 3secs.  Skin temp is normal.    Neuro:  Light and gross touch sensation diminished to feet.    Derm: Ischemic ulcerations to the right 5th toe laterally the right 4th toe laterally and the left great toe medially.  Right great toe measurements after debridement are 3 cm x 2-1/2 cm x 0.2 cm.  Right 5th toe after debridement ulceration measures approximately 1.5 cm x 1.5cm x 0.1 cm in the right 4th toe ulceration measures 1-1/2 cm x 1 cm x 0.1 cm.    Musculoskeletal: Left great toe amputation.    Assessment:    PAD (peripheral artery disease) (H)  Ischemic ulcer of foot with fat layer exposed, unspecified laterality (H)  Diabetic polyneuropathy associated with type 2 diabetes mellitus (H)  Amputation of left great toe (H)      Medical Decision Making/Plan: At this time the ulcers were debrided.  Please see procedure note below.  Discussed that I am very concerned about blood flow to the foot given the ischemic nature of the ulcers.  We will put in a stat referral to vascular for assessment and likely angiogram of the right leg.  Discussed that he is at high risk of amputation if this does not heal or gets infected.  We will have him do iodine dressing changes with gauze and gauze rolls.  We will have " him follow-up in 3 weeks.  Discussed that if he develops fevers, nausea, streaking redness or other signs of infection that he needs to go to the [facility] emergency department to get admitted.  All questions were answered to patient satisfaction and he will call for the questions or concerns.  We will have him wear his postop boot that he had for his left foot on his right foot.    Procedure: After verbal consent, excisional debridement was performed on ulcer.  #15 blade was used to debride ulcer down to and including subcutaneous tissue. Bleeding controlled with light pressure.   No drainage noted.  No anesthesia was used due to neuropathy. Dry dressing applied to foot.  Patient tolerated procedure well.    Patient Risk Factor:  Patient is a high risk factor for infection.     Purvi Whelan DPM, Podiatry/Foot and Ankle Surgery                 Again, thank you for allowing me to participate in the care of your patient.        Sincerely,        Purvi Whelan DPM, Podiatry/Foot and Ankle Surgery

## 2022-02-17 ENCOUNTER — HOSPITAL ENCOUNTER (OUTPATIENT)
Dept: ULTRASOUND IMAGING | Facility: CLINIC | Age: 72
Discharge: HOME | End: 2022-02-17
Attending: SURGERY | Admitting: SURGERY
Payer: COMMERCIAL

## 2022-02-17 ENCOUNTER — OFFICE VISIT (OUTPATIENT)
Dept: SURGERY | Facility: CLINIC | Age: 72
End: 2022-02-17
Payer: COMMERCIAL

## 2022-02-17 VITALS
BODY MASS INDEX: 21.86 KG/M2 | HEART RATE: 78 BPM | SYSTOLIC BLOOD PRESSURE: 130 MMHG | RESPIRATION RATE: 16 BRPM | DIASTOLIC BLOOD PRESSURE: 60 MMHG | OXYGEN SATURATION: 95 % | HEIGHT: 66 IN | WEIGHT: 136 LBS

## 2022-02-17 DIAGNOSIS — Z98.890 CRITICAL ISCHEMIA OF EXTREMITY WITH HISTORY OF REVASCULARIZATION OF SAME EXTREMITY (H): ICD-10-CM

## 2022-02-17 DIAGNOSIS — I70.229 CRITICAL ISCHEMIA OF LOWER EXTREMITY (H): Primary | ICD-10-CM

## 2022-02-17 DIAGNOSIS — I73.9 PAD (PERIPHERAL ARTERY DISEASE) (H): ICD-10-CM

## 2022-02-17 DIAGNOSIS — I70.229 CRITICAL ISCHEMIA OF EXTREMITY WITH HISTORY OF REVASCULARIZATION OF SAME EXTREMITY (H): ICD-10-CM

## 2022-02-17 PROCEDURE — 93922 UPR/L XTREMITY ART 2 LEVELS: CPT

## 2022-02-17 PROCEDURE — 99213 OFFICE O/P EST LOW 20 MIN: CPT | Performed by: SURGERY

## 2022-02-17 PROCEDURE — 93922 UPR/L XTREMITY ART 2 LEVELS: CPT | Mod: 26 | Performed by: SURGERY

## 2022-02-17 NOTE — PROGRESS NOTES
Mr. Hopkins is very well-known to myself.  He turned 72-year-old today.  He has come to hospital gangrene and he underwent debridement of ischemic ulcerations in the right fifth toe, fourth toe and great toe.  He had seen Dr. Whelan for that.  He underwent noninvasive arterial imaging today.  And ankle-brachial indices are 0.36 and 0.24.  Toe pressures could not be obtained due to the bandaging.    We will proceed with right lower extremity arteriogram.  This was explained to the patient in detail.  He does not have good vein on the right side but he has reasonable vein on the left side.

## 2022-02-18 ENCOUNTER — TELEPHONE (OUTPATIENT)
Dept: OTHER | Facility: CLINIC | Age: 72
End: 2022-02-18
Payer: COMMERCIAL

## 2022-02-18 ENCOUNTER — TRANSFERRED RECORDS (OUTPATIENT)
Dept: HEALTH INFORMATION MANAGEMENT | Facility: CLINIC | Age: 72
End: 2022-02-18
Payer: COMMERCIAL

## 2022-02-18 DIAGNOSIS — I70.229 CRITICAL ISCHEMIA OF EXTREMITY WITH HISTORY OF REVASCULARIZATION OF SAME EXTREMITY (H): ICD-10-CM

## 2022-02-18 DIAGNOSIS — I70.235 ATHEROSCLEROSIS OF NATIVE ARTERIES OF RIGHT LEG WITH ULCERATION OF OTHER PART OF FOOT (H): ICD-10-CM

## 2022-02-18 DIAGNOSIS — T14.8XXA NONHEALING NONSURGICAL WOUND: ICD-10-CM

## 2022-02-18 DIAGNOSIS — I96 GANGRENE, NOT ELSEWHERE CLASSIFIED (H): ICD-10-CM

## 2022-02-18 DIAGNOSIS — Z98.890 CRITICAL ISCHEMIA OF EXTREMITY WITH HISTORY OF REVASCULARIZATION OF SAME EXTREMITY (H): ICD-10-CM

## 2022-02-18 DIAGNOSIS — I73.9 PAD (PERIPHERAL ARTERY DISEASE) (H): Primary | ICD-10-CM

## 2022-02-18 NOTE — TELEPHONE ENCOUNTER
----- Message from Surjit Hamlin MD sent at 2/17/2022  3:16 PM CST -----  Regarding: angio  Need to schedule right leg angio for next week as a priority for tissue loss please.  Esvin

## 2022-02-21 ENCOUNTER — MYC MEDICAL ADVICE (OUTPATIENT)
Dept: OTHER | Facility: CLINIC | Age: 72
End: 2022-02-21
Payer: COMMERCIAL

## 2022-02-21 DIAGNOSIS — I70.229 CRITICAL ISCHEMIA OF EXTREMITY WITH HISTORY OF REVASCULARIZATION OF SAME EXTREMITY (H): Primary | ICD-10-CM

## 2022-02-21 DIAGNOSIS — Z09 ENCOUNTER FOR FOLLOW-UP EXAMINATION AFTER COMPLETED TREATMENT FOR CONDITIONS OTHER THAN MALIGNANT NEOPLASM: ICD-10-CM

## 2022-02-21 DIAGNOSIS — Z98.890 CRITICAL ISCHEMIA OF EXTREMITY WITH HISTORY OF REVASCULARIZATION OF SAME EXTREMITY (H): Primary | ICD-10-CM

## 2022-02-21 NOTE — TELEPHONE ENCOUNTER
Pt called back, left mssg, I called pt back and discussed:    Patient Education  Procedure: RIGHT LEG ANGIOGRAM with possible intervention.   Diagnosis: PAD, nonhealing ulcers.  Anticoagulation Instruction: Continue Aspirin and Plavix.  Pre-Operative Physical Exam: You need to have a pre-op physical exam within 30 days of your procedure. Your procedure may be cancelled if you do not have a current History and Physical. Call your PCP's office to schedule.  Allergies:  Updated in Epic  Bowel Prep: N/A  NPO per protocol.   Post Procedure Education: [facility] patient post-procedure fact sheet reviewed with patient.    COVID-19 instructions for isolation and pre testing reviewed with pt.    Dates/times that DO Not work for pt:  2/22/22, I explained that is most likely not going to be scheduled for tomorrow.      Learner(s):patient  Method: Listening  Barriers to Learning:No Barrier  Outcome: Patient did verbalize understanding of above education.    Order form: given to .     Education information sent to pt via [employer] as pt requested.     DAFNE العراقي, RN  [facility]  Office:  [phone] Fax: [phone]

## 2022-02-21 NOTE — TELEPHONE ENCOUNTER
Called pt, pt is currently busy and will call [facility] back to discuss pre op education.    DAFNE العراقي, RN  [facility]  Office:  [phone] Fax: [phone]

## 2022-02-23 ENCOUNTER — TRANSFERRED RECORDS (OUTPATIENT)
Dept: HEALTH INFORMATION MANAGEMENT | Facility: CLINIC | Age: 72
End: 2022-02-23
Payer: COMMERCIAL

## 2022-02-25 ENCOUNTER — LAB (OUTPATIENT)
Dept: URGENT CARE | Facility: URGENT CARE | Age: 72
End: 2022-02-25
Payer: COMMERCIAL

## 2022-02-25 DIAGNOSIS — Z20.822 COVID-19 RULED OUT: Primary | ICD-10-CM

## 2022-02-25 DIAGNOSIS — Z20.822 COVID-19 RULED OUT: ICD-10-CM

## 2022-02-25 PROCEDURE — 99207 PR NO CHARGE LOS: CPT

## 2022-02-25 PROCEDURE — U0005 INFEC AGEN DETEC AMPLI PROBE: HCPCS

## 2022-02-25 PROCEDURE — U0003 INFECTIOUS AGENT DETECTION BY NUCLEIC ACID (DNA OR RNA); SEVERE ACUTE RESPIRATORY SYNDROME CORONAVIRUS 2 (SARS-COV-2) (CORONAVIRUS DISEASE [COVID-19]), AMPLIFIED PROBE TECHNIQUE, MAKING USE OF HIGH THROUGHPUT TECHNOLOGIES AS DESCRIBED BY CMS-2020-01-R: HCPCS

## 2022-02-26 LAB — SARS-COV-2 RNA RESP QL NAA+PROBE: NEGATIVE

## 2022-03-01 ENCOUNTER — APPOINTMENT (OUTPATIENT)
Dept: SURGERY | Facility: PHYSICIAN GROUP | Age: 72
End: 2022-03-01
Payer: COMMERCIAL

## 2022-03-01 ENCOUNTER — HOSPITAL ENCOUNTER (OUTPATIENT)
Facility: CLINIC | Age: 72
Discharge: HOME | DRG: 252 | End: 2022-03-01
Attending: SURGERY | Admitting: SURGERY
Payer: COMMERCIAL

## 2022-03-01 ENCOUNTER — MEDICAL CORRESPONDENCE (OUTPATIENT)
Dept: HEALTH INFORMATION MANAGEMENT | Facility: CLINIC | Age: 72
End: 2022-03-01

## 2022-03-01 ENCOUNTER — APPOINTMENT (OUTPATIENT)
Dept: INTERVENTIONAL RADIOLOGY/VASCULAR | Facility: CLINIC | Age: 72
DRG: 252 | End: 2022-03-01
Attending: SURGERY
Payer: COMMERCIAL

## 2022-03-01 VITALS
HEIGHT: 64 IN | RESPIRATION RATE: 14 BRPM | WEIGHT: 135.7 LBS | BODY MASS INDEX: 23.17 KG/M2 | TEMPERATURE: 97.9 F | SYSTOLIC BLOOD PRESSURE: 146 MMHG | DIASTOLIC BLOOD PRESSURE: 68 MMHG | HEART RATE: 68 BPM | OXYGEN SATURATION: 97 %

## 2022-03-01 DIAGNOSIS — T14.8XXA NONHEALING NONSURGICAL WOUND: ICD-10-CM

## 2022-03-01 DIAGNOSIS — I70.229 CRITICAL ISCHEMIA OF LOWER EXTREMITY (H): Primary | ICD-10-CM

## 2022-03-01 DIAGNOSIS — I70.235 ATHEROSCLEROSIS OF NATIVE ARTERIES OF RIGHT LEG WITH ULCERATION OF OTHER PART OF FOOT (H): ICD-10-CM

## 2022-03-01 DIAGNOSIS — L97.422 DIABETIC ULCER OF LEFT HEEL ASSOCIATED WITH TYPE 2 DIABETES MELLITUS, WITH FAT LAYER EXPOSED (H): Primary | ICD-10-CM

## 2022-03-01 DIAGNOSIS — I70.229 CRITICAL ISCHEMIA OF EXTREMITY WITH HISTORY OF REVASCULARIZATION OF SAME EXTREMITY (H): ICD-10-CM

## 2022-03-01 DIAGNOSIS — Z11.59 ENCOUNTER FOR SCREENING FOR OTHER VIRAL DISEASES: Primary | ICD-10-CM

## 2022-03-01 DIAGNOSIS — E11.621 DIABETIC ULCER OF LEFT HEEL ASSOCIATED WITH TYPE 2 DIABETES MELLITUS, WITH FAT LAYER EXPOSED (H): Primary | ICD-10-CM

## 2022-03-01 DIAGNOSIS — Z98.890 CRITICAL ISCHEMIA OF EXTREMITY WITH HISTORY OF REVASCULARIZATION OF SAME EXTREMITY (H): ICD-10-CM

## 2022-03-01 DIAGNOSIS — I96 GANGRENE, NOT ELSEWHERE CLASSIFIED (H): ICD-10-CM

## 2022-03-01 LAB
ANION GAP SERPL CALCULATED.3IONS-SCNC: 10 MMOL/L (ref 3–14)
BUN SERPL-MCNC: 38 MG/DL (ref 7–30)
CALCIUM SERPL-MCNC: 9.5 MG/DL (ref 8.5–10.1)
CHLORIDE BLD-SCNC: 100 MMOL/L (ref 94–109)
CHOLEST SERPL-MCNC: 110 MG/DL
CO2 SERPL-SCNC: 26 MMOL/L (ref 20–32)
CREAT SERPL-MCNC: 0.99 MG/DL (ref 0.66–1.25)
FASTING STATUS PATIENT QL REPORTED: YES
GFR SERPL CREATININE-BSD FRML MDRD: 80 ML/MIN/1.73M2
GLUCOSE BLD-MCNC: 140 MG/DL (ref 70–99)
HBA1C MFR BLD: 7 % (ref 0–5.6)
HDLC SERPL-MCNC: 62 MG/DL
LDLC SERPL CALC-MCNC: 30 MG/DL
NONHDLC SERPL-MCNC: 48 MG/DL
POTASSIUM BLD-SCNC: 4 MMOL/L (ref 3.4–5.3)
SODIUM SERPL-SCNC: 136 MMOL/L (ref 133–144)
TRIGL SERPL-MCNC: 92 MG/DL

## 2022-03-01 PROCEDURE — C1760 CLOSURE DEV, VASC: HCPCS

## 2022-03-01 PROCEDURE — 99214 OFFICE O/P EST MOD 30 MIN: CPT | Performed by: INTERNAL MEDICINE

## 2022-03-01 PROCEDURE — 272N000196 HC ACCESSORY CR5

## 2022-03-01 PROCEDURE — 36415 COLL VENOUS BLD VENIPUNCTURE: CPT | Performed by: PHYSICIAN ASSISTANT

## 2022-03-01 PROCEDURE — 258N000003 HC RX IP 258 OP 636: Performed by: SURGERY

## 2022-03-01 PROCEDURE — 272N000116 HC CATH CR1

## 2022-03-01 PROCEDURE — 272N000124 HC CATH CR11

## 2022-03-01 PROCEDURE — 75710 ARTERY X-RAYS ARM/LEG: CPT | Mod: RT

## 2022-03-01 PROCEDURE — 76937 US GUIDE VASCULAR ACCESS: CPT | Mod: 26 | Performed by: SURGERY

## 2022-03-01 PROCEDURE — 36246 INS CATH ABD/L-EXT ART 2ND: CPT | Performed by: SURGERY

## 2022-03-01 PROCEDURE — 250N000009 HC RX 250: Performed by: SURGERY

## 2022-03-01 PROCEDURE — 76937 US GUIDE VASCULAR ACCESS: CPT

## 2022-03-01 PROCEDURE — 272N000567 HC SHEATH CR4

## 2022-03-01 PROCEDURE — 75710 ARTERY X-RAYS ARM/LEG: CPT | Mod: 26 | Performed by: SURGERY

## 2022-03-01 PROCEDURE — 83036 HEMOGLOBIN GLYCOSYLATED A1C: CPT | Performed by: PHYSICIAN ASSISTANT

## 2022-03-01 PROCEDURE — 999N000012 HC STATISTIC ANGIOGRAM, STENT, VERTEBRO PLASTY

## 2022-03-01 PROCEDURE — 80061 LIPID PANEL: CPT | Performed by: PHYSICIAN ASSISTANT

## 2022-03-01 PROCEDURE — 36591 DRAW BLOOD OFF VENOUS DEVICE: CPT

## 2022-03-01 PROCEDURE — 82947 ASSAY GLUCOSE BLOOD QUANT: CPT | Performed by: INTERNAL MEDICINE

## 2022-03-01 PROCEDURE — 250N000011 HC RX IP 250 OP 636: Performed by: STUDENT IN AN ORGANIZED HEALTH CARE EDUCATION/TRAINING PROGRAM

## 2022-03-01 PROCEDURE — 82374 ASSAY BLOOD CARBON DIOXIDE: CPT | Performed by: INTERNAL MEDICINE

## 2022-03-01 PROCEDURE — 99152 MOD SED SAME PHYS/QHP 5/>YRS: CPT | Performed by: SURGERY

## 2022-03-01 PROCEDURE — 36247 INS CATH ABD/L-EXT ART 3RD: CPT

## 2022-03-01 PROCEDURE — C1769 GUIDE WIRE: HCPCS

## 2022-03-01 PROCEDURE — 250N000011 HC RX IP 250 OP 636: Performed by: SURGERY

## 2022-03-01 PROCEDURE — 255N000002 HC RX 255 OP 636: Performed by: SURGERY

## 2022-03-01 RX ORDER — NALOXONE HYDROCHLORIDE 0.4 MG/ML
0.2 INJECTION, SOLUTION INTRAMUSCULAR; INTRAVENOUS; SUBCUTANEOUS
Status: DISCONTINUED | OUTPATIENT
Start: 2022-03-01 | End: 2022-03-01 | Stop reason: HOSPADM

## 2022-03-01 RX ORDER — NALOXONE HYDROCHLORIDE 0.4 MG/ML
0.4 INJECTION, SOLUTION INTRAMUSCULAR; INTRAVENOUS; SUBCUTANEOUS
Status: DISCONTINUED | OUTPATIENT
Start: 2022-03-01 | End: 2022-03-01 | Stop reason: HOSPADM

## 2022-03-01 RX ORDER — SODIUM CHLORIDE 9 MG/ML
INJECTION, SOLUTION INTRAVENOUS CONTINUOUS
Status: DISCONTINUED | OUTPATIENT
Start: 2022-03-01 | End: 2022-03-01 | Stop reason: HOSPADM

## 2022-03-01 RX ORDER — FLUMAZENIL 0.1 MG/ML
0.2 INJECTION, SOLUTION INTRAVENOUS
Status: DISCONTINUED | OUTPATIENT
Start: 2022-03-01 | End: 2022-03-01 | Stop reason: HOSPADM

## 2022-03-01 RX ORDER — HEPARIN SODIUM 200 [USP'U]/100ML
1 INJECTION, SOLUTION INTRAVENOUS CONTINUOUS PRN
Status: DISCONTINUED | OUTPATIENT
Start: 2022-03-01 | End: 2022-03-01 | Stop reason: HOSPADM

## 2022-03-01 RX ORDER — HEPARIN SODIUM 1000 [USP'U]/ML
500-6000 INJECTION, SOLUTION INTRAVENOUS; SUBCUTANEOUS
Status: COMPLETED | OUTPATIENT
Start: 2022-03-01 | End: 2022-03-01

## 2022-03-01 RX ORDER — FENTANYL CITRATE 50 UG/ML
25-50 INJECTION, SOLUTION INTRAMUSCULAR; INTRAVENOUS EVERY 5 MIN PRN
Status: DISCONTINUED | OUTPATIENT
Start: 2022-03-01 | End: 2022-03-01 | Stop reason: HOSPADM

## 2022-03-01 RX ORDER — GABAPENTIN 100 MG/1
100 CAPSULE ORAL 2 TIMES DAILY
Status: ON HOLD | COMMUNITY
End: 2022-09-29

## 2022-03-01 RX ORDER — IODIXANOL 320 MG/ML
150 INJECTION, SOLUTION INTRAVASCULAR ONCE
Status: COMPLETED | OUTPATIENT
Start: 2022-03-01 | End: 2022-03-01

## 2022-03-01 RX ORDER — ACETAMINOPHEN 325 MG/1
650 TABLET ORAL EVERY 6 HOURS PRN
Status: DISCONTINUED | OUTPATIENT
Start: 2022-03-01 | End: 2022-03-01 | Stop reason: HOSPADM

## 2022-03-01 RX ORDER — LIDOCAINE 40 MG/G
CREAM TOPICAL
Status: DISCONTINUED | OUTPATIENT
Start: 2022-03-01 | End: 2022-03-01 | Stop reason: HOSPADM

## 2022-03-01 RX ADMIN — HEPARIN SODIUM 3000 UNITS: 1000 INJECTION INTRAVENOUS; SUBCUTANEOUS at 14:12

## 2022-03-01 RX ADMIN — MIDAZOLAM HYDROCHLORIDE 1 MG: 1 INJECTION, SOLUTION INTRAMUSCULAR; INTRAVENOUS at 13:58

## 2022-03-01 RX ADMIN — LIDOCAINE HYDROCHLORIDE 10 ML: 10 INJECTION, SOLUTION INFILTRATION; PERINEURAL at 13:50

## 2022-03-01 RX ADMIN — HEPARIN SODIUM 4 BAG: 200 INJECTION, SOLUTION INTRAVENOUS at 14:17

## 2022-03-01 RX ADMIN — SODIUM CHLORIDE: 9 INJECTION, SOLUTION INTRAVENOUS at 12:04

## 2022-03-01 RX ADMIN — FENTANYL CITRATE 50 MCG: 50 INJECTION, SOLUTION INTRAMUSCULAR; INTRAVENOUS at 13:58

## 2022-03-01 RX ADMIN — IODIXANOL 54 ML: 320 INJECTION, SOLUTION INTRAVASCULAR at 14:28

## 2022-03-01 NOTE — PROGRESS NOTES
Care Suites Admission Nursing Note    Patient Information  Name: Zia Hopkins  Age: 72 year old  Reason for admission: lower extremity angiogram  Care Suites arrival time: 1130    Visitor Information  Name: Le  Informed of visitor restrictions: Yes  1 visitor allowed per patient   Visitor must screen negative for COVID symptoms   Visitor must wear a mask  Waiting rooms closed to visitors    Patient Admission/Assessment   Pre-procedure assessment complete: Yes  If abnormal assessment/labs, provider notified: N/A - awaiting lab results  NPO: Yes  Medications held per instructions/orders: N/A - pt was not told to hold any medications  Consent: deferred - deferred to MD  Patient oriented to room: Yes  Education/questions answered: Yes  Plan/other: Proceed as planned    Discharge Planning  Discharge name/phone number: Le greene [phone]  Overnight post sedation caregiver: Le  Discharge location: home    PATIENT/VISITOR WELLNESS SCREENING    Step 1 Patient Screening    1. In the last month, have you been in contact with someone who was confirmed or suspected to have Coronavirus/COVID-19? No    2. Do you have the following symptoms?  Fever/Chills? No   Cough? No   Shortness of breath? No   New loss of taste or smell? No  Sore throat? No  Muscle or body aches? No  Headaches? No  Fatigue? No  Vomiting or diarrhea? No    Step 2 Visitor Screening    1. Name of Visitor (1 visitor per patient): Le    2. In the last month, have you been in contact with someone who was confirmed or suspected to have Coronavirus/COVID-19? No    3. Do you have the following symptoms?  Fever/Chills? No   Cough? No   Shortness of breath? No   Skin rash? No   Loss of taste or smell? No  Sore throat? No  Runny or stuffy nose? No  Muscle or body aches? No  Headaches? No  Fatigue? No  Vomiting or diarrhea? No    If the visitor has positive symptoms, notify supervisor/manger  Per policy, the visitor will need to leave the  facility     Step 3 Refer to logic grid below for actions    NO SYMPTOM(S)    ACTIONS:  1. Standard rooming process  2. Provider to assess per normal protocol  3. Implement precautions as needed and per guidelines     POSITIVE SYMPTOM(S)  If positive for ANY of the following symptoms: fever, cough, shortness of breath, rash    ACTION:  1. Continue to have the patient wear a mask   2. Room patient as soon as possible  3. Don appropriate PPE when entering room  4. Provider evaluation    Cierra Mendoza RN

## 2022-03-01 NOTE — IR NOTE
Interventional Radiology Intra-procedural Nursing Note    Patient Name: Zia Hopkins  Medical Record Number: [id number]  Today's Date: 3/1/2022    Procedure: Right lower extremity angiogram with moderate sedation with possible intervention  Start time: 1350  End time: 1430  Report provided to: Mónica BARLOW  Patient depart time and location: 1435 to [facility]    Note: Patient entered Interventional Radiology Suite number 1 via cart. Patient awake, alert and orientated. Assisted onto procedural table in supine position. Prepped and draped.  Dr. Hamlin in room. Time out and procedure started. Vital signs stable. Telemetry reading SR.    Procedure well tolerated by patient without complications. Procedure end with debrief by Dr. Hamlin.  Manual pressure applied until hemostasis achieved. Angioseal with gauze and tagederm dressing applied to left groin interventional procedure access site.    Administered medication totals:  Lidocaine 1% 9 mL Intradermal  Heparin 3000 Units IVP  Versed 1 mg IVP  Fentanyl 50 mcg IVP

## 2022-03-01 NOTE — CONSULTS
[facility]    Vascular Medicine Consultation     Attestation: I have obtained the below history, review of systems, and examined the patient independently of Shaina Norton PA-C and agree with the examination and plan as delineated below.    Sohan Velazquez MD      Date of Admission:  3/1/2022  Date of Consult (When I saw the patient): 03/01/22    Assessment & Plan   1. Peripheral arterial disease s/p left popliteal angioplasty and left SFA stenting 2017 followed by stenting of in-stent stenosis 7/30/19 and ultimately angioplasty of the tibial artery and left great toe amputation 4/2021 now presenting with critical limb ischemia of right foot with ischemic digits      He presented today for an angiogram. Post-procedure cares and follow-up per Dr. Hamlin. He will be continued on Plavix and aspirin 81 mg daily. He needs to get better control of his diabetes and stop smoking completely to prevent further progression of his peripheral arterial disease.      2. Hyperlipidemia     His lipid panel revealed an LDL of 30, HDL 62, triglycerides 92, and total cholesterol 110, indicating his lipids are very well controlled on Atorvastatin 80 mg daily and Zetia 10 mg daily. He should continue the same.      3. Type 2 diabetes mellitus     He was diagnosed with Type 2 diabetes in 2017. His A1C is 7.0% this admission. Given his PAD, he should optimally be treated more aggressively to an A1C of less than 7.0%. As such, he should continue his prior to admission metformin and Jardiance 10 mg daily. To help get his A1C to goal of less than 7%, would add Januvia 100 mg daily. He wanted to talk with his primary care about this before being prescribed it. He should have an A1C repeated in three months through his PCP to assure his A1C is at goal on this regimen.       4. Ongoing tobacco use     He had quit smoking but recently started back up again due to pain in his legs. The importance of smoking  cessation was stressed, particularly in regards to his PAD. He is only smoking about 2 cigarettes/day. He declines nicotine patches as he states he is very sensitive to them and feels they are much stronger than his 2-3 cigarettes per day.             Reason for Consult   Reason for consult: Asked by Dr. Hamlin to evaluate vascular risk factors and assist with medical management in this 72 year old male smoker with hyperlipidemia, diabetes, hypertension, and PAD now presenting for a right lower extremity angiogram for critical limb ischemia of non-healing ischemic left foot wounds.     Primary Care Physician   Reid Reese      History of Present Illness   Zia Hopkins is a 72 year old male smoker with hypertension, hyperlipidemia, and diabetes who in 2017 developed necrotic changes to his left toes. He underwent an angiogram on 3/8/17 with left SFA and popliteal artery angioplasty and left SFA stenting by Dr. Hamlin. He appeared to do well and continued to follow up on a routine basis. He presented for his routine follow up with Dr. Hamlin on 7/15/19 and was noted to have in-stent stenosis on arterial duplex. ABIs were unable to be obtained due to non-compressible vessels.  He underwent an angiogram on 7/30/19 and an additional stent was placed inside his previous stent.     Unfortunately, his follow-up was delayed due to him being in [place] and then because of the pandemic. He eventually needed to have his left great toe amputation on 4/20/21 by Dr. Whelan.     Now in the past 4 weeks he has developed ischemic ulcerations to his right 1st, 3rd, 4th, and 5th toes. He was seen by Dr. Whelan of Podiatry who gave a stat referral to Vascular Surgery for further evaluation of his blood supply to his right foot. ABIs were obtained and 0.36 on the right (0.53 on the left). He was evaluated by Dr. Hamlin and a right lower extremity angiogram was recommended, which he presented for today.         Past Medical History    Past Medical History:   Diagnosis Date    Diabetes mellitus type 2, noninsulin dependent (H)     Hyperlipidemia LDL goal <70     Hypertension     PAD (peripheral artery disease) (H)        Past Surgical History   Past Surgical History:   Procedure Laterality Date    AMPUTATE FOOT Left 3/15/2017    Procedure: AMPUTATE FOOT;  Surgeon: Emre Mauricio DPM;  Location: [facility] OR    AMPUTATE TOE(S) Left 4/20/2021    Procedure: LEFT GREAT TOE AMPUTATION.;  Surgeon: Purvi Whelan DPM, Podiatry/Foot and Ankle Surgery;  Location: [facility] OR    IR LOWER EXTREMITY ANGIOGRAM LEFT  7/30/2019    IR LOWER EXTREMITY ANGIOGRAM LEFT  2/3/2021    IR LOWER EXTREMITY ANGIOGRAM LEFT  4/21/2021    IRRIGATION AND DEBRIDEMENT FOOT, COMBINED Left 3/15/2017    Procedure: COMBINED IRRIGATION AND DEBRIDEMENT FOOT;  Surgeon: Emre Mauricio DPM;  Location: [facility] OR       Prior to Admission Medications   Prior to Admission Medications   Prescriptions Last Dose Informant Patient Reported? Taking?   Cadexomer Iodine, topical, 0.9% (IODOSORB) 0.9 % GEL gel Unknown at Unknown time Self No Yes   Sig: Apply 40 g topically daily as needed for other   amLODIPine (NORVASC) 10 MG tablet 2/28/2022 at Unknown time Self Yes Yes   Sig: Take 10 mg by mouth daily   aspirin 81 MG tablet 2/28/2022 at Unknown time Self No Yes   Sig: Take 1 tablet (81 mg) by mouth daily   atorvastatin (LIPITOR) 80 MG tablet 2/28/2022 at Unknown time Self No Yes   Sig: TAKE 1 TABLET(80 MG) BY MOUTH AT BEDTIME   chlorthalidone (HYGROTON) 50 MG tablet 2/28/2022 at Unknown time Self Yes Yes   Sig: TK 1 T PO QD FOR BP   clopidogrel (PLAVIX) 75 MG tablet 2/28/2022 at Unknown time  No Yes   Sig: TAKE 1 TABLET(75 MG) BY MOUTH DAILY   empagliflozin (JARDIANCE) 10 MG TABS tablet 2/28/2022 at Unknown time Self No Yes   Sig: Take 1 tablet (10 mg) by mouth daily   ezetimibe (ZETIA) 10 MG tablet 2/28/2022 at Unknown time Self No Yes   Sig: TAKE 1 TABLET(10 MG) BY MOUTH DAILY   gabapentin  (NEURONTIN) 100 MG capsule 3/1/2022 at Unknown time  Yes Yes   Sig: Take 100 mg by mouth 2 times daily Pt unsure of dose   losartan (COZAAR) 100 MG tablet 2/28/2022 at Unknown time Self Yes Yes   Sig: TK 1 T TABLET QD   metFORMIN (GLUCOPHAGE) 1000 MG tablet 2/28/2022 at Unknown time Self Yes Yes   Sig: Take 1,000 mg by mouth 2 times daily (with meals)    metoprolol succinate ER (TOPROL-XL) 100 MG 24 hr tablet 2/28/2022 at Unknown time Self No Yes   Sig: Take 1 tablet (100 mg) by mouth daily      Facility-Administered Medications: None     Allergies   No Known Allergies    Social History   Zia Hopkins  reports that he has been smoking cigarettes. He has been smoking about 0.25 packs per day. He has never used smokeless tobacco. He reports current alcohol use. He reports that he does not use drugs.    Family History   Family History   Problem Relation Age of Onset    Diabetes Father        Review of Systems   The 10 point Review of Systems is negative other than noted in the HPI or here.     Physical Exam   Temp: 97.9  F (36.6  C) Temp src: Oral BP: (!) 143/71 Pulse: 66   Resp: 10 SpO2: 94 % O2 Device: Nasal cannula Oxygen Delivery: 1 LPM  Vital Signs with Ranges  Temp:  [97.9  F (36.6  C)] 97.9  F (36.6  C)  Pulse:  [66-73] 66  Resp:  [9-16] 10  BP: (143-169)/(67-83) 143/71  SpO2:  [91 %-100 %] 94 %  135 lbs 11.2 oz    Constitutional: awake, alert, cooperative, no apparent distress, and appears stated age  Eyes: Lids and lashes normal, pupils equal, round and reactive to light, extra ocular muscles intact, sclera clear, conjunctiva normal  ENT: normocepalic, without obvious abnormality, oropharynx pink and moist  Hematologic / Lymphatic: no lymphadenopathy  Respiratory: No increased work of breathing, good air exchange, clear to auscultation bilaterally, no crackles or wheezing  Cardiovascular: regular rate and rhythm, normal S1 and S2 and no murmur noted  GI: Normal bowel sounds, soft, non-distended,  non-tender  Skin: no redness, warmth, or swelling, no rashes. Right heel wound dressed. Ischemic wounds right great toe, 3rd-5th toes.   Musculoskeletal: There is no redness, warmth, or swelling of the joints.  Full range of motion noted.  Motor strength is 5 out of 5 all extremities bilaterally.  Tone is normal.  Neurologic: Awake, alert, oriented to name, place and time.  Cranial nerves II-XII are grossly intact.  Motor is 5 out of 5 bilaterally.    Neuropsychiatric:  Normal affect, memory, insight.  Pulses: Unable to palpate pedal pulses. No carotid bruits appreciated.     Data   Most Recent 3 CBC's:  Recent Labs   Lab Test 04/22/21  0638 04/20/21  0704 04/19/21  1151   WBC 7.7 6.6 8.5   HGB 10.9* 11.0* 12.2*   MCV 94 94 94    239 310     Most Recent 3 BMP's:  Recent Labs   Lab Test 03/01/22  1202 04/23/21  0636 04/22/21  1213 04/22/21  0638    138  --  137   POTASSIUM 4.0 3.4 3.5 3.3*   CHLORIDE 100 107  --  106   CO2 26 26  --  26   BUN 38* 14  --  15   CR 0.99 0.95  --  0.91   ANIONGAP 10 5  --  5   TIN 9.5 8.5  --  8.5   * 121*  --  127*     Most Recent 3 INR's:  Recent Labs   Lab Test 02/03/21  1003 07/30/19  0712 03/08/17  0720   INR 0.95 0.90 0.87     Most Recent Cholesterol Panel:  Recent Labs   Lab Test 03/01/22  1202   CHOL 110   LDL 30   HDL 62   TRIG 92     Most Recent Hemoglobin A1c:  Recent Labs   Lab Test 03/01/22  1202   A1C 7.0*

## 2022-03-01 NOTE — PROGRESS NOTES
Care Suites Post Procedure Note    Patient Information  Name: Zia Hopkins  Age: 72 year old    Post Procedure  Time patient returned to Care Suites: 1440  Concerns/abnormal assessment: No  If abnormal assessment, provider notified: N/A  Plan/Other: recover from sedation, bedrest for 2 hours.    Care Suites Discharge Nursing Note    Discharge Education:  Discharge instructions reviewed: Yes  Additional education/resources provided: angioseal pamphlet, MD here and discussed future plan with pt  Patient/patient representative verbalizes understanding: Yes  Patient discharging on new medications: Yes, med recommended by Dr Velazquez pt will discuss with primary doctor at next visit.  Medication education completed: Yes    Discharge Plans:   Discharge location: home  Discharge ride contacted: Yes  Approximate discharge time: 1650    Discharge Criteria:  Discharge criteria met and vital signs stable: Yes    Patient Belongs:  Patient belongings returned to patient: Yes    Mónica Shirley RN

## 2022-03-01 NOTE — BRIEF OP NOTE
Interventional Radiology Brief Post Procedure Note    Procedure: Right lower extremity angiogram     Proceduralist: Yakelin    Assistant: Mellisa     Time Out: Prior to the start of the procedure and with procedural staff participation, I verbally confirmed the patient s identity using two indicators, relevant allergies, that the procedure was appropriate and matched the consent or emergent situation, and that the correct equipment/implants were available. Immediately prior to starting the procedure I conducted the Time Out with the procedural staff and re-confirmed the patient s name, procedure, and site/side. (The Joint Commission universal protocol was followed.)  Yes        Sedation: IR Nurse Monitored Care   Post Procedure Summary:  Prior to the start of the procedure and with procedural staff participation, I verbally confirmed the patient s identity using two indicators, relevant allergies, that the procedure was appropriate and matched the consent or emergent situation, and that the correct equipment/implants were available. Immediately prior to starting the procedure I conducted the Time Out with the procedural staff and re-confirmed the patient s name, procedure, and site/side. (The Joint Commission universal protocol was followed.)  Yes       Sedatives: Fentanyl and Midazolam (Versed)    Vital signs, airway and pulse oximetry were monitored and remained stable throughout the procedure and sedation was maintained until the procedure was complete.  The patient was monitored by staff until sedation discharge criteria were met.    Patient tolerance: Patient tolerated the procedure well with no immediate complications.    Time of sedation in minutes: 30 Minutes minutes from beginning to end of physician one to one monitoring.          Findings: Patent but disease LEONA and EIA. CFA plaque, patent PFA, severe stenosis of origin of SFA, Multifocal areas of SFA stenosis proximally then eventual mid SFA occlusion.  Above knee POP reconstitutes but appears heavily calcified. BK pop calcified but patent. AT occlusion . PT and peroneal diseased but patent to ankle. PT and reconstituted DP provide flow to foot    Estimated Blood Loss: 5 ml       SPECIMENS: None    Complications: 1. None     Condition: Stable    Plan:   - Bed rest 2 hrs  - Will need femoral endarterectomy with antegrade SFA and pop intervention, possible tibial intervention     Comments: See dictated procedure note for full details.    Tequila Pak MD

## 2022-03-01 NOTE — PRE-PROCEDURE
GENERAL PRE-PROCEDURE:   Procedure:  RIGHT LOWER EXTREMITY ARTERIOGRAM WITH POSSIBLE INTERVENTION     Risks and benefits: Risks, benefits and alternatives were discussed    Consent given by:  Patient  Patient states understanding of procedure being performed: Yes    Patient's understanding of procedure matches consent: Yes    Procedure consent matches procedure scheduled: Yes    Expected level of sedation:  Moderate  Appropriately NPO:  Yes  ASA Class:  2  Mallampati  :  Grade 2- soft palate, base of uvula, tonsillar pillars, and portion of posterior pharyngeal wall visible  Lungs:  Lungs clear with good breath sounds bilaterally  Heart:  Normal heart sounds and rate  History & Physical reviewed:  History and physical reviewed and no updates needed  Statement of review:  I have reviewed the lab findings, diagnostic data, medications, and the plan for sedation

## 2022-03-01 NOTE — DISCHARGE INSTRUCTIONS
Peripheral Angiogram Discharge Instructions - Femoral     After you go home:      Have an adult stay with you until tomorrow.    Drink extra fluids for 2 days.    You may resume your normal diet.    No smoking       For 24 hours - due to the sedation you received:    Relax and take it easy.    Do NOT make any important or legal decisions.    Do NOT drive or operate machines at home or at work.    Do NOT drink alcohol.    Care of Groin Puncture Site:      For the first 24 hrs - check the puncture site every 1-2 hours while awake.    For 2 days, when you cough, sneeze, laugh or move your bowels, hold your hand over the puncture site and press firmly.    Remove the bandaid after 24 hours. If there is minor oozing, apply another bandaid and remove it after 12 hours.    It is normal to have a small bruise or pea size lump at the site.    You may shower tomorrow.  Do NOT take a bath, or use a hot tub or pool for at least 3 days. Do NOT scrub the site. Do not use lotion or powder near the puncture site.     Activity:            For 2 days:    No stooping or squatting    Do NOT do any heavy activity such as exercise, lifting, or straining.     No housework, yard work or any activity that make you sweat    Do NOT lift more than 10 pounds    Bleeding:      If you start bleeding from the site in your groin, lie down flat and press firmly on/above the site for 10 minutes.     Once bleeding stops, lay flat for 2 hours.     [facility] as soon as you can.       Call 911 right away if you have heavy bleeding or bleeding that does not stop.      Medicines:      If you are on Metformin (Glucophage) and your GFR (kidney function level) is >30, you may continue taking your Metformin.    If you are on Metformin (Glucophage) and your GFR (kidney function level) is <30, do not restart the Metformin for 48 hours after your procedure. Check with your primary care giver before restarting the Metformin to see if you need  to have blood drawn to recheck your kidney function (GFR).    If you are taking an antiplatelet medication such as Plavix, do not stop taking it until you talk to your provider.       Take your medications, including blood thinners, unless your provider tells you not to.      If you have stopped any medicines, check with your provider about when to restart them.        Follow Up Appointments:      Follow up with [facility] as directed.    Call the clinic if:      You have increased pain or a large or growing hard lump around the site.    The site is red, swollen, hot or tender.    Blood or fluid is draining from the site.    You have chills or a fever greater than 101 F (38 C).    Your leg feels numb, cool or changes color.    You have hives, a rash or unusual itching.    New pain in the back or belly that you cannot control with Tylenol.    Any questions or concerns.    Other Instructions:      If you received a stent - carry your stent card with you at all times.      If you have questions or your original symptoms do not improve, call:         [facility] @ [phone]

## 2022-03-02 ENCOUNTER — LAB (OUTPATIENT)
Dept: URGENT CARE | Facility: URGENT CARE | Age: 72
End: 2022-03-02
Attending: SURGERY
Payer: COMMERCIAL

## 2022-03-02 DIAGNOSIS — Z11.59 ENCOUNTER FOR SCREENING FOR OTHER VIRAL DISEASES: ICD-10-CM

## 2022-03-02 LAB — SARS-COV-2 RNA RESP QL NAA+PROBE: NEGATIVE

## 2022-03-02 PROCEDURE — U0005 INFEC AGEN DETEC AMPLI PROBE: HCPCS

## 2022-03-02 PROCEDURE — U0003 INFECTIOUS AGENT DETECTION BY NUCLEIC ACID (DNA OR RNA); SEVERE ACUTE RESPIRATORY SYNDROME CORONAVIRUS 2 (SARS-COV-2) (CORONAVIRUS DISEASE [COVID-19]), AMPLIFIED PROBE TECHNIQUE, MAKING USE OF HIGH THROUGHPUT TECHNOLOGIES AS DESCRIBED BY CMS-2020-01-R: HCPCS

## 2022-03-03 ENCOUNTER — ANESTHESIA EVENT (OUTPATIENT)
Dept: SURGERY | Facility: CLINIC | Age: 72
DRG: 252 | End: 2022-03-03
Payer: COMMERCIAL

## 2022-03-03 ASSESSMENT — COPD QUESTIONNAIRES: COPD: 0

## 2022-03-03 ASSESSMENT — LIFESTYLE VARIABLES: TOBACCO_USE: 1

## 2022-03-03 NOTE — PROGRESS NOTES
PTA medications updated by Medication Scribe prior to surgery via phone call with patient (last doses completed by Nurse)     Medication history sources: Patient, Surescripts and H&P  In the past week, patient estimated taking medication this percent of the time: Greater than 90%  Adherence assessment: N/A Not Observed    Significant changes made to the medication list:  None      Additional medication history information:   None    Medication reconciliation completed by provider prior to medication history? No    Time spent in this activity: 40 MINUTES    The information provided in this note is only as accurate as the sources available at the time of update(s)      Prior to Admission medications    Medication Sig Last Dose Taking? Auth Provider   amLODIPine (NORVASC) 10 MG tablet Take 10 mg by mouth daily  at 1200 Yes Reported, Patient   aspirin 81 MG tablet Take 1 tablet (81 mg) by mouth daily  at 1200 Yes Dm Monroe MD   atorvastatin (LIPITOR) 80 MG tablet TAKE 1 TABLET(80 MG) BY MOUTH AT BEDTIME  at PM Yes Surjit Hamlin MD   chlorthalidone (HYGROTON) 50 MG tablet Take 50 mg by mouth daily   at 1200 Yes Reported, Patient   clopidogrel (PLAVIX) 75 MG tablet TAKE 1 TABLET(75 MG) BY MOUTH DAILY  at AM Yes Surjit Hamlin MD   empagliflozin (JARDIANCE) 25 MG TABS tablet Take 25 mg by mouth daily  at 1200 Yes Reported, Patient   ezetimibe (ZETIA) 10 MG tablet TAKE 1 TABLET(10 MG) BY MOUTH DAILY  at 1200 Yes Surjit Hamlin MD   gabapentin (NEURONTIN) 100 MG capsule Take 100 mg by mouth 2 times daily Pt unsure of dose  at AM Yes Reported, Patient   losartan (COZAAR) 100 MG tablet Take 100 mg by mouth daily   at 1200 Yes Reported, Patient   metFORMIN (GLUCOPHAGE) 1000 MG tablet Take 1,000 mg by mouth 2 times daily (with meals)   at PM Yes Reported, Patient   metoprolol succinate ER (TOPROL-XL) 100 MG 24 hr tablet Take 1 tablet (100 mg) by mouth daily  at 1200 Yes Surjit Hamlin  MD Sammy

## 2022-03-03 NOTE — ANESTHESIA PREPROCEDURE EVALUATION
Anesthesia Pre-Procedure Evaluation    Patient: Zia Hopkins   MRN: [id number] : 1950        Procedure : Procedure(s):  RIGHT FEMORAL ENDARTERECTOMY. RIGHT ILIAC STENTING BALLOON ANGIOPLASTY AND STENTING. RIGHT FEMORAL ARTERY BALLOON ANGIOPLASTY AND STENTING          Past Medical History:   Diagnosis Date     Diabetes mellitus type 2, noninsulin dependent (H)      Hyperlipidemia LDL goal <70      Hypertension      PAD (peripheral artery disease) (H)       Past Surgical History:   Procedure Laterality Date     AMPUTATE FOOT Left 3/15/2017    Procedure: AMPUTATE FOOT;  Surgeon: Emre Mauricio DPM;  Location: [facility]     AMPUTATE TOE(S) Left 2021    Procedure: LEFT GREAT TOE AMPUTATION.;  Surgeon: Purvi Whelan DPM, Podiatry/Foot and Ankle Surgery;  Location: [facility]     IR LOWER EXTREMITY ANGIOGRAM LEFT  2019     IR LOWER EXTREMITY ANGIOGRAM LEFT  2/3/2021     IR LOWER EXTREMITY ANGIOGRAM LEFT  2021     IR LOWER EXTREMITY ANGIOGRAM RIGHT  3/1/2022     IRRIGATION AND DEBRIDEMENT FOOT, COMBINED Left 3/15/2017    Procedure: COMBINED IRRIGATION AND DEBRIDEMENT FOOT;  Surgeon: Emre Mauricio DPM;  Location: [facility]      No Known Allergies   Social History     Tobacco Use     Smoking status: Current Every Day Smoker     Packs/day: 0.25     Types: Cigarettes     Smokeless tobacco: Never Used     Tobacco comment: 2-3 cigarettes per day   Substance Use Topics     Alcohol use: Yes     Comment: Wine one bottle a day      Wt Readings from Last 1 Encounters:   22 61.6 kg (135 lb 11.2 oz)        Anesthesia Evaluation   Pt has had prior anesthetic. Type: General.    No history of anesthetic complications       ROS/MED HX  ENT/Pulmonary:     (+) tobacco use, Past use,  (-) asthma, COPD and sleep apnea   Neurologic:     (+) peripheral neuropathy,     Cardiovascular:     (+) Dyslipidemia hypertension-Peripheral Vascular Disease-- Symptomatic. ---Taking blood thinners     METS/Exercise Tolerance:      Hematologic:  - neg hematologic  ROS     Musculoskeletal: Comment: Spinal stenosis      GI/Hepatic:    (-) GERD and liver disease   Renal/Genitourinary:    (-) renal disease   Endo:     (+) type II DM, Not using insulin,     Psychiatric/Substance Use:       Infectious Disease:       Malignancy:       Other:            Physical Exam    Airway        Mallampati: II   TM distance: > 3 FB   Neck ROM: full   Mouth opening: > 3 cm    Respiratory Devices and Support         Dental       (+) caps      Cardiovascular   cardiovascular exam normal          Pulmonary   pulmonary exam normal                OUTSIDE LABS:  CBC:   Lab Results   Component Value Date    WBC 7.7 04/22/2021    WBC 6.6 04/20/2021    HGB 10.9 (L) 04/22/2021    HGB 11.0 (L) 04/20/2021    HCT 33.2 (L) 04/22/2021    HCT 33.6 (L) 04/20/2021     04/22/2021     04/20/2021     BMP:   Lab Results   Component Value Date     03/01/2022     04/23/2021    POTASSIUM 4.0 03/01/2022    POTASSIUM 3.4 04/23/2021    CHLORIDE 100 03/01/2022    CHLORIDE 107 04/23/2021    CO2 26 03/01/2022    CO2 26 04/23/2021    BUN 38 (H) 03/01/2022    BUN 14 04/23/2021    CR 0.99 03/01/2022    CR 0.95 04/23/2021     (H) 03/01/2022     (H) 04/23/2021     COAGS:   Lab Results   Component Value Date    PTT 32 02/03/2021    INR 0.95 02/03/2021     POC:   Lab Results   Component Value Date     (H) 04/23/2021     HEPATIC: No results found for: ALBUMIN, PROTTOTAL, ALT, AST, GGT, ALKPHOS, BILITOTAL, BILIDIRECT, CAMMIE  OTHER:   Lab Results   Component Value Date    A1C 7.0 (H) 03/01/2022    TIN 9.5 03/01/2022       Anesthesia Plan    ASA Status:  3   NPO Status:  NPO Appropriate    Anesthesia Type: General.     - Airway: LMA   Induction: Intravenous.   Maintenance: Inhalation.        Consents    Anesthesia Plan(s) and associated risks, benefits, and realistic alternatives discussed. Questions answered and patient/representative(s) expressed  understanding.    - Discussed:     - Discussed with:  Patient      - Extended Intubation/Ventilatory Support Discussed: No.      - Patient is DNR/DNI Status: No    Use of blood products discussed: No .     Postoperative Care    Pain management: IV analgesics, Multi-modal analgesia.     - Plan for long acting post-op opioid use   PONV prophylaxis: Ondansetron (or other 5HT-3), Background Propofol Infusion     Comments:                Juan Pablo Gallardo MD

## 2022-03-04 ENCOUNTER — APPOINTMENT (OUTPATIENT)
Dept: INTERVENTIONAL RADIOLOGY/VASCULAR | Facility: CLINIC | Age: 72
DRG: 252 | End: 2022-03-04
Attending: SURGERY
Payer: COMMERCIAL

## 2022-03-04 ENCOUNTER — ANESTHESIA (OUTPATIENT)
Dept: SURGERY | Facility: CLINIC | Age: 72
DRG: 252 | End: 2022-03-04
Payer: COMMERCIAL

## 2022-03-04 ENCOUNTER — HOSPITAL ENCOUNTER (INPATIENT)
Facility: CLINIC | Age: 72
LOS: 4 days | Discharge: HOME | DRG: 252 | End: 2022-03-08
Attending: SURGERY | Admitting: SURGERY
Payer: COMMERCIAL

## 2022-03-04 ENCOUNTER — APPOINTMENT (OUTPATIENT)
Dept: SURGERY | Facility: PHYSICIAN GROUP | Age: 72
End: 2022-03-04
Payer: COMMERCIAL

## 2022-03-04 DIAGNOSIS — I70.229 CRITICAL ISCHEMIA OF LOWER EXTREMITY (H): Primary | ICD-10-CM

## 2022-03-04 LAB
ABO/RH(D): NORMAL
ANTIBODY SCREEN: NEGATIVE
BLD PROD TYP BPU: NORMAL
BLD PROD TYP BPU: NORMAL
BLOOD COMPONENT TYPE: NORMAL
BLOOD COMPONENT TYPE: NORMAL
CHOLEST SERPL-MCNC: 117 MG/DL
CODING SYSTEM: NORMAL
CODING SYSTEM: NORMAL
CROSSMATCH: NORMAL
CROSSMATCH: NORMAL
GLUCOSE BLD-MCNC: 159 MG/DL (ref 70–99)
GLUCOSE BLDC GLUCOMTR-MCNC: 124 MG/DL (ref 70–99)
GLUCOSE BLDC GLUCOMTR-MCNC: 131 MG/DL (ref 70–99)
HBA1C MFR BLD: 7.2 % (ref 0–5.6)
HDLC SERPL-MCNC: 55 MG/DL
HGB BLD-MCNC: 9.2 G/DL (ref 13.3–17.7)
ISSUE DATE AND TIME: NORMAL
ISSUE DATE AND TIME: NORMAL
LDLC SERPL CALC-MCNC: 38 MG/DL
NONHDLC SERPL-MCNC: 62 MG/DL
POTASSIUM BLD-SCNC: 3.6 MMOL/L (ref 3.4–5.3)
SPECIMEN EXPIRATION DATE: NORMAL
TRIGL SERPL-MCNC: 120 MG/DL
UNIT ABO/RH: NORMAL
UNIT ABO/RH: NORMAL
UNIT NUMBER: NORMAL
UNIT NUMBER: NORMAL
UNIT STATUS: NORMAL
UNIT STATUS: NORMAL
UNIT TYPE ISBT: 5100
UNIT TYPE ISBT: 5100

## 2022-03-04 PROCEDURE — 37226 PR REVASCULARIZE FEM/POP ARTERY, ANGIOPLASTY/STENT: CPT | Performed by: SURGERY

## 2022-03-04 PROCEDURE — 86850 RBC ANTIBODY SCREEN: CPT | Performed by: SURGERY

## 2022-03-04 PROCEDURE — 250N000011 HC RX IP 250 OP 636: Performed by: ANESTHESIOLOGY

## 2022-03-04 PROCEDURE — 258N000003 HC RX IP 258 OP 636: Performed by: STUDENT IN AN ORGANIZED HEALTH CARE EDUCATION/TRAINING PROGRAM

## 2022-03-04 PROCEDURE — 710N000009 HC RECOVERY PHASE 1, LEVEL 1, PER MIN: Performed by: SURGERY

## 2022-03-04 PROCEDURE — C1894 INTRO/SHEATH, NON-LASER: HCPCS | Performed by: SURGERY

## 2022-03-04 PROCEDURE — 120N000001 HC R&B MED SURG/OB

## 2022-03-04 PROCEDURE — 999N000141 HC STATISTIC PRE-PROCEDURE NURSING ASSESSMENT: Performed by: SURGERY

## 2022-03-04 PROCEDURE — 86901 BLOOD TYPING SEROLOGIC RH(D): CPT | Performed by: SURGERY

## 2022-03-04 PROCEDURE — 36415 COLL VENOUS BLD VENIPUNCTURE: CPT | Performed by: ANESTHESIOLOGY

## 2022-03-04 PROCEDURE — P9016 RBC LEUKOCYTES REDUCED: HCPCS | Performed by: SURGERY

## 2022-03-04 PROCEDURE — 86923 COMPATIBILITY TEST ELECTRIC: CPT | Performed by: SURGERY

## 2022-03-04 PROCEDURE — 75710 ARTERY X-RAYS ARM/LEG: CPT | Mod: 26 | Performed by: SURGERY

## 2022-03-04 PROCEDURE — 85018 HEMOGLOBIN: CPT | Performed by: ANESTHESIOLOGY

## 2022-03-04 PROCEDURE — C1757 CATH, THROMBECTOMY/EMBOLECT: HCPCS | Performed by: SURGERY

## 2022-03-04 PROCEDURE — 047K3FZ DILATION OF RIGHT FEMORAL ARTERY WITH THREE INTRALUMINAL DEVICES, PERCUTANEOUS APPROACH: ICD-10-PCS | Performed by: SURGERY

## 2022-03-04 PROCEDURE — 250N000009 HC RX 250: Performed by: SURGERY

## 2022-03-04 PROCEDURE — 250N000011 HC RX IP 250 OP 636: Performed by: STUDENT IN AN ORGANIZED HEALTH CARE EDUCATION/TRAINING PROGRAM

## 2022-03-04 PROCEDURE — 86923 COMPATIBILITY TEST ELECTRIC: CPT | Performed by: HOSPITALIST

## 2022-03-04 PROCEDURE — 258N000003 HC RX IP 258 OP 636: Performed by: ANESTHESIOLOGY

## 2022-03-04 PROCEDURE — 250N000025 HC SEVOFLURANE, PER MIN: Performed by: SURGERY

## 2022-03-04 PROCEDURE — 250N000011 HC RX IP 250 OP 636: Performed by: SURGERY

## 2022-03-04 PROCEDURE — 250N000009 HC RX 250: Performed by: STUDENT IN AN ORGANIZED HEALTH CARE EDUCATION/TRAINING PROGRAM

## 2022-03-04 PROCEDURE — 83036 HEMOGLOBIN GLYCOSYLATED A1C: CPT | Performed by: SURGERY

## 2022-03-04 PROCEDURE — C1876 STENT, NON-COA/NON-COV W/DEL: HCPCS

## 2022-03-04 PROCEDURE — 272N000124 HC CATH CR11

## 2022-03-04 PROCEDURE — P9041 ALBUMIN (HUMAN),5%, 50ML: HCPCS | Performed by: ANESTHESIOLOGY

## 2022-03-04 PROCEDURE — 93005 ELECTROCARDIOGRAM TRACING: CPT

## 2022-03-04 PROCEDURE — 370N000017 HC ANESTHESIA TECHNICAL FEE, PER MIN: Performed by: SURGERY

## 2022-03-04 PROCEDURE — 04UK0KZ SUPPLEMENT RIGHT FEMORAL ARTERY WITH NONAUTOLOGOUS TISSUE SUBSTITUTE, OPEN APPROACH: ICD-10-PCS | Performed by: SURGERY

## 2022-03-04 PROCEDURE — 250N000009 HC RX 250: Performed by: ANESTHESIOLOGY

## 2022-03-04 PROCEDURE — 99223 1ST HOSP IP/OBS HIGH 75: CPT | Performed by: INTERNAL MEDICINE

## 2022-03-04 PROCEDURE — 250N000013 HC RX MED GY IP 250 OP 250 PS 637: Performed by: STUDENT IN AN ORGANIZED HEALTH CARE EDUCATION/TRAINING PROGRAM

## 2022-03-04 PROCEDURE — 82947 ASSAY GLUCOSE BLOOD QUANT: CPT | Performed by: ANESTHESIOLOGY

## 2022-03-04 PROCEDURE — 360N000077 HC SURGERY LEVEL 4, PER MIN: Performed by: SURGERY

## 2022-03-04 PROCEDURE — 80061 LIPID PANEL: CPT | Performed by: SURGERY

## 2022-03-04 PROCEDURE — 04CK0ZZ EXTIRPATION OF MATTER FROM RIGHT FEMORAL ARTERY, OPEN APPROACH: ICD-10-PCS | Performed by: SURGERY

## 2022-03-04 PROCEDURE — 35371 RECHANNELING OF ARTERY: CPT | Mod: RT | Performed by: SURGERY

## 2022-03-04 PROCEDURE — 84132 ASSAY OF SERUM POTASSIUM: CPT | Performed by: ANESTHESIOLOGY

## 2022-03-04 PROCEDURE — C1769 GUIDE WIRE: HCPCS | Performed by: SURGERY

## 2022-03-04 PROCEDURE — 255N000002 HC RX 255 OP 636: Performed by: SURGERY

## 2022-03-04 PROCEDURE — C1763 CONN TISS, NON-HUMAN: HCPCS | Performed by: SURGERY

## 2022-03-04 PROCEDURE — 272N000001 HC OR GENERAL SUPPLY STERILE: Performed by: SURGERY

## 2022-03-04 DEVICE — IMPLANTABLE DEVICE: Type: IMPLANTABLE DEVICE | Site: ILIAC/FEMORALS | Status: FUNCTIONAL

## 2022-03-04 DEVICE — IMP PATCH PERICARDIUM PHOTOFIX BOVINE 0.8X8CM PFP0.8X8: Type: IMPLANTABLE DEVICE | Site: ILIAC/FEMORALS | Status: FUNCTIONAL

## 2022-03-04 RX ORDER — PROPOFOL 10 MG/ML
INJECTION, EMULSION INTRAVENOUS CONTINUOUS PRN
Status: DISCONTINUED | OUTPATIENT
Start: 2022-03-04 | End: 2022-03-04

## 2022-03-04 RX ORDER — NALOXONE HYDROCHLORIDE 0.4 MG/ML
0.4 INJECTION, SOLUTION INTRAMUSCULAR; INTRAVENOUS; SUBCUTANEOUS
Status: DISCONTINUED | OUTPATIENT
Start: 2022-03-04 | End: 2022-03-07

## 2022-03-04 RX ORDER — SODIUM CHLORIDE, SODIUM LACTATE, POTASSIUM CHLORIDE, CALCIUM CHLORIDE 600; 310; 30; 20 MG/100ML; MG/100ML; MG/100ML; MG/100ML
INJECTION, SOLUTION INTRAVENOUS CONTINUOUS
Status: DISCONTINUED | OUTPATIENT
Start: 2022-03-04 | End: 2022-03-04 | Stop reason: HOSPADM

## 2022-03-04 RX ORDER — CEFAZOLIN SODIUM/WATER 2 G/20 ML
2 SYRINGE (ML) INTRAVENOUS
Status: COMPLETED | OUTPATIENT
Start: 2022-03-04 | End: 2022-03-04

## 2022-03-04 RX ORDER — NALOXONE HYDROCHLORIDE 0.4 MG/ML
0.2 INJECTION, SOLUTION INTRAMUSCULAR; INTRAVENOUS; SUBCUTANEOUS
Status: DISCONTINUED | OUTPATIENT
Start: 2022-03-04 | End: 2022-03-07

## 2022-03-04 RX ORDER — PROPOFOL 10 MG/ML
INJECTION, EMULSION INTRAVENOUS PRN
Status: DISCONTINUED | OUTPATIENT
Start: 2022-03-04 | End: 2022-03-04

## 2022-03-04 RX ORDER — LOSARTAN POTASSIUM 100 MG/1
100 TABLET ORAL DAILY
Status: DISCONTINUED | OUTPATIENT
Start: 2022-03-05 | End: 2022-03-08 | Stop reason: HOSPADM

## 2022-03-04 RX ORDER — HYDROMORPHONE HCL IN WATER/PF 6 MG/30 ML
0.2 PATIENT CONTROLLED ANALGESIA SYRINGE INTRAVENOUS
Status: DISCONTINUED | OUTPATIENT
Start: 2022-03-04 | End: 2022-03-07

## 2022-03-04 RX ORDER — METOPROLOL SUCCINATE 50 MG/1
100 TABLET, EXTENDED RELEASE ORAL DAILY
Status: DISCONTINUED | OUTPATIENT
Start: 2022-03-04 | End: 2022-03-08 | Stop reason: HOSPADM

## 2022-03-04 RX ORDER — ACETAMINOPHEN 325 MG/1
650 TABLET ORAL EVERY 4 HOURS PRN
Status: DISCONTINUED | OUTPATIENT
Start: 2022-03-07 | End: 2022-03-08 | Stop reason: HOSPADM

## 2022-03-04 RX ORDER — AMLODIPINE BESYLATE 10 MG/1
10 TABLET ORAL DAILY
Status: DISCONTINUED | OUTPATIENT
Start: 2022-03-05 | End: 2022-03-08 | Stop reason: HOSPADM

## 2022-03-04 RX ORDER — EPHEDRINE SULFATE 50 MG/ML
INJECTION, SOLUTION INTRAMUSCULAR; INTRAVENOUS; SUBCUTANEOUS PRN
Status: DISCONTINUED | OUTPATIENT
Start: 2022-03-04 | End: 2022-03-04

## 2022-03-04 RX ORDER — CLOPIDOGREL BISULFATE 75 MG/1
75 TABLET ORAL DAILY
Status: DISCONTINUED | OUTPATIENT
Start: 2022-03-05 | End: 2022-03-08 | Stop reason: HOSPADM

## 2022-03-04 RX ORDER — SODIUM CHLORIDE 9 MG/ML
INJECTION, SOLUTION INTRAVENOUS CONTINUOUS PRN
Status: DISCONTINUED | OUTPATIENT
Start: 2022-03-04 | End: 2022-03-04

## 2022-03-04 RX ORDER — ONDANSETRON 2 MG/ML
4 INJECTION INTRAMUSCULAR; INTRAVENOUS EVERY 30 MIN PRN
Status: DISCONTINUED | OUTPATIENT
Start: 2022-03-04 | End: 2022-03-04 | Stop reason: HOSPADM

## 2022-03-04 RX ORDER — ONDANSETRON 2 MG/ML
INJECTION INTRAMUSCULAR; INTRAVENOUS PRN
Status: DISCONTINUED | OUTPATIENT
Start: 2022-03-04 | End: 2022-03-04

## 2022-03-04 RX ORDER — CEFAZOLIN SODIUM 1 G/3ML
1 INJECTION, POWDER, FOR SOLUTION INTRAMUSCULAR; INTRAVENOUS EVERY 8 HOURS
Status: COMPLETED | OUTPATIENT
Start: 2022-03-04 | End: 2022-03-05

## 2022-03-04 RX ORDER — PROCHLORPERAZINE MALEATE 5 MG
5 TABLET ORAL EVERY 6 HOURS PRN
Status: DISCONTINUED | OUTPATIENT
Start: 2022-03-04 | End: 2022-03-07

## 2022-03-04 RX ORDER — FENTANYL CITRATE 50 UG/ML
INJECTION, SOLUTION INTRAMUSCULAR; INTRAVENOUS PRN
Status: DISCONTINUED | OUTPATIENT
Start: 2022-03-04 | End: 2022-03-04

## 2022-03-04 RX ORDER — OXYCODONE HYDROCHLORIDE 5 MG/1
5 TABLET ORAL EVERY 4 HOURS PRN
Status: DISCONTINUED | OUTPATIENT
Start: 2022-03-04 | End: 2022-03-04 | Stop reason: HOSPADM

## 2022-03-04 RX ORDER — LIDOCAINE HYDROCHLORIDE 20 MG/ML
INJECTION, SOLUTION INFILTRATION; PERINEURAL PRN
Status: DISCONTINUED | OUTPATIENT
Start: 2022-03-04 | End: 2022-03-04

## 2022-03-04 RX ORDER — ONDANSETRON 2 MG/ML
4 INJECTION INTRAMUSCULAR; INTRAVENOUS EVERY 6 HOURS PRN
Status: DISCONTINUED | OUTPATIENT
Start: 2022-03-04 | End: 2022-03-07

## 2022-03-04 RX ORDER — FAMOTIDINE 20 MG/1
20 TABLET, FILM COATED ORAL 2 TIMES DAILY
Status: DISCONTINUED | OUTPATIENT
Start: 2022-03-04 | End: 2022-03-08 | Stop reason: HOSPADM

## 2022-03-04 RX ORDER — ALBUMIN, HUMAN INJ 5% 5 %
SOLUTION INTRAVENOUS CONTINUOUS PRN
Status: DISCONTINUED | OUTPATIENT
Start: 2022-03-04 | End: 2022-03-04

## 2022-03-04 RX ORDER — POLYETHYLENE GLYCOL 3350 17 G/17G
17 POWDER, FOR SOLUTION ORAL DAILY
Status: DISCONTINUED | OUTPATIENT
Start: 2022-03-05 | End: 2022-03-08 | Stop reason: HOSPADM

## 2022-03-04 RX ORDER — ACETAMINOPHEN 325 MG/1
975 TABLET ORAL EVERY 8 HOURS
Status: DISPENSED | OUTPATIENT
Start: 2022-03-04 | End: 2022-03-07

## 2022-03-04 RX ORDER — FENTANYL CITRATE 0.05 MG/ML
50 INJECTION, SOLUTION INTRAMUSCULAR; INTRAVENOUS
Status: DISCONTINUED | OUTPATIENT
Start: 2022-03-04 | End: 2022-03-04 | Stop reason: HOSPADM

## 2022-03-04 RX ORDER — IOPAMIDOL 612 MG/ML
100 INJECTION, SOLUTION INTRAVASCULAR ONCE
Status: DISCONTINUED | OUTPATIENT
Start: 2022-03-04 | End: 2022-03-04 | Stop reason: HOSPADM

## 2022-03-04 RX ORDER — SODIUM CHLORIDE 9 MG/ML
INJECTION, SOLUTION INTRAVENOUS CONTINUOUS
Status: DISCONTINUED | OUTPATIENT
Start: 2022-03-04 | End: 2022-03-05

## 2022-03-04 RX ORDER — ONDANSETRON 4 MG/1
4 TABLET, ORALLY DISINTEGRATING ORAL EVERY 6 HOURS PRN
Status: DISCONTINUED | OUTPATIENT
Start: 2022-03-04 | End: 2022-03-07

## 2022-03-04 RX ORDER — FENTANYL CITRATE 0.05 MG/ML
25 INJECTION, SOLUTION INTRAMUSCULAR; INTRAVENOUS EVERY 5 MIN PRN
Status: DISCONTINUED | OUTPATIENT
Start: 2022-03-04 | End: 2022-03-04 | Stop reason: HOSPADM

## 2022-03-04 RX ORDER — HYDROMORPHONE HCL IN WATER/PF 6 MG/30 ML
0.4 PATIENT CONTROLLED ANALGESIA SYRINGE INTRAVENOUS
Status: DISCONTINUED | OUTPATIENT
Start: 2022-03-04 | End: 2022-03-07

## 2022-03-04 RX ORDER — PROTAMINE SULFATE 10 MG/ML
INJECTION, SOLUTION INTRAVENOUS PRN
Status: DISCONTINUED | OUTPATIENT
Start: 2022-03-04 | End: 2022-03-04

## 2022-03-04 RX ORDER — MAGNESIUM HYDROXIDE 1200 MG/15ML
LIQUID ORAL PRN
Status: DISCONTINUED | OUTPATIENT
Start: 2022-03-04 | End: 2022-03-04 | Stop reason: HOSPADM

## 2022-03-04 RX ORDER — DEXTROSE MONOHYDRATE 25 G/50ML
25-50 INJECTION, SOLUTION INTRAVENOUS
Status: DISCONTINUED | OUTPATIENT
Start: 2022-03-04 | End: 2022-03-08 | Stop reason: HOSPADM

## 2022-03-04 RX ORDER — ASPIRIN 81 MG/1
81 TABLET, CHEWABLE ORAL DAILY
Status: DISCONTINUED | OUTPATIENT
Start: 2022-03-04 | End: 2022-03-08 | Stop reason: HOSPADM

## 2022-03-04 RX ORDER — LIDOCAINE 40 MG/G
CREAM TOPICAL
Status: DISCONTINUED | OUTPATIENT
Start: 2022-03-04 | End: 2022-03-07

## 2022-03-04 RX ORDER — ONDANSETRON 4 MG/1
4 TABLET, ORALLY DISINTEGRATING ORAL EVERY 30 MIN PRN
Status: DISCONTINUED | OUTPATIENT
Start: 2022-03-04 | End: 2022-03-04 | Stop reason: HOSPADM

## 2022-03-04 RX ORDER — NICOTINE POLACRILEX 4 MG
15-30 LOZENGE BUCCAL
Status: DISCONTINUED | OUTPATIENT
Start: 2022-03-04 | End: 2022-03-08 | Stop reason: HOSPADM

## 2022-03-04 RX ORDER — GABAPENTIN 100 MG/1
100 CAPSULE ORAL 2 TIMES DAILY
Status: DISCONTINUED | OUTPATIENT
Start: 2022-03-04 | End: 2022-03-08 | Stop reason: HOSPADM

## 2022-03-04 RX ORDER — HYDROMORPHONE HCL IN WATER/PF 6 MG/30 ML
0.2 PATIENT CONTROLLED ANALGESIA SYRINGE INTRAVENOUS EVERY 5 MIN PRN
Status: DISCONTINUED | OUTPATIENT
Start: 2022-03-04 | End: 2022-03-04 | Stop reason: HOSPADM

## 2022-03-04 RX ORDER — IOPAMIDOL 612 MG/ML
INJECTION, SOLUTION INTRAVASCULAR PRN
Status: DISCONTINUED | OUTPATIENT
Start: 2022-03-04 | End: 2022-03-04 | Stop reason: HOSPADM

## 2022-03-04 RX ORDER — ATORVASTATIN CALCIUM 80 MG/1
80 TABLET, FILM COATED ORAL EVERY EVENING
Status: DISCONTINUED | OUTPATIENT
Start: 2022-03-04 | End: 2022-03-08 | Stop reason: HOSPADM

## 2022-03-04 RX ORDER — HEPARIN SODIUM 1000 [USP'U]/ML
INJECTION, SOLUTION INTRAVENOUS; SUBCUTANEOUS PRN
Status: DISCONTINUED | OUTPATIENT
Start: 2022-03-04 | End: 2022-03-04

## 2022-03-04 RX ADMIN — HEPARIN SODIUM 2000 UNITS: 1000 INJECTION INTRAVENOUS; SUBCUTANEOUS at 14:44

## 2022-03-04 RX ADMIN — Medication 2 G: at 12:42

## 2022-03-04 RX ADMIN — ASPIRIN 81 MG CHEWABLE TABLET 81 MG: 81 TABLET CHEWABLE at 20:30

## 2022-03-04 RX ADMIN — HEPARIN SODIUM 2000 UNITS: 1000 INJECTION INTRAVENOUS; SUBCUTANEOUS at 14:49

## 2022-03-04 RX ADMIN — ONDANSETRON 4 MG: 2 INJECTION INTRAMUSCULAR; INTRAVENOUS at 15:47

## 2022-03-04 RX ADMIN — FAMOTIDINE 20 MG: 10 INJECTION, SOLUTION INTRAVENOUS at 20:30

## 2022-03-04 RX ADMIN — LIDOCAINE HYDROCHLORIDE 100 MG: 20 INJECTION, SOLUTION INFILTRATION; PERINEURAL at 12:50

## 2022-03-04 RX ADMIN — ALBUMIN HUMAN: 0.05 INJECTION, SOLUTION INTRAVENOUS at 16:25

## 2022-03-04 RX ADMIN — PHENYLEPHRINE HYDROCHLORIDE 0.4 MCG/KG/MIN: 10 INJECTION INTRAVENOUS at 13:10

## 2022-03-04 RX ADMIN — PHENYLEPHRINE HYDROCHLORIDE 100 MCG: 10 INJECTION INTRAVENOUS at 16:32

## 2022-03-04 RX ADMIN — HYDROMORPHONE HYDROCHLORIDE 0.25 MG: 1 INJECTION, SOLUTION INTRAMUSCULAR; INTRAVENOUS; SUBCUTANEOUS at 13:59

## 2022-03-04 RX ADMIN — SODIUM CHLORIDE: 9 INJECTION, SOLUTION INTRAVENOUS at 16:35

## 2022-03-04 RX ADMIN — HYDROMORPHONE HYDROCHLORIDE 0.25 MG: 1 INJECTION, SOLUTION INTRAMUSCULAR; INTRAVENOUS; SUBCUTANEOUS at 15:36

## 2022-03-04 RX ADMIN — PROPOFOL 150 MG: 10 INJECTION, EMULSION INTRAVENOUS at 12:51

## 2022-03-04 RX ADMIN — Medication 5 MG: at 12:59

## 2022-03-04 RX ADMIN — ATORVASTATIN CALCIUM 80 MG: 80 TABLET, FILM COATED ORAL at 22:31

## 2022-03-04 RX ADMIN — SODIUM CHLORIDE, POTASSIUM CHLORIDE, SODIUM LACTATE AND CALCIUM CHLORIDE: 600; 310; 30; 20 INJECTION, SOLUTION INTRAVENOUS at 11:17

## 2022-03-04 RX ADMIN — FENTANYL CITRATE 100 MCG: 50 INJECTION, SOLUTION INTRAMUSCULAR; INTRAVENOUS at 12:51

## 2022-03-04 RX ADMIN — CEFAZOLIN 1 G: 1 INJECTION, POWDER, FOR SOLUTION INTRAMUSCULAR; INTRAVENOUS at 22:38

## 2022-03-04 RX ADMIN — PROPOFOL 20 MCG/KG/MIN: 10 INJECTION, EMULSION INTRAVENOUS at 12:42

## 2022-03-04 RX ADMIN — Medication 5 MG: at 15:41

## 2022-03-04 RX ADMIN — Medication 5 MG: at 15:29

## 2022-03-04 RX ADMIN — PROTAMINE SULFATE 30 MG: 10 INJECTION, SOLUTION INTRAVENOUS at 16:48

## 2022-03-04 RX ADMIN — GABAPENTIN 100 MG: 100 CAPSULE ORAL at 20:30

## 2022-03-04 RX ADMIN — HEPARIN SODIUM 6000 UNITS: 1000 INJECTION INTRAVENOUS; SUBCUTANEOUS at 13:58

## 2022-03-04 RX ADMIN — TRAMADOL HYDROCHLORIDE 25 MG: 50 TABLET ORAL at 22:31

## 2022-03-04 RX ADMIN — PHENYLEPHRINE HYDROCHLORIDE 100 MCG: 10 INJECTION INTRAVENOUS at 15:46

## 2022-03-04 RX ADMIN — SODIUM CHLORIDE: 9 INJECTION, SOLUTION INTRAVENOUS at 22:38

## 2022-03-04 RX ADMIN — MIDAZOLAM 2 MG: 1 INJECTION INTRAMUSCULAR; INTRAVENOUS at 12:42

## 2022-03-04 RX ADMIN — PHENYLEPHRINE HYDROCHLORIDE 100 MCG: 10 INJECTION INTRAVENOUS at 16:30

## 2022-03-04 RX ADMIN — HEPARIN SODIUM 2000 UNITS: 1000 INJECTION INTRAVENOUS; SUBCUTANEOUS at 16:04

## 2022-03-04 RX ADMIN — SODIUM CHLORIDE, POTASSIUM CHLORIDE, SODIUM LACTATE AND CALCIUM CHLORIDE: 600; 310; 30; 20 INJECTION, SOLUTION INTRAVENOUS at 12:42

## 2022-03-04 RX ADMIN — ACETAMINOPHEN 975 MG: 325 TABLET, FILM COATED ORAL at 20:29

## 2022-03-04 RX ADMIN — Medication 2 G: at 16:42

## 2022-03-04 RX ADMIN — PHENYLEPHRINE HYDROCHLORIDE 100 MCG: 10 INJECTION INTRAVENOUS at 13:07

## 2022-03-04 ASSESSMENT — ACTIVITIES OF DAILY LIVING (ADL)
ADLS_ACUITY_SCORE: 6
ADLS_ACUITY_SCORE: 7
ADLS_ACUITY_SCORE: 7
ADLS_ACUITY_SCORE: 6
ADLS_ACUITY_SCORE: 7
ADLS_ACUITY_SCORE: 6
ADLS_ACUITY_SCORE: 6
ADLS_ACUITY_SCORE: 7
ADLS_ACUITY_SCORE: 6
ADLS_ACUITY_SCORE: 7
ADLS_ACUITY_SCORE: 6
ADLS_ACUITY_SCORE: 6
ADLS_ACUITY_SCORE: 12

## 2022-03-04 NOTE — ANESTHESIA PROCEDURE NOTES
Airway      Staff -        Other Anesthesia Staff: Augustine Gamble       Performed By: SRNAIndications and Patient Condition         Mask difficulty assessment: 1 - vent by mask    Final Airway Details       Final airway type: supraglottic airway    Supraglottic Airway Details        Type: LMA       Brand: Ambu AuraGain       LMA size: 4    Post intubation assessment        Placement verified by: capnometry, equal breath sounds and chest rise        Number of attempts at approach: 1       Secured with: pink tape       Ease of procedure: easy       Dentition: Intact

## 2022-03-04 NOTE — OP NOTE
Preoperative diagnosis: Right lower extremity critical limb ischemia with nonhealing wounds.    Postoperative diagnosis: Same.    Procedure:  1.  Right common and superficial femoral endarterectomy with bovine pericardial patch angioplasty.  2.  Recanalization of occluded right superficial femoral artery.  3.  Balloon angioplasty of right proximal popliteal and superficial femoral artery using 4 mm x 15 cm angioplasty balloon with completion arteriogram.  4.  Right superficial femoral artery stenting using 7 mm x 15 cm, 7 mm x 12 cm and 7 mm x 4 cm self-expanding stents with post stenting angioplasty using 6 mm x 15 cm angioplasty balloon with completion arteriogram.    Surgeon: Surjit Hamlin M.D.  Assistant: Tequila Pak M.D.    Modifier 22: It should be noted that this gentleman had extremely calcified rockhard vessels which made for a difficult dissection, endarterectomy and patch angioplasty.  Following the recannulization of this heavily calcified superficial femoral artery was quite challenging as well.  I would like to designate this a difficulty level: D3.    Anesthesia: General.  Estimated blood loss: 1 L.  Complications: None.  Fluoroscopy time: 12.8 minutes.  Fluoroscopy dose: 38.2 mGy.   Contrast: 75 mL of Isovue was used.    Heparin: 12,000 units.  Protamine: 30 mg.    Indication for procedure: This is a very pleasant yet complicated 72-year-old male with multilevel arterial disease of right lower extremity resulting in nonhealing wounds.  We recently did an arteriogram which showed significant occlusive disease of the common femoral artery as well as heavily calcific disease of the proximal superficial femoral artery with occlusion of the mid segment of the superficial femoral artery with heavily calcified popliteal artery as well as tibial vessel disease.  He does not have a good vein conduit based on our sonography.  Our plan is to do a femoral endarterectomy to relieve and debulk some of the  disease in the common femoral and superficial femoral artery and then plan for an endovascular intervention as I do not think his arteries would be plantable for a bypass and the vein conduit is not suitable.  All risks and benefits of the procedure were discussed in detail with the patient and to her family and they wish us to proceed.    Procedure details: Patient was identified and then taken to the operating room and placed in supine position.  General anesthesia was induced.  Preoperative intravenous antibiotics were administered.  Lower right abdomen and the entire right lower extremity were prepped and a sterile surgical field was created.  Preprocedure timeout was conducted.    A generous incision was made in the right groin and the right distal external iliac artery, common femoral artery, 6 cm of superficial femoral artery and 2 cm of the femoral artery were dissected out and placed in Vesseloops.  The external iliac artery had excellent pulsation but the common and proximal social femoral arteries were occluded and it varied in pulsatility.  There was very hard intraluminal calcium deposits identified.  Then we gave 6 units of heparin and waited 5 minutes.  The deep femoral, superficial femoral and external iliac arteries were clamped and the side branches were placed in Vesseloops and tightened.  A #11 blade was used to make an anterior linear arteriotomy into the common femoral artery and extended towards the superficial femoral artery.  There was near occlusive heavily calcified bulky plaque that was endarterectomized.  The left rim of the calcific plaque around the deep femoral artery because it was calcified but it had a good lumen and I did not want to ashlee the plaque into the deep femoral artery.  We sharply divided the plaque in the common femoral and in the superficial femoral arteries.  The plaque was tacked down in the superficial femoral artery.  It accepted a 4 mm dilator.  The femoral  artery excepted a 3 mm dilator.  The arteriotomy was closed with a bovine pericardial patch using 5-0 Prolene suture.  Adequate backbleeding and forward bleeding was performed.  There was robust inflow.  On angiogram we had noted stenosis of the external and common iliac arteries but judging from the quality of the inflow I decided that we would not have to intervene on those.  A stay suture of 5-0 Prolene was placed in the patch and the patch was accessed in antegrade fashion with a micropuncture needle followed by placement of a microwire and catheter.  Arteriogram confirmed that short distance after our biopsy superficial femoral artery was occluded and reconstituted as an above-knee popliteal artery.  Additional 4000 units of heparin were given.  We placed a 5 Kyrgyz sheath and recanalize the segment of occlusion in the superficial femoral artery using a 0.035 inch stiff straight Glidewire and a straight Chiu cross catheter.  Catheter was positioned in the second segment of the popliteal artery.  Wire was removed and arteriogram confirmed intraluminal position.  The first segment of the popliteal artery and the entire superficial femoral artery were treated with a 4 mm x 15 cm angioplasty balloon which was inflated to burst pressure for 1 minute in each segment.  Balloon was deflated and removed and completion arteriogram showed significant residual stenosis.  We then placed 7 mm x 15 cm, 7 mm x 12 cm self-expanding stent.  Arteriogram was performed which showed still an area of of stenosis where the patch was ending in the native superficial femoral artery was starting.  We placed an additional 7 mm x 4 cm self-expanding stent across it.  Because the stent was coming into the patch I only dilated with with a 5 mm balloon to take the waist out of the stent.    Completion arteriogram showed about 30% residual stenosis proximally but no residual stenosis throughout the rest of the superficial femoral and popliteal  arteries.  I accepted this result as the alternative would have been to revise and extend the patch further and clamp on recently placed stents.  By this time we had encountered close to a liter of blood loss with his endarterectomy patch angioplasty and endovascular procedure and anesthesia was planning on administering 1 unit of packed red blood cells.  Given this overall situation I decided that we will conclude the procedure.      Completion arteriogram showed excellent outcome with two-vessel runoff into the foot by way of the peroneal and posterior tibial arteries and significantly improved perfusion of the foot as compared to the diagnostic arteriogram were performed during the week.    We had given a total of 12,000 units of heparin.  30 mg of protamine were given.  The access site in the patch was closed with a figure-of-eight suture of 5-0 Prolene.  Multiple bleeding sites from the patch were controlled with additional 6-0 Prolene sutures and local hemostatic agents.  We satisfied ourselves completely with the hemostasis.  Closure was performed in layers with interrupted 2-0 Vicryl and skin was approximated with vertical mattress 3-0 chromic sutures followed by application of Brittanie sponge.

## 2022-03-04 NOTE — CONSULTS
[facility]    Vascular Medicine Consultation     Date of Admission:  3/4/2022  Date of Consult (When I saw the patient): 03/04/22      Physician Attestation     I, Ailyn Juan, saw and evaluated Zia Hopkins as part of a shared APRN/PA visit.     We were asked by Dr. Hamlin to evaluate vascular risk factors and assist with medical management in this 72 year old male smoker with hyperlipidemia, diabetes, hypertension, and PAD now presenting for a right lower extremity revascularization for critical limb ischemia of non-healing ischemic left foot wounds.       I personally reviewed the vital signs, medications, labs and imaging and recent Dr. Hamlin evaluation.    I personally performed the substantive portion of the medical decision making for this visit - please see the VALDEZ's documentation for full details.      Key management decisions made by me and carried out under my direction:   Postoperative cares, activity and restarting antiplatelet medications per vascular surgery service  He last took Plavix and aspirin was this morning  He must quit smoking  Lipids are well controlled with atorvastatin and Zetia continue same  Continue current outpatient blood pressure medications  If Postop leg swelling hold amlodipine  Insulin sliding scale  We will ask hospitalist service to manage medical issues during this weekend and we will follow this patient starting on Monday.  No vascular medicine rounder this weekend    Thank you for the consultation !  This note was dictated by utilizing Dragon software    Copy of this note to  and primary care physician    Ailyn Juan MD, [place], [facility], [facility]  Vascular Medicine  Clinical Lipidologist  Clinical Hypertension specialist   Date of Service (when I saw the patient): 03/04/22    Assessment & Plan   1. Peripheral arterial disease s/p left popliteal angioplasty and left SFA stenting 2017 followed by stenting of in-stent  stenosis 7/30/19 and ultimately angioplasty of the tibial artery and left great toe amputation 4/2021 now presenting with critical limb ischemia of right foot with ischemic digits and undergoing right femoral endarterectomy and right iliac and femoral angioplasty/stenting 3/4/22     Post-operative cares per Dr. Hamlin / Vascular Surgery. He was on Plavix and aspirin 81 mg daily prior to admission. He needs to get better control of his diabetes and stop smoking completely to prevent further progression of his peripheral arterial disease.      2. Hyperlipidemia     His recent lipid panel revealed an LDL of 30, HDL 62, triglycerides 92, and total cholesterol 110, indicating his lipids are very well controlled on Atorvastatin 80 mg daily and Zetia 10 mg daily. He should continue the same.      3. Type 2 diabetes mellitus     He was diagnosed with Type 2 diabetes in 2017. His A1C was 7.0% just 3 days ago. Given his PAD, he should optimally be treated more aggressively to an A1C of less than 7.0%. When last seen 3 days ago, he was advised to start Januvia 100 mg daily in addition to his Metformin and Jardiance. He was going to talk to his PCP to prescribe it.     Now that he underwent surgery with an intraoperative angiogram, his metformin will be held as well as his Jardiance. He will be covered with sliding scale insulin as needed.       4. Ongoing tobacco use     He had quit smoking but recently started back up again due to pain in his legs. The importance of smoking cessation was stressed, particularly in regards to his PAD. He is only smoking about 2 cigarettes/day. He declines nicotine patches as he states he is very sensitive to them and feels they are much stronger than his 2-3 cigarettes per day.      5. Hypertension    He is on Toprol- mg daily, Losartan 100 mg daily, Chlorthalidone 50 mg daily, and Amlodipine 10 mg daily as an outpatient. Continue these as blood pressure and renal function allow.          Reason for Consult   Reason for consult: Asked by Dr. Hamlin to evaluate vascular risk factors and assist with medical management in this 72 year old male smoker with hyperlipidemia, diabetes, hypertension, and PAD now presenting for a right lower extremity revascularization for critical limb ischemia of non-healing ischemic left foot wounds.     Primary Care Physician   Reid Reese      History of Present Illness   Zia Hopkins is a 72 year old male smoker with hypertension, hyperlipidemia, and diabetes who in 2017 developed necrotic changes to his left toes. He underwent an angiogram on 3/8/17 with left SFA and popliteal artery angioplasty and left SFA stenting by Dr. Hamlin. He appeared to do well and continued to follow up on a routine basis. He presented for his routine follow up with Dr. Hamlin on 7/15/19 and was noted to have in-stent stenosis on arterial duplex. ABIs were unable to be obtained due to non-compressible vessels.  He underwent an angiogram on 7/30/19 and an additional stent was placed inside his previous stent.      Unfortunately, his follow-up was delayed due to him being in [place] and then because of the pandemic. He eventually needed to have his left great toe amputated on 4/20/21 by Dr. Whelan.      Now in the past 4 weeks he has developed ischemic ulcerations to his right 1st, 3rd, 4th, and 5th toes. He was seen by Dr. Whelan of Podiatry who gave a stat referral to Vascular Surgery for further evaluation of his blood supply to his right foot. ABIs were obtained and 0.36 on the right (0.53 on the left). He was evaluated by Dr. Hamlin and a right lower extremity angiogram was recommended. This was undertaken on 3/1/22, showing patent but diseased LEONA and EIA, CFA plaque, patent PFA, severe stenosis of origin of SFA, multifocal areas of SFA stenosis proximally then eventual mid SFA occlusion. Above knee popliteal reconstitutes but appears heavily calcified. Below knee popliteal is  calcified but patent. There is AT occlusion . PT and peroneal arteries are diseased but patent to the ankle. PT artery and reconstituted DP artery provide flow to his foot. A right femoral endarterectomy with antegrade SFA and iliac angioplasty/stenting was then felt to be in his best interest. He presented for this today.            Past Medical History   Past Medical History:   Diagnosis Date     Diabetes mellitus type 2, noninsulin dependent (H)      Hyperlipidemia LDL goal <70      Hypertension      PAD (peripheral artery disease) (H)        Past Surgical History   Past Surgical History:   Procedure Laterality Date     AMPUTATE FOOT Left 3/15/2017    Procedure: AMPUTATE FOOT;  Surgeon: Emre Mauricio DPM;  Location: [facility]     AMPUTATE TOE(S) Left 4/20/2021    Procedure: LEFT GREAT TOE AMPUTATION.;  Surgeon: Purvi Whelan DPM, Podiatry/Foot and Ankle Surgery;  Location: [facility]     IR LOWER EXTREMITY ANGIOGRAM LEFT  7/30/2019     IR LOWER EXTREMITY ANGIOGRAM LEFT  2/3/2021     IR LOWER EXTREMITY ANGIOGRAM LEFT  4/21/2021     IR LOWER EXTREMITY ANGIOGRAM RIGHT  3/1/2022     IRRIGATION AND DEBRIDEMENT FOOT, COMBINED Left 3/15/2017    Procedure: COMBINED IRRIGATION AND DEBRIDEMENT FOOT;  Surgeon: Emre Mauricio DPM;  Location: [facility]       Prior to Admission Medications   Prior to Admission Medications   Prescriptions Last Dose Informant Patient Reported? Taking?   amLODIPine (NORVASC) 10 MG tablet  at 1200 Self Yes Yes   Sig: Take 10 mg by mouth daily   aspirin 81 MG tablet  at 1200 Self No Yes   Sig: Take 1 tablet (81 mg) by mouth daily   atorvastatin (LIPITOR) 80 MG tablet  at PM Self No Yes   Sig: TAKE 1 TABLET(80 MG) BY MOUTH AT BEDTIME   chlorthalidone (HYGROTON) 50 MG tablet  at 1200 Self Yes Yes   Sig: Take 50 mg by mouth daily    clopidogrel (PLAVIX) 75 MG tablet  at AM Self No Yes   Sig: TAKE 1 TABLET(75 MG) BY MOUTH DAILY   empagliflozin (JARDIANCE) 25 MG TABS tablet  at 1200 Self Yes Yes   Sig:  Take 25 mg by mouth daily   ezetimibe (ZETIA) 10 MG tablet  at 1200 Self No Yes   Sig: TAKE 1 TABLET(10 MG) BY MOUTH DAILY   gabapentin (NEURONTIN) 100 MG capsule  at AM Self Yes Yes   Sig: Take 100 mg by mouth 2 times daily Pt unsure of dose   losartan (COZAAR) 100 MG tablet  at 1200 Self Yes Yes   Sig: Take 100 mg by mouth daily    metFORMIN (GLUCOPHAGE) 1000 MG tablet  at PM Self Yes Yes   Sig: Take 1,000 mg by mouth 2 times daily (with meals)    metoprolol succinate ER (TOPROL-XL) 100 MG 24 hr tablet  at 1200 Self No Yes   Sig: Take 1 tablet (100 mg) by mouth daily      Facility-Administered Medications: None     Allergies   No Known Allergies    Social History   Zia Hopkins  reports that he has been smoking cigarettes. He has been smoking about 0.25 packs per day. He has never used smokeless tobacco. He reports current alcohol use. He reports that he does not use drugs.    Family History   Family History   Problem Relation Age of Onset     Diabetes Father        Review of Systems   The 10 point Review of Systems is negative other than noted in the HPI or here.     Physical Exam   Vital Signs with Ranges  Temp:  [97.6  F (36.4  C)] 97.6  F (36.4  C)  Pulse:  [70] 70  Resp:  [12] 12  BP: (156)/(82) 156/82  SpO2:  [98 %] 98 %  0 lbs 0 oz    Constitutional: awake, alert, cooperative, no apparent distress, and appears stated age  Eyes: Lids and lashes normal, pupils equal, round and reactive to light, extra ocular muscles intact, sclera clear, conjunctiva normal  ENT: normocepalic, without obvious abnormality, oropharynx pink and moist  Hematologic / Lymphatic: no lymphadenopathy  Respiratory: No increased work of breathing, good air exchange, clear to auscultation bilaterally, no crackles or wheezing  Cardiovascular: regular rate and rhythm, normal S1 and S2 and no murmur noted  GI: Normal bowel sounds, soft, non-distended, non-tender  Skin: no redness, warmth, or swelling, no rashes. Wound on right heel  noted as well as gangrenous tips to toes on right foot.   Musculoskeletal: There is no redness, warmth, or swelling of the joints.  Full range of motion noted.  Motor strength is 5 out of 5 all extremities bilaterally.  Tone is normal.  Neurologic: Awake, alert, oriented to name, place and time.  Cranial nerves II-XII are grossly intact.  Motor is 5 out of 5 bilaterally.    Neuropsychiatric:  Normal affect, memory, insight.      Data   Most Recent 3 CBC's:  Recent Labs   Lab Test 04/22/21  0638 04/20/21  0704 04/19/21  1151   WBC 7.7 6.6 8.5   HGB 10.9* 11.0* 12.2*   MCV 94 94 94    239 310     Most Recent 3 BMP's:  Recent Labs   Lab Test 03/01/22  1202 04/23/21  0636 04/22/21  1213 04/22/21  0638    138  --  137   POTASSIUM 4.0 3.4 3.5 3.3*   CHLORIDE 100 107  --  106   CO2 26 26  --  26   BUN 38* 14  --  15   CR 0.99 0.95  --  0.91   ANIONGAP 10 5  --  5   TIN 9.5 8.5  --  8.5   * 121*  --  127*     Most Recent 3 INR's:  Recent Labs   Lab Test 02/03/21  1003 07/30/19  0712 03/08/17  0720   INR 0.95 0.90 0.87     Most Recent Cholesterol Panel:  Recent Labs   Lab Test 03/01/22  1202   CHOL 110   LDL 30   HDL 62   TRIG 92     Most Recent Hemoglobin A1c:  Recent Labs   Lab Test 03/01/22  1202   A1C 7.0*

## 2022-03-04 NOTE — BRIEF OP NOTE
[facility]    Brief Operative Note    Pre-operative diagnosis: Critical ischemia of lower extremity (H) [I70.229]  Post-operative diagnosis Same as pre-operative diagnosis    Procedure: Procedure(s):  1. RIGHT COMMON FEMORAL AND SUPERFICIAL  FEMORAL ENDARTERECTOMY WITH PATCH ANGIOPLASTY   2.  RIGHT FEMORAL ARTERY BALLOON ANGIOPLASTY WITH STENTING    Surgeon: Surgeon(s) and Role:     * Surjit Hamlin MD - Primary  Anesthesia: General   Estimated Blood Loss: 1L     Drains: None  Specimens: * No specimens in log *  Findings:   R CFA and proximal SFA endarterectomy. Very calcified vessels. Patch angioplasty. Antegrade SFA stenting and balloon angioplasty. Biphasic PT and monophasic DP at end. 2 vessel runoff via PT and peroneal. Diffuse oozing and oozing from patch throughout case (1L EBL)    Complications: None.  Implants:   Implant Name Type Inv. Item Serial No.  Lot No. LRB No. Used Action   IMP PATCH PERICARDIUM PHOTOFIX BOVINE 0.8X8CM [id number] - [id number] Bone/Tissue/Biologic IMP PATCH PERICARDIUM PHOTOFIX BOVINE 0.8X8CM PFP0.8X8 [id number] [facility] [id number] Right 1 Implanted   CORDIS  [employer]  TRANSHEPATIC BILIARY STENT 7MM X 150MM X 6FR Stent Graft   [facility] [id number] Right 1 Implanted   CORDIS  S.M.A.R.T.  TRANSHEPATIC BILIARY STENT 7MM X 120MM X 6FR Stent Graft   [facility] [id number] Right 1 Implanted   CORDIS  S.M.A.R.T.  CONTROL  VASCULAR STENT SYSTEM SFA AND ILIAC 7MM X 40MM X 6FR  Stent Graft   [facility] [id number] Right 1 Implanted       - Continue ASA, plavix  - Hb check 8 pm (1U PRBCS in OR)

## 2022-03-04 NOTE — ANESTHESIA CARE TRANSFER NOTE
Patient: Zia Hopkins    Procedure: Procedure(s):  RIGHT FEMORAL ENDARTERECTOMY, RIGHT FEMORAL ARTERY BALLOON ANGIOPLASTY WITH STENTING       Diagnosis: Critical ischemia of lower extremity (H) [I70.229]  Diagnosis Additional Information: No value filed.    Anesthesia Type:   General     Note:    Oropharynx: oropharynx clear of all foreign objects and spontaneously breathing  Level of Consciousness: awake  Oxygen Supplementation: face mask  Level of Supplemental Oxygen (L/min / FiO2): 6  Independent Airway: airway patency satisfactory and stable  Dentition: dentition unchanged  Vital Signs Stable: post-procedure vital signs reviewed and stable  Report to RN Given: handoff report given  Patient transferred to: PACU  Comments:     At end of procedure, spontaneous respirations, adequate tidal volumes, followed commands to voice, LMA removed atraumatically, airway patent after LMA removal. Oxygen via facemask at 6 liters per minute to PACU. Oxygen tubing connected to wall O2 in PACU, SpO2, NiBP, and EKG monitors and alarms on and functioning, report on patient's clinical status given to PACU RN, RN questions answered.      Handoff Report: Identifed the Patient, Identified the Reponsible Provider, Reviewed the pertinent medical history, Discussed the surgical course, Reviewed Intra-OP anesthesia mangement and issues during anesthesia, Set expectations for post-procedure period and Allowed opportunity for questions and acknowledgement of understanding      Vitals:  Vitals Value Taken Time   /68 03/04/22 1745   Temp     Pulse 75 03/04/22 1747   Resp 14 03/04/22 1747   SpO2 100 % 03/04/22 1747   Vitals shown include unvalidated device data.    Electronically Signed By: CLAUDINE Anthony CRNA  March 4, 2022  5:48 PM

## 2022-03-05 LAB
ANION GAP SERPL CALCULATED.3IONS-SCNC: 4 MMOL/L (ref 3–14)
BASOPHILS # BLD AUTO: 0 10E3/UL (ref 0–0.2)
BASOPHILS NFR BLD AUTO: 1 %
BUN SERPL-MCNC: 32 MG/DL (ref 7–30)
CALCIUM SERPL-MCNC: 7.7 MG/DL (ref 8.5–10.1)
CHLORIDE BLD-SCNC: 104 MMOL/L (ref 94–109)
CO2 SERPL-SCNC: 27 MMOL/L (ref 20–32)
CREAT SERPL-MCNC: 1.1 MG/DL (ref 0.66–1.25)
EOSINOPHIL # BLD AUTO: 0.1 10E3/UL (ref 0–0.7)
EOSINOPHIL NFR BLD AUTO: 1 %
ERYTHROCYTE [DISTWIDTH] IN BLOOD BY AUTOMATED COUNT: 15 % (ref 10–15)
GFR SERPL CREATININE-BSD FRML MDRD: 70 ML/MIN/1.73M2
GLUCOSE BLD-MCNC: 147 MG/DL (ref 70–99)
GLUCOSE BLDC GLUCOMTR-MCNC: 144 MG/DL (ref 70–99)
GLUCOSE BLDC GLUCOMTR-MCNC: 180 MG/DL (ref 70–99)
GLUCOSE BLDC GLUCOMTR-MCNC: 186 MG/DL (ref 70–99)
GLUCOSE BLDC GLUCOMTR-MCNC: 200 MG/DL (ref 70–99)
GLUCOSE BLDC GLUCOMTR-MCNC: 202 MG/DL (ref 70–99)
HCT VFR BLD AUTO: 22.1 % (ref 40–53)
HGB BLD-MCNC: 7.2 G/DL (ref 13.3–17.7)
IMM GRANULOCYTES # BLD: 0.1 10E3/UL
IMM GRANULOCYTES NFR BLD: 1 %
LYMPHOCYTES # BLD AUTO: 1.1 10E3/UL (ref 0.8–5.3)
LYMPHOCYTES NFR BLD AUTO: 13 %
MCH RBC QN AUTO: 29.6 PG (ref 26.5–33)
MCHC RBC AUTO-ENTMCNC: 32.6 G/DL (ref 31.5–36.5)
MCV RBC AUTO: 91 FL (ref 78–100)
MONOCYTES # BLD AUTO: 0.5 10E3/UL (ref 0–1.3)
MONOCYTES NFR BLD AUTO: 7 %
NEUTROPHILS # BLD AUTO: 6.3 10E3/UL (ref 1.6–8.3)
NEUTROPHILS NFR BLD AUTO: 77 %
NRBC # BLD AUTO: 0 10E3/UL
NRBC BLD AUTO-RTO: 0 /100
PLATELET # BLD AUTO: 203 10E3/UL (ref 150–450)
POTASSIUM BLD-SCNC: 3.6 MMOL/L (ref 3.4–5.3)
RBC # BLD AUTO: 2.43 10E6/UL (ref 4.4–5.9)
SODIUM SERPL-SCNC: 135 MMOL/L (ref 133–144)
WBC # BLD AUTO: 8 10E3/UL (ref 4–11)

## 2022-03-05 PROCEDURE — 250N000012 HC RX MED GY IP 250 OP 636 PS 637: Performed by: STUDENT IN AN ORGANIZED HEALTH CARE EDUCATION/TRAINING PROGRAM

## 2022-03-05 PROCEDURE — 250N000013 HC RX MED GY IP 250 OP 250 PS 637: Performed by: STUDENT IN AN ORGANIZED HEALTH CARE EDUCATION/TRAINING PROGRAM

## 2022-03-05 PROCEDURE — 250N000011 HC RX IP 250 OP 636: Performed by: STUDENT IN AN ORGANIZED HEALTH CARE EDUCATION/TRAINING PROGRAM

## 2022-03-05 PROCEDURE — 120N000001 HC R&B MED SURG/OB

## 2022-03-05 PROCEDURE — 36415 COLL VENOUS BLD VENIPUNCTURE: CPT | Performed by: STUDENT IN AN ORGANIZED HEALTH CARE EDUCATION/TRAINING PROGRAM

## 2022-03-05 PROCEDURE — 250N000013 HC RX MED GY IP 250 OP 250 PS 637: Performed by: INTERNAL MEDICINE

## 2022-03-05 PROCEDURE — 82310 ASSAY OF CALCIUM: CPT | Performed by: STUDENT IN AN ORGANIZED HEALTH CARE EDUCATION/TRAINING PROGRAM

## 2022-03-05 PROCEDURE — 85025 COMPLETE CBC W/AUTO DIFF WBC: CPT | Performed by: STUDENT IN AN ORGANIZED HEALTH CARE EDUCATION/TRAINING PROGRAM

## 2022-03-05 PROCEDURE — 250N000013 HC RX MED GY IP 250 OP 250 PS 637: Performed by: NURSE PRACTITIONER

## 2022-03-05 RX ORDER — QUETIAPINE FUMARATE 25 MG/1
25 TABLET, FILM COATED ORAL ONCE
Status: COMPLETED | OUTPATIENT
Start: 2022-03-05 | End: 2022-03-05

## 2022-03-05 RX ADMIN — INSULIN ASPART 2 UNITS: 100 INJECTION, SOLUTION INTRAVENOUS; SUBCUTANEOUS at 22:09

## 2022-03-05 RX ADMIN — FAMOTIDINE 20 MG: 20 TABLET ORAL at 09:45

## 2022-03-05 RX ADMIN — INSULIN ASPART 2 UNITS: 100 INJECTION, SOLUTION INTRAVENOUS; SUBCUTANEOUS at 18:22

## 2022-03-05 RX ADMIN — ATORVASTATIN CALCIUM 80 MG: 80 TABLET, FILM COATED ORAL at 20:28

## 2022-03-05 RX ADMIN — AMLODIPINE BESYLATE 10 MG: 10 TABLET ORAL at 09:46

## 2022-03-05 RX ADMIN — GABAPENTIN 100 MG: 100 CAPSULE ORAL at 09:45

## 2022-03-05 RX ADMIN — QUETIAPINE FUMARATE 25 MG: 25 TABLET ORAL at 20:28

## 2022-03-05 RX ADMIN — INSULIN ASPART 1 UNITS: 100 INJECTION, SOLUTION INTRAVENOUS; SUBCUTANEOUS at 10:01

## 2022-03-05 RX ADMIN — METOPROLOL SUCCINATE 100 MG: 50 TABLET, EXTENDED RELEASE ORAL at 09:45

## 2022-03-05 RX ADMIN — LOSARTAN POTASSIUM 100 MG: 100 TABLET, FILM COATED ORAL at 09:45

## 2022-03-05 RX ADMIN — INSULIN ASPART 1 UNITS: 100 INJECTION, SOLUTION INTRAVENOUS; SUBCUTANEOUS at 13:00

## 2022-03-05 RX ADMIN — ACETAMINOPHEN 975 MG: 325 TABLET, FILM COATED ORAL at 09:43

## 2022-03-05 RX ADMIN — ACETAMINOPHEN 975 MG: 325 TABLET, FILM COATED ORAL at 02:00

## 2022-03-05 RX ADMIN — GABAPENTIN 100 MG: 100 CAPSULE ORAL at 20:28

## 2022-03-05 RX ADMIN — INSULIN ASPART 1 UNITS: 100 INJECTION, SOLUTION INTRAVENOUS; SUBCUTANEOUS at 06:44

## 2022-03-05 RX ADMIN — ASPIRIN 81 MG CHEWABLE TABLET 81 MG: 81 TABLET CHEWABLE at 09:45

## 2022-03-05 RX ADMIN — CLOPIDOGREL BISULFATE 75 MG: 75 TABLET ORAL at 09:45

## 2022-03-05 RX ADMIN — INSULIN ASPART 1 UNITS: 100 INJECTION, SOLUTION INTRAVENOUS; SUBCUTANEOUS at 02:56

## 2022-03-05 RX ADMIN — FAMOTIDINE 20 MG: 20 TABLET ORAL at 20:28

## 2022-03-05 RX ADMIN — CEFAZOLIN 1 G: 1 INJECTION, POWDER, FOR SOLUTION INTRAMUSCULAR; INTRAVENOUS at 04:34

## 2022-03-05 ASSESSMENT — ACTIVITIES OF DAILY LIVING (ADL)
ADLS_ACUITY_SCORE: 6
ADLS_ACUITY_SCORE: 7
ADLS_ACUITY_SCORE: 6

## 2022-03-05 NOTE — PLAN OF CARE
Care plan note:      Recent Vitals:  Temp: 99.4  F (37.4  C) Temp src: Oral BP: 120/48 Pulse: 73   Resp: 14 SpO2: 93 % O2 Device: None (Room air)      Orientation/Neuro: Alert and Oriented x 4  Pain: Pain is controlled with current analgesics.  Medication(s) being used: acetaminophen.   Tele: NA.   IV medications: None   Mobility: up with 1 assist.   Skin: Wounds: right groin with wound vac in place.    GI: WDL  : WDL     Diet: Tolerating diet:   Well  Orders Placed This Encounter      Advance Diet as Tolerated: Regular Diet Adult; Moderate Consistent Carb (60 g CHO per Meal) Diet; Low Saturated Fat Diet      Safety/Concerns:  Fall Risk and Logan Risk  Aggression Color: Green    Plan: Pain well managed, pedal pulses present per doppler, wound vac patent with scant output noted. Pt ambulated in hallway, voiding adequately, appetite is fair.    Continue to monitor.      Uche Brown RN     Goal Outcome Evaluation: improving.

## 2022-03-05 NOTE — PROGRESS NOTES
"VASCULAR SURGERY PROGRESS NOTE    Subjective:  No issues overnight.  Patient resting comfortably in bed.    Objective:  Intake/Output Summary (Last 24 hours) at 3/5/2022 0644  Last data filed at 3/5/2022 0200  Gross per 24 hour   Intake 2950 ml   Output 600 ml   Net 2350 ml     PHYSICAL EXAM:  /46 (BP Location: Left arm)   Pulse 72   Temp 97.5  F (36.4  C) (Oral)   Resp 14   Ht 1.626 m (5' 4\")   Wt 60.8 kg (134 lb)   SpO2 96%   BMI 23.00 kg/m    Hemodynamically stable  Right groin dressing with Prevena wound VAC in place  Biphasic right posterior tibial signal; monophasic right dorsalis pedis signal      ASSESSMENT:  72-year-old gentleman status post right common and superficial femoral artery endarterectomy with bovine patch angioplasty and recanalization of occluded right superficial femoral artery with right superficial femoral artery stenting for right lower extremity critical limb ischemia on March 4, 2022.      PLAN:  -Continue aspirin, Plavix  -Follow-up labs  -Up and out of bed as tolerated  -We will stop IV fluids today  -PT OT  -Dispo: Floor    Discussed pt history, exam, assessment and plan with Dr. Hamlin of the vascular surgery service, who is in agreement with the above.    Foster Stevens MD  Division of Vascular Surgery   Pager: [phone]                  "

## 2022-03-05 NOTE — OR NURSING
OK by [facility] Gallardo to transfer to the floor. Sent with 1 belonging bag. Spouse called with update and room number.

## 2022-03-05 NOTE — PLAN OF CARE
A/O x4, VSS on RA, PIV running 100 ml/hr NaCl, Velazquez removed, due to void, not out of bed, CMS intact, doppler pulses, R foot wounds DOMINIK, BG checks, wound vacc to R groin has been alerting for leak and dressing has been reinforced multiple times. Tolerating full liquids so far- okay to advance. Good appetite. PRN Ultram given, repositioning, and ice packs for c/o sciatica/ lower lumbar pain.

## 2022-03-05 NOTE — OR NURSING
Chief Complaint   Patient presents with     Follow Up     Per pt, not feeling very well, a little short of breath, a little weight gain     Vitals were taken and medications were reconciled. EKG was performed.    Addis Cloud CMA    8:56 AM         Dr Gallardo called to bedside, pt c/o area of firmness and tenderness above IV site on right wrist. No pressors given through IV, no redness. IV discontinued.

## 2022-03-05 NOTE — PROGRESS NOTES
HOSPITALIST CONSULT    Hospitalist service was consulted for management of medical issues over the weekend and cross cover. We will follow the patient March 5-6th. Vascular Surgery will resume care on Monday.     - For medical concerns during business hours, call the [facility] at [phone] to have the rounding/on call Vascular Medicine (NOT VASCULAR SURGERY) MD paged.    - After business hours, for medical concerns on this patient, please page Hospitalist staff.    - For vascular surgical questions, please page the appropriate surgeon (primary vascular surgeon or on call vascular surgeon) based upon the time of day.     checked on the patient. Pain is farly controlled (rates as 2). Denies cp/sob.    Plan for surgical/vasc teams.        Milan Walton MD  [facility]

## 2022-03-05 NOTE — PLAN OF CARE
Goal Outcome Evaluation: A&Ox4. VSS on RA. CMS intact with doppler pulses. CAPNO WNL. L PIV infusing. Ambulated to BR with ax1 GB, voided. PVR x1 WNL. Wounds to R foot/heel DOMINIK. Wound vac to right groin WNL. Pain controlled with scheduled tylenol. Tolerating fulls and advanced to Mod Carb/low Saturated Fats. BS checks.

## 2022-03-05 NOTE — PLAN OF CARE
A/O x4, VSS on RA, PIV running, due to void, not out of bed, CMS intact, doppler pulses, R foot wounds DOMINIK, BG checks, wound vacc to R groin has been alerting for leak and dressing has been reinforced multiple times. Tolerating full liquids so far. Good appetite. PRN Ultram given, repositioning, and ice packs for c/o sciatica/ lower lumbar pain.

## 2022-03-05 NOTE — ANESTHESIA POSTPROCEDURE EVALUATION
Patient: Zia Hopkins    Procedure: Procedure(s):  RIGHT FEMORAL ENDARTERECTOMY, RIGHT FEMORAL ARTERY BALLOON ANGIOPLASTY WITH STENTING       Anesthesia Type:  General    Note:  Disposition: Inpatient   Postop Pain Control: Uneventful            Sign Out: Well controlled pain   PONV: No   Neuro/Psych: Uneventful            Sign Out: Acceptable/Baseline neuro status   Airway/Respiratory: Uneventful            Sign Out: Acceptable/Baseline resp. status   CV/Hemodynamics: Uneventful            Sign Out: Acceptable CV status   Other NRE: NONE   DID A NON-ROUTINE EVENT OCCUR? YES    Event details/Postop Comments:  Right forearm IV infiltrated. CRNA reported giving blood through it but it did not run well so they stopped using it. No other medications were given through it. Phenylephrine was given through a different IV during the case in the form of an infusion. The blood was finished through the good IV. The arms were tucked so the decision was based on flow rate.            Last vitals:  Vitals Value Taken Time   /62 03/04/22 1830   Temp 36.5  C (97.7  F) 03/04/22 1820   Pulse 79 03/04/22 1835   Resp 12 03/04/22 1835   SpO2 99 % 03/04/22 1835   Vitals shown include unvalidated device data.    Electronically Signed By: Juan Pablo Gallardo MD  March 4, 2022  6:37 PM

## 2022-03-05 NOTE — PROGRESS NOTES
HOSPITALIST CONSULT CHART CHECK:    Hospitalist service was consulted for management of medical issues over the weekend and cross cover. We will follow the patient March 5-6th. Vascular Surgery will resume care on Monday.     - For medical concerns during business hours, call the [facility] Vascular [facility] at [phone] to have the rounding/on call Vascular Medicine (NOT VASCULAR SURGERY) MD paged.    - After business hours, for medical concerns on this patient, please page Hospitalist staff.    - For vascular surgical questions, please page the appropriate surgeon (primary vascular surgeon or on call vascular surgeon) based upon the time of day.       Barbie Weiss NP  [facility]  Securely message with the Vocera Web Console (learn more here)  Text page via [employer] Paging/Directory

## 2022-03-05 NOTE — PROVIDER NOTIFICATION
MD Notification    Notified Person: MD    Notified Person Name: Dr. Walton    Notification Date/Time:1757 3/5/22    Notification Interaction:    Purpose of Notification:    Orders Received: ARDEN [id number] Pt seems to be confused, agitated, and forgetful which wife explained is not his baseline. Please advise.     Comments:

## 2022-03-06 LAB
ALBUMIN UR-MCNC: 10 MG/DL
APPEARANCE UR: CLEAR
BILIRUB UR QL STRIP: NEGATIVE
BLD PROD TYP BPU: NORMAL
BLOOD COMPONENT TYPE: NORMAL
CODING SYSTEM: NORMAL
COLOR UR AUTO: ABNORMAL
CROSSMATCH: NORMAL
ERYTHROCYTE [DISTWIDTH] IN BLOOD BY AUTOMATED COUNT: 14.5 % (ref 10–15)
GLUCOSE BLDC GLUCOMTR-MCNC: 151 MG/DL (ref 70–99)
GLUCOSE BLDC GLUCOMTR-MCNC: 168 MG/DL (ref 70–99)
GLUCOSE BLDC GLUCOMTR-MCNC: 172 MG/DL (ref 70–99)
GLUCOSE BLDC GLUCOMTR-MCNC: 186 MG/DL (ref 70–99)
GLUCOSE BLDC GLUCOMTR-MCNC: 233 MG/DL (ref 70–99)
GLUCOSE BLDC GLUCOMTR-MCNC: 272 MG/DL (ref 70–99)
GLUCOSE UR STRIP-MCNC: >=1000 MG/DL
HCT VFR BLD AUTO: 25.6 % (ref 40–53)
HGB BLD-MCNC: 6.3 G/DL (ref 13.3–17.7)
HGB BLD-MCNC: 8.3 G/DL (ref 13.3–17.7)
HGB UR QL STRIP: NEGATIVE
ISSUE DATE AND TIME: NORMAL
KETONES UR STRIP-MCNC: 10 MG/DL
LEUKOCYTE ESTERASE UR QL STRIP: NEGATIVE
MCH RBC QN AUTO: 29.6 PG (ref 26.5–33)
MCHC RBC AUTO-ENTMCNC: 32.4 G/DL (ref 31.5–36.5)
MCV RBC AUTO: 91 FL (ref 78–100)
NITRATE UR QL: NEGATIVE
PH UR STRIP: 5 [PH] (ref 5–7)
PLATELET # BLD AUTO: 268 10E3/UL (ref 150–450)
PROCALCITONIN SERPL-MCNC: 0.14 NG/ML
RBC # BLD AUTO: 2.8 10E6/UL (ref 4.4–5.9)
RBC URINE: <1 /HPF
SP GR UR STRIP: 1.01 (ref 1–1.03)
UNIT ABO/RH: NORMAL
UNIT NUMBER: NORMAL
UNIT STATUS: NORMAL
UNIT TYPE ISBT: 5100
UROBILINOGEN UR STRIP-MCNC: NORMAL MG/DL
WBC # BLD AUTO: 9.9 10E3/UL (ref 4–11)
WBC URINE: 0 /HPF

## 2022-03-06 PROCEDURE — 85018 HEMOGLOBIN: CPT | Performed by: INTERNAL MEDICINE

## 2022-03-06 PROCEDURE — 36415 COLL VENOUS BLD VENIPUNCTURE: CPT | Performed by: HOSPITALIST

## 2022-03-06 PROCEDURE — 250N000013 HC RX MED GY IP 250 OP 250 PS 637: Performed by: HOSPITALIST

## 2022-03-06 PROCEDURE — 87040 BLOOD CULTURE FOR BACTERIA: CPT | Performed by: INTERNAL MEDICINE

## 2022-03-06 PROCEDURE — 81001 URINALYSIS AUTO W/SCOPE: CPT | Performed by: HOSPITALIST

## 2022-03-06 PROCEDURE — 84145 PROCALCITONIN (PCT): CPT | Performed by: INTERNAL MEDICINE

## 2022-03-06 PROCEDURE — HZ2ZZZZ DETOXIFICATION SERVICES FOR SUBSTANCE ABUSE TREATMENT: ICD-10-PCS | Performed by: INTERNAL MEDICINE

## 2022-03-06 PROCEDURE — P9016 RBC LEUKOCYTES REDUCED: HCPCS | Performed by: HOSPITALIST

## 2022-03-06 PROCEDURE — 36415 COLL VENOUS BLD VENIPUNCTURE: CPT | Performed by: INTERNAL MEDICINE

## 2022-03-06 PROCEDURE — 250N000013 HC RX MED GY IP 250 OP 250 PS 637: Performed by: NURSE PRACTITIONER

## 2022-03-06 PROCEDURE — 120N000001 HC R&B MED SURG/OB

## 2022-03-06 PROCEDURE — 250N000013 HC RX MED GY IP 250 OP 250 PS 637: Performed by: STUDENT IN AN ORGANIZED HEALTH CARE EDUCATION/TRAINING PROGRAM

## 2022-03-06 PROCEDURE — 85018 HEMOGLOBIN: CPT | Performed by: HOSPITALIST

## 2022-03-06 RX ORDER — QUETIAPINE FUMARATE 25 MG/1
25 TABLET, FILM COATED ORAL AT BEDTIME
Status: DISCONTINUED | OUTPATIENT
Start: 2022-03-06 | End: 2022-03-08 | Stop reason: HOSPADM

## 2022-03-06 RX ADMIN — GABAPENTIN 100 MG: 100 CAPSULE ORAL at 08:13

## 2022-03-06 RX ADMIN — POLYETHYLENE GLYCOL 3350 17 G: 17 POWDER, FOR SOLUTION ORAL at 08:13

## 2022-03-06 RX ADMIN — AMLODIPINE BESYLATE 10 MG: 10 TABLET ORAL at 08:13

## 2022-03-06 RX ADMIN — ACETAMINOPHEN 975 MG: 325 TABLET, FILM COATED ORAL at 10:11

## 2022-03-06 RX ADMIN — FAMOTIDINE 20 MG: 20 TABLET ORAL at 08:13

## 2022-03-06 RX ADMIN — INSULIN ASPART 2 UNITS: 100 INJECTION, SOLUTION INTRAVENOUS; SUBCUTANEOUS at 13:16

## 2022-03-06 RX ADMIN — GABAPENTIN 100 MG: 100 CAPSULE ORAL at 20:06

## 2022-03-06 RX ADMIN — CLOPIDOGREL BISULFATE 75 MG: 75 TABLET ORAL at 08:13

## 2022-03-06 RX ADMIN — FAMOTIDINE 20 MG: 20 TABLET ORAL at 20:06

## 2022-03-06 RX ADMIN — ATORVASTATIN CALCIUM 80 MG: 80 TABLET, FILM COATED ORAL at 20:06

## 2022-03-06 RX ADMIN — ASPIRIN 81 MG CHEWABLE TABLET 81 MG: 81 TABLET CHEWABLE at 08:13

## 2022-03-06 RX ADMIN — ACETAMINOPHEN 975 MG: 325 TABLET, FILM COATED ORAL at 02:17

## 2022-03-06 RX ADMIN — LOSARTAN POTASSIUM 100 MG: 100 TABLET, FILM COATED ORAL at 08:13

## 2022-03-06 RX ADMIN — INSULIN ASPART 1 UNITS: 100 INJECTION, SOLUTION INTRAVENOUS; SUBCUTANEOUS at 02:19

## 2022-03-06 RX ADMIN — INSULIN ASPART 2 UNITS: 100 INJECTION, SOLUTION INTRAVENOUS; SUBCUTANEOUS at 10:14

## 2022-03-06 RX ADMIN — INSULIN ASPART 1 UNITS: 100 INJECTION, SOLUTION INTRAVENOUS; SUBCUTANEOUS at 17:37

## 2022-03-06 RX ADMIN — INSULIN ASPART 3 UNITS: 100 INJECTION, SOLUTION INTRAVENOUS; SUBCUTANEOUS at 21:41

## 2022-03-06 RX ADMIN — QUETIAPINE FUMARATE 25 MG: 25 TABLET ORAL at 21:39

## 2022-03-06 RX ADMIN — METOPROLOL SUCCINATE 100 MG: 50 TABLET, EXTENDED RELEASE ORAL at 08:13

## 2022-03-06 RX ADMIN — INSULIN ASPART 1 UNITS: 100 INJECTION, SOLUTION INTRAVENOUS; SUBCUTANEOUS at 05:33

## 2022-03-06 ASSESSMENT — ACTIVITIES OF DAILY LIVING (ADL)
ADLS_ACUITY_SCORE: 6

## 2022-03-06 NOTE — PROGRESS NOTES
Some confusion reported.  Will check UA.  Keep monitoring and intervene as indicated.  Spoke with RN

## 2022-03-06 NOTE — PROGRESS NOTES
Xcoverage: paged by RN because of increasing confusion and agitation; Seroquel 25 mg poX1 ordered.    Basilia Rogers MD

## 2022-03-06 NOTE — PROGRESS NOTES
HOSPITALIST CONSULT    Hospitalist service was consulted for management of medical issues over the weekend and cross cover. We will follow the patient March 5-6th. Vascular Surgery will resume care on Monday.     - For medical concerns during business hours, call the [facility] at [phone] to have the rounding/on call Vascular Medicine (NOT VASCULAR SURGERY) MD paged.    - After business hours, for medical concerns on this patient, please page Hospitalist staff.    - For vascular surgical questions, please page the appropriate surgeon (primary vascular surgeon or on call vascular surgeon) based upon the time of day.      Pain is farly controlledDenies cp/sob.  Confusion last night.  Might have underlying cognitive disorder.  Delirium precautions.  seroquel  low dose at bedtime.    Plan for surgical/vasc teams.        Milan Walton MD  [facility]

## 2022-03-06 NOTE — PLAN OF CARE
VSS on RA. Pt has acute onset of confusion and increasing agitation and forgetful. Needs frequent reorientation and increased visualization for self transferring. Oriented to self and oriented to place at times. UA has been ordered and needs to be collected. Baseline numbness and tingling in all extremities. LLE doppler pulses. Weaker in LLE. No IV access. Ambulating A1 w/ cane. Voids in urinal w/ no urinary sx besides confusion. Wounds to R foot/heel DOMINIK with gauze between toes. Wound vac to right groin WDL. Denies pain. Tolerating Mod Carb/low Saturated Fats. Ate 100% of dinner. Drinking adequate fluids.

## 2022-03-06 NOTE — PROVIDER NOTIFICATION
Dr. Walton notified 13:15  Pt has a critical lab value of Hgb 6.3.  Please advise. Thanks! Lori Kirk RN *[phone]

## 2022-03-06 NOTE — PLAN OF CARE
Pt confuse, forgetful and agitated sometimes. Seroquel given x1 and that helps to calm him down. VSS on RA. Denies pain even though he has visible pain sensations on both extremities while trying to get on and off the bed. He often will decline cares offered but can be easily convince with some teachings and education. Took scheduled Tylenol. Urinal at bedside but he is bladder incontinent. Wound vac on RE. No IV access. Tolerating mod carb diet. Up with SBA

## 2022-03-06 NOTE — PLAN OF CARE
POD 2. Pt VSS, on RA, A/Ox4.  Not confused this shift.  Pt denies pain and nausea.  Rt groin dressing with Prevana wound vac, CDI.  CMS intact, LE doppler pulses found on pedal and PT sites. Pt moving with standby assist, ambulating in room.  Tolerating mod carb diet, good appetite, BS in all quadrants.  Critical lab of 6.3 Hgb.  Started infusing PRBC at 14:35, pt tolerating well. Discharge pending for tomorrow.

## 2022-03-06 NOTE — PROGRESS NOTES
"VASCULAR SURGERY PROGRESS NOTE    Subjective:  No issues overnight.  Patient resting comfortably in bed. Reports pain in the right groin is well controlled     Objective:  Intake/Output Summary (Last 24 hours) at 3/5/2022 0644  Last data filed at 3/5/2022 0200  Gross per 24 hour   Intake 2950 ml   Output 600 ml   Net 2350 ml     PHYSICAL EXAM:  BP (!) 144/58 (BP Location: Right arm)   Pulse 79   Temp 99  F (37.2  C) (Oral)   Resp 14   Ht 1.626 m (5' 4\")   Wt 63.3 kg (139 lb 8.8 oz)   SpO2 93%   BMI 23.95 kg/m    Hemodynamically stable  Right groin dressing with Prevena wound VAC in place  Biphasic right posterior tibial signal; monophasic right dorsalis pedis signal      ASSESSMENT:  72-year-old gentleman status post right common and superficial femoral artery endarterectomy with bovine patch angioplasty and recanalization of occluded right superficial femoral artery with right superficial femoral artery stenting for right lower extremity critical limb ischemia on March 4, 2022.      PLAN:  -Continue aspirin, Plavix  -Follow-up labs  -Up and out of bed as tolerated  -PT OT  -Dispo: Floor; tentative plan for discharge tomorrow     Discussed pt history, exam, assessment and plan with Dr. Hamlin of the vascular surgery service, who is in agreement with the above.    Foster Stevens MD  Division of Vascular Surgery   Pager: [phone]    Addendum:  Patient receiving 1 unit packed red blood cells for hemoglobin of 6.3                  "

## 2022-03-06 NOTE — PROGRESS NOTES
HGB is 6.3 today diwn from 7.2 yesterday.  No clear evidence of bleeding. Asymptomatic.   Transfuse a unit of blood.  Recheck tonight.    RN to notify vascular surgery.    D/w RN.

## 2022-03-07 ENCOUNTER — TELEPHONE (OUTPATIENT)
Dept: OTHER | Facility: CLINIC | Age: 72
End: 2022-03-07
Payer: COMMERCIAL

## 2022-03-07 LAB
ANION GAP SERPL CALCULATED.3IONS-SCNC: 5 MMOL/L (ref 3–14)
BASOPHILS # BLD AUTO: 0.1 10E3/UL (ref 0–0.2)
BASOPHILS NFR BLD AUTO: 1 %
BUN SERPL-MCNC: 23 MG/DL (ref 7–30)
CALCIUM SERPL-MCNC: 8.6 MG/DL (ref 8.5–10.1)
CHLORIDE BLD-SCNC: 106 MMOL/L (ref 94–109)
CO2 SERPL-SCNC: 26 MMOL/L (ref 20–32)
CREAT SERPL-MCNC: 0.79 MG/DL (ref 0.66–1.25)
EOSINOPHIL # BLD AUTO: 0.2 10E3/UL (ref 0–0.7)
EOSINOPHIL NFR BLD AUTO: 2 %
ERYTHROCYTE [DISTWIDTH] IN BLOOD BY AUTOMATED COUNT: 14.4 % (ref 10–15)
GFR SERPL CREATININE-BSD FRML MDRD: >90 ML/MIN/1.73M2
GLUCOSE BLD-MCNC: 139 MG/DL (ref 70–99)
GLUCOSE BLDC GLUCOMTR-MCNC: 142 MG/DL (ref 70–99)
GLUCOSE BLDC GLUCOMTR-MCNC: 153 MG/DL (ref 70–99)
GLUCOSE BLDC GLUCOMTR-MCNC: 154 MG/DL (ref 70–99)
GLUCOSE BLDC GLUCOMTR-MCNC: 161 MG/DL (ref 70–99)
GLUCOSE BLDC GLUCOMTR-MCNC: 169 MG/DL (ref 70–99)
GLUCOSE BLDC GLUCOMTR-MCNC: 176 MG/DL (ref 70–99)
HCT VFR BLD AUTO: 23.8 % (ref 40–53)
HGB BLD-MCNC: 7.6 G/DL (ref 13.3–17.7)
HGB BLD-MCNC: 7.6 G/DL (ref 13.3–17.7)
IMM GRANULOCYTES # BLD: 0.1 10E3/UL
IMM GRANULOCYTES NFR BLD: 1 %
LYMPHOCYTES # BLD AUTO: 1.1 10E3/UL (ref 0.8–5.3)
LYMPHOCYTES NFR BLD AUTO: 11 %
MCH RBC QN AUTO: 29.8 PG (ref 26.5–33)
MCHC RBC AUTO-ENTMCNC: 32.5 G/DL (ref 31.5–36.5)
MCV RBC AUTO: 93 FL (ref 78–100)
MONOCYTES # BLD AUTO: 0.8 10E3/UL (ref 0–1.3)
MONOCYTES NFR BLD AUTO: 9 %
NEUTROPHILS # BLD AUTO: 7.3 10E3/UL (ref 1.6–8.3)
NEUTROPHILS NFR BLD AUTO: 76 %
NRBC # BLD AUTO: 0 10E3/UL
NRBC BLD AUTO-RTO: 0 /100
PLATELET # BLD AUTO: 270 10E3/UL (ref 150–450)
POTASSIUM BLD-SCNC: 3.5 MMOL/L (ref 3.4–5.3)
RBC # BLD AUTO: 2.55 10E6/UL (ref 4.4–5.9)
SODIUM SERPL-SCNC: 137 MMOL/L (ref 133–144)
WBC # BLD AUTO: 9.5 10E3/UL (ref 4–11)

## 2022-03-07 PROCEDURE — 250N000013 HC RX MED GY IP 250 OP 250 PS 637: Performed by: SURGERY

## 2022-03-07 PROCEDURE — 85018 HEMOGLOBIN: CPT | Performed by: HOSPITALIST

## 2022-03-07 PROCEDURE — 80048 BASIC METABOLIC PNL TOTAL CA: CPT | Performed by: PHYSICIAN ASSISTANT

## 2022-03-07 PROCEDURE — 250N000013 HC RX MED GY IP 250 OP 250 PS 637: Performed by: HOSPITALIST

## 2022-03-07 PROCEDURE — 250N000013 HC RX MED GY IP 250 OP 250 PS 637: Performed by: PHYSICIAN ASSISTANT

## 2022-03-07 PROCEDURE — 99233 SBSQ HOSP IP/OBS HIGH 50: CPT | Performed by: PHYSICIAN ASSISTANT

## 2022-03-07 PROCEDURE — 36415 COLL VENOUS BLD VENIPUNCTURE: CPT | Performed by: HOSPITALIST

## 2022-03-07 PROCEDURE — 250N000013 HC RX MED GY IP 250 OP 250 PS 637: Performed by: NURSE PRACTITIONER

## 2022-03-07 PROCEDURE — 36415 COLL VENOUS BLD VENIPUNCTURE: CPT | Performed by: PHYSICIAN ASSISTANT

## 2022-03-07 PROCEDURE — 85025 COMPLETE CBC W/AUTO DIFF WBC: CPT | Performed by: PHYSICIAN ASSISTANT

## 2022-03-07 PROCEDURE — 120N000001 HC R&B MED SURG/OB

## 2022-03-07 PROCEDURE — 250N000013 HC RX MED GY IP 250 OP 250 PS 637: Performed by: STUDENT IN AN ORGANIZED HEALTH CARE EDUCATION/TRAINING PROGRAM

## 2022-03-07 RX ORDER — DEXTROSE MONOHYDRATE 25 G/50ML
25-50 INJECTION, SOLUTION INTRAVENOUS
Status: DISCONTINUED | OUTPATIENT
Start: 2022-03-07 | End: 2022-03-07

## 2022-03-07 RX ORDER — FOLIC ACID 1 MG/1
1 TABLET ORAL DAILY
Status: DISCONTINUED | OUTPATIENT
Start: 2022-03-07 | End: 2022-03-08 | Stop reason: HOSPADM

## 2022-03-07 RX ORDER — NICOTINE POLACRILEX 4 MG
15-30 LOZENGE BUCCAL
Status: DISCONTINUED | OUTPATIENT
Start: 2022-03-07 | End: 2022-03-07

## 2022-03-07 RX ORDER — FERROUS SULFATE 325(65) MG
325 TABLET ORAL DAILY
Status: DISCONTINUED | OUTPATIENT
Start: 2022-03-07 | End: 2022-03-08 | Stop reason: HOSPADM

## 2022-03-07 RX ADMIN — AMLODIPINE BESYLATE 10 MG: 10 TABLET ORAL at 08:31

## 2022-03-07 RX ADMIN — METFORMIN HYDROCHLORIDE 1000 MG: 500 TABLET, FILM COATED ORAL at 17:50

## 2022-03-07 RX ADMIN — METFORMIN HYDROCHLORIDE 1000 MG: 500 TABLET, FILM COATED ORAL at 11:01

## 2022-03-07 RX ADMIN — METOPROLOL SUCCINATE 100 MG: 50 TABLET, EXTENDED RELEASE ORAL at 08:31

## 2022-03-07 RX ADMIN — ACETAMINOPHEN 650 MG: 325 TABLET ORAL at 00:27

## 2022-03-07 RX ADMIN — ACETAMINOPHEN 975 MG: 325 TABLET, FILM COATED ORAL at 09:03

## 2022-03-07 RX ADMIN — QUETIAPINE FUMARATE 25 MG: 25 TABLET ORAL at 22:44

## 2022-03-07 RX ADMIN — LOSARTAN POTASSIUM 100 MG: 100 TABLET, FILM COATED ORAL at 08:31

## 2022-03-07 RX ADMIN — ATORVASTATIN CALCIUM 80 MG: 80 TABLET, FILM COATED ORAL at 20:16

## 2022-03-07 RX ADMIN — FERROUS SULFATE TAB 325 MG (65 MG ELEMENTAL FE) 325 MG: 325 (65 FE) TAB at 09:03

## 2022-03-07 RX ADMIN — FAMOTIDINE 20 MG: 20 TABLET ORAL at 20:15

## 2022-03-07 RX ADMIN — ASPIRIN 81 MG CHEWABLE TABLET 81 MG: 81 TABLET CHEWABLE at 09:04

## 2022-03-07 RX ADMIN — FOLIC ACID 1 MG: 1 TABLET ORAL at 09:03

## 2022-03-07 RX ADMIN — CLOPIDOGREL BISULFATE 75 MG: 75 TABLET ORAL at 08:31

## 2022-03-07 RX ADMIN — FAMOTIDINE 20 MG: 20 TABLET ORAL at 08:31

## 2022-03-07 RX ADMIN — INSULIN ASPART 1 UNITS: 100 INJECTION, SOLUTION INTRAVENOUS; SUBCUTANEOUS at 06:14

## 2022-03-07 RX ADMIN — INSULIN ASPART 1 UNITS: 100 INJECTION, SOLUTION INTRAVENOUS; SUBCUTANEOUS at 02:13

## 2022-03-07 RX ADMIN — GABAPENTIN 100 MG: 100 CAPSULE ORAL at 20:16

## 2022-03-07 RX ADMIN — INSULIN ASPART 1 UNITS: 100 INJECTION, SOLUTION INTRAVENOUS; SUBCUTANEOUS at 09:03

## 2022-03-07 RX ADMIN — GABAPENTIN 100 MG: 100 CAPSULE ORAL at 08:31

## 2022-03-07 RX ADMIN — EMPAGLIFLOZIN 25 MG: 25 TABLET, FILM COATED ORAL at 12:15

## 2022-03-07 ASSESSMENT — ACTIVITIES OF DAILY LIVING (ADL)
ADLS_ACUITY_SCORE: 8
ADLS_ACUITY_SCORE: 8
ADLS_ACUITY_SCORE: 6
ADLS_ACUITY_SCORE: 8
ADLS_ACUITY_SCORE: 6
ADLS_ACUITY_SCORE: 8
ADLS_ACUITY_SCORE: 6
ADLS_ACUITY_SCORE: 8
ADLS_ACUITY_SCORE: 6
ADLS_ACUITY_SCORE: 8
ADLS_ACUITY_SCORE: 6
ADLS_ACUITY_SCORE: 6
ADLS_ACUITY_SCORE: 8
ADLS_ACUITY_SCORE: 8
ADLS_ACUITY_SCORE: 6
ADLS_ACUITY_SCORE: 8
ADLS_ACUITY_SCORE: 6
ADLS_ACUITY_SCORE: 8
ADLS_ACUITY_SCORE: 8

## 2022-03-07 NOTE — PLAN OF CARE
A&Ox 4, SBA, RA, tolerating mod carb diet, denies pain and appears to be comfortable, right groin dressing with Prevana wound vac, CDI, doppler pulses, CMS is intact , temp 101.0, PRN tylenol given and temp was down to  99.2. Pt has been calm and co-operative, possible discharge today.

## 2022-03-07 NOTE — TELEPHONE ENCOUNTER
Imaging and report received. Requested download to PACS. Notified providers of receipt.   DAFNE العراقي, RN  [facility]  Office:  [phone] Fax: [phone]

## 2022-03-07 NOTE — PROGRESS NOTES
Xcoverage: paged by RN regarding fever 101.2; UA was negative; the patient remains confused; his Hb in am was 6.3, transfused 1 unit PRBCs, plan to recheck Hb soon; will check CBC, procalcitonin, CXR; will order BC; as per report- he drinks 1 bottle of wine daily; will order CIWA at this time.    Basilia Rogers MD

## 2022-03-07 NOTE — PLAN OF CARE
VSS on RA, fever of 101.2. Refused Tylenol. Pt had confusion and increasing agitation tonight. CIWA score of 9. Needs frequent reorientation and increased visualization for self transferring. Bed and chair alarms on. Pt argumentative with staff. Forgetful/ repetitive. All of which are not his baseline at home per wife. Scheduled Seroquel given. Explained cares. Reassurance and redirection provided. Refused chest X-ray tonight; education provided. Spoke with wife and pt multiple times. Education provided.  Received 1 unit of blood on days. Hgb recheck 8.3. Procalcitonin elevated. Baseline numbness and tingling in all extremities. LLE doppler pulses. Weaker in LLE. No IV access. Ambulating A1 w/ cane. Wounds to R foot/heel DOMINIK with gauze between toes. Wound vac to right groin WDL. Denies pain besides some sciatica pain that is managed with repositioning. Tolerating Mod Carb/low Saturated Fats. Ate 100% of dinner. SBA w/ cane. Tolerating mod carb diet, good appetite.

## 2022-03-07 NOTE — PROGRESS NOTES
Patient is postoperative day 3 from complicated right lower extremity revascularization.   Required 1 unit PRBCs yesterday. Don't suspect active bleeding, its catching up from acute blood loss anemia in the operating room.  Right DP and PT have biphasic signals.   OK to discontinue home.   Will start iron and folic acid.   I will arrange follow up in clinic. Instructions for Brittanie given.

## 2022-03-07 NOTE — TELEPHONE ENCOUNTER
Late charting.   Called earlier and left  with [facility] imaging dept for push of MRI spine and fax report to [facility]. Phone, fax given for call back and fax.     DAFNE لاعراقي, RN  [facility]  Office:  [phone] Fax: [phone]

## 2022-03-07 NOTE — PROGRESS NOTES
[facility]    Vascular Medicine Progress Note    Date of Service (when I saw the patient): 03/07/2022     Assessment & Plan   1. Peripheral arterial disease s/p left popliteal angioplasty and left SFA stenting 2017 followed by stenting of in-stent stenosis 7/30/19 and ultimately angioplasty of the tibial artery and left great toe amputation 4/2021 now presenting with critical limb ischemia of right foot with ischemic digits and undergoing right femoral endarterectomy and right iliac and femoral angioplasty/stenting 3/4/22     -Post-operative cares per Dr. Hamlin / Vascular Surgery.   -He was on Plavix and aspirin 81 mg daily prior to admission -> resumed.  -Hgb down to 6.3 yesterday -> given one unit PRBC. Hgb filippo to 8.3 but now down to 7.6 today. No evidence of bleeding. Possibly all from acute blood loss during surgery. Will continue to trend hemoglobin.   -Fever last night to 101.2. WBC 9.9. UA negative. CXR pending. BC pending. Procalcitonin elevated at 0.14. ? Secondary to blood transfusion or atelectasis.   -Would recommend that he stay one more night in the hospital while awaiting pending labs and closely monitoring his hemoglobin and fevers.   -Will need to follow-up with Dr. Whelan/Podiatry as outpatient.      2. Hyperlipidemia     -LDL 30 -> at goal.   -Continue Atorvastatin 80 mg daily and Zetia 10 mg daily.     3. Type 2 diabetes mellitus     -He was diagnosed with Type 2 diabetes in 2017.   -His A1C just prior to this admission was 7.0%.   -Given his PAD, he should optimally be treated more aggressively to an A1C of less than 7.0%.   -He was advised to start Januvia 100 mg daily in addition to his Metformin and Jardiance. He wanted to talk to his PCP and have them prescribe it.   -Resume metformin (now > 48 hours since contrast) as well as Jardiance.   -He will be covered with sliding scale insulin as needed.       4. Ongoing tobacco use     -He had quit smoking but recently  started back up again due to pain in his legs.   -The importance of smoking cessation was stressed, particularly in regards to his PAD.   -He is only smoking about 2 cigarettes/day. He declines nicotine patches as he states he is very sensitive to them and feels they are much stronger than his 2-3 cigarettes per day.      5. Hypertension     -He is on Toprol- mg daily, Losartan 100 mg daily, Chlorthalidone 50 mg daily, and Amlodipine 10 mg daily as an outpatient.   -Continue these as blood pressure and renal function allow.     6. Sciatica    -He states this has been causing him significant pain in his back/legs. Was seen at [facility] prior to this surgery and completed a few PT sessions, but he was not improving and next step was SHARRI.   -Having MRI results sent to Dr. Hamlin to review and he will discuss with pain team possible treatment as he does not want Plavix held at this time out of concern that vascular work will occlude.              Interval History   Doing well. Fever last night. Needed one unit of blood yesterday. Feels good in the mornings, but loses energy by evening. Wants to go home instead of TCU. Worried about sciatica and need for epidural steroid injection but unable to do that right now due to being on Plavix/ASA now.     Physical Exam   Temp: 99.7  F (37.6  C) Temp src: Oral BP: 137/58 Pulse: 71   Resp: 16 SpO2: 96 % O2 Device: None (Room air)    Vitals:    03/04/22 1049 03/06/22 0500   Weight: 60.8 kg (134 lb) 63.3 kg (139 lb 8.8 oz)     Vital Signs with Ranges  Temp:  [99  F (37.2  C)-101.2  F (38.4  C)] 99.7  F (37.6  C)  Pulse:  [62-83] 71  Resp:  [16-19] 16  BP: (109-149)/(49-58) 137/58  SpO2:  [92 %-99 %] 96 %  I/O last 3 completed shifts:  In: 1640 [P.O.:1640]  Out: 200 [Urine:200]    Constitutional: Awake, alert, cooperative, no apparent distress, and appears stated age.  Eyes: Lids and lashes normal, sclera clear, conjunctiva normal.  ENT: Normocephalic, without obvious abnormality,  atraumatic, oral pharynx with moist mucus membranes  Respiratory: No increased work of breathing, good air exchange, clear to auscultation bilaterally, no crackles or wheezing.  Cardiovascular: Regular rate and rhythm, normal S1 and S2, no S3 or S4, and no murmur noted.  GI: Normal bowel sounds, soft, non-distended, non-tender  Skin: No bruising or bleeding, normal skin color, texture, turgor, no redness, warmth, or swelling, no rashes, prevena wound vac is in place. Toes on right foot with dry necrotic areas on distal tips.   Musculoskeletal: There is no redness, warmth, or swelling of the joints.    Neurologic: Awake, alert, oriented to name, place and time.  Cranial nerves II-XII are grossly intact.    Neuropsychiatric: Calm, normal eye contact, alert, normal affect, oriented to self, place, time and situation, memory for past and recent events intact and thought process normal.    Medications       acetaminophen  975 mg Oral Q8H     amLODIPine  10 mg Oral Daily     aspirin  81 mg Oral Daily     atorvastatin  80 mg Oral QPM     clopidogrel  75 mg Oral Daily     empagliflozin  25 mg Oral Daily     famotidine  20 mg Oral BID    Or     famotidine  20 mg Intravenous BID     ferrous sulfate  325 mg Oral Daily     folic acid  1 mg Oral Daily     gabapentin  100 mg Oral BID     insulin aspart  1-7 Units Subcutaneous TID AC     insulin aspart  1-5 Units Subcutaneous At Bedtime     losartan  100 mg Oral Daily     metFORMIN  1,000 mg Oral BID w/meals     metoprolol succinate ER  100 mg Oral Daily     polyethylene glycol  17 g Oral Daily     QUEtiapine  25 mg Oral At Bedtime     sodium chloride (PF)  3 mL Intracatheter Q8H       Data   Recent Labs   Lab 03/07/22  0848 03/07/22  0720 03/07/22  0552 03/07/22  0157 03/06/22  2214 03/06/22  1734 03/06/22  1210 03/05/22  1133 03/05/22  0634 03/04/22  1759 03/04/22  1042 03/01/22  1202   WBC  --   --   --   --  9.9  --   --   --  8.0  --   --   --    HGB  --  7.6*  --   --  8.3*   --  6.3*  --  7.2*   < >  --   --    MCV  --   --   --   --  91  --   --   --  91  --   --   --    PLT  --   --   --   --  268  --   --   --  203  --   --   --    NA  --   --   --   --   --   --   --   --  135  --   --  136   POTASSIUM  --   --   --   --   --   --   --   --  3.6  --  3.6 4.0   CHLORIDE  --   --   --   --   --   --   --   --  104  --   --  100   CO2  --   --   --   --   --   --   --   --  27  --   --  26   BUN  --   --   --   --   --   --   --   --  32*  --   --  38*   CR  --   --   --   --   --   --   --   --  1.10  --   --  0.99   ANIONGAP  --   --   --   --   --   --   --   --  4  --   --  10   TIN  --   --   --   --   --   --   --   --  7.7*  --   --  9.5   *  --  142* 169*  --    < >  --    < > 147*   < > 159* 140*    < > = values in this interval not displayed.     No results found for this or any previous visit (from the past 24 hour(s)).

## 2022-03-07 NOTE — PROVIDER NOTIFICATION
MD Notification    Notified Person: MD Keila Rogers    Notified Person Name:    Notification Date/Time: 2110 3/6/22    Notification Interaction:   Pt 2217 M.C. Confused and agitated since surgery. neg for UTI. suspecting possible withdrawl. Order CIWA? Also new fever 101.2. callback # [phone]    Purpose of Notification:    Orders Received: Procalcitonin, CIWA, CXR, BC    Comments:

## 2022-03-08 ENCOUNTER — APPOINTMENT (OUTPATIENT)
Dept: OCCUPATIONAL THERAPY | Facility: CLINIC | Age: 72
DRG: 252 | End: 2022-03-08
Attending: PHYSICIAN ASSISTANT
Payer: COMMERCIAL

## 2022-03-08 VITALS
BODY MASS INDEX: 22.57 KG/M2 | OXYGEN SATURATION: 95 % | DIASTOLIC BLOOD PRESSURE: 48 MMHG | HEART RATE: 64 BPM | TEMPERATURE: 98.9 F | SYSTOLIC BLOOD PRESSURE: 111 MMHG | HEIGHT: 64 IN | RESPIRATION RATE: 16 BRPM | WEIGHT: 132.2 LBS

## 2022-03-08 LAB
ANION GAP SERPL CALCULATED.3IONS-SCNC: 7 MMOL/L (ref 3–14)
BASOPHILS # BLD AUTO: 0.1 10E3/UL (ref 0–0.2)
BASOPHILS NFR BLD AUTO: 1 %
BUN SERPL-MCNC: 27 MG/DL (ref 7–30)
CALCIUM SERPL-MCNC: 8.8 MG/DL (ref 8.5–10.1)
CHLORIDE BLD-SCNC: 105 MMOL/L (ref 94–109)
CO2 SERPL-SCNC: 26 MMOL/L (ref 20–32)
CREAT SERPL-MCNC: 0.88 MG/DL (ref 0.66–1.25)
EOSINOPHIL # BLD AUTO: 0.3 10E3/UL (ref 0–0.7)
EOSINOPHIL NFR BLD AUTO: 3 %
ERYTHROCYTE [DISTWIDTH] IN BLOOD BY AUTOMATED COUNT: 14.3 % (ref 10–15)
GFR SERPL CREATININE-BSD FRML MDRD: 90 ML/MIN/1.73M2
GLUCOSE BLD-MCNC: 103 MG/DL (ref 70–99)
GLUCOSE BLDC GLUCOMTR-MCNC: 101 MG/DL (ref 70–99)
GLUCOSE BLDC GLUCOMTR-MCNC: 111 MG/DL (ref 70–99)
GLUCOSE BLDC GLUCOMTR-MCNC: 141 MG/DL (ref 70–99)
HCT VFR BLD AUTO: 26.9 % (ref 40–53)
HGB BLD-MCNC: 8.7 G/DL (ref 13.3–17.7)
IMM GRANULOCYTES # BLD: 0.1 10E3/UL
IMM GRANULOCYTES NFR BLD: 1 %
LYMPHOCYTES # BLD AUTO: 1.1 10E3/UL (ref 0.8–5.3)
LYMPHOCYTES NFR BLD AUTO: 11 %
MCH RBC QN AUTO: 29.7 PG (ref 26.5–33)
MCHC RBC AUTO-ENTMCNC: 32.3 G/DL (ref 31.5–36.5)
MCV RBC AUTO: 92 FL (ref 78–100)
MONOCYTES # BLD AUTO: 0.7 10E3/UL (ref 0–1.3)
MONOCYTES NFR BLD AUTO: 7 %
NEUTROPHILS # BLD AUTO: 8.3 10E3/UL (ref 1.6–8.3)
NEUTROPHILS NFR BLD AUTO: 77 %
NRBC # BLD AUTO: 0 10E3/UL
NRBC BLD AUTO-RTO: 0 /100
PLATELET # BLD AUTO: 353 10E3/UL (ref 150–450)
POTASSIUM BLD-SCNC: 3.6 MMOL/L (ref 3.4–5.3)
RBC # BLD AUTO: 2.93 10E6/UL (ref 4.4–5.9)
SODIUM SERPL-SCNC: 138 MMOL/L (ref 133–144)
WBC # BLD AUTO: 10.5 10E3/UL (ref 4–11)

## 2022-03-08 PROCEDURE — 97535 SELF CARE MNGMENT TRAINING: CPT | Mod: GO

## 2022-03-08 PROCEDURE — 250N000013 HC RX MED GY IP 250 OP 250 PS 637: Performed by: PHYSICIAN ASSISTANT

## 2022-03-08 PROCEDURE — 80048 BASIC METABOLIC PNL TOTAL CA: CPT | Performed by: PHYSICIAN ASSISTANT

## 2022-03-08 PROCEDURE — 250N000013 HC RX MED GY IP 250 OP 250 PS 637: Performed by: STUDENT IN AN ORGANIZED HEALTH CARE EDUCATION/TRAINING PROGRAM

## 2022-03-08 PROCEDURE — 36415 COLL VENOUS BLD VENIPUNCTURE: CPT | Performed by: PHYSICIAN ASSISTANT

## 2022-03-08 PROCEDURE — 85025 COMPLETE CBC W/AUTO DIFF WBC: CPT | Performed by: PHYSICIAN ASSISTANT

## 2022-03-08 PROCEDURE — 250N000013 HC RX MED GY IP 250 OP 250 PS 637: Performed by: NURSE PRACTITIONER

## 2022-03-08 PROCEDURE — 250N000013 HC RX MED GY IP 250 OP 250 PS 637: Performed by: SURGERY

## 2022-03-08 PROCEDURE — 97165 OT EVAL LOW COMPLEX 30 MIN: CPT | Mod: GO

## 2022-03-08 PROCEDURE — 99233 SBSQ HOSP IP/OBS HIGH 50: CPT | Performed by: INTERNAL MEDICINE

## 2022-03-08 RX ORDER — TRAMADOL HYDROCHLORIDE 50 MG/1
25 TABLET ORAL EVERY 6 HOURS PRN
Qty: 20 TABLET | Refills: 0 | Status: SHIPPED | OUTPATIENT
Start: 2022-03-08 | End: 2022-03-18

## 2022-03-08 RX ORDER — QUETIAPINE FUMARATE 25 MG/1
25 TABLET, FILM COATED ORAL AT BEDTIME
Qty: 30 TABLET | Refills: 2 | Status: SHIPPED | OUTPATIENT
Start: 2022-03-08 | End: 2022-11-18

## 2022-03-08 RX ORDER — FOLIC ACID 1 MG/1
1 TABLET ORAL DAILY
Qty: 30 TABLET | Refills: 2 | Status: SHIPPED | OUTPATIENT
Start: 2022-03-09 | End: 2022-11-18

## 2022-03-08 RX ORDER — FERROUS SULFATE 325(65) MG
325 TABLET ORAL DAILY
Qty: 30 TABLET | Refills: 2 | Status: SHIPPED | OUTPATIENT
Start: 2022-03-09 | End: 2022-11-18

## 2022-03-08 RX ADMIN — AMLODIPINE BESYLATE 10 MG: 10 TABLET ORAL at 08:17

## 2022-03-08 RX ADMIN — FERROUS SULFATE TAB 325 MG (65 MG ELEMENTAL FE) 325 MG: 325 (65 FE) TAB at 08:17

## 2022-03-08 RX ADMIN — FOLIC ACID 1 MG: 1 TABLET ORAL at 08:17

## 2022-03-08 RX ADMIN — LOSARTAN POTASSIUM 100 MG: 100 TABLET, FILM COATED ORAL at 08:17

## 2022-03-08 RX ADMIN — EMPAGLIFLOZIN 25 MG: 25 TABLET, FILM COATED ORAL at 08:21

## 2022-03-08 RX ADMIN — CLOPIDOGREL BISULFATE 75 MG: 75 TABLET ORAL at 08:17

## 2022-03-08 RX ADMIN — METOPROLOL SUCCINATE 100 MG: 50 TABLET, EXTENDED RELEASE ORAL at 08:17

## 2022-03-08 RX ADMIN — GABAPENTIN 100 MG: 100 CAPSULE ORAL at 08:17

## 2022-03-08 RX ADMIN — METFORMIN HYDROCHLORIDE 1000 MG: 500 TABLET, FILM COATED ORAL at 08:17

## 2022-03-08 RX ADMIN — ASPIRIN 81 MG CHEWABLE TABLET 81 MG: 81 TABLET CHEWABLE at 08:16

## 2022-03-08 RX ADMIN — FAMOTIDINE 20 MG: 20 TABLET ORAL at 08:17

## 2022-03-08 ASSESSMENT — ACTIVITIES OF DAILY LIVING (ADL)
ADLS_ACUITY_SCORE: 8
ADLS_ACUITY_SCORE: 6
ADLS_ACUITY_SCORE: 8
ADLS_ACUITY_SCORE: 6
ADLS_ACUITY_SCORE: 8
ADLS_ACUITY_SCORE: 6
ADLS_ACUITY_SCORE: 6
ADLS_ACUITY_SCORE: 8
ADLS_ACUITY_SCORE: 6
ADLS_ACUITY_SCORE: 8

## 2022-03-08 NOTE — DISCHARGE SUMMARY
[facility] Discharge Summary    Zia Hopkins MRN# [id number]   Age: 72 year old YOB: 1950     Date of Admission:  3/4/2022  Date of Discharge::  3/8/2022  Admitting Physician:  Surjit Hamlin MD  Discharge Physician:  Surjit Hamlin MD          Admission Diagnoses:   Critical ischemia of lower extremity (H) [I70.229]          Discharge Diagnosis:   Critical ischemia of lower extremity (H) [I70.229]          Procedures:   Procedure(s):    1.  Right common and superficial femoral endarterectomy with bovine pericardial patch angioplasty.  2.  Recanalization of occluded right superficial femoral artery.  3.  Balloon angioplasty of right proximal popliteal and superficial femoral artery using 4 mm x 15 cm angioplasty balloon with completion arteriogram.  4.  Right superficial femoral artery stenting using 7 mm x 15 cm, 7 mm x 12 cm and 7 mm x 4 cm self-expanding stents with post stenting angioplasty using 6 mm x 15 cm angioplasty balloon with completion arteriogram.             Discharge Medications:     Discharge Medication List as of 3/8/2022  2:20 PM      START taking these medications    Details   ferrous sulfate (FEROSUL) 325 (65 Fe) MG tablet Take 1 tablet (325 mg) by mouth daily, Disp-30 tablet, R-2, E-PrescribeTake for 3 months and stop      folic acid (FOLVITE) 1 MG tablet Take 1 tablet (1 mg) by mouth daily, Disp-30 tablet, R-2, E-PrescribeTake for 3 months and stop      QUEtiapine (SEROQUEL) 25 MG tablet Take 1 tablet (25 mg) by mouth At Bedtime, Disp-30 tablet, R-2, E-PrescribePlease call PCP for refills      traMADol (ULTRAM) 50 MG tablet Take 0.5 tablets (25 mg) by mouth every 6 hours as needed for moderate pain or severe pain, Disp-20 tablet, R-0, E-Prescribe         CONTINUE these medications which have NOT CHANGED    Details   amLODIPine (NORVASC) 10 MG tablet Take 10 mg by mouth daily, Historical      aspirin 81 MG tablet Take 1 tablet (81 mg) by mouth daily,  Disp-90 tablet, R-1, E-Prescribe      atorvastatin (LIPITOR) 80 MG tablet TAKE 1 TABLET(80 MG) BY MOUTH AT BEDTIME, Disp-90 tablet, R-3, E-Prescribe      chlorthalidone (HYGROTON) 50 MG tablet Take 50 mg by mouth daily , Historical      clopidogrel (PLAVIX) 75 MG tablet TAKE 1 TABLET(75 MG) BY MOUTH DAILY, Disp-90 tablet, R-3, E-Prescribe      empagliflozin (JARDIANCE) 25 MG TABS tablet Take 25 mg by mouth daily, Historical      ezetimibe (ZETIA) 10 MG tablet TAKE 1 TABLET(10 MG) BY MOUTH DAILY, Disp-90 tablet, R-3, E-Prescribe      gabapentin (NEURONTIN) 100 MG capsule Take 100 mg by mouth 2 times daily Pt unsure of dose, Historical      losartan (COZAAR) 100 MG tablet Take 100 mg by mouth daily , Historical      metFORMIN (GLUCOPHAGE) 1000 MG tablet Take 1,000 mg by mouth 2 times daily (with meals) , Historical      metoprolol succinate ER (TOPROL-XL) 100 MG 24 hr tablet Take 1 tablet (100 mg) by mouth daily, Disp-90 tablet, R-3, E-Prescribe                   Consultations:   Physical therapy  Occupational Therapy  Vascular medicine          Brief History of Illness:   This is a very pleasant yet complicated 72-year-old male with multilevel arterial disease of right lower extremity resulting in nonhealing wounds.  We recently did an arteriogram which showed significant occlusive disease of the common femoral artery as well as heavily calcific disease of the proximal superficial femoral artery with occlusion of the mid segment of the superficial femoral artery with heavily calcified popliteal artery as well as tibial vessel disease.  He does not have a good vein conduit based on our sonography.    He presents to the operating room for right common femoral artery endarterectomy with antegrade SFA intervention.           Hospital Course:   The patient's hospital course was unremarkable.  Postoperatively, patient had some confusion that was thought to be secondary to acute toxic encephalopathy due to effects of  anesthesia and pain medications.  Patient's neuro status improved to baseline immediately on postop day 1.  He recovered as anticipated and experienced no post-operative complications.  Patient did require 1 unit of packed red blood cells on postop day 2.  Patient recovered well postoperatively.  Patient was discharged on postop day 4.  On day of discharge, patient's vital signs were stable.  Patient's vascular exam was stable-right DP and PT biphasic signals.  Patient's right groin had Prevena wound VAC in place.  Patient tolerated diet.  Patient voiding appropriately.  Patient's pain was well controlled.  Plan is for patient to follow-up in 4 weeks with repeat noninvasive studies         Discharge Instructions and Follow-Up:   Discharge diet: Regular   Discharge activity: Activity as tolerated   Discharge follow-up: Follow up with Dr. Hamlin in 4 weeks   Wound care: May get incision wet in shower but do not soak or scrub           Discharge Disposition:   Discharged to home       Foster Stevens MD

## 2022-03-08 NOTE — PROGRESS NOTES
03/08/22 1337   Quick Adds   Type of Visit Initial Occupational Therapy Evaluation   Living Environment   People in Home spouse   Current Living Arrangements house   Home Accessibility stairs to enter home   Number of Stairs, Main Entrance 2   Number of Stairs, Within Home, Primary other (see comments)   Stair Railings, Within Home, Primary railings safe and in good condition   Transportation Anticipated family or friend will provide   Living Environment Comments One flight of stairs to basement and one flight upstairs. Bedroom and bathroom upstairs.    Self-Care   Usual Activity Tolerance good   Current Activity Tolerance good   Equipment Currently Used at Home cane, straight   Fall history within last six months no   Activity/Exercise/Self-Care Comment (I) in ADLs. Started using a cane a few weeks ago.    Instrumental Activities of Daily Living (IADL)   Previous Responsibilities shopping;laundry;housekeeping;meal prep;medication management;finances;driving;yardwork   IADL Comments (I) in IADLs. Son is available for yardwork.    General Information   Onset of Illness/Injury or Date of Surgery 03/04/22   Referring Physician Shaina Norton PA-C   Patient/Family Therapy Goal Statement (OT) Home with wife   Additional Occupational Profile Info/Pertinent History of Current Problem Peripheral arterial disease s/p left popliteal angioplasty and left SFA stenting 2017 followed by stenting of in-stent stenosis 7/30/19 and ultimately angioplasty of the tibial artery and left great toe amputation 4/2021 now presenting with critical limb ischemia of right foot with ischemic digits and undergoing right femoral endarterectomy and right iliac and femoral angioplasty/stenting 3/4/22   Existing Precautions/Restrictions no known precautions/restrictions   Cognitive Status Examination   Orientation Status orientation to person, place and time   Visual Perception   Visual Impairment/Limitations corrective lenses for reading    Pain Assessment   Patient Currently in Pain No  (0/10)   Range of Motion Comprehensive   General Range of Motion no range of motion deficits identified   Strength Comprehensive (MMT)   General Manual Muscle Testing (MMT) Assessment no strength deficits identified   Bed Mobility   Bed Mobility sit-supine;supine-sit   Supine-Sit [place] (Bed Mobility) independent   Sit-Supine [place] (Bed Mobility) independent   Transfers   Transfers toilet transfer;sit-stand transfer;bed-chair transfer   Transfer Skill: Bed to Chair/Chair to Bed   Bed-Chair [place] (Transfers) independent   Sit-Stand Transfer   Sit-Stand [place] (Transfers) independent   Toilet Transfer   [place] Level (Toilet Transfer) independent   Balance   Balance Comments No LOB.    Activities of Daily Living   BADL Assessment/Intervention upper body dressing;lower body dressing;toileting   Upper Body Dressing Assessment/Training   [place] Level (Upper Body Dressing) independent   Lower Body Dressing Assessment/Training   [place] Level (Lower Body Dressing) independent   Toileting   [place] Level (Toileting) independent   Clinical Impression   Criteria for Skilled Therapeutic Interventions Met (OT) Yes, treatment indicated   OT Diagnosis Impaired (I) in IADLs   OT Problem List-Impairments impacting ADL problems related to;activity tolerance impaired;pain   Assessment of Occupational Performance 1-3 Performance Deficits   Identified Performance Deficits Impaired (I) in heavy IADLs (ie laundry, housekeeping, yardwork, etc)   Planned Therapy Interventions (OT) ADL retraining   Clinical Decision Making Complexity (OT) low complexity   Risk & Benefits of therapy have been explained evaluation/treatment results reviewed;care plan/treatment goals reviewed;risks/benefits reviewed;current/potential barriers reviewed;participants voiced agreement with care plan;participants included;patient;spouse/significant other   OT Discharge  Planning   OT Discharge Recommendation (DC Rec) home with assist;home   OT Rationale for DC Rec Pt functioning near baseline limited by activity tolerance and wound vac. Currently (I) in ADLs, but anticipate may need A w/ showering due to low activity tolerance. Recommend d/c home with wife available for A w/ any ADLs as needed and heavy IADLs (shopping, meal prep, housekeeping) and son for yard work.    OT Brief overview of current status (I) in functional mobility and ADLs    Total Evaluation Time (Minutes)   Total Evaluation Time (Minutes) 15

## 2022-03-08 NOTE — PLAN OF CARE
A/Ox4, VSS on RA, SBA with walker, moderate consistent carb diet. Denies pain, N/V, doppler pulses, blood sugar checks, new IV access placed, ambulated in hallway, wife and son at bedside. Possible discharge tomorrow.

## 2022-03-08 NOTE — TELEPHONE ENCOUNTER
Per Dr. Hamlin's request, MR Lumbar spine without contrast faxed to Dr. Limon's office at - [phone].    [employer] 03/08/22 8:40.    Lluvia Hays RN BSN  [facility]  [phone]

## 2022-03-08 NOTE — PLAN OF CARE
Goal Outcome Evaluation:      3-8-22, [id number]    A/Ox4, VSS on RA ex /60. Irritable at times. Denies pain. SBA with walker. Moderate consistent carb diet. , and 111. No N/V, or SOB. Doppler pulses on lower extremeties. Wound vac to R groin running at 125. L PIV SL. Possible discharge today.

## 2022-03-08 NOTE — PLAN OF CARE
A/Ox4, VSS on RA, SBA with walker, moderate consistent carb diet. Denies pain, N/V, doppler pulses, blood sugar checks, ambulated in hallway, wife at bedside. Discharge education provided, questions answered, wife providing transportation. Patient discharged.

## 2022-03-08 NOTE — CONSULTS
Patient cleared to discharge. Discussed with provider, will not need home care.     Nisa Viveros RN   [facility]   Phone [phone]

## 2022-03-08 NOTE — PLAN OF CARE
Occupational Therapy Discharge Summary    Reason for therapy discharge:    Discharged to home.    Progress towards therapy goal(s). See goals on Care Plan in [facility] electronic health record for goal details.  Goals met    Therapy recommendation(s):    No further therapy is recommended. Pt functioning near baseline limited by activity tolerance and wound vac. Currently (I) in ADLs, but anticipate may need A w/ showering due to low activity tolerance. Recommend d/c home with wife available for A w/ any ADLs as needed and heavy IADLs (shopping, meal prep, housekeeping) and son for yard work.

## 2022-03-08 NOTE — PLAN OF CARE
Physical Therapy Discharge Summary    Reason for therapy discharge:    Discharged to home.    Progress towards therapy goal(s). See goals on Care Plan in [facility] electronic health record for goal details.  Pt discharged home before having PT evaluation    Therapy recommendation(s):    No further therapy is recommended.  Pt near baseline per chart review of OT evaluation, pt has good assist at home from spouse and is only limited now by activity tolerance and wound vac.

## 2022-03-08 NOTE — PROGRESS NOTES
[facility]    Vascular Medicine Progress Note    Date of Service (when I saw the patient): 03/08/2022     Assessment & Plan   1. Peripheral arterial disease s/p left popliteal angioplasty and left SFA stenting 2017 followed by stenting of in-stent stenosis 7/30/19 and ultimately angioplasty of the tibial artery and left great toe amputation 4/2021 now presenting with critical limb ischemia of right foot with ischemic digits and undergoing right femoral endarterectomy and right iliac and femoral angioplasty/stenting 3/4/22    POD 4   -Post-operative cares per Dr. Hamlin / Vascular Surgery.   -He was on Plavix and aspirin 81 mg daily prior to admission -> resumed.  -Hgb down to 6.3 yesterday -> given one unit PRBC. Hgb filippo to 8.3 but now down to 7.6--> 8.7  today. No evidence of bleeding. Possibly all from acute blood loss during surgery. Will continue to trend hemoglobin.   Yesterday 101 today T max 99.7   CXR ordered not done ?   IS   Ambulate   -Will need to follow-up with Dr. Whelan/Podiatry as outpatient.      2. Hyperlipidemia     -LDL 30 -> at goal.   -Continue Atorvastatin 80 mg daily and Zetia 10 mg daily.  TLC suggested      3. Type 2 diabetes mellitus     -He was diagnosed with Type 2 diabetes in 2017.   -His A1C just prior to this admission was 7.0%.   -Given his PAD, he should optimally be treated more aggressively to an A1C of less than 7.0%.   -He was advised to start Januvia 100 mg daily in addition to his Metformin and Jardiance. He wanted to talk to his PCP and have them prescribe it.   -Resume metformin (now > 48 hours since contrast) as well as Jardiance.   -He will be covered with sliding scale insulin as needed.       4. Ongoing tobacco use     -He had quit smoking but recently started back up again due to pain in his legs.   -The importance of smoking cessation was stressed, particularly in regards to his PAD.   -He is only smoking about 2 cigarettes/day. He declines  nicotine patches as he states he is very sensitive to them and feels they are much stronger than his 2-3 cigarettes per day.      5. Hypertension     -He is on Toprol- mg daily, Losartan 100 mg daily, Chlorthalidone 50 mg daily, and Amlodipine 10 mg daily as an outpatient.   -Continue these as blood pressure and renal function allow.     6. Sciatica    -He states this has been causing him significant pain in his back/legs. Was seen at [facility] prior to this surgery and completed a few PT sessions, but he was not improving and next step was SHARRI.   -Having MRI results sent to Dr. Hamlin to review and he will discuss with pain team possible treatment as he does not want Plavix held at this time out of concern that vascular work will occlude.       Patient care time spent 35 minutes today, reviewed last night events, labs, documentation and care coordination etc wife at bedside answered all their questions , discussed with Nursing staff this am.    Ailyn Juan MD, FASILVIANO, [facility], [facility]  Vascular Medicine      Interval History   Doing well. Still low grade Fever 99.7 HGB improved . received one unit of blood 2 days ago. Feels good in the mornings, but loses energy by evening. Wants to go home instead of TCU. Worried about sciatica and need for epidural steroid injection but unable to do that right now due to being on Plavix/ASA now.   BS good range  WBC improved 8.7 this am    Physical Exam   Temp: 99.1  F (37.3  C) Temp src: Oral BP: 128/44 Pulse: 68   Resp: 16 SpO2: 93 % O2 Device: None (Room air)    Vitals:    03/04/22 1049 03/06/22 0500 03/08/22 0700   Weight: 60.8 kg (134 lb) 63.3 kg (139 lb 8.8 oz) 60 kg (132 lb 3.2 oz)     Vital Signs with Ranges  Temp:  [98.9  F (37.2  C)-99.1  F (37.3  C)] 99.1  F (37.3  C)  Pulse:  [65-73] 68  Resp:  [16] 16  BP: (114-140)/(44-60) 128/44  SpO2:  [93 %-95 %] 93 %  No intake/output data recorded.    Constitutional: Awake, alert, cooperative, no apparent distress, and  appears stated age.  Eyes: Lids and lashes normal, sclera clear, conjunctiva normal.  ENT: Normocephalic, without obvious abnormality, atraumatic, oral pharynx with moist mucus membranes  Respiratory: No increased work of breathing, good air exchange, clear to auscultation bilaterally, no crackles or wheezing.  Cardiovascular: Regular rate and rhythm, normal S1 and S2, no S3 or S4, and no murmur noted.  GI: Normal bowel sounds, soft, non-distended, non-tender  Skin: No bruising or bleeding, normal skin color, texture, turgor, no redness, warmth, or swelling, no rashes, prevena wound vac is in place. Toes on right foot with dry necrotic areas on distal tips.   Musculoskeletal: There is no redness, warmth, or swelling of the joints.    Neurologic: Awake, alert, oriented to name, place and time.  Cranial nerves II-XII are grossly intact.    Neuropsychiatric: Calm, normal eye contact, alert, normal affect, oriented to self, place, time and situation, memory for past and recent events intact and thought process normal.    Medications       amLODIPine  10 mg Oral Daily     aspirin  81 mg Oral Daily     atorvastatin  80 mg Oral QPM     clopidogrel  75 mg Oral Daily     empagliflozin  25 mg Oral Daily     famotidine  20 mg Oral BID    Or     famotidine  20 mg Intravenous BID     ferrous sulfate  325 mg Oral Daily     folic acid  1 mg Oral Daily     gabapentin  100 mg Oral BID     insulin aspart  1-7 Units Subcutaneous TID AC     insulin aspart  1-5 Units Subcutaneous At Bedtime     losartan  100 mg Oral Daily     metFORMIN  1,000 mg Oral BID w/meals     metoprolol succinate ER  100 mg Oral Daily     polyethylene glycol  17 g Oral Daily     QUEtiapine  25 mg Oral At Bedtime     sodium chloride (PF)  3 mL Intracatheter Q8H       Data   Recent Labs   Lab 03/08/22  0807 03/08/22  0650 03/08/22  0230 03/07/22  1226 03/07/22  1104 03/07/22  0848 03/07/22  0720 03/07/22  0157 03/06/22  2214 03/05/22  1133 03/05/22  0634   WBC  --   10.5  --   --   --   --  9.5  --  9.9  --  8.0   HGB  --  8.7*  --   --   --   --  7.6*  7.6*  --  8.3*   < > 7.2*   MCV  --  92  --   --   --   --  93  --  91  --  91   PLT  --  353  --   --   --   --  270  --  268  --  203   NA  --  138  --   --  137  --   --   --   --   --  135   POTASSIUM  --  3.6  --   --  3.5  --   --   --   --   --  3.6   CHLORIDE  --  105  --   --  106  --   --   --   --   --  104   CO2  --  26  --   --  26  --   --   --   --   --  27   BUN  --  27  --   --  23  --   --   --   --   --  32*   CR  --  0.88  --   --  0.79  --   --   --   --   --  1.10   ANIONGAP  --  7  --   --  5  --   --   --   --   --  4   TIN  --  8.8  --   --  8.6  --   --   --   --   --  7.7*   * 103* 111*   < > 139*   < >  --    < >  --    < > 147*    < > = values in this interval not displayed.

## 2022-03-09 ENCOUNTER — PATIENT OUTREACH (OUTPATIENT)
Dept: CARE COORDINATION | Facility: CLINIC | Age: 72
End: 2022-03-09
Payer: COMMERCIAL

## 2022-03-09 DIAGNOSIS — Z71.89 OTHER SPECIFIED COUNSELING: ICD-10-CM

## 2022-03-09 DIAGNOSIS — M54.16 LUMBAR RADICULOPATHY: Primary | ICD-10-CM

## 2022-03-09 LAB
ATRIAL RATE - MUSE: 70 BPM
DIASTOLIC BLOOD PRESSURE - MUSE: NORMAL MMHG
INTERPRETATION ECG - MUSE: NORMAL
P AXIS - MUSE: 59 DEGREES
PR INTERVAL - MUSE: 156 MS
QRS DURATION - MUSE: 78 MS
QT - MUSE: 380 MS
QTC - MUSE: 410 MS
R AXIS - MUSE: 30 DEGREES
SYSTOLIC BLOOD PRESSURE - MUSE: NORMAL MMHG
T AXIS - MUSE: 74 DEGREES
VENTRICULAR RATE- MUSE: 70 BPM

## 2022-03-09 ASSESSMENT — ACTIVITIES OF DAILY LIVING (ADL): DEPENDENT_IADLS:: INDEPENDENT

## 2022-03-09 NOTE — PROGRESS NOTES
Clinic Care Coordination Contact    Clinic Care Coordination Contact  OUTREACH    Referral Information:  Referral Source: IP Report         Chief Complaint   Patient presents with     Clinic Care Coordination - Post Hospital        Universal Utilization: NA  Clinic Utilization  Difficulty keeping appointments:: No  Compliance Concerns: No  No-Show Concerns: No  No PCP office visit in Past Year: No  Utilization    Hospital Admissions  3             ED Visits  0             No Show Count (past year)  0                Current as of: 3/9/2022  8:13 AM              Clinical Concerns:  Current Medical Concerns:  Critical ischemia of lower extremity (H) [I70.229]  Current Behavioral Concerns: None  Education Provided to patient: Discussed CC  Pain  Pain (GOAL):: No  Health Maintenance Reviewed: Up to date  Clinical Pathway: None    Medication Management:  Medication review status: Medications reviewed.  Changes noted per patient report.       Functional Status:  Dependent ADLs:: Ambulation-cane  Dependent IADLs:: Independent  Bed or wheelchair confined:: No  Mobility Status: Independent w/Device  Fallen 2 or more times in the past year?: No  Any fall with injury in the past year?: No    Living Situation:  Current living arrangement:: I live in a private home with spouse  [facility]    Lifestyle & Psychosocial Needs: Pt lives with wife in a central location. Is well supported by wife and children.    Social Determinants of Health     Tobacco Use: Medium Risk     Smoking Tobacco Use: Former Smoker     Smokeless Tobacco Use: Never Used   Alcohol Use: Not on file   Financial Resource Strain: Not on file   Food Insecurity: Not on file   Transportation Needs: Not on file   Physical Activity: Not on file   Stress: Not on file   Social Connections: Not on file   Intimate Partner Violence: Not on file   Depression: Not on file   Housing Stability: Not on file     Diet:: Regular  Inadequate nutrition  (GOAL):: No  Tube Feeding: No  Inadequate activity/exercise (GOAL):: No  Significant changes in sleep pattern (GOAL): No  Transportation means:: Regular car     Zoroastrianism or spiritual beliefs that impact treatment:: No  Mental health DX:: No  Mental health management concern (GOAL):: No  Chemical Dependency Status: No Current Concerns  Informal Support system:: Family, Friends           Resources and Interventions:  Current Resources:      Community Resources: None  Supplies Currently Used at Home: None  Equipment Currently Used at Home: cane, straight  Employment Status: retired         Advance Care Plan/Directive  Advanced Care Plans/Directives on file:: No    Referrals Placed: None     Goals:       Patient/Caregiver understanding:     Outreach Frequency: 2 weeks  Future Appointments              In 2 weeks Surjit Hamlin MD [facility]          Plan: [facility] outreached to Zia. He was discharged home yesterday after several days in the hospital. Pt. Reports to be feeing very well overall. SW[facility] discussed upcoming appt and medication changes. SW[facility] encouraged him to follow up with PCP within 7-10 days. Pt. States that he is well supported by his family and has no psychosocial needs.

## 2022-03-10 ENCOUNTER — TELEPHONE (OUTPATIENT)
Dept: OTHER | Facility: CLINIC | Age: 72
End: 2022-03-10
Payer: COMMERCIAL

## 2022-03-10 NOTE — TELEPHONE ENCOUNTER
[facility]    Who is the name of the provider?:  Yakelin      What is the location you see this provider at?: [place]    Reason for call:  Thank you to Dr. Hamlin: Yesterday, 3/9, Dr. Hamlin suggested a provider, states he was able to see the recommended provider for a steroid injection today.  Please relay his appreciation to Dr. Hamlin!    Can we leave a detailed message on this number?  YES

## 2022-03-10 NOTE — TELEPHONE ENCOUNTER
"----- Message from Surjit Hamlin MD sent at 3/9/2022 10:16 AM CST -----  Regarding: follow up  Follow up in 3-4 weeks   K    Per 3/8/22 discharge note from Dr. Stevens:  \"Plan is for patient to follow-up in 4 weeks with repeat noninvasive studies\".     Pt has an Post Op OV on 3/28/22 setup but no imaging setup.     Verifying imaging with [facility], then:  Routing to  to add DAVID/TBI and Right LE arterial U/S to be done prior to OV (can be same day).       JOHN العراقيN, RN  [facility]  Office:  [phone] Fax: [phone]    "

## 2022-03-11 ENCOUNTER — TELEPHONE (OUTPATIENT)
Dept: OTHER | Facility: CLINIC | Age: 72
End: 2022-03-11
Payer: COMMERCIAL

## 2022-03-11 NOTE — TELEPHONE ENCOUNTER
[facility]    Who is the name of the provider?:  Dr. Hamlin      What is the location you see this provider at?: [place]    Can we leave a detailed message on this number? n/a    Reason for call:  Zia needs direction on how to remove his wound vac sponge.     Please call patient to discuss.       Kristel Mota    [facility]  Vascular [facility]   [phone]

## 2022-03-11 NOTE — TELEPHONE ENCOUNTER
Discharged 3/8/22 with Prevena Wound vac in right groin.    Per Dr. Hamlin, remove wound vac on Monday and leave open to air.    Relayed to patient.    Lluvia Hays RN BSN  [facility]  [phone]

## 2022-03-12 LAB
BACTERIA BLD CULT: NO GROWTH
BACTERIA BLD CULT: NO GROWTH

## 2022-03-17 NOTE — TELEPHONE ENCOUNTER
Per Dr. Hamlin, yes   Wants DAVID/TBI along with R LE arterial U/S correct with follow up.     Routing to  to add imaging to already scheduled 3/28/22 1st PO OV.    JOHN العراقيN, RN  [facility]  Office:  [phone] Fax: [phone]

## 2022-03-17 NOTE — TELEPHONE ENCOUNTER
Ultrasounds scheduled for 3/24/22. Was unable to add to the same day that he sees Dr. Hamlin.     Future Appointments   Date Time Provider Department Center   3/24/2022 10:00 AM [address]   3/24/2022 10:45 AM [address]   3/28/2022 10:00 AM Surjit Hamlin MD [facility]         Kristel Mota    [facility]   [phone]

## 2022-03-18 ENCOUNTER — TRANSFERRED RECORDS (OUTPATIENT)
Dept: HEALTH INFORMATION MANAGEMENT | Facility: CLINIC | Age: 72
End: 2022-03-18
Payer: COMMERCIAL

## 2022-03-24 ENCOUNTER — HOSPITAL ENCOUNTER (OUTPATIENT)
Dept: ULTRASOUND IMAGING | Facility: CLINIC | Age: 72
Discharge: HOME | End: 2022-03-24
Attending: SURGERY
Payer: COMMERCIAL

## 2022-03-24 ENCOUNTER — PATIENT OUTREACH (OUTPATIENT)
Dept: CARE COORDINATION | Facility: CLINIC | Age: 72
End: 2022-03-24
Payer: COMMERCIAL

## 2022-03-24 DIAGNOSIS — Z98.890 CRITICAL ISCHEMIA OF EXTREMITY WITH HISTORY OF REVASCULARIZATION OF SAME EXTREMITY (H): ICD-10-CM

## 2022-03-24 DIAGNOSIS — Z09 ENCOUNTER FOR FOLLOW-UP EXAMINATION AFTER COMPLETED TREATMENT FOR CONDITIONS OTHER THAN MALIGNANT NEOPLASM: ICD-10-CM

## 2022-03-24 DIAGNOSIS — I70.229 CRITICAL ISCHEMIA OF EXTREMITY WITH HISTORY OF REVASCULARIZATION OF SAME EXTREMITY (H): ICD-10-CM

## 2022-03-24 PROCEDURE — 93926 LOWER EXTREMITY STUDY: CPT | Mod: RT

## 2022-03-24 PROCEDURE — 93922 UPR/L XTREMITY ART 2 LEVELS: CPT

## 2022-03-24 PROCEDURE — 93922 UPR/L XTREMITY ART 2 LEVELS: CPT | Mod: 26 | Performed by: SURGERY

## 2022-03-24 PROCEDURE — 93926 LOWER EXTREMITY STUDY: CPT | Mod: 26 | Performed by: SURGERY

## 2022-03-24 NOTE — PROGRESS NOTES
Clinic Care Coordination Contact    Follow Up Progress Note      Assessment: PC to Zia. He has a follow up with Dr. Hamlin with vascular medicine on the 28th for follow up. Overall, he states he's doing well . He complains of sciatic pain for which he recently received a steroid shot in his back . Pt. states emotionally, he feels good on heidi days but cloudy and cold days tend to get him down. SW[facility] encouraged him to find things he enjoys to do to keep his mood lifted . No other needs presented at this time. SW[facility] will close out CC.    Care Gaps:    Health Maintenance Due   Topic Date Due     MICROALBUMIN  Never done     DIABETIC FOOT EXAM  Never done     ADVANCE CARE PLANNING  Never done     EYE EXAM  Never done     COLORECTAL CANCER SCREENING  Never done     HEPATITIS C SCREENING  Never done     DTAP/TDAP/TD IMMUNIZATION (1 - Tdap) Never done     ZOSTER IMMUNIZATION (1 of 2) Never done     LUNG CANCER SCREENING  Never done     MEDICARE ANNUAL WELLNESS VISIT  Never done     AORTIC ANEURYSM SCREENING (SYSTEM ASSIGNED)  Never done     Pneumococcal Vaccine: 65+ Years (1 of 1 - PPSV23) 02/23/2018     PHQ-2 (once per calendar year)  Never done           Goals addressed this encounter:   Goals Addressed    None         Intervention/Education provided during outreach: na              Plan:   Follow up with vascular medicine  Care Coordinator will close out CC.

## 2022-03-28 ENCOUNTER — TELEPHONE (OUTPATIENT)
Dept: OTHER | Facility: CLINIC | Age: 72
End: 2022-03-28

## 2022-03-28 ENCOUNTER — TELEPHONE (OUTPATIENT)
Dept: PODIATRY | Facility: CLINIC | Age: 72
End: 2022-03-28

## 2022-03-28 ENCOUNTER — OFFICE VISIT (OUTPATIENT)
Dept: OTHER | Facility: CLINIC | Age: 72
End: 2022-03-28
Attending: SURGERY
Payer: COMMERCIAL

## 2022-03-28 VITALS — SYSTOLIC BLOOD PRESSURE: 165 MMHG | DIASTOLIC BLOOD PRESSURE: 73 MMHG | HEART RATE: 69 BPM

## 2022-03-28 DIAGNOSIS — Z98.890 CRITICAL LIMB ISCHEMIA WITH HISTORY OF REVASCULARIZATION OF SAME EXTREMITY (H): Primary | ICD-10-CM

## 2022-03-28 DIAGNOSIS — I70.229 CRITICAL LIMB ISCHEMIA WITH HISTORY OF REVASCULARIZATION OF SAME EXTREMITY (H): Primary | ICD-10-CM

## 2022-03-28 PROCEDURE — G0463 HOSPITAL OUTPT CLINIC VISIT: HCPCS

## 2022-03-28 PROCEDURE — 99024 POSTOP FOLLOW-UP VISIT: CPT | Performed by: SURGERY

## 2022-03-28 NOTE — PROGRESS NOTES
Mr. Hopkins is a very pleasant 72-year-old gentleman who is very well-known to myself.  Recently underwent right femoral endarterectomy with recanalization, balloon angioplasty and stenting of the superficial femoral artery.  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  On examination there is a small area of wound dehiscence in the right groin.  There is no evidence of infection.  This was sharply debrided.  Right dorsalis pedis is monophasic and posterior tibial is biphasic by Doppler.  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    Noninvasive imaging shows significant improvement in the ankle-brachial indices without significant improvement in the toe brachial indices.    I have applied Betadine dressing to him and I have requested Dr. Whelan to evaluate him again regarding the pros.  My sense is that he will end up requiring toe amputations and then just like last time as we did with the left lower extremity he will require hyperbaric oxygen to further promote the healing of the lesions in his right foot.    I plan to see him back in 1 month.

## 2022-03-28 NOTE — TELEPHONE ENCOUNTER
Per Dr. Hamlin, pt to see Dr. Whelan first. Then potential hyperbaric oxygen therapy.     Routing to  to coordinate 1 month from 3/28/22 in person OV with Dr. Hamlin.     Appt note: 1 mo f/u; hx of right femoral endarterectomy with recanalization, right SFA balloon angioplasty/stenting. Right side multiple toe dry/wet gangrene.    DAFNE العراقي, RN  [facility]  Office:  [phone] Fax: [phone]

## 2022-03-28 NOTE — PROGRESS NOTES
[facility]        Patient is here for a post-op.    Pt is currently taking Aspirin and Plavix.    BP (!) 165/73 (BP Location: Right arm, Patient Position: Chair, Cuff Size: Adult Regular)   Pulse 69     The provider has been notified that the patient has no concerns.     Questions patient would like addressed today are: N/A.    Refills are needed: N/A    Has homecare services and agency name:  Codie Cavazos MA

## 2022-03-28 NOTE — TELEPHONE ENCOUNTER
Received message below:      Surjit Hamlin MD Bohm, Purvi GAMBINO DPM, Podiatry/Foot and Ankle Surgery; Alanna Fam, CAIN Whelan,   ABIs have improved but toes on the right foot still in bad shape. Please see him as a priority. My sense is he will need tow amputations and back to hyper baric oxygen therapy.   Thank you,   Esvin       Please call to schedule this patient to see me for a follow up on his toes hopefully this week if possible.     Thanks,    Purvi Whelan DPM

## 2022-03-28 NOTE — TELEPHONE ENCOUNTER
Return call to patient.  Spoke with patient and informed him of recommendations.  Appointment scheduled per patient's request on 4/1/2022 at 1:45 PM.  He had no further questions and was appreciative of call back.    Phone Number patient can be reached at:  Cell number on file:    Telephone Information:   Mobile [phone]     Can we leave a detailed message on this number:  NO    Lori Crandall MBA, ATC

## 2022-03-30 NOTE — TELEPHONE ENCOUNTER
Scheduled for 4/25/22.     Future Appointments   Date Time Provider Department Center   4/1/2022  1:45 PM Purvi Whelan DPM, Podiatry/Foot and Ankle Surgery [facility]   4/25/2022  9:30 AM Surjit Hamlin MD [facility]         Kristel Mota    [facility]   [phone]

## 2022-04-01 ENCOUNTER — OFFICE VISIT (OUTPATIENT)
Dept: PODIATRY | Facility: CLINIC | Age: 72
End: 2022-04-01
Payer: COMMERCIAL

## 2022-04-01 VITALS
DIASTOLIC BLOOD PRESSURE: 82 MMHG | WEIGHT: 132 LBS | HEIGHT: 64 IN | SYSTOLIC BLOOD PRESSURE: 142 MMHG | BODY MASS INDEX: 22.53 KG/M2

## 2022-04-01 DIAGNOSIS — I73.9 PAD (PERIPHERAL ARTERY DISEASE) (H): ICD-10-CM

## 2022-04-01 DIAGNOSIS — S98.112A AMPUTATION OF LEFT GREAT TOE (H): ICD-10-CM

## 2022-04-01 DIAGNOSIS — L97.514: ICD-10-CM

## 2022-04-01 DIAGNOSIS — E11.42 DIABETIC POLYNEUROPATHY ASSOCIATED WITH TYPE 2 DIABETES MELLITUS (H): Primary | ICD-10-CM

## 2022-04-01 DIAGNOSIS — Z11.59 ENCOUNTER FOR SCREENING FOR OTHER VIRAL DISEASES: Primary | ICD-10-CM

## 2022-04-01 PROCEDURE — 99214 OFFICE O/P EST MOD 30 MIN: CPT | Performed by: PODIATRIST

## 2022-04-01 NOTE — PROGRESS NOTES
"Podiatry / Foot and Ankle Surgery Progress Note    April 1, 2022    Subject: Patient was seen for ischemic ulcers to the toes of the right foot.  He recently was revascularized with Dr. Hamlin.  Here to follow-up on the toe ulcers.  Notes that his pain has improved significantly since her vascularization.  Denies fever, nausea, vomiting.  Has been doing iodine to the toes daily.    Objective:  Vitals: BP (!) 142/82   Ht 1.626 m (5' 4\")   Wt 59.9 kg (132 lb)   BMI 22.66 kg/m    BMI= Body mass index is 22.66 kg/m .    A1C: 7.4 (2/2021)     General:  Patient is alert and orientated.  NAD.     Vascular:  DP and PT pulses are nonpalpable on the right foot..  No edema or varicosities noted.  CFT's < 3secs.  Skin temp is normal.     Neuro:  Light and gross touch sensation diminished to feet.     Derm:  The distal half of the right fifth and first toes are necrotic with ischemic ulcers.  There is bone exposed.  No redness or signs of acute infection noted.  There is an ischemic ulcer to the lateral aspect of the right fourth toe.  This measures approximately 2 cm in length and 0.5 cm in width.  0.2 cm in depth.  No redness, purulent drainage or signs of acute infection noted.  Full-thickness ulceration to the medial aspect of the right first metatarsal head.  This measures approximately 3 cm x 1-1/2 cm x 0.2 cm.  Another full-thickness ulceration to the posterior aspect of the right heel.  This measures approximately 0.5 cm x 0.3 cm x 0.1 cm.  Again no redness or signs of acute infection.  No purulent drainage is noted.    Musculoskeletal: Left great toe amputation.     Assessment:    PAD (peripheral artery disease) (H)  Ischemic ulcer of foot with fat layer exposed, unspecified laterality (H)  Diabetic polyneuropathy associated with type 2 diabetes mellitus (H)  Amputation of left great toe (H)        Medical Decision Making/Plan:  Had long discussion with patient about the ischemic areas especially on the first and " fifth toes.  Discussed that even with the improved blood flow that that tissue is not coming back and there is no way to graft over that area.  Would recommend amputation of the first and fifth right toes.  We discussed that the right fourth toe possibly may need to be amputated as it does have a large defect however does not appear to have the exposed bone of the other toes do.  Would try to see if this would heal after revascularization has now occurred.  Discussed that since he does not have any signs of a systemic infection that we could try to do this as an outpatient surgery.  If he does develop fever chills or signs of systemic infection he needs to go to the hospital.  Patient is in agreement.  We will have my surgery scheduler call him to schedule surgery.  All questions were answered to patient satisfaction he will call for the questions or concerns.  We will have him continue to do iodine dressing changes to the feet daily.     Patient Risk Factor:  Patient is a high risk factor for infection.        Purvi Whelan DPM, Podiatry/Foot and Ankle Surgery

## 2022-04-01 NOTE — PATIENT INSTRUCTIONS
Thank you for choosing [facility] Podiatry / Foot & Ankle Surgery!    DR SAVAGE'S CLINIC:  [facility]   [address]   [place] [address]      TRIAGE LINE: [phone] - Opt. [employer]  APPOINTMENTS: [phone]  RADIOLOGY: [phone]  SET UP SURGERY: [phone]  FAX NUMBER: [phone]  BILLING QUESTIONS: [phone]       Follow up: we will call to schedule surgery    GETTING READY FOR YOUR SURGERY  ONE-THREE WEEKS BEFORE  1. See your Family Doctor or Primary Care Doctor for a History and Physical. If you do not, we may need to change the date of your surgery.  2. Please see pre-surgical medications below to which medications need to be stopped before surgery and when.    TEN OR MORE DAYS BEFORE    1. Roby with the hospital. (For [facility])      By Phone: [phone].      By Internet: [email or web address]. Choose [facility].      If you do not register by phone or online, we will call to help you register.    SAME DAY SURGERY PATIENTS  1. You will need a family member of friend to drive you home. If you do not have one the surgery will be cancelled or rescheduled.  2. You will need a responsible adult to stay with you that night after the surgery.       We will ask this person to listen to some instructions before you leave the hospital.  * If your child is having surgery, and you would like a tour of the hospital, please call: [phone].      DAY BEFORE SURGERY  1. DO NOT EAT OR DRINK ANYTHING AFTER MIDNIGHT THE NIGHT BEFORE YOUR SURGERY!   2. DO NOT DRINK ALCOHOL.  3. Do not take over the counter drugs.  4. Some people need to have blood tests at the hospital. If you need blood tests, we will tell you in advance.  5. Take medications as directed by your doctor. You may take these with a small amount of water.  6. Do not chew gum, chew tobacco, or suck on hard candy the day of surgery.  7. Bring your insurance cards, a list of your medicines and  co-pays you might need. Leave jewelry and other valuables at home.  8. If you received papers at your doctor's office, bring these with you to the surgery.    If you have questions about these instructions, please call: [phone]  Ask to speak with a pre-admitting nurse.    PRESURGICAL MEDICATIONS:  Certain prescription, over-the-counter, and herbal medications interfere with healing after an operation. The main concern relates to medications that increase bleeding at the surgical site. Excess blood under the incision results in poor wound healing, excess pain, increased scarring, and a higher risk of infection.    Some medications slow the healing process of bone. Medications can also interfere with the anesthesia drugs that keep you asleep during the operation. It is important to ensure that these medications are out of your system prior to the operation. The list below details a number of medications that are of concern. Pay special attention to how long you should avoid these medications before your operation. Please note that this list is not complete. You should ask your surgeon or pharmacist if you are uncertain about other medications. Any herbal supplement not listed should be discontinued at least one week prior to surgery.    Aspirin: Hold for one week prior to surgery and restart the day after surgery. This over the counter medication promotes bleeding.    Motrin / Ibuprofen / Aleve / Advil / NSAIDS:  Stop one week prior to surgery. These medications affect bleeding and may cause delay in bone healing. Avoid taking these medications for six weeks after bone surgeries. Other procedures may allow you to restart sooner than 6 weeks after surgery.    Coumadin / Plavix: Your primary care provider will manage Coumadin in relation to surgery. Coumadin may result in excessive bleeding and may be adjusted before and after surgery.    Enbriel: Stop two weeks prior to surgery and restart two weeks after surgery.  This medication can effect soft tissue healing and increases the risk of infection.    Remicade: Stop 8-12 weeks before surgery and restart two weeks after surgery. This medication can affect soft tissue healing and increases the risk of infection.    Humira: Stop 4 weeks before surgery and restart two weeks after surgery. This medication can affect soft tissue healing and increases the risk of infection.    Methotrexate: Stop one dose prior to surgery. This medication will be restarted when the wound appears to be healing well. Please ask your surgeon about restarting this medication when you are being seen in the office for wound checks.    Kava: Stop at least one day prior to surgery and may restart one day after surgery. Kava may increase the sedative effect of anesthetics that are given during the operation. Kava can also increase bleeding at the surgical site.    Ephedra (ma farley): Stop at least one day prior to surgery and may restart one day after surgery.  Ephedra may increase the risk of heart attack and stroke. This medication can also increase bleeding at the surgical site.    Ashleigh's Wort: Stop at least five days before surgery and may restart one day after surgery. Colquitt's wort may diminish the effects of several drugs that are given during surgery.    Ginseng: Stop at least one week prior to surgery and may restart one day after surgery.  Ginseng lowers blood sugar and may increase bleeding at the surgical site.    Ginkgo: Stop 36 hours before surgery and may restart one day after surgery. Ginkgo may increase bleeding at the surgical site.    Garlic: Stop at least one week prior to surgery and may restart one day after. Garlic may increase bleeding at the surgery site.    Valerian: Do a slow steady decrease in your daily dose over a period of 2-3 weeks before surgery to decrease the chance of withdrawal symptoms. Valerian may increase the sedative effect of anesthetics given during the  operation.    Echinacea: There is no data on stopping echinacea prior to surgery. This medication though can be associated with allergic reactions and suppression of your immune system.    Vitamin E, Omega-3, Flax, Fish Oil, Glucosamine and Chondroitin: Stop 2 weeks prior to surgery and may restart one day after. These herbal medications can increase risk of bleeding at surgical site.     POTENTIAL COMPLICATIONS OF FOOT & ANKLE SURGERY  Undergoing a surgical procedure involves a certain amount of risk. Risks of complications vary depending on the complexity of the surgery and how you take care of the surgical area during the healing process. Complications can range from minor infection to death. Some complications are temporary while others will be permanent.  Your surgeon weighs the risk of complications vs the potential benefit of undergoing surgery. You need to consider your tolerance for unexpected problems as you decide whether to undergo surgery.    Foot and Ankle surgery involves cutting skin, bone, ligaments, blood vessels and joints.  These structures heal well but not without consequence. Any break to the skin can lead to infection. A deep infection involves bones or joints which can be devastating. Deep infection can lead to amputation or could spread to other parts of your body. Most infections are minor and easily treated with oral antibiotics. Infections are often times from bacteria already present on your skin. Proper care of the surgical site is an essential component of avoiding infection. Do not get the bandage wet and take proper care of external pins to avoid these problems.     Joint stiffness is inherent to any foot or ankle surgery. Joint surgery is a major component of reconstructive foot and ankle procedures. The ligaments and tissues around the joint are cut, and later repaired. Scare tissue limits joint mobility. This can be permanent but generally improves over the course of one  year.    Surgery involves dissection around nerves. Visible nerves are moved out of the way while very small nerves are cut. Scar tissue develops around nerves and can lead to nerve pain, numbness, or neuromas. Nerve symptoms can be permanent. This can lead to numbness or sometimes hypersensitivity to touch and problems wearing shoes.    Bones do not always heal after surgery. Poor healing after a bone cut or joint fusion can lead to an extended period of casting or repeat surgery. Electronic bone stimulators are sometimes used to stimulate poor healing of bone. Nonunion is when joint fusion does not take.  This can occur as often as 10% of the time. Smoking doubles your risk of poor bone healing to 20%.    Bone grafting is sometimes necessary during the original or subsequent surgery. Bone is sometimes taken from other parts of your body or freeze dried bone from a bone bank from a bone bank or synthetic bone material might be used.    A scar is always present after foot and ankle surgery. The scar will be visible and could be sensitive. Some people develop excessive scarring, which cannot be controlled by the surgeon. Scars can be unsightly and can restrict joint mobility.    Blood clots can develop in the calf after surgery. Foot and ankle surgery is a predisposing factor for blood clots. The blood clot could break and travel to your lung.  This condition can lead to death. Early warning signs could include calf swelling and pain, chest pain or shortness of breath. This is an emergency that requires immediate attention by a medical doctor!    Surgery will not necessarily create a pain-free foot. Even normal feet hurt. Crooked toes, bunions, neuromas, flat feet and arthritis should all be considered permanent conditions.  Ankle pain commonly requires multiple surgeries over a lifetime. Do not assume that having surgery will permanently fix your condition. You may need permanent alteration in shoes and activities  to accommodate your foot and ankle problem.    Careful attention to post-operative recommendations will dramatically reduce your risk of complications. Proper dressing, wound care, elevation and rest will be essential to get the wound healed and minimize scarring. Strict attention to activity restrictions, such as non-weight bearing, or partial weight bearing is essential. Internal fixation devices may not resist the stress of walking. Some select surgeries allow the patient to walk, however this should be very minimal.    Despite these concerns, foot and ankle surgery leads to a high level of patient satisfaction. Your surgeon would not recommend surgery if he/she did not expect your foot to improve. Talk to your surgeon about any of the above issues.    POST OPERATIVE HOME CARE INSTRUCTIONS  Activities: You should rest today. Stay off your feet as much as possible and keep your foot elevated above the level of your heart (about two pillow height). Wear your surgical shoe at all times when up. Limit walking to 5 to 10 minutes per hour over the next few days if your doctor has previously told you that you can put some weight on the foot after surgery, although limit the weight to your heel. If you are supposed to be non-weight bearing, that means NO WEIGHT AT ALL ON THE FOOT. Use an ice pack on the ankle while awake 20 minutes per hour to help decrease pain and swelling.     Discomfort: If a prescription for pain was given, take as directed. If no pain medication was ordered, you may take a non-prescription, non-aspirin pain medication. If the pain is not relieved by pain medication, call the clinic.     Incision and Dressing: Your surgical dressing is a sterile dressing and should be left in place until removed by your physician. Keep the dressing dry by covering it with a plastic bag for showers, taking baths with the surgical foot out of the tub, or sponge bathing. Some bleeding on the dressing should be  expected. If however, you notice active or excessive bleeding or a temperature over 100 degrees by mouth, call the clinic. Do not change dressing by yourself.  If the dressing becomes wet or dirty, please call the clinic as it may need a new sterile dressing applied. You may start getting the foot wet after the stitches are removed.     Do not wear regular shoes with a surgical bandage and/or external pins in your foot. Wear loose fitting clothing that easily will slip over the bandage and/or pins. Do not cover your surgical foot with blankets as they may damage the dressing/pins. Also, remember that dogs are not aware of your surgery. Please keep them away from the bandage/pins.   If your surgeon places external pins in your foot, you must keep the foot dry until the pins are removed at 6-8 weeks after the surgery. Pins should be covered with a dressing for protection. You should examine the pins and your skin often. Check for any spreading redness or yellow drainage from the pin areas. Do not apply ointment around the pins. Do not push a loose pin back into your foot. Please call the clinic if the pin is spinning or moving in and out. If the pins are bumped or loosened they may need to be removed early. This may affect your surgical outcome.   Please call the clinic if you feel there is a problem with your pins and/or surgical bandage.    TIPS FOR SUCCESSFUL HEALING  How you care for the surgical site is critically important to achieve a successful result after surgery. Avoidance of injury, infection, excess swelling, scar tissue and stiffness are highly dependent on the care you provide over the next six weeks. Please do not hesitate to call if you have questions or concerns.   Your foot requires significant rest and elevation. Sitting for long hours with your foot elevated, however, will create its own problems. Expect muscle aches, back pain, cramps, etc. Optimal posture, lumbar support, back exercises, ice and  heat may all help with your new aches and pains. Do not apply a heating pad to your foot or leg as this can cause increase swelling and pain. Rather use ice in those areas.   Pain medications cause drowsiness. You may frequently sleep during the day and then have trouble sleeping at night. Over the counter sleep aids might be more effective than narcotic pain medication to achieve a reasonable night's sleep.    Narcotic pain medications and inactivity lead to constipation. Limiting use of narcotics will help minimize this problem. The pain medications will not completely alleviate your pain. The purpose of pain pills is to take the edge off and help you get through the first few days. You can substitute Extra Strength Tylenol if pain is mild. Please note that narcotic pain pills usually contain acetaminophen (the active ingredient in Tylenol) so be careful to avoid the maximum dose of acetaminophen. You should take measures to avoid constipation by drinking plenty of water, eating lots of fruit and vegetables and taking the recommended dose of Metamucil or a similar fiber supplement. These measures should be continued for as long as you require narcotic pain medications and are inactive.     Showering is a major challenge. Your incision requires about three days to become sealed from water. Your bandage should not get wet and should not be removed. Do not attempt showering for the first three days. A sponge bath is preferred. You may attempt to shower on the fourth day after the operation. Your foot should be covered with a bag, tape and rubber bands. Double bagging is preferred. Standing in the shower with a bag on your foot is quite hazardous. A portable shower stool would be ideal. The bandage will need to be changed in the office if it becomes moistened. A moist bandage will not dry on its own. A moist dressing may lead to infection.   Stiffness will develop after any operation due to scarring. The scar tissue  begins to form immediately after the surgery. Inactivity can cause excess stiffness and may lead to blood clots in your legs. Frequent range of motion exercises will help decrease stiffness, blood clots, scar tissue and adhesions. Please call if you are unsure about these recommendations.   Good luck and best wishes on a prompt recovery. Healing is slow but an important step in your recovery. You are in control of the final result. Please use this time wisely. Please do not hesitate to call if you have questions, concerns or comments.    * If you have any post-operative questions or concerns regarding your procedure, call our triage team at the [facility] at [phone] (option 3).

## 2022-04-01 NOTE — LETTER
"    4/1/2022         RE: Zia Hopkins  [address]  [facility] [address]        Dear Colleague,    Thank you for referring your patient, Zia Hopkins, to the [facility] PODIATRY. Please see a copy of my visit note below.    Podiatry / Foot and Ankle Surgery Progress Note    April 1, 2022    Subject: Patient was seen for ischemic ulcers to the toes of the right foot.  He recently was revascularized with Dr. Hamlin.  Here to follow-up on the toe ulcers.  Notes that his pain has improved significantly since her vascularization.  Denies fever, nausea, vomiting.  Has been doing iodine to the toes daily.    Objective:  Vitals: BP (!) 142/82   Ht 1.626 m (5' 4\")   Wt 59.9 kg (132 lb)   BMI 22.66 kg/m    BMI= Body mass index is 22.66 kg/m .    A1C: 7.4 (2/2021)     General:  Patient is alert and orientated.  NAD.     Vascular:  DP and PT pulses are nonpalpable on the right foot..  No edema or varicosities noted.  CFT's < 3secs.  Skin temp is normal.     Neuro:  Light and gross touch sensation diminished to feet.     Derm:  The distal half of the right fifth and first toes are necrotic with ischemic ulcers.  There is bone exposed.  No redness or signs of acute infection noted.  There is an ischemic ulcer to the lateral aspect of the right fourth toe.  This measures approximately 2 cm in length and 0.5 cm in width.  0.2 cm in depth.  No redness, purulent drainage or signs of acute infection noted.  Full-thickness ulceration to the medial aspect of the right first metatarsal head.  This measures approximately 3 cm x 1-1/2 cm x 0.2 cm.  Another full-thickness ulceration to the posterior aspect of the right heel.  This measures approximately 0.5 cm x 0.3 cm x 0.1 cm.  Again no redness or signs of acute infection.  No purulent drainage is noted.    Musculoskeletal: Left great toe amputation.     Assessment:    PAD (peripheral artery disease) (H)  Ischemic ulcer of foot with fat layer " exposed, unspecified laterality (H)  Diabetic polyneuropathy associated with type 2 diabetes mellitus (H)  Amputation of left great toe (H)        Medical Decision Making/Plan:  Had long discussion with patient about the ischemic areas especially on the first and fifth toes.  Discussed that even with the improved blood flow that that tissue is not coming back and there is no way to graft over that area.  Would recommend amputation of the first and fifth right toes.  We discussed that the right fourth toe possibly may need to be amputated as it does have a large defect however does not appear to have the exposed bone of the other toes do.  Would try to see if this would heal after revascularization has now occurred.  Discussed that since he does not have any signs of a systemic infection that we could try to do this as an outpatient surgery.  If he does develop fever chills or signs of systemic infection he needs to go to the hospital.  Patient is in agreement.  We will have my surgery scheduler call him to schedule surgery.  All questions were answered to patient satisfaction he will call for the questions or concerns.  We will have him continue to do iodine dressing changes to the feet daily.     Patient Risk Factor:  Patient is a high risk factor for infection.        Purvi Whelan DPM, Podiatry/Foot and Ankle Surgery        Again, thank you for allowing me to participate in the care of your patient.        Sincerely,        Purvi Whelan DPM, Podiatry/Foot and Ankle Surgery

## 2022-04-04 ENCOUNTER — TELEPHONE (OUTPATIENT)
Dept: PODIATRY | Facility: CLINIC | Age: 72
End: 2022-04-04
Payer: COMMERCIAL

## 2022-04-04 ENCOUNTER — TELEPHONE (OUTPATIENT)
Dept: OTHER | Facility: CLINIC | Age: 72
End: 2022-04-04
Payer: COMMERCIAL

## 2022-04-04 DIAGNOSIS — Z98.890 CRITICAL LIMB ISCHEMIA WITH HISTORY OF REVASCULARIZATION OF SAME EXTREMITY (H): Primary | ICD-10-CM

## 2022-04-04 DIAGNOSIS — T14.8XXA NONHEALING NONSURGICAL WOUND: ICD-10-CM

## 2022-04-04 DIAGNOSIS — I70.229 CRITICAL LIMB ISCHEMIA WITH HISTORY OF REVASCULARIZATION OF SAME EXTREMITY (H): Primary | ICD-10-CM

## 2022-04-04 NOTE — TELEPHONE ENCOUNTER
Per Dr. Hamlin, patient will need one week of hyperbaric therapy prior to 22 surgery date and ongoing after the surgery.    Referral faxed to [facility] after discussing with patient.    [facility] 22 20:53.    Hyperbaric Oxygen Therapy Referrals    If faxing - referral letter, progress note, & face sheet.   Include: patient name//rationale/diagnosis/is patient able to get self to the facility    [facility] ([place])  Wound Care Clinic & Hyperbaric Oxygen Therapy  [facility] - Fourth floor  [address], [place], [place] [id number] [phone]    Fax [phone]     [facility] ([place])   Does not accept insurance - will do super bill that patient can submit to insurance.  [address] [place] [id number] [phone]    Fax: [phone]    [facility]  Center for Hyperbaric Medicine  [address] [facility] [address]  [place], [place] [address] [phone]    Fax [phone]    [facility]  Wound Healing Center  [address] [place] [address]    [phone]    Fax [phone]    [facility]   Wound & Hyperbaric Healing Center  [address], [place] [id number] [phone]   Fax [phone]  [facility]    [facility]  Respiratory Therapy  [phone]  (referral in Epic- HYPERBARIC OXYGEN

## 2022-04-04 NOTE — TELEPHONE ENCOUNTER
----- Message from Surjit Hamlin MD sent at 4/1/2022  5:22 PM CDT -----  Regarding: RE: follow up  He will need hyperbaric as an adjunct to get his surgical sites to heal  K  ----- Message -----  From: Alanna Fam, RN  Sent: 4/1/2022   5:17 PM CDT  To: Surjit Hamlin MD  Subject: FW: follow up                                    Do you want hyperbaric oxygen therapy ordered?   Pt saw Dr. Whelna today.   Please route back to [facility] RN Triage.   M  ----- Message -----  From: Surjit Hamlin MD  Sent: 3/28/2022  10:51 AM CDT  To: , #  Subject: follow up                                        Dr. Whelan,  ABIs have improved but toes on the right foot still in bad shape. Please see him as a priority. My sense is he will need tow amputations and back to hyper baric oxygen therapy.   Thank you,  Esvin

## 2022-04-04 NOTE — TELEPHONE ENCOUNTER
Scheduled surgery    Type of surgery: right foot toe amputation, toes 1, 5, possible toe 4  Location of surgery: [facility]  Date and time of surgery: 4/18/22  Surgeon: Tip  Pre-Op Appt Date: patient to schedule  Post-Op Appt Date: 4/26/22   Packet sent out: Yes  Pre-cert/Authorization completed:  No  Date: 4/4/22      Marques Bee, Surgery Scheduler

## 2022-04-05 ENCOUNTER — TELEPHONE (OUTPATIENT)
Dept: PODIATRY | Facility: CLINIC | Age: 72
End: 2022-04-05
Payer: COMMERCIAL

## 2022-04-05 DIAGNOSIS — L97.524: ICD-10-CM

## 2022-04-05 DIAGNOSIS — E11.42 DIABETIC POLYNEUROPATHY ASSOCIATED WITH TYPE 2 DIABETES MELLITUS (H): ICD-10-CM

## 2022-04-05 DIAGNOSIS — I73.9 PAD (PERIPHERAL ARTERY DISEASE) (H): Primary | ICD-10-CM

## 2022-04-05 NOTE — TELEPHONE ENCOUNTER
Orders faxed to [facility] at: [phone] and confirmed they went through via [employer].     STARR Xavier RN

## 2022-04-05 NOTE — TELEPHONE ENCOUNTER
PUt in order for hyperberic oxygen for patient.    Please print off order and a face sheet and fax to [facility] at the number below.     Thanks,    Purvi Whelan DPM      [facility]  Hyperbaric oxygen  - [phone] (fax: [phone])

## 2022-04-12 ENCOUNTER — TELEPHONE (OUTPATIENT)
Dept: OTHER | Facility: CLINIC | Age: 72
End: 2022-04-12
Payer: COMMERCIAL

## 2022-04-12 NOTE — TELEPHONE ENCOUNTER
Pictures received, reviewed with Dr. Hamlin who recommends:   Clean with peroxide once a day.    I called pt back, explained this and to change any dressings frequently to keep area as dry as possible. Pt notes understanding.                 DAFNE العراقي, RN  [facility]  Office:  [phone] Fax: [phone]

## 2022-04-12 NOTE — TELEPHONE ENCOUNTER
"Pt s/p right femoral endarterectomy with balloon angioplasty and stenting on 3/1/22, right groin wound dehiscence.  Betadine dressing applied at 3/28/22 OV.    Pt has upcoming sx on 4/18/22 with Dr. Whelan.     Called pt back and triaged:  Any of the following present?  Drainage color/consistency: whitish and purulent, started about 2 days after removed pump, which was awhile ago.   Odorus: intermittent pungent smell.    Redness: No  Fever/chills: no  Nausea/vomitting: no  \"Length of wound has healed well, except for highest end of site\".  No lump present.   \"Have been putting a small amount of antibiotic iodosorb and santyl on site.\"    Daily dressing changes and dresssing saturated.   Advised pt to continue to do dressing changes frequently to keep clean and dry.   Will discuss with Dr. Hamlin and follow up with pt. Pt notes understanding.     DFANE العراقي, RN  [facility]  Office:  [phone] Fax: [phone]       "

## 2022-04-12 NOTE — TELEPHONE ENCOUNTER
"[facility]    Who is the name of the provider?:  Dr. Hamlin      What is the location you see this provider at?: [place]    Can we leave a detailed message on this number? Yes    Reason for call:  Symptom concerns.     Patient states that he had a recent procedure and his incision is still not healing and is \"leaking\".     Please contact patient to discuss.         Kristel Mota    [facility]  [facility]   [phone]      "

## 2022-04-12 NOTE — TELEPHONE ENCOUNTER
Discussed with Dr. Hamlin, requested either a picture of site or pt to come in for OV.   Pt has hyperbaric med until after 11:00 tomorrow 4/13/22; pt will send picture of site.     DAFNE العراقي, RN  [facility]  Office:  [phone] Fax: [phone]

## 2022-04-16 ENCOUNTER — LAB (OUTPATIENT)
Dept: URGENT CARE | Facility: URGENT CARE | Age: 72
End: 2022-04-16
Attending: PODIATRIST
Payer: COMMERCIAL

## 2022-04-16 ENCOUNTER — HEALTH MAINTENANCE LETTER (OUTPATIENT)
Age: 72
End: 2022-04-16

## 2022-04-16 DIAGNOSIS — Z11.59 ENCOUNTER FOR SCREENING FOR OTHER VIRAL DISEASES: ICD-10-CM

## 2022-04-16 LAB — SARS-COV-2 RNA RESP QL NAA+PROBE: NEGATIVE

## 2022-04-16 PROCEDURE — U0005 INFEC AGEN DETEC AMPLI PROBE: HCPCS

## 2022-04-16 PROCEDURE — U0003 INFECTIOUS AGENT DETECTION BY NUCLEIC ACID (DNA OR RNA); SEVERE ACUTE RESPIRATORY SYNDROME CORONAVIRUS 2 (SARS-COV-2) (CORONAVIRUS DISEASE [COVID-19]), AMPLIFIED PROBE TECHNIQUE, MAKING USE OF HIGH THROUGHPUT TECHNOLOGIES AS DESCRIBED BY CMS-2020-01-R: HCPCS

## 2022-04-18 ENCOUNTER — ANESTHESIA EVENT (OUTPATIENT)
Dept: SURGERY | Facility: CLINIC | Age: 72
End: 2022-04-18
Payer: COMMERCIAL

## 2022-04-18 ENCOUNTER — APPOINTMENT (OUTPATIENT)
Dept: GENERAL RADIOLOGY | Facility: CLINIC | Age: 72
End: 2022-04-18
Attending: PODIATRIST
Payer: COMMERCIAL

## 2022-04-18 ENCOUNTER — ANESTHESIA (OUTPATIENT)
Dept: SURGERY | Facility: CLINIC | Age: 72
End: 2022-04-18
Payer: COMMERCIAL

## 2022-04-18 ENCOUNTER — HOSPITAL ENCOUNTER (OUTPATIENT)
Facility: CLINIC | Age: 72
Discharge: HOME | End: 2022-04-18
Attending: PODIATRIST | Admitting: PODIATRIST
Payer: COMMERCIAL

## 2022-04-18 VITALS
BODY MASS INDEX: 23.22 KG/M2 | OXYGEN SATURATION: 96 % | SYSTOLIC BLOOD PRESSURE: 135 MMHG | RESPIRATION RATE: 12 BRPM | TEMPERATURE: 97.7 F | WEIGHT: 136 LBS | DIASTOLIC BLOOD PRESSURE: 62 MMHG | HEART RATE: 62 BPM | HEIGHT: 64 IN

## 2022-04-18 DIAGNOSIS — I70.229 CRITICAL ISCHEMIA OF LOWER EXTREMITY (H): ICD-10-CM

## 2022-04-18 DIAGNOSIS — I73.9 PAD (PERIPHERAL ARTERY DISEASE) (H): ICD-10-CM

## 2022-04-18 DIAGNOSIS — E11.42 DIABETIC POLYNEUROPATHY ASSOCIATED WITH TYPE 2 DIABETES MELLITUS (H): ICD-10-CM

## 2022-04-18 DIAGNOSIS — E11.621 DIABETIC ULCER OF LEFT HEEL ASSOCIATED WITH TYPE 2 DIABETES MELLITUS, WITH NECROSIS OF BONE (H): ICD-10-CM

## 2022-04-18 DIAGNOSIS — L97.424 DIABETIC ULCER OF LEFT HEEL ASSOCIATED WITH TYPE 2 DIABETES MELLITUS, WITH NECROSIS OF BONE (H): ICD-10-CM

## 2022-04-18 DIAGNOSIS — L97.514 ULCER OF RIGHT FOOT, WITH NECROSIS OF BONE (H): ICD-10-CM

## 2022-04-18 DIAGNOSIS — I96 GANGRENE (H): Primary | ICD-10-CM

## 2022-04-18 LAB
GLUCOSE BLDC GLUCOMTR-MCNC: 136 MG/DL (ref 70–99)
GLUCOSE BLDC GLUCOMTR-MCNC: 137 MG/DL (ref 70–99)

## 2022-04-18 PROCEDURE — 999N000065 XR FOOT PORT RIGHT 2 VIEWS: Mod: RT

## 2022-04-18 PROCEDURE — 999N000141 HC STATISTIC PRE-PROCEDURE NURSING ASSESSMENT: Performed by: PODIATRIST

## 2022-04-18 PROCEDURE — 82962 GLUCOSE BLOOD TEST: CPT | Mod: 91

## 2022-04-18 PROCEDURE — 250N000011 HC RX IP 250 OP 636: Performed by: PODIATRIST

## 2022-04-18 PROCEDURE — 88311 DECALCIFY TISSUE: CPT | Mod: TC | Performed by: PODIATRIST

## 2022-04-18 PROCEDURE — 250N000009 HC RX 250: Performed by: NURSE ANESTHETIST, CERTIFIED REGISTERED

## 2022-04-18 PROCEDURE — 250N000009 HC RX 250: Performed by: PODIATRIST

## 2022-04-18 PROCEDURE — 250N000011 HC RX IP 250 OP 636: Performed by: NURSE ANESTHETIST, CERTIFIED REGISTERED

## 2022-04-18 PROCEDURE — 370N000017 HC ANESTHESIA TECHNICAL FEE, PER MIN: Performed by: PODIATRIST

## 2022-04-18 PROCEDURE — 28820 AMPUTATION OF TOE: CPT | Mod: 59 | Performed by: PODIATRIST

## 2022-04-18 PROCEDURE — 360N000076 HC SURGERY LEVEL 3, PER MIN: Performed by: PODIATRIST

## 2022-04-18 PROCEDURE — 710N000012 HC RECOVERY PHASE 2, PER MINUTE: Performed by: PODIATRIST

## 2022-04-18 PROCEDURE — 272N000001 HC OR GENERAL SUPPLY STERILE: Performed by: PODIATRIST

## 2022-04-18 PROCEDURE — 258N000003 HC RX IP 258 OP 636: Performed by: NURSE ANESTHETIST, CERTIFIED REGISTERED

## 2022-04-18 PROCEDURE — 28825 PARTIAL AMPUTATION OF TOE: CPT | Mod: 59 | Performed by: PODIATRIST

## 2022-04-18 RX ORDER — LIDOCAINE 40 MG/G
CREAM TOPICAL
Status: DISCONTINUED | OUTPATIENT
Start: 2022-04-18 | End: 2022-04-18 | Stop reason: HOSPADM

## 2022-04-18 RX ORDER — FENTANYL CITRATE 50 UG/ML
INJECTION, SOLUTION INTRAMUSCULAR; INTRAVENOUS PRN
Status: DISCONTINUED | OUTPATIENT
Start: 2022-04-18 | End: 2022-04-18

## 2022-04-18 RX ORDER — SODIUM CHLORIDE, SODIUM LACTATE, POTASSIUM CHLORIDE, CALCIUM CHLORIDE 600; 310; 30; 20 MG/100ML; MG/100ML; MG/100ML; MG/100ML
INJECTION, SOLUTION INTRAVENOUS CONTINUOUS
Status: DISCONTINUED | OUTPATIENT
Start: 2022-04-18 | End: 2022-04-18 | Stop reason: HOSPADM

## 2022-04-18 RX ORDER — IBUPROFEN 600 MG/1
600 TABLET, FILM COATED ORAL
Status: DISCONTINUED | OUTPATIENT
Start: 2022-04-18 | End: 2022-04-18 | Stop reason: HOSPADM

## 2022-04-18 RX ORDER — ACETAMINOPHEN 325 MG/1
975 TABLET ORAL ONCE
Status: DISCONTINUED | OUTPATIENT
Start: 2022-04-18 | End: 2022-04-18 | Stop reason: HOSPADM

## 2022-04-18 RX ORDER — DIPHENHYDRAMINE HCL 12.5MG/5ML
12.5 LIQUID (ML) ORAL EVERY 6 HOURS PRN
Status: DISCONTINUED | OUTPATIENT
Start: 2022-04-18 | End: 2022-04-18 | Stop reason: HOSPADM

## 2022-04-18 RX ORDER — CEFAZOLIN SODIUM/WATER 2 G/20 ML
2 SYRINGE (ML) INTRAVENOUS
Status: COMPLETED | OUTPATIENT
Start: 2022-04-18 | End: 2022-04-18

## 2022-04-18 RX ORDER — NALOXONE HYDROCHLORIDE 0.4 MG/ML
0.4 INJECTION, SOLUTION INTRAMUSCULAR; INTRAVENOUS; SUBCUTANEOUS
Status: DISCONTINUED | OUTPATIENT
Start: 2022-04-18 | End: 2022-04-18 | Stop reason: HOSPADM

## 2022-04-18 RX ORDER — ONDANSETRON 2 MG/ML
4 INJECTION INTRAMUSCULAR; INTRAVENOUS EVERY 30 MIN PRN
Status: DISCONTINUED | OUTPATIENT
Start: 2022-04-18 | End: 2022-04-18 | Stop reason: HOSPADM

## 2022-04-18 RX ORDER — NALOXONE HYDROCHLORIDE 0.4 MG/ML
0.2 INJECTION, SOLUTION INTRAMUSCULAR; INTRAVENOUS; SUBCUTANEOUS
Status: DISCONTINUED | OUTPATIENT
Start: 2022-04-18 | End: 2022-04-18 | Stop reason: HOSPADM

## 2022-04-18 RX ORDER — HALOPERIDOL 5 MG/ML
1 INJECTION INTRAMUSCULAR
Status: DISCONTINUED | OUTPATIENT
Start: 2022-04-18 | End: 2022-04-18 | Stop reason: HOSPADM

## 2022-04-18 RX ORDER — DIPHENHYDRAMINE HYDROCHLORIDE 50 MG/ML
12.5 INJECTION INTRAMUSCULAR; INTRAVENOUS EVERY 6 HOURS PRN
Status: DISCONTINUED | OUTPATIENT
Start: 2022-04-18 | End: 2022-04-18 | Stop reason: HOSPADM

## 2022-04-18 RX ORDER — FENTANYL CITRATE 50 UG/ML
25 INJECTION, SOLUTION INTRAMUSCULAR; INTRAVENOUS EVERY 5 MIN PRN
Status: DISCONTINUED | OUTPATIENT
Start: 2022-04-18 | End: 2022-04-18 | Stop reason: HOSPADM

## 2022-04-18 RX ORDER — LIDOCAINE HYDROCHLORIDE 10 MG/ML
INJECTION, SOLUTION INFILTRATION; PERINEURAL PRN
Status: DISCONTINUED | OUTPATIENT
Start: 2022-04-18 | End: 2022-04-18

## 2022-04-18 RX ORDER — BUPIVACAINE HYDROCHLORIDE 5 MG/ML
INJECTION, SOLUTION EPIDURAL; INTRACAUDAL PRN
Status: DISCONTINUED | OUTPATIENT
Start: 2022-04-18 | End: 2022-04-18 | Stop reason: HOSPADM

## 2022-04-18 RX ORDER — HYDROMORPHONE HCL IN WATER/PF 6 MG/30 ML
0.2 PATIENT CONTROLLED ANALGESIA SYRINGE INTRAVENOUS EVERY 5 MIN PRN
Status: DISCONTINUED | OUTPATIENT
Start: 2022-04-18 | End: 2022-04-18 | Stop reason: HOSPADM

## 2022-04-18 RX ORDER — ONDANSETRON 2 MG/ML
INJECTION INTRAMUSCULAR; INTRAVENOUS PRN
Status: DISCONTINUED | OUTPATIENT
Start: 2022-04-18 | End: 2022-04-18

## 2022-04-18 RX ORDER — CEPHALEXIN 500 MG/1
500 CAPSULE ORAL 2 TIMES DAILY
Qty: 28 CAPSULE | Refills: 0 | Status: SHIPPED | OUTPATIENT
Start: 2022-04-18 | End: 2022-05-02

## 2022-04-18 RX ORDER — HYDRALAZINE HYDROCHLORIDE 20 MG/ML
2.5-5 INJECTION INTRAMUSCULAR; INTRAVENOUS EVERY 10 MIN PRN
Status: DISCONTINUED | OUTPATIENT
Start: 2022-04-18 | End: 2022-04-18 | Stop reason: HOSPADM

## 2022-04-18 RX ORDER — DIMENHYDRINATE 50 MG/ML
25 INJECTION, SOLUTION INTRAMUSCULAR; INTRAVENOUS
Status: DISCONTINUED | OUTPATIENT
Start: 2022-04-18 | End: 2022-04-18 | Stop reason: HOSPADM

## 2022-04-18 RX ORDER — ONDANSETRON 4 MG/1
4 TABLET, ORALLY DISINTEGRATING ORAL EVERY 30 MIN PRN
Status: DISCONTINUED | OUTPATIENT
Start: 2022-04-18 | End: 2022-04-18 | Stop reason: HOSPADM

## 2022-04-18 RX ORDER — DIAZEPAM 10 MG/2ML
2.5 INJECTION, SOLUTION INTRAMUSCULAR; INTRAVENOUS
Status: DISCONTINUED | OUTPATIENT
Start: 2022-04-18 | End: 2022-04-18 | Stop reason: HOSPADM

## 2022-04-18 RX ORDER — CEFAZOLIN SODIUM/WATER 2 G/20 ML
2 SYRINGE (ML) INTRAVENOUS SEE ADMIN INSTRUCTIONS
Status: DISCONTINUED | OUTPATIENT
Start: 2022-04-18 | End: 2022-04-18 | Stop reason: HOSPADM

## 2022-04-18 RX ORDER — LABETALOL HYDROCHLORIDE 5 MG/ML
10 INJECTION, SOLUTION INTRAVENOUS
Status: DISCONTINUED | OUTPATIENT
Start: 2022-04-18 | End: 2022-04-18 | Stop reason: HOSPADM

## 2022-04-18 RX ORDER — PROPOFOL 10 MG/ML
INJECTION, EMULSION INTRAVENOUS PRN
Status: DISCONTINUED | OUTPATIENT
Start: 2022-04-18 | End: 2022-04-18

## 2022-04-18 RX ORDER — OXYCODONE HYDROCHLORIDE 5 MG/1
5 TABLET ORAL EVERY 4 HOURS PRN
Status: DISCONTINUED | OUTPATIENT
Start: 2022-04-18 | End: 2022-04-18 | Stop reason: HOSPADM

## 2022-04-18 RX ORDER — ALBUTEROL SULFATE 0.83 MG/ML
2.5 SOLUTION RESPIRATORY (INHALATION) EVERY 4 HOURS PRN
Status: DISCONTINUED | OUTPATIENT
Start: 2022-04-18 | End: 2022-04-18 | Stop reason: HOSPADM

## 2022-04-18 RX ORDER — SODIUM CHLORIDE, SODIUM LACTATE, POTASSIUM CHLORIDE, CALCIUM CHLORIDE 600; 310; 30; 20 MG/100ML; MG/100ML; MG/100ML; MG/100ML
INJECTION, SOLUTION INTRAVENOUS CONTINUOUS PRN
Status: DISCONTINUED | OUTPATIENT
Start: 2022-04-18 | End: 2022-04-18

## 2022-04-18 RX ADMIN — FENTANYL CITRATE 50 MCG: 50 INJECTION, SOLUTION INTRAMUSCULAR; INTRAVENOUS at 12:43

## 2022-04-18 RX ADMIN — PROPOFOL 70 MG: 10 INJECTION, EMULSION INTRAVENOUS at 12:46

## 2022-04-18 RX ADMIN — ONDANSETRON HYDROCHLORIDE 4 MG: 2 INJECTION, SOLUTION INTRAVENOUS at 12:54

## 2022-04-18 RX ADMIN — Medication 2 G: at 12:42

## 2022-04-18 RX ADMIN — FENTANYL CITRATE 50 MCG: 50 INJECTION, SOLUTION INTRAMUSCULAR; INTRAVENOUS at 12:49

## 2022-04-18 RX ADMIN — PROPOFOL 40 MCG/KG/MIN: 10 INJECTION, EMULSION INTRAVENOUS at 12:52

## 2022-04-18 RX ADMIN — SODIUM CHLORIDE, POTASSIUM CHLORIDE, SODIUM LACTATE AND CALCIUM CHLORIDE: 600; 310; 30; 20 INJECTION, SOLUTION INTRAVENOUS at 11:50

## 2022-04-18 RX ADMIN — LIDOCAINE HYDROCHLORIDE 30 MG: 10 INJECTION, SOLUTION INFILTRATION; PERINEURAL at 12:46

## 2022-04-18 NOTE — ANESTHESIA POSTPROCEDURE EVALUATION
Patient: Zia Hopkins    Procedure: Procedure(s):  Amputation of first and fifth toes right foot fifth possible amputation of right fourth toe       Anesthesia Type:  MAC    Note:  Disposition: Outpatient   Postop Pain Control: Uneventful            Sign Out: Well controlled pain   PONV: No   Neuro/Psych: Uneventful            Sign Out: Acceptable/Baseline neuro status   Airway/Respiratory: Uneventful            Sign Out: Acceptable/Baseline resp. status   CV/Hemodynamics: Uneventful            Sign Out: Acceptable CV status; No obvious hypovolemia; No obvious fluid overload   Other NRE: NONE   DID A NON-ROUTINE EVENT OCCUR? No           Last vitals:  Vitals Value Taken Time   /62 04/18/22 1455   Temp 97.7  F (36.5  C) 04/18/22 1455   Pulse 62 04/18/22 1455   Resp 12 04/18/22 1455   SpO2 96 % 04/18/22 1455       Electronically Signed By: Juan Pablo Pringle MD  April 18, 2022  3:42 PM

## 2022-04-18 NOTE — BRIEF OP NOTE
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 (H) [L97.514]  PAD (peripheral artery disease) (H) [I73.9]  Amputation of left great toe (H) [S98.112A]  Post-operative diagnosis Same as pre-operative diagnosis    Procedure: Procedure(s):  Amputation of first and fifth toes right foot fifth possible amputation of right fourth toe  Surgeon: Surgeon(s) and Role:     * Purvi Whelan DPM, Podiatry/Foot and Ankle Surgery - Primary  Anesthesia: Combined MAC with Local   Estimated Blood Loss: 5 mL from 4/18/2022 12:42 PM to 4/18/2022  1:31 PM      Drains: None  Specimens:   ID Type Source Tests Collected by Time Destination   1 : right 1st, 4th, and 5th toes Tissue Toe, Right SURGICAL PATHOLOGY EXAM Purvi Whelan DPM, Podiatry/Foot and Ankle Surgery 4/18/2022  1:02 PM      Findings:   None.  Complications: None.  Implants: * No implants in log *

## 2022-04-18 NOTE — ANESTHESIA PREPROCEDURE EVALUATION
Anesthesia Pre-Procedure Evaluation    Patient: Zia Hopkins   MRN: [id number] : 1950        Procedure : Procedure(s):  Amputation of first and fifth toes right foot fifth possible amputation of right fourth toe          Past Medical History:   Diagnosis Date     Diabetes mellitus type 2, noninsulin dependent (H)      Hyperlipidemia LDL goal <70      Hypertension      PAD (peripheral artery disease) (H)       Past Surgical History:   Procedure Laterality Date     AMPUTATE FOOT Left 3/15/2017    Procedure: AMPUTATE FOOT;  Surgeon: Emre Mauricio DPM;  Location: [facility] OR     AMPUTATE TOE(S) Left 2021    Procedure: LEFT GREAT TOE AMPUTATION.;  Surgeon: Purvi Whelan DPM, Podiatry/Foot and Ankle Surgery;  Location: [facility] OR     ENDARTERECTOMY FEMORAL Right 3/4/2022    Procedure: RIGHT FEMORAL ENDARTERECTOMY, RIGHT FEMORAL ARTERY BALLOON ANGIOPLASTY WITH STENTING;  Surgeon: Surjit Hamlin MD;  Location: [facility] OR     IR LOWER EXTREMITY ANGIOGRAM LEFT  2019     IR LOWER EXTREMITY ANGIOGRAM LEFT  2/3/2021     IR LOWER EXTREMITY ANGIOGRAM LEFT  2021     IR LOWER EXTREMITY ANGIOGRAM RIGHT  3/1/2022     IR OR ANGIOGRAM  3/4/2022     IRRIGATION AND DEBRIDEMENT FOOT, COMBINED Left 3/15/2017    Procedure: COMBINED IRRIGATION AND DEBRIDEMENT FOOT;  Surgeon: Emre Mauricio DPM;  Location: [facility] OR      No Known Allergies   Social History     Tobacco Use     Smoking status: Former Smoker     Packs/day: 0.25     Types: Cigarettes     Quit date: 3/1/2016     Years since quittin.1     Smokeless tobacco: Never Used     Tobacco comment: 2-3 cigarettes per day   Substance Use Topics     Alcohol use: Yes     Comment: Wine one bottle a day during dinner      Wt Readings from Last 1 Encounters:   22 59.9 kg (132 lb)        Anesthesia Evaluation   Pt has had prior anesthetic. Type: General and MAC.    No history of anesthetic complications       ROS/MED HX  ENT/Pulmonary:  - neg pulmonary ROS      Neurologic:  - neg neurologic ROS     Cardiovascular:     (+) Dyslipidemia hypertension-Peripheral Vascular Disease-- Symptomatic. ---    METS/Exercise Tolerance:     Hematologic:  - neg hematologic  ROS     Musculoskeletal: Comment: NON HEALING wound to right 4th and 5th toes  (+) arthritis,     GI/Hepatic:  - neg GI/hepatic ROS     Renal/Genitourinary:  - neg Renal ROS     Endo:     (+) type II DM, Not using insulin, - not using insulin pump.     Psychiatric/Substance Use:  - neg psychiatric ROS     Infectious Disease:  - neg infectious disease ROS     Malignancy:  - neg malignancy ROS     Other:  - neg other ROS          Physical Exam    Airway        Mallampati: II   TM distance: > 3 FB   Neck ROM: full   Mouth opening: > 3 cm    Respiratory Devices and Support         Dental  no notable dental history         Cardiovascular   cardiovascular exam normal       Rhythm and rate: regular and normal     Pulmonary   pulmonary exam normal        breath sounds clear to auscultation       Other findings: Lab Test        03/08/22 03/07/22 03/06/22 04/19/21 02/03/21 07/30/19 03/14/17 03/08/17                       0650          0720          2214          1151          1003          0712          1520          0720          WBC          10.5         9.5          9.9            < >        12.0*        5.7            < >        8.4           HGB          8.7*         7.6*  7.6*  8.3*           < >        12.2*        15.0           < >        15.1          MCV          92           93           91             < >        94           92             < >        91            PLT          353          270          268            < >        377          215            < >        240           INR           --           --           --           --          0.95         0.90          --          0.87           < > = values in this interval not displayed.                  Lab Test        03/08/22      03/08/22 03/08/22 03/07/22 03/07/22 03/05/22 03/05/22                       1318          0807          0650          1226          1104          1133          0634          NA            --           --          138           --          137           --          135           POTASSIUM     --           --          3.6           --          3.5           --          3.6           CHLORIDE      --           --          105           --          106           --          104           CO2           --           --          26            --          26            --          27            BUN           --           --          27            --          23            --          32*           CR            --           --          0.88          --          0.79          --          1.10          ANIONGAP      --           --          7             --          5             --          4             TIN           --           --          8.8           --          8.6           --          7.7*          GLC          141*         101*         103*           < >        139*           < >        147*           < > = values in this interval not displayed.                        EKG Interpretation:   SR with likely LAHB.  IVCD likely right sided.  Abnl    OUTSIDE LABS:  CBC:   Lab Results   Component Value Date    WBC 10.5 03/08/2022    WBC 9.5 03/07/2022    HGB 8.7 (L) 03/08/2022    HGB 7.6 (L) 03/07/2022    HGB 7.6 (L) 03/07/2022    HCT 26.9 (L) 03/08/2022    HCT 23.8 (L) 03/07/2022     03/08/2022     03/07/2022     BMP:   Lab Results   Component Value Date     03/08/2022     03/07/2022    POTASSIUM 3.6 03/08/2022    POTASSIUM 3.5 03/07/2022    CHLORIDE 105 03/08/2022    CHLORIDE 106 03/07/2022    CO2 26 03/08/2022    CO2 26 03/07/2022    BUN 27 03/08/2022    BUN 23 03/07/2022    CR 0.88 03/08/2022    CR 0.79 03/07/2022     (H) 03/08/2022     (H) 03/08/2022      COAGS:   Lab Results   Component Value Date    PTT 32 02/03/2021    INR 0.95 02/03/2021     POC:   Lab Results   Component Value Date     (H) 04/23/2021     HEPATIC: No results found for: ALBUMIN, PROTTOTAL, ALT, AST, GGT, ALKPHOS, BILITOTAL, BILIDIRECT, CAMMIE  OTHER:   Lab Results   Component Value Date    A1C 7.2 (H) 03/04/2022    TIN 8.8 03/08/2022       Anesthesia Plan    ASA Status:  3      Anesthesia Type: MAC.     - Reason for MAC: chronic cardiopulmonary disease, immobility needed, straight local not clinically adequate   Induction: Intravenous.   Maintenance: TIVA.        Consents    Anesthesia Plan(s) and associated risks, benefits, and realistic alternatives discussed. Questions answered and patient/representative(s) expressed understanding.     - Discussed: Risks, Benefits and Alternatives for BOTH SEDATION and the PROCEDURE were discussed     - Discussed with:  Patient      - Extended Intubation/Ventilatory Support Discussed: No.      - Patient is DNR/DNI Status: No    Use of blood products discussed: No .     Postoperative Care    Pain management: IV analgesics, Oral pain medications, Multi-modal analgesia.   PONV prophylaxis: Ondansetron (or other 5HT-3), Dexamethasone or Solumedrol, Background Propofol Infusion     Comments:                Juan Pablo Pringle MD

## 2022-04-18 NOTE — OP NOTE
Procedure Date: 04/18/2022    SURGEON:  Purvi Whelan DPM    PREOPERATIVE DIAGNOSES:     1.  Diabetic with neuropathy.  2.  Peripheral arterial disease.  3.  Dry gangrene of the right first, fourth and fifth toes.    POSTOPERATIVE DIAGNOSES:    1.  Diabetic with neuropathy.  2.  Peripheral arterial disease.  3.  Dry gangrene of the right first, fourth and fifth toes.    PROCEDURES:     1.  Partial right great toe amputation.  2.  Right foot fourth toe amputation at the metatarsophalangeal joint.  3.  Right fifth metatarsal amputation at the metatarsophalangeal joint.    ANESTHESIA:  MAC with local.    HEMOSTASIS:  None.    ESTIMATED BLOOD LOSS:  5 mL    SPECIMENS:  Right first, fourth and fifth toes for pathology.    MATERIALS:  None.    INDICATIONS FOR PROCEDURE:  Mr. Hopkins is a 72-year-old male that presented to clinic with his right toes starting to turn black.  He was seen in Vascular where he underwent an angiogram.  They were not able to improve blood flow that much, so he was started on hyperbaric oxygen.  We saw him in clinic to discuss the dry gangrene of his right first, fourth and fifth toes.  We discussed that we would go in and remove these toes to get rid of the dead tissue and try to prevent worsening infection.  I explained that this is a salvage procedure.  If the hyperbaric is unable to heal the incisions, then the patient is at a high risk for below-knee amputation as he is unable to be revascularized to the foot.  Risks, benefits and complications were discussed with the patient and he wishes to proceed with surgery.  No guarantees were made.    DESCRIPTION OF PROCEDURE:  PROCEDURE #1:  The patient was brought to the operating room and placed on the operating table in a supine position.  Anesthesia was administered and local was injected.  The foot was prepped and draped using sterile technique.  Attention was directed to the distal aspect of the right great toe.  A fishmouth incision was made  around the distal one-half of the right great toe full thickness down to bone with a 15 blade.  The tissue was sharply dissected off of the base of the distal phalanx and the toe was disarticulated.  A sagittal saw was used to remove the distal one-half of the proximal phalanx.  The wound was flushed with copious amounts of normal saline and the skin was reapproximated using 4-0 Prolene.    PROCEDURE #2:  Attention was directed to the right fourth toe.  A full-thickness incision was made at the base of the right fourth toe full thickness down to bone with a 15 blade.  The tissue was sharply dissected off the base of the proximal phalanx and the toe was disarticulated at the metatarsophalangeal joint.  The wound was flushed with copious amounts of normal saline and the skin was reapproximated using 4-0 Prolene.    PROCEDURE #3:  Attention was directed to the distal aspect of the right fifth toe.  A fish-mouth incision was made around the base of the right fifth toe full thickness down to bone with a 15 blade.  The tissue was sharply dissected off of the base of the proximal phalanx and the toe was disarticulated at the metatarsophalangeal joint.  The wound was flushed with copious amounts of normal saline and the skin was reapproximated using 4-0 Prolene.      The patient's foot was placed in a dry sterile dressing pad.  The patient tolerated the procedure and anesthesia well and was transferred to recovery with vital signs stable and vascular status intact.  He will be minimal weightbearing in a postop boot.  He will continue his hyperbaric oxygen and resume his Plavix tomorrow.  All questions were answered.    Purvi Whelan DPM        D: 2022   T: 2022   MT: SHELBY    Name:     SKY GAYTAN  MRN:      [id number]        Account:        [id number]   :      1950           Procedure Date: 2022     Document: [id number]

## 2022-04-18 NOTE — DISCHARGE INSTRUCTIONS
GENERAL ANESTHESIA OR SEDATION ADULT DISCHARGE INSTRUCTIONS   SPECIAL PRECAUTIONS FOR 24 HOURS AFTER SURGERY    IT IS NOT UNUSUAL TO FEEL LIGHT-HEADED OR FAINT, UP TO 24 HOURS AFTER SURGERY OR WHILE TAKING PAIN MEDICATION.  IF YOU HAVE THESE SYMPTOMS; SIT FOR A FEW MINUTES BEFORE STANDING AND HAVE SOMEONE ASSIST YOU WHEN YOU GET UP TO WALK OR USE THE BATHROOM.    YOU SHOULD REST AND RELAX FOR THE NEXT 24 HOURS AND YOU MUST MAKE ARRANGEMENTS TO HAVE SOMEONE STAY WITH YOU FOR AT LEAST 24 HOURS AFTER YOUR DISCHARGE.  AVOID HAZARDOUS AND STRENUOUS ACTIVITIES.  DO NOT MAKE IMPORTANT DECISIONS FOR 24 HOURS.    DO NOT DRIVE ANY VEHICLE OR OPERATE MECHANICAL EQUIPMENT FOR 24 HOURS FOLLOWING THE END OF YOUR SURGERY.  EVEN THOUGH YOU MAY FEEL NORMAL, YOUR REACTIONS MAY BE AFFECTED BY THE MEDICATION YOU HAVE RECEIVED.    DO NOT DRINK ALCOHOLIC BEVERAGES FOR 24 HOURS FOLLOWING YOUR SURGERY.    DRINK CLEAR LIQUIDS (APPLE JUICE, GINGER ALE, 7-UP, BROTH, ETC.).  PROGRESS TO YOUR REGULAR DIET AS YOU FEEL ABLE.    YOU MAY HAVE A DRY MOUTH, A SORE THROAT, MUSCLES ACHES OR TROUBLE SLEEPING.  THESE SHOULD GO AWAY AFTER 24 HOURS.    CALL YOUR DOCTOR FOR ANY OF THE FOLLOWING:  SIGNS OF INFECTION (FEVER, GROWING TENDERNESS AT THE SURGERY SITE, A LARGE AMOUNT OF DRAINAGE OR BLEEDING, SEVERE PAIN, FOUL-SMELLING DRAINAGE, REDNESS OR SWELLING.    IT HAS BEEN OVER 8 TO 10 HOURS SINCE SURGERY AND YOU ARE STILL NOT ABLE TO URINATE (PASS WATER).

## 2022-04-18 NOTE — ANESTHESIA CARE TRANSFER NOTE
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    Anesthesia Type:   MAC     Note:    Oropharynx: oropharynx clear of all foreign objects and spontaneously breathing  Level of Consciousness: awake  Oxygen Supplementation: room air    Independent Airway: airway patency satisfactory and stable  Dentition: dentition unchanged  Vital Signs Stable: post-procedure vital signs reviewed and stable  Report to RN Given: handoff report given  Patient transferred to: Phase II  Comments: No issues  Handoff Report: Identifed the Patient, Identified the Reponsible Provider, Reviewed the pertinent medical history, Discussed the surgical course, Reviewed Intra-OP anesthesia mangement and issues during anesthesia and Allowed opportunity for questions and acknowledgement of understanding      Vitals:  Vitals Value Taken Time   BP     Temp     Pulse     Resp     SpO2         Electronically Signed By: CLAUDINE Marshall CRNA  April 18, 2022  1:36 PM

## 2022-04-19 ENCOUNTER — TELEPHONE (OUTPATIENT)
Dept: PODIATRY | Facility: CLINIC | Age: 72
End: 2022-04-19
Payer: COMMERCIAL

## 2022-04-19 NOTE — TELEPHONE ENCOUNTER
Wife, Le, left voicemail for patient.   Patient had foot surgery yesterday by Dr. Whelan with toe amputations.   He is resting quietly today and doing well taking Tylenol and pain medications.     Patient is scheduled for a 2nd SHARRI tomorrow with a Pain Clinic, and she is not sure if Dr. Whelan is aware of the injection planned or not. The first injection didn't help with his back pain, and they want to pursue the 2nd one tomorrow. She wants to make sure it is ok to proceed with SHARRI tomorrow.     There is no consent to communicate on file for wife.     Please advise if patient may continue with SHARRI tomorrow.       STARR Xavier RN

## 2022-04-19 NOTE — TELEPHONE ENCOUNTER
Patient should be okay to get injection. He is currently on antibiotics.     Please let them know.     Purvi Whelan DPM

## 2022-04-19 NOTE — TELEPHONE ENCOUNTER
Phone call to patient and obtained permission to speak to wife, Le. Explained that it is ok for patient to have SHARRI tomorrow at [facility].     Patient states he is having some pain but is not able to elevate his feet any higher because it aggravates his sciatica. He is able to keep his foot elevated on the couch with a pillow, however. Recommended he continue to elevate the best as he can. He is taking Tylenol for pain. Recommended he ice as well. They both verbalized understanding.     STARR Xavier RN

## 2022-04-21 LAB
PATH REPORT.COMMENTS IMP SPEC: NORMAL
PATH REPORT.COMMENTS IMP SPEC: NORMAL
PATH REPORT.FINAL DX SPEC: NORMAL
PATH REPORT.GROSS SPEC: NORMAL
PATH REPORT.MICROSCOPIC SPEC OTHER STN: NORMAL
PATH REPORT.RELEVANT HX SPEC: NORMAL
PHOTO IMAGE: NORMAL

## 2022-04-21 PROCEDURE — 88311 DECALCIFY TISSUE: CPT | Mod: 26 | Performed by: PATHOLOGY

## 2022-04-21 PROCEDURE — 88305 TISSUE EXAM BY PATHOLOGIST: CPT | Mod: 26 | Performed by: PATHOLOGY

## 2022-04-25 ENCOUNTER — OFFICE VISIT (OUTPATIENT)
Dept: OTHER | Facility: CLINIC | Age: 72
End: 2022-04-25
Attending: SURGERY
Payer: COMMERCIAL

## 2022-04-25 ENCOUNTER — TELEPHONE (OUTPATIENT)
Dept: OTHER | Facility: CLINIC | Age: 72
End: 2022-04-25

## 2022-04-25 VITALS — HEART RATE: 80 BPM | SYSTOLIC BLOOD PRESSURE: 177 MMHG | DIASTOLIC BLOOD PRESSURE: 79 MMHG

## 2022-04-25 DIAGNOSIS — I70.229 CRITICAL ISCHEMIA OF EXTREMITY WITH HISTORY OF REVASCULARIZATION OF SAME EXTREMITY (H): Primary | ICD-10-CM

## 2022-04-25 DIAGNOSIS — Z98.890 CRITICAL ISCHEMIA OF EXTREMITY WITH HISTORY OF REVASCULARIZATION OF SAME EXTREMITY (H): Primary | ICD-10-CM

## 2022-04-25 DIAGNOSIS — Z09 ENCOUNTER FOR FOLLOW-UP EXAMINATION AFTER COMPLETED TREATMENT FOR CONDITIONS OTHER THAN MALIGNANT NEOPLASM: ICD-10-CM

## 2022-04-25 PROCEDURE — G0463 HOSPITAL OUTPT CLINIC VISIT: HCPCS

## 2022-04-25 PROCEDURE — 99024 POSTOP FOLLOW-UP VISIT: CPT | Performed by: SURGERY

## 2022-04-25 NOTE — PROGRESS NOTES
[facility]        Patient is here for a  follow up.      Pt is currently taking Aspirin and Statin.    BP (!) 177/79 (BP Location: Left arm, Patient Position: Chair, Cuff Size: Adult Regular)   Pulse 80     The provider has been notified that the patient has no concerns.     Questions patient would like addressed today are: N/A.    Refills are needed: N/A    Has homecare services and agency name:  Codie Cavazos MA

## 2022-04-25 NOTE — TELEPHONE ENCOUNTER
Routing to  to coordinate:  1 month follow up to 4/25/22 appointment with Dr. Hamlin. Right lower extremity arterial duplex and In person OV.    Appt note: 1 mo f/u, History of right lower extremity femoral endarterectomy and right superficial femoral artery stenting. (RLE arterial duplex to be scheduled prior).     DAFNE العراقي, RN  [facility]  Office:  [phone] Fax: [phone]

## 2022-04-25 NOTE — PROGRESS NOTES
Mr. Hopkins returns to clinic today.  He is a very pleasant 72-year-old male with right lower extremity critical limb ischemia who underwent right femoral endarterectomy with recanalization and stenting of the right superficial femoral artery.    Patient underwent right foot toe amputations on 18 April 2022 under the care of Dr. Whelan.  Patient still has a dressing on from that.    There are concerns about his right groin wound.  There is a small area of superficial dehiscence.  He will continue to use Santyl for that.  He has a strong biphasic posterior tibial and monophasic anterior tibial artery signal.  He will continue to do his hyperbaric oxygen therapy and I will request Dr. Whelan to send me pictures of his foot when she sees him in the clinic tomorrow.  I will see him back in 1 month with arterial duplex of the right lower extremity.

## 2022-04-26 ENCOUNTER — OFFICE VISIT (OUTPATIENT)
Dept: PODIATRY | Facility: CLINIC | Age: 72
End: 2022-04-26
Payer: COMMERCIAL

## 2022-04-26 VITALS
BODY MASS INDEX: 23.22 KG/M2 | SYSTOLIC BLOOD PRESSURE: 142 MMHG | HEIGHT: 64 IN | WEIGHT: 136 LBS | DIASTOLIC BLOOD PRESSURE: 78 MMHG

## 2022-04-26 DIAGNOSIS — L97.512 ULCER OF RIGHT FOOT WITH FAT LAYER EXPOSED (H): ICD-10-CM

## 2022-04-26 DIAGNOSIS — I73.9 PAD (PERIPHERAL ARTERY DISEASE) (H): ICD-10-CM

## 2022-04-26 DIAGNOSIS — S98.111A AMPUTATION OF RIGHT GREAT TOE (H): ICD-10-CM

## 2022-04-26 DIAGNOSIS — Z98.890 POST-OPERATIVE STATE: Primary | ICD-10-CM

## 2022-04-26 DIAGNOSIS — Z89.421 H/O AMPUTATION OF LESSER TOE, RIGHT (H): ICD-10-CM

## 2022-04-26 PROCEDURE — 99213 OFFICE O/P EST LOW 20 MIN: CPT | Mod: 24 | Performed by: PODIATRIST

## 2022-04-26 NOTE — PATIENT INSTRUCTIONS
Thank you for choosing [facility] / [facility]!    DR SAVAGE'S CLINIC:  [facility]   [address]   [place] [address]      TRIAGE LINE: [phone]  APPOINTMENTS: [phone]  RADIOLOGY: [phone]  SET UP SURGERY: [phone]  FAX NUMBER: [phone]  BILLING QUESTIONS: [phone]       Follow up: Make a weekly appointment for the next 4 weeks with Dr. Lainez.

## 2022-04-26 NOTE — PROGRESS NOTES
"Podiatry / Foot and Ankle Surgery Progress Note    April 26, 2022    Subject: Patient was seen for 1 week s/p right partial great toe amputation and right 4th and 5th toe amputation due to gangrene. Notes he is doing well. Denies fever, chills, nausa.  Notes note     Objective:  Vitals: BP (!) 142/78   Ht 1.626 m (5' 4\")   Wt 61.7 kg (136 lb)   BMI 23.34 kg/m    BMI= Body mass index is 23.34 kg/m .    A1C: 7.2 (3/2022)    General:  Patient is alert and orientated.  NAD.    Vascular:  DP and PT pulses are palpable.  No edema or varicosities noted.  CFT's < 3secs.  Skin temp is normal.    Neuro:  Light and gross touch sensation diminished to feet.    Derm: Dressing is clean dry intact.  Sutures are intact.  Incisions are well coapted.  Full-thickness ulceration to the medial aspect of the first metatarsal head measures approximately 2.5 cm x 1.3 cm x 0.1 cm in depth.  No redness, dehiscence or signs of acute infection noted.    Musculoskeletal:  No foot deformity noted.      Assessment:    Post-operative state  Amputation of right great toe (H)  H/O amputation of lesser toe, right (H)  PAD (peripheral artery disease) (H)  Ulcer of right foot with fat layer exposed (H)    Medical Decision Making/Plan: Dressing was changed.  We will have him continue minimal weightbearing in his offloading shoe.  He is continuing hyperbaric oxygen at this time.  We will leave the stitches in for at least 4 to 6 weeks postop.  We will have him follow-up weekly for dressing changes and reassessment of the foot.  All questions were answered to patient satisfaction he will call for the questions or concerns.      Patient Risk Factor:  Patient is a high risk factor for infection.     Purvi Whelan DPM, Podiatry/Foot and Ankle Surgery            "

## 2022-04-26 NOTE — LETTER
"    4/26/2022         RE: Zia Hopkins  [address]  [facility] [address]        Dear Colleague,    Thank you for referring your patient, Zia Hopkins, to the [facility] PODI[facility]RY. Please see a copy of my visit note below.    Podiatry / Foot and Ankle Surgery Progress Note    April 26, 2022    Subject: Patient was seen for 1 week s/p right partial great toe amputation and right 4th and 5th toe amputation due to gangrene. Notes he is doing well. Denies fever, chills, nausa.  Notes note     Objective:  Vitals: BP (!) 142/78   Ht 1.626 m (5' 4\")   Wt 61.7 kg (136 lb)   BMI 23.34 kg/m    BMI= Body mass index is 23.34 kg/m .    A1C: 7.2 (3/2022)    General:  Patient is alert and orientated.  NAD.    Vascular:  DP and PT pulses are palpable.  No edema or varicosities noted.  CFT's < 3secs.  Skin temp is normal.    Neuro:  Light and gross touch sensation diminished to feet.    Derm: Dressing is clean dry intact.  Sutures are intact.  Incisions are well coapted.  Full-thickness ulceration to the medial aspect of the first metatarsal head measures approximately 2.5 cm x 1.3 cm x 0.1 cm in depth.  No redness, dehiscence or signs of acute infection noted.    Musculoskeletal:  No foot deformity noted.      Assessment:    Post-operative state  Amputation of right great toe (H)  H/O amputation of lesser toe, right (H)  PAD (peripheral artery disease) (H)  Ulcer of right foot with fat layer exposed (H)    Medical Decision Making/Plan: Dressing was changed.  We will have him continue minimal weightbearing in his offloading shoe.  He is continuing hyperbaric oxygen at this time.  We will leave the stitches in for at least 4 to 6 weeks postop.  We will have him follow-up weekly for dressing changes and reassessment of the foot.  All questions were answered to patient satisfaction he will call for the questions or concerns.      Patient Risk Factor:  Patient is a high risk factor for " infection.     Purvi Whelan DPM, Podiatry/Foot and Ankle Surgery                Again, thank you for allowing me to participate in the care of your patient.        Sincerely,        Purvi Whelan DPM, Podiatry/Foot and Ankle Surgery

## 2022-04-26 NOTE — TELEPHONE ENCOUNTER
Scheduled for 5/23/22.     Future Appointments   Date Time Provider Department Center   5/23/2022  1:15 PM VUS1 [facility]   5/23/2022  2:00 PM Surjit Hamlin MD [facility]         Kristel Mota    [facility]   [phone]

## 2022-05-03 ENCOUNTER — OFFICE VISIT (OUTPATIENT)
Dept: PODIATRY | Facility: CLINIC | Age: 72
End: 2022-05-03
Payer: COMMERCIAL

## 2022-05-03 VITALS — WEIGHT: 136 LBS | BODY MASS INDEX: 23.34 KG/M2 | SYSTOLIC BLOOD PRESSURE: 130 MMHG | DIASTOLIC BLOOD PRESSURE: 80 MMHG

## 2022-05-03 DIAGNOSIS — L97.512 ULCER OF RIGHT FOOT WITH FAT LAYER EXPOSED (H): ICD-10-CM

## 2022-05-03 DIAGNOSIS — Z89.421 H/O AMPUTATION OF LESSER TOE, RIGHT (H): ICD-10-CM

## 2022-05-03 DIAGNOSIS — I73.9 PAD (PERIPHERAL ARTERY DISEASE) (H): ICD-10-CM

## 2022-05-03 DIAGNOSIS — Z98.890 POST-OPERATIVE STATE: Primary | ICD-10-CM

## 2022-05-03 DIAGNOSIS — S98.111A AMPUTATION OF RIGHT GREAT TOE (H): ICD-10-CM

## 2022-05-03 PROCEDURE — 99024 POSTOP FOLLOW-UP VISIT: CPT | Performed by: PODIATRIST

## 2022-05-03 NOTE — PROGRESS NOTES
Podiatry / Foot and Ankle Surgery Progress Note    May 3, 2022    Subject: Patient was seen for 2 week s/p right partial great toe amputation and right 4th and 5th toe amputation due to gangrene. Notes he is doing well. Denies fever, chills, naua.     Objective:  Vitals: /80   Wt 61.7 kg (136 lb)   BMI 23.34 kg/m    BMI= Body mass index is 23.34 kg/m .    A1C: 7.2 (3/2022)     General:  Patient is alert and orientated.  NAD.     Vascular:  DP and PT pulses are palpable.  No edema or varicosities noted.  CFT's < 3secs.  Skin temp is normal.     Neuro:  Light and gross touch sensation diminished to feet.     Derm: Dressing is clean dry intact.  Sutures are intact.  Incisions are well coapted.  Full-thickness ulceration to the medial aspect of the first metatarsal head measures approximately 2.5 cm x 1.3 cm x 0.1 cm in depth.  No redness, dehiscence or signs of acute infection noted.     Musculoskeletal:  No foot deformity noted.       Assessment:    Post-operative state  Amputation of right great toe (H)  H/O amputation of lesser toe, right (H)  PAD (peripheral artery disease) (H)  Ulcer of right foot with fat layer exposed (H)     Medical Decision Making/Plan: Dressing was changed.  We will have him continue minimal weightbearing in his offloading shoe.  He is continuing hyperbaric oxygen at this time.  We will leave the stitches in for at least 4 to 6 weeks postop.  We will have him follow-up weekly for dressing changes and reassessment of the foot.  All questions were answered to patient satisfaction he will call for the questions or concerns.        Patient Risk Factor:  Patient is a high risk factor for infection.       Purvi Whelan DPM, Podiatry/Foot and Ankle Surgery

## 2022-05-03 NOTE — PATIENT INSTRUCTIONS
Thank you for choosing [facility] Podiatry / Foot & Ankle Surgery!    DR SAVAGE'S CLINIC:  [facility]   [address]   [place] [address]      TRIAGE LINE: [phone]  APPOINTMENTS: [phone]  RADIOLOGY: [phone]  SET UP SURGERY: [phone]  FAX NUMBER: [phone]  BILLING QUESTIONS: [phone]       Follow up: 1 week       cast protector for right foot to cover while in shower.     [facility]  [place]  [address]   [place] [address]  Ph: [phone]  Fax: [phone] [place] [facility]  [address]  [place] [address]  Ph: [phone]  Fax: [phone]   [place]  [address]   [place] [address]  Ph: [phone]  Fax: [phone] [place]  [address]  [place] [address]   Ph: [phone]    [place]  [address]  [place] [address]   Ph: [phone]  [place]  [address]  [place] [address]   Ph: [phone]      [place]  [id number]  [place] [address]  Ph: [phone]  [place]  [address]   [place] [address]  Ph: [phone]  Fax: [phone]

## 2022-05-03 NOTE — LETTER
5/3/2022         RE: Zia Hopkins  [address]  [facility] [address]        Dear Colleague,    Thank you for referring your patient, Zia Hopkins, to the [facility] PODIATRY. Please see a copy of my visit note below.    Podiatry / Foot and Ankle Surgery Progress Note    May 3, 2022    Subject: Patient was seen for 2 week s/p right partial great toe amputation and right 4th and 5th toe amputation due to gangrene. Notes he is doing well. Denies fever, chills, naua.     Objective:  Vitals: /80   Wt 61.7 kg (136 lb)   BMI 23.34 kg/m    BMI= Body mass index is 23.34 kg/m .    A1C: 7.2 (3/2022)     General:  Patient is alert and orientated.  NAD.     Vascular:  DP and PT pulses are palpable.  No edema or varicosities noted.  CFT's < 3secs.  Skin temp is normal.     Neuro:  Light and gross touch sensation diminished to feet.     Derm: Dressing is clean dry intact.  Sutures are intact.  Incisions are well coapted.  Full-thickness ulceration to the medial aspect of the first metatarsal head measures approximately 2.5 cm x 1.3 cm x 0.1 cm in depth.  No redness, dehiscence or signs of acute infection noted.     Musculoskeletal:  No foot deformity noted.       Assessment:    Post-operative state  Amputation of right great toe (H)  H/O amputation of lesser toe, right (H)  PAD (peripheral artery disease) (H)  Ulcer of right foot with fat layer exposed (H)     Medical Decision Making/Plan: Dressing was changed.  We will have him continue minimal weightbearing in his offloading shoe.  He is continuing hyperbaric oxygen at this time.  We will leave the stitches in for at least 4 to 6 weeks postop.  We will have him follow-up weekly for dressing changes and reassessment of the foot.  All questions were answered to patient satisfaction he will call for the questions or concerns.        Patient Risk Factor:  Patient is a high risk factor for infection.       Purvi Whelan DPM,  Podiatry/Foot and Ankle Surgery                Again, thank you for allowing me to participate in the care of your patient.        Sincerely,        Purvi Whelan DPM, Podiatry/Foot and Ankle Surgery

## 2022-05-10 ENCOUNTER — OFFICE VISIT (OUTPATIENT)
Dept: PODIATRY | Facility: CLINIC | Age: 72
End: 2022-05-10
Payer: COMMERCIAL

## 2022-05-10 VITALS — SYSTOLIC BLOOD PRESSURE: 166 MMHG | DIASTOLIC BLOOD PRESSURE: 80 MMHG

## 2022-05-10 DIAGNOSIS — Z89.421 H/O AMPUTATION OF LESSER TOE, RIGHT (H): ICD-10-CM

## 2022-05-10 DIAGNOSIS — L97.512 ULCER OF RIGHT FOOT WITH FAT LAYER EXPOSED (H): ICD-10-CM

## 2022-05-10 DIAGNOSIS — I73.9 PAD (PERIPHERAL ARTERY DISEASE) (H): ICD-10-CM

## 2022-05-10 DIAGNOSIS — Z98.890 POST-OPERATIVE STATE: Primary | ICD-10-CM

## 2022-05-10 DIAGNOSIS — S98.111A AMPUTATION OF RIGHT GREAT TOE (H): ICD-10-CM

## 2022-05-10 PROCEDURE — 99024 POSTOP FOLLOW-UP VISIT: CPT | Performed by: PODIATRIST

## 2022-05-10 NOTE — PROGRESS NOTES
Podiatry / Foot and Ankle Surgery Progress Note    May 10, 2022    Subject: Patient was seen for 2 week s/p right partial great toe amputation and right 4th and 5th toe amputation due to gangrene. Notes he is doing well. Denies fever, chills, he is still doing hyperbaric.  Has noticed a little more feeling and tenderness to the dorsal aspect of his right foot.    Objective:  Vitals: BP (!) 166/80   BMI= There is no height or weight on file to calculate BMI.    A1C: 7.2 (3/2022)     General:  Patient is alert and orientated.  NAD.     Vascular:  DP and PT pulses are palpable.  No edema or varicosities noted.  CFT's < 3secs.  Skin temp is normal.     Neuro:  Light and gross touch sensation diminished to feet.     Derm: Dressing is clean dry intact.  Sutures are intact.  Incisions are well coapted.  Full-thickness ulceration to the medial aspect of the first metatarsal head measures approximately 2.0 cm x 1.3 cm x 0.1 cm in depth.  No redness, dehiscence or signs of acute infection noted.     Musculoskeletal:  No foot deformity noted.       Assessment:    Post-operative state  Amputation of right great toe (H)  H/O amputation of lesser toe, right (H)  PAD (peripheral artery disease) (H)  Ulcer of right foot with fat layer exposed (H)     Medical Decision Making/Plan: Dressing was changed.  We will have him continue minimal weightbearing in his offloading shoe.  He is continuing hyperbaric oxygen at this time.  We will leave the stitches in for at least 3 to 4 weeks postop.  We will have him follow-up weekly for dressing changes and reassessment of the foot.  All questions were answered to patient satisfaction he will call for the questions or concerns.        Patient Risk Factor:  Patient is a high risk factor for infection.     Purvi Whelan DPM, Podiatry/Foot and Ankle Surgery

## 2022-05-10 NOTE — LETTER
5/10/2022         RE: Zia Hopkins  [address]  [facility] [address]        Dear Colleague,    Thank you for referring your patient, Zia Hopkins, to the [facility] PODIATRY. Please see a copy of my visit note below.    Podiatry / Foot and Ankle Surgery Progress Note    May 10, 2022    Subject: Patient was seen for 2 week s/p right partial great toe amputation and right 4th and 5th toe amputation due to gangrene. Notes he is doing well. Denies fever, chills, he is still doing hyperbaric.  Has noticed a little more feeling and tenderness to the dorsal aspect of his right foot.    Objective:  Vitals: BP (!) 166/80   BMI= There is no height or weight on file to calculate BMI.    A1C: 7.2 (3/2022)     General:  Patient is alert and orientated.  NAD.     Vascular:  DP and PT pulses are palpable.  No edema or varicosities noted.  CFT's < 3secs.  Skin temp is normal.     Neuro:  Light and gross touch sensation diminished to feet.     Derm: Dressing is clean dry intact.  Sutures are intact.  Incisions are well coapted.  Full-thickness ulceration to the medial aspect of the first metatarsal head measures approximately 2.0 cm x 1.3 cm x 0.1 cm in depth.  No redness, dehiscence or signs of acute infection noted.     Musculoskeletal:  No foot deformity noted.       Assessment:    Post-operative state  Amputation of right great toe (H)  H/O amputation of lesser toe, right (H)  PAD (peripheral artery disease) (H)  Ulcer of right foot with fat layer exposed (H)     Medical Decision Making/Plan: Dressing was changed.  We will have him continue minimal weightbearing in his offloading shoe.  He is continuing hyperbaric oxygen at this time.  We will leave the stitches in for at least 3 to 4 weeks postop.  We will have him follow-up weekly for dressing changes and reassessment of the foot.  All questions were answered to patient satisfaction he will call for the questions or  concerns.        Patient Risk Factor:  Patient is a high risk factor for infection.     Purvi Whelan DPM, Podiatry/Foot and Ankle Surgery        Again, thank you for allowing me to participate in the care of your patient.        Sincerely,        Purvi Whelan DPM, Podiatry/Foot and Ankle Surgery

## 2022-05-17 ENCOUNTER — OFFICE VISIT (OUTPATIENT)
Dept: PODIATRY | Facility: CLINIC | Age: 72
End: 2022-05-17
Payer: COMMERCIAL

## 2022-05-17 VITALS — DIASTOLIC BLOOD PRESSURE: 64 MMHG | WEIGHT: 136 LBS | BODY MASS INDEX: 23.34 KG/M2 | SYSTOLIC BLOOD PRESSURE: 132 MMHG

## 2022-05-17 DIAGNOSIS — S98.111A AMPUTATION OF RIGHT GREAT TOE (H): ICD-10-CM

## 2022-05-17 DIAGNOSIS — Z89.421 H/O AMPUTATION OF LESSER TOE, RIGHT (H): ICD-10-CM

## 2022-05-17 DIAGNOSIS — I73.9 PAD (PERIPHERAL ARTERY DISEASE) (H): ICD-10-CM

## 2022-05-17 DIAGNOSIS — E11.42 DIABETIC POLYNEUROPATHY ASSOCIATED WITH TYPE 2 DIABETES MELLITUS (H): Primary | ICD-10-CM

## 2022-05-17 DIAGNOSIS — L97.512 DIABETIC ULCER OF OTHER PART OF RIGHT FOOT ASSOCIATED WITH TYPE 2 DIABETES MELLITUS, WITH FAT LAYER EXPOSED (H): ICD-10-CM

## 2022-05-17 DIAGNOSIS — E11.621 DIABETIC ULCER OF OTHER PART OF RIGHT FOOT ASSOCIATED WITH TYPE 2 DIABETES MELLITUS, WITH FAT LAYER EXPOSED (H): ICD-10-CM

## 2022-05-17 PROCEDURE — 99024 POSTOP FOLLOW-UP VISIT: CPT | Performed by: PODIATRIST

## 2022-05-17 NOTE — PATIENT INSTRUCTIONS
Thank you for choosing [facility] Podiatry / Foot & Ankle Surgery!    DR SAVAGE'S CLINIC:  [facility]   [address]   [place] [address]      TRIAGE LINE: [phone]  APPOINTMENTS: [phone]  RADIOLOGY: [phone]  SET UP SURGERY: [phone]  BILLING QUESTIONS: [phone]  FAX: [phone]       Follow up:  3 weeks.

## 2022-05-17 NOTE — PROGRESS NOTES
Podiatry / Foot and Ankle Surgery Progress Note    May 17, 2022    Subject: Patient was seen for 5 weeks status post right partial great toe and fourth and fifth toe amputations.  Patient denies fever, nausea, vomiting.  Notes the area is a little sensitive the last few days as far as pain but not out of the ordinary.    Objective:  Vitals: /64 (BP Location: Right arm)   Wt 61.7 kg (136 lb)   BMI 23.34 kg/m      A1C: 7.2 (3/2022)     General:  Patient is alert and orientated.  NAD.     Vascular:  DP and PT pulses are palpable.  No edema or varicosities noted.  CFT's < 3secs.  Skin temp is normal.     Neuro:  Light and gross touch sensation diminished to feet.     Derm: Dressing is clean dry intact.  Sutures are intact.  Incisions are well coapted.  Full-thickness ulceration to the medial aspect of the first metatarsal head measures approximately 2.5 cm x 1.5 cm x 0.1 cm in depth.  No redness, dehiscence or signs of acute infection noted.     Musculoskeletal:  Right great toe amputation. Right 4th and 5th toe amputations.  Left great toe amputation.      Assessment:    Post-operative state  Amputation of right great toe (H)  H/O amputation of lesser toe, right (H)  PAD (peripheral artery disease) (H)  Ulcer of right foot with fat layer exposed (H)     Medical Decision Making/Plan: At this time the sutures were removed.  Patient can shower and get the foot wet.  We will have him pat dry and apply iodine to the incision area and the ulceration.  He can do large Band-Aids or do gauze and a gauze roll to cover the area.  He will continue minimal weightbearing in the postop shoe.  We will have him follow-up in 3 weeks for reassessment of the ulcer.  All questions were answered to patient satisfaction he will call for the questions or concerns.        Patient Risk Factor:  Patient is a high risk factor for infection.     Purvi Whelan DPM, Podiatry/Foot and Ankle Surgery

## 2022-05-17 NOTE — LETTER
5/17/2022         RE: Zia Hopkins  [address]  [facility] [address]        Dear Colleague,    Thank you for referring your patient, Zia Hopkins, to the [facility] PODIATRY. Please see a copy of my visit note below.    Podiatry / Foot and Ankle Surgery Progress Note    May 17, 2022    Subject: Patient was seen for 5 weeks status post right partial great toe and fourth and fifth toe amputations.  Patient denies fever, nausea, vomiting.  Notes the area is a little sensitive the last few days as far as pain but not out of the ordinary.    Objective:  Vitals: /64 (BP Location: Right arm)   Wt 61.7 kg (136 lb)   BMI 23.34 kg/m      A1C: 7.2 (3/2022)     General:  Patient is alert and orientated.  NAD.     Vascular:  DP and PT pulses are palpable.  No edema or varicosities noted.  CFT's < 3secs.  Skin temp is normal.     Neuro:  Light and gross touch sensation diminished to feet.     Derm: Dressing is clean dry intact.  Sutures are intact.  Incisions are well coapted.  Full-thickness ulceration to the medial aspect of the first metatarsal head measures approximately 2.5 cm x 1.5 cm x 0.1 cm in depth.  No redness, dehiscence or signs of acute infection noted.     Musculoskeletal:  Right great toe amputation. Right 4th and 5th toe amputations.  Left great toe amputation.      Assessment:    Post-operative state  Amputation of right great toe (H)  H/O amputation of lesser toe, right (H)  PAD (peripheral artery disease) (H)  Ulcer of right foot with fat layer exposed (H)     Medical Decision Making/Plan: At this time the sutures were removed.  Patient can shower and get the foot wet.  We will have him pat dry and apply iodine to the incision area and the ulceration.  He can do large Band-Aids or do gauze and a gauze roll to cover the area.  He will continue minimal weightbearing in the postop shoe.  We will have him follow-up in 3 weeks for reassessment of the ulcer.   All questions were answered to patient satisfaction he will call for the questions or concerns.        Patient Risk Factor:  Patient is a high risk factor for infection.     Purvi Whelan DPM, Podiatry/Foot and Ankle Surgery        Again, thank you for allowing me to participate in the care of your patient.        Sincerely,        Purvi Whelan DPM, Podiatry/Foot and Ankle Surgery

## 2022-05-23 ENCOUNTER — OFFICE VISIT (OUTPATIENT)
Dept: OTHER | Facility: CLINIC | Age: 72
End: 2022-05-23
Attending: SURGERY
Payer: COMMERCIAL

## 2022-05-23 ENCOUNTER — MEDICAL CORRESPONDENCE (OUTPATIENT)
Dept: HEALTH INFORMATION MANAGEMENT | Facility: CLINIC | Age: 72
End: 2022-05-23

## 2022-05-23 ENCOUNTER — HOSPITAL ENCOUNTER (OUTPATIENT)
Dept: ULTRASOUND IMAGING | Facility: CLINIC | Age: 72
Discharge: HOME | End: 2022-05-23
Attending: SURGERY
Payer: COMMERCIAL

## 2022-05-23 VITALS — SYSTOLIC BLOOD PRESSURE: 169 MMHG | DIASTOLIC BLOOD PRESSURE: 70 MMHG | HEART RATE: 71 BPM

## 2022-05-23 DIAGNOSIS — I73.9 PAD (PERIPHERAL ARTERY DISEASE) (H): ICD-10-CM

## 2022-05-23 DIAGNOSIS — I70.229 CRITICAL ISCHEMIA OF EXTREMITY WITH HISTORY OF REVASCULARIZATION OF SAME EXTREMITY (H): ICD-10-CM

## 2022-05-23 DIAGNOSIS — I70.229 CRITICAL ISCHEMIA OF LOWER EXTREMITY (H): Primary | ICD-10-CM

## 2022-05-23 DIAGNOSIS — Z09 ENCOUNTER FOR FOLLOW-UP EXAMINATION AFTER COMPLETED TREATMENT FOR CONDITIONS OTHER THAN MALIGNANT NEOPLASM: ICD-10-CM

## 2022-05-23 DIAGNOSIS — Z98.890 CRITICAL ISCHEMIA OF EXTREMITY WITH HISTORY OF REVASCULARIZATION OF SAME EXTREMITY (H): ICD-10-CM

## 2022-05-23 DIAGNOSIS — I70.235 ATHEROSCLEROSIS OF NATIVE ARTERIES OF RIGHT LEG WITH ULCERATION OF OTHER PART OF FOOT (H): ICD-10-CM

## 2022-05-23 PROCEDURE — 99024 POSTOP FOLLOW-UP VISIT: CPT | Performed by: SURGERY

## 2022-05-23 PROCEDURE — 93926 LOWER EXTREMITY STUDY: CPT | Mod: RT

## 2022-05-23 PROCEDURE — 93926 LOWER EXTREMITY STUDY: CPT | Mod: 26 | Performed by: SURGERY

## 2022-05-23 PROCEDURE — G0463 HOSPITAL OUTPT CLINIC VISIT: HCPCS

## 2022-05-23 NOTE — PROGRESS NOTES
[facility]         Patient is here for a  follow up.      Pt is currently taking Aspirin, Statin and Plavix.    BP (!) 169/70 (BP Location: Right arm, Patient Position: Chair, Cuff Size: Adult Regular)   Pulse 71     The provider has been notified that the patient has no concerns.     Questions patient would like addressed today are: N/A.    Refills are needed: N/A    Has homecare services and agency name:  Codie Cavazos MA

## 2022-05-23 NOTE — PATIENT INSTRUCTIONS
Zia Hopkins,    Your visit to [facility] for your surgery or procedure is coming soon and we look forward to seeing you! This friendly reminder and pre-procedure checklist will help to ensure your procedure/surgery goes smoothly and meets your expectations. At [facility], our goal is to provide you with a great patient experience and to deliver genuine, professional care to every patient.     Please complete all the steps in advance of your visit. If you have any questions about the items listed below, please give our office a call at [phone].  IF YOU NEED TO RESCHEDULE OR CANCEL YOUR PROCEDURE FOR ANY REASON PLEASE CALL THE CLINIC AS SOON AS POSSIBLE.    Procedure: Right Leg angiogram     Procedure Date :  TBD    Procedure Time :  TBD    Arrival Time: TBD    Covid Test: TBD    2 week Post Procedure Appointment with Surjit Hamlin MD  and also DAVID: TBD    Ordering Provider: Surjit Hamlin MD     Performing Provider: Surjit Hamlin MD     Procedure Location: [facility]:  [address] [place] [address] (Fax: [phone])    If you take blood thinners, see specific instructions below:  CONTINUE ASPIRIN AND PLAVIX.    PLEASE DO NOT STOP YOUR ASPIRIN OR PLAVIX UNLESS SPECIFICALLY DIRECTED BY THE VASCULAR SURGEON TO STOP!  In most cases Vascular surgeons want you to continue these. This is different from most NON vascular surgeries and may not be well known by your Primary Care Provider    Before the procedure  Prepare for the peripheral angiography as follows:     [x] A Pre-op physical within 30 days of the procedure is required. You will need to set up an appointment with your primary care provider.  [x]Do not eat or drink anything after midnight before your procedure. If your provider says to take your normal medicines, swallow them with only small sips of water.  [x]Tell your healthcare provider about all medicines you take and any allergies  you may have.  [x]You MUST have a  to bring you home.  You will need to arrange for a family member or friend to drive you home.  [x]PLEASE BE SURE TO ADDRESS WITH YOUR PCP WHAT TO DO WITH YOUR INSULIN AND METFORMIN PRIOR TO YOUR ANGIOGRAM    What is Peripheral Angiography?    Peripheral angiography is an outpatient procedure that makes a  map  of the vessels (arteries) in your lower body, legs, and arms, using X-ray and dye.This map can show where blood flow may be blocked.    An angiogram is commonly performed under sedation with the use of local anesthesia.    The procedure usually starts with a needle put into the femoral (groin) artery. From one treatment site, areas all over the body can be treated.  After access is established, catheters (thin tubes) and wires are threaded through the arterial system to a specific area of interest or throughout the entire body.  As a contrast agent (iodine dye) is injected, X-ray images are taken to let your vascular surgeon view the flow of the dye and identify blockages. The surgeon can then choose the best mode of therapy for you - whether during or following the angiogram. This decision depends on your symptoms and the severity and characteristics of the blockages.  Two common therapies that can be provided during the angiogram are balloon angioplasty and stent placement.               Angioplasty can be used to open arterial blockages. Guided by X-ray, your vascular surgeon navigates through the blockage with a wire and introduces a special device equipped with an inflatable balloon. After positioning the balloon device across the blocked portion of the artery, the vascular surgeon inflates the balloon to expand the artery and compress the blockage. The balloon is then deflated and removed while keeping the wire in place across the area that has been treated. Next, contrast dye is injected to assess the result. Treatment is considered a success if blood flow is  improved and less than 30% of the blockage remains. If the vessel is still considerably narrowed, placing a stent may be the next step.    Stents are used to prop open an artery at the site of a narrowing. Stents are generally placed after balloon angioplasty when there is residual narrowing or insufficient blood flow in a treated vessel. Stents are considered a permanent implant and cannot be used if you have a metal allergy. Stents that are used in the leg are constructed of a nickel-titanium alloy (Nitinol), a memory-shaped metal. This alloy has a predetermined size and shape at body temperature and expands to this size and shape after being introduced through a catheter. These stents resist kinking and are flexible so that damage from activities that involve your legs is minimized.    If surgery is felt to be a better option, your vascular surgeon will obtain any additional X-ray images needed to plan a surgical bypass of the blocked vessel/s and will then conclude the angiogram.    During the procedure  You may get medicine through an IV (intravenous) line to relax you. You re given an injection to numb the insertion site. Then, a tiny skin cut (incision) is made near an artery in your groin.  Your provider inserts a thin tube (catheter) through the incision. He or she then threads the catheter into an artery while looking at a video monitor.  Contrast  dye  is injected into the catheter to confirm position. You may feel warmth or pressure in your legs and back. You lie still as X-rays are taken. The catheter is then taken out.    After the procedure  You ll be taken to a recovery area. A healthcare provider will apply pressure to the site for about 10 minutes. Your healthcare provider will tell you how long to lie down and keep the insertion site still. Your healthcare provider will discuss the results with you soon after the procedure.    Back at home  On the day you get home, don t drive, don t exercise,  avoid walking and taking stairs, and avoid bending and lifting. Your healthcare provider may give you other care instructions.    Call your healthcare provider right away if:  You notice a lump or bleeding at the insertion site  You feel pain at the insertion site  You become lightheaded or dizzy  You have leg pain or numbness  You do not urinate in 8 hours     Angiogram Procedure Discharge Instructions:     1. If you received sedation for your procedure: Do not drive or operate heavy machinery for the rest of the day.     2. Avoid strenuous activity for 72 hours (3 days):                        - Do not lift greater than 10 pounds.                        - Excessive exercise                        - Straining                        - Return to your normal activities as you tolerate after the 3 days restriction     3. Avoid tub baths, Jacuzzis, hot tubs and pools for 72 hours (3 days) or until puncture site is will healed.     4. You may shower beginning tomorrow. Do not scrub puncture site(s) until well healed, pat dry.     5. You can expect to return to work 1-2 days after your procedure - depending on the nature of your profession.     6. It is normal to have some tenderness and minimal swelling at puncture site. A small area of discoloration may be present. Tenderness typically subsides in 24-48 hours. A small knot may also be present at puncture site for 6-8 weeks, this can be a normal part of the healing process.     After the angiogram, if you:      1. Experience any bleeding or active swelling from puncture site: Lie down, firmly apply pressure to puncture site and CALL 9-1-1     2. Fever greater than 101 degrees Fahrenheit.     3. Redness, swelling, warmth to touch, or purulent (yellow/green/foul smelling) drainage from the puncture site.     4. Increasing pain, tenderness or swelling at puncture site OR of arm/leg near puncture site.     5. Feeling weak or faint.     6. Change in color, temperature, or  sensation of arm/leg where puncture was made.     Call us with any other questions or concerns after your procedure: [phone].    You will need to have an ultrasound 2-3 weeks after your angiogram and should be scheduled at the time of your follow up appt.  Further follow up will be based on ultrasound results. Typical follow up is every 3 months for the first year, then every 6 months to one year thereafter.     All invasive procedures can have complications. While the risk of an angiogram is low it is not zero. The most common complications are related to the arterial access site.      Risks/ Complications  Bruising is common.  You will likely have bruising (ecchymosis) where the artery was entered.    Pain and bleeding.  Less commonly, patients experience pain and bleeding that may include blood collecting under the skin (hematoma).    Blockage and leakage.  In rare cases, the access artery can become blocked. Infrequently, patients experience persistent leakage of blood where the artery was entered, which can result in the formation of a pseudoaneurysm--a blood-filled sac--that may require further treatment.  Other complications related to an angiogram include:   Allergic reaction to the iodine contrast dye, which can lead to the development of kidney failure.  Very rarely during balloon angioplasty and/or stent placement, part of the arterial blockage can break off (embolism) and travel to more distant arteries. This can worsen blood flow.    Patient Education Confirmation  Learner(s):patient  Method: Listening  Barriers to Learning:No Barrier  Outcome: Patient did verbalize understanding of above education.    Notify our office right away, if you have any changes in your health status, or if you develop a cold, flu, diarrhea, infection, fever or sore throat before your scheduled surgery date. We can be reached at [phone].  Monday-Friday 8 am-4:30 pm if you have any questions.   Thank you for choosing  [facility]  If you have any questions or need to reschedule your appointment, please call [phone]

## 2022-05-24 ENCOUNTER — OFFICE VISIT (OUTPATIENT)
Dept: PODIATRY | Facility: CLINIC | Age: 72
End: 2022-05-24
Payer: COMMERCIAL

## 2022-05-24 VITALS
SYSTOLIC BLOOD PRESSURE: 124 MMHG | WEIGHT: 136 LBS | BODY MASS INDEX: 23.22 KG/M2 | DIASTOLIC BLOOD PRESSURE: 72 MMHG | HEIGHT: 64 IN

## 2022-05-24 DIAGNOSIS — S98.112A AMPUTATION OF LEFT GREAT TOE (H): ICD-10-CM

## 2022-05-24 DIAGNOSIS — L97.512 ULCER OF RIGHT FOOT WITH FAT LAYER EXPOSED (H): Primary | ICD-10-CM

## 2022-05-24 DIAGNOSIS — S98.111A AMPUTATION OF RIGHT GREAT TOE (H): ICD-10-CM

## 2022-05-24 DIAGNOSIS — I73.9 PAD (PERIPHERAL ARTERY DISEASE) (H): ICD-10-CM

## 2022-05-24 PROCEDURE — 99213 OFFICE O/P EST LOW 20 MIN: CPT | Performed by: PODIATRIST

## 2022-05-24 NOTE — LETTER
"    5/24/2022         RE: Zia Hopkins  [address]  [place] [address]        Dear Colleague,    Thank you for referring your patient, Zia Hopkins, to the [facility]. Please see a copy of my visit note below.    Podiatry / Foot and Ankle Surgery Progress Note    May 24, 2022    Subject: Patient was seen for concern for infection in his right foot ulcer.  He was concerned that there is pus coming out of it last night and today.  Denies fever, nausea, vomiting.  No pain to the foot.    Objective:  Vitals: /72   Ht 1.626 m (5' 4\")   Wt 61.7 kg (136 lb)   BMI 23.34 kg/m    BMI= Body mass index is 23.34 kg/m .    A1C: 7.2 (3/2022)     General:  Patient is alert and orientated.  NAD.     Vascular:  DP and PT pulses are palpable.  No edema or varicosities noted.  CFT's < 3secs.  Skin temp is normal.     Neuro:  Light and gross touch sensation diminished to feet.     Derm:  Full-thickness ulceration to the medial aspect of the first metatarsal head measures approximately 2.5 cm x 1.5 cm x 0.1 cm in depth. Base of the wound is fibrous.  No redness, dehiscence or signs of acute infection noted.     Musculoskeletal:  Right great toe amputation. Right 4th and 5th toe amputations.  Left great toe amputation.      Assessment:    Post-operative state  Amputation of right great toe (H)  H/O amputation of lesser toe, right (H)  PAD (peripheral artery disease) (H)  Ulcer of right foot with fat layer exposed (H)     Medical Decision Making/Plan: Discussed with patient that the yellow color in his wound bed is just fibrous tissue and not pus.  There does not appear to be any signs of acute infection.  He will continue with the iodine dressing changes daily to this area.  We will have him follow-up in 4 weeks for reassessment of the ulcer.  All questions were answered to patient satisfaction he will call for the questions or concerns.        Patient Risk Factor:  Patient " is a high risk factor for infection.      Purvi Whelan DPM, Podiatry/Foot and Ankle Surgery        Again, thank you for allowing me to participate in the care of your patient.        Sincerely,        Purvi Whelan DPM, Podiatry/Foot and Ankle Surgery

## 2022-05-24 NOTE — PATIENT INSTRUCTIONS
Thank you for choosing [facility] / [facility]le Surgery!    DR SAVAGE'S CLINIC:  [facility]   [address]   [place] [address]      TRIAGE LINE: [phone]  APPOINTMENTS: [phone]  RADIOLOGY: [phone]  SET UP SURGERY: [phone]  FAX NUMBER: [phone]  BILLING QUESTIONS: [phone]       Follow up: 1 month

## 2022-05-24 NOTE — PROGRESS NOTES
"Podiatry / Foot and Ankle Surgery Progress Note    May 24, 2022    Subject: Patient was seen for concern for infection in his right foot ulcer.  He was concerned that there is pus coming out of it last night and today.  Denies fever, nausea, vomiting.  No pain to the foot.    Objective:  Vitals: /72   Ht 1.626 m (5' 4\")   Wt 61.7 kg (136 lb)   BMI 23.34 kg/m    BMI= Body mass index is 23.34 kg/m .    A1C: 7.2 (3/2022)     General:  Patient is alert and orientated.  NAD.     Vascular:  DP and PT pulses are palpable.  No edema or varicosities noted.  CFT's < 3secs.  Skin temp is normal.     Neuro:  Light and gross touch sensation diminished to feet.     Derm:  Full-thickness ulceration to the medial aspect of the first metatarsal head measures approximately 2.5 cm x 1.5 cm x 0.1 cm in depth. Base of the wound is fibrous.  No redness, dehiscence or signs of acute infection noted.     Musculoskeletal:  Right great toe amputation. Right 4th and 5th toe amputations.  Left great toe amputation.      Assessment:    Post-operative state  Amputation of right great toe (H)  H/O amputation of lesser toe, right (H)  PAD (peripheral artery disease) (H)  Ulcer of right foot with fat layer exposed (H)     Medical Decision Making/Plan: Discussed with patient that the yellow color in his wound bed is just fibrous tissue and not pus.  There does not appear to be any signs of acute infection.  He will continue with the iodine dressing changes daily to this area.  We will have him follow-up in 4 weeks for reassessment of the ulcer.  All questions were answered to patient satisfaction he will call for the questions or concerns.        Patient Risk Factor:  Patient is a high risk factor for infection.      Purvi Whelan DPM, Podiatry/Foot and Ankle Surgery    "

## 2022-05-24 NOTE — PROGRESS NOTES
Mr. Hopkins is in clinic today.  He underwent right femoral endarterectomy and right superficial femoral artery balloon angioplasty and stenting on 4 March 2022.    He recently saw Dr. Whelan and she is satisfied with the progress of the healing of the surgical site.    Duplex sonography shows possibly recurrent stenosis in one of the areas of the superficial femoral artery where we had stented.    We will proceed with right lower extremity arteriogram with possible intervention to assess and address the area of stenosis.

## 2022-05-25 ENCOUNTER — TELEPHONE (OUTPATIENT)
Dept: OTHER | Facility: CLINIC | Age: 72
End: 2022-05-25
Payer: COMMERCIAL

## 2022-06-01 DIAGNOSIS — Z11.59 ENCOUNTER FOR SCREENING FOR OTHER VIRAL DISEASES: Primary | ICD-10-CM

## 2022-06-07 ENCOUNTER — ANCILLARY PROCEDURE (OUTPATIENT)
Dept: GENERAL RADIOLOGY | Facility: CLINIC | Age: 72
End: 2022-06-07
Attending: PODIATRIST
Payer: COMMERCIAL

## 2022-06-07 ENCOUNTER — OFFICE VISIT (OUTPATIENT)
Dept: PODIATRY | Facility: CLINIC | Age: 72
End: 2022-06-07

## 2022-06-07 ENCOUNTER — LAB (OUTPATIENT)
Dept: URGENT CARE | Facility: URGENT CARE | Age: 72
End: 2022-06-07
Payer: COMMERCIAL

## 2022-06-07 VITALS — BODY MASS INDEX: 23.34 KG/M2 | WEIGHT: 136 LBS | SYSTOLIC BLOOD PRESSURE: 142 MMHG | DIASTOLIC BLOOD PRESSURE: 80 MMHG

## 2022-06-07 DIAGNOSIS — M79.671 FOOT PAIN, RIGHT: ICD-10-CM

## 2022-06-07 DIAGNOSIS — S98.112A AMPUTATION OF LEFT GREAT TOE (H): ICD-10-CM

## 2022-06-07 DIAGNOSIS — Z89.422 H/O AMPUTATION OF LESSER TOE, LEFT (H): ICD-10-CM

## 2022-06-07 DIAGNOSIS — S98.111A AMPUTATION OF RIGHT GREAT TOE (H): ICD-10-CM

## 2022-06-07 DIAGNOSIS — I73.9 PAD (PERIPHERAL ARTERY DISEASE) (H): ICD-10-CM

## 2022-06-07 DIAGNOSIS — Z11.59 ENCOUNTER FOR SCREENING FOR OTHER VIRAL DISEASES: ICD-10-CM

## 2022-06-07 DIAGNOSIS — M79.671 FOOT PAIN, RIGHT: Primary | ICD-10-CM

## 2022-06-07 DIAGNOSIS — L97.512 ULCER OF RIGHT FOOT, WITH FAT LAYER EXPOSED (H): ICD-10-CM

## 2022-06-07 LAB — SARS-COV-2 RNA RESP QL NAA+PROBE: NEGATIVE

## 2022-06-07 PROCEDURE — U0005 INFEC AGEN DETEC AMPLI PROBE: HCPCS

## 2022-06-07 PROCEDURE — 11042 DBRDMT SUBQ TIS 1ST 20SQCM/<: CPT | Performed by: PODIATRIST

## 2022-06-07 PROCEDURE — 99213 OFFICE O/P EST LOW 20 MIN: CPT | Mod: 25 | Performed by: PODIATRIST

## 2022-06-07 PROCEDURE — 73630 X-RAY EXAM OF FOOT: CPT | Mod: TC | Performed by: RADIOLOGY

## 2022-06-07 PROCEDURE — U0003 INFECTIOUS AGENT DETECTION BY NUCLEIC ACID (DNA OR RNA); SEVERE ACUTE RESPIRATORY SYNDROME CORONAVIRUS 2 (SARS-COV-2) (CORONAVIRUS DISEASE [COVID-19]), AMPLIFIED PROBE TECHNIQUE, MAKING USE OF HIGH THROUGHPUT TECHNOLOGIES AS DESCRIBED BY CMS-2020-01-R: HCPCS

## 2022-06-07 NOTE — LETTER
6/7/2022         RE: Zia Hopkins  [address]  [facility] [address]        Dear Colleague,    Thank you for referring your patient, Zia Hopkins, to the [facility]. Please see a copy of my visit note below.    Podiatry / Foot and Ankle Surgery Progress Note    June 7, 2022    Subject: Patient was seen for right foot pain and ulcerations.  He is noted more drainage from the outside of his foot where there is more been a wound.  He would like this checked out.  Denies fever, nausea, vomiting.    Objective:  Vitals: BP (!) 142/80   Wt 61.7 kg (136 lb)   BMI 23.34 kg/m    BMI= Body mass index is 23.34 kg/m .    A1C: 7.2 (3/2022)     General:  Patient is alert and orientated.  NAD.     Vascular:  DP and PT pulses are palpable.  No edema or varicosities noted.  CFT's < 3secs.  Skin temp is normal.     Neuro:  Light and gross touch sensation diminished to feet.     Derm:  Full-thickness ulceration to the medial aspect of the first metatarsal head measures approximately 2.5 cm x 1.5 cm x 0.1 cm in depth. Base of the wound is fibrous.  No redness, dehiscence or signs of acute infection noted.  Full-thickness ulceration to the lateral aspect of the right fifth metatarsal head.  This measures approximately 2.3 cm x 1.2 cm x 0.3 cm after debridement.  Base of the wound is fibrous.  No purulent drainage but heavy amounts of clear serous drainage noted.  No streaking redness noted.     Musculoskeletal:  Right great toe amputation. Right 4th and 5th toe amputations.  Left great toe amputation.     Radiographs: Right foot x-ray -I have looked at and reviewed xrays personally -question bone infection to the fifth and fourth metatarsal heads.     Assessment:     Foot pain, right  Ulcer of right foot, with fat layer exposed (H)  H/O amputation of lesser toe, left (H)  PAD (peripheral artery disease) (H)  Amputation of left great toe (H)  Amputation of right great toe  (H)     Medical Decision Making/Plan:  At this time the ulcers were debrided.  Please see procedure note below.  We will have him continue Iodosorb dressing changes to the ulcers daily to every other day.  Discussed that because of his significant poor blood flow that this is likely why another 1 occurred.  We will get baseline x-rays today to make sure there is no deeper signs of infection.  We will call him with results.  He has a follow-up in 2 weeks. All questions were answered to patient satisfaction he will call for the questions or concerns.     PRocedure: After verbal consent, excisional debridement was performed on ulcer.  #15 blade was used to debride ulcer down to and including subcutaneous tissue. Bleeding controlled with light pressure.   No drainage noted.  No anesthesia was used due to neuropathy. Dry dressing applied to foot.  Patient tolerated procedure well.       Patient Risk Factor:  Patient is a high risk factor for infection.      Purvi Whelan DPM, Podiatry/Foot and Ankle Surgery        Again, thank you for allowing me to participate in the care of your patient.        Sincerely,        Purvi hWelan DPM, Podiatry/Foot and Ankle Surgery

## 2022-06-07 NOTE — PROGRESS NOTES
Podiatry / Foot and Ankle Surgery Progress Note    June 7, 2022    Subject: Patient was seen for right foot pain and ulcerations.  He is noted more drainage from the outside of his foot where there is more been a wound.  He would like this checked out.  Denies fever, nausea, vomiting.    Objective:  Vitals: BP (!) 142/80   Wt 61.7 kg (136 lb)   BMI 23.34 kg/m    BMI= Body mass index is 23.34 kg/m .    A1C: 7.2 (3/2022)     General:  Patient is alert and orientated.  NAD.     Vascular:  DP and PT pulses are palpable.  No edema or varicosities noted.  CFT's < 3secs.  Skin temp is normal.     Neuro:  Light and gross touch sensation diminished to feet.     Derm:  Full-thickness ulceration to the medial aspect of the first metatarsal head measures approximately 2.5 cm x 1.5 cm x 0.1 cm in depth. Base of the wound is fibrous.  No redness, dehiscence or signs of acute infection noted.  Full-thickness ulceration to the lateral aspect of the right fifth metatarsal head.  This measures approximately 2.3 cm x 1.2 cm x 0.3 cm after debridement.  Base of the wound is fibrous.  No purulent drainage but heavy amounts of clear serous drainage noted.  No streaking redness noted.     Musculoskeletal:  Right great toe amputation. Right 4th and 5th toe amputations.  Left great toe amputation.     Radiographs: Right foot x-ray -I have looked at and reviewed xrays personally -question bone infection to the fifth and fourth metatarsal heads.     Assessment:     Foot pain, right  Ulcer of right foot, with fat layer exposed (H)  H/O amputation of lesser toe, left (H)  PAD (peripheral artery disease) (H)  Amputation of left great toe (H)  Amputation of right great toe (H)     Medical Decision Making/Plan:  At this time the ulcers were debrided.  Please see procedure note below.  We will have him continue Iodosorb dressing changes to the ulcers daily to every other day.  Discussed that because of his significant poor blood flow that this is  likely why another 1 occurred.  We will get baseline x-rays today to make sure there is no deeper signs of infection.  We will call him with results.  He has a follow-up in 2 weeks. All questions were answered to patient satisfaction he will call for the questions or concerns.     PRocedure: After verbal consent, excisional debridement was performed on ulcer.  #15 blade was used to debride ulcer down to and including subcutaneous tissue. Bleeding controlled with light pressure.   No drainage noted.  No anesthesia was used due to neuropathy. Dry dressing applied to foot.  Patient tolerated procedure well.       Patient Risk Factor:  Patient is a high risk factor for infection.      Purvi Whlean DPM, Podiatry/Foot and Ankle Surgery

## 2022-06-08 ENCOUNTER — TELEPHONE (OUTPATIENT)
Dept: PODIATRY | Facility: CLINIC | Age: 72
End: 2022-06-08
Payer: COMMERCIAL

## 2022-06-08 DIAGNOSIS — L97.512 ULCER OF RIGHT FOOT, WITH FAT LAYER EXPOSED (H): ICD-10-CM

## 2022-06-08 DIAGNOSIS — I73.9 PAD (PERIPHERAL ARTERY DISEASE) (H): ICD-10-CM

## 2022-06-08 DIAGNOSIS — E11.42 DIABETIC POLYNEUROPATHY ASSOCIATED WITH TYPE 2 DIABETES MELLITUS (H): Primary | ICD-10-CM

## 2022-06-08 RX ORDER — CEPHALEXIN 500 MG/1
500 CAPSULE ORAL 2 TIMES DAILY
Qty: 20 CAPSULE | Refills: 0 | Status: SHIPPED | OUTPATIENT
Start: 2022-06-08 | End: 2022-06-18

## 2022-06-08 NOTE — TELEPHONE ENCOUNTER
"Patient left voicemail asking that we go ahead and \"make a reservation and a time\". He would like a call back.     Phone call to patient. He states his insurance stated that [facility] is in network. He was informed that we will fax an order to [facility], but he would be responsible for calling them to schedule. If he is able to get in sooner, he needs to call Radiology Scheduling and cancel the other MRI appointment with [facility]. He verbalized understanding.     Order faxed to [facility] and confirmed it went through via [employer].     STARR Xavier RN        "

## 2022-06-08 NOTE — TELEPHONE ENCOUNTER
Reviewed xray and it is showing concerns of a deeper bone infection.     Will order an MRI to further assess for deeper bone infection.     Spoke with patient about this. He was given the number below.     We will also start him on an oral antibiotic    Please call to schedule your MRI:  [phone].    We will call him with the MRI results.  He also notes history of palmoplantar psoriasis and we discussed that this process could also be going on. He will continue with his hyperbaric oxygen treatments.     Purvi Whelan DPM

## 2022-06-08 NOTE — TELEPHONE ENCOUNTER
Reason for call:  Patient said he has insurance information for [facility].    Home number on file [phone] (home)

## 2022-06-08 NOTE — TELEPHONE ENCOUNTER
If they can try to get in with [facility] sooner that would be good. We could fax the MRI order over there otherwise the 16th should be okay.   He stated he was feeling better this morning when I talked to him.     If he develops fever, chills, nausa, or streaking redness within the meantime, recommend going to the hospital.     Please let patient know.     Thanks,    Purvi Whelan DPM

## 2022-06-08 NOTE — TELEPHONE ENCOUNTER
Wife, Le, calls for patient. She states Zia spoke with Dr. Whelan this am and was directed to get an MRI.  The earliest they could get it scheduled was 6/16/22 at [facility]. She asks how urgent the MRI is needed.   Patient is also scheduled for angioplasty on the same leg on 6/10/22.   There is no consent to communicate on file for Le.   Asked to speak with patient who gave permission to speak with wife, but he continued the conversation.     Patient states they tried finding an MRI at multiple [place] locations and 6/16/22 was the soonest.   Will discuss with provider and get back with him.   Ok to leave message : YES    Please advise if patient is ok to wait until 6/16/22 for the MRI , or if you want him to try to get in with [facility] sooner if it is within network for his insurance.     STARR Xavier, RN

## 2022-06-08 NOTE — TELEPHONE ENCOUNTER
Phone call to patient and he was informed of recommendations below.   He has been to [facility] and thinks they are in network. He will double check and call us back if he would like us to send an order.   Their phone number was provided.     STARR Xavier RN

## 2022-06-09 DIAGNOSIS — I70.229 CRITICAL ISCHEMIA OF LOWER EXTREMITY (H): Primary | ICD-10-CM

## 2022-06-10 ENCOUNTER — HOSPITAL ENCOUNTER (OUTPATIENT)
Facility: CLINIC | Age: 72
Discharge: HOME | End: 2022-06-10
Admitting: SURGERY
Payer: COMMERCIAL

## 2022-06-10 ENCOUNTER — APPOINTMENT (OUTPATIENT)
Dept: INTERVENTIONAL RADIOLOGY/VASCULAR | Facility: CLINIC | Age: 72
End: 2022-06-10
Attending: SURGERY
Payer: COMMERCIAL

## 2022-06-10 VITALS
HEIGHT: 64 IN | DIASTOLIC BLOOD PRESSURE: 57 MMHG | HEART RATE: 56 BPM | BODY MASS INDEX: 22.48 KG/M2 | TEMPERATURE: 98.3 F | SYSTOLIC BLOOD PRESSURE: 134 MMHG | OXYGEN SATURATION: 98 % | WEIGHT: 131.7 LBS | RESPIRATION RATE: 18 BRPM

## 2022-06-10 DIAGNOSIS — I70.235 ATHEROSCLEROSIS OF NATIVE ARTERIES OF RIGHT LEG WITH ULCERATION OF OTHER PART OF FOOT (H): ICD-10-CM

## 2022-06-10 DIAGNOSIS — I73.9 PAD (PERIPHERAL ARTERY DISEASE) (H): ICD-10-CM

## 2022-06-10 DIAGNOSIS — I70.229 CRITICAL ISCHEMIA OF LOWER EXTREMITY (H): Primary | ICD-10-CM

## 2022-06-10 LAB
ANION GAP SERPL CALCULATED.3IONS-SCNC: 6 MMOL/L (ref 3–14)
BUN SERPL-MCNC: 28 MG/DL (ref 7–30)
CALCIUM SERPL-MCNC: 8.9 MG/DL (ref 8.5–10.1)
CHLORIDE BLD-SCNC: 105 MMOL/L (ref 94–109)
CO2 SERPL-SCNC: 28 MMOL/L (ref 20–32)
CREAT SERPL-MCNC: 0.88 MG/DL (ref 0.66–1.25)
GFR SERPL CREATININE-BSD FRML MDRD: 90 ML/MIN/1.73M2
GLUCOSE BLD-MCNC: 128 MG/DL (ref 70–99)
POTASSIUM BLD-SCNC: 4 MMOL/L (ref 3.4–5.3)
SODIUM SERPL-SCNC: 139 MMOL/L (ref 133–144)

## 2022-06-10 PROCEDURE — C1725 CATH, TRANSLUMIN NON-LASER: HCPCS

## 2022-06-10 PROCEDURE — C1769 GUIDE WIRE: HCPCS

## 2022-06-10 PROCEDURE — 272N000116 HC CATH CR1

## 2022-06-10 PROCEDURE — 36415 COLL VENOUS BLD VENIPUNCTURE: CPT | Performed by: SURGERY

## 2022-06-10 PROCEDURE — 272N000196 HC ACCESSORY CR5

## 2022-06-10 PROCEDURE — 250N000011 HC RX IP 250 OP 636: Performed by: SURGERY

## 2022-06-10 PROCEDURE — 250N000009 HC RX 250: Performed by: SURGERY

## 2022-06-10 PROCEDURE — 75710 ARTERY X-RAYS ARM/LEG: CPT | Mod: RT

## 2022-06-10 PROCEDURE — 37228 PR REVASC TIB/PERON ART, ANGIOPLASTY INIT VESSEL: CPT | Mod: RT | Performed by: SURGERY

## 2022-06-10 PROCEDURE — 76937 US GUIDE VASCULAR ACCESS: CPT

## 2022-06-10 PROCEDURE — 36591 DRAW BLOOD OFF VENOUS DEVICE: CPT

## 2022-06-10 PROCEDURE — 272N000124 HC CATH CR11

## 2022-06-10 PROCEDURE — 999N000012 HC STATISTIC ANGIOGRAM, STENT, VERTEBRO PLASTY

## 2022-06-10 PROCEDURE — 272N000302 HC DEVICE INFLATION CR5

## 2022-06-10 PROCEDURE — 76937 US GUIDE VASCULAR ACCESS: CPT | Mod: 26 | Performed by: SURGERY

## 2022-06-10 PROCEDURE — 82310 ASSAY OF CALCIUM: CPT | Performed by: SURGERY

## 2022-06-10 PROCEDURE — 75710 ARTERY X-RAYS ARM/LEG: CPT | Mod: 26 | Performed by: SURGERY

## 2022-06-10 PROCEDURE — 272N000570 HC SHEATH CR7

## 2022-06-10 PROCEDURE — 255N000002 HC RX 255 OP 636: Performed by: SURGERY

## 2022-06-10 PROCEDURE — 99152 MOD SED SAME PHYS/QHP 5/>YRS: CPT | Performed by: SURGERY

## 2022-06-10 PROCEDURE — 272N000567 HC SHEATH CR4

## 2022-06-10 PROCEDURE — 36247 INS CATH ABD/L-EXT ART 3RD: CPT

## 2022-06-10 PROCEDURE — C1760 CLOSURE DEV, VASC: HCPCS

## 2022-06-10 PROCEDURE — 258N000003 HC RX IP 258 OP 636: Performed by: SURGERY

## 2022-06-10 PROCEDURE — 272N000123 HC CATH CR9

## 2022-06-10 PROCEDURE — 75774 ARTERY X-RAY EACH VESSEL: CPT

## 2022-06-10 RX ORDER — LIDOCAINE 40 MG/G
CREAM TOPICAL
Status: DISCONTINUED | OUTPATIENT
Start: 2022-06-10 | End: 2022-06-10 | Stop reason: HOSPADM

## 2022-06-10 RX ORDER — NALOXONE HYDROCHLORIDE 0.4 MG/ML
0.4 INJECTION, SOLUTION INTRAMUSCULAR; INTRAVENOUS; SUBCUTANEOUS
Status: DISCONTINUED | OUTPATIENT
Start: 2022-06-10 | End: 2022-06-10 | Stop reason: HOSPADM

## 2022-06-10 RX ORDER — NALOXONE HYDROCHLORIDE 0.4 MG/ML
0.2 INJECTION, SOLUTION INTRAMUSCULAR; INTRAVENOUS; SUBCUTANEOUS
Status: DISCONTINUED | OUTPATIENT
Start: 2022-06-10 | End: 2022-06-10 | Stop reason: HOSPADM

## 2022-06-10 RX ORDER — FLUMAZENIL 0.1 MG/ML
0.2 INJECTION, SOLUTION INTRAVENOUS
Status: DISCONTINUED | OUTPATIENT
Start: 2022-06-10 | End: 2022-06-10 | Stop reason: HOSPADM

## 2022-06-10 RX ORDER — HEPARIN SODIUM 1000 [USP'U]/ML
6000 INJECTION, SOLUTION INTRAVENOUS; SUBCUTANEOUS
Status: COMPLETED | OUTPATIENT
Start: 2022-06-10 | End: 2022-06-10

## 2022-06-10 RX ORDER — IODIXANOL 320 MG/ML
150 INJECTION, SOLUTION INTRAVASCULAR ONCE
Status: COMPLETED | OUTPATIENT
Start: 2022-06-10 | End: 2022-06-10

## 2022-06-10 RX ORDER — HEPARIN SODIUM 200 [USP'U]/100ML
1 INJECTION, SOLUTION INTRAVENOUS CONTINUOUS PRN
Status: DISCONTINUED | OUTPATIENT
Start: 2022-06-10 | End: 2022-06-10 | Stop reason: HOSPADM

## 2022-06-10 RX ORDER — SODIUM CHLORIDE 9 MG/ML
INJECTION, SOLUTION INTRAVENOUS CONTINUOUS
Status: DISCONTINUED | OUTPATIENT
Start: 2022-06-10 | End: 2022-06-10 | Stop reason: HOSPADM

## 2022-06-10 RX ORDER — CEFAZOLIN SODIUM 2 G/100ML
2 INJECTION, SOLUTION INTRAVENOUS
Status: COMPLETED | OUTPATIENT
Start: 2022-06-10 | End: 2022-06-10

## 2022-06-10 RX ORDER — FENTANYL CITRATE 50 UG/ML
25-50 INJECTION, SOLUTION INTRAMUSCULAR; INTRAVENOUS EVERY 5 MIN PRN
Status: DISCONTINUED | OUTPATIENT
Start: 2022-06-10 | End: 2022-06-10 | Stop reason: HOSPADM

## 2022-06-10 RX ORDER — PROTAMINE SULFATE 10 MG/ML
15 INJECTION, SOLUTION INTRAVENOUS ONCE
Status: COMPLETED | OUTPATIENT
Start: 2022-06-10 | End: 2022-06-10

## 2022-06-10 RX ADMIN — HEPARIN SODIUM 4 BAG: 200 INJECTION, SOLUTION INTRAVENOUS at 08:37

## 2022-06-10 RX ADMIN — MIDAZOLAM HYDROCHLORIDE 0.5 MG: 1 INJECTION, SOLUTION INTRAMUSCULAR; INTRAVENOUS at 08:19

## 2022-06-10 RX ADMIN — FENTANYL CITRATE 25 MCG: 50 INJECTION, SOLUTION INTRAMUSCULAR; INTRAVENOUS at 08:29

## 2022-06-10 RX ADMIN — CEFAZOLIN SODIUM 2 G: 2 INJECTION, SOLUTION INTRAVENOUS at 07:40

## 2022-06-10 RX ADMIN — HEPARIN SODIUM 6000 UNITS: 1000 INJECTION INTRAVENOUS; SUBCUTANEOUS at 08:51

## 2022-06-10 RX ADMIN — SODIUM CHLORIDE: 9 INJECTION, SOLUTION INTRAVENOUS at 07:28

## 2022-06-10 RX ADMIN — FENTANYL CITRATE 25 MCG: 50 INJECTION, SOLUTION INTRAMUSCULAR; INTRAVENOUS at 09:17

## 2022-06-10 RX ADMIN — MIDAZOLAM HYDROCHLORIDE 0.5 MG: 1 INJECTION, SOLUTION INTRAMUSCULAR; INTRAVENOUS at 08:53

## 2022-06-10 RX ADMIN — FENTANYL CITRATE 25 MCG: 50 INJECTION, SOLUTION INTRAMUSCULAR; INTRAVENOUS at 08:53

## 2022-06-10 RX ADMIN — MIDAZOLAM HYDROCHLORIDE 0.5 MG: 1 INJECTION, SOLUTION INTRAMUSCULAR; INTRAVENOUS at 08:28

## 2022-06-10 RX ADMIN — MIDAZOLAM HYDROCHLORIDE 0.5 MG: 1 INJECTION, SOLUTION INTRAMUSCULAR; INTRAVENOUS at 09:17

## 2022-06-10 RX ADMIN — LIDOCAINE HYDROCHLORIDE 20 ML: 10 INJECTION, SOLUTION INFILTRATION; PERINEURAL at 08:26

## 2022-06-10 RX ADMIN — FENTANYL CITRATE 25 MCG: 50 INJECTION, SOLUTION INTRAMUSCULAR; INTRAVENOUS at 08:19

## 2022-06-10 RX ADMIN — PROTAMINE SULFATE 15 MG: 10 INJECTION, SOLUTION INTRAVENOUS at 09:26

## 2022-06-10 RX ADMIN — IODIXANOL 65 ML: 320 INJECTION, SOLUTION INTRAVASCULAR at 09:28

## 2022-06-10 NOTE — PRE-PROCEDURE
GENERAL PRE-PROCEDURE:   Procedure:  RIGHT LOWER EXTREMITY ARTERIOGRAM WITH POSSIBLE INTERVENTION   Date/Time:  6/10/2022 7:14 AM    Verbal consent obtained?: Yes    Written consent obtained?: Yes    Risks and benefits: Risks, benefits and alternatives were discussed    Consent given by:  Patient  Patient states understanding of procedure being performed: Yes    Patient's understanding of procedure matches consent: Yes    Procedure consent matches procedure scheduled: Yes    Expected level of sedation:  Moderate  Appropriately NPO:  Yes  ASA Class:  2  Mallampati  :  Grade 2- soft palate, base of uvula, tonsillar pillars, and portion of posterior pharyngeal wall visible  Lungs:  Lungs clear with good breath sounds bilaterally  Heart:  Normal heart sounds and rate  History & Physical reviewed:  History and physical reviewed and no updates needed  Statement of review:  I have reviewed the lab findings, diagnostic data, medications, and the plan for sedation

## 2022-06-10 NOTE — DISCHARGE INSTRUCTIONS
Peripheral Angiogram Discharge Instructions - Femoral     After you go home:    Have an adult stay with you until tomorrow.  Drink extra fluids for 2 days.  You may resume your normal diet.  No smoking       For 24 hours - due to the sedation you received:  Relax and take it easy.  Do NOT make any important or legal decisions.  Do NOT drive or operate machines at home or at work.  Do NOT drink alcohol.    Care of Groin Puncture Site:    For the first 24 hrs - check the puncture site every 1-2 hours while awake.  For 2 days, when you cough, sneeze, laugh or move your bowels, hold your hand over the puncture site and press firmly.  Remove the bandaid after 24 hours. If there is minor oozing, apply another bandaid and remove it after 12 hours.  It is normal to have a small bruise or pea size lump at the site.  You may shower tomorrow.  Do NOT take a bath, or use a hot tub or pool for at least 3 days. Do NOT scrub the site. Do not use lotion or powder near the puncture site.     Activity:            For 2 days:  No stooping or squatting  Do NOT do any heavy activity such as exercise, lifting, or straining.   No housework, yard work or any activity that make you sweat  Do NOT lift more than 10 pounds    Bleeding:    If you start bleeding from the site in your groin, lie down flat and press firmly on/above the site for 10 minutes.   Once bleeding stops, lay flat for 2 hours.   [facility] as soon as you can.       Call 911 right away if you have heavy bleeding or bleeding that does not stop.      Medicines:    If you are on Metformin (Glucophage) and your GFR (kidney function level) is >30, you may continue taking your Metformin.  If you are on Metformin (Glucophage) and your GFR (kidney function level) is <30, do not restart the Metformin for 48 hours after your procedure. Check with your primary care giver before restarting the Metformin to see if you need to have blood drawn to recheck your kidney  function (GFR).  If you are taking an antiplatelet medication such as Plavix, do not stop taking it until you talk to your provider.     Take your medications, including blood thinners, unless your provider tells you not to.    If you take Coumadin (Warfarin), have your INR checked by your provider in  3-5 days. Call your clinic to schedule this.  If you have stopped any medicines, check with your provider about when to restart them.        Follow Up Appointments:    Follow up with [facility] as directed.    Call the clinic if:    You have increased pain or a large or growing hard lump around the site.  The site is red, swollen, hot or tender.  Blood or fluid is draining from the site.  You have chills or a fever greater than 101 F (38 C).  Your leg feels numb, cool or changes color.  You have hives, a rash or unusual itching.  New pain in the back or belly that you cannot control with Tylenol.  Any questions or concerns.    Other Instructions:    If you received a stent - carry your stent card with you at all times.      If you have questions or your original symptoms do not improve, call:         [facility] @ [phone]

## 2022-06-10 NOTE — IR NOTE
Interventional Radiology Intra-procedural Nursing Note    Patient Name: Zia Hopkins  Medical Record Number: [id number]  Today's Date: Valerie 10, 2022    Procedure: RIGHT LOWER EXTREMITY ARTERIOGRAM WITH POSSIBLE INTERVENTION   Start time: 0825  End time: 0930  Report provided to: CAIN Madsen  Patient depart time and location: 0941 to  Room 18    Note: Patient entered Interventional Radiology Suite number 1 via cart. Patient awake, alert and orientated. Assisted onto procedural table in supine position. Prepped and draped.  Dr. Hamlin in room. Time out and procedure started. Vital signs stable. Telemetry reading NSR.    Procedure well tolerated by patient without complications. Procedure end with debrief by Dr. Hamlin.  6 Fr angioseal deployed at 0930, gauze and tegaderm dressing applied to left interventional procedure access site, dressing is c/d/i.    2 hours bedrest per Dr. Hamlin, from [id number].    Administered medication totals:  Lidocaine 1% 10 mL Intradermal  Heparin 6000 Units    Versed 2 mg IVP  Fentanyl 100 mcg IVP  Protamine 15 mg IVP    Last dose of sedation administered at 0917.

## 2022-06-10 NOTE — PROGRESS NOTES
Care Suites Discharge Nursing Note    Patient Information  Name: Zia Hopkins  Age: 72 year old    Discharge Education:  Discharge instructions reviewed: Yes  Additional education/resources provided: angioseal packet  Patient/patient representative verbalizes understanding: Yes  Patient discharging on new medications: No  Medication education completed: N/A    Discharge Plans:   Discharge location: home  Discharge ride contacted: Yes  Approximate discharge time: 1240    Discharge Criteria:  Discharge criteria met and vital signs stable: Yes  Groin site CDI, soft and flat after ambulation.  Denied any diff voiding post proc.  Patient Belongs:  Patient belongings returned to patient: Yes    Nisa Johnson RN

## 2022-06-10 NOTE — PROGRESS NOTES
Care Suites Admission Nursing Note    Patient Information  Name: Zia Hopkins  Age: 72 year old  Reason for admission: L/E Angio  Care Suites arrival time: 0650    Visitor Information  Name: Le  Informed of visitor restrictions: Yes  1 visitor allowed per patient   Visitor must screen negative for COVID symptoms   Visitor must wear a mask  Waiting rooms closed to visitors    Patient Admission/Assessment   Pre-procedure assessment complete: Yes  If abnormal assessment/labs, provider notified: N/A  NPO: Yes  Medications held per instructions/orders: Yes  Consent: obtained  If applicable, pregnancy test status: deferred  Patient oriented to room: Yes  Education/questions answered: Yes  Plan/other: A waiting procedure    Discharge Planning  Discharge name/phone number: Le [phone]  Overnight post sedation caregiver: Le  Discharge location: home      PATIENT/VISITOR WELLNESS SCREENING    Step 1 Patient Screening    1. In the last month, have you been in contact with someone who was confirmed or suspected to have Coronavirus/COVID-19? No    2. Do you have the following symptoms?  Fever/Chills? No   Cough? No   Shortness of breath? No   New loss of taste or smell? No  Sore throat? No  Muscle or body aches? No  Headaches? No  Fatigue? No  Vomiting or diarrhea? No    Step 2 Visitor Screening    1. Name of Visitor (1 visitor per patient): Le    2. In the last month, have you been in contact with someone who was confirmed or suspected to have Coronavirus/COVID-19? No    3. Do you have the following symptoms?  Fever/Chills? No   Cough? No   Shortness of breath? No   Skin rash? No   Loss of taste or smell? No  Sore throat? No  Runny or stuffy nose? No  Muscle or body aches? No  Headaches? No  Fatigue? No  Vomiting or diarrhea? No      If the visitor has positive symptoms, notify supervisor/manger  Per policy, the visitor will need to leave the facility     Step 3 Refer to logic grid below for  actions    NO SYMPTOM(S)    ACTIONS:  1. Standard rooming process  2. Provider to assess per normal protocol  3. Implement precautions as needed and per guidelines     POSITIVE SYMPTOM(S)  If positive for ANY of the following symptoms: fever, cough, shortness of breath, rash    ACTION:  1. Continue to have the patient wear a mask   2. Room patient as soon as possible  3. Don appropriate PPE when entering room  4. Provider evaluation    Tena Marcos RN

## 2022-06-10 NOTE — PROGRESS NOTES
Care Suites Post Procedure Note    Patient Information  Name: Zia Hopkins  Age: 72 year old    Post Procedure  Time patient returned to Care Suites: [id number]  Concerns/abnormal assessment: Angioplasty done to two vessels  If abnormal assessment, provider notified: N/A  Plan/Other: 2 hours bedrest    Tena Marcos RN

## 2022-06-11 ENCOUNTER — HEALTH MAINTENANCE LETTER (OUTPATIENT)
Age: 72
End: 2022-06-11

## 2022-06-13 ENCOUNTER — TRANSFERRED RECORDS (OUTPATIENT)
Dept: HEALTH INFORMATION MANAGEMENT | Facility: CLINIC | Age: 72
End: 2022-06-13
Payer: COMMERCIAL

## 2022-06-14 ENCOUNTER — TELEPHONE (OUTPATIENT)
Dept: PODIATRY | Facility: CLINIC | Age: 72
End: 2022-06-14
Payer: COMMERCIAL

## 2022-06-14 DIAGNOSIS — L97.512 ULCER OF RIGHT FOOT WITH FAT LAYER EXPOSED (H): ICD-10-CM

## 2022-06-14 DIAGNOSIS — M86.271 SUBACUTE OSTEOMYELITIS OF RIGHT FOOT (H): ICD-10-CM

## 2022-06-14 DIAGNOSIS — E11.42 DIABETIC POLYNEUROPATHY ASSOCIATED WITH TYPE 2 DIABETES MELLITUS (H): Primary | ICD-10-CM

## 2022-06-14 DIAGNOSIS — I73.9 PAD (PERIPHERAL ARTERY DISEASE) (H): ICD-10-CM

## 2022-06-14 NOTE — TELEPHONE ENCOUNTER
Received the MRI results back from the right foot from [facility].  It does show bone infection in the right fifth and fourth metatarsal bones.  No bone infection right great toe or second and third toes.    Attempted to call patient about MRI results but no answer.     Will try back later.     Purvi Whelan DPM

## 2022-06-14 NOTE — TELEPHONE ENCOUNTER
Spoke with Zia.  Discussed the bone infection is showing in the right 4th and 5th metatarsal heads.     Recommend removal of the infected bones. Discussed that I would recommend surgical removal of the infected bones.     He is going to talk to his wife and think about it. Discussed that if he develops fevers, chills, nausa, he should go to the hospital.   Currently he does not and pain is improved.    He will call back to schedule a follow appointment and will let us know if he would like to proceed with surgery.    Purvi Whelan DPM

## 2022-06-15 ENCOUNTER — TELEPHONE (OUTPATIENT)
Dept: PODIATRY | Facility: CLINIC | Age: 72
End: 2022-06-15
Payer: COMMERCIAL

## 2022-06-15 NOTE — TELEPHONE ENCOUNTER
Scheduled surgery    Type of surgery: partial right 4th and 5th metatarsal bone excision  Location of surgery: [facility]  Date and time of surgery: 6/27/22  Surgeon: Tip  Pre-Op Appt Date: 5/31/22  Post-Op Appt Date: 7/5/22   Packet sent out: Yes  Pre-cert/Authorization completed:  No  Date: 6.15.22      Marques Bee, Surgery Scheduler

## 2022-06-15 NOTE — TELEPHONE ENCOUNTER
I would like him to see me next week if he is able to make it work for an appoitnment.     Thanks,     Purvi Whelan DPM

## 2022-06-17 ENCOUNTER — TELEPHONE (OUTPATIENT)
Dept: PODIATRY | Facility: CLINIC | Age: 72
End: 2022-06-17
Payer: COMMERCIAL

## 2022-06-17 NOTE — TELEPHONE ENCOUNTER
Reason for call:  Patient would like more detailed instructions on how to send a picture of his Covid test results prior to his procedure.    Home number on file [phone] (home)

## 2022-06-17 NOTE — TELEPHONE ENCOUNTER
Sending to Surgery Scheduling to respond.   DOS is 6/27/202 with Dr. Whelan.     Elizabeth Herrera, ATC

## 2022-06-20 NOTE — TELEPHONE ENCOUNTER
Spoke to Zia.  Explained to patient that he will take a photo of the home COVID test on his phone. And show the hospital staff the photo on day of surgery.     Patient verbalized understanding.     Closing encounter.       Marques Bee, Surgery Scheduler

## 2022-06-23 ENCOUNTER — OFFICE VISIT (OUTPATIENT)
Dept: PODIATRY | Facility: CLINIC | Age: 72
End: 2022-06-23
Payer: COMMERCIAL

## 2022-06-23 VITALS — DIASTOLIC BLOOD PRESSURE: 62 MMHG | SYSTOLIC BLOOD PRESSURE: 142 MMHG | BODY MASS INDEX: 22.49 KG/M2 | WEIGHT: 131 LBS

## 2022-06-23 DIAGNOSIS — S98.112A AMPUTATION OF LEFT GREAT TOE (H): ICD-10-CM

## 2022-06-23 DIAGNOSIS — M79.671 RIGHT FOOT PAIN: Primary | ICD-10-CM

## 2022-06-23 DIAGNOSIS — I73.9 PAD (PERIPHERAL ARTERY DISEASE) (H): ICD-10-CM

## 2022-06-23 DIAGNOSIS — S98.111A AMPUTATION OF RIGHT GREAT TOE (H): ICD-10-CM

## 2022-06-23 DIAGNOSIS — Z89.422 H/O AMPUTATION OF LESSER TOE, LEFT (H): ICD-10-CM

## 2022-06-23 DIAGNOSIS — L97.512 ULCER OF RIGHT FOOT WITH FAT LAYER EXPOSED (H): ICD-10-CM

## 2022-06-23 PROCEDURE — 99214 OFFICE O/P EST MOD 30 MIN: CPT | Performed by: PODIATRIST

## 2022-06-23 NOTE — OR NURSING
ML for patient's care team at [facility] re: plavix. Pt states he has been holding it since 6/19/22, checking with his MD if this is correct.

## 2022-06-23 NOTE — PROGRESS NOTES
Podiatry / Foot and Ankle Surgery Progress Note    June 23, 2022    Subject: Patient was seen for follow-up on MRI results.  Notes that the foot has improved with antibiotics and is no longer painful.  Denies fever, nausea, vomiting.    Objective:  Vitals: BP (!) 142/62   Wt 59.4 kg (131 lb)   BMI 22.49 kg/m    BMI= Body mass index is 22.49 kg/m .    A1C: 7.2 (3/2022)     General:  Patient is alert and orientated.  NAD.     Vascular:  DP and PT pulses are palpable.  No edema or varicosities noted.  CFT's < 3secs.  Skin temp is normal.     Neuro:  Light and gross touch sensation diminished to feet.     Derm:  Full-thickness ulceration to the medial aspect of the first metatarsal head measures approximately 1.0 cm x 1.0 cm x 0.1 cm in depth. Base of the wound is fibrous.  No redness, dehiscence or signs of acute infection noted.  Full-thickness ulceration to the lateral aspect of the right fifth metatarsal head.  This measures approximately 1.9 cm x 1.0 cm x 0.3 cm after debridement.  Base of the wound is fibrous.  No purulent drainage but heavy amounts of clear serous drainage noted.  No streaking redness noted.     Musculoskeletal:  Right great toe amputation. Right 4th and 5th toe amputations.  Left great toe amputation.      Radiographs: Right foot x-ray -I have looked at and reviewed xrays personally -question bone infection to the fifth and fourth metatarsal heads.    MRI right foot:       Assessment:     Foot pain, right  Ulcer of right foot, with fat layer exposed (H)  H/O amputation of lesser toe, left (H)  PAD (peripheral artery disease) (H)  Amputation of left great toe (H)  Amputation of right great toe (H)     Medical Decision Making/Plan:   Reviewed and discussed the MRI results with patient.  Discussed that I still recommend moving forward with partial removal of the fifth and fourth metatarsal bones to excise the ulceration and close the area with stitches.  He is getting an ultrasound next week and  if there is any changes in the blood flow that would postpone surgery we will contact him.  Discussed it would be a same-day surgery and the stitches would be in for 3 to 6 weeks depending on the healing. Talked about risks including infection, numbness, continued pain, recurrence, need for further surgery, blood loss, blood clotting. You will scar.     All questions were answered to patient satisfaction he will call for the questions or concerns.   Patient Risk Factor:  Patient is a high risk factor for infection.    Purvi Whelan DPM, Podiatry/Foot and Ankle Surgery

## 2022-06-23 NOTE — LETTER
6/23/2022         RE: Zia Hopkins  [address]  [place] [address]        Dear Colleague,    Thank you for referring your patient, Zia Hopkins, to the [facility]. Please see a copy of my visit note below.    Podiatry / Foot and Ankle Surgery Progress Note    June 23, 2022    Subject: Patient was seen for follow-up on MRI results.  Notes that the foot has improved with antibiotics and is no longer painful.  Denies fever, nausea, vomiting.    Objective:  Vitals: BP (!) 142/62   Wt 59.4 kg (131 lb)   BMI 22.49 kg/m    BMI= Body mass index is 22.49 kg/m .    A1C: 7.2 (3/2022)     General:  Patient is alert and orientated.  NAD.     Vascular:  DP and PT pulses are palpable.  No edema or varicosities noted.  CFT's < 3secs.  Skin temp is normal.     Neuro:  Light and gross touch sensation diminished to feet.     Derm:  Full-thickness ulceration to the medial aspect of the first metatarsal head measures approximately 1.0 cm x 1.0 cm x 0.1 cm in depth. Base of the wound is fibrous.  No redness, dehiscence or signs of acute infection noted.  Full-thickness ulceration to the lateral aspect of the right fifth metatarsal head.  This measures approximately 1.9 cm x 1.0 cm x 0.3 cm after debridement.  Base of the wound is fibrous.  No purulent drainage but heavy amounts of clear serous drainage noted.  No streaking redness noted.     Musculoskeletal:  Right great toe amputation. Right 4th and 5th toe amputations.  Left great toe amputation.      Radiographs: Right foot x-ray -I have looked at and reviewed xrays personally -question bone infection to the fifth and fourth metatarsal heads.    MRI right foot:       Assessment:     Foot pain, right  Ulcer of right foot, with fat layer exposed (H)  H/O amputation of lesser toe, left (H)  PAD (peripheral artery disease) (H)  Amputation of left great toe (H)  Amputation of right great toe (H)     Medical Decision  Making/Plan:   Reviewed and discussed the MRI results with patient.  Discussed that I still recommend moving forward with partial removal of the fifth and fourth metatarsal bones to excise the ulceration and close the area with stitches.  He is getting an ultrasound next week and if there is any changes in the blood flow that would postpone surgery we will contact him.  Discussed it would be a same-day surgery and the stitches would be in for 3 to 6 weeks depending on the healing. Talked about risks including infection, numbness, continued pain, recurrence, need for further surgery, blood loss, blood clotting. You will scar.     All questions were answered to patient satisfaction he will call for the questions or concerns.   Patient Risk Factor:  Patient is a high risk factor for infection.    Purvi Whelan DPM, Podiatry/Foot and Ankle Surgery              Again, thank you for allowing me to participate in the care of your patient.        Sincerely,        Purvi Whelan DPM, Podiatry/Foot and Ankle Surgery

## 2022-06-27 ENCOUNTER — HOSPITAL ENCOUNTER (OUTPATIENT)
Facility: CLINIC | Age: 72
Discharge: HOME | End: 2022-06-27
Attending: PODIATRIST | Admitting: PODIATRIST
Payer: COMMERCIAL

## 2022-06-27 ENCOUNTER — ANESTHESIA (OUTPATIENT)
Dept: SURGERY | Facility: CLINIC | Age: 72
End: 2022-06-27
Payer: COMMERCIAL

## 2022-06-27 ENCOUNTER — ANESTHESIA EVENT (OUTPATIENT)
Dept: SURGERY | Facility: CLINIC | Age: 72
End: 2022-06-27
Payer: COMMERCIAL

## 2022-06-27 ENCOUNTER — APPOINTMENT (OUTPATIENT)
Dept: GENERAL RADIOLOGY | Facility: CLINIC | Age: 72
End: 2022-06-27
Attending: PODIATRIST
Payer: COMMERCIAL

## 2022-06-27 VITALS
WEIGHT: 129 LBS | HEIGHT: 64 IN | RESPIRATION RATE: 16 BRPM | SYSTOLIC BLOOD PRESSURE: 129 MMHG | TEMPERATURE: 97 F | HEART RATE: 56 BPM | BODY MASS INDEX: 22.02 KG/M2 | OXYGEN SATURATION: 96 % | DIASTOLIC BLOOD PRESSURE: 68 MMHG

## 2022-06-27 DIAGNOSIS — I73.9 PAD (PERIPHERAL ARTERY DISEASE) (H): Primary | ICD-10-CM

## 2022-06-27 DIAGNOSIS — E11.42 DIABETIC POLYNEUROPATHY ASSOCIATED WITH TYPE 2 DIABETES MELLITUS (H): ICD-10-CM

## 2022-06-27 DIAGNOSIS — M86.271 SUBACUTE OSTEOMYELITIS OF RIGHT FOOT (H): ICD-10-CM

## 2022-06-27 LAB
GLUCOSE BLDC GLUCOMTR-MCNC: 125 MG/DL (ref 70–99)
GLUCOSE BLDC GLUCOMTR-MCNC: 135 MG/DL (ref 70–99)

## 2022-06-27 PROCEDURE — 370N000017 HC ANESTHESIA TECHNICAL FEE, PER MIN: Performed by: PODIATRIST

## 2022-06-27 PROCEDURE — 258N000003 HC RX IP 258 OP 636: Performed by: ANESTHESIOLOGY

## 2022-06-27 PROCEDURE — 250N000011 HC RX IP 250 OP 636

## 2022-06-27 PROCEDURE — 82962 GLUCOSE BLOOD TEST: CPT

## 2022-06-27 PROCEDURE — 28122 PARTIAL REMOVAL OF FOOT BONE: CPT | Mod: 59 | Performed by: PODIATRIST

## 2022-06-27 PROCEDURE — 272N000001 HC OR GENERAL SUPPLY STERILE: Performed by: PODIATRIST

## 2022-06-27 PROCEDURE — 710N000012 HC RECOVERY PHASE 2, PER MINUTE: Performed by: PODIATRIST

## 2022-06-27 PROCEDURE — 999N000141 HC STATISTIC PRE-PROCEDURE NURSING ASSESSMENT: Performed by: PODIATRIST

## 2022-06-27 PROCEDURE — 999N000065 XR FOOT PORT RIGHT 2 VIEWS: Mod: RT

## 2022-06-27 PROCEDURE — 360N000075 HC SURGERY LEVEL 2, PER MIN: Performed by: PODIATRIST

## 2022-06-27 PROCEDURE — 88311 DECALCIFY TISSUE: CPT | Mod: TC | Performed by: PODIATRIST

## 2022-06-27 PROCEDURE — 250N000011 HC RX IP 250 OP 636: Performed by: PODIATRIST

## 2022-06-27 PROCEDURE — 250N000009 HC RX 250: Performed by: PODIATRIST

## 2022-06-27 PROCEDURE — 250N000009 HC RX 250

## 2022-06-27 PROCEDURE — 93010 ELECTROCARDIOGRAM REPORT: CPT | Performed by: INTERNAL MEDICINE

## 2022-06-27 PROCEDURE — 14040 TIS TRNFR F/C/C/M/N/A/G/H/F: CPT | Mod: 59 | Performed by: PODIATRIST

## 2022-06-27 RX ORDER — ONDANSETRON 2 MG/ML
4 INJECTION INTRAMUSCULAR; INTRAVENOUS EVERY 30 MIN PRN
Status: DISCONTINUED | OUTPATIENT
Start: 2022-06-27 | End: 2022-06-27 | Stop reason: HOSPADM

## 2022-06-27 RX ORDER — LABETALOL HYDROCHLORIDE 5 MG/ML
10 INJECTION, SOLUTION INTRAVENOUS
Status: DISCONTINUED | OUTPATIENT
Start: 2022-06-27 | End: 2022-06-27 | Stop reason: HOSPADM

## 2022-06-27 RX ORDER — SODIUM CHLORIDE, SODIUM LACTATE, POTASSIUM CHLORIDE, CALCIUM CHLORIDE 600; 310; 30; 20 MG/100ML; MG/100ML; MG/100ML; MG/100ML
INJECTION, SOLUTION INTRAVENOUS CONTINUOUS
Status: DISCONTINUED | OUTPATIENT
Start: 2022-06-27 | End: 2022-06-27 | Stop reason: HOSPADM

## 2022-06-27 RX ORDER — ONDANSETRON 4 MG/1
4 TABLET, ORALLY DISINTEGRATING ORAL EVERY 30 MIN PRN
Status: DISCONTINUED | OUTPATIENT
Start: 2022-06-27 | End: 2022-06-27 | Stop reason: HOSPADM

## 2022-06-27 RX ORDER — DEXAMETHASONE SODIUM PHOSPHATE 4 MG/ML
INJECTION, SOLUTION INTRA-ARTICULAR; INTRALESIONAL; INTRAMUSCULAR; INTRAVENOUS; SOFT TISSUE PRN
Status: DISCONTINUED | OUTPATIENT
Start: 2022-06-27 | End: 2022-06-27

## 2022-06-27 RX ORDER — FENTANYL CITRATE 50 UG/ML
25 INJECTION, SOLUTION INTRAMUSCULAR; INTRAVENOUS EVERY 5 MIN PRN
Status: DISCONTINUED | OUTPATIENT
Start: 2022-06-27 | End: 2022-06-27 | Stop reason: HOSPADM

## 2022-06-27 RX ORDER — ONDANSETRON 2 MG/ML
INJECTION INTRAMUSCULAR; INTRAVENOUS PRN
Status: DISCONTINUED | OUTPATIENT
Start: 2022-06-27 | End: 2022-06-27

## 2022-06-27 RX ORDER — ONDANSETRON 4 MG/1
4 TABLET, ORALLY DISINTEGRATING ORAL EVERY 8 HOURS PRN
Qty: 4 TABLET | Refills: 0 | Status: ON HOLD | OUTPATIENT
Start: 2022-06-27 | End: 2022-09-29

## 2022-06-27 RX ORDER — CEPHALEXIN 500 MG/1
500 CAPSULE ORAL 2 TIMES DAILY
Qty: 28 CAPSULE | Refills: 0 | Status: SHIPPED | OUTPATIENT
Start: 2022-06-27 | End: 2022-07-11

## 2022-06-27 RX ORDER — LIDOCAINE 40 MG/G
CREAM TOPICAL
Status: DISCONTINUED | OUTPATIENT
Start: 2022-06-27 | End: 2022-06-27 | Stop reason: HOSPADM

## 2022-06-27 RX ORDER — CEFAZOLIN SODIUM/WATER 2 G/20 ML
2 SYRINGE (ML) INTRAVENOUS SEE ADMIN INSTRUCTIONS
Status: DISCONTINUED | OUTPATIENT
Start: 2022-06-27 | End: 2022-06-27 | Stop reason: HOSPADM

## 2022-06-27 RX ORDER — HYDROCODONE BITARTRATE AND ACETAMINOPHEN 5; 325 MG/1; MG/1
1-2 TABLET ORAL EVERY 4 HOURS PRN
Qty: 15 TABLET | Refills: 0 | Status: SHIPPED | OUTPATIENT
Start: 2022-06-27 | End: 2022-11-18

## 2022-06-27 RX ORDER — FENTANYL CITRATE 50 UG/ML
INJECTION, SOLUTION INTRAMUSCULAR; INTRAVENOUS PRN
Status: DISCONTINUED | OUTPATIENT
Start: 2022-06-27 | End: 2022-06-27

## 2022-06-27 RX ORDER — HYDROCODONE BITARTRATE AND ACETAMINOPHEN 5; 325 MG/1; MG/1
1 TABLET ORAL
Status: DISCONTINUED | OUTPATIENT
Start: 2022-06-27 | End: 2022-06-27 | Stop reason: HOSPADM

## 2022-06-27 RX ORDER — ACETAMINOPHEN 650 MG
TABLET, EXTENDED RELEASE ORAL PRN
Status: DISCONTINUED | OUTPATIENT
Start: 2022-06-27 | End: 2022-06-27 | Stop reason: HOSPADM

## 2022-06-27 RX ORDER — CEFAZOLIN SODIUM/WATER 2 G/20 ML
2 SYRINGE (ML) INTRAVENOUS
Status: COMPLETED | OUTPATIENT
Start: 2022-06-27 | End: 2022-06-27

## 2022-06-27 RX ORDER — AMOXICILLIN 250 MG
1-2 CAPSULE ORAL 2 TIMES DAILY
Qty: 30 TABLET | Refills: 0 | Status: SHIPPED | OUTPATIENT
Start: 2022-06-27 | End: 2022-11-18

## 2022-06-27 RX ORDER — GLYCOPYRROLATE 0.2 MG/ML
INJECTION, SOLUTION INTRAMUSCULAR; INTRAVENOUS PRN
Status: DISCONTINUED | OUTPATIENT
Start: 2022-06-27 | End: 2022-06-27

## 2022-06-27 RX ORDER — LIDOCAINE HYDROCHLORIDE 10 MG/ML
INJECTION, SOLUTION INFILTRATION; PERINEURAL PRN
Status: DISCONTINUED | OUTPATIENT
Start: 2022-06-27 | End: 2022-06-27

## 2022-06-27 RX ORDER — BUPIVACAINE HYDROCHLORIDE 5 MG/ML
INJECTION, SOLUTION EPIDURAL; INTRACAUDAL PRN
Status: DISCONTINUED | OUTPATIENT
Start: 2022-06-27 | End: 2022-06-27 | Stop reason: HOSPADM

## 2022-06-27 RX ORDER — PROPOFOL 10 MG/ML
INJECTION, EMULSION INTRAVENOUS CONTINUOUS PRN
Status: DISCONTINUED | OUTPATIENT
Start: 2022-06-27 | End: 2022-06-27

## 2022-06-27 RX ORDER — NALOXONE HYDROCHLORIDE 0.4 MG/ML
0.2 INJECTION, SOLUTION INTRAMUSCULAR; INTRAVENOUS; SUBCUTANEOUS
Status: DISCONTINUED | OUTPATIENT
Start: 2022-06-27 | End: 2022-06-27 | Stop reason: HOSPADM

## 2022-06-27 RX ORDER — NALOXONE HYDROCHLORIDE 0.4 MG/ML
0.4 INJECTION, SOLUTION INTRAMUSCULAR; INTRAVENOUS; SUBCUTANEOUS
Status: DISCONTINUED | OUTPATIENT
Start: 2022-06-27 | End: 2022-06-27 | Stop reason: HOSPADM

## 2022-06-27 RX ADMIN — MIDAZOLAM 1 MG: 1 INJECTION INTRAMUSCULAR; INTRAVENOUS at 13:57

## 2022-06-27 RX ADMIN — MIDAZOLAM 1 MG: 1 INJECTION INTRAMUSCULAR; INTRAVENOUS at 13:47

## 2022-06-27 RX ADMIN — GLYCOPYRROLATE 0.2 MCG: 0.2 INJECTION, SOLUTION INTRAMUSCULAR; INTRAVENOUS at 14:03

## 2022-06-27 RX ADMIN — Medication 2 G: at 13:48

## 2022-06-27 RX ADMIN — ONDANSETRON HYDROCHLORIDE 4 MG: 2 INJECTION, SOLUTION INTRAVENOUS at 14:08

## 2022-06-27 RX ADMIN — SODIUM CHLORIDE, POTASSIUM CHLORIDE, SODIUM LACTATE AND CALCIUM CHLORIDE: 600; 310; 30; 20 INJECTION, SOLUTION INTRAVENOUS at 12:32

## 2022-06-27 RX ADMIN — SODIUM CHLORIDE, POTASSIUM CHLORIDE, SODIUM LACTATE AND CALCIUM CHLORIDE: 600; 310; 30; 20 INJECTION, SOLUTION INTRAVENOUS at 13:18

## 2022-06-27 RX ADMIN — PROPOFOL 50 MCG/KG/MIN: 10 INJECTION, EMULSION INTRAVENOUS at 13:55

## 2022-06-27 RX ADMIN — DEXAMETHASONE SODIUM PHOSPHATE 4 MG: 4 INJECTION, SOLUTION INTRA-ARTICULAR; INTRALESIONAL; INTRAMUSCULAR; INTRAVENOUS; SOFT TISSUE at 13:54

## 2022-06-27 RX ADMIN — FENTANYL CITRATE 50 MCG: 50 INJECTION, SOLUTION INTRAMUSCULAR; INTRAVENOUS at 13:54

## 2022-06-27 RX ADMIN — LIDOCAINE HYDROCHLORIDE 40 MG: 10 INJECTION, SOLUTION INFILTRATION; PERINEURAL at 13:54

## 2022-06-27 ASSESSMENT — LIFESTYLE VARIABLES: TOBACCO_USE: 1

## 2022-06-27 ASSESSMENT — COPD QUESTIONNAIRES: COPD: 0

## 2022-06-27 NOTE — ANESTHESIA PREPROCEDURE EVALUATION
Anesthesia Pre-Procedure Evaluation    Patient: Zia Hopkins   MRN: [id number] : 1950        Procedure : Procedure(s):  Partial right fourth and fifth metatarsal bone excision          Past Medical History:   Diagnosis Date     Diabetes mellitus type 2, noninsulin dependent (H)      Hyperlipidemia LDL goal <70      Hypertension      PAD (peripheral artery disease) (H)       Past Surgical History:   Procedure Laterality Date     AMPUTATE FOOT Left 3/15/2017    Procedure: AMPUTATE FOOT;  Surgeon: Emre Mauricio DPM;  Location: [facility] OR     AMPUTATE TOE(S) Left 2021    Procedure: LEFT GREAT TOE AMPUTATION.;  Surgeon: Purvi Whelan DPM, Podiatry/Foot and Ankle Surgery;  Location: [facility] OR     AMPUTATE TOE(S) Right 2022    Procedure: 1.  Partial right great toe amputation. 2.  Right foot fourth toe amputation at the metatarsophalangeal joint. 3.  Right fifth metatarsal amputation at the metatarsophalangeal joint.;  Surgeon: Purvi Whelan DPM, Podiatry/Foot and Ankle Surgery;  Location: [facility]     ENDARTERECTOMY FEMORAL Right 3/4/2022    Procedure: RIGHT FEMORAL ENDARTERECTOMY, RIGHT FEMORAL ARTERY BALLOON ANGIOPLASTY WITH STENTING;  Surgeon: Surjit Hamlin MD;  Location: [facility]     IR LOWER EXTREMITY ANGIOGRAM LEFT  2019     IR LOWER EXTREMITY ANGIOGRAM LEFT  2/3/2021     IR LOWER EXTREMITY ANGIOGRAM LEFT  2021     IR LOWER EXTREMITY ANGIOGRAM RIGHT  3/1/2022     IR LOWER EXTREMITY ANGIOGRAM RIGHT  6/10/2022     IR OR ANGIOGRAM  3/4/2022     IRRIGATION AND DEBRIDEMENT FOOT, COMBINED Left 3/15/2017    Procedure: COMBINED IRRIGATION AND DEBRIDEMENT FOOT;  Surgeon: Emre Mauricio DPM;  Location: [facility] OR      No Known Allergies   Social History     Tobacco Use     Smoking status: Former Smoker     Packs/day: 0.25     Types: Cigarettes     Quit date: 3/1/2016     Years since quittin.3     Smokeless tobacco: Never Used     Tobacco comment: 2-3 cigarettes per day   Substance Use  Topics     Alcohol use: Yes     Comment: 2.5 glasses of wine at dinner      Wt Readings from Last 1 Encounters:   06/27/22 58.5 kg (129 lb)        Anesthesia Evaluation   Pt has had prior anesthetic. Type: General.    No history of anesthetic complications       ROS/MED HX  ENT/Pulmonary:     (+) tobacco use, Past use,  (-) COPD, sleep apnea and recent URI   Neurologic:    (-) no CVA   Cardiovascular:     (+) Dyslipidemia hypertension-Peripheral Vascular Disease----Taking blood thinners  (-) stent   METS/Exercise Tolerance:     Hematologic:       Musculoskeletal:       GI/Hepatic:    (-) GERD   Renal/Genitourinary:       Endo:     (+) type II DM,     Psychiatric/Substance Use:       Infectious Disease:       Malignancy:       Other:            Physical Exam    Airway        Mallampati: II   TM distance: > 3 FB   Neck ROM: full   Mouth opening: > 3 cm    Respiratory Devices and Support         Dental  no notable dental history         Cardiovascular   cardiovascular exam normal          Pulmonary   pulmonary exam normal                OUTSIDE LABS:  CBC:   Lab Results   Component Value Date    WBC 10.5 03/08/2022    WBC 9.5 03/07/2022    HGB 8.7 (L) 03/08/2022    HGB 7.6 (L) 03/07/2022    HGB 7.6 (L) 03/07/2022    HCT 26.9 (L) 03/08/2022    HCT 23.8 (L) 03/07/2022     03/08/2022     03/07/2022     BMP:   Lab Results   Component Value Date     06/10/2022     03/08/2022    POTASSIUM 4.0 06/10/2022    POTASSIUM 3.6 03/08/2022    CHLORIDE 105 06/10/2022    CHLORIDE 105 03/08/2022    CO2 28 06/10/2022    CO2 26 03/08/2022    BUN 28 06/10/2022    BUN 27 03/08/2022    CR 0.88 06/10/2022    CR 0.88 03/08/2022     (H) 06/27/2022     (H) 06/10/2022     COAGS:   Lab Results   Component Value Date    PTT 32 02/03/2021    INR 0.95 02/03/2021     POC:   Lab Results   Component Value Date     (H) 04/23/2021     HEPATIC: No results found for: ALBUMIN, PROTTOTAL, ALT, AST, GGT, ALKPHOS,  BILITOTAL, BILIDIRECT, CAMMIE  OTHER:   Lab Results   Component Value Date    A1C 7.2 (H) 03/04/2022    TIN 8.9 06/10/2022       Anesthesia Plan    ASA Status:  3   NPO Status:  NPO Appropriate    Anesthesia Type: MAC.     - Reason for MAC: chronic cardiopulmonary disease              Consents    Anesthesia Plan(s) and associated risks, benefits, and realistic alternatives discussed. Questions answered and patient/representative(s) expressed understanding.    - Discussed:     - Discussed with:  Patient, Spouse      - Extended Intubation/Ventilatory Support Discussed: No.      - Patient is DNR/DNI Status: No    Use of blood products discussed: No .     Postoperative Care    Pain management: IV analgesics, Oral pain medications, Multi-modal analgesia.   PONV prophylaxis: Ondansetron (or other 5HT-3)     Comments:                Cierra Collins MD

## 2022-06-27 NOTE — ANESTHESIA POSTPROCEDURE EVALUATION
Patient: Zia Hopkins    Procedure: Procedure(s):  Partial right fourth and fifth metatarsal bone excision       Anesthesia Type:  MAC    Note:  Disposition: Outpatient   Postop Pain Control: Uneventful            Sign Out: Well controlled pain   PONV: No   Neuro/Psych: Uneventful            Sign Out: Acceptable/Baseline neuro status   Airway/Respiratory: Uneventful            Sign Out: Acceptable/Baseline resp. status   CV/Hemodynamics: Uneventful            Sign Out: Acceptable CV status   Other NRE: NONE   DID A NON-ROUTINE EVENT OCCUR? No           Last vitals:  Vitals Value Taken Time   BP     Temp     Pulse     Resp     SpO2         Electronically Signed By: Cierra Collins MD  June 27, 2022  2:51 PM

## 2022-06-27 NOTE — ANESTHESIA CARE TRANSFER NOTE
Patient: Zia Hopkins    Procedure: Procedure(s):  Partial right fourth and fifth metatarsal bone excision       Diagnosis: Diabetic polyneuropathy associated with type 2 diabetes mellitus (H) [E11.42]  PAD (peripheral artery disease) (H) [I73.9]  Ulcer of right foot with fat layer exposed (H) [L97.512]  Subacute osteomyelitis of right foot (H) [M86.271]  Diagnosis Additional Information: No value filed.    Anesthesia Type:   MAC     Note:    Oropharynx: spontaneously breathing  Level of Consciousness: awake  Oxygen Supplementation: face mask  Level of Supplemental Oxygen (L/min / FiO2): 6l  Independent Airway: airway patency satisfactory and stable  Dentition: dentition unchanged  Vital Signs Stable: post-procedure vital signs reviewed and stable  Report to RN Given: handoff report given  Patient transferred to: Phase II  Comments: Pt to Phase 2 recovery, VSS, report to RN  Handoff Report: Identifed the Patient, Identified the Reponsible Provider, Reviewed the pertinent medical history, Discussed the surgical course, Reviewed Intra-OP anesthesia mangement and issues during anesthesia, Set expectations for post-procedure period and Allowed opportunity for questions and acknowledgement of understanding      Vitals:  Vitals Value Taken Time   BP     Temp     Pulse     Resp     SpO2         Electronically Signed By: CLAUDINE Torres CRNA  June 27, 2022  2:31 PM

## 2022-06-27 NOTE — BRIEF OP NOTE
[facility]    Brief Operative Note    Pre-operative diagnosis: Diabetic polyneuropathy associated with type 2 diabetes mellitus (H) [E11.42]  PAD (peripheral artery disease) (H) [I73.9]  Ulcer of right foot with fat layer exposed (H) [L97.512]  Subacute osteomyelitis of right foot (H) [M86.271]  Post-operative diagnosis Same as pre-operative diagnosis    Procedure: Procedure(s):  Partial right fourth and fifth metatarsal bone excision  Surgeon: Surgeon(s) and Role:     * Purvi Whelan DPM, Podiatry/Foot and Ankle Surgery - Primary  Anesthesia: MAC with Local   Estimated Blood Loss: Minimal    Drains: None  Specimens:   ID Type Source Tests Collected by Time Destination   1 : Right foot bone - assess for bone infection Bone Resection Foot, Right SURGICAL PATHOLOGY EXAM Purvi Whelan DPM, Podiatry/Foot and Ankle Surgery 6/27/2022  2:07 PM      Findings:   None.  Complications: None.  Implants: * No implants in log *

## 2022-06-28 LAB
ATRIAL RATE - MUSE: 56 BPM
DIASTOLIC BLOOD PRESSURE - MUSE: NORMAL MMHG
INTERPRETATION ECG - MUSE: NORMAL
P AXIS - MUSE: 65 DEGREES
PR INTERVAL - MUSE: 152 MS
QRS DURATION - MUSE: 94 MS
QT - MUSE: 418 MS
QTC - MUSE: 403 MS
R AXIS - MUSE: -28 DEGREES
SYSTOLIC BLOOD PRESSURE - MUSE: NORMAL MMHG
T AXIS - MUSE: 44 DEGREES
VENTRICULAR RATE- MUSE: 56 BPM

## 2022-06-28 NOTE — OP NOTE
Procedure Date: 06/27/2022    SURGEON:  Purvi Whelan DPM    PREOPERATIVE DIAGNOSES:     1.  Diabetic with neuropathy.  2.  Ulceration, right foot.  3.  Osteomyelitis, right foot.  4.  Peripheral arterial disease.  5.  Lesser right toe amputations.    POSTOPERATIVE DIAGNOSES:     1.  Diabetic with neuropathy.  2.  Ulceration, right foot.  3.  Osteomyelitis, right foot.  4.  Peripheral arterial disease.  5.  Lesser right toe amputations.    PROCEDURE:    1.  Ulcer excision with flap closure.  2.  Bone excision, right fourth and fifth metatarsal bones.    ANESTHESIA:  MAC with local.    HEMOSTASIS:  Pneumatic ankle tourniquet with electrocautery.    HEMOSTASIS:  None.    ESTIMATED BLOOD LOSS:  2 mL    SPECIMENS:  Right fourth and fifth metatarsal bone for pathology.    INDICATIONS FOR PROCEDURE:  Mr. Hopkins is a 72-year-old diabetic male with peripheral arterial disease on whom we had previously done a partial right great toe amputation and 4th and 5th toe amputations on due to bone infection and critical limb ischemia.  He developed a new wound on the lateral aspect of his right foot that probed to bone and MRI showed osteomyelitis in the distal fourth and fifth metatarsals.  It was discussed with the patient to go in and remove the infected bones as well as the ulcer and close the area now that he has done hyperbaric oxygen to try to help with healing.  Risks, benefits, and complications were discussed with the patient.  No guarantees were made.  He wished to proceed with surgery.    DESCRIPTION OF PROCEDURE:  The patient was brought to the operating room and placed on the operating table in supine position.  Anesthesia was administered and local was injected.  The foot was prepped and draped using sterile technique.  Attention was directed to the distal lateral aspect of the right foot.  An elliptical incision approximately 6 cm in length and 2 cm in width was made with a 15 blade, full thickness down to bone,  excising the ulceration.  This was done for the ability to close the skin at the end of the procedure.  The ulcer was excised in total.  There was no purulent drainage noted.  The tissue was then sharply dissected off the distal 1/2 of the fourth and fifth metatarsal bones with a #15 blade.  These were then cut with a sagittal saw and removed.  These were sent to pathology.  The wound was flushed with copious amounts of normal saline.  The plantar skin flap was then rotated dorsally and reapproximated with 4-0 Prolene.  The patient's foot was placed in a dry sterile dressing.  The patient tolerated the procedure and anesthesia well and was transferred to recovery with vital signs stable and vascular status intact.  The patient will be minimal heel weightbearing in a postop shoe.    Purvi Whelan DPM        D: 2022   T: 2022   MT: [id number]    Name:     SKY GAYTANDwayne  MRN:      [id number]        Account:        [id number]   :      1950           Procedure Date: 2022     Document: [id number]

## 2022-06-29 ENCOUNTER — OFFICE VISIT (OUTPATIENT)
Dept: OTHER | Facility: CLINIC | Age: 72
End: 2022-06-29
Attending: SURGERY
Payer: COMMERCIAL

## 2022-06-29 ENCOUNTER — HOSPITAL ENCOUNTER (OUTPATIENT)
Dept: ULTRASOUND IMAGING | Facility: CLINIC | Age: 72
Discharge: HOME | End: 2022-06-29
Attending: SURGERY
Payer: COMMERCIAL

## 2022-06-29 VITALS — SYSTOLIC BLOOD PRESSURE: 175 MMHG | DIASTOLIC BLOOD PRESSURE: 74 MMHG | HEART RATE: 65 BPM

## 2022-06-29 DIAGNOSIS — I70.235 ATHEROSCLEROSIS OF NATIVE ARTERIES OF RIGHT LEG WITH ULCERATION OF OTHER PART OF FOOT (H): ICD-10-CM

## 2022-06-29 DIAGNOSIS — I73.9 PAD (PERIPHERAL ARTERY DISEASE) (H): ICD-10-CM

## 2022-06-29 DIAGNOSIS — I73.9 PAD (PERIPHERAL ARTERY DISEASE) (H): Primary | ICD-10-CM

## 2022-06-29 DIAGNOSIS — I70.229 CRITICAL ISCHEMIA OF LOWER EXTREMITY (H): ICD-10-CM

## 2022-06-29 PROCEDURE — 88311 DECALCIFY TISSUE: CPT | Mod: 26

## 2022-06-29 PROCEDURE — G0463 HOSPITAL OUTPT CLINIC VISIT: HCPCS

## 2022-06-29 PROCEDURE — 93922 UPR/L XTREMITY ART 2 LEVELS: CPT

## 2022-06-29 PROCEDURE — 88309 TISSUE EXAM BY PATHOLOGIST: CPT | Mod: 26

## 2022-06-29 PROCEDURE — 99214 OFFICE O/P EST MOD 30 MIN: CPT

## 2022-06-29 PROCEDURE — 93922 UPR/L XTREMITY ART 2 LEVELS: CPT | Mod: 26 | Performed by: SURGERY

## 2022-06-29 NOTE — PROGRESS NOTES
VASCULAR SURGERY PROGRESS NOTE    LOCATION[facility]    Zia Hopkins  Medical Record #:  [id number]  YOB: 1950  Age:  72 year old     Date of Service: 6/29/2022    PRIMARY CARE PROVIDER: Reid Reese    Reason for visit: Post-op angio with angioplasty to Right posterior tib and right tibioperoneal    IMPRESSION:  Mr. Hopkins is a 72 year old well known to the [facility]. Mr. Hopkins is 2 weeks post op for an angioplasty to the right posterior tib and right tibioperoneal. Hx of right femoral endarterectomy and right superficial femoral artery balloon angio and stenting and occlusion due to progressive necrosis of the toes on March 4, 2022. The patient recently had a partial right fourth and fifth metatarsal bone excision on 6/27 with Dr. Whelan.    The patient's ABIs on the right side have improved compared to previous. The patient has strong AT doppler signal at this time.     Patient has no new wounds, denies rest pain or claudication at this time.     RECOMMENDATION/RISKS:     Plan for follow up is as follows: Recommend right lower extremity arterial duplex in one month. Continue current medication regimen.    REVIEW OF SYSTEMS:    A 12 point ROS was reviewed and is negative except for what is listed above in HPI.    PHH:    Past Medical History:   Diagnosis Date     Diabetes mellitus type 2, noninsulin dependent (H)      Hyperlipidemia LDL goal <70      Hypertension      PAD (peripheral artery disease) (H)      Psoriasis           Past Surgical History:   Procedure Laterality Date     AMPUTATE FOOT Left 3/15/2017    Procedure: AMPUTATE FOOT;  Surgeon: Emre Mauricio DPM;  Location: [facility] OR     AMPUTATE TOE(S) Left 4/20/2021    Procedure: LEFT GREAT TOE AMPUTATION.;  Surgeon: Purvi Whelan DPM, Podiatry/Foot and Ankle Surgery;  Location: [facility] OR     AMPUTATE TOE(S) Right 4/18/2022    Procedure: 1.  Partial right great toe amputation. 2.  Right foot fourth toe  amputation at the metatarsophalangeal joint. 3.  Right fifth metatarsal amputation at the metatarsophalangeal joint.;  Surgeon: Purvi Whelan DPM, Podiatry/Foot and Ankle Surgery;  Location: [facility] OR     BIOPSY BONE FOOT Right 6/27/2022    Procedure: Partial right fourth and fifth metatarsal bone excision;  Surgeon: Purvi Whelan DPM, Podiatry/Foot and Ankle Surgery;  Location: [facility] OR     ENDARTERECTOMY FEMORAL Right 3/4/2022    Procedure: RIGHT FEMORAL ENDARTERECTOMY, RIGHT FEMORAL ARTERY BALLOON ANGIOPLASTY WITH STENTING;  Surgeon: Surjit Hamlin MD;  Location: [facility] OR     IR LOWER EXTREMITY ANGIOGRAM LEFT  7/30/2019     IR LOWER EXTREMITY ANGIOGRAM LEFT  2/3/2021     IR LOWER EXTREMITY ANGIOGRAM LEFT  4/21/2021     IR LOWER EXTREMITY ANGIOGRAM RIGHT  3/1/2022     IR LOWER EXTREMITY ANGIOGRAM RIGHT  6/10/2022     IR OR ANGIOGRAM  3/4/2022     IRRIGATION AND DEBRIDEMENT FOOT, COMBINED Left 3/15/2017    Procedure: COMBINED IRRIGATION AND DEBRIDEMENT FOOT;  Surgeon: Emre Mauricio DPM;  Location: [facility] OR       ALLERGIES:  Patient has no known allergies.    MEDS:    Current Outpatient Medications:      amLODIPine (NORVASC) 10 MG tablet, Take 10 mg by mouth daily, Disp: , Rfl:      aspirin 81 MG tablet, Take 1 tablet (81 mg) by mouth daily, Disp: 90 tablet, Rfl: 1     atorvastatin (LIPITOR) 80 MG tablet, TAKE 1 TABLET(80 MG) BY MOUTH AT BEDTIME, Disp: 90 tablet, Rfl: 3     cephALEXin (KEFLEX) 500 MG capsule, Take 1 capsule (500 mg) by mouth 2 times daily for 14 days, Disp: 28 capsule, Rfl: 0     chlorthalidone (HYGROTON) 50 MG tablet, Take 50 mg by mouth daily , Disp: , Rfl:      clopidogrel (PLAVIX) 75 MG tablet, TAKE 1 TABLET(75 MG) BY MOUTH DAILY, Disp: 90 tablet, Rfl: 3     empagliflozin (JARDIANCE) 25 MG TABS tablet, Take 25 mg by mouth daily, Disp: , Rfl:      ezetimibe (ZETIA) 10 MG tablet, TAKE 1 TABLET(10 MG) BY MOUTH DAILY, Disp: 90 tablet, Rfl: 3     ferrous sulfate (FEROSUL) 325 (65 Fe) MG tablet,  Take 1 tablet (325 mg) by mouth daily, Disp: 30 tablet, Rfl: 2     folic acid (FOLVITE) 1 MG tablet, Take 1 tablet (1 mg) by mouth daily, Disp: 30 tablet, Rfl: 2     gabapentin (NEURONTIN) 100 MG capsule, Take 100 mg by mouth 2 times daily Pt unsure of dose, Disp: , Rfl:      HYDROcodone-acetaminophen (NORCO) 5-325 MG tablet, Take 1-2 tablets by mouth every 4 hours as needed for moderate to severe pain, Disp: 15 tablet, Rfl: 0     losartan (COZAAR) 100 MG tablet, Take 100 mg by mouth daily , Disp: , Rfl:      metFORMIN (GLUCOPHAGE) 1000 MG tablet, Take 1,000 mg by mouth 2 times daily (with meals) , Disp: , Rfl:      metoprolol succinate ER (TOPROL-XL) 100 MG 24 hr tablet, Take 1 tablet (100 mg) by mouth daily, Disp: 90 tablet, Rfl: 3     ondansetron (ZOFRAN ODT) 4 MG ODT tab, Take 1 tablet (4 mg) by mouth every 8 hours as needed for nausea, Disp: 4 tablet, Rfl: 0     QUEtiapine (SEROQUEL) 25 MG tablet, Take 1 tablet (25 mg) by mouth At Bedtime, Disp: 30 tablet, Rfl: 2     senna-docusate (SENOKOT-S/PERICOLACE) 8.6-50 MG tablet, Take 1-2 tablets by mouth 2 times daily, Disp: 30 tablet, Rfl: 0    SOCIAL HABITS:    History   Smoking Status     Former Smoker     Packs/day: 0.25     Types: Cigarettes     Quit date: 3/1/2016   Smokeless Tobacco     Never Used     Comment: 2-3 cigarettes per day     Social History    Substance and Sexual Activity      Alcohol use: Yes        Comment: 2.5 glasses of wine at dinner      History   Drug Use Unknown       FAMILY HISTORY:    Family History   Problem Relation Age of Onset     Diabetes Father        PE:  BP (!) 175/74 (BP Location: Left arm, Patient Position: Chair, Cuff Size: Adult Regular)   Pulse 65   Wt Readings from Last 1 Encounters:   06/27/22 129 lb (58.5 kg)     There is no height or weight on file to calculate BMI.          DIAGNOSTIC STUDIES:     Images:  IR Lower Extremity Angiogram Right    Result Date: 6/10/2022  Preprocedure diagnosis: Right lower extremity  peripheral arterial disease with critical limb ischemia. Postprocedure diagnosis: Same. PROCEDURE: 1. Ultrasound-guided left common femoral artery access placement. 2. Aortoiliac arteriogram. 3. Selective catheterization of right common iliac, external iliac, common femoral, superficial femoral, popliteal, posterior tibial and common plantar arteries with right lower extremity arteriogram with runoff. 4. Right posterior tibial artery balloon angioplasty using 2.5 mm x 15 cm angioplasty balloon with completion arteriogram. 5. Right tibioperoneal trunk balloon angioplasty using 3.5 mm x 8 cm angioplasty balloon with completion arteriogram. 6. Left common femoral arteriogram with left common femoral artery access site closure with 6 [place] Angio-Seal device. Surgeon: Surjit Hamlin M.D. Assistant: NOAH Yo. Sedation: Patient received 2 mg of intravenous Versed and 100 mcg of intravenous fentanyl. Medication was administered by registered nurse in the Department of interventional radiology. Patient was continuously monitored. Medication was administered under my direct supervision. Sedation time: 65 minutes. Fluoroscopy time: 20.7 minutes. Fluoroscopy dose: 78.40 mGy. Contrast: 65 mL of Visipaque were used. Local anesthetic: 20 mL of 1% lidocaine was used. Heparin: 6000 units. Protamine: 15 mg. Indication for procedure: This is a 72-year-old male with right lower extremity critical limb ischemia and peripheral arterial disease. He underwent right common femoral endarterectomy and superficial femoral artery recanalization with balloon angioplasty and stenting. Surveillance imaging shows high-grade stenosis in the mid stent of the superficial femoral artery. Arteriogram with intervention for continued improvement in circulation of the foot is advised. Procedure details: Patient was identified and then taken to the radiology suite and placed in supine position. Both groins were prepped and a sterile surgical  "field was created. Preoperative intravenous antibiotics were administered. Both the groins were prepped. Preprocedure timeout was conducted. Left common femoral artery was located anterior to the left femoral head. It was further localized with ultrasonography. Images were stored. Local anesthetic was administered in the left groin and the left common femoral artery was accessed with a micropuncture needle followed by placement of a microwire. This was then converted to a 0.035 inch wire system. Short 5 Syriac sheath was placed. Omni Flush catheter was advanced into the distal aorta. Aortoiliac arteriogram was performed. The entire aortoiliac segment has significant atherosclerotic and calcified disease. None of these were hemodynamically significant. The distal aorta, common iliac arteries, hypogastric arteries and external iliac arteries were patent. Wire and catheter were directed into the right common femoral artery. Arteriogram showed that the distal right external iliac artery, common femoral artery, superficial femoral artery and deep femoral artery were patent. Mild to moderate stenosis just beyond the common femoral artery patch which was similar to the angiographic finding from his recent surgical intervention. Superficial femoral artery stents were patent. Calcific disease in the popliteal artery without severe stenosis. Anterior tibial artery was occluded. Tibioperoneal trunk had short segment 80% stenosis and mid posterior tibial artery had long segment 70% stenosis.  6000 units of heparin were given. We switched over to a 5 Syriac crossover sheath and positioned it in the right superficial femoral artery. Wire was switch to 0.014\" Mail man and catheter to 0.014\" [place]. We got across to the right common plantar artery with wire and catheter. Posterior tibial lesion was treated with a 2.5 mm x 15 cm balloon for 3 minutes and nominal pressure. Completion imaging showed significant improvement without " perforation, dissection or embolization. The tibioperoneal trunk lesion was treated with 3.5 mm x 8 cm balloon and inflated to nominal pressure for 3 minutes. Completion imaging showed less than 30% residual stenosis. We accepted both as good results. 15 mg protamine were given and left common femoral arteriogram was done. Access site was closed with a 6 Turkmen angio seal device. IAN YATES MD   SYSTEM ID:  [id number]    XR Foot Right G/E 3 Views    Result Date: 6/8/2022  RIGHT FOOT THREE OR MORE VIEWS    6/7/2022 4:46 PM HISTORY: Foot pain, right. Ulcer of right foot, with fat layer exposed (H). History of amputation of lesser toe, left (H). PAD (peripheral artery disease) (H). Amputation of left great toe (H). Amputation of right great toe (H). COMPARISON: 4/18/2022 x-ray.     IMPRESSION: Previous amputations of the great toe at the mid aspect of the proximal phalanx. Previous amputation of the fourth and fifth toes. There is now prominent lytic and destructive changes involving the head of the fourth and fifth metatarsal consistent with osteomyelitis. There is an ulcer adjacent to the first MTP joint but no evidence of osteomyelitis in this region. MARY GARCIA MD   SYSTEM ID:  [id number]    XR Foot Port Right 2 Views    Result Date: 6/27/2022  FOOT PORTABLE RIGHT TWO VIEWS DATE/TIME: 6/27/2022 2:59 PM INDICATION: Postoperative COMPARISON: 6/7/2022     IMPRESSION: Interval revision amputation of the right foot now transmetatarsal at the right fourth and fifth rays. Expected postoperative soft tissue gas at the operative bed. Postoperative amputation of the great toe through the midshaft proximal phalanx. Shallow ulcer along the medial aspect of the first MTP joint noted. No acute right foot fracture or dislocation. Joint spaces are unchanged. Heel spur with arterial calcification. Hind foot valgus. Nothing specific for acute osteomyelitis radiographically.  LESLI GRAVES MD   SYSTEM ID:  [id number]    LABS:       Sodium   Date Value Ref Range Status   06/10/2022 139 133 - 144 mmol/L Final   03/08/2022 138 133 - 144 mmol/L Final   03/07/2022 137 133 - 144 mmol/L Final   04/23/2021 138 133 - 144 mmol/L Final   04/22/2021 137 133 - 144 mmol/L Final   04/20/2021 138 133 - 144 mmol/L Final     Urea Nitrogen   Date Value Ref Range Status   06/10/2022 28 7 - 30 mg/dL Final   03/08/2022 27 7 - 30 mg/dL Final   03/07/2022 23 7 - 30 mg/dL Final   04/23/2021 14 7 - 30 mg/dL Final   04/22/2021 15 7 - 30 mg/dL Final   04/20/2021 22 7 - 30 mg/dL Final     Hemoglobin   Date Value Ref Range Status   03/08/2022 8.7 (L) 13.3 - 17.7 g/dL Final   03/07/2022 7.6 (L) 13.3 - 17.7 g/dL Final   03/07/2022 7.6 (L) 13.3 - 17.7 g/dL Final   04/22/2021 10.9 (L) 13.3 - 17.7 g/dL Final   04/20/2021 11.0 (L) 13.3 - 17.7 g/dL Final   04/19/2021 12.2 (L) 13.3 - 17.7 g/dL Final     Platelet Count   Date Value Ref Range Status   03/08/2022 353 150 - 450 10e3/uL Final   03/07/2022 270 150 - 450 10e3/uL Final   03/06/2022 268 150 - 450 10e3/uL Final   04/22/2021 237 150 - 450 10e9/L Final   04/20/2021 239 150 - 450 10e9/L Final   04/19/2021 310 150 - 450 10e9/L Final     INR   Date Value Ref Range Status   02/03/2021 0.95 0.86 - 1.14 Final   07/30/2019 0.90 0.86 - 1.14 Final   03/08/2017 0.87 0.86 - 1.14 Final       30 minutes spent on the day of encounter doing chart review, history and exam, documentation, and further activities as noted.     Nallely Trujillo, NP  VASCULAR SURGERY

## 2022-06-29 NOTE — PROGRESS NOTES
[facility]        Patient is here for a  follow up.     Pt is currently taking Aspirin, Statin and Plavix.    BP (!) 175/74 (BP Location: Left arm, Patient Position: Chair, Cuff Size: Adult Regular)   Pulse 65     The provider has been notified that the patient has no concerns.     Questions patient would like addressed today are: N/A.    Refills are needed: N/A    Has homecare services and agency name:  Codie Cavazos MA

## 2022-07-05 ENCOUNTER — OFFICE VISIT (OUTPATIENT)
Dept: PODIATRY | Facility: CLINIC | Age: 72
End: 2022-07-05
Payer: COMMERCIAL

## 2022-07-05 VITALS — DIASTOLIC BLOOD PRESSURE: 72 MMHG | SYSTOLIC BLOOD PRESSURE: 162 MMHG | WEIGHT: 130 LBS | BODY MASS INDEX: 22.31 KG/M2

## 2022-07-05 DIAGNOSIS — Z98.890 POST-OPERATIVE STATE: Primary | ICD-10-CM

## 2022-07-05 DIAGNOSIS — S98.111A AMPUTATION OF RIGHT GREAT TOE (H): ICD-10-CM

## 2022-07-05 DIAGNOSIS — L97.512 ULCER OF RIGHT FOOT WITH FAT LAYER EXPOSED (H): ICD-10-CM

## 2022-07-05 DIAGNOSIS — S98.112A AMPUTATION OF LEFT GREAT TOE (H): ICD-10-CM

## 2022-07-05 DIAGNOSIS — I73.9 PAD (PERIPHERAL ARTERY DISEASE) (H): ICD-10-CM

## 2022-07-05 DIAGNOSIS — Z89.422 H/O AMPUTATION OF LESSER TOE, LEFT (H): ICD-10-CM

## 2022-07-05 PROCEDURE — 99024 POSTOP FOLLOW-UP VISIT: CPT | Performed by: PODIATRIST

## 2022-07-05 NOTE — PROGRESS NOTES
Podiatry / Foot and Ankle Surgery Progress Note    July 5, 2022    Subject: Patient was seen for 1 week status post ulcer excision and bone excision of the fourth and fifth metatarsals on the right foot.  Patient notes he is doing well.  Pain is very minimal.  Denies fever, nausea, vomiting.  No concerns today.    Objective:  Vitals: BP (!) 162/72   Wt 59 kg (130 lb)   BMI 22.31 kg/m    BMI= Body mass index is 22.31 kg/m .    A1C: 7.2 (3/2022)    General:  Patient is alert and orientated.  NAD.     Vascular:  DP and PT pulses are palpable.  No edema or varicosities noted.  CFT's < 3secs.  Skin temp is normal.     Neuro:  Light and gross touch sensation diminished to feet.     Derm: Dressing to the right foot is clean dry and intact.  Bloody strikethrough noted to the Kerlix roll.  Incision is well coapted.  No redness, dehiscence or signs of acute infection noted.  Sutures intact.  Ulcer to the medial aspect of the right foot appears stable.  Measures approximately 1.2 cm x 1.5 cm x 0.2 cm.  Base of the wound is granular.  No surrounding erythema purulent drainage or malodor noted.       Musculoskeletal:  Right great toe amputation. Right 4th and 5th toe amputations.  Left great toe amputation.     Assessment:     Post-operative state  PAD (peripheral artery disease) (H)  H/O amputation of lesser toe, left (H)  Amputation of right great toe (H)  Amputation of left great toe (H)  Ulcer of right foot with fat layer exposed (H)    Medical Decision Making/Plan: At this time the dressing was changed.  We will keep the foot dressing dry.  We did discuss that he can change the dressing once to twice a week if needed.  He will continue minimal weightbearing in a postop shoe.  We will have him follow-up with me for the next 3 weeks until the stitches are ready to be removed.  All questions were answered to patient satisfaction and call for the questions or concerns.      Patient Risk Factor:  Patient is a high risk factor  for infection.     Purvi Whelan DPM, Podiatry/Foot and Ankle Surgery

## 2022-07-05 NOTE — LETTER
7/5/2022         RE: Zia Hopkins  [address] [place] [address]        Dear Colleague,    Thank you for referring your patient, Zia Hopkins, to the [facility]. Please see a copy of my visit note below.    Podiatry / Foot and Ankle Surgery Progress Note    July 5, 2022    Subject: Patient was seen for 1 week status post ulcer excision and bone excision of the fourth and fifth metatarsals on the right foot.  Patient notes he is doing well.  Pain is very minimal.  Denies fever, nausea, vomiting.  No concerns today.    Objective:  Vitals: BP (!) 162/72   Wt 59 kg (130 lb)   BMI 22.31 kg/m    BMI= Body mass index is 22.31 kg/m .    A1C: 7.2 (3/2022)    General:  Patient is alert and orientated.  NAD.     Vascular:  DP and PT pulses are palpable.  No edema or varicosities noted.  CFT's < 3secs.  Skin temp is normal.     Neuro:  Light and gross touch sensation diminished to feet.     Derm: Dressing to the right foot is clean dry and intact.  Bloody strikethrough noted to the Kerlix roll.  Incision is well coapted.  No redness, dehiscence or signs of acute infection noted.  Sutures intact.  Ulcer to the medial aspect of the right foot appears stable.  Measures approximately 1.2 cm x 1.5 cm x 0.2 cm.  Base of the wound is granular.  No surrounding erythema purulent drainage or malodor noted.       Musculoskeletal:  Right great toe amputation. Right 4th and 5th toe amputations.  Left great toe amputation.     Assessment:     Post-operative state  PAD (peripheral artery disease) (H)  H/O amputation of lesser toe, left (H)  Amputation of right great toe (H)  Amputation of left great toe (H)  Ulcer of right foot with fat layer exposed (H)    Medical Decision Making/Plan: At this time the dressing was changed.  We will keep the foot dressing dry.  We did discuss that he can change the dressing once to twice a week if needed.  He will continue minimal weightbearing in a postop  shoe.  We will have him follow-up with me for the next 3 weeks until the stitches are ready to be removed.  All questions were answered to patient satisfaction and call for the questions or concerns.      Patient Risk Factor:  Patient is a high risk factor for infection.     Purvi Whelan DPM, Podiatry/Foot and Ankle Surgery        Again, thank you for allowing me to participate in the care of your patient.        Sincerely,        Purvi Whelan DPM, Podiatry/Foot and Ankle Surgery

## 2022-07-12 ENCOUNTER — OFFICE VISIT (OUTPATIENT)
Dept: PODIATRY | Facility: CLINIC | Age: 72
End: 2022-07-12
Payer: COMMERCIAL

## 2022-07-12 VITALS — WEIGHT: 130 LBS | DIASTOLIC BLOOD PRESSURE: 60 MMHG | SYSTOLIC BLOOD PRESSURE: 126 MMHG | BODY MASS INDEX: 22.31 KG/M2

## 2022-07-12 DIAGNOSIS — L97.512 ULCER OF RIGHT FOOT WITH FAT LAYER EXPOSED (H): ICD-10-CM

## 2022-07-12 DIAGNOSIS — Z89.422 H/O AMPUTATION OF LESSER TOE, LEFT (H): ICD-10-CM

## 2022-07-12 DIAGNOSIS — I73.9 PAD (PERIPHERAL ARTERY DISEASE) (H): ICD-10-CM

## 2022-07-12 DIAGNOSIS — Z98.890 POST-OPERATIVE STATE: Primary | ICD-10-CM

## 2022-07-12 DIAGNOSIS — S98.111A AMPUTATION OF RIGHT GREAT TOE (H): ICD-10-CM

## 2022-07-12 DIAGNOSIS — S98.112A AMPUTATION OF LEFT GREAT TOE (H): ICD-10-CM

## 2022-07-12 PROCEDURE — 99024 POSTOP FOLLOW-UP VISIT: CPT | Performed by: PODIATRIST

## 2022-07-12 NOTE — LETTER
7/12/2022         RE: Zia Hopkins  [address] [place] [address]        Dear Colleague,    Thank you for referring your patient, Zia Hopkins, to the [facility] PODI[facility]. Please see a copy of my visit note below.    Podiatry / Foot and Ankle Surgery Progress Note    July 12, 2022    Subject: Patient was seen for 2 week status post ulcer excision and bone excision of the fourth and fifth metatarsals on the right foot.  Patient notes he is doing well.  Pain is very minimal.  Denies fever, nausea, vomiting.  No concerns today.    Objective:  Vitals: /60   Wt 59 kg (130 lb)   BMI 22.31 kg/m    BMI= Body mass index is 22.31 kg/m .    A1C: 7.2 (3/2022)     General:  Patient is alert and orientated.  NAD.     Vascular:  DP and PT pulses are palpable.  No edema or varicosities noted.  CFT's < 3secs.  Skin temp is normal.     Neuro:  Light and gross touch sensation diminished to feet.     Derm: Dressing to the right foot is clean dry and intact.  Bloody strikethrough noted to the Kerlix roll.  Incision is well coapted.  No redness, dehiscence or signs of acute infection noted.  Sutures intact.  Ulcer to the medial aspect of the right foot appears stable.  Measures approximately 1.2 cm x 1.2 cm x 0.2 cm.  Base of the wound is granular.  No surrounding erythema purulent drainage or malodor noted.        Musculoskeletal:  Right great toe amputation. Right 4th and 5th toe amputations.  Left great toe amputation.      Assessment:     Post-operative state  PAD (peripheral artery disease) (H)  H/O amputation of lesser toe, left (H)  Amputation of right great toe (H)  Amputation of left great toe (H)  Ulcer of right foot with fat layer exposed (H)     Medical Decision Making/Plan: At this time the dressing was changed.  We will keep the foot dressing dry.  We did discuss that he can change the dressing once to twice a week if needed.  He will continue minimal weightbearing in a  postop shoe.  We will have him follow-up with me for the next 2 weeks until the stitches are ready to be removed.  All questions were answered to patient satisfaction and call for the questions or concerns.        Patient Risk Factor:  Patient is a high risk factor for infection.     Purvi Whelan DPM, Podiatry/Foot and Ankle Surgery        Again, thank you for allowing me to participate in the care of your patient.        Sincerely,        Purvi Whelan DPM, Podiatry/Foot and Ankle Surgery

## 2022-07-12 NOTE — PROGRESS NOTES
Podiatry / Foot and Ankle Surgery Progress Note    July 12, 2022    Subject: Patient was seen for 2 week status post ulcer excision and bone excision of the fourth and fifth metatarsals on the right foot.  Patient notes he is doing well.  Pain is very minimal.  Denies fever, nausea, vomiting.  No concerns today.    Objective:  Vitals: /60   Wt 59 kg (130 lb)   BMI 22.31 kg/m    BMI= Body mass index is 22.31 kg/m .    A1C: 7.2 (3/2022)     General:  Patient is alert and orientated.  NAD.     Vascular:  DP and PT pulses are palpable.  No edema or varicosities noted.  CFT's < 3secs.  Skin temp is normal.     Neuro:  Light and gross touch sensation diminished to feet.     Derm: Dressing to the right foot is clean dry and intact.  Bloody strikethrough noted to the Kerlix roll.  Incision is well coapted.  No redness, dehiscence or signs of acute infection noted.  Sutures intact.  Ulcer to the medial aspect of the right foot appears stable.  Measures approximately 1.2 cm x 1.2 cm x 0.2 cm.  Base of the wound is granular.  No surrounding erythema purulent drainage or malodor noted.        Musculoskeletal:  Right great toe amputation. Right 4th and 5th toe amputations.  Left great toe amputation.      Assessment:     Post-operative state  PAD (peripheral artery disease) (H)  H/O amputation of lesser toe, left (H)  Amputation of right great toe (H)  Amputation of left great toe (H)  Ulcer of right foot with fat layer exposed (H)     Medical Decision Making/Plan: At this time the dressing was changed.  We will keep the foot dressing dry.  We did discuss that he can change the dressing once to twice a week if needed.  He will continue minimal weightbearing in a postop shoe.  We will have him follow-up with me for the next 2 weeks until the stitches are ready to be removed.  All questions were answered to patient satisfaction and call for the questions or concerns.        Patient Risk Factor:  Patient is a high risk  factor for infection.     Purvi Whelan DPM, Podiatry/Foot and Ankle Surgery

## 2022-07-19 ENCOUNTER — OFFICE VISIT (OUTPATIENT)
Dept: PODIATRY | Facility: CLINIC | Age: 72
End: 2022-07-19
Payer: COMMERCIAL

## 2022-07-19 VITALS — WEIGHT: 130 LBS | DIASTOLIC BLOOD PRESSURE: 60 MMHG | SYSTOLIC BLOOD PRESSURE: 124 MMHG | BODY MASS INDEX: 22.31 KG/M2

## 2022-07-19 DIAGNOSIS — S98.112A AMPUTATION OF LEFT GREAT TOE (H): ICD-10-CM

## 2022-07-19 DIAGNOSIS — S98.111A AMPUTATION OF RIGHT GREAT TOE (H): ICD-10-CM

## 2022-07-19 DIAGNOSIS — I73.9 PAD (PERIPHERAL ARTERY DISEASE) (H): ICD-10-CM

## 2022-07-19 DIAGNOSIS — L97.512 ULCER OF TOE OF RIGHT FOOT, WITH FAT LAYER EXPOSED (H): ICD-10-CM

## 2022-07-19 DIAGNOSIS — Z98.890 POST-OPERATIVE STATE: Primary | ICD-10-CM

## 2022-07-19 DIAGNOSIS — Z89.422 H/O AMPUTATION OF LESSER TOE, LEFT (H): ICD-10-CM

## 2022-07-19 PROCEDURE — 99024 POSTOP FOLLOW-UP VISIT: CPT | Performed by: PODIATRIST

## 2022-07-19 NOTE — LETTER
7/19/2022         RE: Zia Hopkins  [address] [place] [address]        Dear Colleague,    Thank you for referring your patient, Zia Hopkins, to the [facility] PODI[facility]. Please see a copy of my visit note below.    Podiatry / Foot and Ankle Surgery Progress Note    July 19, 2022    Subject: Patient was seen for 3 week status post ulcer excision and bone excision of the fourth and fifth metatarsals on the right foot.  Patient notes he is doing well.  Pain is very minimal.  Denies fever, nausea, vomiting.  No concerns today     Objective:  Vitals: /60   Wt 59 kg (130 lb)   BMI 22.31 kg/m    BMI= Body mass index is 22.31 kg/m .    A1C: 7.2 (3/2022)     General:  Patient is alert and orientated.  NAD.     Vascular:  DP and PT pulses are palpable.  No edema or varicosities noted.  CFT's < 3secs.  Skin temp is normal.     Neuro:  Light and gross touch sensation diminished to feet.     Derm: Dressing to the right foot is clean dry and intact.  Bloody strikethrough noted to the Kerlix roll.  Incision is well coapted.  No redness, dehiscence or signs of acute infection noted.  Sutures intact.  Ulcer to the medial aspect of the right foot appears stable.  Measures approximately 1.2 cm x 1.2 cm x 0.2 cm.  Base of the wound is granular.  No surrounding erythema purulent drainage or malodor noted.        Musculoskeletal:  Right great toe amputation. Right 4th and 5th toe amputations.  Left great toe amputation.      Assessment:     Post-operative state  PAD (peripheral artery disease) (H)  H/O amputation of lesser toe, left (H)  Amputation of right great toe (H)  Amputation of left great toe (H)  Ulcer of right foot with fat layer exposed (H)     Medical Decision Making/Plan: At this time the dressing was changed.  We will keep the foot dressing dry.  We did discuss that he can change the dressing once to twice a week if needed.  He will continue minimal weightbearing in a  postop shoe.  We will have him follow-up with me for the next 2 weeks until the stitches are ready to be removed. Discussed they come out at 4-6 weeks from surgery.  He will follow-up with 1 my partners next week for dressing change.  All questions were answered to patient satisfaction and call for the questions or concerns.        Patient Risk Factor:  Patient is a high risk factor for infection.     Purvi Whelan DPM, Podiatry/Foot and Ankle Surgery        Again, thank you for allowing me to participate in the care of your patient.        Sincerely,        Purvi Whelan DPM, Podiatry/Foot and Ankle Surgery

## 2022-07-19 NOTE — PROGRESS NOTES
Podiatry / Foot and Ankle Surgery Progress Note    July 19, 2022    Subject: Patient was seen for 3 week status post ulcer excision and bone excision of the fourth and fifth metatarsals on the right foot.  Patient notes he is doing well.  Pain is very minimal.  Denies fever, nausea, vomiting.  No concerns today     Objective:  Vitals: /60   Wt 59 kg (130 lb)   BMI 22.31 kg/m    BMI= Body mass index is 22.31 kg/m .    A1C: 7.2 (3/2022)     General:  Patient is alert and orientated.  NAD.     Vascular:  DP and PT pulses are palpable.  No edema or varicosities noted.  CFT's < 3secs.  Skin temp is normal.     Neuro:  Light and gross touch sensation diminished to feet.     Derm: Dressing to the right foot is clean dry and intact.  Bloody strikethrough noted to the Kerlix roll.  Incision is well coapted.  No redness, dehiscence or signs of acute infection noted.  Sutures intact.  Ulcer to the medial aspect of the right foot appears stable.  Measures approximately 1.2 cm x 1.2 cm x 0.2 cm.  Base of the wound is granular.  No surrounding erythema purulent drainage or malodor noted.        Musculoskeletal:  Right great toe amputation. Right 4th and 5th toe amputations.  Left great toe amputation.      Assessment:     Post-operative state  PAD (peripheral artery disease) (H)  H/O amputation of lesser toe, left (H)  Amputation of right great toe (H)  Amputation of left great toe (H)  Ulcer of right foot with fat layer exposed (H)     Medical Decision Making/Plan: At this time the dressing was changed.  We will keep the foot dressing dry.  We did discuss that he can change the dressing once to twice a week if needed.  He will continue minimal weightbearing in a postop shoe.  We will have him follow-up with me for the next 2 weeks until the stitches are ready to be removed. Discussed they come out at 4-6 weeks from surgery.  He will follow-up with 1 my partners next week for dressing change.  All questions were answered  to patient satisfaction and call for the questions or concerns.        Patient Risk Factor:  Patient is a high risk factor for infection.     Purvi Whelan DPM, Podiatry/Foot and Ankle Surgery

## 2022-07-28 ENCOUNTER — OFFICE VISIT (OUTPATIENT)
Dept: PODIATRY | Facility: CLINIC | Age: 72
End: 2022-07-28
Payer: COMMERCIAL

## 2022-07-28 VITALS
DIASTOLIC BLOOD PRESSURE: 60 MMHG | BODY MASS INDEX: 22.36 KG/M2 | WEIGHT: 131 LBS | HEIGHT: 64 IN | SYSTOLIC BLOOD PRESSURE: 144 MMHG

## 2022-07-28 DIAGNOSIS — I73.9 PAD (PERIPHERAL ARTERY DISEASE) (H): ICD-10-CM

## 2022-07-28 DIAGNOSIS — Z98.890 POST-OPERATIVE STATE: Primary | ICD-10-CM

## 2022-07-28 DIAGNOSIS — L97.512 ULCER OF TOE OF RIGHT FOOT, WITH FAT LAYER EXPOSED (H): ICD-10-CM

## 2022-07-28 PROCEDURE — 99024 POSTOP FOLLOW-UP VISIT: CPT | Performed by: PODIATRIST

## 2022-07-28 PROCEDURE — 11042 DBRDMT SUBQ TIS 1ST 20SQCM/<: CPT | Mod: 58 | Performed by: PODIATRIST

## 2022-07-28 NOTE — PROGRESS NOTES
"ASSESSMENT:  Encounter Diagnoses   Name Primary?     Post-operative state Yes     PAD (peripheral artery disease) (H)      Ulcer of toe of right foot, with fat layer exposed (H)      MEDICAL DECISION MAKING:  The surgical site is stable.  Sutures left intact.  Sterile redress.    Excisional debridement was performed on the left foot ulcer.  No clinical signs of infection.  Redressed    Continue offloading shoe.  Follow-up with Dr. Whelan as scheduled.    Excisional Debridement    The excisional debridement procedure was discussed.  This included the goals of removing non-viable tissue, evaluating the full extent of wound, and promoting wound healing.  JagdishHOLLY Hopkins  provided verba consent.  The \"Time Out\" was called, confirming procedure and location.     Using a sterile #15 blade and tissue nippers, excisional debridment of the right foot ulcer was performed. The hyperkeratotic eschar surrounding the wound was removed, by excising skin edges back to healthy, bleeding tissue. Non-viable tissue was excised.  Debridement was carried out to the depth of the fat layer. The ulcer base was scraped to remove bioburden and promote healing.  The area debrided was less than 20 square cm.   There was moderate bleeding with the procedure. No anesthesia was needed due to peripheral neuropathy.   A sterile dressing was applied.      Disclaimer: This note consists of symbols derived from keyboarding, dictation and/or voice recognition software. As a result, there may be errors in the script that have gone undetected. Please consider this when interpreting information found in this chart.    Emre Mauricio DPM, FACFAS, MS    [place] [facility] of Podiatry/Foot & Ankle Surgery      ____________________________________________________________________    HPI:       Zia presents today status post right foot surgery on 6/27/2022.  Dr. Whelan did the surgery.  I am seeing him today, as she is out of the office.  Zia has no " complaints.  He is ambulating in a DH 2 shoe.  We will put that in the month    POSTOPERATIVE DIAGNOSES:     1.  Diabetic with neuropathy.  2.  Ulceration, right foot.  3.  Osteomyelitis, right foot.  4.  Peripheral arterial disease.  5.  Lesser right toe amputations.     PROCEDURE:    1.  Ulcer excision with flap closure.  2.  Bone excision, right fourth and fifth metatarsal bones.  *  Past Medical History:   Diagnosis Date     Diabetes mellitus type 2, noninsulin dependent (H)      Hyperlipidemia LDL goal <70      Hypertension      PAD (peripheral artery disease) (H)      Psoriasis    *  *  Past Surgical History:   Procedure Laterality Date     AMPUTATE FOOT Left 3/15/2017    Procedure: AMPUTATE FOOT;  Surgeon: Emre Mauricio DPM;  Location: [facility] OR     AMPUTATE TOE(S) Left 4/20/2021    Procedure: LEFT GREAT TOE AMPUTATION.;  Surgeon: Purvi Whelan DPM, Podiatry/Foot and Ankle Surgery;  Location: [facility] OR     AMPUTATE TOE(S) Right 4/18/2022    Procedure: 1.  Partial right great toe amputation. 2.  Right foot fourth toe amputation at the metatarsophalangeal joint. 3.  Right fifth metatarsal amputation at the metatarsophalangeal joint.;  Surgeon: Purvi Whelan DPM, Podiatry/Foot and Ankle Surgery;  Location: [facility] OR     BIOPSY BONE FOOT Right 6/27/2022    Procedure: Partial right fourth and fifth metatarsal bone excision;  Surgeon: Purvi Whelan DPM, Podiatry/Foot and Ankle Surgery;  Location: [facility] OR     ENDARTERECTOMY FEMORAL Right 3/4/2022    Procedure: RIGHT FEMORAL ENDARTERECTOMY, RIGHT FEMORAL ARTERY BALLOON ANGIOPLASTY WITH STENTING;  Surgeon: Surjit Hamlin MD;  Location: [facility] OR     IR LOWER EXTREMITY ANGIOGRAM LEFT  7/30/2019     IR LOWER EXTREMITY ANGIOGRAM LEFT  2/3/2021     IR LOWER EXTREMITY ANGIOGRAM LEFT  4/21/2021     IR LOWER EXTREMITY ANGIOGRAM RIGHT  3/1/2022     IR LOWER EXTREMITY ANGIOGRAM RIGHT  6/10/2022     IR OR ANGIOGRAM  3/4/2022     IRRIGATION AND DEBRIDEMENT FOOT, COMBINED Left  "3/15/2017    Procedure: COMBINED IRRIGATION AND DEBRIDEMENT FOOT;  Surgeon: Emre Mauricio DPM;  Location:  OR   *    EXAM:    Vitals: BP (!) 144/60   Ht 1.626 m (5' 4\")   Wt 59.4 kg (131 lb)   BMI 22.49 kg/m    BMI: Body mass index is 22.49 kg/m .    Constitutional:  Zia Hopkins is in no apparent distress, appears well-nourished.  Cooperative with history and physical exam.    Derm:   1) the right foot surgical incision remains coapted.  Sutures are intact.  No maceration.  No ischemia.  No concerning erythema.    2) ulceration medial right first metatarsal head.  Depth to the fat layer.  Healthy bleeding with debridement.              "

## 2022-07-28 NOTE — LETTER
"    7/28/2022         RE: Zia Hopkins  [address] [place] [address]        Dear Colleague,    Thank you for referring your patient, Zia Hopkins, to the [facility] PODIATRY. Please see a copy of my visit note below.    ASSESSMENT:  Encounter Diagnoses   Name Primary?     Post-operative state Yes     PAD (peripheral artery disease) (H)      Ulcer of toe of right foot, with fat layer exposed (H)      MEDICAL DECISION MAKING:  The surgical site is stable.  Sutures left intact.  Sterile redress.    Excisional debridement was performed on the left foot ulcer.  No clinical signs of infection.  Redressed    Continue offloading shoe.  Follow-up with Dr. Whelan as scheduled.    Excisional Debridement    The excisional debridement procedure was discussed.  This included the goals of removing non-viable tissue, evaluating the full extent of wound, and promoting wound healing.  Zia Hopkins  provided verba consent.  The \"Time Out\" was called, confirming procedure and location.     Using a sterile #15 blade and tissue nippers, excisional debridment of the right foot ulcer was performed. The hyperkeratotic eschar surrounding the wound was removed, by excising skin edges back to healthy, bleeding tissue. Non-viable tissue was excised.  Debridement was carried out to the depth of the fat layer. The ulcer base was scraped to remove bioburden and promote healing.  The area debrided was less than 20 square cm.   There was moderate bleeding with the procedure. No anesthesia was needed due to peripheral neuropathy.   A sterile dressing was applied.      Disclaimer: This note consists of symbols derived from keyboarding, dictation and/or voice recognition software. As a result, there may be errors in the script that have gone undetected. Please consider this when interpreting information found in this chart.    Emre Mauricio DPM, FACFAS, MS    [place] Department of Podiatry/Foot & Ankle " Surgery      ____________________________________________________________________    HPI:       Zia presents today status post right foot surgery on 6/27/2022.  Dr. Whelan did the surgery.  I am seeing him today, as she is out of the office.  Zia has no complaints.  He is ambulating in a DH 2 shoe.  We will put that in the month    POSTOPERATIVE DIAGNOSES:     1.  Diabetic with neuropathy.  2.  Ulceration, right foot.  3.  Osteomyelitis, right foot.  4.  Peripheral arterial disease.  5.  Lesser right toe amputations.     PROCEDURE:    1.  Ulcer excision with flap closure.  2.  Bone excision, right fourth and fifth metatarsal bones.  *  Past Medical History:   Diagnosis Date     Diabetes mellitus type 2, noninsulin dependent (H)      Hyperlipidemia LDL goal <70      Hypertension      PAD (peripheral artery disease) (H)      Psoriasis    *  *  Past Surgical History:   Procedure Laterality Date     AMPUTATE FOOT Left 3/15/2017    Procedure: AMPUTATE FOOT;  Surgeon: Emre Mauricio DPM;  Location: [facility] OR     AMPUTATE TOE(S) Left 4/20/2021    Procedure: LEFT GREAT TOE AMPUTATION.;  Surgeon: Purvi Whelan DPM, Podiatry/Foot and Ankle Surgery;  Location: [facility] OR     AMPUTATE TOE(S) Right 4/18/2022    Procedure: 1.  Partial right great toe amputation. 2.  Right foot fourth toe amputation at the metatarsophalangeal joint. 3.  Right fifth metatarsal amputation at the metatarsophalangeal joint.;  Surgeon: Purvi Whelan DPM, Podiatry/Foot and Ankle Surgery;  Location: [facility] OR     BIOPSY BONE FOOT Right 6/27/2022    Procedure: Partial right fourth and fifth metatarsal bone excision;  Surgeon: Purvi Whelan DPM, Podiatry/Foot and Ankle Surgery;  Location: [facility] OR     ENDARTERECTOMY FEMORAL Right 3/4/2022    Procedure: RIGHT FEMORAL ENDARTERECTOMY, RIGHT FEMORAL ARTERY BALLOON ANGIOPLASTY WITH STENTING;  Surgeon: Surjit Hamlin MD;  Location: [facility] OR     IR LOWER EXTREMITY ANGIOGRAM LEFT  7/30/2019     IR LOWER  "EXTREMITY ANGIOGRAM LEFT  2/3/2021     IR LOWER EXTREMITY ANGIOGRAM LEFT  4/21/2021     IR LOWER EXTREMITY ANGIOGRAM RIGHT  3/1/2022     IR LOWER EXTREMITY ANGIOGRAM RIGHT  6/10/2022     IR OR ANGIOGRAM  3/4/2022     IRRIGATION AND DEBRIDEMENT FOOT, COMBINED Left 3/15/2017    Procedure: COMBINED IRRIGATION AND DEBRIDEMENT FOOT;  Surgeon: Emre Mauricio DPM;  Location: [facility] OR   *    EXAM:    Vitals: BP (!) 144/60   Ht 1.626 m (5' 4\")   Wt 59.4 kg (131 lb)   BMI 22.49 kg/m    BMI: Body mass index is 22.49 kg/m .    Constitutional:  Zia Hopkins is in no apparent distress, appears well-nourished.  Cooperative with history and physical exam.    Derm:   1) the right foot surgical incision remains coapted.  Sutures are intact.  No maceration.  No ischemia.  No concerning erythema.    2) ulceration medial right first metatarsal head.  Depth to the fat layer.  Healthy bleeding with debridement.                  Again, thank you for allowing me to participate in the care of your patient.        Sincerely,        Emre Mauricio DPM    "

## 2022-08-01 ENCOUNTER — OFFICE VISIT (OUTPATIENT)
Dept: OTHER | Facility: CLINIC | Age: 72
End: 2022-08-01
Payer: COMMERCIAL

## 2022-08-01 ENCOUNTER — HOSPITAL ENCOUNTER (OUTPATIENT)
Dept: ULTRASOUND IMAGING | Facility: CLINIC | Age: 72
Discharge: HOME | End: 2022-08-01
Payer: COMMERCIAL

## 2022-08-01 VITALS — HEART RATE: 65 BPM | DIASTOLIC BLOOD PRESSURE: 63 MMHG | SYSTOLIC BLOOD PRESSURE: 157 MMHG

## 2022-08-01 DIAGNOSIS — I70.229 CRITICAL ISCHEMIA OF EXTREMITY WITH HISTORY OF REVASCULARIZATION OF SAME EXTREMITY (H): Primary | ICD-10-CM

## 2022-08-01 DIAGNOSIS — I73.9 PAD (PERIPHERAL ARTERY DISEASE) (H): ICD-10-CM

## 2022-08-01 DIAGNOSIS — Z98.890 CRITICAL ISCHEMIA OF EXTREMITY WITH HISTORY OF REVASCULARIZATION OF SAME EXTREMITY (H): Primary | ICD-10-CM

## 2022-08-01 PROCEDURE — 93926 LOWER EXTREMITY STUDY: CPT | Mod: RT

## 2022-08-01 PROCEDURE — 93926 LOWER EXTREMITY STUDY: CPT | Mod: 26 | Performed by: SURGERY

## 2022-08-01 PROCEDURE — 99213 OFFICE O/P EST LOW 20 MIN: CPT | Performed by: SURGERY

## 2022-08-01 PROCEDURE — G0463 HOSPITAL OUTPT CLINIC VISIT: HCPCS

## 2022-08-01 NOTE — PROGRESS NOTES
[facility]        Patient is here for a  follow up.     Pt is currently taking Statin and Plavix.    BP (!) 157/63 (BP Location: Left arm, Patient Position: Chair, Cuff Size: Adult Regular)   Pulse 65     The provider has been notified that the patient has no concerns.     Questions patient would like addressed today are: N/A.    Refills are needed: N/A    Has homecare services and agency name:  Codie Cavazos MA

## 2022-08-02 DIAGNOSIS — Z09 ENCOUNTER FOR FOLLOW-UP EXAMINATION AFTER COMPLETED TREATMENT FOR CONDITIONS OTHER THAN MALIGNANT NEOPLASM: ICD-10-CM

## 2022-08-02 DIAGNOSIS — Z98.890 CRITICAL LIMB ISCHEMIA WITH HISTORY OF REVASCULARIZATION OF SAME EXTREMITY (H): Primary | ICD-10-CM

## 2022-08-02 DIAGNOSIS — Z98.890 CRITICAL ISCHEMIA OF EXTREMITY WITH HISTORY OF REVASCULARIZATION OF SAME EXTREMITY (H): ICD-10-CM

## 2022-08-02 DIAGNOSIS — I70.229 CRITICAL LIMB ISCHEMIA WITH HISTORY OF REVASCULARIZATION OF SAME EXTREMITY (H): Primary | ICD-10-CM

## 2022-08-02 DIAGNOSIS — I70.229 CRITICAL ISCHEMIA OF EXTREMITY WITH HISTORY OF REVASCULARIZATION OF SAME EXTREMITY (H): ICD-10-CM

## 2022-08-02 NOTE — PROGRESS NOTES
Mr. Hopkins returns to clinic today.  He is a 72-year-old male with bilateral lower extremity peripheral arterial disease.  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  In March of this year he underwent femoral endarterectomy with recanalization and stenting of the superficial femoral artery.  Then he underwent toe amputations.  In June of this year he underwent right lower extremity arteriogram with balloon angioplasty of the tibioperoneal trunk and posterior tibial artery.  Subsequent to that he underwent right fourth and fifth metatarsal bone excision.    That seems to be healing quite well.    Duplex sonography shows patent right lower extremity arteries.    Pending the removal of the sutures in his right foot he is planning on going to [place] for about a month.    We do certainly need to keep a close eye on things and I will see him back in December of this year with right lower extremity arterial duplex.  He has verbalizes understanding.

## 2022-08-03 ENCOUNTER — OFFICE VISIT (OUTPATIENT)
Dept: PODIATRY | Facility: CLINIC | Age: 72
End: 2022-08-03
Payer: COMMERCIAL

## 2022-08-03 VITALS — SYSTOLIC BLOOD PRESSURE: 142 MMHG | BODY MASS INDEX: 22.49 KG/M2 | WEIGHT: 131 LBS | DIASTOLIC BLOOD PRESSURE: 60 MMHG

## 2022-08-03 DIAGNOSIS — I73.9 PAD (PERIPHERAL ARTERY DISEASE) (H): ICD-10-CM

## 2022-08-03 DIAGNOSIS — Z89.422 H/O AMPUTATION OF LESSER TOE, LEFT (H): ICD-10-CM

## 2022-08-03 DIAGNOSIS — Z98.890 POST-OPERATIVE STATE: Primary | ICD-10-CM

## 2022-08-03 DIAGNOSIS — E11.42 DIABETIC POLYNEUROPATHY ASSOCIATED WITH TYPE 2 DIABETES MELLITUS (H): ICD-10-CM

## 2022-08-03 DIAGNOSIS — L97.512 ULCER OF RIGHT FOOT WITH FAT LAYER EXPOSED (H): ICD-10-CM

## 2022-08-03 DIAGNOSIS — S98.111A AMPUTATION OF RIGHT GREAT TOE (H): ICD-10-CM

## 2022-08-03 PROCEDURE — 99024 POSTOP FOLLOW-UP VISIT: CPT | Performed by: PODIATRIST

## 2022-08-03 NOTE — PROGRESS NOTES
Podiatry / Foot and Ankle Surgery Progress Note    August 3, 2022    Subject: Patient was seen for 5 week status post ulcer excision and bone excision of the fourth and fifth metatarsals on the right foot.  Patient notes he is doing well.  Pain is very minimal.  Denies fever, nausea, vomiting.  No concerns today.     Objective:  Vitals: BP (!) 142/60   Wt 59.4 kg (131 lb)   BMI 22.49 kg/m    BMI= Body mass index is 22.49 kg/m .    A1C: 7.2 (3/2022)     General:  Patient is alert and orientated.  NAD.     Vascular:  DP and PT pulses are palpable.  No edema or varicosities noted.  CFT's < 3secs.  Skin temp is normal.     Neuro:  Light and gross touch sensation diminished to feet.     Derm: Dressing to the right foot is clean dry and intact.  Bloody strikethrough noted to the Kerlix roll.  Incision is well coapted.  No redness, dehiscence or signs of acute infection noted.  Sutures intact.  Ulcer to the medial aspect of the right foot appears stable.  Measures approximately 0.9 cm x 0.8cm x 0.2 cm.  Base of the wound is granular.  No surrounding erythema purulent drainage or malodor noted.        Musculoskeletal:  Right great toe amputation. Right 4th and 5th toe amputations.  Left great toe amputation.      Assessment:      Post-operative state  Diabetic polyneuropathy associated with type 2 diabetes mellitus (H)  PAD (peripheral artery disease) (H)  H/O amputation of lesser toe, left (H)  Ulcer of right foot with fat layer exposed (H)  Amputation of right great toe (H)     Medical Decision Making/Plan: At this time, sutures were removed.  We will have him apply iodine and a bandage to the right foot ulcer and incision area.  The incision area for the next week ulcer until it is healed.  He can go back to regular shoes.  He is leaving for [place] at the end of this month so we will have him follow-up when he gets back with us.  He can use a washcloth to wash the foot and dry it well.  He can wear regular socks.  All  questions were answered to patient satisfaction and call for the questions or concerns.        Patient Risk Factor:  Patient is a high risk factor for infection.     Purvi Whelan DPM, Podiatry/Foot and Ankle Surgery

## 2022-08-03 NOTE — LETTER
8/3/2022         RE: Zia Hopkins  [address] [place] [address]        Dear Colleague,    Thank you for referring your patient, Zia Hopkins, to the [facility] PODI[facility]RY. Please see a copy of my visit note below.    Podiatry / Foot and Ankle Surgery Progress Note    August 3, 2022    Subject: Patient was seen for 5 week status post ulcer excision and bone excision of the fourth and fifth metatarsals on the right foot.  Patient notes he is doing well.  Pain is very minimal.  Denies fever, nausea, vomiting.  No concerns today.     Objective:  Vitals: BP (!) 142/60   Wt 59.4 kg (131 lb)   BMI 22.49 kg/m    BMI= Body mass index is 22.49 kg/m .    A1C: 7.2 (3/2022)     General:  Patient is alert and orientated.  NAD.     Vascular:  DP and PT pulses are palpable.  No edema or varicosities noted.  CFT's < 3secs.  Skin temp is normal.     Neuro:  Light and gross touch sensation diminished to feet.     Derm: Dressing to the right foot is clean dry and intact.  Bloody strikethrough noted to the Kerlix roll.  Incision is well coapted.  No redness, dehiscence or signs of acute infection noted.  Sutures intact.  Ulcer to the medial aspect of the right foot appears stable.  Measures approximately 0.9 cm x 0.8cm x 0.2 cm.  Base of the wound is granular.  No surrounding erythema purulent drainage or malodor noted.        Musculoskeletal:  Right great toe amputation. Right 4th and 5th toe amputations.  Left great toe amputation.      Assessment:      Post-operative state  Diabetic polyneuropathy associated with type 2 diabetes mellitus (H)  PAD (peripheral artery disease) (H)  H/O amputation of lesser toe, left (H)  Ulcer of right foot with fat layer exposed (H)  Amputation of right great toe (H)     Medical Decision Making/Plan: At this time, sutures were removed.  We will have him apply iodine and a bandage to the right foot ulcer and incision area.  The incision area for the next week  ulcer until it is healed.  He can go back to regular shoes.  He is leaving for [place] at the end of this month so we will have him follow-up when he gets back with us.  He can use a washcloth to wash the foot and dry it well.  He can wear regular socks.  All questions were answered to patient satisfaction and call for the questions or concerns.        Patient Risk Factor:  Patient is a high risk factor for infection.     Purvi Whelan DPM, Podiatry/Foot and Ankle Surgery        Again, thank you for allowing me to participate in the care of your patient.        Sincerely,        Purvi Whelan DPM, Podiatry/Foot and Ankle Surgery

## 2022-08-16 ENCOUNTER — TELEPHONE (OUTPATIENT)
Dept: PODIATRY | Facility: CLINIC | Age: 72
End: 2022-08-16

## 2022-08-16 DIAGNOSIS — E11.42 DIABETIC POLYNEUROPATHY ASSOCIATED WITH TYPE 2 DIABETES MELLITUS (H): Primary | ICD-10-CM

## 2022-08-16 DIAGNOSIS — L03.115 CELLULITIS OF RIGHT FOOT: ICD-10-CM

## 2022-08-16 RX ORDER — CEPHALEXIN 500 MG/1
500 CAPSULE ORAL 2 TIMES DAILY
Qty: 28 CAPSULE | Refills: 0 | Status: SHIPPED | OUTPATIENT
Start: 2022-08-16 | End: 2022-08-30

## 2022-08-16 NOTE — TELEPHONE ENCOUNTER
Will put in order for antibiotics. If he develops worsening symptoms, recommend going to the hospital.      Sent prescription to [facility] in [place].    Please let patient know.       Purvi Whelan DPM

## 2022-08-16 NOTE — TELEPHONE ENCOUNTER
Routing to provider. Also mycharted patient for additional information on systemic symptoms and appearance of foot. Jennifer Vasquez, ATC

## 2022-08-16 NOTE — TELEPHONE ENCOUNTER
[facility] Call Center    Phone Message    May a detailed message be left on voicemail: yes     Reason for Call: Other: Pt thinks he has infection after the stitches have been removed and asking for refill on antibiotics he is traveling to [place] in 5 days      Action Taken: Other: ortho -[facility]    Travel Screening: Not Applicable

## 2022-08-16 NOTE — TELEPHONE ENCOUNTER
LVM for patient confirming that his prescription for antibiotics has been sent to [facility] in [place] and that if his symptoms worsen, to go to the hospital. Jennifer Vasquez, ATC

## 2022-09-22 ENCOUNTER — OFFICE VISIT (OUTPATIENT)
Dept: PODIATRY | Facility: CLINIC | Age: 72
End: 2022-09-22
Payer: COMMERCIAL

## 2022-09-22 ENCOUNTER — TELEPHONE (OUTPATIENT)
Dept: PODIATRY | Facility: CLINIC | Age: 72
End: 2022-09-22

## 2022-09-22 VITALS — WEIGHT: 131 LBS | SYSTOLIC BLOOD PRESSURE: 132 MMHG | BODY MASS INDEX: 22.49 KG/M2 | DIASTOLIC BLOOD PRESSURE: 62 MMHG

## 2022-09-22 DIAGNOSIS — I73.9 PAD (PERIPHERAL ARTERY DISEASE) (H): ICD-10-CM

## 2022-09-22 DIAGNOSIS — E11.621 DIABETIC ULCER OF OTHER PART OF RIGHT FOOT ASSOCIATED WITH TYPE 2 DIABETES MELLITUS, WITH FAT LAYER EXPOSED (H): ICD-10-CM

## 2022-09-22 DIAGNOSIS — L97.512 DIABETIC ULCER OF OTHER PART OF RIGHT FOOT ASSOCIATED WITH TYPE 2 DIABETES MELLITUS, WITH FAT LAYER EXPOSED (H): ICD-10-CM

## 2022-09-22 DIAGNOSIS — E11.42 DIABETIC POLYNEUROPATHY ASSOCIATED WITH TYPE 2 DIABETES MELLITUS (H): Primary | ICD-10-CM

## 2022-09-22 DIAGNOSIS — I96 DRY GANGRENE (H): ICD-10-CM

## 2022-09-22 PROCEDURE — 99024 POSTOP FOLLOW-UP VISIT: CPT | Performed by: PODIATRIST

## 2022-09-22 RX ORDER — SULFAMETHOXAZOLE/TRIMETHOPRIM 800-160 MG
1 TABLET ORAL 2 TIMES DAILY
Qty: 28 TABLET | Refills: 0 | Status: SHIPPED | OUTPATIENT
Start: 2022-09-22 | End: 2022-10-06

## 2022-09-22 NOTE — PROGRESS NOTES
Podiatry / Foot and Ankle Surgery Progress Note    September 22, 2022    Subject: Patient was seen for concern for his right third toe.  He was currently in [place] and came home early because he thought that the foot was not looking good.  He has been putting iodine and gauze over the right foot daily.  Currently denies fever, nausea, vomiting.  Does have some pain to the right foot.    Objective:  Vitals: /62   Wt 59.4 kg (131 lb)   BMI 22.49 kg/m    BMI= Body mass index is 22.49 kg/m .    A1C: 7.2 (3/2022)     General:  Patient is alert and orientated.  NAD.     Vascular:  DP and PT pulses are palpable.  No edema or varicosities noted.  CFT's < 3secs.  Skin temp is normal.     Neuro:  Light and gross touch sensation diminished to feet.     Derm: Right third toe is now black.  Ulceration to the plantar aspect of the right third toe.  Ulceration to the distal aspect of the right second toe.  This measures approximately 0.4 cm x 0.4 cm x 0.3 cm.  Does probe to bone.  Currently no redness or purulent drainage noted.  Heavy amounts of clear serous drainage is noted.    Musculoskeletal:  Right great toe amputation. Right 4th and 5th toe amputations.  Left great toe amputation.     Radiographs: right foot xray -previous partial right fourth and fifth ray amputations.  Partial right great toe amputation.  No gas noted in the tissues.  Do not see evidence of acute osteomyelitis at this time's.     Assessment:      Post-operative state  Diabetic polyneuropathy associated with type 2 diabetes mellitus (H)  PAD (peripheral artery disease) (H)  H/O amputation of lesser toe, left (H)  Ulcer of right foot with fat layer exposed (H)  Amputation of right great toe (H)     Medical Decision Making/Plan: At this time we will get x-rays on the way out.  Discussed that given the black toe and the ulcer to the second toe I recommend amputation of both.  We discussed that he may need to do hyperbaric oxygen again to help with  healing.  Discussed that this would be a same-day surgery unless he develops fevers chills or nausea then we want him to go to the hospital.  We will start him on Bactrim today.  We will also get him in touch with his vascular doctor given the ischemic nature of the right third toe.  He will continue to do daily dressing changes with iodine.  All questions were answered to patient satisfaction and call for the questions or concerns.        Patient Risk Factor:  Patient is a high risk factor for infection.     Purvi Whelan DPM, Podiatry/Foot and Ankle Surgery

## 2022-09-22 NOTE — PATIENT INSTRUCTIONS
Thank you for choosing [facility] Podiatry / Foot & Ankle Surgery!    DR SAVAGE'S CLINIC:  [facility]   [address]   [place] [address]      TRIAGE LINE: [phone]  APPOINTMENTS: [phone]  RADIOLOGY: [phone]  SET UP SURGERY: [phone]  FAX NUMBER: [phone]  BILLING QUESTIONS: [phone]       Follow up: 3 weeks

## 2022-09-22 NOTE — LETTER
9/22/2022         RE: Zia Hopkins  [address] [place] [address]        Dear Colleague,    Thank you for referring your patient, Zia Hopkins, to the [facility] PODI[facility]. Please see a copy of my visit note below.    Podiatry / Foot and Ankle Surgery Progress Note    September 22, 2022    Subject: Patient was seen for concern for his right third toe.  He was currently in [place] and came home early because he thought that the foot was not looking good.  He has been putting iodine and gauze over the right foot daily.  Currently denies fever, nausea, vomiting.  Does have some pain to the right foot.    Objective:  Vitals: /62   Wt 59.4 kg (131 lb)   BMI 22.49 kg/m    BMI= Body mass index is 22.49 kg/m .    A1C: 7.2 (3/2022)     General:  Patient is alert and orientated.  NAD.     Vascular:  DP and PT pulses are palpable.  No edema or varicosities noted.  CFT's < 3secs.  Skin temp is normal.     Neuro:  Light and gross touch sensation diminished to feet.     Derm: Right third toe is now black.  Ulceration to the plantar aspect of the right third toe.  Ulceration to the distal aspect of the right second toe.  This measures approximately 0.4 cm x 0.4 cm x 0.3 cm.  Does probe to bone.  Currently no redness or purulent drainage noted.  Heavy amounts of clear serous drainage is noted.    Musculoskeletal:  Right great toe amputation. Right 4th and 5th toe amputations.  Left great toe amputation.      Assessment:      Post-operative state  Diabetic polyneuropathy associated with type 2 diabetes mellitus (H)  PAD (peripheral artery disease) (H)  H/O amputation of lesser toe, left (H)  Ulcer of right foot with fat layer exposed (H)  Amputation of right great toe (H)     Medical Decision Making/Plan: At this time we will get x-rays on the way out.  Discussed that given the black toe and the ulcer to the second toe I recommend amputation of both.  We discussed that he may need to do  hyperbaric oxygen again to help with healing.  Discussed that this would be a same-day surgery unless he develops fevers chills or nausea then we want him to go to the hospital.  We will start him on Bactrim today.  We will also get him in touch with his vascular doctor given the ischemic nature of the right third toe.  He will continue to do daily dressing changes with iodine.  All questions were answered to patient satisfaction and call for the questions or concerns.        Patient Risk Factor:  Patient is a high risk factor for infection.     Puvri Whelan DPM, Podiatry/Foot and Ankle Surgery               Again, thank you for allowing me to participate in the care of your patient.        Sincerely,        Purvi Whelan DPM, Podiatry/Foot and Ankle Surgery

## 2022-09-22 NOTE — Clinical Note
EMERSON Mcallister,  I saw Zia today. He has a black right 3rd toe and wound to the 2nd toe. Needs amputation. I 'm going to try to do this outpatient but he should probably see you soon here for a bloodflow check.  Hope you have a great day :)  Gena

## 2022-09-22 NOTE — TELEPHONE ENCOUNTER
Scheduled surgery    Type of surgery: right 2nd and 3rd toe ampuations  Location of surgery: [facility]  Date and time of surgery: 9/29/22  Surgeon: Tip  Pre-Op Appt Date: patient will schedule  Post-Op Appt Date: 10/5/22   Packet sent out: Yes  Pre-cert/Authorization completed:  No  Date: 9/22/22      Marques Bee, Surgery Scheduler

## 2022-09-27 ENCOUNTER — TELEPHONE (OUTPATIENT)
Dept: PODIATRY | Facility: CLINIC | Age: 72
End: 2022-09-27

## 2022-09-27 DIAGNOSIS — I73.9 PAD (PERIPHERAL ARTERY DISEASE) (H): ICD-10-CM

## 2022-09-27 DIAGNOSIS — L97.514: ICD-10-CM

## 2022-09-27 DIAGNOSIS — Z89.421 H/O AMPUTATION OF LESSER TOE, RIGHT (H): ICD-10-CM

## 2022-09-27 DIAGNOSIS — E11.42 DIABETIC POLYNEUROPATHY ASSOCIATED WITH TYPE 2 DIABETES MELLITUS (H): Primary | ICD-10-CM

## 2022-09-28 ENCOUNTER — ANESTHESIA EVENT (OUTPATIENT)
Dept: SURGERY | Facility: CLINIC | Age: 72
End: 2022-09-28
Payer: COMMERCIAL

## 2022-09-29 ENCOUNTER — ANESTHESIA (OUTPATIENT)
Dept: SURGERY | Facility: CLINIC | Age: 72
End: 2022-09-29
Payer: COMMERCIAL

## 2022-09-29 ENCOUNTER — HOSPITAL ENCOUNTER (OUTPATIENT)
Facility: CLINIC | Age: 72
Discharge: HOME | End: 2022-09-29
Attending: PODIATRIST | Admitting: PODIATRIST
Payer: COMMERCIAL

## 2022-09-29 ENCOUNTER — APPOINTMENT (OUTPATIENT)
Dept: GENERAL RADIOLOGY | Facility: CLINIC | Age: 72
End: 2022-09-29
Attending: PODIATRIST
Payer: COMMERCIAL

## 2022-09-29 VITALS
DIASTOLIC BLOOD PRESSURE: 52 MMHG | BODY MASS INDEX: 20.92 KG/M2 | TEMPERATURE: 97.1 F | HEIGHT: 64 IN | WEIGHT: 122.5 LBS | OXYGEN SATURATION: 94 % | HEART RATE: 62 BPM | SYSTOLIC BLOOD PRESSURE: 117 MMHG | RESPIRATION RATE: 16 BRPM

## 2022-09-29 DIAGNOSIS — I70.229 CRITICAL ISCHEMIA OF LOWER EXTREMITY (H): Primary | ICD-10-CM

## 2022-09-29 DIAGNOSIS — I96 GANGRENE OF TOE (H): ICD-10-CM

## 2022-09-29 DIAGNOSIS — I73.9 PAD (PERIPHERAL ARTERY DISEASE) (H): ICD-10-CM

## 2022-09-29 DIAGNOSIS — E11.621 DIABETIC ULCER OF LEFT HEEL ASSOCIATED WITH TYPE 2 DIABETES MELLITUS, WITH FAT LAYER EXPOSED (H): ICD-10-CM

## 2022-09-29 DIAGNOSIS — L97.422 DIABETIC ULCER OF LEFT HEEL ASSOCIATED WITH TYPE 2 DIABETES MELLITUS, WITH FAT LAYER EXPOSED (H): ICD-10-CM

## 2022-09-29 DIAGNOSIS — E11.42 DIABETIC POLYNEUROPATHY ASSOCIATED WITH TYPE 2 DIABETES MELLITUS (H): ICD-10-CM

## 2022-09-29 PROCEDURE — 710N000012 HC RECOVERY PHASE 2, PER MINUTE: Performed by: PODIATRIST

## 2022-09-29 PROCEDURE — 28820 AMPUTATION OF TOE: CPT | Mod: 59 | Performed by: PODIATRIST

## 2022-09-29 PROCEDURE — 272N000001 HC OR GENERAL SUPPLY STERILE: Performed by: PODIATRIST

## 2022-09-29 PROCEDURE — 370N000017 HC ANESTHESIA TECHNICAL FEE, PER MIN: Performed by: PODIATRIST

## 2022-09-29 PROCEDURE — 999N000141 HC STATISTIC PRE-PROCEDURE NURSING ASSESSMENT: Performed by: PODIATRIST

## 2022-09-29 PROCEDURE — 250N000009 HC RX 250: Performed by: PODIATRIST

## 2022-09-29 PROCEDURE — 710N000009 HC RECOVERY PHASE 1, LEVEL 1, PER MIN: Performed by: PODIATRIST

## 2022-09-29 PROCEDURE — 250N000009 HC RX 250: Performed by: NURSE ANESTHETIST, CERTIFIED REGISTERED

## 2022-09-29 PROCEDURE — 250N000011 HC RX IP 250 OP 636: Performed by: NURSE ANESTHETIST, CERTIFIED REGISTERED

## 2022-09-29 PROCEDURE — 360N000082 HC SURGERY LEVEL 2 W/ FLUORO, PER MIN: Performed by: PODIATRIST

## 2022-09-29 PROCEDURE — 88300 SURGICAL PATH GROSS: CPT | Mod: TC | Performed by: PODIATRIST

## 2022-09-29 PROCEDURE — 999N000063 XR FOOT PORT RIGHT 2 VIEWS: Mod: RT

## 2022-09-29 PROCEDURE — 271N000001 HC OR GENERAL SUPPLY NON-STERILE: Performed by: PODIATRIST

## 2022-09-29 PROCEDURE — 258N000003 HC RX IP 258 OP 636: Performed by: NURSE ANESTHETIST, CERTIFIED REGISTERED

## 2022-09-29 PROCEDURE — 250N000011 HC RX IP 250 OP 636: Performed by: PODIATRIST

## 2022-09-29 RX ORDER — EPHEDRINE SULFATE 50 MG/ML
INJECTION, SOLUTION INTRAMUSCULAR; INTRAVENOUS; SUBCUTANEOUS PRN
Status: DISCONTINUED | OUTPATIENT
Start: 2022-09-29 | End: 2022-09-29

## 2022-09-29 RX ORDER — ONDANSETRON 2 MG/ML
INJECTION INTRAMUSCULAR; INTRAVENOUS PRN
Status: DISCONTINUED | OUTPATIENT
Start: 2022-09-29 | End: 2022-09-29

## 2022-09-29 RX ORDER — BUPIVACAINE HYDROCHLORIDE 5 MG/ML
INJECTION, SOLUTION EPIDURAL; INTRACAUDAL PRN
Status: DISCONTINUED | OUTPATIENT
Start: 2022-09-29 | End: 2022-09-29 | Stop reason: HOSPADM

## 2022-09-29 RX ORDER — SODIUM CHLORIDE, SODIUM LACTATE, POTASSIUM CHLORIDE, CALCIUM CHLORIDE 600; 310; 30; 20 MG/100ML; MG/100ML; MG/100ML; MG/100ML
INJECTION, SOLUTION INTRAVENOUS CONTINUOUS
Status: DISCONTINUED | OUTPATIENT
Start: 2022-09-29 | End: 2022-09-29 | Stop reason: HOSPADM

## 2022-09-29 RX ORDER — HALOPERIDOL 5 MG/ML
1 INJECTION INTRAMUSCULAR
Status: DISCONTINUED | OUTPATIENT
Start: 2022-09-29 | End: 2022-09-29 | Stop reason: HOSPADM

## 2022-09-29 RX ORDER — FENTANYL CITRATE 50 UG/ML
25 INJECTION, SOLUTION INTRAMUSCULAR; INTRAVENOUS EVERY 5 MIN PRN
Status: DISCONTINUED | OUTPATIENT
Start: 2022-09-29 | End: 2022-09-29 | Stop reason: HOSPADM

## 2022-09-29 RX ORDER — LIDOCAINE HYDROCHLORIDE 20 MG/ML
INJECTION, SOLUTION INFILTRATION; PERINEURAL PRN
Status: DISCONTINUED | OUTPATIENT
Start: 2022-09-29 | End: 2022-09-29

## 2022-09-29 RX ORDER — AMOXICILLIN 250 MG
1-2 CAPSULE ORAL 2 TIMES DAILY
Qty: 30 TABLET | Refills: 0 | Status: SHIPPED | OUTPATIENT
Start: 2022-09-29 | End: 2022-11-18

## 2022-09-29 RX ORDER — FENTANYL CITRATE 50 UG/ML
25 INJECTION, SOLUTION INTRAMUSCULAR; INTRAVENOUS
Status: DISCONTINUED | OUTPATIENT
Start: 2022-09-29 | End: 2022-09-29 | Stop reason: HOSPADM

## 2022-09-29 RX ORDER — ONDANSETRON 4 MG/1
4 TABLET, ORALLY DISINTEGRATING ORAL EVERY 8 HOURS PRN
Qty: 4 TABLET | Refills: 0 | Status: SHIPPED | OUTPATIENT
Start: 2022-09-29 | End: 2022-11-18

## 2022-09-29 RX ORDER — OXYCODONE HYDROCHLORIDE 5 MG/1
5 TABLET ORAL
Status: DISCONTINUED | OUTPATIENT
Start: 2022-09-29 | End: 2022-09-29 | Stop reason: HOSPADM

## 2022-09-29 RX ORDER — NALOXONE HYDROCHLORIDE 0.4 MG/ML
0.4 INJECTION, SOLUTION INTRAMUSCULAR; INTRAVENOUS; SUBCUTANEOUS
Status: DISCONTINUED | OUTPATIENT
Start: 2022-09-29 | End: 2022-09-29 | Stop reason: HOSPADM

## 2022-09-29 RX ORDER — SULFAMETHOXAZOLE/TRIMETHOPRIM 800-160 MG
1 TABLET ORAL 2 TIMES DAILY
Qty: 20 TABLET | Refills: 0 | Status: SHIPPED | OUTPATIENT
Start: 2022-09-29 | End: 2022-10-09

## 2022-09-29 RX ORDER — OXYCODONE HYDROCHLORIDE 5 MG/1
5-10 TABLET ORAL EVERY 4 HOURS PRN
Qty: 20 TABLET | Refills: 0 | Status: SHIPPED | OUTPATIENT
Start: 2022-09-29 | End: 2022-11-18

## 2022-09-29 RX ORDER — CEFAZOLIN SODIUM/WATER 2 G/20 ML
2 SYRINGE (ML) INTRAVENOUS
Status: COMPLETED | OUTPATIENT
Start: 2022-09-29 | End: 2022-09-29

## 2022-09-29 RX ORDER — ONDANSETRON 4 MG/1
4 TABLET, ORALLY DISINTEGRATING ORAL EVERY 30 MIN PRN
Status: DISCONTINUED | OUTPATIENT
Start: 2022-09-29 | End: 2022-09-29 | Stop reason: HOSPADM

## 2022-09-29 RX ORDER — ONDANSETRON 2 MG/ML
4 INJECTION INTRAMUSCULAR; INTRAVENOUS EVERY 30 MIN PRN
Status: DISCONTINUED | OUTPATIENT
Start: 2022-09-29 | End: 2022-09-29 | Stop reason: HOSPADM

## 2022-09-29 RX ORDER — OXYCODONE HYDROCHLORIDE 5 MG/1
5 TABLET ORAL EVERY 4 HOURS PRN
Status: DISCONTINUED | OUTPATIENT
Start: 2022-09-29 | End: 2022-09-29 | Stop reason: HOSPADM

## 2022-09-29 RX ORDER — NALOXONE HYDROCHLORIDE 0.4 MG/ML
0.2 INJECTION, SOLUTION INTRAMUSCULAR; INTRAVENOUS; SUBCUTANEOUS
Status: DISCONTINUED | OUTPATIENT
Start: 2022-09-29 | End: 2022-09-29 | Stop reason: HOSPADM

## 2022-09-29 RX ORDER — SODIUM CHLORIDE, SODIUM LACTATE, POTASSIUM CHLORIDE, CALCIUM CHLORIDE 600; 310; 30; 20 MG/100ML; MG/100ML; MG/100ML; MG/100ML
INJECTION, SOLUTION INTRAVENOUS CONTINUOUS PRN
Status: DISCONTINUED | OUTPATIENT
Start: 2022-09-29 | End: 2022-09-29

## 2022-09-29 RX ORDER — PROPOFOL 10 MG/ML
INJECTION, EMULSION INTRAVENOUS CONTINUOUS PRN
Status: DISCONTINUED | OUTPATIENT
Start: 2022-09-29 | End: 2022-09-29

## 2022-09-29 RX ORDER — HYDROMORPHONE HCL IN WATER/PF 6 MG/30 ML
0.2 PATIENT CONTROLLED ANALGESIA SYRINGE INTRAVENOUS EVERY 5 MIN PRN
Status: DISCONTINUED | OUTPATIENT
Start: 2022-09-29 | End: 2022-09-29 | Stop reason: HOSPADM

## 2022-09-29 RX ORDER — CEFAZOLIN SODIUM/WATER 2 G/20 ML
2 SYRINGE (ML) INTRAVENOUS SEE ADMIN INSTRUCTIONS
Status: DISCONTINUED | OUTPATIENT
Start: 2022-09-29 | End: 2022-09-29 | Stop reason: HOSPADM

## 2022-09-29 RX ADMIN — Medication 2 G: at 14:30

## 2022-09-29 RX ADMIN — Medication 5 MG: at 14:55

## 2022-09-29 RX ADMIN — LIDOCAINE HYDROCHLORIDE 60 MG: 20 INJECTION, SOLUTION INFILTRATION; PERINEURAL at 14:36

## 2022-09-29 RX ADMIN — SODIUM CHLORIDE, POTASSIUM CHLORIDE, SODIUM LACTATE AND CALCIUM CHLORIDE: 600; 310; 30; 20 INJECTION, SOLUTION INTRAVENOUS at 14:30

## 2022-09-29 RX ADMIN — ONDANSETRON 4 MG: 2 INJECTION INTRAMUSCULAR; INTRAVENOUS at 14:38

## 2022-09-29 RX ADMIN — Medication 5 MG: at 15:05

## 2022-09-29 RX ADMIN — PROPOFOL 75 MCG/KG/MIN: 10 INJECTION, EMULSION INTRAVENOUS at 14:36

## 2022-09-29 ASSESSMENT — ACTIVITIES OF DAILY LIVING (ADL)
ADLS_ACUITY_SCORE: 35
ADLS_ACUITY_SCORE: 37

## 2022-09-29 ASSESSMENT — ENCOUNTER SYMPTOMS
SEIZURES: 0
DYSRHYTHMIAS: 0

## 2022-09-29 ASSESSMENT — LIFESTYLE VARIABLES: TOBACCO_USE: 1

## 2022-09-29 NOTE — OP NOTE
Procedure Date: 09/29/2022    SURGEON:  Purvi Whelan DPM    PREOPERATIVE DIAGNOSES:     1.  Diabetes with neuropathy.  2.  Peripheral arterial disease.  3.  Ischemic right third toe.  4.  Dry gangrene.    POSTOPERATIVE DIAGNOSES:   1.  Diabetes with neuropathy.  2.  Peripheral arterial disease.  3.  Ischemic right third toe.  4.  Dry gangrene.    PROCEDURE:  Right second and third toe amputations at the metatarsophalangeal joint.    ANESTHESIA:  MAC with local.    HEMOSTASIS:  None.    ESTIMATED BLOOD LOSS:  3 mL    SPECIMENS:  Right second and third toe for pathology.    INDICATIONS FOR PROCEDURE:  Mr. Hopkins, is a 72-year-old male that I have been seeing in clinic for wounds to his right foot.  He underwent previously a partial right great toe as well as a right fourth and fifth toe amputation due to his peripheral arterial disease and ischemic ulcers.  He presented to the office last week with a black right third toe.  He was recently in [place] and that is when the black toe developed.  He came back here to get it looked at.  It was discussed with the patient to go in and remove the right third toe; however, this would leave the right second toe out by itself and it has a distal ulcer on the end of it.  My concern is that this would become infected once the third toe was removed and this was taken all the pressure.  Recommend removing both toes.  The patient is in agreement.  Risks, benefits, and complications were discussed with the patient.  No guarantees were made.    DESCRIPTION OF PROCEDURE:  The patient was brought to the operating room and placed on the operating table in supine position.  Anesthesia was administered and local was injected.  The foot was prepped and draped using sterile technique.  Attention was directed to the distal aspect of the right foot.  A fishmouth incision was made around the base of the right second and third toes full thickness down to bone.  The tissue was sharply dissected  off the base of the proximal phalanx of the second and third toes and the toes were disarticulated at the metatarsophalangeal joints.  The wound was flushed with copious amounts of normal saline.  No further purulent material was noted in the wound.  The toes were sent to pathology.  The skin was reapproximated using 3-0 Prolene.  The patient's foot was placed in dry sterile dressing.  The patient tolerated the procedure and anesthesia well and was transferred to recovery with vital signs stable and vascular status intact.  He will be minimal heel weightbearing in a postop boot.      Purvi Whelan DPM        D: 2022   T: 2022   MT: SHELBY    Name:     SKY GAYTANDwayne  MRN:      [id number]        Account:        [id number]   :      1950           Procedure Date: 2022     Document: [id number]

## 2022-09-29 NOTE — OR NURSING
Clarified with Dr. Whelan the order placed for CAM walker, but patient is able to wear his post op shoe home that he brought with him.

## 2022-09-29 NOTE — DISCHARGE INSTRUCTIONS
Same Day Surgery Discharge Instructions for  Sedation and General Anesthesia     It's not unusual to feel dizzy, light-headed or faint for up to 24 hours after surgery or while taking pain medication.  If you have these symptoms: sit for a few minutes before standing and have someone assist you when you get up to walk or use the bathroom.    You should rest and relax for the next 24 hours. We recommend you make arrangements to have an adult stay with you for at least 24 hours after your discharge.  Avoid hazardous and strenuous activity.    DO NOT DRIVE any vehicle or operate mechanical equipment for 24 hours following the end of your surgery.  Even though you may feel normal, your reactions may be affected by the medication you have received.    Do not drink alcoholic beverages for 24 hours following surgery.     Slowly progress to your regular diet as you feel able. It's not unusual to feel nauseated and/or vomit after receiving anesthesia.  If you develop these symptoms, drink clear liquids (apple juice, ginger ale, broth, 7-up, etc. ) until you feel better.  If your nausea and vomiting persists for 24 hours, please notify your surgeon.      All narcotic pain medications, along with inactivity and anesthesia, can cause constipation. Drinking plenty of liquids and increasing fiber intake will help.    For any questions of a medical nature, call your surgeon.    Do not make important decisions for 24 hours.    If you had general anesthesia, you may have a sore throat for a couple of days related to the breathing tube used during surgery.  You may use Cepacol lozenges to help with this discomfort.  If it worsens or if you develop a fever, contact your surgeon.     If you feel your pain is not well managed with the pain medications prescribed by your surgeon, please contact your surgeon's office to let them know so they can address your concerns.

## 2022-09-29 NOTE — BRIEF OP NOTE
[facility]    Brief Operative Note    Pre-operative diagnosis: Diabetic polyneuropathy associated with type 2 diabetes mellitus (H) [E11.42]  PAD (peripheral artery disease) (H) [I73.9]  Dry gangrene (H) [I96]  Diabetic ulcer of other part of right foot associated with type 2 diabetes mellitus, with fat layer exposed (H) [E11.621, L97.512]  Post-operative diagnosis Same as pre-operative diagnosis    Procedure: Procedure(s):  Right second and third toe amputations  Surgeon: Surgeon(s) and Role:     * Purvi Whelan DPM, Podiatry/Foot and Ankle Surgery - Primary  Anesthesia: MAC with Local   Estimated Blood Loss: Minimal    Drains: None  Specimens:   ID Type Source Tests Collected by Time Destination   1 : RIGHT 2ND AND 3RD TOES- GROSS ONLY Tissue Toe, Right SURGICAL PATHOLOGY EXAM Purvi Whelan DPM, Podiatry/Foot and Ankle Surgery 9/29/2022  2:54 PM      Findings:   None.  Complications: None.  Implants: * No implants in log *

## 2022-09-29 NOTE — OR NURSING
Patient Zia Hopkins presented a photo of a negative COVID test result, which was apparently done on 09/27//22 at 5/PM.

## 2022-09-29 NOTE — ANESTHESIA CARE TRANSFER NOTE
Patient: Zia Hopkins    Procedure: Procedure(s):  Right second and third toe amputations       Diagnosis: Diabetic polyneuropathy associated with type 2 diabetes mellitus (H) [E11.42]  PAD (peripheral artery disease) (H) [I73.9]  Dry gangrene (H) [I96]  Diabetic ulcer of other part of right foot associated with type 2 diabetes mellitus, with fat layer exposed (H) [E11.621, L97.512]  Diagnosis Additional Information: No value filed.    Anesthesia Type:   MAC     Note:    Oropharynx: oropharynx clear of all foreign objects  Level of Consciousness: awake  Oxygen Supplementation: face mask    Independent Airway: airway patency satisfactory and stable  Dentition: dentition unchanged  Vital Signs Stable: post-procedure vital signs reviewed and stable  Report to RN Given: handoff report given  Patient transferred to: PACU    Handoff Report: Identifed the Patient, Identified the Reponsible Provider, Reviewed the pertinent medical history, Discussed the surgical course, Reviewed Intra-OP anesthesia mangement and issues during anesthesia, Set expectations for post-procedure period and Allowed opportunity for questions and acknowledgement of understanding      Vitals:  Vitals Value Taken Time   /64 09/29/22 1516   Temp     Pulse 62 09/29/22 1517   Resp 17 09/29/22 1517   SpO2 96 % 09/29/22 1517   Vitals shown include unvalidated device data.    Electronically Signed By: CLAUDINE Mcintosh CRNA  September 29, 2022  3:18 PM

## 2022-09-29 NOTE — ANESTHESIA POSTPROCEDURE EVALUATION
Patient: Zia Hopkins    Procedure: Procedure(s):  Right second and third toe amputations       Anesthesia Type:  MAC    Note:  Disposition: Outpatient   Postop Pain Control: Uneventful            Sign Out: Well controlled pain   PONV: No   Neuro/Psych: Uneventful            Sign Out: Acceptable/Baseline neuro status   Airway/Respiratory: Uneventful            Sign Out: Acceptable/Baseline resp. status   CV/Hemodynamics: Uneventful            Sign Out: Acceptable CV status; No obvious hypovolemia; No obvious fluid overload   Other NRE: NONE   DID A NON-ROUTINE EVENT OCCUR? No           Last vitals:  Vitals Value Taken Time   /53 09/29/22 1600   Temp     Pulse 60 09/29/22 1603   Resp 18 09/29/22 1604   SpO2 97 % 09/29/22 1605   Vitals shown include unvalidated device data.    Electronically Signed By: Kristen Wilburn MD  September 29, 2022  4:56 PM

## 2022-09-29 NOTE — ANESTHESIA PREPROCEDURE EVALUATION
Anesthesia Pre-Procedure Evaluation    Patient: Zia Hopkins   MRN: [id number] : 1950        Procedure : Procedure(s):  Right second and third toe amputations          Past Medical History:   Diagnosis Date     Acute low back pain      Bilateral carotid bruits      Cellulitis of left foot      Central spinal stenosis     and Subarticular     Degenerative lumbar spinal stenosis     multilevel     Diabetes mellitus type 2, noninsulin dependent (H)      Diabetic ulcer of toe (H)      Gangrene of toe (H)      Hyperlipidemia LDL goal <70      Hypertension      Lumbar foraminal stenosis      PAD (peripheral artery disease) (H)      Psoriasis       Past Surgical History:   Procedure Laterality Date     AMPUTATE FOOT Left 3/15/2017    Procedure: AMPUTATE FOOT;  Surgeon: Emre Mauricio DPM;  Location: [facility] OR     AMPUTATE TOE(S) Left 2021    Procedure: LEFT GREAT TOE AMPUTATION.;  Surgeon: Purvi Whelan DPM, Podiatry/Foot and Ankle Surgery;  Location: [facility] OR     AMPUTATE TOE(S) Right 2022    Procedure: 1.  Partial right great toe amputation. 2.  Right foot fourth toe amputation at the metatarsophalangeal joint. 3.  Right fifth metatarsal amputation at the metatarsophalangeal joint.;  Surgeon: Purvi Whelan DPM, Podiatry/Foot and Ankle Surgery;  Location: [facility] OR     BIOPSY BONE FOOT Right 2022    Procedure: Partial right fourth and fifth metatarsal bone excision;  Surgeon: Purvi Whelan DPM, Podiatry/Foot and Ankle Surgery;  Location: [facility] OR     ENDARTERECTOMY FEMORAL Right 3/4/2022    Procedure: RIGHT FEMORAL ENDARTERECTOMY, RIGHT FEMORAL ARTERY BALLOON ANGIOPLASTY WITH STENTING;  Surgeon: Surjit Hamlin MD;  Location: [facility] OR     IR LOWER EXTREMITY ANGIOGRAM LEFT  2019     IR LOWER EXTREMITY ANGIOGRAM LEFT  2/3/2021     IR LOWER EXTREMITY ANGIOGRAM LEFT  2021     IR LOWER EXTREMITY ANGIOGRAM RIGHT  3/1/2022     IR LOWER EXTREMITY ANGIOGRAM RIGHT  6/10/2022     IR OR  ANGIOGRAM  3/4/2022     IRRIGATION AND DEBRIDEMENT FOOT, COMBINED Left 3/15/2017    Procedure: COMBINED IRRIGATION AND DEBRIDEMENT FOOT;  Surgeon: Emre Mauricio DPM;  Location: [facility] OR      No Known Allergies   Social History     Tobacco Use     Smoking status: Former Smoker     Packs/day: 0.25     Types: Cigarettes     Quit date: 3/1/2016     Years since quittin.5     Smokeless tobacco: Never Used     Tobacco comment: 2-3 cigarettes per day   Substance Use Topics     Alcohol use: Yes     Comment: 2.5 glasses of wine at dinner      Wt Readings from Last 1 Encounters:   22 55.6 kg (122 lb 8 oz)        Anesthesia Evaluation   Pt has had prior anesthetic.     No history of anesthetic complications       ROS/MED HX  ENT/Pulmonary:     (+) tobacco use, Past use,  (-) sleep apnea and recent URI   Neurologic:    (-) no seizures, no CVA and no TIA   Cardiovascular:     (+) Dyslipidemia hypertension-Peripheral Vascular Disease----Taking blood thinners  (-) arrhythmias   METS/Exercise Tolerance:     Hematologic:     (+) anemia,     Musculoskeletal:       GI/Hepatic:    (-) GERD and liver disease   Renal/Genitourinary:    (-) renal disease   Endo:     (+) type II DM, Diabetic complications: neuropathy.  (-) obesity   Psychiatric/Substance Use:       Infectious Disease:    (-) Recent Fever   Malignancy:       Other:            Physical Exam    Airway        Mallampati: II   TM distance: > 3 FB   Neck ROM: full   Mouth opening: > 3 cm    Respiratory Devices and Support         Dental  no notable dental history         Cardiovascular          Rhythm and rate: regular and normal     Pulmonary           breath sounds clear to auscultation           OUTSIDE LABS:  CBC:   Lab Results   Component Value Date    WBC 10.5 2022    WBC 9.5 2022    HGB 8.7 (L) 2022    HGB 7.6 (L) 2022    HGB 7.6 (L) 2022    HCT 26.9 (L) 2022    HCT 23.8 (L) 2022     2022      03/07/2022     BMP:   Lab Results   Component Value Date     06/10/2022     03/08/2022    POTASSIUM 4.0 06/10/2022    POTASSIUM 3.6 03/08/2022    CHLORIDE 105 06/10/2022    CHLORIDE 105 03/08/2022    CO2 28 06/10/2022    CO2 26 03/08/2022    BUN 28 06/10/2022    BUN 27 03/08/2022    CR 0.88 06/10/2022    CR 0.88 03/08/2022     (H) 06/27/2022     (H) 06/27/2022     COAGS:   Lab Results   Component Value Date    PTT 32 02/03/2021    INR 0.95 02/03/2021     POC:   Lab Results   Component Value Date     (H) 04/23/2021     HEPATIC: No results found for: ALBUMIN, PROTTOTAL, ALT, AST, GGT, ALKPHOS, BILITOTAL, BILIDIRECT, CAMMIE  OTHER:   Lab Results   Component Value Date    A1C 7.2 (H) 03/04/2022    TIN 8.9 06/10/2022       Anesthesia Plan    ASA Status:  3   NPO Status:  NPO Appropriate    Anesthesia Type: MAC.     - Reason for MAC: straight local not clinically adequate   Induction: Propofol.   Maintenance: TIVA.        Consents    Anesthesia Plan(s) and associated risks, benefits, and realistic alternatives discussed. Questions answered and patient/representative(s) expressed understanding.     - Discussed: Risks, Benefits and Alternatives for the PROCEDURE were discussed     - Discussed with:  Patient         Postoperative Care    Pain management: Multi-modal analgesia.   PONV prophylaxis: Background Propofol Infusion, Ondansetron (or other 5HT-3)     Comments:                Kristen Wilburn MD

## 2022-10-04 PROCEDURE — 88300 SURGICAL PATH GROSS: CPT | Mod: 26 | Performed by: STUDENT IN AN ORGANIZED HEALTH CARE EDUCATION/TRAINING PROGRAM

## 2022-10-05 ENCOUNTER — OFFICE VISIT (OUTPATIENT)
Dept: PODIATRY | Facility: CLINIC | Age: 72
End: 2022-10-05
Payer: COMMERCIAL

## 2022-10-05 DIAGNOSIS — Z98.890 POST-OPERATIVE STATE: ICD-10-CM

## 2022-10-05 DIAGNOSIS — M79.671 RIGHT FOOT PAIN: ICD-10-CM

## 2022-10-05 DIAGNOSIS — I73.9 PAD (PERIPHERAL ARTERY DISEASE) (H): ICD-10-CM

## 2022-10-05 DIAGNOSIS — Z89.421 H/O AMPUTATION OF LESSER TOE, RIGHT (H): ICD-10-CM

## 2022-10-05 DIAGNOSIS — E11.42 DIABETIC POLYNEUROPATHY ASSOCIATED WITH TYPE 2 DIABETES MELLITUS (H): Primary | ICD-10-CM

## 2022-10-05 PROCEDURE — 99213 OFFICE O/P EST LOW 20 MIN: CPT | Performed by: PODIATRIST

## 2022-10-05 NOTE — PROGRESS NOTES
Podiatry / Foot and Ankle Surgery Progress Note    October 5, 2022    Subject: Patient was seen for 1 week status post right second and third toe amputations due to ischemia.  Patient notes that he is having pain to the arch of his foot which is what happened when he had a psoriasis.  Denies fever, nausea, vomiting.    Objective:  Vitals: There were no vitals taken for this visit.  BMI= There is no height or weight on file to calculate BMI.    A1C: 7.2 (3/4/2022)    General:  Patient is alert and orientated.  NAD.    Vascular:  DP and PT pulses are not palpable.  No edema or varicosities noted.  CFT's < 3secs.  Skin temp is normal.    Neuro:  Light and gross touch sensation diminished to feet.    Derm: Clean dry and intact..  Bloody strikethrough noted to Kerlix.  Sutures are intact.  Incision is well approximated.  No streaking redness, dehiscence or signs of acute infection noted.    Musculoskeletal: Right toes 1 through 5 are now amputated.    Assessment:    Diabetic polyneuropathy associated with type 2 diabetes mellitus (H)  Post-operative state  PAD (peripheral artery disease) (H)  H/O amputation of lesser toe, right (H)  Right foot pain      Medical Decision Making/Plan: At this time the dressing was changed.  We will have patient continue to keep the foot dressing dry.  We will have him take ibuprofen for his pain  If he does not want to take narcotic medication setting up .  He is going to contact the hyperbaric oxygen sessions again.  We will have him follow-up weekly for the next 4 weeks for wound assessment and dressing changes.  All questions were answered to patient's satisfaction and he will call for the questions or concerns.      Patient Risk Factor:  Patient is a high risk factor for infection.     Purvi Whelan DPM, Podiatry/Foot and Ankle Surgery

## 2022-10-05 NOTE — LETTER
10/5/2022         RE: Zia Hopkins  [address] [place] [address]        Dear Colleague,    Thank you for referring your patient, Zia Hopkins, to the [facility]ODI[facility]. Please see a copy of my visit note below.    Podiatry / Foot and Ankle Surgery Progress Note    October 5, 2022    Subject: Patient was seen for 1 week status post right second and third toe amputations due to ischemia.  Patient notes that he is having pain to the arch of his foot which is what happened when he had a psoriasis.  Denies fever, nausea, vomiting.    Objective:  Vitals: There were no vitals taken for this visit.  BMI= There is no height or weight on file to calculate BMI.    A1C: 7.2 (3/4/2022)    General:  Patient is alert and orientated.  NAD.    Vascular:  DP and PT pulses are not palpable.  No edema or varicosities noted.  CFT's < 3secs.  Skin temp is normal.    Neuro:  Light and gross touch sensation diminished to feet.    Derm: Clean dry and intact..  Bloody strikethrough noted to Kerlix.  Sutures are intact.  Incision is well approximated.  No streaking redness, dehiscence or signs of acute infection noted.    Musculoskeletal: Right toes 1 through 5 are now amputated.    Assessment:    Diabetic polyneuropathy associated with type 2 diabetes mellitus (H)  Post-operative state  PAD (peripheral artery disease) (H)  H/O amputation of lesser toe, right (H)  Right foot pain      Medical Decision Making/Plan: At this time the dressing was changed.  We will have patient continue to keep the foot dressing dry.  We will have him take ibuprofen for his pain  If he does not want to take narcotic medication setting up .  He is going to contact the hyperbaric oxygen sessions again.  We will have him follow-up weekly for the next 4 weeks for wound assessment and dressing changes.  All questions were answered to patient's satisfaction and he will call for the questions or concerns.      Patient Risk  Factor:  Patient is a high risk factor for infection.     Purvi Whelan DPM, Podiatry/Foot and Ankle Surgery        Again, thank you for allowing me to participate in the care of your patient.        Sincerely,        Purvi Whelan DPM, Podiatry/Foot and Ankle Surgery

## 2022-10-12 ENCOUNTER — OFFICE VISIT (OUTPATIENT)
Dept: PODIATRY | Facility: CLINIC | Age: 72
End: 2022-10-12
Payer: COMMERCIAL

## 2022-10-12 VITALS
BODY MASS INDEX: 20.83 KG/M2 | HEIGHT: 64 IN | SYSTOLIC BLOOD PRESSURE: 138 MMHG | DIASTOLIC BLOOD PRESSURE: 58 MMHG | WEIGHT: 122 LBS

## 2022-10-12 DIAGNOSIS — E11.42 DIABETIC POLYNEUROPATHY ASSOCIATED WITH TYPE 2 DIABETES MELLITUS (H): Primary | ICD-10-CM

## 2022-10-12 DIAGNOSIS — Z89.421 H/O AMPUTATION OF LESSER TOE, RIGHT (H): ICD-10-CM

## 2022-10-12 DIAGNOSIS — S98.111A AMPUTATION OF RIGHT GREAT TOE (H): ICD-10-CM

## 2022-10-12 DIAGNOSIS — M79.671 RIGHT FOOT PAIN: ICD-10-CM

## 2022-10-12 DIAGNOSIS — Z98.890 POST-OPERATIVE STATE: ICD-10-CM

## 2022-10-12 DIAGNOSIS — I73.9 PAD (PERIPHERAL ARTERY DISEASE) (H): ICD-10-CM

## 2022-10-12 PROCEDURE — 99024 POSTOP FOLLOW-UP VISIT: CPT | Performed by: PODIATRIST

## 2022-10-12 NOTE — PROGRESS NOTES
"Podiatry / Foot and Ankle Surgery Progress Note    October 12, 2022    Subject: Patient was seen for 2 week status post right second and third toe amputations due to ischemia.  Patient notes that he is having intermittent pain to  All areas of his foot.  Started hyperbaric oxygen again.   Denies fever, nausea, vomiting.     Objective:  Vitals: /58   Ht 1.626 m (5' 4.02\")   Wt 55.3 kg (122 lb)   BMI 20.93 kg/m      A1C: 7.2 (3/4/2022)     General:  Patient is alert and orientated.  NAD.     Vascular:  DP and PT pulses are not palpable.  No edema or varicosities noted.  CFT's < 3secs.  Skin temp is normal.     Neuro:  Light and gross touch sensation diminished to feet.     Derm: Clean dry and intact..  Bloody strikethrough noted to Kerlix.  Sutures are intact.  Incision is well approximated.  No streaking redness, dehiscence or signs of acute infection noted.     Musculoskeletal: Right toes 1 through 5 are now amputated.     Assessment:    Diabetic polyneuropathy associated with type 2 diabetes mellitus (H)  Post-operative state  PAD (peripheral artery disease) (H)  H/O amputation of lesser toe, right (H)  Right foot pain     Medical Decision Making/Plan: At this time the dressing was changed.  We will have patient continue to keep the foot dressing dry.  We will have him take ibuprofen for his pain  We will have him follow-up weekly for the next 2-3 weeks for wound assessment and dressing changes.  All questions were answered to patient's satisfaction and he will call for the questions or concerns.        Patient Risk Factor:  Patient is a high risk factor for infection.     Purvi Whelan DPM, Podiatry/Foot and Ankle Surgery          "

## 2022-10-12 NOTE — LETTER
"    10/12/2022         RE: Zia Hopkins  [address] [place] [address]        Dear Colleague,    Thank you for referring your patient, Zia Hopkins, to the [facility]. Please see a copy of my visit note below.    Podiatry / Foot and Ankle Surgery Progress Note    October 12, 2022    Subject: Patient was seen for 2 week status post right second and third toe amputations due to ischemia.  Patient notes that he is having intermittent pain to  All areas of his foot.  Started hyperbaric oxygen again.   Denies fever, nausea, vomiting.     Objective:  Vitals: /58   Ht 1.626 m (5' 4.02\")   Wt 55.3 kg (122 lb)   BMI 20.93 kg/m      A1C: 7.2 (3/4/2022)     General:  Patient is alert and orientated.  NAD.     Vascular:  DP and PT pulses are not palpable.  No edema or varicosities noted.  CFT's < 3secs.  Skin temp is normal.     Neuro:  Light and gross touch sensation diminished to feet.     Derm: Clean dry and intact..  Bloody strikethrough noted to Kerlix.  Sutures are intact.  Incision is well approximated.  No streaking redness, dehiscence or signs of acute infection noted.     Musculoskeletal: Right toes 1 through 5 are now amputated.     Assessment:    Diabetic polyneuropathy associated with type 2 diabetes mellitus (H)  Post-operative state  PAD (peripheral artery disease) (H)  H/O amputation of lesser toe, right (H)  Right foot pain     Medical Decision Making/Plan: At this time the dressing was changed.  We will have patient continue to keep the foot dressing dry.  We will have him take ibuprofen for his pain  We will have him follow-up weekly for the next 2-3 weeks for wound assessment and dressing changes.  All questions were answered to patient's satisfaction and he will call for the questions or concerns.        Patient Risk Factor:  Patient is a high risk factor for infection.     Purvi Whelan DPM, Podiatry/Foot and Ankle Surgery              Again, thank " you for allowing me to participate in the care of your patient.        Sincerely,        Purvi Whelan DPM, Podiatry/Foot and Ankle Surgery

## 2022-10-19 ENCOUNTER — OFFICE VISIT (OUTPATIENT)
Dept: PODIATRY | Facility: CLINIC | Age: 72
End: 2022-10-19
Payer: COMMERCIAL

## 2022-10-19 VITALS — BODY MASS INDEX: 20.93 KG/M2 | SYSTOLIC BLOOD PRESSURE: 128 MMHG | WEIGHT: 122 LBS | DIASTOLIC BLOOD PRESSURE: 60 MMHG

## 2022-10-19 DIAGNOSIS — Z89.421 HISTORY OF AMPUTATION OF LESSER TOE, RIGHT (H): ICD-10-CM

## 2022-10-19 DIAGNOSIS — Z98.890 POST-OPERATIVE STATE: ICD-10-CM

## 2022-10-19 DIAGNOSIS — E11.42 DIABETIC POLYNEUROPATHY ASSOCIATED WITH TYPE 2 DIABETES MELLITUS (H): Primary | ICD-10-CM

## 2022-10-19 DIAGNOSIS — I73.9 PAD (PERIPHERAL ARTERY DISEASE) (H): ICD-10-CM

## 2022-10-19 DIAGNOSIS — M79.671 RIGHT FOOT PAIN: ICD-10-CM

## 2022-10-19 PROCEDURE — 99024 POSTOP FOLLOW-UP VISIT: CPT | Performed by: PODIATRIST

## 2022-10-19 NOTE — PROGRESS NOTES
Podiatry / Foot and Ankle Surgery Progress Note    October 19, 2022    Subject: Patient was seen for 3-week status post right second and third toe amputations.  Denies fever, nausea, vomiting.  Notes the pain has improved.  Notes that he is done with his hyperbaric oxygen rounds.  No other concerns today.    Objective:  Vitals: /60   Wt 55.3 kg (122 lb)   BMI 20.93 kg/m    BMI= Body mass index is 20.93 kg/m .    A1C: 7.2 (3/4/2022)     General:  Patient is alert and orientated.  NAD.     Vascular:  DP and PT pulses are not palpable.  No edema or varicosities noted.  CFT's < 3secs.  Skin temp is normal.     Neuro:  Light and gross touch sensation diminished to feet.     Derm: Clean dry and intact..  Bloody strikethrough noted to Kerlix.  Sutures are intact.  Incision is well approximated.  No streaking redness, dehiscence or signs of acute infection noted.     Musculoskeletal: Right toes 1 through 5 are now amputated.     Assessment:    Diabetic polyneuropathy associated with type 2 diabetes mellitus (H)  Post-operative state  PAD (peripheral artery disease) (H)  H/O amputation of lesser toe, right (H)  Right foot pain     Medical Decision Making/Plan: At this time the dressing was changed.  We will have patient continue to keep the foot dressing dry.  We will have him take ibuprofen for his pain  We will have him follow-up weekly for the next 1-2weeks for wound assessment and dressing changes.  All questions were answered to patient's satisfaction and he will call for the questions or concerns.        Patient Risk Factor:  Patient is a high risk factor for infection.     Purvi Whelan DPM, Podiatry/Foot and Ankle Surgery

## 2022-10-19 NOTE — LETTER
10/19/2022         RE: Zia Hopkins  [address] [place] [address]        Dear Colleague,    Thank you for referring your patient, Zia Hopkins, to the [facility]OD[facility]. Please see a copy of my visit note below.    Podiatry / Foot and Ankle Surgery Progress Note    October 19, 2022    Subject: Patient was seen for 3-week status post right second and third toe amputations.  Denies fever, nausea, vomiting.  Notes the pain has improved.  Notes that he is done with his hyperbaric oxygen rounds.  No other concerns today.    Objective:  Vitals: /60   Wt 55.3 kg (122 lb)   BMI 20.93 kg/m    BMI= Body mass index is 20.93 kg/m .    A1C: 7.2 (3/4/2022)     General:  Patient is alert and orientated.  NAD.     Vascular:  DP and PT pulses are not palpable.  No edema or varicosities noted.  CFT's < 3secs.  Skin temp is normal.     Neuro:  Light and gross touch sensation diminished to feet.     Derm: Clean dry and intact..  Bloody strikethrough noted to Kerlix.  Sutures are intact.  Incision is well approximated.  No streaking redness, dehiscence or signs of acute infection noted.     Musculoskeletal: Right toes 1 through 5 are now amputated.     Assessment:    Diabetic polyneuropathy associated with type 2 diabetes mellitus (H)  Post-operative state  PAD (peripheral artery disease) (H)  H/O amputation of lesser toe, right (H)  Right foot pain     Medical Decision Making/Plan: At this time the dressing was changed.  We will have patient continue to keep the foot dressing dry.  We will have him take ibuprofen for his pain  We will have him follow-up weekly for the next 1-2weeks for wound assessment and dressing changes.  All questions were answered to patient's satisfaction and he will call for the questions or concerns.        Patient Risk Factor:  Patient is a high risk factor for infection.     Purvi Whelan, KEMAR, Podiatry/Foot and Ankle Surgery                Again, thank you  for allowing me to participate in the care of your patient.        Sincerely,        Purvi Whelan DPM, Podiatry/Foot and Ankle Surgery

## 2022-10-22 ENCOUNTER — HEALTH MAINTENANCE LETTER (OUTPATIENT)
Age: 72
End: 2022-10-22

## 2022-10-28 ENCOUNTER — OFFICE VISIT (OUTPATIENT)
Dept: PODIATRY | Facility: CLINIC | Age: 72
End: 2022-10-28
Payer: COMMERCIAL

## 2022-10-28 VITALS — DIASTOLIC BLOOD PRESSURE: 62 MMHG | SYSTOLIC BLOOD PRESSURE: 152 MMHG | BODY MASS INDEX: 20.93 KG/M2 | WEIGHT: 122 LBS

## 2022-10-28 DIAGNOSIS — Z98.890 POST-OPERATIVE STATE: ICD-10-CM

## 2022-10-28 DIAGNOSIS — Z89.421 H/O AMPUTATION OF LESSER TOE, RIGHT (H): ICD-10-CM

## 2022-10-28 DIAGNOSIS — I73.9 PAD (PERIPHERAL ARTERY DISEASE) (H): ICD-10-CM

## 2022-10-28 DIAGNOSIS — E11.42 DIABETIC POLYNEUROPATHY ASSOCIATED WITH TYPE 2 DIABETES MELLITUS (H): Primary | ICD-10-CM

## 2022-10-28 PROCEDURE — 99213 OFFICE O/P EST LOW 20 MIN: CPT | Performed by: PODIATRIST

## 2022-10-28 NOTE — LETTER
10/28/2022         RE: Zia Hopkins  [address] [place] [address]        Dear Colleague,    Thank you for referring your patient, Zia Hopkins, to the [facility]. Please see a copy of my visit note below.    Podiatry / Foot and Ankle Surgery Progress Note    October 28, 2022    Subject: Patient was seen for 4-week status post right second and third toe amputations.  Denies fever, nausea, vomiting.  Notes the pain has improved.   No other concerns today.    Objective:  Vitals: BP (!) 152/62   Wt 55.3 kg (122 lb)   BMI 20.93 kg/m    BMI= Body mass index is 20.93 kg/m .    A1C: 7.2 (3/4/2022)     General:  Patient is alert and orientated.  NAD.     Vascular:  DP and PT pulses are not palpable.  No edema or varicosities noted.  CFT's < 3secs.  Skin temp is normal.     Neuro:  Light and gross touch sensation diminished to feet.     Derm: Clean dry and intact..  Bloody strikethrough noted to Kerlix.  Sutures are intact.  Incision is well approximated.  No streaking redness, dehiscence or signs of acute infection noted.     Musculoskeletal: Right toes 1 through 5 are now amputated.     Assessment:     Diabetic polyneuropathy associated with type 2 diabetes mellitus (H)  Post-operative state  H/O amputation of lesser toe, right (H)  PAD (peripheral artery disease) (H)     Medical Decision Making/Plan: At this time, the sutures were removed.  He can get the foot wet in the shower but do not soak the foot for the next week.  Recommend applying iodine and a bandage over the incision for the next week.  He can stop hyperbaric oxygen therapy at this time.  He can go back to a regular sock and shoe.  We will have him follow-up in 1 month for reassessment.     All questions were answered to patient's satisfaction and he will call for the questions or concerns.        Patient Risk Factor:  Patient is a high risk factor for infection.      Purvi Whelan DPM, Podiatry/Foot and  Ankle Surgery                 Again, thank you for allowing me to participate in the care of your patient.        Sincerely,        Purvi Whelan DPM, Podiatry/Foot and Ankle Surgery

## 2022-10-28 NOTE — PROGRESS NOTES
Podiatry / Foot and Ankle Surgery Progress Note    October 28, 2022    Subject: Patient was seen for 4-week status post right second and third toe amputations.  Denies fever, nausea, vomiting.  Notes the pain has improved.   No other concerns today.    Objective:  Vitals: BP (!) 152/62   Wt 55.3 kg (122 lb)   BMI 20.93 kg/m    BMI= Body mass index is 20.93 kg/m .    A1C: 7.2 (3/4/2022)     General:  Patient is alert and orientated.  NAD.     Vascular:  DP and PT pulses are not palpable.  No edema or varicosities noted.  CFT's < 3secs.  Skin temp is normal.     Neuro:  Light and gross touch sensation diminished to feet.     Derm: Clean dry and intact..  Bloody strikethrough noted to Kerlix.  Sutures are intact.  Incision is well approximated.  No streaking redness, dehiscence or signs of acute infection noted.     Musculoskeletal: Right toes 1 through 5 are now amputated.     Assessment:     Diabetic polyneuropathy associated with type 2 diabetes mellitus (H)  Post-operative state  H/O amputation of lesser toe, right (H)  PAD (peripheral artery disease) (H)     Medical Decision Making/Plan: At this time, the sutures were removed.  He can get the foot wet in the shower but do not soak the foot for the next week.  Recommend applying iodine and a bandage over the incision for the next week.  He can stop hyperbaric oxygen therapy at this time.  He can go back to a regular sock and shoe.  We will have him follow-up in 1 month for reassessment.     All questions were answered to patient's satisfaction and he will call for the questions or concerns.        Patient Risk Factor:  Patient is a high risk factor for infection.      Purvi Whelan DPM, Podiatry/Foot and Ankle Surgery

## 2022-10-28 NOTE — PATIENT INSTRUCTIONS
Thank you for choosing [facility] Podiatry / Foot & Ankle Surgery!    DR SAVAGE'S CLINIC:  [facility]   [address]   [place] [address]      TRIAGE LINE: [phone]  APPOINTMENTS: [phone]  RADIOLOGY: [phone]  SET UP SURGERY: [phone]  FAX NUMBER: [phone]  BILLING QUESTIONS: [phone]       Follow up: 1 month    Apply iodine to the incision daily with a large bandage for the next week.    Can stop hyperbaric oxygen.    SCAR CARE PROTOCOL  Scarring is an unfortunate but unavoidable part of surgery.  Every person scars differently and there is no way to predict how an individual's final scar will look.  Now that the sutures have been removed one can begin taking some steps to help minimize the appearance of scarring.    WOUND HEALING  As soon as the skin is incised during surgery, the body is taking steps to prepare for healing. After about 3 days, the body has sent cells to the incision to begin the healing process. These cells, called fibroblasts, make collagen, a protein in the skin that helps provide strength. Once the skin has been sufficiently strengthened, the sutures are removed. Over the next year, the body synthesizes new collagen and breaks down old collagen to help achieve a strong scar that allows the foot/ankle to function appropriately. This is where patients can help the appearance of the scar, as it will change over the next year.    STEPS  1. Do not expose the scar to the sun for 1 year.  2. Any sun exposure may permanently darken the appearance of the scar.  3. Wear shoes/socks or cover your scar with zinc oxide.  4. Massage the scar 2-3 times per day.  -Massage the entire length of the scar with gentle to moderate pressure.  -Pressure can help flatten the scar.  5. Lotion/Vitamin E helps keep the tissue soft.  6. Try over the counter scar products such as Mederma or Scar Zone.  -These are available at any pharmacy without a  "prescription.  -Patients must use these for extended periods of time (6-12 months) to see a difference.    DIABETES AND YOUR FEET  Diabetes can result in several problems in the feet including ulcers (open sores) and amputations. Two of the most important reasons why people develop foot problems when they have diabetes is : 1. Neuropathy (loss of feeling)  2. Vascular disease (loss or decrease of blood flow).    Neuropathy is a term used to describe a loss of nerve function.  Patients with diabetes are at risk of developing neuropathy if their sugars continue to run high and are above the normal value. One theory for neuropathy is that the \"extra\" sugar in the body enters the nerves and is broken down. These by-products build up in the nerve causing it to swell and impairing nerve function. Often times, this can be prevented by controlling your sugars, dieting and exercise.    When a person develops neuropathy, they usually begin to feel numbness or tingling in their feet and sometime in their legs.  Other symptoms may include painful burning or hot feet, tingling or feeling like insects or ants are crawling on your feet or legs.  If the diabetes is sever and the sugars run high for long periods of time, neuropathy can also occur in the hands.    Vascular disease  is a term used to describe a loss or decrease in circulation (blood flow). There is a problem in getting blood and oxygen to areas that need it. Similar to neuropathy, sugars can build up in the walls of the arteries (blood vessels) and cause them to become swollen, thickened and hardened. This decreases the amount of blood that can go to an area that needs it. Though this is common in the legs of diabetic patients, it can also affect other arteries (blood vessels) in the body such as in the heart and eyes.    In the legs, vascular disease usually results in cramping. Patients who develop leg cramps after walking the same distance every time (i.e. One " block, half a mile, ect.) need to let their doctors know so that their circulation may be checked. Cramps causing severe pain in the feet and/or legs while sleeping and the cramps go away when you stand or hang your legs off the side of the bed, may also be a sign of poor blood circulation.  Occasional cramping in cold weather or on rare occasions with activity may not be due to poor circulation, but you should inform your doctor.    PREVENTION OF THESE DISEASES  The key to prevention is good blood sugar control. Poor blood sugar control is a big reason many of these problems start. Physical activity (exercise) is a very good way to help decrease your blood sugars. Exercise can lower your blood sugar, blood pressure, and cholesterol. It also reduces your risk for heart disease and stroke, relieves stress, and strengthens your heart, muscles and bones.  In addition, regular activity helps insulin work better, improves your blood circulation, and keeps your joints flexible. If you're trying to lose weight, a combination of exercise and wise food choices can help you reach your target weight and maintain it.      PAIN MANAGEMENT (**Please speak with your primary doctor about any medications**)  1.Blood Sugar Control - Most important  2. Medications such as:  Amytriptylline, duloxetine, gabapentin, lyrica, tramadol (talk with your primary care doctor about this).     NUTRITION:  Nutrition is also important to help with healing. If your body does not have what it needs, it can't heal.   Increasing your protein intake is important.  With wounds you need 60-90gm of protein a day to help with healing. Over the counter protein shakes such as Clayton, Glucerna, Ensure, ect... can help to supplement your daily protein intake.   It is also important to take Vitamins to help with healing.  Vitamins such as B12, B6 and Vitamin D3 are important for healing. These can be gotten over the counter at pharmacies or at stores like [employer] or  "the [employer].    I can also prescribe a dietary supplement called \"Rheumate\" that has a lot of essential vitamins in one capsule.  This may not be covered by insurance though.     FOOT CARE RECOMMENDATIONS   1. Wash your feet with lukewarm water and a mild soap and then dry them thoroughly, especially between the toes.     2. Examine your feet daily looking for cuts, corns, blisters, cracks, ect, especially after wearing new shoes. Make sure to look between your toes. If you cannot see the bottom of your feet, set a mirror on the floor and hold your foot over it, or ask a spouse, friend or family member to examine your feet for you. Contact your doctor immediately if new problems are noted or if sores are not healing.     3. Immediately apply moisturizer to the tops and bottoms of your feet, avoiding areas between the toes. Hand lotion (Intesive Care, Ninfa, Eucerin, Neutrogena, Curel, ect) is sufficient unless your doctor prescribes a medicated lotion. Apply sunscreen to your feet when going swimming outside.     4. Use clean comfortable shoes, wear white socks (if you have any bleeding or drainage, you will see it on white socks). Socks should not have thick seams or cut off the circulation around the leg. Break in new shoes slowly and rotate with older shoes until broken in. Check the inside of your shoes with your hand to look for areas of irritation or objects that may have fallen into your shoes.       5. Keep slippers by the side of your bed for use during the night.     6.  Shoes should be fitted by a professional and should not cause areas of irritation.  Check your feet regularly when wearing a new pair of shoes and replace them as needed.     7.  Talk to your doctor about proper exercise. Exercise and stretching stimulate blood flow to your feet and maintain proper glucose levels.     8.  Monitor your blood glucose level as instructed by your doctor. Notify your doctor immediately if your blood " sugar is abnormally high or low.    9. Cut your nails straight across, but then gently round any sharp edges with a cardboard nail file. If you have neuropathy, peripheral vascular disease or cannot see that well to trim your own toenails contact [employer] ([phone]) or [employer] ([phone]).      THINGS TO AVOID DOING   1.  Do not soak your feet if you have an open sore. Use only lukewarm water and always check the temperature with your hand as hot water can easily burn your feet.       2.  Never use a hot water bottle or heating pad on your feet. Also do not apply cold compresses to your feet. With decreased sensation, you could burn or freeze your feet.       3.  Do not apply any of these to your feet:    -  Over the counter medicine for corns or warts    -  Harsh chemicals like boric acid    -  Do not self-treat corns, cuts, blisters or infections. Always consult your doctor.       4.  Do not wear sandals, slippers or walk barefoot, especially on hot sand or concrete or other harsh surfaces.     5.  If you smoke, stop!!!

## 2022-11-17 ENCOUNTER — TELEPHONE (OUTPATIENT)
Dept: PHARMACY | Facility: PHYSICIAN GROUP | Age: 72
End: 2022-11-17

## 2022-11-17 NOTE — TELEPHONE ENCOUNTER
Outreach to patient from [facility] for MTM visit. [employer] [facility] identified patient for outreach. Sees Dr. Reese and  at [facility].     Left message for pt to call back and schedule  MTM visit.     Hamida Worrell, Pharm.D, [facility]  Medication Therapy Management Pharmacist  [phone]

## 2022-11-18 ENCOUNTER — VIRTUAL VISIT (OUTPATIENT)
Dept: PHARMACY | Facility: PHYSICIAN GROUP | Age: 72
End: 2022-11-18
Payer: COMMERCIAL

## 2022-11-18 DIAGNOSIS — E11.52 TYPE 2 DIABETES MELLITUS WITH DIABETIC PERIPHERAL ANGIOPATHY AND GANGRENE, WITHOUT LONG-TERM CURRENT USE OF INSULIN (H): Primary | ICD-10-CM

## 2022-11-18 DIAGNOSIS — E78.5 DYSLIPIDEMIA: ICD-10-CM

## 2022-11-18 DIAGNOSIS — I10 BENIGN ESSENTIAL HYPERTENSION: ICD-10-CM

## 2022-11-18 DIAGNOSIS — I73.9 PAD (PERIPHERAL ARTERY DISEASE) (H): ICD-10-CM

## 2022-11-18 PROCEDURE — 99607 MTMS BY PHARM ADDL 15 MIN: CPT | Performed by: PHARMACIST

## 2022-11-18 PROCEDURE — 99605 MTMS BY PHARM NP 15 MIN: CPT | Performed by: PHARMACIST

## 2022-11-18 NOTE — LETTER
_  Medication List        Prepared on: Nov 18, 2022     Bring your Medication List when you go to the doctor, hospital, or   emergency room. And, share it with your family or caregivers.     Note any changes to how you take your medications.  Cross out medications when you no longer use them.    Medication How I take it Why I use it Prescriber   amLODIPine (NORVASC) 10 MG tablet Take 10 mg by mouth daily Blood pressure Reid Reese MD   aspirin 81 MG tablet Take 1 tablet (81 mg) by mouth daily Peripheral Artery Disease Surjit Hamlin MD   atorvastatin (LIPITOR) 80 MG tablet TAKE 1 TABLET(80 MG) BY MOUTH AT BEDTIME Cholesterol Surjit Hamlin MD   chlorthalidone (HYGROTON) 50 MG tablet Take 50 mg by mouth daily  Blood pressure Reid Reese MD   clopidogrel (PLAVIX) 75 MG tablet TAKE 1 TABLET(75 MG) BY MOUTH DAILY PAD (Peripheral Artery Disease)  Surjit Hamlin MD   empagliflozin (JARDIANCE) 25 MG TABS tablet Take 25 mg by mouth daily Diabetes Reid Reese MD   ezetimibe (ZETIA) 10 MG tablet TAKE 1 TABLET(10 MG) BY MOUTH DAILY Cholesterol Surjit Hamlin MD   losartan (COZAAR) 100 MG tablet Take 100 mg by mouth daily  Blood pressure Reid Reese MD   metFORMIN (GLUCOPHAGE) 1000 MG tablet Take 1,000 mg by mouth 2 times daily (with meals)  Diabetes Reid Reese MD   metoprolol succinate ER (TOPROL-XL) 100 MG 24 hr tablet Take 1 tablet (100 mg) by mouth daily Blood pressure Surjit Hamlin MD         Add new medications, over-the-counter drugs, herbals, vitamins, or  minerals in the blank rows below.    Medication How I take it Why I use it Prescriber                          Allergies:      No Known Allergies        Side effects I have had:               Other Information:              My notes and questions:

## 2022-11-18 NOTE — PATIENT INSTRUCTIONS
"Recommendations from today's MTM visit:                                                    MTM (medication therapy management) is a service provided by a clinical pharmacist designed to help you get the most of out of your medicines.   Today we reviewed what your medicines are for, how to know if they are working, that your medicines are safe and how to make your medicine regimen as easy as possible.      1. Lab only to recheck BMP (kidney function test).     2. Should be on aspirin and clopidogrel at this time, you should discuss this further with Dr. Hamlin if your bruising is an issue.     Follow-up: Return in 1 week (on 11/25/2022) for Lab Work, and MTM as needed. .    It was great speaking with you today.  I value your experience and would be very thankful for your time in providing feedback in our clinic survey. In the next few days, you may receive an email or text message from [employer] with a link to a survey related to your  clinical pharmacist.\"       My Clinical Pharmacist's contact information:                                                      Please feel free to contact me with any questions or concerns you have.      Hamida Worrell, Pharm.D, [facility]  Medication Therapy Management Pharmacist  [phone]     "

## 2022-11-18 NOTE — LETTER
November 18, 2022  Zia Hopkins  [address] [place] [address]    Dear Mr. Hopkins, [facility]     Thank you for talking with me on Nov 18, 2022 about your health and medications. As a follow-up to our conversation, I have included two documents:      1. Your Recommended To-Do List has steps you should take to get the best results from your medications.  2. Your Medication List will help you keep track of your medications and how to take them.    If you want to talk about these documents, please call Hamida Worrell RPH at phone: [phone], Monday-Friday 8-4:30pm.    I look forward to working with you and your doctors to make sure your medications work well for you.    Sincerely,  Hamida Worrell RPH  [facility] Pharmacist, [facility]

## 2022-11-18 NOTE — LETTER
"Recommended To-Do List      Prepared on: Nov 18, 2022       You can get the best results from your medications by completing the items on this \"To-Do List.\"      Bring your To-Do List when you go to your doctor. And, share it with your family or caregivers.    My To-Do List:  What we talked about: What I should do:   Needing additional monitoring    Get the following lab test(s): basic metobolic panel (kidney function test)           What we talked about: What I should do:   The importance of taking your medication as intended    Education: Restart Aspirin 81mg daily and discuss with  Dr. Hamlin if you have the bruising again.           What we talked about: What I should do:                       "

## 2022-11-18 NOTE — PROGRESS NOTES
Medication Therapy Management (MTM) Encounter    ASSESSMENT:                            Medication Adherence/Access: No issues identified    PAD: has been recommended to be on clopidogrel and aspirin, but currently only on clopidogrel. Recommend he restart his aspirin and discuss this further with vascular before stopping.     Hypertension: Patient is not meeting blood pressure goal of < 140/90mmHg. Rechecking at home and sending updated blood pressure since last 2 checks showing elevated. Due to have recheck of BMP since has not been rechecked since Sept when a change was seen.     Type 2 Diabetes: Patient is meeting A1c goal of < 7%. Reviewed history of SGLT2 inhibitor initiation in efforts to reduce CV risks. Has been determined by specialists risk/benefit of this therapy favors patient continuing on this medication. Patient would benefit from no changes at this time. PharmD requesting additional data from medical liaison with Jardiance regarding post-marketing safety data given ongoing amputations and severe PAD.      Hyperlipidemia: Stable.    PLAN:                            1. Lab only to recheck BMP (kidney function test).     2. Should be on aspirin and clopidogrel at this time, you should discuss this further with Dr. Hamlin if your bruising is an issue.     Follow-up: Return in 1 week (on 11/25/2022) for Lab Work, and MTM as needed. .    SUBJECTIVE/OBJECTIVE:                          Zia Hopkins is a 72 year old male called for an initial visit. He was referred to me from [facility].    Reason for visit: Med review.    Allergies/ADRs: Reviewed in chart  Past Medical History: Reviewed in chart  Tobacco: He reports that he quit smoking about 6 years ago. His smoking use included cigarettes. He smoked an average of .25 packs per day. He has never used smokeless tobacco.  Alcohol: not currently using    Medication Adherence/Access: no issues reported, states Jardiance is affordable at this time.      PAD: Taking clopidogrel 75mg daily. Aug 2022, stopped aspirin on his own. Left hand palm darker spot, it has gone since stopping aspirin. Denies other bruising/bleeding. Sees both podiatry and vascular regarding his multiple amputations. Recent infections, but now off antibiotics and doing better. Has been given extended follow up with podiatry now.     Hypertension: Current medications include amlodipine 10mg daily, chlorthalidone 50mg daily, losartan 100mg daily and metoprolol ER 100mg daily .  Patient does self-monitor blood pressure. Home BP monitoring in range of 130-140's systolic over 60-70's diastolic.  Patient reports no current medication side effects.  BP Readings from Last 3 Encounters:   10/28/22 (!) 152/62   10/19/22 128/60   10/12/22 138/58   Had a jump in Scr in Sept to 1.31 from 0.98. was to schedule a recheck but this has not been done.     Type 2 Diabetes:  Currently taking metformin 1000mg twice daily and Jardiance 25mg daily.  Patient is not experiencing side effects. Was started on Jardiance in Feb 2021 by vascular, was initiated given possible CV benefits.   Not monitoring.   Symptoms of low blood sugar? none  Symptoms of high blood sugar? none  Eye exam: unavailable  Seeing podiatry regularly for foot follow up   Aspirin:on clopidogrel daily and is supposed to be on aspirin 81mg daily, but has been holding this since Aug.   Statin: Yes: atorvastatin   ACEi/ARB: Yes: losartan.   A1c= 6.3% on 8/15/22    Hyperlipidemia: Current therapy includes atorvastatin 80mg daily and ezetimibe 10mg daily.  Patient reports no significant myalgias or other side effects.  Recent Labs   Lab Test 03/04/22  1042 03/01/22  1202   CHOL 117 110   HDL 55 62   LDL 38 30   TRIG 120 92       ----------------      I spent 25 minutes with this patient today. All changes were made via collaborative practice agreement with Reid Reese MD. A copy of the visit note was provided to the patient's provider(s).    The  patient was sent via [employer] a summary of these recommendations.     Hamida Worrell, Pharm.D, BCACP  Medication Therapy Management Pharmacist  [phone]      Telemedicine Visit Details  Type of service:  Telephone visit  Start Time: 2:19PM  End Time: 2:44pm  Originating Location (pt. Location): Home      Distant Location (provider location):  On-site  Provider has received verbal consent for a visit from the patient? Yes     Medication Therapy Recommendations  Diabetic ulcer of left foot associated with type 2 diabetes mellitus (H)    Current Medication: metFORMIN (GLUCOPHAGE) 1000 MG tablet   Rationale: Medication requires monitoring - Needs additional monitoring - Safety   Recommendation: Order Lab - bmp RECHECK   Status: Accepted per CPA         PAD (peripheral artery disease) (H)    Current Medication: aspirin 81 MG tablet   Rationale: Patient prefers not to take - Adherence - Adherence   Recommendation: Provide Education - restart aspirin 81mg daily and discuss with specialist   Status: Accepted - no CPA Needed

## 2022-11-29 ENCOUNTER — OFFICE VISIT (OUTPATIENT)
Dept: PODIATRY | Facility: CLINIC | Age: 72
End: 2022-11-29
Payer: COMMERCIAL

## 2022-11-29 VITALS — SYSTOLIC BLOOD PRESSURE: 132 MMHG | DIASTOLIC BLOOD PRESSURE: 62 MMHG | BODY MASS INDEX: 20.93 KG/M2 | WEIGHT: 122 LBS

## 2022-11-29 DIAGNOSIS — Z89.421 H/O AMPUTATION OF LESSER TOE, RIGHT (H): ICD-10-CM

## 2022-11-29 DIAGNOSIS — I73.9 PAD (PERIPHERAL ARTERY DISEASE) (H): ICD-10-CM

## 2022-11-29 DIAGNOSIS — E11.42 DIABETIC POLYNEUROPATHY ASSOCIATED WITH TYPE 2 DIABETES MELLITUS (H): Primary | ICD-10-CM

## 2022-11-29 DIAGNOSIS — Z98.890 POST-OPERATIVE STATE: ICD-10-CM

## 2022-11-29 PROCEDURE — 99213 OFFICE O/P EST LOW 20 MIN: CPT | Performed by: PODIATRIST

## 2022-11-29 NOTE — PATIENT INSTRUCTIONS
Thank you for choosing [facility] Podiatry / Foot & Ankle Surgery!    DR SAVAGE'S CLINIC:  [facility]   [address]   [place] [address]      TRIAGE LINE: [phone]  APPOINTMENTS: [phone]  RADIOLOGY: [phone]  SET UP SURGERY: [phone]  FAX NUMBER: [phone]  BILLING QUESTIONS: [phone]       Follow up: 2 months

## 2022-11-29 NOTE — LETTER
11/29/2022         RE: Zia Hopkins  [address] [place] [address]        Dear Colleague,    Thank you for referring your patient, Zia Hopkins, to the [facility] PODI[facility]RY. Please see a copy of my visit note below.    Podiatry / Foot and Ankle Surgery Progress Note    November 29, 2022    Subject: Patient was seen for 3-month status post right second and third toe amputations.  Notes that he is doing well.  Has not really noted much drainage to the foot.  Denies fever, nausea, vomiting.    Objective:  Vitals: Wt 55.3 kg (122 lb)   BMI 20.93 kg/m    BMI= Body mass index is 20.93 kg/m .    A1C: 7.2 (3/4/2022)     General:  Patient is alert and orientated.  NAD.     Vascular:  DP and PT pulses are not palpable.  No edema or varicosities noted.  CFT's < 3secs.  Skin temp is normal.     Neuro:  Light and gross touch sensation diminished to feet.     Derm: INcision is well healed. Ulcer to the medial right 1st metatarsal head is healed. No open lesions or signs of infection noted.      Musculoskeletal: Right toes 1 through 5 are now amputated.     Assessment:     Diabetic polyneuropathy associated with type 2 diabetes mellitus (H)  Post-operative state  H/O amputation of lesser toe, right (H)  PAD (peripheral artery disease) (H)     Medical Decision Making/Plan: At this time,  he can continue to progress his activity.  Recommend motioning the feet daily.   Discussed that he is at high risk for rate ulceration and to keep a close eye on his feet.  We will have him follow-up in 2 month for reassessment.      All questions were answered to patient's satisfaction and he will call for the questions or concerns.        Patient Risk Factor:  Patient is a high risk factor for infection.      Purvi Whelan DPM, Podiatry/Foot and Ankle Surgery        Again, thank you for allowing me to participate in the care of your patient.        Sincerely,        Purvi Whelan DPM, Podiatry/Foot and  Ankle Surgery

## 2022-11-29 NOTE — PROGRESS NOTES
Podiatry / Foot and Ankle Surgery Progress Note    November 29, 2022    Subject: Patient was seen for 3-month status post right second and third toe amputations.  Notes that he is doing well.  Has not really noted much drainage to the foot.  Denies fever, nausea, vomiting.    Objective:  Vitals: Wt 55.3 kg (122 lb)   BMI 20.93 kg/m    BMI= Body mass index is 20.93 kg/m .    A1C: 7.2 (3/4/2022)     General:  Patient is alert and orientated.  NAD.     Vascular:  DP and PT pulses are not palpable.  No edema or varicosities noted.  CFT's < 3secs.  Skin temp is normal.     Neuro:  Light and gross touch sensation diminished to feet.     Derm: INcision is well healed. Ulcer to the medial right 1st metatarsal head is healed. No open lesions or signs of infection noted.      Musculoskeletal: Right toes 1 through 5 are now amputated.     Assessment:     Diabetic polyneuropathy associated with type 2 diabetes mellitus (H)  Post-operative state  H/O amputation of lesser toe, right (H)  PAD (peripheral artery disease) (H)     Medical Decision Making/Plan: At this time,  he can continue to progress his activity.  Recommend motioning the feet daily.   Discussed that he is at high risk for rate ulceration and to keep a close eye on his feet.  We will have him follow-up in 2 month for reassessment.      All questions were answered to patient's satisfaction and he will call for the questions or concerns.        Patient Risk Factor:  Patient is a high risk factor for infection.      Purvi Whelan DPM, Podiatry/Foot and Ankle Surgery

## 2022-12-19 ENCOUNTER — HOSPITAL ENCOUNTER (OUTPATIENT)
Dept: ULTRASOUND IMAGING | Facility: CLINIC | Age: 72
Discharge: HOME | End: 2022-12-19
Attending: SURGERY
Payer: COMMERCIAL

## 2022-12-19 ENCOUNTER — OFFICE VISIT (OUTPATIENT)
Dept: OTHER | Facility: CLINIC | Age: 72
End: 2022-12-19
Attending: SURGERY
Payer: COMMERCIAL

## 2022-12-19 VITALS — OXYGEN SATURATION: 99 % | DIASTOLIC BLOOD PRESSURE: 68 MMHG | SYSTOLIC BLOOD PRESSURE: 175 MMHG | HEART RATE: 65 BPM

## 2022-12-19 DIAGNOSIS — Z98.890 CRITICAL ISCHEMIA OF EXTREMITY WITH HISTORY OF REVASCULARIZATION OF SAME EXTREMITY (H): ICD-10-CM

## 2022-12-19 DIAGNOSIS — I70.223 CRITICAL LIMB ISCHEMIA OF BOTH LOWER EXTREMITIES (H): Primary | ICD-10-CM

## 2022-12-19 DIAGNOSIS — I70.229 CRITICAL ISCHEMIA OF EXTREMITY WITH HISTORY OF REVASCULARIZATION OF SAME EXTREMITY (H): ICD-10-CM

## 2022-12-19 DIAGNOSIS — Z09 ENCOUNTER FOR FOLLOW-UP EXAMINATION AFTER COMPLETED TREATMENT FOR CONDITIONS OTHER THAN MALIGNANT NEOPLASM: ICD-10-CM

## 2022-12-19 PROCEDURE — 93926 LOWER EXTREMITY STUDY: CPT | Mod: RT

## 2022-12-19 PROCEDURE — G0463 HOSPITAL OUTPT CLINIC VISIT: HCPCS

## 2022-12-19 PROCEDURE — 93926 LOWER EXTREMITY STUDY: CPT | Mod: 26 | Performed by: SURGERY

## 2022-12-19 PROCEDURE — 99213 OFFICE O/P EST LOW 20 MIN: CPT | Performed by: SURGERY

## 2022-12-19 NOTE — PROGRESS NOTES
Mr. Hopkins returns to clinic today.  He is a very pleasant 72-year-old male with bilateral lower extremity critical limb ischemia.  His last intervention was a balloon angioplasty of the right posterior tibial artery and the tibioperoneal trunk.  This was done in June of this year.  Following the revascularization he underwent partial right fourth and fifth metatarsal bone excision for subacute osteomyelitis of the right foot.  Then in August of this year he actually went to [place] and then had to come back urgently because he developed black discoloration of the third toe.  Then at that time and under the care of Dr. Whelan, he had second and third toes amputated.  This has finally gone on to heal.    He is planning on going back to [place] for his birthday which is coming up in February of next year.    His duplex sonography of the right lower extremity shows patent superficial femoral and popliteal stent and patent peroneal and posterior tibial arteries.  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    I have reassured him of the findings of the duplex sonography.  They are stable.    We will see him back in 6 months with bilateral lower extremity arterial duplex and ankle-brachial indices.

## 2022-12-19 NOTE — PROGRESS NOTES
Patient is here to discuss follow up.    BP (!) 175/68 (BP Location: Right arm, Patient Position: Chair, Cuff Size: Adult Regular)   Pulse 65   SpO2 99%     Questions patient would like addressed today are: N/A.    Refills are needed: No    Has homecare services and agency name:  [facility]

## 2023-01-31 ENCOUNTER — OFFICE VISIT (OUTPATIENT)
Dept: PODIATRY | Facility: CLINIC | Age: 73
End: 2023-01-31
Payer: COMMERCIAL

## 2023-01-31 VITALS
DIASTOLIC BLOOD PRESSURE: 68 MMHG | SYSTOLIC BLOOD PRESSURE: 150 MMHG | HEIGHT: 64 IN | WEIGHT: 122 LBS | BODY MASS INDEX: 20.83 KG/M2

## 2023-01-31 DIAGNOSIS — L40.9 PSORIASIS: ICD-10-CM

## 2023-01-31 DIAGNOSIS — E11.42 DIABETIC POLYNEUROPATHY ASSOCIATED WITH TYPE 2 DIABETES MELLITUS (H): Primary | ICD-10-CM

## 2023-01-31 DIAGNOSIS — I73.9 PAD (PERIPHERAL ARTERY DISEASE) (H): ICD-10-CM

## 2023-01-31 DIAGNOSIS — Z89.421 H/O AMPUTATION OF LESSER TOE, RIGHT (H): ICD-10-CM

## 2023-01-31 PROCEDURE — 99213 OFFICE O/P EST LOW 20 MIN: CPT | Performed by: PODIATRIST

## 2023-01-31 NOTE — PROGRESS NOTES
"Podiatry / Foot and Ankle Surgery Progress Note    January 31, 2023    Subject: Patient was seen for follow up on right foot. He notes he his going well. Denies drainage from foot. No fevers, chills. No concerns.     Objective:  Vitals: BP (!) 150/68   Ht 1.626 m (5' 4\")   Wt 55.3 kg (122 lb)   BMI 20.94 kg/m    BMI= Body mass index is 20.94 kg/m .    A1C: 7.2 (3/4/2022)     General:  Patient is alert and orientated.  NAD.     Vascular:  DP and PT pulses are not palpable.  No edema or varicosities noted.  CFT's < 3secs.  Skin temp is normal.     Neuro:  Light and gross touch sensation diminished to feet.     Derm: INcision is well healed. Ulcer to the medial right 1st metatarsal head is healed. No open lesions or signs of infection noted.      Musculoskeletal: Right toes 1 through 5 are now amputated.     Assessment:      Diabetic polyneuropathy associated with type 2 diabetes mellitus (H)  PAD (peripheral artery disease) (H)  H/O amputation of lesser toe, right (H)  Psoriasis     Medical Decision Making/Plan: At this time,  the foot remains healed. He will continue to progress activity as tolerated. Continue to lotion feet daily.  We will have him follow-up in 4 month for reassessment.      All questions were answered to patient's satisfaction and he will call for the questions or concerns.        Patient Risk Factor:  Patient is a high risk factor for infection.     Purvi Whelan DPM, Podiatry/Foot and Ankle Surgery             "

## 2023-01-31 NOTE — LETTER
"    1/31/2023         RE: Zia Hopkins  [address] [place] [address]        Dear Colleague,    Thank you for referring your patient, Zia Hopkins, to the [facility] POD[facility]. Please see a copy of my visit note below.    Podiatry / Foot and Ankle Surgery Progress Note    January 31, 2023    Subject: Patient was seen for follow up on right foot. He notes he his going well. Denies drainage from foot. No fevers, chills. No concerns.     Objective:  Vitals: BP (!) 150/68   Ht 1.626 m (5' 4\")   Wt 55.3 kg (122 lb)   BMI 20.94 kg/m    BMI= Body mass index is 20.94 kg/m .    A1C: 7.2 (3/4/2022)     General:  Patient is alert and orientated.  NAD.     Vascular:  DP and PT pulses are not palpable.  No edema or varicosities noted.  CFT's < 3secs.  Skin temp is normal.     Neuro:  Light and gross touch sensation diminished to feet.     Derm: INcision is well healed. Ulcer to the medial right 1st metatarsal head is healed. No open lesions or signs of infection noted.      Musculoskeletal: Right toes 1 through 5 are now amputated.     Assessment:      Diabetic polyneuropathy associated with type 2 diabetes mellitus (H)  PAD (peripheral artery disease) (H)  H/O amputation of lesser toe, right (H)  Psoriasis     Medical Decision Making/Plan: At this time,  the foot remains healed. He will continue to progress activity as tolerated. Continue to lotion feet daily.  We will have him follow-up in 4 month for reassessment.      All questions were answered to patient's satisfaction and he will call for the questions or concerns.        Patient Risk Factor:  Patient is a high risk factor for infection.     Purvi Whelan DPM, Podiatry/Foot and Ankle Surgery                 Again, thank you for allowing me to participate in the care of your patient.        Sincerely,        Purvi Whelan DPM, Podiatry/Foot and Ankle Surgery    "

## 2023-04-01 ENCOUNTER — HEALTH MAINTENANCE LETTER (OUTPATIENT)
Age: 73
End: 2023-04-01

## 2023-05-30 ENCOUNTER — OFFICE VISIT (OUTPATIENT)
Dept: PODIATRY | Facility: CLINIC | Age: 73
End: 2023-05-30
Payer: COMMERCIAL

## 2023-05-30 VITALS — BODY MASS INDEX: 20.94 KG/M2 | WEIGHT: 122 LBS | DIASTOLIC BLOOD PRESSURE: 78 MMHG | SYSTOLIC BLOOD PRESSURE: 152 MMHG

## 2023-05-30 DIAGNOSIS — I73.9 PAD (PERIPHERAL ARTERY DISEASE) (H): ICD-10-CM

## 2023-05-30 DIAGNOSIS — M79.671 RIGHT FOOT PAIN: ICD-10-CM

## 2023-05-30 DIAGNOSIS — E11.42 DIABETIC POLYNEUROPATHY ASSOCIATED WITH TYPE 2 DIABETES MELLITUS (H): Primary | ICD-10-CM

## 2023-05-30 DIAGNOSIS — Z89.421 H/O AMPUTATION OF LESSER TOE, RIGHT (H): ICD-10-CM

## 2023-05-30 PROCEDURE — 99213 OFFICE O/P EST LOW 20 MIN: CPT | Performed by: PODIATRIST

## 2023-05-30 NOTE — PROGRESS NOTES
Podiatry / Foot and Ankle Surgery Progress Note    May 30, 2023    Subject: Patient was seen for follow up on right foot. Has had multiple toe amputations on right foot.  Notes he slipped on a rock and banged his foot about a week ago.  He notes that it was very sore.  Did not note any cuts in the foot.  Wants to make sure the foot is doing okay.  He notes that in the last day or 2 the pain has subsided significantly.  But still wanted it checked out as he is a lot of issues with his foot.    Vitals: BP (!) 152/78   Wt 55.3 kg (122 lb)   BMI 20.94 kg/m      A1C: 7.2 (3/4/2022)     General:  Patient is alert and orientated.  NAD.     Vascular:  DP and PT pulses are not palpable.  No edema or varicosities noted.  CFT's < 3secs.  Skin temp is normal.     Neuro:  Light and gross touch sensation diminished to feet.     Derm: No open lesions or signs of infection noted.  Musculoskeletal: Right toes 1 through 5 are now amputated.     Assessment:      Diabetic polyneuropathy associated with type 2 diabetes mellitus (H)  PAD (peripheral artery disease) (H)  H/O amputation of lesser toe, right (H)  Psoriasis     Medical Decision Making/Plan: At this time, there is no open lesion noted to the foot.  His pain has subsided significantly.  We will have him continue his daily activity as tolerated.  If this worsens we will have him come back for an x-ray.      All questions were answered to patient's satisfaction and he will call for the questions or concerns.        Patient Risk Factor:  Patient is a high risk factor for infection.     Purvi Whelan DPM, Podiatry/Foot and Ankle Surgery

## 2023-05-30 NOTE — PATIENT INSTRUCTIONS
Thank you for choosing [facility] / Foot & Ankle Surgery!    DR SAVAGE'S CLINIC:  [facility]   [address]   [place] [address]      TRIAGE LINE: [phone]  APPOINTMENTS: [phone]  RADIOLOGY: [phone]  SET UP SURGERY: [phone]  PHYSICAL THERAPY: [phone]   FAX NUMBER: [phone]  BILLING QUESTIONS: [phone]       Follow up: As needed

## 2023-05-30 NOTE — LETTER
5/30/2023         RE: Zia Hopkins  [address] [place] [address]        Dear Colleague,    Thank you for referring your patient, Zia Hopkins, to the [facility] PODI[facility]. Please see a copy of my visit note below.    Podiatry / Foot and Ankle Surgery Progress Note    May 30, 2023    Subject: Patient was seen for follow up on right foot. Has had multiple toe amputations on right foot.  Notes he slipped on a rock and banged his foot about a week ago.  He notes that it was very sore.  Did not note any cuts in the foot.  Wants to make sure the foot is doing okay.  He notes that in the last day or 2 the pain has subsided significantly.  But still wanted it checked out as he is a lot of issues with his foot.    Vitals: BP (!) 152/78   Wt 55.3 kg (122 lb)   BMI 20.94 kg/m      A1C: 7.2 (3/4/2022)     General:  Patient is alert and orientated.  NAD.     Vascular:  DP and PT pulses are not palpable.  No edema or varicosities noted.  CFT's < 3secs.  Skin temp is normal.     Neuro:  Light and gross touch sensation diminished to feet.     Derm: No open lesions or signs of infection noted.  Musculoskeletal: Right toes 1 through 5 are now amputated.     Assessment:      Diabetic polyneuropathy associated with type 2 diabetes mellitus (H)  PAD (peripheral artery disease) (H)  H/O amputation of lesser toe, right (H)  Psoriasis     Medical Decision Making/Plan: At this time, there is no open lesion noted to the foot.  His pain has subsided significantly.  We will have him continue his daily activity as tolerated.  If this worsens we will have him come back for an x-ray.      All questions were answered to patient's satisfaction and he will call for the questions or concerns.        Patient Risk Factor:  Patient is a high risk factor for infection.     Purvi Whelan DPM, Podiatry/Foot and Ankle Surgery        Again, thank you for allowing me to participate in the care of your patient.         Sincerely,        Purvi Whelan DPM, Podiatry/Foot and Ankle Surgery

## 2023-06-01 ENCOUNTER — HEALTH MAINTENANCE LETTER (OUTPATIENT)
Age: 73
End: 2023-06-01

## 2023-06-19 ENCOUNTER — HOSPITAL ENCOUNTER (OUTPATIENT)
Dept: ULTRASOUND IMAGING | Facility: CLINIC | Age: 73
Discharge: HOME | End: 2023-06-19
Attending: SURGERY
Payer: COMMERCIAL

## 2023-06-19 ENCOUNTER — OFFICE VISIT (OUTPATIENT)
Dept: OTHER | Facility: CLINIC | Age: 73
End: 2023-06-19
Attending: SURGERY
Payer: COMMERCIAL

## 2023-06-19 VITALS — OXYGEN SATURATION: 98 % | HEART RATE: 62 BPM | SYSTOLIC BLOOD PRESSURE: 124 MMHG | DIASTOLIC BLOOD PRESSURE: 63 MMHG

## 2023-06-19 DIAGNOSIS — I70.223 CRITICAL LIMB ISCHEMIA OF BOTH LOWER EXTREMITIES (H): Primary | ICD-10-CM

## 2023-06-19 DIAGNOSIS — I70.223 CRITICAL LIMB ISCHEMIA OF BOTH LOWER EXTREMITIES (H): ICD-10-CM

## 2023-06-19 PROCEDURE — 93924 LWR XTR VASC STDY BILAT: CPT | Mod: 26 | Performed by: SURGERY

## 2023-06-19 PROCEDURE — 99213 OFFICE O/P EST LOW 20 MIN: CPT | Performed by: SURGERY

## 2023-06-19 PROCEDURE — G0463 HOSPITAL OUTPT CLINIC VISIT: HCPCS | Mod: 25

## 2023-06-19 PROCEDURE — 93925 LOWER EXTREMITY STUDY: CPT

## 2023-06-19 PROCEDURE — 93924 LWR XTR VASC STDY BILAT: CPT

## 2023-06-19 PROCEDURE — 93925 LOWER EXTREMITY STUDY: CPT | Mod: 26 | Performed by: SURGERY

## 2023-06-19 RX ORDER — FUROSEMIDE 20 MG
10 TABLET ORAL EVERY MORNING
Status: ON HOLD | COMMUNITY
Start: 2023-06-13 | End: 2024-07-12

## 2023-06-19 NOTE — PROGRESS NOTES
Patient is here to discuss follow up    /63 (BP Location: Right arm, Patient Position: Chair, Cuff Size: Adult Regular)   Pulse 62   SpO2 98%     Questions patient would like addressed today are: N/A.    Refills are needed: No    Has homecare services and agency name:  [facility]

## 2023-06-19 NOTE — PROGRESS NOTES
Mr. Hopkins returns to clinic today.  He is a very pleasant 73-year-old gentleman with bilateral lower extremity critical limb ischemia.  On the left side he has had stents in the superficial femoral artery and on the right side he has undergone a femoral endarterectomy with superficial femoral artery stenting.  His last intervention was angioplasty of the right posterior tibial artery and tibioperoneal trunk.    He tells me that he is doing okay and does not have any additional wounds in his lower extremities.    On imaging there is a significant decline in his resting left ankle-brachial indices and toe pressures.  Sonography shows high-grade stenosis of the distal superficial femoral/popliteal artery beyond the stents.    My recommendation would be to proceed with left lower extremity arteriogram.  Because of the extensive right common femoral artery dissection and femoral endarterectomy I think it is best if we do this with an antegrade approach.  We will do a left common femoral artery cutdown with direct access so that we can repair the arterial site.  We will do the left lower extremity arteriogram and intervention as necessary.    I explained the procedure to him in detail and he has verbalizes understanding.

## 2023-06-19 NOTE — NURSING NOTE
Patient Education  Learner(s):patient  Method: Listening  Barriers to Learning:No Barrier  Outcome: Patient did verbalize understanding of above education.    DAFNE العراقي, RN  [facility]  Office:  [phone] Fax: [phone]

## 2023-06-19 NOTE — PATIENT INSTRUCTIONS
Zia Hopkins,    Your visit to [facility] for your procedure is coming soon and we look forward to seeing you! This friendly reminder and pre-procedure checklist will help to ensure your procedure goes smoothly and meets your expectations. At [facility], our goal is to provide you with a great patient experience and to deliver genuine, professional care to every patient.     Please complete all the steps in advance of your visit. If you have any questions about the items listed below, please give our office a call. We can be reached at [phone] or visit our website at [email or web address] for more information.     Procedure: Left femoral cutdown, Left  Leg  Angiogram with intervention.    Procedure Date :  TBD    Procedure Time :  TBD    Arrival Time: TBD    2 week Post Procedure Appointment with   or Nallely Trujillo NP and also ultrasound: TBD    Admission Type: inpatient. 1 day/night stay.     Surgeon:     Procedure Location: [facility].    If you take blood thinners: SEE SPECIFIC INSTRUCTIONS BELOW    PLEASE DO NOT STOP YOUR ASPIRIN OR PLAVIX UNLESS SPECIFICALLY DIRECTED BY THE VASCULAR SURGEON TO STOP!  In most cases Vascular providers want you to continue these. This is different from most NON vascular surgeries and may not be well known by your Primary Care Provider DO NOT STOP, CONTINUE TAKING ASPIRIN AND PLAVIX.       Prepare for the peripheral angiography as follows:  Do not eat 8 hours before your procedure. You may have clear liquids up to 1 hour before surgery.  Tell your healthcare provider about all medicines you take and any allergies you may have.  Arrange for a family member or friend to drive you home.  If you are on Metformin, please HOLD for 48 hours after the procedure.  Please be sure to address your diabetic medications with your Primary Care Physician prior to your  angiogram.    Peripheral Angiography    Peripheral angiography is an outpatient procedure that makes a  map  of the vessels (arteries) in your lower body, legs, and arms, using X-ray and dye.This map can show where blood flow may be blocked.    An angiogram is commonly performed under sedation with the use of local anesthesia.    The procedure usually starts with a needle put into the femoral (groin) artery. From one treatment site, areas all over the body can be treated.  After access is established, catheters (thin tubes) and wires are threaded through the arterial system to a specific area of interest or throughout the entire body.  As a contrast agent (iodine dye) is injected, X-ray images are taken to let your provider view the flow of the dye and identify blockages. The surgeon can then choose the best mode of therapy for you - whether during or following the angiogram. This decision depends on your symptoms and the severity and characteristics of the blockages.  Two common therapies that can be provided during the angiogram are balloon angioplasty and stent placement.                   Angioplasty can be used to open arterial blockages. Guided by X-ray, your provider navigates through the blockage with a wire and introduces a special device equipped with an inflatable balloon. After positioning the balloon device across the blocked portion of the artery, the provider inflates the balloon to expand the artery and compress the blockage. The balloon is then deflated and removed while keeping the wire in place across the area that has been treated. Next, contrast dye is injected to assess the result. Treatment is considered a success if blood flow is improved and less than 30% of the blockage remains. If the vessel is still considerably narrowed, placing a stent may be the next step.    Stents are used to prop open an artery at the site of a narrowing. Stents are generally placed after balloon angioplasty when  there is residual narrowing or insufficient blood flow in a treated vessel. Stents are considered a permanent implant and cannot be used if you have a metal allergy. Stents that are used in the leg are constructed of a nickel-titanium alloy (Nitinol), a memory-shaped metal. This alloy has a predetermined size and shape at body temperature and expands to this size and shape after being introduced through a catheter. These stents resist kinking and are flexible so that damage from activities that involve your legs is minimized.  Your procedure may require other techniques to address the problem or plaque.     If surgery is felt to be a better option, your vascular provider will obtain any additional X-ray images needed to plan a surgical bypass of the blocked vessel/s and will then conclude the angiogram.      During the procedure  Here is what to expect:  You may get medicine through an IV (intravenous) line to relax you. You re given an injection to numb the insertion site. Then, a tiny skin cut (incision) is made near an artery in your groin.  Your provider inserts a thin tube (catheter) through the incision. He or she then threads the catheter into an artery while looking at a video monitor.  Contrast  dye  is injected into the catheter to confirm position. You may feel warmth or pressure in your legs and back. You lie still as X-rays are taken. The catheter is then taken out.  After the procedure  You ll be taken to a recovery area. A healthcare provider will apply pressure to the site for about 10 minutes. Your healthcare provider will tell you how long to lie down and keep the insertion site still. Your healthcare provider will discuss the results with you soon after the procedure.      Angiogram Procedure Discharge Instructions:     1. If you received sedation for your procedure: Do not drive or operate heavy machinery for the rest of the day.  2. Avoid strenuous activity for 72 hours (3 days):                         - Do not lift greater than 10 pounds.                        - Excessive exercise                        - Straining                        - Return to your normal activities as you tolerate after the 3 days restriction  3. Avoid tub baths, Jacuzzis, hot tubs and pools for 72 hours (3 days) or until puncture site is will healed.  4. You may shower beginning tomorrow. Do not scrub puncture site(s) until well healed, pat dry.  5. You can expect to return to work 1-2 days after your procedure - depending on the nature of your profession.  6. It is normal to have some tenderness and minimal swelling at puncture site. A small area of discoloration may be present. Tenderness typically subsides in 24-48 hours. A small knot may also be present at puncture site for 6-8 weeks, this can be a normal part of the healing process.       After the angiogram If you:      1. Experience any bleeding or active swelling from puncture site: Lie down, firmly apply pressure to puncture site and CALL 9-1-1  2. Fever greater than 101 degrees Fahrenheit.  3. Redness, swelling, warmth to touch, or purulent (yellow/green/foul smelling) drainage from the puncture site.  4. Increasing pain, tenderness or swelling at puncture site OR of arm/leg near puncture site.  5. Feeling weak or faint.  6. Change in color, temperature, or sensation of arm/leg where puncture was made.  7. You can t feel your thrill (pulse at your dialysis fistula site) or it feels weak (If you had fistulogram done).     Call us with any other questions or concerns after your procedure: [phone]      All invasive procedures can have complications. While the risk of an angiogram is low it is not zero. The most common complications are related to the arterial access site.       Risks/ Complications   Bruising is Common  You will likely have bruising (ecchymosis) where the artery was entered.    Pain and Bleeding  Less commonly, patients experience pain and bleeding  "that may include blood collecting under the skin (hematoma).    Blockage and Leakage   In rare cases, the access artery can become blocked. Infrequently, patients experience persistent leakage of blood where the artery was entered, which can result in the formation of a pseudoaneurysm--a blood-filled sac--that may require further treatment.  Other complications related to an angiogram include:   Allergic reaction to the iodine contrast dye, which can lead to the development of kidney failure.  Very rarely during balloon angioplasty and/or stent placement, part of the arterial blockage can break off (embolism) and travel to more distant arteries. This can worsen blood flow.    Pre-Procedure checklist:    [x] A Pre-op physical within 30 days of the procedure is required. You will need to set up an appointment with your primary care provider.  [x] Contact your insurance regarding coverage  If you would like a Good [place] Estimate for your upcoming procedure at [facility] Location, contact Cost of Care Estimates   Advocates are available Monday through Friday 8am - 5pm  \You may also submit a request online at [email or web address]  - Complete the secure online form found under \"Services and Procedure Pricing\"   If your procedure is at [facility], please contact the numbers below for Cost of Care Estimates.   - Facility Charge: [phone]    Anesthesiology charge:  [phone]   [x] DO NOT BRING FMLA WITH TO SURGERY.  These should be sent to the provider's office by fax to [phone].     [x] Day of Surgery  Medications - Take as indicated with sips of water.   Wear comfortable loose-fitting clothes. Wear your glasses-Not contacts. Do not wear jewelry and remove body piercings. Surgery may be cancelled if they are not removed.   You may have 2 family member wait in the lobby during your surgery. Visitor restrictions are subject to change. Please verify with the surgery nurse when " they call.   If same day surgery-Have a someone come with you to surgery that can help you understand the surgeon's instructions, drive you home and stay with you overnight the first night.    [x] You will receive a call from a nurse 1-3 days prior to the procedure. They will go over more details with you    Showering instructions and Chlorhexidine soap reviewed and provided to patient.     Notify our office right away, if you have any changes in your health status, or if you develop a cold, flu, diarrhea, infection, fever or sore throat before your scheduled surgery date. We can be reached at  [phone] Monday-Friday 8 am-4:30 pm if you have any questions.   Thank you for choosing [facility]

## 2023-06-20 ENCOUNTER — TELEPHONE (OUTPATIENT)
Dept: OTHER | Facility: CLINIC | Age: 73
End: 2023-06-20
Payer: COMMERCIAL

## 2023-06-20 NOTE — TELEPHONE ENCOUNTER
CASE REQUEST RECEIVED ON 6/19/23 FOR: LEFT FEMORAL CUTDOWN, LEFT LEG ANGIOGRAM, STENTING AND ANGIOPLASTY    CASE ID: [id number]

## 2023-06-27 NOTE — TELEPHONE ENCOUNTER
Spoke with patient and informed him of his scheduled surgery date/time of Wednesday, 7/12/23 @ 10:30am with a check-in time of 8:30am at [facility] Desk.    Patient will schedule a pre-op physical exam appointment.    Patient is scheduled for a post-op DAVID and a post-op appointment with Dr. Hamlin on Monday, 7/24/23.

## 2023-07-11 ENCOUNTER — ANESTHESIA EVENT (OUTPATIENT)
Dept: SURGERY | Facility: CLINIC | Age: 73
End: 2023-07-11
Payer: COMMERCIAL

## 2023-07-11 ASSESSMENT — ACTIVITIES OF DAILY LIVING (ADL)
WALKING_OR_CLIMBING_STAIRS_DIFFICULTY: NO
FALL_HISTORY_WITHIN_LAST_SIX_MONTHS: NO
CONCENTRATING,_REMEMBERING_OR_MAKING_DECISIONS_DIFFICULTY: NO
DOING_ERRANDS_INDEPENDENTLY_DIFFICULTY: NO
TOILETING_ISSUES: NO
DIFFICULTY_EATING/SWALLOWING: NO
DRESSING/BATHING_DIFFICULTY: NO
CHANGE_IN_FUNCTIONAL_STATUS_SINCE_ONSET_OF_CURRENT_ILLNESS/INJURY: NO
WEAR_GLASSES_OR_BLIND: NO

## 2023-07-11 ASSESSMENT — ENCOUNTER SYMPTOMS
DYSRHYTHMIAS: 0
SEIZURES: 0

## 2023-07-11 ASSESSMENT — LIFESTYLE VARIABLES: TOBACCO_USE: 1

## 2023-07-11 NOTE — TELEPHONE ENCOUNTER
Patient had questions about continuing the Aspirin and Plavix.  Spoke with patient's wife and informed her he is to continue taking Aspirin and Plavix.

## 2023-07-11 NOTE — PROGRESS NOTES
PTA medications updated by Medication Scribe prior to surgery via phone call with patient (last doses completed by Nurse)     Medication history sources: Patient, Surescripts and H&P  In the past week, patient estimated taking medication this percent of the time: Greater than 90%      Significant changes made to the medication list:  None      Additional medication history information:   None    Medication reconciliation completed by provider prior to medication history? No    Time spent in this activity: 35 MINUTES    The information provided in this note is only as accurate as the sources available at the time of update(s)      Prior to Admission medications    Medication Sig Last Dose Taking? Auth Provider Long Term End Date   amLODIPine (NORVASC) 10 MG tablet Take 10 mg by mouth daily  at AM Yes Reported, Patient Yes    aspirin 81 MG tablet Take 1 tablet (81 mg) by mouth daily  at AM Yes Dm Monroe MD     atorvastatin (LIPITOR) 80 MG tablet TAKE 1 TABLET(80 MG) BY MOUTH AT BEDTIME  at PM Yes Surjit Hamlin MD Yes    chlorthalidone (HYGROTON) 50 MG tablet Take 50 mg by mouth daily   at AM Yes Reported, Patient Yes    clopidogrel (PLAVIX) 75 MG tablet TAKE 1 TABLET(75 MG) BY MOUTH DAILY  at AM Yes Surjit Hamlin MD Yes    empagliflozin (JARDIANCE) 25 MG TABS tablet Take 25 mg by mouth daily  at AM Yes Reported, Patient     ezetimibe (ZETIA) 10 MG tablet TAKE 1 TABLET(10 MG) BY MOUTH DAILY  at AM Yes Surjit Hamlin MD Yes    furosemide (LASIX) 20 MG tablet Take 10 mg by mouth every morning (20 MG X 0.5 = 10 MG)  at AM Yes Reported, Patient Yes    losartan (COZAAR) 100 MG tablet Take 100 mg by mouth daily   at AM Yes Reported, Patient Yes    metFORMIN (GLUCOPHAGE) 1000 MG tablet Take 1,000 mg by mouth 2 times daily (with meals)   at AM Yes Reported, Patient No    metoprolol succinate ER (TOPROL-XL) 100 MG 24 hr tablet Take 1 tablet (100 mg) by mouth daily  at AM Yes Yakelin  Surjit Christianson MD Yes

## 2023-07-12 ENCOUNTER — HOSPITAL ENCOUNTER (OUTPATIENT)
Facility: CLINIC | Age: 73
Discharge: HOME | End: 2023-07-12
Attending: SURGERY | Admitting: SURGERY
Payer: COMMERCIAL

## 2023-07-12 ENCOUNTER — APPOINTMENT (OUTPATIENT)
Dept: INTERVENTIONAL RADIOLOGY/VASCULAR | Facility: CLINIC | Age: 73
End: 2023-07-12
Attending: SURGERY
Payer: COMMERCIAL

## 2023-07-12 ENCOUNTER — APPOINTMENT (OUTPATIENT)
Dept: SURGERY | Facility: PHYSICIAN GROUP | Age: 73
End: 2023-07-12
Payer: COMMERCIAL

## 2023-07-12 ENCOUNTER — ANESTHESIA (OUTPATIENT)
Dept: SURGERY | Facility: CLINIC | Age: 73
End: 2023-07-12
Payer: COMMERCIAL

## 2023-07-12 VITALS
SYSTOLIC BLOOD PRESSURE: 115 MMHG | WEIGHT: 130 LBS | BODY MASS INDEX: 22.2 KG/M2 | DIASTOLIC BLOOD PRESSURE: 56 MMHG | RESPIRATION RATE: 16 BRPM | HEART RATE: 63 BPM | OXYGEN SATURATION: 94 % | TEMPERATURE: 97.5 F | HEIGHT: 64 IN

## 2023-07-12 DIAGNOSIS — I70.223 CRITICAL LIMB ISCHEMIA OF BOTH LOWER EXTREMITIES (H): ICD-10-CM

## 2023-07-12 DIAGNOSIS — I70.229 CRITICAL ISCHEMIA OF LOWER EXTREMITY (H): Primary | ICD-10-CM

## 2023-07-12 DIAGNOSIS — I70.223 CRITICAL LIMB ISCHEMIA OF BOTH LOWER EXTREMITIES (H): Primary | ICD-10-CM

## 2023-07-12 LAB
ABO/RH(D): NORMAL
ANTIBODY SCREEN: NEGATIVE
GLUCOSE SERPL-MCNC: 127 MG/DL (ref 70–99)
HBA1C MFR BLD: 7 %
HGB BLD-MCNC: 13.8 G/DL (ref 13.3–17.7)
POTASSIUM SERPL-SCNC: 3.7 MMOL/L (ref 3.4–5.3)
SPECIMEN EXPIRATION DATE: NORMAL

## 2023-07-12 PROCEDURE — 250N000011 HC RX IP 250 OP 636: Performed by: ANESTHESIOLOGY

## 2023-07-12 PROCEDURE — C1725 CATH, TRANSLUMIN NON-LASER: HCPCS | Performed by: SURGERY

## 2023-07-12 PROCEDURE — C1887 CATHETER, GUIDING: HCPCS | Performed by: SURGERY

## 2023-07-12 PROCEDURE — 250N000011 HC RX IP 250 OP 636: Performed by: SURGERY

## 2023-07-12 PROCEDURE — 710N000009 HC RECOVERY PHASE 1, LEVEL 1, PER MIN: Performed by: SURGERY

## 2023-07-12 PROCEDURE — 82947 ASSAY GLUCOSE BLOOD QUANT: CPT | Performed by: SURGERY

## 2023-07-12 PROCEDURE — 36415 COLL VENOUS BLD VENIPUNCTURE: CPT | Performed by: SURGERY

## 2023-07-12 PROCEDURE — 272N000124 HC CATH CR11

## 2023-07-12 PROCEDURE — 255N000002 HC RX 255 OP 636: Mod: JZ | Performed by: SURGERY

## 2023-07-12 PROCEDURE — C1769 GUIDE WIRE: HCPCS | Performed by: SURGERY

## 2023-07-12 PROCEDURE — 258N000003 HC RX IP 258 OP 636: Performed by: ANESTHESIOLOGY

## 2023-07-12 PROCEDURE — 999N000012 HC STATISTIC ANGIOGRAM, STENT, VERTEBRO PLASTY

## 2023-07-12 PROCEDURE — 250N000025 HC SEVOFLURANE, PER MIN: Performed by: SURGERY

## 2023-07-12 PROCEDURE — C1894 INTRO/SHEATH, NON-LASER: HCPCS | Performed by: SURGERY

## 2023-07-12 PROCEDURE — 370N000017 HC ANESTHESIA TECHNICAL FEE, PER MIN: Performed by: SURGERY

## 2023-07-12 PROCEDURE — 258N000003 HC RX IP 258 OP 636

## 2023-07-12 PROCEDURE — 37224 PR REVASCULARIZE FEM/POP ARTERY, ANGIOPLASTY: CPT | Mod: LT | Performed by: SURGERY

## 2023-07-12 PROCEDURE — 250N000009 HC RX 250: Performed by: SURGERY

## 2023-07-12 PROCEDURE — 999N000141 HC STATISTIC PRE-PROCEDURE NURSING ASSESSMENT: Performed by: SURGERY

## 2023-07-12 PROCEDURE — 85018 HEMOGLOBIN: CPT | Performed by: ANESTHESIOLOGY

## 2023-07-12 PROCEDURE — 272N000001 HC OR GENERAL SUPPLY STERILE: Performed by: SURGERY

## 2023-07-12 PROCEDURE — 93005 ELECTROCARDIOGRAM TRACING: CPT

## 2023-07-12 PROCEDURE — C2623 CATH, TRANSLUMIN, DRUG-COAT: HCPCS

## 2023-07-12 PROCEDURE — 272N000570 HC SHEATH CR7

## 2023-07-12 PROCEDURE — 84132 ASSAY OF SERUM POTASSIUM: CPT | Performed by: ANESTHESIOLOGY

## 2023-07-12 PROCEDURE — 83036 HEMOGLOBIN GLYCOSYLATED A1C: CPT | Performed by: SURGERY

## 2023-07-12 PROCEDURE — 86850 RBC ANTIBODY SCREEN: CPT | Performed by: SURGERY

## 2023-07-12 PROCEDURE — 360N000077 HC SURGERY LEVEL 4, PER MIN: Performed by: SURGERY

## 2023-07-12 PROCEDURE — 272N000571 HC SHEATH CR8

## 2023-07-12 PROCEDURE — 86901 BLOOD TYPING SEROLOGIC RH(D): CPT | Performed by: SURGERY

## 2023-07-12 PROCEDURE — 250N000009 HC RX 250: Performed by: ANESTHESIOLOGY

## 2023-07-12 RX ORDER — CEFAZOLIN SODIUM/WATER 2 G/20 ML
2 SYRINGE (ML) INTRAVENOUS
Status: COMPLETED | OUTPATIENT
Start: 2023-07-12 | End: 2023-07-12

## 2023-07-12 RX ORDER — ASPIRIN 81 MG/1
81 TABLET ORAL ONCE
Status: COMPLETED | OUTPATIENT
Start: 2023-07-12 | End: 2023-07-12

## 2023-07-12 RX ORDER — HYDROMORPHONE HCL IN WATER/PF 6 MG/30 ML
0.4 PATIENT CONTROLLED ANALGESIA SYRINGE INTRAVENOUS EVERY 5 MIN PRN
Status: DISCONTINUED | OUTPATIENT
Start: 2023-07-12 | End: 2023-07-12 | Stop reason: HOSPADM

## 2023-07-12 RX ORDER — FENTANYL CITRATE 0.05 MG/ML
50 INJECTION, SOLUTION INTRAMUSCULAR; INTRAVENOUS EVERY 5 MIN PRN
Status: DISCONTINUED | OUTPATIENT
Start: 2023-07-12 | End: 2023-07-12 | Stop reason: HOSPADM

## 2023-07-12 RX ORDER — OXYCODONE HYDROCHLORIDE 5 MG/1
5 TABLET ORAL EVERY 4 HOURS PRN
Status: DISCONTINUED | OUTPATIENT
Start: 2023-07-12 | End: 2023-07-12 | Stop reason: HOSPADM

## 2023-07-12 RX ORDER — ONDANSETRON 4 MG/1
4 TABLET, ORALLY DISINTEGRATING ORAL EVERY 30 MIN PRN
Status: DISCONTINUED | OUTPATIENT
Start: 2023-07-12 | End: 2023-07-12 | Stop reason: HOSPADM

## 2023-07-12 RX ORDER — ONDANSETRON 2 MG/ML
4 INJECTION INTRAMUSCULAR; INTRAVENOUS EVERY 30 MIN PRN
Status: DISCONTINUED | OUTPATIENT
Start: 2023-07-12 | End: 2023-07-12 | Stop reason: HOSPADM

## 2023-07-12 RX ORDER — FENTANYL CITRATE 0.05 MG/ML
25 INJECTION, SOLUTION INTRAMUSCULAR; INTRAVENOUS EVERY 5 MIN PRN
Status: DISCONTINUED | OUTPATIENT
Start: 2023-07-12 | End: 2023-07-12 | Stop reason: HOSPADM

## 2023-07-12 RX ORDER — CEFAZOLIN SODIUM/WATER 2 G/20 ML
2 SYRINGE (ML) INTRAVENOUS SEE ADMIN INSTRUCTIONS
Status: DISCONTINUED | OUTPATIENT
Start: 2023-07-12 | End: 2023-07-12 | Stop reason: HOSPADM

## 2023-07-12 RX ORDER — FENTANYL CITRATE 50 UG/ML
INJECTION, SOLUTION INTRAMUSCULAR; INTRAVENOUS PRN
Status: DISCONTINUED | OUTPATIENT
Start: 2023-07-12 | End: 2023-07-12

## 2023-07-12 RX ORDER — IOPAMIDOL 612 MG/ML
INJECTION, SOLUTION INTRAVASCULAR PRN
Status: DISCONTINUED | OUTPATIENT
Start: 2023-07-12 | End: 2023-07-12 | Stop reason: HOSPADM

## 2023-07-12 RX ORDER — SODIUM CHLORIDE 9 MG/ML
INJECTION, SOLUTION INTRAVENOUS CONTINUOUS
Status: DISCONTINUED | OUTPATIENT
Start: 2023-07-12 | End: 2023-07-12 | Stop reason: HOSPADM

## 2023-07-12 RX ORDER — LIDOCAINE HYDROCHLORIDE 20 MG/ML
INJECTION, SOLUTION INFILTRATION; PERINEURAL PRN
Status: DISCONTINUED | OUTPATIENT
Start: 2023-07-12 | End: 2023-07-12

## 2023-07-12 RX ORDER — HEPARIN SODIUM 1000 [USP'U]/ML
INJECTION, SOLUTION INTRAVENOUS; SUBCUTANEOUS PRN
Status: DISCONTINUED | OUTPATIENT
Start: 2023-07-12 | End: 2023-07-12

## 2023-07-12 RX ORDER — HYDROMORPHONE HCL IN WATER/PF 6 MG/30 ML
0.2 PATIENT CONTROLLED ANALGESIA SYRINGE INTRAVENOUS EVERY 5 MIN PRN
Status: DISCONTINUED | OUTPATIENT
Start: 2023-07-12 | End: 2023-07-12 | Stop reason: HOSPADM

## 2023-07-12 RX ORDER — PROPOFOL 10 MG/ML
INJECTION, EMULSION INTRAVENOUS CONTINUOUS PRN
Status: DISCONTINUED | OUTPATIENT
Start: 2023-07-12 | End: 2023-07-12

## 2023-07-12 RX ORDER — VASOPRESSIN IN 0.9 % NACL 2 UNIT/2ML
SYRINGE (ML) INTRAVENOUS PRN
Status: DISCONTINUED | OUTPATIENT
Start: 2023-07-12 | End: 2023-07-12

## 2023-07-12 RX ORDER — SODIUM CHLORIDE, SODIUM LACTATE, POTASSIUM CHLORIDE, CALCIUM CHLORIDE 600; 310; 30; 20 MG/100ML; MG/100ML; MG/100ML; MG/100ML
INJECTION, SOLUTION INTRAVENOUS CONTINUOUS
Status: DISCONTINUED | OUTPATIENT
Start: 2023-07-12 | End: 2023-07-12 | Stop reason: HOSPADM

## 2023-07-12 RX ORDER — PROPOFOL 10 MG/ML
INJECTION, EMULSION INTRAVENOUS PRN
Status: DISCONTINUED | OUTPATIENT
Start: 2023-07-12 | End: 2023-07-12

## 2023-07-12 RX ORDER — ONDANSETRON 2 MG/ML
INJECTION INTRAMUSCULAR; INTRAVENOUS PRN
Status: DISCONTINUED | OUTPATIENT
Start: 2023-07-12 | End: 2023-07-12

## 2023-07-12 RX ORDER — OXYCODONE AND ACETAMINOPHEN 5; 325 MG/1; MG/1
1 TABLET ORAL EVERY 6 HOURS PRN
Qty: 15 TABLET | Refills: 0 | Status: ON HOLD | OUTPATIENT
Start: 2023-07-12 | End: 2024-07-22

## 2023-07-12 RX ORDER — LIDOCAINE HYDROCHLORIDE 10 MG/ML
INJECTION, SOLUTION INFILTRATION; PERINEURAL
Status: COMPLETED | OUTPATIENT
Start: 2023-07-12 | End: 2023-07-12

## 2023-07-12 RX ORDER — BUPIVACAINE HYDROCHLORIDE 5 MG/ML
INJECTION, SOLUTION PERINEURAL PRN
Status: DISCONTINUED | OUTPATIENT
Start: 2023-07-12 | End: 2023-07-12 | Stop reason: HOSPADM

## 2023-07-12 RX ORDER — ACETAMINOPHEN 325 MG/1
650 TABLET ORAL EVERY 6 HOURS PRN
Status: DISCONTINUED | OUTPATIENT
Start: 2023-07-12 | End: 2023-07-12 | Stop reason: HOSPADM

## 2023-07-12 RX ORDER — SODIUM CHLORIDE, SODIUM LACTATE, POTASSIUM CHLORIDE, CALCIUM CHLORIDE 600; 310; 30; 20 MG/100ML; MG/100ML; MG/100ML; MG/100ML
INJECTION, SOLUTION INTRAVENOUS CONTINUOUS PRN
Status: DISCONTINUED | OUTPATIENT
Start: 2023-07-12 | End: 2023-07-12

## 2023-07-12 RX ORDER — EPHEDRINE SULFATE 50 MG/ML
INJECTION, SOLUTION INTRAMUSCULAR; INTRAVENOUS; SUBCUTANEOUS PRN
Status: DISCONTINUED | OUTPATIENT
Start: 2023-07-12 | End: 2023-07-12

## 2023-07-12 RX ADMIN — Medication 2 G: at 10:50

## 2023-07-12 RX ADMIN — SUGAMMADEX 150 MG: 100 INJECTION, SOLUTION INTRAVENOUS at 12:44

## 2023-07-12 RX ADMIN — Medication 5 MG: at 10:59

## 2023-07-12 RX ADMIN — HEPARIN SODIUM 5 ML: 1000 INJECTION, SOLUTION INTRAVENOUS; SUBCUTANEOUS at 11:38

## 2023-07-12 RX ADMIN — Medication 0.3 UNITS: at 11:03

## 2023-07-12 RX ADMIN — Medication 5 MG: at 10:54

## 2023-07-12 RX ADMIN — PROPOFOL 25 MCG/KG/MIN: 10 INJECTION, EMULSION INTRAVENOUS at 11:10

## 2023-07-12 RX ADMIN — FENTANYL CITRATE 100 MCG: 50 INJECTION, SOLUTION INTRAMUSCULAR; INTRAVENOUS at 10:41

## 2023-07-12 RX ADMIN — PHENYLEPHRINE HYDROCHLORIDE 0.3 MCG/KG/MIN: 10 INJECTION INTRAVENOUS at 10:50

## 2023-07-12 RX ADMIN — LIDOCAINE HYDROCHLORIDE 1 ML: 10 INJECTION, SOLUTION INFILTRATION; PERINEURAL at 10:23

## 2023-07-12 RX ADMIN — SODIUM CHLORIDE, POTASSIUM CHLORIDE, SODIUM LACTATE AND CALCIUM CHLORIDE: 600; 310; 30; 20 INJECTION, SOLUTION INTRAVENOUS at 10:29

## 2023-07-12 RX ADMIN — PROPOFOL 20 MG: 10 INJECTION, EMULSION INTRAVENOUS at 12:34

## 2023-07-12 RX ADMIN — PROPOFOL 200 MG: 10 INJECTION, EMULSION INTRAVENOUS at 10:41

## 2023-07-12 RX ADMIN — ROCURONIUM BROMIDE 50 MG: 50 INJECTION, SOLUTION INTRAVENOUS at 10:41

## 2023-07-12 RX ADMIN — Medication 5 MG: at 11:01

## 2023-07-12 RX ADMIN — Medication 5 MG: at 11:40

## 2023-07-12 RX ADMIN — ONDANSETRON 4 MG: 2 INJECTION INTRAMUSCULAR; INTRAVENOUS at 12:32

## 2023-07-12 RX ADMIN — SODIUM CHLORIDE, POTASSIUM CHLORIDE, SODIUM LACTATE AND CALCIUM CHLORIDE: 600; 310; 30; 20 INJECTION, SOLUTION INTRAVENOUS at 10:48

## 2023-07-12 RX ADMIN — LIDOCAINE HYDROCHLORIDE 100 MG: 20 INJECTION, SOLUTION INFILTRATION; PERINEURAL at 10:41

## 2023-07-12 RX ADMIN — Medication 5 MG: at 12:07

## 2023-07-12 RX ADMIN — HYDROMORPHONE HYDROCHLORIDE 0.5 MG: 1 INJECTION, SOLUTION INTRAMUSCULAR; INTRAVENOUS; SUBCUTANEOUS at 11:27

## 2023-07-12 ASSESSMENT — ACTIVITIES OF DAILY LIVING (ADL)
ADLS_ACUITY_SCORE: 20
ADLS_ACUITY_SCORE: 35

## 2023-07-12 NOTE — ANESTHESIA CARE TRANSFER NOTE
Patient: Zia Hopkins    Procedure: Procedure(s):  LEFT FEMORAL CUTDOWN  LEFT LOWER EXTREMITY ANGIOGRAM BALLOON ANGIOPLASTY       Diagnosis: Critical limb ischemia of both lower extremities (H) [I70.223]  Diagnosis Additional Information: No value filed.    Anesthesia Type:   General     Note:    Oropharynx: oropharynx clear of all foreign objects and spontaneously breathing  Level of Consciousness: awake  Oxygen Supplementation: face mask  Level of Supplemental Oxygen (L/min / FiO2): 6  Independent Airway: airway patency satisfactory and stable  Dentition: dentition unchanged  Vital Signs Stable: post-procedure vital signs reviewed and stable  Report to RN Given: handoff report given  Patient transferred to: PACU    Handoff Report: Identifed the Patient, Identified the Reponsible Provider, Reviewed the pertinent medical history, Discussed the surgical course, Reviewed Intra-OP anesthesia mangement and issues during anesthesia, Set expectations for post-procedure period and Allowed opportunity for questions and acknowledgement of understanding      Vitals:  Vitals Value Taken Time   /56 07/12/23 1300   Temp     Pulse 70 07/12/23 1303   Resp 12 07/12/23 1303   SpO2 96 % 07/12/23 1303       Electronically Signed By: CLAUDINE Garrison CRNA  July 12, 2023  1:05 PM

## 2023-07-12 NOTE — BRIEF OP NOTE
[facility]    Brief Operative Note    Pre-operative diagnosis: Critical limb ischemia of both lower extremities (H) [I70.223]  Post-operative diagnosis Same as pre-operative diagnosis    Procedure: Procedure(s):  LEFT FEMORAL CUTDOWN  LEFT LOWER EXTREMITY ANGIOGRAM BALLOON ANGIOPLASTY  Surgeon: Surgeon(s) and Role:     * Surjit Hamlin MD - Primary     * Maryann Belle DO - Assisting  Anesthesia: General   Estimated Blood Loss: 50 mL from 7/12/2023 10:33 AM to 7/12/2023 12:53 PM      Drains: None  Specimens: * No specimens in log *  Findings:   nearly occlusion lesion in above knee popliteal artery treated with PBA and DCB, improved flow post procedure with strong monphasic DP and PT on left. 2+ femoral pulse in left groin..  Complications: None.  Implants: * No implants in log *

## 2023-07-12 NOTE — ANESTHESIA PROCEDURE NOTES
Arterial Line Procedure Note    Pre-Procedure   Staff -        Anesthesiologist:  Juan Pablo Gallardo MD       Performed By: anesthesiologist       Location: pre-op       Pre-Anesthestic Checklist: patient identified, IV checked, risks and benefits discussed, informed consent, monitors and equipment checked and pre-op evaluation  Timeout:       Correct Patient: Yes        Correct Procedure: Yes        Correct Site: Yes        Correct Position: Yes   Line Placement:   This line was placed Pre Induction starting at 7/12/2023 10:23 AM and ending at 7/12/2023 10:23 AM  Procedure   Procedure: arterial line       Laterality: right       Insertion Site: radial.  Sterile Prep        Standard elements of sterile barrier followed       Skin prep: Chloraprep  Insertion/Injection        Technique: ultrasound guided        1. Ultrasound was used to evaluate the access site.       2. Artery evaluated via ultrasound for patency/adequacy.       3. Using real-time ultrasound the needle/catheter was observed entering the artery/vein.       5. The visualized structures were anatomically normal.       6. There were no apparent abnormal pathologic findings.       Catheter Type/Size: 20 G, 1.75 in/4.5 cm quick cath (integral wire)  Narrative         Secured by: other       Tegaderm dressing used.       Complications: None apparent,        Arterial waveform: Yes    Comments:  Arterial line secured with a Tegaderm and tape. Sterile gloves, mask, hat, and sterile drape utilized. No IMAGES SAVED

## 2023-07-12 NOTE — PROGRESS NOTES
1435: Pt transferred from PACU. Gauze drsg CDI to left groin incision site. No oozing or hematoma noted. Area soft & flat. Pt denies pain. Pt instructed on activity restrictions while on bedrest. Verbal understanding received from pt.

## 2023-07-12 NOTE — PROGRESS NOTES
Fellow stopped by and told patient he could go home.  She stated he needed to void prior to discharge, and should be on bedrest for 2 hours prior to getting up. [facility] will be [facility].

## 2023-07-12 NOTE — ANESTHESIA PREPROCEDURE EVALUATION
Anesthesia Pre-Procedure Evaluation    Patient: Zia Hopkins   MRN: [id number] : 1950        Procedure : Procedure(s):  LEFT FEMORAL CUTDOWN  LEFT LOWER EXTREMITY ANGIOGRAM WITH STENTING AND ANGIOPLASTY          Past Medical History:   Diagnosis Date     Acute low back pain      Bilateral carotid bruits      Cellulitis of left foot      Central spinal stenosis     and Subarticular     Chronic kidney disease      Degenerative lumbar spinal stenosis     multilevel     Diabetes mellitus type 2, noninsulin dependent (H)      Diabetic ulcer of toe (H)      Gangrene of toe (H)      Hyperlipidemia LDL goal <70      Hypertension      Lumbar foraminal stenosis      PAD (peripheral artery disease) (H)      Psoriasis       Past Surgical History:   Procedure Laterality Date     AMPUTATE FOOT Left 3/15/2017    Procedure: AMPUTATE FOOT;  Surgeon: Emre Mauricio DPM;  Location: [facility] OR     AMPUTATE TOE(S) Left 2021    Procedure: LEFT GREAT TOE AMPUTATION.;  Surgeon: Purvi Whelan DPM, Podiatry/Foot and Ankle Surgery;  Location: [facility] OR     AMPUTATE TOE(S) Right 2022    Procedure: 1.  Partial right great toe amputation. 2.  Right foot fourth toe amputation at the metatarsophalangeal joint. 3.  Right fifth metatarsal amputation at the metatarsophalangeal joint.;  Surgeon: Purvi Whelan DPM, Podiatry/Foot and Ankle Surgery;  Location: [facility] OR     AMPUTATE TOE(S) Right 2022    Procedure: Right second and third toe amputations;  Surgeon: Purvi Whelan DPM, Podiatry/Foot and Ankle Surgery;  Location:  OR     BIOPSY BONE FOOT Right 2022    Procedure: Partial right fourth and fifth metatarsal bone excision;  Surgeon: Purvi Whelan DPM, Podiatry/Foot and Ankle Surgery;  Location: [facility] OR     ENDARTERECTOMY FEMORAL Right 3/4/2022    Procedure: RIGHT FEMORAL ENDARTERECTOMY, RIGHT FEMORAL ARTERY BALLOON ANGIOPLASTY WITH STENTING;  Surgeon: Surjit Hamlin MD;  Location:  OR     IR LOWER  EXTREMITY ANGIOGRAM LEFT  2019     IR LOWER EXTREMITY ANGIOGRAM LEFT  2/3/2021     IR LOWER EXTREMITY ANGIOGRAM LEFT  2021     IR LOWER EXTREMITY ANGIOGRAM RIGHT  3/1/2022     IR LOWER EXTREMITY ANGIOGRAM RIGHT  6/10/2022     IR OR ANGIOGRAM  3/4/2022     IRRIGATION AND DEBRIDEMENT FOOT, COMBINED Left 3/15/2017    Procedure: COMBINED IRRIGATION AND DEBRIDEMENT FOOT;  Surgeon: Emre Mauricio DPM;  Location: [facility] OR      No Known Allergies   Social History     Tobacco Use     Smoking status: Former     Packs/day: 0.25     Types: Cigarettes     Quit date: 3/1/2016     Years since quittin.3     Smokeless tobacco: Never     Tobacco comments:     2-3 cigarettes per day   Substance Use Topics     Alcohol use: Yes     Comment: 1-2.5 glasses of wine at dinner      Wt Readings from Last 1 Encounters:   23 55.3 kg (122 lb)        Anesthesia Evaluation   Pt has had prior anesthetic. Type: General.    No history of anesthetic complications       ROS/MED HX  ENT/Pulmonary:     (+) tobacco use, Past use,  (-) sleep apnea and recent URI   Neurologic:    (-) no seizures, no CVA and no TIA   Cardiovascular:     (+) Dyslipidemia hypertension-Peripheral Vascular Disease-- Symptomatic. ---Taking blood thinners  (-) arrhythmias   METS/Exercise Tolerance:     Hematologic:     (+) anemia,     Musculoskeletal:       GI/Hepatic:    (-) GERD and liver disease   Renal/Genitourinary:    (-) renal disease   Endo:     (+) type II DM, Diabetic complications: neuropathy.  (-) Type I DM and obesity   Psychiatric/Substance Use:       Infectious Disease:    (-) Recent Fever   Malignancy:       Other:            Physical Exam    Airway        Mallampati: II   TM distance: > 3 FB   Neck ROM: full   Mouth opening: > 3 cm    Respiratory Devices and Support         Dental       (+) Minor Abnormalities - some fillings, tiny chips      Cardiovascular          Rhythm and rate: regular and normal     Pulmonary   pulmonary exam normal                 OUTSIDE LABS:  CBC:   Lab Results   Component Value Date    WBC 10.5 03/08/2022    WBC 9.5 03/07/2022    HGB 8.7 (L) 03/08/2022    HGB 7.6 (L) 03/07/2022    HGB 7.6 (L) 03/07/2022    HCT 26.9 (L) 03/08/2022    HCT 23.8 (L) 03/07/2022     03/08/2022     03/07/2022     BMP:   Lab Results   Component Value Date     06/10/2022     03/08/2022    POTASSIUM 4.0 06/10/2022    POTASSIUM 3.6 03/08/2022    CHLORIDE 105 06/10/2022    CHLORIDE 105 03/08/2022    CO2 28 06/10/2022    CO2 26 03/08/2022    BUN 28 06/10/2022    BUN 27 03/08/2022    CR 0.88 06/10/2022    CR 0.88 03/08/2022     (H) 06/27/2022     (H) 06/27/2022     COAGS:   Lab Results   Component Value Date    PTT 32 02/03/2021    INR 0.95 02/03/2021     POC:   Lab Results   Component Value Date     (H) 04/23/2021     HEPATIC: No results found for: ALBUMIN, PROTTOTAL, ALT, AST, GGT, ALKPHOS, BILITOTAL, BILIDIRECT, CAMMIE  OTHER:   Lab Results   Component Value Date    A1C 7.2 (H) 03/04/2022    TIN 8.9 06/10/2022       Anesthesia Plan    ASA Status:  3   NPO Status:  NPO Appropriate    Anesthesia Type: General.     - Airway: ETT   Induction: Intravenous.   Maintenance: Balanced.   Techniques and Equipment:     - Lines/Monitors: 2nd IV, Arterial Line     Consents    Anesthesia Plan(s) and associated risks, benefits, and realistic alternatives discussed. Questions answered and patient/representative(s) expressed understanding.    - Discussed:     - Discussed with:  Patient      - Extended Intubation/Ventilatory Support Discussed: No.      - Patient is DNR/DNI Status: No    Use of blood products discussed: Yes.     - Discussed with: Patient.     - Consented: consented to blood products            Reason for refusal: other.     Postoperative Care    Pain management: IV analgesics, Multi-modal analgesia.   PONV prophylaxis: Ondansetron (or other 5HT-3), Background Propofol Infusion     Comments:                Juan Pablo  Henrique Gallardo MD

## 2023-07-12 NOTE — OP NOTE
Preoperative diagnosis: Bilateral lower extremity peripheral arterial disease with left popliteal artery recurrent stenosis.    Postoperative diagnosis: Same.    Procedure:  1.  Left common femoral artery cutdown with primary closure.  2.  Left lower extremity arteriogram with runoff.  3.  Left second segment popliteal artery balloon angioplasty using 3 mm x 15 cm angioplasty balloon with completion arteriogram.  4.  Left second segment popliteal artery balloon angioplasty using 4 mm x 15 cm angioplasty balloon with completion arteriogram.  5.  Left second segment popliteal artery balloon angioplasty using 5 mm x 15 cm drug-coated balloon with completion arteriogram.    Surgeon: Surjit Hamlin M.D.  Assistant: Maryann Belle M.D.    Anesthesia: General.  Estimated blood loss: About 50 mL.  Fluoroscopy time: 4.2 minutes.  Fluoroscopy dose: 21.9 mGy.   Contrast: 41 mL of Isovue was used.    Indication for procedure: This is a very pleasant 73-year-old male with bilateral lower extremity peripheral arterial disease with multiple previous interventions.  On his surveillance imaging we found that he has significant recurrent stenosis of the second segment of his left popliteal artery.  Arteriogram with intervention is advised.  He has had significant arterial reconstruction of the right lower extremity including the common femoral artery and therefore we will have to take an antegrade approach on the left side.  We will plan to achieve this safely with a cutdown.    Procedure details: Patient was identified and then taken to the operating room and.  General was induced.  Left groin and left lower extremity and the right groin were prepped and a sterile surgical field was created.  Preoperative intravenous antibiotics were administered.  Preprocedure timeout was conducted.    A 3 cm oblique left groin incision was made and the left common femoral artery was dissected out and placed in [place] proximally and  distally.  It had excellent pulsation.  The artery was accessed with a micropuncture needle in antegrade fashion followed by placement of a microwire and microcatheter.  Arteriogram was performed which showed mild in-stent stenosis in the previous superficial femoral artery stents.  The second segment of the popliteal artery had multiple areas of critical tandem stenoses.  It was difficult to visualize the runoff but appears to be through the posterior and anterior tibial arteries.  5 7 units of heparin was given.  Then we switched over to a short 5 [place] sheath over an 035 platform and then placed a 6 [place] 45 crossover sheath into the mid superficial femoral artery.  The area of the multiple stenoses were traversed with a 0.018 inch Glidewire advantage and a 0.018 Chiu cross catheter.  Wire was removed and catheter arteriogram confirmed intraluminal position.  The area of the stenosis was first treated with a 3 mm x 10 cm angioplasty balloon which was inflated to 14 panda for 1 minute.  Balloon was deflated and removed.  It showed some improvement but at the end decided to proceed with a 4 mm balloon.  Then we placed a 4 mm x 15 cm angioplasty balloon which was inflated to 12 panda for 2 minutes.  Balloon was deflated and removed and showed even more improvement with less than 40% residual stenosis.  I then decided to proceed with a 5 mm balloon.  A 5 mm x 15 cm drug-coated balloon was placed across the entire length of the second segment of the popliteal artery and inflated to 12 panda for 3 minutes.  Balloon was deflated and removed and completion arteriogram showed less than 20% stenosis in all the concerned areas of disease.  We accepted that as an excellent result.    Procedure was concluded.  Heparin was not reversed.  The 6 [place] sheath was removed from the common femoral artery and access site was closed with interrupted 5-0 Prolene sutures.  There was adequate hemostasis.  The deep layer was approximated  with interrupted 2-0 Vicryl.  Subdermal layer was approximated with running 3-0 Vicryl and skin was approximated with 4-0 Monocryl in subcuticular fashion.    Counts of instruments, sponges and needles were noted to be correct.    Patient was awakened and extubated and taken to the recovery room in stable condition.    I spoke with with the patient's wife in person and explained our progress.

## 2023-07-12 NOTE — INTERVAL H&P NOTE
I have reviewed the surgical (or preoperative) H&P that is linked to this encounter, and examined the patient. There are no significant changes    Clinical Conditions Present on Arrival:  Clinically Significant Risk Factors Present on Admission                 # Drug Induced Platelet Defect: home medication list includes an antiplatelet medication  # DMII: A1C = 7.0 % (Ref range: <5.7 %) within past 6 months

## 2023-07-12 NOTE — ANESTHESIA POSTPROCEDURE EVALUATION
Patient: Zia Hopkins    Procedure: Procedure(s):  LEFT FEMORAL CUTDOWN  LEFT LOWER EXTREMITY ANGIOGRAM BALLOON ANGIOPLASTY       Anesthesia Type:  General    Note:  Disposition: Inpatient   Postop Pain Control: Uneventful            Sign Out: Well controlled pain   PONV: No   Neuro/Psych: Uneventful            Sign Out: Acceptable/Baseline neuro status   Airway/Respiratory: Uneventful            Sign Out: Acceptable/Baseline resp. status   CV/Hemodynamics: Uneventful            Sign Out: Acceptable CV status; No obvious hypovolemia; No obvious fluid overload   Other NRE: NONE   DID A NON-ROUTINE EVENT OCCUR? No           Last vitals:  Vitals Value Taken Time   /53 07/12/23 1415   Temp 36.4  C (97.5  F) 07/12/23 1300   Pulse 62 07/12/23 1420   Resp 11 07/12/23 1420   SpO2 96 % 07/12/23 1420   Vitals shown include unvalidated device data.    Electronically Signed By: Juan Pablo Gallardo MD  July 12, 2023  3:16 PM

## 2023-07-12 NOTE — ANESTHESIA PROCEDURE NOTES
Airway       Patient location during procedure: OR       Procedure Start/Stop Times: 7/12/2023 10:45 AM  Staff -        Anesthesiologist:  Juan Pablo Gallardo MD       CRNA: Rosie Gilman APRN CRNA       Other Anesthesia Staff: Vicky Soliz       Performed By: SRNA  Consent for Airway        Urgency: elective  Indications and Patient Condition       Indications for airway management: lashawn-procedural       Induction type:intravenous       Mask difficulty assessment: 2 - vent by mask + OA or adjuvant +/- NMBA    Final Airway Details       Final airway type: endotracheal airway       Successful airway: ETT - single  Endotracheal Airway Details        ETT size (mm): 8.0       Cuffed: yes       Successful intubation technique: video laryngoscopy       VL Blade Size: Glidescope 3       Grade View of Cords: 1       Adjucts: stylet       Position: Right       Measured from: gums/teeth       Secured at (cm): 23       Bite block used: None    Post intubation assessment        Placement verified by: capnometry, equal breath sounds and chest rise        Number of attempts at approach: 1       Number of other approaches attempted: 0       Secured with: pink tape       Ease of procedure: easy       Dentition: Intact and Unchanged    Medication(s) Administered   Medication Administration Time: 7/12/2023 10:45 AM

## 2023-07-12 NOTE — DISCHARGE INSTRUCTIONS
Same Day Surgery Discharge Instructions for  Sedation and General Anesthesia     It's not unusual to feel dizzy, light-headed or faint for up to 24 hours after surgery or while taking pain medication.  If you have these symptoms: sit for a few minutes before standing and have someone assist you when you get up to walk or use the bathroom.    You should rest and relax for the next 24 hours. We recommend you make arrangements to have an adult stay with you for at least 24 hours after your discharge.  Avoid hazardous and strenuous activity.    DO NOT DRIVE any vehicle or operate mechanical equipment for 24 hours following the end of your surgery.  Even though you may feel normal, your reactions may be affected by the medication you have received.    Do not drink alcoholic beverages for 24 hours following surgery.     Slowly progress to your regular diet as you feel able. It's not unusual to feel nauseated and/or vomit after receiving anesthesia.  If you develop these symptoms, drink clear liquids (apple juice, ginger ale, broth, 7-up, etc. ) until you feel better.  If your nausea and vomiting persists for 24 hours, please notify your surgeon.      All narcotic pain medications, along with inactivity and anesthesia, can cause constipation. Drinking plenty of liquids and increasing fiber intake will help.    For any questions of a medical nature, call your surgeon.    Do not make important decisions for 24 hours.    If you had general anesthesia, you may have a sore throat for a couple of days related to the breathing tube used during surgery.  You may use Cepacol lozenges to help with this discomfort.  If it worsens or if you develop a fever, contact your surgeon.     If you feel your pain is not well managed with the pain medications prescribed by your surgeon, please contact your surgeon's office to let them know so they can address your concerns.      **If you have questions or concerns about your procedure  call   [facility] at [phone]**     Peripheral Angiogram Discharge Instructions - Femoral     After you go home:    Have an adult stay with you until tomorrow.  Drink extra fluids for 2 days.  You may resume your normal diet.  No smoking       For 24 hours - due to the sedation you received:  Relax and take it easy.  Do NOT make any important or legal decisions.  Do NOT drive or operate machines at home or at work.  Do NOT drink alcohol.    Care of Groin Puncture Site:    For the first 24 hrs - check the puncture site every 1-2 hours while awake.  For 2 days, when you cough, sneeze, laugh or move your bowels, hold your hand over the puncture site and press firmly.  Remove the bandaid after 24 hours. If there is minor oozing, apply another bandaid and remove it after 12 hours.  It is normal to have a small bruise or pea size lump at the site.  You may shower tomorrow.  Do NOT take a bath, or use a hot tub or pool for at least 3 days. Do NOT scrub the site. Do not use lotion or powder near the puncture site.     Activity:            For 2 days:  No stooping or squatting  Do NOT do any heavy activity such as exercise, lifting, or straining.   No housework, yard work or any activity that make you sweat  Do NOT lift more than 10 pounds    Bleeding:    If you start bleeding from the site in your groin, lie down flat and press firmly on/above the site for 10 minutes.   Once bleeding stops, lay flat for 2 hours.   Call the [facility] as soon as you can.       Call 911 right away if you have heavy bleeding or bleeding that does not stop.      Medicines:    If you are on Metformin (Glucophage) and your GFR (kidney function level) is >30, you may continue taking your Metformin.  If you are on Metformin (Glucophage) and your GFR (kidney function level) is <30, do not restart the Metformin for 48 hours after your procedure. Check with your primary care giver before restarting the Metformin to see if you need to have blood  drawn to recheck your kidney function (GFR).  If you are taking an antiplatelet medication such as Plavix, do not stop taking it until you talk to your provider.     Take your medications, including blood thinners, unless your provider tells you not to.    If you have stopped any medicines, check with your provider about when to restart them.        Follow Up Appointments:    Follow up with [facility] as directed.    Call the clinic if:    You have increased pain or a large or growing hard lump around the site.  The site is red, swollen, hot or tender.  Blood or fluid is draining from the site.  You have chills or a fever greater than 101 F (38 C).  Your leg feels numb, cool or changes color.  You have hives, a rash or unusual itching.  New pain in the back or belly that you cannot control with Tylenol.  Any questions or concerns.    Other Instructions:    If you received a stent - carry your stent card with you at all times.      If you have questions or your original symptoms do not improve, call:         [facility] @ [phone]

## 2023-07-12 NOTE — PROGRESS NOTES
Care Suites Post Procedure Note    Patient Information  Name: Zia Hopkins  Age: 73 year old    Post Procedure- Left Femoral Angio/Cut Down with Dr. Hamlin   Pt. Off bedrest 1520-   Pt. Up with slight groin discomfort - Able to void  Concerns/abnormal assessment: no  If abnormal assessment, provider notified: N/A  Plan/Other:   Discharge to home   Percocet signed by patient and given to wife.    Care Suites Discharge Nursing Note    Discharge Education:  Discharge instructions reviewed: Yes  Additional education/resources provided: no  Patient/patient representative verbalizes understanding: Yes  Patient discharging on new medications: Yes  Medication education completed: Yes    Discharge Plans:   Discharge location: home  Discharge ride contacted: Yes  Approximate discharge time: 1630    Discharge Criteria:  Discharge criteria met and vital signs stable: Yes    Patient Belongs:  Patient belongings returned to patient: Yes    Conrad Hermosillo RN

## 2023-07-13 ENCOUNTER — TELEPHONE (OUTPATIENT)
Dept: OTHER | Facility: CLINIC | Age: 73
End: 2023-07-13
Payer: COMMERCIAL

## 2023-07-13 DIAGNOSIS — I70.229 CRITICAL ISCHEMIA OF LOWER EXTREMITY (H): Primary | ICD-10-CM

## 2023-07-13 DIAGNOSIS — I73.9 PAD (PERIPHERAL ARTERY DISEASE) (H): ICD-10-CM

## 2023-07-13 RX ORDER — OXYCODONE HYDROCHLORIDE 5 MG/1
5 TABLET ORAL EVERY 6 HOURS PRN
Qty: 5 TABLET | Refills: 0 | Status: SHIPPED | OUTPATIENT
Start: 2023-07-13 | End: 2023-07-16

## 2023-07-13 NOTE — TELEPHONE ENCOUNTER
----- Message from Surjit Hamlin MD sent at 7/12/2023  1:50 PM CDT -----  Regarding: follow up  2 weeks for wound check with me with ABIs and TBIs and left lower extremity ultrasound.  Esvin       Pt already scheduled for 7/24/23 DAVID/TBI and OV with Dr. Hamlin. Pt now also needs Left lower extremity U/S added. Routing to  to add U/S, to be done prior to 7/24/23 OV.     JOHN العراقيN, RN  [facility]  Office:  [phone] Fax: [phone]

## 2023-07-14 LAB
ATRIAL RATE - MUSE: 63 BPM
DIASTOLIC BLOOD PRESSURE - MUSE: NORMAL MMHG
INTERPRETATION ECG - MUSE: NORMAL
P AXIS - MUSE: 61 DEGREES
PR INTERVAL - MUSE: 160 MS
QRS DURATION - MUSE: 78 MS
QT - MUSE: 396 MS
QTC - MUSE: 405 MS
R AXIS - MUSE: -5 DEGREES
SYSTOLIC BLOOD PRESSURE - MUSE: NORMAL MMHG
T AXIS - MUSE: 54 DEGREES
VENTRICULAR RATE- MUSE: 63 BPM

## 2023-07-17 NOTE — TELEPHONE ENCOUNTER
Future Appointments   Date Time Provider Department Center   7/19/2023  2:45 PM [facility]   7/24/2023  8:15 AM [facility]   7/24/2023  9:00 AM Surjit Hamlin MD [facility]

## 2023-07-19 ENCOUNTER — HOSPITAL ENCOUNTER (OUTPATIENT)
Dept: ULTRASOUND IMAGING | Facility: CLINIC | Age: 73
Discharge: HOME | End: 2023-07-19
Attending: SURGERY | Admitting: SURGERY
Payer: COMMERCIAL

## 2023-07-19 DIAGNOSIS — I70.229 CRITICAL ISCHEMIA OF LOWER EXTREMITY (H): ICD-10-CM

## 2023-07-19 DIAGNOSIS — I73.9 PAD (PERIPHERAL ARTERY DISEASE) (H): ICD-10-CM

## 2023-07-19 PROCEDURE — 93926 LOWER EXTREMITY STUDY: CPT | Mod: LT

## 2023-07-19 PROCEDURE — 93926 LOWER EXTREMITY STUDY: CPT | Mod: 26 | Performed by: SURGERY

## 2023-07-24 ENCOUNTER — OFFICE VISIT (OUTPATIENT)
Dept: OTHER | Facility: CLINIC | Age: 73
End: 2023-07-24
Attending: SURGERY
Payer: COMMERCIAL

## 2023-07-24 ENCOUNTER — HOSPITAL ENCOUNTER (OUTPATIENT)
Dept: ULTRASOUND IMAGING | Facility: CLINIC | Age: 73
Discharge: HOME | End: 2023-07-24
Attending: SURGERY
Payer: COMMERCIAL

## 2023-07-24 VITALS — HEART RATE: 62 BPM | DIASTOLIC BLOOD PRESSURE: 69 MMHG | SYSTOLIC BLOOD PRESSURE: 166 MMHG

## 2023-07-24 DIAGNOSIS — I70.223 CRITICAL LIMB ISCHEMIA OF BOTH LOWER EXTREMITIES (H): Primary | ICD-10-CM

## 2023-07-24 DIAGNOSIS — I70.223 CRITICAL LIMB ISCHEMIA OF BOTH LOWER EXTREMITIES (H): ICD-10-CM

## 2023-07-24 PROCEDURE — 93922 UPR/L XTREMITY ART 2 LEVELS: CPT | Mod: 26 | Performed by: SURGERY

## 2023-07-24 PROCEDURE — 93922 UPR/L XTREMITY ART 2 LEVELS: CPT

## 2023-07-24 PROCEDURE — G0463 HOSPITAL OUTPT CLINIC VISIT: HCPCS

## 2023-07-24 PROCEDURE — 99213 OFFICE O/P EST LOW 20 MIN: CPT | Performed by: SURGERY

## 2023-07-24 RX ORDER — CLOPIDOGREL BISULFATE 75 MG/1
75 TABLET ORAL DAILY
Qty: 90 TABLET | Refills: 3 | Status: ON HOLD | OUTPATIENT
Start: 2023-07-24 | End: 2024-07-22

## 2023-07-24 NOTE — PROGRESS NOTES
[facility]        Patient is here for a  follow up.    Pt is currently taking Aspirin and Statin.    BP (!) 166/69 (BP Location: Left arm, Patient Position: Chair, Cuff Size: Adult Regular)   Pulse 62     The provider has been notified that the patient has no concerns.     Questions patient would like addressed today are: N/A.    Refills are needed: N/A    Has homecare services and agency name:  Codie Cavazos MA

## 2023-07-24 NOTE — PROGRESS NOTES
Mr. Zia Hopkins returns to clinic today.  He is an exceedingly pleasant 73-year-old gentleman of [place] descent who has had multiple bilateral lower extremity revascularization procedures.  His last one was by way of a left common femoral artery cutdown and balloon angioplasty of the second segment of the popliteal artery with a 3 mm then a 4 mm plain and then a 5 mm drug-coated balloon.  This was done on the 12th of this month.  He has no complaints.    His left groin incision is healing nicely.    Duplex sonography shows patent intervention sites and improvement in his ankle-brachial indices and toe perfusion pressure.    I reassured him of the same.  He is planning on going to [place] for a 5 weeks.  I have advised him to stay active and hydrated and let me know when he is back so we can arrange his follow-up visit.  He also plans to ride his bike in his native hometown of [place] and I have advised him to wear a helmet because he is on Plavix.

## 2023-07-27 DIAGNOSIS — I73.9 PAD (PERIPHERAL ARTERY DISEASE) (H): Primary | ICD-10-CM

## 2023-11-05 ENCOUNTER — HEALTH MAINTENANCE LETTER (OUTPATIENT)
Age: 73
End: 2023-11-05

## 2023-12-13 ENCOUNTER — OFFICE VISIT (OUTPATIENT)
Dept: PODIATRY | Facility: CLINIC | Age: 73
End: 2023-12-13
Payer: COMMERCIAL

## 2023-12-13 VITALS — DIASTOLIC BLOOD PRESSURE: 82 MMHG | SYSTOLIC BLOOD PRESSURE: 142 MMHG

## 2023-12-13 DIAGNOSIS — E11.42 DIABETIC POLYNEUROPATHY ASSOCIATED WITH TYPE 2 DIABETES MELLITUS (H): Primary | ICD-10-CM

## 2023-12-13 DIAGNOSIS — Z89.431 HISTORY OF TRANSMETATARSAL AMPUTATION OF RIGHT FOOT (H): ICD-10-CM

## 2023-12-13 DIAGNOSIS — I73.9 PAD (PERIPHERAL ARTERY DISEASE) (H): ICD-10-CM

## 2023-12-13 DIAGNOSIS — L97.512 ULCER OF RIGHT FOOT WITH FAT LAYER EXPOSED (H): ICD-10-CM

## 2023-12-13 PROCEDURE — 99213 OFFICE O/P EST LOW 20 MIN: CPT | Mod: 25 | Performed by: PODIATRIST

## 2023-12-13 PROCEDURE — 11042 DBRDMT SUBQ TIS 1ST 20SQCM/<: CPT | Performed by: PODIATRIST

## 2023-12-13 RX ORDER — CEPHALEXIN 500 MG/1
500 CAPSULE ORAL 2 TIMES DAILY
Qty: 20 CAPSULE | Refills: 0 | Status: SHIPPED | OUTPATIENT
Start: 2023-12-13 | End: 2023-12-23

## 2023-12-13 NOTE — PATIENT INSTRUCTIONS
Thank you for choosing [facility] Podiatry / Foot & Ankle Surgery!    DR SAVAGE'S CLINIC:  [facility]   [address]   [place] [address]      TRIAGE LINE: [phone]  APPOINTMENTS: [phone]  RADIOLOGY: [phone]  SET UP SURGERY: [phone]  PHYSICAL THERAPY: [phone]   FAX NUMBER: [phone]  BILLING QUESTIONS: [phone]       Follow up: 1 month      FOOT ULCER (WOUND) EDUCATION  Ulceration ofthe foot involves a break or hole in the skin. Skin is our best protection against infection. Skin is quite durable, however, the underlying tissues are fragile. For this reason, the wound is likely to deepen rapidly. Deep wounds usually get infected and require amputation. Prompt healing is therefore essential to avoid limb loss.     Foot ulcers do not heal without intervention. Walking on the foot and living your normal life is not typically compatible with healing the sore. Successful healing will require several months of significant alteration of your daily activities.   Ulcer complications frequently develop. This primarily includes infection of skin, which then spreads deep into your joints, bones and tendons. Spreading infection may travel up your leg and into other parts of your body. Deep infection is usually treated with amputation ofpart ofyour foot or your leg. Signs of infection include fever, chills, nausea, vomiting, erratic blood sugars, local redness, pus, strong odor and localized warmth. Signs of infection should be taken seriously. Prompt evaluation in the clinic or hospital emergency room is required.   Ulcer treatment requires debridement or surgical removal of devitalized tissue. Your doctor will trim away callused, moistened, unhealthy tissue from the wound surface and margin. This helps to clean the wound and allows proper inspection. Debridement also stimulates healing even though the wound originally appears larger. Expect some bleeding with  each debridement. You will be given instruction regarding wound bandaging. This often includes ointment and gauze. Avoid tape directly on the skin. Hand washing is essential since most infections will come from your fingertips. Ulcer care requires a no touch technique. Your fingers should not touch the margin or base of the wound.    HELPFUL HEALING TIPS:  1. Debridement: Getting rid of bad tissue makes way for good tissue to promote healing  2. Addressing Foot Deformities: Hammertoes and bunions can cause increased pressure  3. Pressure Reduction: If pressure remains to the wound, it won't heal  4. Good Pulses: If bloodflow is not getting to the foot, the ulcer will not heal  5. Good Nutrition: If you are not getting proper nutrition your body can't heal.Protein! At least 90g a day.  Supplements are a good way to help get this, such as [place], Glucerna, Ensure. Also taking 5000units of Vitamin D a day.   6. Infection Control: Keep the ulcer clean with wound cleanser. DO NOT SOAK IT!  7. Moisture Control: Keep edema down and make sure that drainage is getting pulled away from the ulcer    IMPORTANCE OF DEBRIDEMENT   Reduces bioburden to help control or reduce infection. Even if an ulcer is not  infected,  the bacterial bioburden causes increased local inflammation.   Allows more accurate visualization of the wound base and edges, which allows for more precise staging.   Removes necrotic/non-viable tissue, which impedes wound healing, causes protein loss and can be a nidus for infection.   Stimulates new circulation (angiogenesis) and allows adequate oxygen delivery to the wound.   Removes undermining and tunneling, and may help reduce abscess formation.   Releases healing growth factors at the edge of the wound.   Prepares the wound bed by leaving only tissues that are capable of regenerating.

## 2023-12-13 NOTE — PROGRESS NOTES
Podiatry / Foot and Ankle Surgery Progress Note    December 13, 2023    Subject: Patient was seen for new wound to the right foot.  States he noticed that about 10 days ago that it started draining.  Denies specific injury.  Denies fever, nausea, vomiting.    Objective:  Vitals: BP (!) 142/82       A1C: 7.20(7/12/2023)     General:  Patient is alert and orientated.  NAD.     Vascular:  DP and PT pulses are not palpable.  No edema or varicosities noted.  CFT's < 3secs.  Skin temp is normal.     Neuro:  Light and gross touch sensation diminished to feet.     Derm: Full-thickness ulceration to the plantar aspect of the right second metatarsal head.  This measures approximately 0.3 cm x 0.3 cm x 0.2 cm after debridement.  No surrounding erythema purulent drainage or malodor noted.    Musculoskeletal: Right toes 1 through 5 are now amputated.     Assessment:       Diabetic polyneuropathy associated with type 2 diabetes mellitus (H)  PAD (peripheral artery disease) (H24)  History of transmetatarsal amputation of right foot (H)  Ulcer of right foot with fat layer exposed (H)     Medical Decision Making/Plan: At this time, the ulcer was debrided.  Please see procedure note below.  We will start him on an oral antibiotic prophylactically.  Recommend that he do daily iodine dressing changes with gauze and a bandage to the area.  He does have his postop shoe with offloading in it and we will have him wear that at this time to try to keep pressure off of the area.  We will have him follow-up in 1 month for reassessment.      All questions were answered to patient's satisfaction and he will call for the questions or concerns.   Procedure: After verbal consent, excisional debridement was performed on ulcer.  #15 blade was used to debride ulcer down to and including subcutaneous tissue. Bleeding controlled with light pressure.   No drainage noted.  No anesthesia was used due to neuropathy. Dry dressing applied to foot.  Patient  tolerated procedure well.  Patient Risk Factor:  Patient is a high risk factor for infection.     Purvi Whelan DPM, Podiatry/Foot and Ankle Surgery

## 2023-12-13 NOTE — LETTER
12/13/2023         RE: Zia Hopkins  [address] [place] [address]        Dear Colleague,    Thank you for referring your patient, Zia Hopkins, to the [facility] PODI[facility]. Please see a copy of my visit note below.    Podiatry / Foot and Ankle Surgery Progress Note    December 13, 2023    Subject: Patient was seen for new wound to the right foot.  States he noticed that about 10 days ago that it started draining.  Denies specific injury.  Denies fever, nausea, vomiting.    Objective:  Vitals: BP (!) 142/82       A1C: 7.20(7/12/2023)     General:  Patient is alert and orientated.  NAD.     Vascular:  DP and PT pulses are not palpable.  No edema or varicosities noted.  CFT's < 3secs.  Skin temp is normal.     Neuro:  Light and gross touch sensation diminished to feet.     Derm: Full-thickness ulceration to the plantar aspect of the right second metatarsal head.  This measures approximately 0.3 cm x 0.3 cm x 0.2 cm after debridement.  No surrounding erythema purulent drainage or malodor noted.    Musculoskeletal: Right toes 1 through 5 are now amputated.     Assessment:       Diabetic polyneuropathy associated with type 2 diabetes mellitus (H)  PAD (peripheral artery disease) (H24)  History of transmetatarsal amputation of right foot (H)  Ulcer of right foot with fat layer exposed (H)     Medical Decision Making/Plan: At this time, the ulcer was debrided.  Please see procedure note below.  We will start him on an oral antibiotic prophylactically.  Recommend that he do daily iodine dressing changes with gauze and a bandage to the area.  He does have his postop shoe with offloading in it and we will have him wear that at this time to try to keep pressure off of the area.  We will have him follow-up in 1 month for reassessment.      All questions were answered to patient's satisfaction and he will call for the questions or concerns.   Procedure: After verbal consent, excisional  debridement was performed on ulcer.  #15 blade was used to debride ulcer down to and including subcutaneous tissue. Bleeding controlled with light pressure.   No drainage noted.  No anesthesia was used due to neuropathy. Dry dressing applied to foot.  Patient tolerated procedure well.  Patient Risk Factor:  Patient is a high risk factor for infection.     Puriv Whelan DPM, Podiatry/Foot and Ankle Surgery            Again, thank you for allowing me to participate in the care of your patient.        Sincerely,        Purvi Whelan DPM, Podiatry/Foot and Ankle Surgery

## 2024-01-16 ENCOUNTER — OFFICE VISIT (OUTPATIENT)
Dept: PODIATRY | Facility: CLINIC | Age: 74
End: 2024-01-16
Payer: COMMERCIAL

## 2024-01-16 VITALS
BODY MASS INDEX: 22.2 KG/M2 | HEIGHT: 64 IN | WEIGHT: 130 LBS | SYSTOLIC BLOOD PRESSURE: 124 MMHG | DIASTOLIC BLOOD PRESSURE: 72 MMHG

## 2024-01-16 DIAGNOSIS — Z89.431 HISTORY OF TRANSMETATARSAL AMPUTATION OF RIGHT FOOT (H): ICD-10-CM

## 2024-01-16 DIAGNOSIS — E11.42 DIABETIC POLYNEUROPATHY ASSOCIATED WITH TYPE 2 DIABETES MELLITUS (H): Primary | ICD-10-CM

## 2024-01-16 DIAGNOSIS — L97.512 ULCER OF RIGHT FOOT WITH FAT LAYER EXPOSED (H): ICD-10-CM

## 2024-01-16 DIAGNOSIS — I73.9 PAD (PERIPHERAL ARTERY DISEASE) (H): ICD-10-CM

## 2024-01-16 PROCEDURE — 99213 OFFICE O/P EST LOW 20 MIN: CPT | Mod: 25 | Performed by: PODIATRIST

## 2024-01-16 PROCEDURE — 11042 DBRDMT SUBQ TIS 1ST 20SQCM/<: CPT | Performed by: PODIATRIST

## 2024-01-16 NOTE — LETTER
"    1/16/2024         RE: Zia Hopkins  [address] [place] [address]        Dear Colleague,    Thank you for referring your patient, Zia Hopkins, to the [facility]. Please see a copy of my visit note below.    Podiatry / Foot and Ankle Surgery Progress Note    January 16, 2024    Subject: Patient was seen for issue with his right foot.  He noticed some drainage about a week ago.  Notes its to the top of his foot.  He is wondering if it is from his new diabetic shoes and inserts.  Denies fever, nausea, vomiting.  Has been scrubbing the foot with alcohol and keeping a Band-Aid over it.  Denies specific injury.    Objective:  Vitals: /72   Ht 1.626 m (5' 4\")   Wt 59 kg (130 lb)   BMI 22.31 kg/m    BMI= Body mass index is 22.31 kg/m .    A1C: 7.20(7/12/2023)     General:  Patient is alert and orientated.  NAD.     Vascular:  DP and PT pulses are not palpable.  No edema or varicosities noted.  CFT's < 3secs.  Skin temp is normal.     Neuro:  Light and gross touch sensation diminished to feet.     Derm: Small partial-thickness ulcer to the dorsal aspect of the right second metatarsal phalangeal joint area.  This measures approximately 0.1 cm x 0.1 cm x 0.1 cm.  No surrounding erythema purulent drainage or malodor noted.    Musculoskeletal: Right toes 1 through 5 are now amputated.     Assessment:       Diabetic polyneuropathy associated with type 2 diabetes mellitus (H)  PAD (peripheral artery disease) (H24)  History of transmetatarsal amputation of right foot (H)  Ulcer of right foot with fat layer exposed (H)     Medical Decision Making/Plan: At this time, the ulcer was debrided.  Please see procedure note below.   Recommend that he do daily iodine dressing changes with gauze and a bandage to the area.  He does have his postop shoe with offloading in it and we will have him wear that at this time to try to keep pressure off of the area.  We will have him follow-up " in 1 month for reassessment if wound is still open.       All questions were answered to patient's satisfaction and he will call for the questions or concerns.   Procedure: After verbal consent, excisional debridement was performed on ulcer.  #15 blade was used to debride ulcer down to and including subcutaneous tissue. Bleeding controlled with light pressure.   No drainage noted.  No anesthesia was used due to neuropathy. Dry dressing applied to foot.  Patient tolerated procedure well.  Patient Risk Factor:  Patient is a high risk factor for infection.     Purvi Whelan DPM, Podiatry/Foot and Ankle Surgery            Again, thank you for allowing me to participate in the care of your patient.        Sincerely,        Purvi Whelan DPM, Podiatry/Foot and Ankle Surgery

## 2024-01-16 NOTE — PROGRESS NOTES
"Podiatry / Foot and Ankle Surgery Progress Note    January 16, 2024    Subject: Patient was seen for issue with his right foot.  He noticed some drainage about a week ago.  Notes its to the top of his foot.  He is wondering if it is from his new diabetic shoes and inserts.  Denies fever, nausea, vomiting.  Has been scrubbing the foot with alcohol and keeping a Band-Aid over it.  Denies specific injury.    Objective:  Vitals: /72   Ht 1.626 m (5' 4\")   Wt 59 kg (130 lb)   BMI 22.31 kg/m    BMI= Body mass index is 22.31 kg/m .    A1C: 7.20(7/12/2023)     General:  Patient is alert and orientated.  NAD.     Vascular:  DP and PT pulses are not palpable.  No edema or varicosities noted.  CFT's < 3secs.  Skin temp is normal.     Neuro:  Light and gross touch sensation diminished to feet.     Derm: Small partial-thickness ulcer to the dorsal aspect of the right second metatarsal phalangeal joint area.  This measures approximately 0.1 cm x 0.1 cm x 0.1 cm.  No surrounding erythema purulent drainage or malodor noted.    Musculoskeletal: Right toes 1 through 5 are now amputated.     Assessment:       Diabetic polyneuropathy associated with type 2 diabetes mellitus (H)  PAD (peripheral artery disease) (H24)  History of transmetatarsal amputation of right foot (H)  Ulcer of right foot with fat layer exposed (H)     Medical Decision Making/Plan: At this time, the ulcer was debrided.  Please see procedure note below.   Recommend that he do daily iodine dressing changes with gauze and a bandage to the area.  He does have his postop shoe with offloading in it and we will have him wear that at this time to try to keep pressure off of the area.  We will have him follow-up in 1 month for reassessment if wound is still open.       All questions were answered to patient's satisfaction and he will call for the questions or concerns.   Procedure: After verbal consent, excisional debridement was performed on ulcer.  #15 blade was " used to debride ulcer down to and including subcutaneous tissue. Bleeding controlled with light pressure.   No drainage noted.  No anesthesia was used due to neuropathy. Dry dressing applied to foot.  Patient tolerated procedure well.  Patient Risk Factor:  Patient is a high risk factor for infection.     Purvi Whelan DPM, Podiatry/Foot and Ankle Surgery

## 2024-01-29 ENCOUNTER — OFFICE VISIT (OUTPATIENT)
Dept: OTHER | Facility: CLINIC | Age: 74
End: 2024-01-29
Attending: SURGERY
Payer: COMMERCIAL

## 2024-01-29 ENCOUNTER — HOSPITAL ENCOUNTER (OUTPATIENT)
Dept: ULTRASOUND IMAGING | Facility: CLINIC | Age: 74
Discharge: HOME | End: 2024-01-29
Attending: SURGERY
Payer: COMMERCIAL

## 2024-01-29 VITALS — OXYGEN SATURATION: 96 % | SYSTOLIC BLOOD PRESSURE: 151 MMHG | DIASTOLIC BLOOD PRESSURE: 63 MMHG | HEART RATE: 63 BPM

## 2024-01-29 DIAGNOSIS — I73.9 PAD (PERIPHERAL ARTERY DISEASE) (H): ICD-10-CM

## 2024-01-29 DIAGNOSIS — I73.9 PAD (PERIPHERAL ARTERY DISEASE) (H): Primary | ICD-10-CM

## 2024-01-29 DIAGNOSIS — I70.223 CRITICAL LIMB ISCHEMIA OF BOTH LOWER EXTREMITIES (H): Primary | ICD-10-CM

## 2024-01-29 PROCEDURE — 93922 UPR/L XTREMITY ART 2 LEVELS: CPT

## 2024-01-29 PROCEDURE — G0463 HOSPITAL OUTPT CLINIC VISIT: HCPCS | Mod: 25 | Performed by: SURGERY

## 2024-01-29 PROCEDURE — 93922 UPR/L XTREMITY ART 2 LEVELS: CPT | Mod: 26 | Performed by: SURGERY

## 2024-01-29 PROCEDURE — 99213 OFFICE O/P EST LOW 20 MIN: CPT | Performed by: SURGERY

## 2024-01-29 PROCEDURE — 93925 LOWER EXTREMITY STUDY: CPT | Mod: 26 | Performed by: SURGERY

## 2024-01-29 PROCEDURE — 93925 LOWER EXTREMITY STUDY: CPT

## 2024-01-29 NOTE — PROGRESS NOTES
Mr. Hopkins returns to clinic for follow-up.  He is a 73-year-old gentleman who has had numerous bilateral lower extremity arterial interventions for revascularization.  Currently he has no wounds.  He has no claudication.  He has been walking without any problems.    Today's sonography shows recurrent stenosis in the left popliteal artery which was treated with the help of a cutdown in the left common femoral artery and balloon angioplasty with a 3 mm, 4 mm and then a 5 mm drug-coated balloon in July of last year.  The luminal diameter of the popliteal artery is 2 mm.    I have notified him of these findings.  We will repeat his duplex sonography in 3 months time to see if there is any worsening and if so then we will have to proceed with left common femoral artery cutdown and repeat angioplasty.  Though the artery was severely calcified and we will use the lithotripsy balloon.    I will see him back in 3 months.

## 2024-01-29 NOTE — PROGRESS NOTES
Patient is here to discuss follow up    BP (!) 151/63 (BP Location: Right arm, Patient Position: Chair, Cuff Size: Adult Regular)   Pulse 63   SpO2 96%     Questions patient would like addressed today are: N/A.    Refills are needed: No    Has homecare services and agency name:  Codie SOSA

## 2024-01-29 NOTE — PATIENT INSTRUCTIONS
Thank you for allowing us to provide your medical care. Please see below for follow up instructions pertaining to your medical appointment with Dr. Hamlin.     Follow up plan: 3 months with arterial ultrasound of both legs, ankle brachial index and in clinic office visit with Dr. Hamlin.     Our office will send you a letter by mail, closer to your follow up time frame, with a reminder to call to schedule appointment(s).     Please call our office with questions or concerns at [phone].     DAFNE العراقي, RN  [facility]  Office:  [phone] Fax: [phone]

## 2024-02-22 NOTE — NURSING NOTE
Pt demonstrates ability to turn self in bed with assistance of staff or family. Patient and family understands importance in prevention of skin breakdown, ulcers, and potential infection. Hourly rounding in effect. RN skin check complete.   Devices in place include: PIV and cast.  Skin assessed under devices: Yes.  Confirmed HAPI identified on the following date: NA   Location of HAPI: NA.  Wound Care RN following: No.  The following interventions are in place: Pillows in place for support/positioning.     Zia to follow up with Primary Care provider regarding elevated blood pressure.

## 2024-03-24 ENCOUNTER — HEALTH MAINTENANCE LETTER (OUTPATIENT)
Age: 74
End: 2024-03-24

## 2024-05-06 ENCOUNTER — HOSPITAL ENCOUNTER (OUTPATIENT)
Dept: ULTRASOUND IMAGING | Facility: CLINIC | Age: 74
Discharge: HOME | End: 2024-05-06
Attending: SURGERY
Payer: COMMERCIAL

## 2024-05-06 ENCOUNTER — OFFICE VISIT (OUTPATIENT)
Dept: OTHER | Facility: CLINIC | Age: 74
End: 2024-05-06
Attending: SURGERY
Payer: COMMERCIAL

## 2024-05-06 VITALS — DIASTOLIC BLOOD PRESSURE: 60 MMHG | SYSTOLIC BLOOD PRESSURE: 150 MMHG | HEART RATE: 62 BPM

## 2024-05-06 DIAGNOSIS — I70.235 ATHEROSCLEROSIS OF NATIVE ARTERIES OF RIGHT LEG WITH ULCERATION OF OTHER PART OF FOOT (H): ICD-10-CM

## 2024-05-06 DIAGNOSIS — I73.9 PAD (PERIPHERAL ARTERY DISEASE) (H): Primary | ICD-10-CM

## 2024-05-06 DIAGNOSIS — I70.212 ATHEROSCLER OF NATIVE ARTERY OF LEFT LEG WITH INTERMIT CLAUDICATION (H): ICD-10-CM

## 2024-05-06 DIAGNOSIS — I73.9 PAD (PERIPHERAL ARTERY DISEASE) (H): ICD-10-CM

## 2024-05-06 DIAGNOSIS — I70.229 CRITICAL ISCHEMIA OF LOWER EXTREMITY (H): ICD-10-CM

## 2024-05-06 PROCEDURE — 93925 LOWER EXTREMITY STUDY: CPT

## 2024-05-06 PROCEDURE — 93922 UPR/L XTREMITY ART 2 LEVELS: CPT | Mod: 26 | Performed by: SURGERY

## 2024-05-06 PROCEDURE — G0463 HOSPITAL OUTPT CLINIC VISIT: HCPCS | Mod: 25 | Performed by: SURGERY

## 2024-05-06 PROCEDURE — 93925 LOWER EXTREMITY STUDY: CPT | Mod: 26 | Performed by: SURGERY

## 2024-05-06 PROCEDURE — 99213 OFFICE O/P EST LOW 20 MIN: CPT | Performed by: SURGERY

## 2024-05-06 PROCEDURE — 93922 UPR/L XTREMITY ART 2 LEVELS: CPT

## 2024-05-06 NOTE — NURSING NOTE
Patient education completed with patient in clinic on 5/6/24.    Procedure: Left common femoral artery cutdown with left lower extremity angiogram  Diagnosis: critical limb  Anticoagulation Instruction: Do not stop Plavix or ASA  GLP-1 Agonists Instruction: NA  Pre-Operative Physical Exam: Patient instructed they will need to have a pre-op physical exam within 30 days of your procedure.   Allergies:  Updated in Epic  Bowel Prep: NA   Post Procedure Education: [facility] post-procedure fact sheet reviewed with patient.  Showering instructions provided: Yes  Learner(s): patient  Method: Listening  Barriers to Learning: No Barrier  Outcome: Patient did verbalize understanding of above education.    Deepali LEOS, RN    [facility]  Vascular [facility]  Office: [phone]  Fax: [phone]

## 2024-05-06 NOTE — PROGRESS NOTES
[facility]        Patient is here for a  follow up.    Pt is currently taking Aspirin, Statin, and Plavix.    BP (!) 150/60 (BP Location: Left arm, Patient Position: Chair, Cuff Size: Adult Regular)   Pulse 62     The provider has been notified that the patient has no concerns.     Questions patient would like addressed today are: N/A.    Refills are needed: N/A    Has homecare services and agency name:  Codie Cavazos MA

## 2024-05-06 NOTE — PROGRESS NOTES
Mr. Hopkins is a 74-year-old gentleman with bilateral lower extremity peripheral arterial disease.  He has had multiple arterial interventions both open and endovascular for both lower extremities.  He denies any complaints.  He has been walking without any problems.    When I saw him in January his sonography had shown a popliteal artery diameter of 2 mm consistent with recurrent stenosis.  At that time I had opted to watch this more closely and he returns for follow-up.  He has no complaints.  He is leaving for [place] on [place] 12th of this month and will be back in on 2 July.    His ABIs have dropped and sonography is suggestive of occlusion of the popliteal artery with filling through collateralization.    I explained the findings to him.  My plan would be to proceed with left common femoral artery cutdown and recanalize and revascularize through the popliteal artery to maintain the patency of the proximal superficial femoral artery stents.    I have asked him to stay active and hydrated and Flaquita continues to take his Plavix.  We will plan to proceed with his surgical intervention on 9 July 2024.

## 2024-05-07 ENCOUNTER — TELEPHONE (OUTPATIENT)
Dept: OTHER | Facility: CLINIC | Age: 74
End: 2024-05-07
Payer: COMMERCIAL

## 2024-05-07 NOTE — TELEPHONE ENCOUNTER
Patient left a message on the Surgery Scheduling line.  Returned patient's call.  He will schedule his pre-op on either 7/3 or 7/5.  I will start looking at scheduling starting the week of 7/8.  There are not dates to avoid.    Informed him we will work on getting him scheduled and will get back to him.

## 2024-05-07 NOTE — TELEPHONE ENCOUNTER
CASE REQUEST RECEIVED ON 5/7/24 FOR: LEFT COMMON FEMORAL ARTERY CUTDOWN, LEFT LOWER EXTREMITY ARTERIOGRAM AND ANGIOPLASTY    CASE ID: [id number]

## 2024-05-07 NOTE — TELEPHONE ENCOUNTER
[facility]    Who is the name of the provider?:  IAN YATES   What is the location you see this provider at/preferred location?: [place]  Person calling / Facility: Zia Hopkins  Phone number:  [phone] (home)   Nurse call back needed:  YES     Reason for call:  Patient has questions about pre-op timing vs. procedure timing. Asking for a callback from surgery scheduling.    Pharmacy location:  [id number] [place] [address] [address] AT [facility] [address]  Outside Imaging: n/a   Can we leave a detailed message on this number?  YES     5/7/2024, 9:58 AM

## 2024-05-07 NOTE — TELEPHONE ENCOUNTER
LM for patient to call to obtain dates to avoid for scheduling surgery and to respond to his questions about the timing of the pre-op and his surgery.

## 2024-05-08 ENCOUNTER — HOSPITAL ENCOUNTER (INPATIENT)
Facility: CLINIC | Age: 74
Setting detail: SURGERY ADMIT
End: 2024-05-08
Attending: SURGERY | Admitting: SURGERY
Payer: COMMERCIAL

## 2024-06-02 ENCOUNTER — HEALTH MAINTENANCE LETTER (OUTPATIENT)
Age: 74
End: 2024-06-02

## 2024-06-21 ENCOUNTER — TELEPHONE (OUTPATIENT)
Dept: OTHER | Facility: CLINIC | Age: 74
End: 2024-06-21
Payer: COMMERCIAL

## 2024-06-21 NOTE — TELEPHONE ENCOUNTER
Returned call to Alok and discussed nursing does not manage prior authorizations for surgery. He states he needs assistance with the prior authorization for patients upcoming left common femoral artery cutdown and will reach out to [employer] to specifically clarify what is needed.     Deepali LEOS RN    [facility]  Vascular [facility]  Office: [phone]  Fax: [phone]

## 2024-06-21 NOTE — TELEPHONE ENCOUNTER
[facility]    Who is the name of the provider?:  IAN YATES   What is the location you see this provider at/preferred location?: [place]  Central Prior Auth (Alok)   Phone number:  [phone]  Nurse call back needed:  Yes     Reason for call:  Alok from [employer] called and requested to speak to a nurse regarding this patient's case       Outside Imaging: n/a   Can we leave a detailed message on this number?  YES     6/21/2024, 1:43 PM

## 2024-07-01 ENCOUNTER — TELEPHONE (OUTPATIENT)
Dept: CARDIOLOGY | Facility: CLINIC | Age: 74
End: 2024-07-01
Payer: COMMERCIAL

## 2024-07-01 NOTE — TELEPHONE ENCOUNTER
When I last saw Mr. Hopkins sonography was concerning for either high-grade recurrent stenosis of the left popliteal artery or occlusion.  Either way the superficial femoral artery stents are threatened because of the problem.  My plan was to do a left femoral artery cutdown with angiogram of the left lower extremity and intervention as necessary.    Reason we are doing a left femoral artery cutdown is because he has complex arterial reconstruction of his right lower extremity and doing the standard up and over the aortic bifurcation approach is likely to cause exercise problems in the right lower extremity reconstruction.  Previously we had done left common femoral artery cutdown with angiogram to minimize risk of access site complications.    Recently this was denied by his insurance company.  I do not plan to do a femoral endarterectomy on the left side.  This is only a cutdown procedure with primary repair to minimize complications.    We will submit this to the insurance company.

## 2024-07-03 ENCOUNTER — MEDICAL CORRESPONDENCE (OUTPATIENT)
Dept: HEALTH INFORMATION MANAGEMENT | Facility: CLINIC | Age: 74
End: 2024-07-03
Payer: COMMERCIAL

## 2024-07-03 ENCOUNTER — TRANSFERRED RECORDS (OUTPATIENT)
Dept: HEALTH INFORMATION MANAGEMENT | Facility: CLINIC | Age: 74
End: 2024-07-03
Payer: COMMERCIAL

## 2024-07-03 DIAGNOSIS — R94.31 ABNORMAL ELECTROCARDIOGRAM: Primary | ICD-10-CM

## 2024-07-03 DIAGNOSIS — Z01.818 PREOPERATIVE CLEARANCE: ICD-10-CM

## 2024-07-05 ENCOUNTER — HOSPITAL ENCOUNTER (OUTPATIENT)
Dept: CARDIOLOGY | Facility: CLINIC | Age: 74
Discharge: HOME | End: 2024-07-05
Attending: FAMILY MEDICINE | Admitting: FAMILY MEDICINE
Payer: COMMERCIAL

## 2024-07-05 DIAGNOSIS — Z01.818 PREOPERATIVE CLEARANCE: ICD-10-CM

## 2024-07-05 DIAGNOSIS — R94.31 ABNORMAL ELECTROCARDIOGRAM: ICD-10-CM

## 2024-07-05 LAB — LVEF ECHO: NORMAL

## 2024-07-05 PROCEDURE — 93306 TTE W/DOPPLER COMPLETE: CPT | Mod: 26 | Performed by: INTERNAL MEDICINE

## 2024-07-05 PROCEDURE — 93306 TTE W/DOPPLER COMPLETE: CPT

## 2024-07-09 RX ORDER — NUTRITIONAL SUPPLEMENT
1 PACKET (EA) ORAL DAILY
Status: ON HOLD | COMMUNITY
End: 2024-07-12

## 2024-07-09 RX ORDER — BISOPROLOL FUMARATE AND HYDROCHLOROTHIAZIDE 2.5; 6.25 MG/1; MG/1
1 TABLET ORAL DAILY
Status: ON HOLD | COMMUNITY
End: 2024-07-12

## 2024-07-09 RX ORDER — ACETAMINOPHEN 500 MG
500-1000 TABLET ORAL EVERY 6 HOURS PRN
COMMUNITY

## 2024-07-09 RX ORDER — DICLOFENAC SODIUM 75 MG/1
75 TABLET, DELAYED RELEASE ORAL 2 TIMES DAILY PRN
COMMUNITY

## 2024-07-11 ENCOUNTER — ANESTHESIA EVENT (OUTPATIENT)
Dept: SURGERY | Facility: CLINIC | Age: 74
DRG: 253 | End: 2024-07-11
Payer: COMMERCIAL

## 2024-07-12 ENCOUNTER — APPOINTMENT (OUTPATIENT)
Dept: MRI IMAGING | Facility: CLINIC | Age: 74
DRG: 253 | End: 2024-07-12
Payer: COMMERCIAL

## 2024-07-12 ENCOUNTER — HOSPITAL ENCOUNTER (INPATIENT)
Facility: CLINIC | Age: 74
LOS: 10 days | Discharge: HOME HEALTH | DRG: 253 | End: 2024-07-22
Attending: SURGERY | Admitting: SURGERY
Payer: COMMERCIAL

## 2024-07-12 ENCOUNTER — APPOINTMENT (OUTPATIENT)
Dept: SURGERY | Facility: PHYSICIAN GROUP | Age: 74
End: 2024-07-12
Payer: COMMERCIAL

## 2024-07-12 ENCOUNTER — APPOINTMENT (OUTPATIENT)
Dept: CT IMAGING | Facility: CLINIC | Age: 74
DRG: 253 | End: 2024-07-12
Payer: COMMERCIAL

## 2024-07-12 ENCOUNTER — ANESTHESIA (OUTPATIENT)
Dept: SURGERY | Facility: CLINIC | Age: 74
DRG: 253 | End: 2024-07-12
Payer: COMMERCIAL

## 2024-07-12 ENCOUNTER — APPOINTMENT (OUTPATIENT)
Dept: INTERVENTIONAL RADIOLOGY/VASCULAR | Facility: CLINIC | Age: 74
DRG: 253 | End: 2024-07-12
Attending: SURGERY
Payer: COMMERCIAL

## 2024-07-12 DIAGNOSIS — I70.229 CRITICAL ISCHEMIA OF LOWER EXTREMITY (H): ICD-10-CM

## 2024-07-12 DIAGNOSIS — I73.9 PAD (PERIPHERAL ARTERY DISEASE) (H): ICD-10-CM

## 2024-07-12 DIAGNOSIS — I70.223 CRITICAL LIMB ISCHEMIA OF BOTH LOWER EXTREMITIES (H): Primary | ICD-10-CM

## 2024-07-12 DIAGNOSIS — R78.81 BACTEREMIA: ICD-10-CM

## 2024-07-12 DIAGNOSIS — I70.212 ATHEROSCLER OF NATIVE ARTERY OF LEFT LEG WITH INTERMIT CLAUDICATION (H): ICD-10-CM

## 2024-07-12 LAB
ANION GAP SERPL CALCULATED.3IONS-SCNC: 12 MMOL/L (ref 7–15)
BASOPHILS # BLD AUTO: 0.1 10E3/UL (ref 0–0.2)
BASOPHILS NFR BLD AUTO: 1 %
BUN SERPL-MCNC: 23 MG/DL (ref 8–23)
CALCIUM SERPL-MCNC: 9.6 MG/DL (ref 8.8–10.2)
CHLORIDE SERPL-SCNC: 100 MMOL/L (ref 98–107)
CHOLEST SERPL-MCNC: 146 MG/DL
CREAT SERPL-MCNC: 1 MG/DL (ref 0.67–1.17)
CRP SERPL-MCNC: 14.57 MG/L
DEPRECATED HCO3 PLAS-SCNC: 26 MMOL/L (ref 22–29)
EGFRCR SERPLBLD CKD-EPI 2021: 78 ML/MIN/1.73M2
EOSINOPHIL # BLD AUTO: 0.5 10E3/UL (ref 0–0.7)
EOSINOPHIL NFR BLD AUTO: 5 %
ERYTHROCYTE [DISTWIDTH] IN BLOOD BY AUTOMATED COUNT: 12.7 % (ref 10–15)
ERYTHROCYTE [SEDIMENTATION RATE] IN BLOOD BY WESTERGREN METHOD: 32 MM/HR (ref 0–20)
GLUCOSE BLDC GLUCOMTR-MCNC: 114 MG/DL (ref 70–99)
GLUCOSE BLDC GLUCOMTR-MCNC: 200 MG/DL (ref 70–99)
GLUCOSE SERPL-MCNC: 152 MG/DL (ref 70–99)
HBA1C MFR BLD: 7 %
HCT VFR BLD AUTO: 41.5 % (ref 40–53)
HDLC SERPL-MCNC: 62 MG/DL
HGB BLD-MCNC: 13.9 G/DL (ref 13.3–17.7)
IMM GRANULOCYTES # BLD: 0 10E3/UL
IMM GRANULOCYTES NFR BLD: 0 %
LDLC SERPL CALC-MCNC: 62 MG/DL
LYMPHOCYTES # BLD AUTO: 1.3 10E3/UL (ref 0.8–5.3)
LYMPHOCYTES NFR BLD AUTO: 13 %
MCH RBC QN AUTO: 31.2 PG (ref 26.5–33)
MCHC RBC AUTO-ENTMCNC: 33.5 G/DL (ref 31.5–36.5)
MCV RBC AUTO: 93 FL (ref 78–100)
MONOCYTES # BLD AUTO: 0.7 10E3/UL (ref 0–1.3)
MONOCYTES NFR BLD AUTO: 7 %
NEUTROPHILS # BLD AUTO: 7.5 10E3/UL (ref 1.6–8.3)
NEUTROPHILS NFR BLD AUTO: 75 %
NONHDLC SERPL-MCNC: 84 MG/DL
NRBC # BLD AUTO: 0 10E3/UL
NRBC BLD AUTO-RTO: 0 /100
PLATELET # BLD AUTO: 283 10E3/UL (ref 150–450)
POTASSIUM SERPL-SCNC: 4.3 MMOL/L (ref 3.4–5.3)
RBC # BLD AUTO: 4.46 10E6/UL (ref 4.4–5.9)
SODIUM SERPL-SCNC: 138 MMOL/L (ref 135–145)
TRIGL SERPL-MCNC: 109 MG/DL
WBC # BLD AUTO: 9.9 10E3/UL (ref 4–11)

## 2024-07-12 PROCEDURE — 86140 C-REACTIVE PROTEIN: CPT | Performed by: SURGERY

## 2024-07-12 PROCEDURE — 87149 DNA/RNA DIRECT PROBE: CPT

## 2024-07-12 PROCEDURE — 73718 MRI LOWER EXTREMITY W/O DYE: CPT | Mod: LT

## 2024-07-12 PROCEDURE — 99222 1ST HOSP IP/OBS MODERATE 55: CPT | Performed by: PODIATRIST

## 2024-07-12 PROCEDURE — 250N000011 HC RX IP 250 OP 636

## 2024-07-12 PROCEDURE — 85041 AUTOMATED RBC COUNT: CPT | Performed by: SURGERY

## 2024-07-12 PROCEDURE — 250N000011 HC RX IP 250 OP 636: Performed by: SURGERY

## 2024-07-12 PROCEDURE — 36415 COLL VENOUS BLD VENIPUNCTURE: CPT

## 2024-07-12 PROCEDURE — 99222 1ST HOSP IP/OBS MODERATE 55: CPT | Performed by: PHYSICIAN ASSISTANT

## 2024-07-12 PROCEDURE — 99222 1ST HOSP IP/OBS MODERATE 55: CPT | Mod: GC | Performed by: SURGERY

## 2024-07-12 PROCEDURE — 83036 HEMOGLOBIN GLYCOSYLATED A1C: CPT | Performed by: SURGERY

## 2024-07-12 PROCEDURE — 85652 RBC SED RATE AUTOMATED: CPT | Performed by: SURGERY

## 2024-07-12 PROCEDURE — 999N000141 HC STATISTIC PRE-PROCEDURE NURSING ASSESSMENT: Performed by: SURGERY

## 2024-07-12 PROCEDURE — 250N000009 HC RX 250: Performed by: SURGERY

## 2024-07-12 PROCEDURE — 87186 SC STD MICRODIL/AGAR DIL: CPT

## 2024-07-12 PROCEDURE — 82374 ASSAY BLOOD CARBON DIOXIDE: CPT | Performed by: SURGERY

## 2024-07-12 PROCEDURE — 83718 ASSAY OF LIPOPROTEIN: CPT | Performed by: SURGERY

## 2024-07-12 PROCEDURE — 250N000013 HC RX MED GY IP 250 OP 250 PS 637

## 2024-07-12 PROCEDURE — 75635 CT ANGIO ABDOMINAL ARTERIES: CPT

## 2024-07-12 PROCEDURE — 120N000001 HC R&B MED SURG/OB

## 2024-07-12 PROCEDURE — 250N000011 HC RX IP 250 OP 636: Performed by: PHYSICIAN ASSISTANT

## 2024-07-12 PROCEDURE — 250N000013 HC RX MED GY IP 250 OP 250 PS 637: Performed by: SURGERY

## 2024-07-12 RX ORDER — ONDANSETRON 4 MG/1
4 TABLET, ORALLY DISINTEGRATING ORAL EVERY 6 HOURS PRN
Status: DISCONTINUED | OUTPATIENT
Start: 2024-07-12 | End: 2024-07-20

## 2024-07-12 RX ORDER — CHLORTHALIDONE 25 MG/1
50 TABLET ORAL DAILY
Status: DISCONTINUED | OUTPATIENT
Start: 2024-07-12 | End: 2024-07-22 | Stop reason: HOSPADM

## 2024-07-12 RX ORDER — METOPROLOL SUCCINATE 100 MG/1
100 TABLET, EXTENDED RELEASE ORAL DAILY
Status: DISCONTINUED | OUTPATIENT
Start: 2024-07-12 | End: 2024-07-19

## 2024-07-12 RX ORDER — NALOXONE HYDROCHLORIDE 0.4 MG/ML
0.2 INJECTION, SOLUTION INTRAMUSCULAR; INTRAVENOUS; SUBCUTANEOUS
Status: DISCONTINUED | OUTPATIENT
Start: 2024-07-12 | End: 2024-07-22 | Stop reason: HOSPADM

## 2024-07-12 RX ORDER — CLOPIDOGREL BISULFATE 75 MG/1
75 TABLET ORAL DAILY
Status: DISCONTINUED | OUTPATIENT
Start: 2024-07-13 | End: 2024-07-12

## 2024-07-12 RX ORDER — OXYCODONE AND ACETAMINOPHEN 5; 325 MG/1; MG/1
1 TABLET ORAL EVERY 6 HOURS PRN
Status: DISCONTINUED | OUTPATIENT
Start: 2024-07-12 | End: 2024-07-17

## 2024-07-12 RX ORDER — CEFAZOLIN SODIUM/WATER 2 G/20 ML
2 SYRINGE (ML) INTRAVENOUS
Status: DISCONTINUED | OUTPATIENT
Start: 2024-07-12 | End: 2024-07-12 | Stop reason: HOSPADM

## 2024-07-12 RX ORDER — NALOXONE HYDROCHLORIDE 0.4 MG/ML
0.4 INJECTION, SOLUTION INTRAMUSCULAR; INTRAVENOUS; SUBCUTANEOUS
Status: DISCONTINUED | OUTPATIENT
Start: 2024-07-12 | End: 2024-07-22 | Stop reason: HOSPADM

## 2024-07-12 RX ORDER — DEXTROSE MONOHYDRATE 25 G/50ML
25-50 INJECTION, SOLUTION INTRAVENOUS
Status: DISCONTINUED | OUTPATIENT
Start: 2024-07-12 | End: 2024-07-22 | Stop reason: HOSPADM

## 2024-07-12 RX ORDER — CLOPIDOGREL BISULFATE 75 MG/1
75 TABLET ORAL DAILY
Status: DISCONTINUED | OUTPATIENT
Start: 2024-07-12 | End: 2024-07-18

## 2024-07-12 RX ORDER — ACETAMINOPHEN 500 MG
500 TABLET ORAL EVERY 6 HOURS PRN
Status: DISCONTINUED | OUTPATIENT
Start: 2024-07-12 | End: 2024-07-13

## 2024-07-12 RX ORDER — FUROSEMIDE 20 MG/1
10 TABLET ORAL EVERY MORNING
Status: DISCONTINUED | OUTPATIENT
Start: 2024-07-13 | End: 2024-07-12

## 2024-07-12 RX ORDER — ATORVASTATIN CALCIUM 80 MG/1
80 TABLET, FILM COATED ORAL AT BEDTIME
Status: DISCONTINUED | OUTPATIENT
Start: 2024-07-12 | End: 2024-07-22 | Stop reason: HOSPADM

## 2024-07-12 RX ORDER — EZETIMIBE 10 MG/1
10 TABLET ORAL DAILY
Status: DISCONTINUED | OUTPATIENT
Start: 2024-07-12 | End: 2024-07-22 | Stop reason: HOSPADM

## 2024-07-12 RX ORDER — ONDANSETRON 2 MG/ML
4 INJECTION INTRAMUSCULAR; INTRAVENOUS EVERY 6 HOURS PRN
Status: DISCONTINUED | OUTPATIENT
Start: 2024-07-12 | End: 2024-07-20

## 2024-07-12 RX ORDER — AMOXICILLIN 250 MG
1 CAPSULE ORAL 2 TIMES DAILY PRN
Status: DISCONTINUED | OUTPATIENT
Start: 2024-07-12 | End: 2024-07-22 | Stop reason: HOSPADM

## 2024-07-12 RX ORDER — PIPERACILLIN SODIUM, TAZOBACTAM SODIUM 3; .375 G/15ML; G/15ML
3.38 INJECTION, POWDER, LYOPHILIZED, FOR SOLUTION INTRAVENOUS EVERY 8 HOURS
Status: DISCONTINUED | OUTPATIENT
Start: 2024-07-12 | End: 2024-07-12

## 2024-07-12 RX ORDER — CEFAZOLIN SODIUM/WATER 2 G/20 ML
2 SYRINGE (ML) INTRAVENOUS SEE ADMIN INSTRUCTIONS
Status: DISCONTINUED | OUTPATIENT
Start: 2024-07-12 | End: 2024-07-12 | Stop reason: HOSPADM

## 2024-07-12 RX ORDER — CALCIUM CARBONATE 500 MG/1
1000 TABLET, CHEWABLE ORAL 4 TIMES DAILY PRN
Status: DISCONTINUED | OUTPATIENT
Start: 2024-07-12 | End: 2024-07-22 | Stop reason: HOSPADM

## 2024-07-12 RX ORDER — AMLODIPINE BESYLATE 5 MG/1
10 TABLET ORAL DAILY
Status: DISCONTINUED | OUTPATIENT
Start: 2024-07-13 | End: 2024-07-12

## 2024-07-12 RX ORDER — NICOTINE POLACRILEX 4 MG
15-30 LOZENGE BUCCAL
Status: DISCONTINUED | OUTPATIENT
Start: 2024-07-12 | End: 2024-07-22 | Stop reason: HOSPADM

## 2024-07-12 RX ORDER — AMOXICILLIN 250 MG
2 CAPSULE ORAL 2 TIMES DAILY PRN
Status: DISCONTINUED | OUTPATIENT
Start: 2024-07-12 | End: 2024-07-22 | Stop reason: HOSPADM

## 2024-07-12 RX ORDER — AMLODIPINE BESYLATE 10 MG/1
10 TABLET ORAL DAILY
Status: DISCONTINUED | OUTPATIENT
Start: 2024-07-12 | End: 2024-07-22 | Stop reason: HOSPADM

## 2024-07-12 RX ORDER — SODIUM CHLORIDE, SODIUM LACTATE, POTASSIUM CHLORIDE, CALCIUM CHLORIDE 600; 310; 30; 20 MG/100ML; MG/100ML; MG/100ML; MG/100ML
INJECTION, SOLUTION INTRAVENOUS CONTINUOUS
Status: DISCONTINUED | OUTPATIENT
Start: 2024-07-12 | End: 2024-07-12 | Stop reason: HOSPADM

## 2024-07-12 RX ORDER — ASPIRIN 81 MG/1
81 TABLET, CHEWABLE ORAL DAILY
Status: DISCONTINUED | OUTPATIENT
Start: 2024-07-12 | End: 2024-07-22 | Stop reason: HOSPADM

## 2024-07-12 RX ORDER — CEFTRIAXONE 2 G/1
2 INJECTION, POWDER, FOR SOLUTION INTRAMUSCULAR; INTRAVENOUS DAILY
Status: DISCONTINUED | OUTPATIENT
Start: 2024-07-12 | End: 2024-07-15 | Stop reason: ALTCHOICE

## 2024-07-12 RX ORDER — HEPARIN SODIUM 5000 [USP'U]/.5ML
5000 INJECTION, SOLUTION INTRAVENOUS; SUBCUTANEOUS 3 TIMES DAILY
Status: DISCONTINUED | OUTPATIENT
Start: 2024-07-12 | End: 2024-07-20

## 2024-07-12 RX ORDER — PIPERACILLIN SODIUM, TAZOBACTAM SODIUM 3; .375 G/15ML; G/15ML
3.38 INJECTION, POWDER, LYOPHILIZED, FOR SOLUTION INTRAVENOUS EVERY 6 HOURS
Status: DISCONTINUED | OUTPATIENT
Start: 2024-07-12 | End: 2024-07-12 | Stop reason: ALTCHOICE

## 2024-07-12 RX ORDER — LOSARTAN POTASSIUM 100 MG/1
100 TABLET ORAL DAILY
Status: DISCONTINUED | OUTPATIENT
Start: 2024-07-12 | End: 2024-07-22 | Stop reason: HOSPADM

## 2024-07-12 RX ORDER — DICLOFENAC SODIUM 25 MG/1
75 TABLET, DELAYED RELEASE ORAL 2 TIMES DAILY PRN
Status: DISCONTINUED | OUTPATIENT
Start: 2024-07-12 | End: 2024-07-22 | Stop reason: HOSPADM

## 2024-07-12 RX ORDER — IOPAMIDOL 755 MG/ML
100 INJECTION, SOLUTION INTRAVASCULAR ONCE
Status: COMPLETED | OUTPATIENT
Start: 2024-07-12 | End: 2024-07-12

## 2024-07-12 RX ORDER — LIDOCAINE 40 MG/G
CREAM TOPICAL
Status: DISCONTINUED | OUTPATIENT
Start: 2024-07-12 | End: 2024-07-16

## 2024-07-12 RX ADMIN — ASPIRIN 81 MG CHEWABLE TABLET 81 MG: 81 TABLET CHEWABLE at 12:03

## 2024-07-12 RX ADMIN — CHLORTHALIDONE 50 MG: 25 TABLET ORAL at 14:26

## 2024-07-12 RX ADMIN — METFORMIN HYDROCHLORIDE 1000 MG: 500 TABLET ORAL at 20:17

## 2024-07-12 RX ADMIN — CLOPIDOGREL BISULFATE 75 MG: 75 TABLET ORAL at 12:03

## 2024-07-12 RX ADMIN — HEPARIN SODIUM 5000 UNITS: 5000 INJECTION, SOLUTION INTRAVENOUS; SUBCUTANEOUS at 22:48

## 2024-07-12 RX ADMIN — PIPERACILLIN AND TAZOBACTAM 3.38 G: 3; .375 INJECTION, POWDER, FOR SOLUTION INTRAVENOUS at 07:34

## 2024-07-12 RX ADMIN — AMLODIPINE BESYLATE 10 MG: 10 TABLET ORAL at 12:03

## 2024-07-12 RX ADMIN — EZETIMIBE 10 MG: 10 TABLET ORAL at 14:27

## 2024-07-12 RX ADMIN — CEFTRIAXONE SODIUM 2 G: 2 INJECTION, POWDER, FOR SOLUTION INTRAMUSCULAR; INTRAVENOUS at 14:31

## 2024-07-12 RX ADMIN — EMPAGLIFLOZIN 25 MG: 25 TABLET, FILM COATED ORAL at 12:04

## 2024-07-12 RX ADMIN — HEPARIN SODIUM 5000 UNITS: 5000 INJECTION, SOLUTION INTRAVENOUS; SUBCUTANEOUS at 14:26

## 2024-07-12 RX ADMIN — SODIUM CHLORIDE 80 ML: 9 INJECTION, SOLUTION INTRAVENOUS at 19:29

## 2024-07-12 RX ADMIN — IOPAMIDOL 100 ML: 755 INJECTION, SOLUTION INTRAVENOUS at 19:29

## 2024-07-12 RX ADMIN — LOSARTAN POTASSIUM 100 MG: 100 TABLET, FILM COATED ORAL at 12:03

## 2024-07-12 RX ADMIN — METOPROLOL SUCCINATE 100 MG: 100 TABLET, EXTENDED RELEASE ORAL at 12:03

## 2024-07-12 RX ADMIN — ATORVASTATIN CALCIUM 80 MG: 80 TABLET, FILM COATED ORAL at 22:44

## 2024-07-12 ASSESSMENT — ACTIVITIES OF DAILY LIVING (ADL)
VISION_MANAGEMENT: GLASSES
WEAR_GLASSES_OR_BLIND: YES
DIFFICULTY_EATING/SWALLOWING: NO
ADLS_ACUITY_SCORE: 26
ADLS_ACUITY_SCORE: 24
CONCENTRATING,_REMEMBERING_OR_MAKING_DECISIONS_DIFFICULTY: NO
ADLS_ACUITY_SCORE: 24
ADLS_ACUITY_SCORE: 25
DOING_ERRANDS_INDEPENDENTLY_DIFFICULTY: NO
ADLS_ACUITY_SCORE: 24
DRESSING/BATHING_DIFFICULTY: NO
ADLS_ACUITY_SCORE: 24
ADLS_ACUITY_SCORE: 26
ADLS_ACUITY_SCORE: 38
ADLS_ACUITY_SCORE: 24
CHANGE_IN_FUNCTIONAL_STATUS_SINCE_ONSET_OF_CURRENT_ILLNESS/INJURY: NO
ADLS_ACUITY_SCORE: 26
WALKING_OR_CLIMBING_STAIRS_DIFFICULTY: NO
ADLS_ACUITY_SCORE: 26
ADLS_ACUITY_SCORE: 36
ADLS_ACUITY_SCORE: 26
ADLS_ACUITY_SCORE: 24
FALL_HISTORY_WITHIN_LAST_SIX_MONTHS: NO
ADLS_ACUITY_SCORE: 38
TOILETING_ISSUES: NO
ADLS_ACUITY_SCORE: 26

## 2024-07-12 NOTE — LETTER
Transition Communication Hand-off for Care Transitions to Next Level of Care Provider    Name: Zia Hopkins  : 1950  MRN #: [id number]  Primary Care Provider: Reid Reese     Primary Clinic: [address]  [place] [address]     Reason for Hospitalization:  Critical ischemia of lower extremity (H) [I70.229]  Admit Date/Time: 2024  5:52 AM  Discharge Date: 2024  Payor Source: Payor: MEDICA / Plan: [employer] / Product Type: HMO /            Reason for Communication Hand-off Referral: Other home with [facility] for IV antibiotics at discharge    Discharge Plan: Home with [facility]       Concern for non-adherence with plan of care:   Y/N No  Discharge Needs Assessment:  Needs      Flowsheet Row Most Recent Value   Equipment Currently Used at Home crutches, cane, straight   # of Referrals Placed by [facility]            Follow-up specialty is recommended: Yes    Follow-up plan:    Future Appointments   Date Time Provider Department Center   2024  3:30 PM Rafaela Salter, PT [facility]   2024 10:15 AM Erik Baez DPM [facility]   2024 12:30 PM Nallely Trujillo, [facility]   2024 11:00 AM Erik Baez DPM [facility]       Any outstanding tests or procedures:        Referrals       Future Labs/Procedures    Primary Care - Care Coordination Referral     Process Instructions:    Services are provided by a Care Coordinator for people with complex needs such as: medical, social, or financial troubles.  The Care Coordinator works with the patient and their Primary Care Provider to determine health goals, obtain resources, achieve outcomes, and develop care plans that help coordinate the patient's care.     Comments:         Home Care Referral     Comments:    Your provider has ordered home health services. If you have not been contacted within 2 days of your discharge please call  the selected Home Care agency listed on your Discharge document.  If a Home Care agency is NOT listed, please call [phone].    Home Infusion Referral               Nisa Viveros RN   [facility]   Phone [phone], [place] or [phone]     AVS/Discharge Summary is the source of truth; this is a helpful guide for improved communication of patient story

## 2024-07-12 NOTE — PROGRESS NOTES
We were planning on doing a left femoral cutdown with left lower extremity arteriogram.  In preoperative area when I saw the patient he has wet gangrene of his third toe and significant erythema of the foot and ankle.    We will change the plan and admit the patient to the hospital.  We will get blood cultures as well as CBC, ESR and CRP.  We will start him on Zosyn after the blood cultures are drawn.  We will get an MRI of the left foot.    We will have consultation with our podiatry and infectious disease colleagues.    Once we have the source of the infection and the infection somewhat under control then we can proceed with his left lower extremity arteriogram.    This was discussed with the patient and his family and he has verbalized his understanding.

## 2024-07-12 NOTE — PLAN OF CARE
Date & Time: 07/12/24 [id number]    Surgery/POD#: Here for L common femoral artery cutdown but patient presented to preop with wet gangrene, surgery delayed  Behavior & Aggression: Green  Fall Risk:  Yes  Orientation:A&O x4  ABNL VS/O2: VSS on RA  ABNL Labs: CRP inflammation 14.57  Pain Management: Denies  Bowel/Bladder: Continent, voiding adequately in BR. No BM. +BS  Drains: PIV SL w/ int abx  Wounds/incisions: L third toe gangrene; R foot partial amputation  Diet: Mod Carb  Activity Level: Ax1 w/ cane  Tests/Procedures: MRI - checklist done  Anticipated  DC Date: Pending improvement, ID & podiatry consulted   Significant Information: N/A

## 2024-07-12 NOTE — H&P
Vascular Surgery H&P  2024    Zia Hopkins  : 1950    Date of Service: 2024 8:58 AM       Assessment and Plan:  Zia Hopkins is a 74 year old male with PMH of T2DM, HTN, HLD, CKD3, and PAD [facility] and LLE angi[facility] however was noted to have L 3rd digit wet gangrene with surrounding cellulitis of LLE as well as wound on L heel. He is now admitted for IV abx, workup of L heel wound, podiatry consult. He will still require angiogram however doing femoral artery cutdown in setting of active infection in LLE increases likelihood of groin infection significantly. We will plan for angiogram as an outpatient in 2 weeks. In the meantime he is being admitted for the above.    - Continue PTA meds.   - Started empiric zosyn, note ID wishes to de-escalate to ceftriaxone, appreciate recs  - Blood cultures sent  - will trend WBC  - LLE MRI ordered  - podiatry consulted        Discussed with Dr. Hamlin, who is in agreement with the above    Naeem Lewis MD  Surgery PGY4    History of Present Illness:    Zia Hopkins is a 74 year old male with PMH of T2DM, HTN, HLD, CKD3, and PAD [facility] and LLE angiogram however was noted to have L 3rd digit wet gangrene with surrounding cellulitis of LLE as well as wound on L heel.    States 3rd toe wound has been present for 4 days. He notes it started after walking for very long distances in [place] on his recent trip.   Denies fever, chills, nausea, lack of appetite.       Past Medical History:  Past Medical History:   Diagnosis Date    Acute low back pain     Anemia     Bilateral carotid bruits     Cellulitis of left foot     Central spinal stenosis     and Subarticular    Chronic kidney disease     Degenerative lumbar spinal stenosis     multilevel    Diabetes mellitus type 2, noninsulin dependent (H)     Diabetic ulcer of toe (H)     Gangrene of toe (H)      "Hyperlipidemia LDL goal <70     Hypertension     Lumbar foraminal stenosis     Macular degeneration (senile) of retina     PAD (peripheral artery disease) (H24)     Pneumonia     Psoriasis            Medications:  No current outpatient medications on file.       Allergies:     Allergies   Allergen Reactions    Nickel        Review of Symptoms:  A 10 point review of symptoms has been conducted and is negative except for that mentioned in the above HPI.    Physical Exam:    Blood pressure (!) 144/62, pulse 62, temperature 97.3  F (36.3  C), temperature source Temporal, resp. rate 16, height 1.6 m (5' 3\"), weight 60.3 kg (133 lb), SpO2 100%.  Gen:    Lying in bed in NAD, A&OX3  HEENT: Normocephalic and atraumatic  CV:  RRR per DP signal  Pulm:  Non-labored breathing on RA  Abd:  Soft, non-tender, non-distended  Ext:  Warm and well perfused RLE, with previous TMA, does have small opening of skin on lateral aspect foot.   LLE with black eschar to entire toe, foul odor, no obvious drainage noted. LLE is erythematous with blanching erythema. Also with dime sized wound on L heel.   Vascular:  Monophasic DP and PT signals in LLE   Neuro:  Able to wiggle toes bilaterally equally    Labs:  - A1c 7,   - WBC 9.9, CRP mildly elevated at 14  CBC RESULTS:   Recent Labs   Lab Test 07/12/24  0729   WBC 9.9   HGB 13.9        Last Basic Metabolic Panel:  Lab Results   Component Value Date    POTASSIUM 4.3 07/12/2024    POTASSIUM 4.0 06/10/2022    POTASSIUM 3.4 04/23/2021     Lab Results   Component Value Date    CR 1.00 07/12/2024    CR 0.95 04/23/2021       Imaging:  No new imaging reviewed.     Vascular surgery attending staff note: I have seen and examined the patient.  I agree with the documentation by our resident, Dr. Lewis.  Mr. Zia Hopkins is a very pleasant 74-year-old gentleman who is well-known to myself.  We are planning on doing a left common femoral artery cutdown with endovascular intervention of the left " popliteal artery occlusion.  However in preoperative holding area he revealed that he has had progressive wet gangrene of his left third toe.  Also notes increased erythema of the left foot.    Will postpone the case and admit him to the hospital.  Will get blood cultures and start him on antibiotics and get an MRI of the foot as well.  We will have to postpone his revascularization procedure till we have sepsis controlled.    This was discussed with the patient and his wife and they are in agreement.    IAN YATES M.D., FACS, RPVI

## 2024-07-12 NOTE — CONSULTS
[facility]    Infectious Disease Consultation     Date of Admission:  7/12/2024  Date of Consult (When I saw the patient): 07/12/24    Assessment & Plan   Zia Hopkins is a 74 year old male who was admitted on 7/12/2024.     Impression:  73 yo male with a history of DM (decently controlled with A1C 7.0), HTN, hyperlipidemia, and PAD s/p numerous vascular interventions to both legs presenting for left femoral artery cutdown with angiogram for probable occluded popliteal artery now found to have wet gangrene of the left 3rd toe as well as mild erythema of the left foot and ankle that started 4 days prior.     -History of right femoral endarterectomy and angioplasty/stenting of bilateral lower extremities for PAD.   -Now with possible occlusion of left popliteal artery with scheduled left femoral artery cut-down and angiogram put on hold for newly noted wet gangrene of left 3rd toe (appears to be ischemic wound)  -Afebrile. WBC normal.   -Blood cultures pending.   -MRI pending.       Recommendations:   Surgical intervention of the toe would be curative. Podiatry to see.   Unsure how helpful antibiotics will be in this setting as his blood flow to the area is limited and antibiotics may not even be able to penetrate there. Will defer antibiotics to primary service. Presently on Zosyn. Will de-escalate to Ceftriaxone.   Follow blood cultures.   Await MRI results.  ID will follow peripherally over weekend. Please call us back with questions.     Patient and plan discussed with Dr. Parikh.       Shaina Norton PA-C    Reason for Consult   Reason for consult: Asked to evaluate this patient for possible left foot infection - osteomyelitis.    Primary Care Physician   Reid Reese    Chief Complaint   Left foot wet gangrene    History is obtained from the patient and medical records    History of Present Illness   Zia Hopkins is a 74 year old male smoker with hypertension, hyperlipidemia, and  diabetes who in 2017 developed necrotic changes to his left toes. He underwent  left SFA and popliteal artery angioplasty and left SFA stenting, but he eventually needed to have his left great toe amputated on 4/20/21 by Dr. Whelan. He then presented again in 2022 with right foot ischemic digits and is s/p right femoral endarterectomy and right iliac and femoral angioplasty/stenting 3/4/22. He has been followed closely by Vascular Surgery for his PAD. He was scheduled today to undergo a left femoral artery cutdown with angiogram of the left lower extremity and intervention as necessary for either high-grade recurrent stenosis of the left popliteal artery or occlusion. However, in the pre-op area he was noted to have some erythema of his left ankle and foot as well as wet gangrene of the left 3rd toe. This started approximately 4 days ago. The procedure was cancelled and he was admitted and started on IV Zosyn. MRI is pending as well as blood cultures. WBC is normal. CRP is mildly elevated at 14.57.          Past Medical History   I have reviewed this patient's medical history and updated it with pertinent information if needed.   Past Medical History:   Diagnosis Date    Acute low back pain     Anemia     Bilateral carotid bruits     Cellulitis of left foot     Central spinal stenosis     and Subarticular    Chronic kidney disease     Degenerative lumbar spinal stenosis     multilevel    Diabetes mellitus type 2, noninsulin dependent (H)     Diabetic ulcer of toe (H)     Gangrene of toe (H)     Hyperlipidemia LDL goal <70     Hypertension     Lumbar foraminal stenosis     Macular degeneration (senile) of retina     PAD (peripheral artery disease) (H24)     Pneumonia     Psoriasis        Past Surgical History   I have reviewed this patient's surgical history and updated it with pertinent information if needed.  Past Surgical History:   Procedure Laterality Date    AMPUTATE FOOT Left 03/15/2017    Procedure: AMPUTATE  FOOT;  Surgeon: Emre Mauricio DPM;  Location: [facility]    AMPUTATE TOE(S) Left 04/20/2021    Procedure: LEFT GREAT TOE AMPUTATION.;  Surgeon: Purvi Whelan DPM, Podiatry/Foot and Ankle Surgery;  Location: [facility]    AMPUTATE TOE(S) Right 04/18/2022    Procedure: 1.  Partial right great toe amputation. 2.  Right foot fourth toe amputation at the metatarsophalangeal joint. 3.  Right fifth metatarsal amputation at the metatarsophalangeal joint.;  Surgeon: Purvi Whelan DPM, Podiatry/Foot and Ankle Surgery;  Location: [facility] OR    AMPUTATE TOE(S) Right 09/29/2022    Procedure: Right second and third toe amputations;  Surgeon: Purvi Whelan DPM, Podiatry/Foot and Ankle Surgery;  Location: [facility]    ARTHROPLASTY KNEE BILATERAL Bilateral     BIOPSY BONE FOOT Right 06/27/2022    Procedure: Partial right fourth and fifth metatarsal bone excision;  Surgeon: Purvi Whelan DPM, Podiatry/Foot and Ankle Surgery;  Location: [facility]    ENDARTERECTOMY FEMORAL Right 03/04/2022    Procedure: RIGHT FEMORAL ENDARTERECTOMY, RIGHT FEMORAL ARTERY BALLOON ANGIOPLASTY WITH STENTING;  Surgeon: Surjit Hamlin MD;  [facility]: [facility]    ENDARTERECTOMY FEMORAL Left 07/12/2023    Procedure: LEFT FEMORAL CUTDOWN;  Surgeon: Surjit Hamlin MD;  [facility]: [facility]    IR LOWER EXTREMITY ANGIOGRAM LEFT  07/30/2019    IR LOWER EXTREMITY ANGIOGRAM LEFT  02/03/2021    IR LOWER EXTREMITY ANGIOGRAM LEFT  04/21/2021    IR LOWER EXTREMITY ANGIOGRAM RIGHT  03/01/2022    IR LOWER EXTREMITY ANGIOGRAM RIGHT  06/10/2022    IR OR ANGIOGRAM  03/04/2022    IR OR ANGIOGRAM  07/12/2023    IRRIGATION AND DEBRIDEMENT FOOT, COMBINED Left 03/15/2017    Procedure: COMBINED IRRIGATION AND DEBRIDEMENT FOOT;  Surgeon: Emre Mauricio DPM;  [facility]: [facility]    OR ANGIOGRAM, LOWER EXTREMITY Left 07/12/2023    Procedure: LEFT LOWER EXTREMITY ANGIOGRAM BALLOON ANGIOPLASTY;  Surgeon: Surjit Hamlin MD;  Location: [facility]    PERONEAL NERVE DECOMPRESSION       VASECTOMY         Prior to Admission Medications   Prior to Admission Medications   Prescriptions Last Dose Informant Patient Reported? Taking?   acetaminophen (TYLENOL) 500 MG tablet Past Month  Yes Yes   Sig: Take 500-1,000 mg by mouth every 6 hours as needed for mild pain   amLODIPine (NORVASC) 10 MG tablet 7/11/2024 Self Yes Yes   Sig: Take 10 mg by mouth daily   aspirin 81 MG tablet 7/11/2024 Self No Yes   Sig: Take 1 tablet (81 mg) by mouth daily   atorvastatin (LIPITOR) 80 MG tablet 7/11/2024 Self No Yes   Sig: TAKE 1 TABLET(80 MG) BY MOUTH AT BEDTIME   chlorthalidone (HYGROTON) 50 MG tablet 7/11/2024 Self Yes Yes   Sig: Take 50 mg by mouth daily    clopidogrel (PLAVIX) 75 MG tablet 7/11/2024 Self No Yes   Sig: TAKE 1 TABLET(75 MG) BY MOUTH DAILY   clopidogrel (PLAVIX) 75 MG tablet   No No   Sig: Take 1 tablet (75 mg) by mouth daily   diclofenac (VOLTAREN) 75 MG EC tablet Unknown  Yes Yes   Sig: Take 75 mg by mouth 2 times daily   empagliflozin (JARDIANCE) 25 MG TABS tablet 7/11/2024 Self Yes Yes   Sig: Take 25 mg by mouth daily   ezetimibe (ZETIA) 10 MG tablet 7/11/2024 Self No Yes   Sig: TAKE 1 TABLET(10 MG) BY MOUTH DAILY   furosemide (LASIX) 20 MG tablet  Self Yes No   Sig: Take 10 mg by mouth every morning (20 MG X 0.5 = 10 MG)   losartan (COZAAR) 100 MG tablet 7/11/2024 Self Yes Yes   Sig: Take 100 mg by mouth daily    metFORMIN (GLUCOPHAGE) 1000 MG tablet 7/11/2024 Self Yes Yes   Sig: Take 1,000 mg by mouth 2 times daily (with meals)    metoprolol succinate ER (TOPROL-XL) 100 MG 24 hr tablet 7/11/2024 Self No Yes   Sig: Take 1 tablet (100 mg) by mouth daily   oxyCODONE-acetaminophen (PERCOCET) 5-325 MG tablet   No No   Sig: Take 1 tablet by mouth every 6 hours as needed for severe pain      Facility-Administered Medications: None     Allergies   Allergies   Allergen Reactions    Nickel        Immunization History   Immunization History   Administered Date(s) Administered    COVID-19 12+ (2023-24)  (Pfizer) 09/29/2023    COVID-19 Bivalent 12+ (Pfizer) 01/25/2023    COVID-19 MONOVALENT 12+ (Pfizer) 02/25/2021, 03/18/2021, 10/14/2021    COVID-19 Monovalent 12+ (Pfizer 2022) 06/08/2022    Flu, Unspecified 11/07/2022    Influenza (High Dose) 3 valent vaccine 09/25/2020, 09/15/2021    Pneumo Conj 13-V (2010&after) 02/23/2017       Social History   I have reviewed this patient's social history and updated it with pertinent information if needed. Zia Hopkins  reports that he has been smoking cigarettes. He has never used smokeless tobacco. He reports current alcohol use. He reports that he does not use drugs.    Family History   I have reviewed this patient's family history and updated it with pertinent information if needed.   Family History   Problem Relation Age of Onset    Diabetes Father        Review of Systems   The 10 point Review of Systems is negative    Physical Exam   Temp: 97.3  F (36.3  C) Temp src: Temporal BP: (!) 144/62 Pulse: 62   Resp: 16 SpO2: 100 % O2 Device: None (Room air)    Vital Signs with Ranges  Temp:  [97.3  F (36.3  C)] 97.3  F (36.3  C)  Pulse:  [60-62] 62  Resp:  [16] 16  BP: (144)/(62) 144/62  SpO2:  [100 %] 100 %  133 lbs 0 oz  Body mass index is 23.56 kg/m .    GENERAL APPEARANCE:  awake  EYES: Eyes grossly normal to inspection  NECK: no adenopathy  RESP: lungs clear   CV: regular rates and rhythm  ABDOMEN: soft, nontender  MS: extremities normal  SKIN: Left 3rd toe gangrenous, appears to be ischemic in nature. Very mild surrounding cellulitis.         Data   All laboratory data reviewed    Component      Latest Ref Rng 7/12/2024  7:29 AM   WBC      4.0 - 11.0 10e3/uL 9.9    RBC Count      4.40 - 5.90 10e6/uL 4.46    Hemoglobin      13.3 - 17.7 g/dL 13.9    Hematocrit      40.0 - 53.0 % 41.5    MCV      78 - 100 fL 93    MCH      26.5 - 33.0 pg 31.2    MCHC      31.5 - 36.5 g/dL 33.5    RDW      10.0 - 15.0 % 12.7    Platelet Count      150 - 450 10e3/uL 283    % Neutrophils       % 75    % Lymphocytes      % 13    % Monocytes      % 7    % Eosinophils      % 5    % Basophils      % 1    % Immature Granulocytes      % 0    NRBCs per 100 WBC      <1 /100 0    Absolute Neutrophils      1.6 - 8.3 10e3/uL 7.5    Absolute Lymphocytes      0.8 - 5.3 10e3/uL 1.3    Absolute Monocytes      0.0 - 1.3 10e3/uL 0.7    Absolute Eosinophils      0.0 - 0.7 10e3/uL 0.5    Absolute Basophils      0.0 - 0.2 10e3/uL 0.1    Absolute Immature Granulocytes      <=0.4 10e3/uL 0.0    Absolute NRBCs      10e3/uL 0.0        Component      Latest Ref [facility] 7/12/2024  7:29 AM   Sodium      135 - 145 mmol/L 138    Potassium      3.4 - 5.3 mmol/L 4.3    Chloride      98 - 107 mmol/L 100    Carbon Dioxide (CO2)      22 - 29 mmol/L 26    Anion Gap      7 - 15 mmol/L 12    Urea Nitrogen      8.0 - 23.0 mg/dL 23.0    Creatinine      0.67 - 1.17 mg/dL 1.00    GFR Estimate      >60 mL/min/1.73m2 78    Calcium      8.8 - 10.2 mg/dL 9.6    Glucose      70 - 99 mg/dL 152 (H)       Component      Latest Ref [facility] 7/12/2024  7:29 AM   Hemoglobin A1C      <5.7 % 7.0 (H)    CRP Inflammation      <5.00 mg/L 14.57 (H)    Sed Rate      0 - 20 mm/hr 32 (H)

## 2024-07-12 NOTE — CONSULTS
[place] [facility]  CONSULTATION NOTE    CHIEF COMPLAINT:      I was asked by Nallely Trujillo NP   to evaluate this patient for left foot infection.    PATIENT HISTORY:  Zia Hopkins is a 74 year old male  with a past medical history significant for what's listed below, was admitted for worsening left foot gangrene. He had a planned left femoral artery cutdown and angiogram today, although was noted to have wet gangrene and cellulitis. Procedure was canceled and he was admitted. Currently states feels well. No significant pain to left foot. Notes gangrenous lesions came on fast.         Review of Systems:  A 10 point review of systems was performed and is positive for that noted above in the patient history.  All other areas are negative.     PAST MEDICAL HISTORY:   Past Medical History:   Diagnosis Date    Acute low back pain     Anemia     Bilateral carotid bruits     Cellulitis of left foot     Central spinal stenosis     and Subarticular    Chronic kidney disease     Degenerative lumbar spinal stenosis     multilevel    Diabetes mellitus type 2, noninsulin dependent (H)     Diabetic ulcer of toe (H)     Gangrene of toe (H)     Hyperlipidemia LDL goal <70     Hypertension     Lumbar foraminal stenosis     Macular degeneration (senile) of retina     PAD (peripheral artery disease) (H24)     Pneumonia     Psoriasis         PAST SURGICAL HISTORY:   Past Surgical History:   Procedure Laterality Date    AMPUTATE FOOT Left 03/15/2017    Procedure: AMPUTATE FOOT;  Surgeon: Emre Mauricio DPM;  [facility]    AMPUTATE TOE(S) Left 04/20/2021    Procedure: LEFT GREAT TOE AMPUTATION.;  Surgeon: Purvi Whelan DPM, Podiatry/Foot and Ankle Surgery;  Location: [facility]    AMPUTATE TOE(S) Right 04/18/2022    Procedure: 1.  Partial right great toe amputation. 2.  Right foot fourth toe amputation at the metatarsophalangeal joint. 3.  Right fifth metatarsal amputation at the metatarsophalangeal  joint.;  Surgeon: Purvi Whelan DPM, Podiatry/Foot and Ankle Surgery;  Location: [facility]    AMPUTATE TOE(S) Right 09/29/2022    Procedure: Right second and third toe amputations;  Surgeon: Purvi Whelan DPM, Podiatry/Foot and Ankle Surgery;  Location: [facility]    ARTHROPLASTY KNEE BILATERAL Bilateral     BIOPSY BONE FOOT Right 06/27/2022    Procedure: Partial right fourth and fifth metatarsal bone excision;  Surgeon: Purvi Whelan DPM, Podiatry/Foot and Ankle Surgery;  Location: [facility]    ENDARTERECTOMY FEMORAL Right 03/04/2022    Procedure: RIGHT FEMORAL ENDARTERECTOMY, RIGHT FEMORAL ARTERY BALLOON ANGIOPLASTY WITH STENTING;  Surgeon: Surjit Hamlin MD;  [facility]: [facility]    ENDARTERECTOMY FEMORAL Left 07/12/2023    Procedure: LEFT FEMORAL CUTDOWN;  Surgeon: Surjit Hamlin MD;  [facility]    IR LOWER EXTREMITY ANGIOGRAM LEFT  07/30/2019    IR LOWER EXTREMITY ANGIOGRAM LEFT  02/03/2021    IR LOWER EXTREMITY ANGIOGRAM LEFT  04/21/2021    IR LOWER EXTREMITY ANGIOGRAM RIGHT  03/01/2022    IR LOWER EXTREMITY ANGIOGRAM RIGHT  06/10/2022    IR OR ANGIOGRAM  03/04/2022    IR OR ANGIOGRAM  07/12/2023    IRRIGATION AND DEBRIDEMENT FOOT, COMBINED Left 03/15/2017    Procedure: COMBINED IRRIGATION AND DEBRIDEMENT FOOT;  Surgeon: Emre Mauricio DPM;  [facility]    OR ANGIOGRAM, LOWER EXTREMITY Left 07/12/2023    Procedure: LEFT LOWER EXTREMITY ANGIOGRAM BALLOON ANGIOPLASTY;  Surgeon: Surjit Hamlin MD;  Location: [facility]    PERONEAL NERVE DECOMPRESSION      VASECTOMY          MEDICATIONS:  Reviewed in Epic. Current.     ALLERGIES:    Allergies   Allergen Reactions    Nickel         SOCIAL HISTORY:   Social History     Socioeconomic History    Marital status:      Spouse name: Not on file    Number of children: Not on file    Years of education: Not on file    Highest education level: Not on file   Occupational History    Not on file   Tobacco Use    Smoking status: Some Days     Current  "packs/day: 0.00     Types: Cigarettes     Last attempt to quit: 3/3/2017     Years since quittin.3    Smokeless tobacco: Never    Tobacco comments:     2-3 cigarettes per day   Vaping Use    Vaping status: Never Used   Substance and Sexual Activity    Alcohol use: Yes     Comment: 1-2.5 glasses of wine at dinner    Drug use: Never    Sexual activity: Yes     Partners: Female   Other Topics Concern    Parent/sibling w/ CABG, MI or angioplasty before 65F 55M? Not Asked   Social History Narrative    Not on file     Social Determinants of Health     Financial Resource Strain: Not on file   Food Insecurity: Not on file   Transportation Needs: Not on file   Physical Activity: Not on file   Stress: Not on file   Social Connections: Not on file   Interpersonal Safety: Not on file   Housing Stability: Not on file        FAMILY HISTORY:   Family History   Problem Relation Age of Onset    Diabetes Father         EXAM:Vitals: BP (!) 144/62 (BP Location: Left arm, Patient Position: Semi-Alexander's)   Pulse 62   Temp 97.3  F (36.3  C) (Temporal)   Resp 16   Ht 1.6 m (5' 3\")   Wt 60.3 kg (133 lb)   SpO2 100%   BMI 23.56 kg/m    BMI= Body mass index is 23.56 kg/m .    LABS:     Last Comprehensive Metabolic Panel:  Sodium   Date Value Ref Range Status   2024 138 135 - 145 mmol/L Final   2021 138 133 - 144 mmol/L Final     Potassium   Date Value Ref Range Status   2024 4.3 3.4 - 5.3 mmol/L Final   06/10/2022 4.0 3.4 - 5.3 mmol/L Final   2021 3.4 3.4 - 5.3 mmol/L Final     Chloride   Date Value Ref Range Status   2024 100 98 - 107 mmol/L Final   06/10/2022 105 94 - 109 mmol/L Final   2021 107 94 - 109 mmol/L Final     Carbon Dioxide   Date Value Ref Range Status   2021 26 20 - 32 mmol/L Final     Carbon Dioxide (CO2)   Date Value Ref Range Status   2024 26 22 - 29 mmol/L Final   06/10/2022 28 20 - 32 mmol/L Final     Anion Gap   Date Value Ref Range Status   2024 12 7 - 15 " mmol/L Final   06/10/2022 6 3 - 14 mmol/L Final   04/23/2021 5 3 - 14 mmol/L Final     Glucose   Date Value Ref Range Status   07/12/2024 152 (H) 70 - 99 mg/dL Final   06/10/2022 128 (H) 70 - 99 mg/dL Final   04/23/2021 121 (H) 70 - 99 mg/dL Final     GLUCOSE BY METER POCT   Date Value Ref Range Status   06/27/2022 125 (H) 70 - 99 mg/dL Final     Urea Nitrogen   Date Value Ref Range Status   07/12/2024 23.0 8.0 - 23.0 mg/dL Final   06/10/2022 28 7 - 30 mg/dL Final   04/23/2021 14 7 - 30 mg/dL Final     Creatinine   Date Value Ref Range Status   07/12/2024 1.00 0.67 - 1.17 mg/dL Final   04/23/2021 0.95 0.66 - 1.25 mg/dL Final     GFR Estimate   Date Value Ref Range Status   07/12/2024 78 >60 mL/min/1.73m2 Final     Comment:     eGFR calculated using 2021 CKD-EPI equation.   04/23/2021 79 >60 mL/min/[1.73_m2] Final     Comment:     Non  GFR Calc  Starting 12/18/2018, serum creatinine based estimated GFR (eGFR) will be   calculated using the Chronic Kidney Disease Epidemiology Collaboration   (CKD-EPI) equation.       Calcium   Date Value Ref Range Status   07/12/2024 9.6 8.8 - 10.2 mg/dL Final   04/23/2021 8.5 8.5 - 10.1 mg/dL Final     Lab Results   Component Value Date    WBC 9.9 07/12/2024    WBC 7.7 04/22/2021     Lab Results   Component Value Date    RBC 4.46 07/12/2024    RBC 3.55 04/22/2021     Lab Results   Component Value Date    HGB 13.9 07/12/2024    HGB 10.9 04/22/2021     Lab Results   Component Value Date    HCT 41.5 07/12/2024    HCT 33.2 04/22/2021     Lab Results   Component Value Date     07/12/2024     04/22/2021      Lab Results   Component Value Date    INR 0.95 02/03/2021    INR 0.90 07/30/2019    INR 0.87 03/08/2017        General appearance: Patient is alert and fully cooperative with history & exam.  No sign of distress is noted during the visit.      Respiratory: Breathing is regular & unlabored while sitting.      HEENT: Hearing is intact to spoken word.  Speech  "is clear.  No gross evidence of visual impairment that would impact ambulation.      Dermatologic: Left foot: third digit with distal toe gangrene. Foot erythematous. Heel with dried eschar to posterior lateral aspect.   No open lesions to right foot.      Vascular: Dorsalis pedis and posterior tibial pulses are weakly palpable.  CFT and skin temperature is normal to both lower extremities.      Neurologic: Lower extremity sensation is diminished, bilateral foot, to light touch.  No evidence of neurological-based weakness or contracture in the lower extremities.       Musculoskeletal: Patient is ambulatory without an assistive device or brace.  S/p several digital amputations b/l.     Psychiatric: Affect is pleasant & appropriate.      All cultures:  No results for input(s): \"CULTURE\" in the last 168 hours.     IMAGING:     ABIs 5/6/24:     FINDINGS:  Right DAVID:   PT: 0.81 - biphasic  DP: 0.71 - monophasic/biphasic     Left DAVID:   PT: 0.22 - monophasic  DP: 0.19 - monophasic                                                                      IMPRESSION:   1. Moderate resting arterial insufficiency in the right lower  extremity.   2. Severe resting arterial insufficiency in the left lower extremity,  significant decline from previous study.     ASSESSMENT:  Left foot third digit gangrene, posterior lateral heel eschar   Peripheral Arterial Disease   Diabetes Mellitus --> hba1c: 7.0     MEDICAL DECISION MAKING:   -Discussed all findings with patient. Chart and imaging reviewed.   -Left foot with distal third digit gangrene and posterior lateral heel eschar. Discussed with patient likely need for third digit amputation. Will also order a prevalon boot for offloading of the posterior heel. Patient states he doesn't spend significant time in bed and thinks may have been from shoes while walking in [place]. Recommendation still made for offloading.   -MRI ordered to evaluate for osteomyelitis.   -Will ask Dr Baez to " follow up once MRI is available.   -No weight bearing restrictions currently. Will be WB to heel following podiatry procedure.       Thank you for the consultation request and the opportunity to participate in the care of Zia Mina DPM   [place] [facility] of Pod[facility]/Foot & Ankle Surgery  [phone]

## 2024-07-13 ENCOUNTER — PREP FOR PROCEDURE (OUTPATIENT)
Dept: OTHER | Facility: CLINIC | Age: 74
End: 2024-07-13

## 2024-07-13 ENCOUNTER — ANESTHESIA EVENT (OUTPATIENT)
Dept: SURGERY | Facility: CLINIC | Age: 74
DRG: 253 | End: 2024-07-13
Payer: COMMERCIAL

## 2024-07-13 ENCOUNTER — APPOINTMENT (OUTPATIENT)
Dept: GENERAL RADIOLOGY | Facility: CLINIC | Age: 74
DRG: 253 | End: 2024-07-13
Attending: PODIATRIST
Payer: COMMERCIAL

## 2024-07-13 ENCOUNTER — ANESTHESIA (OUTPATIENT)
Dept: SURGERY | Facility: CLINIC | Age: 74
DRG: 253 | End: 2024-07-13
Payer: COMMERCIAL

## 2024-07-13 DIAGNOSIS — I96 GANGRENE OF TOE OF LEFT FOOT (H): Primary | ICD-10-CM

## 2024-07-13 LAB
ANION GAP SERPL CALCULATED.3IONS-SCNC: 11 MMOL/L (ref 7–15)
BACTERIA SPEC CULT: ABNORMAL
BUN SERPL-MCNC: 22.8 MG/DL (ref 8–23)
CALCIUM SERPL-MCNC: 8.8 MG/DL (ref 8.8–10.2)
CHLORIDE SERPL-SCNC: 99 MMOL/L (ref 98–107)
CREAT SERPL-MCNC: 1.01 MG/DL (ref 0.67–1.17)
CRP SERPL-MCNC: 15.1 MG/L
DEPRECATED HCO3 PLAS-SCNC: 25 MMOL/L (ref 22–29)
EGFRCR SERPLBLD CKD-EPI 2021: 77 ML/MIN/1.73M2
ENTEROCOCCUS FAECALIS: NOT DETECTED
ENTEROCOCCUS FAECIUM: NOT DETECTED
ERYTHROCYTE [DISTWIDTH] IN BLOOD BY AUTOMATED COUNT: 12.7 % (ref 10–15)
GLUCOSE BLDC GLUCOMTR-MCNC: 119 MG/DL (ref 70–99)
GLUCOSE BLDC GLUCOMTR-MCNC: 124 MG/DL (ref 70–99)
GLUCOSE BLDC GLUCOMTR-MCNC: 137 MG/DL (ref 70–99)
GLUCOSE BLDC GLUCOMTR-MCNC: 149 MG/DL (ref 70–99)
GLUCOSE BLDC GLUCOMTR-MCNC: 267 MG/DL (ref 70–99)
GLUCOSE BLDC GLUCOMTR-MCNC: 94 MG/DL (ref 70–99)
GLUCOSE SERPL-MCNC: 118 MG/DL (ref 70–99)
GRAM STAIN RESULT: ABNORMAL
HCT VFR BLD AUTO: 36.8 % (ref 40–53)
HGB BLD-MCNC: 12.3 G/DL (ref 13.3–17.7)
LISTERIA SPECIES (DETECTED/NOT DETECTED): NOT DETECTED
MCH RBC QN AUTO: 31.2 PG (ref 26.5–33)
MCHC RBC AUTO-ENTMCNC: 33.4 G/DL (ref 31.5–36.5)
MCV RBC AUTO: 93 FL (ref 78–100)
PLATELET # BLD AUTO: 227 10E3/UL (ref 150–450)
POTASSIUM SERPL-SCNC: 3.7 MMOL/L (ref 3.4–5.3)
RBC # BLD AUTO: 3.94 10E6/UL (ref 4.4–5.9)
SODIUM SERPL-SCNC: 135 MMOL/L (ref 135–145)
STAPHYLOCOCCUS AUREUS: DETECTED
STAPHYLOCOCCUS EPIDERMIDIS: NOT DETECTED
STAPHYLOCOCCUS LUGDUNENSIS: NOT DETECTED
STREPTOCOCCUS AGALACTIAE: NOT DETECTED
STREPTOCOCCUS ANGINOSUS GROUP: NOT DETECTED
STREPTOCOCCUS PNEUMONIAE: NOT DETECTED
STREPTOCOCCUS PYOGENES: NOT DETECTED
STREPTOCOCCUS SPECIES: NOT DETECTED
WBC # BLD AUTO: 6.4 10E3/UL (ref 4–11)

## 2024-07-13 PROCEDURE — S5010 5% DEXTROSE AND 0.45% SALINE: HCPCS | Performed by: PODIATRIST

## 2024-07-13 PROCEDURE — 250N000011 HC RX IP 250 OP 636: Performed by: PODIATRIST

## 2024-07-13 PROCEDURE — 0JBR0ZZ EXCISION OF LEFT FOOT SUBCUTANEOUS TISSUE AND FASCIA, OPEN APPROACH: ICD-10-PCS | Performed by: PODIATRIST

## 2024-07-13 PROCEDURE — 258N000003 HC RX IP 258 OP 636: Performed by: PODIATRIST

## 2024-07-13 PROCEDURE — 120N000001 HC R&B MED SURG/OB

## 2024-07-13 PROCEDURE — 250N000011 HC RX IP 250 OP 636

## 2024-07-13 PROCEDURE — 87205 SMEAR GRAM STAIN: CPT | Performed by: PODIATRIST

## 2024-07-13 PROCEDURE — 271N000001 HC OR GENERAL SUPPLY NON-STERILE: Performed by: PODIATRIST

## 2024-07-13 PROCEDURE — 250N000013 HC RX MED GY IP 250 OP 250 PS 637: Performed by: PODIATRIST

## 2024-07-13 PROCEDURE — 360N000082 HC SURGERY LEVEL 2 W/ FLUORO, PER MIN: Performed by: PODIATRIST

## 2024-07-13 PROCEDURE — 86140 C-REACTIVE PROTEIN: CPT

## 2024-07-13 PROCEDURE — 0Y6U0Z1 DETACHMENT AT LEFT 3RD TOE, HIGH, OPEN APPROACH: ICD-10-PCS | Performed by: PODIATRIST

## 2024-07-13 PROCEDURE — 258N000003 HC RX IP 258 OP 636: Performed by: ANESTHESIOLOGY

## 2024-07-13 PROCEDURE — 87070 CULTURE OTHR SPECIMN AEROBIC: CPT | Performed by: PODIATRIST

## 2024-07-13 PROCEDURE — 250N000011 HC RX IP 250 OP 636: Performed by: NURSE ANESTHETIST, CERTIFIED REGISTERED

## 2024-07-13 PROCEDURE — 710N000009 HC RECOVERY PHASE 1, LEVEL 1, PER MIN: Performed by: PODIATRIST

## 2024-07-13 PROCEDURE — 272N000001 HC OR GENERAL SUPPLY STERILE: Performed by: PODIATRIST

## 2024-07-13 PROCEDURE — 99222 1ST HOSP IP/OBS MODERATE 55: CPT | Performed by: INTERNAL MEDICINE

## 2024-07-13 PROCEDURE — 80048 BASIC METABOLIC PNL TOTAL CA: CPT

## 2024-07-13 PROCEDURE — 99231 SBSQ HOSP IP/OBS SF/LOW 25: CPT | Performed by: SURGERY

## 2024-07-13 PROCEDURE — 88311 DECALCIFY TISSUE: CPT | Mod: TC | Performed by: PODIATRIST

## 2024-07-13 PROCEDURE — 28825 PARTIAL AMPUTATION OF TOE: CPT | Mod: T2 | Performed by: PODIATRIST

## 2024-07-13 PROCEDURE — 11042 DBRDMT SUBQ TIS 1ST 20SQCM/<: CPT | Performed by: NURSE ANESTHETIST, CERTIFIED REGISTERED

## 2024-07-13 PROCEDURE — 11042 DBRDMT SUBQ TIS 1ST 20SQCM/<: CPT | Performed by: ANESTHESIOLOGY

## 2024-07-13 PROCEDURE — 250N000013 HC RX MED GY IP 250 OP 250 PS 637

## 2024-07-13 PROCEDURE — 73620 X-RAY EXAM OF FOOT: CPT | Mod: LT

## 2024-07-13 PROCEDURE — 250N000012 HC RX MED GY IP 250 OP 636 PS 637

## 2024-07-13 PROCEDURE — 250N000013 HC RX MED GY IP 250 OP 250 PS 637: Performed by: SURGERY

## 2024-07-13 PROCEDURE — 370N000017 HC ANESTHESIA TECHNICAL FEE, PER MIN: Performed by: PODIATRIST

## 2024-07-13 PROCEDURE — 11042 DBRDMT SUBQ TIS 1ST 20SQCM/<: CPT | Mod: 51 | Performed by: PODIATRIST

## 2024-07-13 PROCEDURE — 99233 SBSQ HOSP IP/OBS HIGH 50: CPT | Mod: 57 | Performed by: PODIATRIST

## 2024-07-13 PROCEDURE — 999N000141 HC STATISTIC PRE-PROCEDURE NURSING ASSESSMENT: Performed by: PODIATRIST

## 2024-07-13 PROCEDURE — 99100 ANES PT EXTEME AGE<1 YR&>70: CPT | Mod: QX | Performed by: NURSE ANESTHETIST, CERTIFIED REGISTERED

## 2024-07-13 PROCEDURE — 250N000009 HC RX 250: Performed by: NURSE ANESTHETIST, CERTIFIED REGISTERED

## 2024-07-13 PROCEDURE — 36415 COLL VENOUS BLD VENIPUNCTURE: CPT

## 2024-07-13 PROCEDURE — 87075 CULTR BACTERIA EXCEPT BLOOD: CPT | Performed by: PODIATRIST

## 2024-07-13 PROCEDURE — 85014 HEMATOCRIT: CPT

## 2024-07-13 PROCEDURE — 99207 PR NO BILLABLE SERVICE THIS VISIT: CPT | Performed by: INTERNAL MEDICINE

## 2024-07-13 PROCEDURE — 250N000011 HC RX IP 250 OP 636: Performed by: PHYSICIAN ASSISTANT

## 2024-07-13 RX ORDER — LABETALOL HYDROCHLORIDE 5 MG/ML
5 INJECTION, SOLUTION INTRAVENOUS
Status: DISCONTINUED | OUTPATIENT
Start: 2024-07-13 | End: 2024-07-13 | Stop reason: HOSPADM

## 2024-07-13 RX ORDER — NALOXONE HYDROCHLORIDE 0.4 MG/ML
0.1 INJECTION, SOLUTION INTRAMUSCULAR; INTRAVENOUS; SUBCUTANEOUS
Status: DISCONTINUED | OUTPATIENT
Start: 2024-07-13 | End: 2024-07-13 | Stop reason: HOSPADM

## 2024-07-13 RX ORDER — ONDANSETRON 2 MG/ML
INJECTION INTRAMUSCULAR; INTRAVENOUS PRN
Status: DISCONTINUED | OUTPATIENT
Start: 2024-07-13 | End: 2024-07-13

## 2024-07-13 RX ORDER — PROCHLORPERAZINE MALEATE 5 MG
5 TABLET ORAL EVERY 6 HOURS PRN
Status: DISCONTINUED | OUTPATIENT
Start: 2024-07-13 | End: 2024-07-20

## 2024-07-13 RX ORDER — PROPOFOL 10 MG/ML
INJECTION, EMULSION INTRAVENOUS CONTINUOUS PRN
Status: DISCONTINUED | OUTPATIENT
Start: 2024-07-13 | End: 2024-07-13

## 2024-07-13 RX ORDER — ACETAMINOPHEN 325 MG/1
650 TABLET ORAL EVERY 4 HOURS PRN
Status: DISCONTINUED | OUTPATIENT
Start: 2024-07-16 | End: 2024-07-20

## 2024-07-13 RX ORDER — ONDANSETRON 2 MG/ML
4 INJECTION INTRAMUSCULAR; INTRAVENOUS EVERY 30 MIN PRN
Status: DISCONTINUED | OUTPATIENT
Start: 2024-07-13 | End: 2024-07-13 | Stop reason: HOSPADM

## 2024-07-13 RX ORDER — LIDOCAINE HYDROCHLORIDE 20 MG/ML
INJECTION, SOLUTION INFILTRATION; PERINEURAL PRN
Status: DISCONTINUED | OUTPATIENT
Start: 2024-07-13 | End: 2024-07-13

## 2024-07-13 RX ORDER — LIDOCAINE 40 MG/G
CREAM TOPICAL
Status: DISCONTINUED | OUTPATIENT
Start: 2024-07-13 | End: 2024-07-22

## 2024-07-13 RX ORDER — DEXTROSE MONOHYDRATE AND SODIUM CHLORIDE 5; .45 G/100ML; G/100ML
INJECTION, SOLUTION INTRAVENOUS CONTINUOUS
Status: DISCONTINUED | OUTPATIENT
Start: 2024-07-13 | End: 2024-07-19

## 2024-07-13 RX ORDER — OXYCODONE HYDROCHLORIDE 5 MG/1
5 TABLET ORAL EVERY 4 HOURS PRN
Status: DISCONTINUED | OUTPATIENT
Start: 2024-07-13 | End: 2024-07-20

## 2024-07-13 RX ORDER — NICOTINE POLACRILEX 4 MG
15-30 LOZENGE BUCCAL
Status: DISCONTINUED | OUTPATIENT
Start: 2024-07-13 | End: 2024-07-13

## 2024-07-13 RX ORDER — VANCOMYCIN HYDROCHLORIDE
1250 ONCE
Status: DISCONTINUED | OUTPATIENT
Start: 2024-07-13 | End: 2024-07-13

## 2024-07-13 RX ORDER — DEXTROSE MONOHYDRATE 25 G/50ML
25-50 INJECTION, SOLUTION INTRAVENOUS
Status: DISCONTINUED | OUTPATIENT
Start: 2024-07-13 | End: 2024-07-13

## 2024-07-13 RX ORDER — BUPIVACAINE HYDROCHLORIDE 2.5 MG/ML
INJECTION, SOLUTION INFILTRATION; PERINEURAL PRN
Status: DISCONTINUED | OUTPATIENT
Start: 2024-07-13 | End: 2024-07-13 | Stop reason: HOSPADM

## 2024-07-13 RX ORDER — FENTANYL CITRATE 0.05 MG/ML
50 INJECTION, SOLUTION INTRAMUSCULAR; INTRAVENOUS EVERY 5 MIN PRN
Status: DISCONTINUED | OUTPATIENT
Start: 2024-07-13 | End: 2024-07-13 | Stop reason: HOSPADM

## 2024-07-13 RX ORDER — ONDANSETRON 4 MG/1
4 TABLET, ORALLY DISINTEGRATING ORAL EVERY 6 HOURS PRN
Status: DISCONTINUED | OUTPATIENT
Start: 2024-07-13 | End: 2024-07-13

## 2024-07-13 RX ORDER — HYDROMORPHONE HCL IN WATER/PF 6 MG/30 ML
0.2 PATIENT CONTROLLED ANALGESIA SYRINGE INTRAVENOUS EVERY 5 MIN PRN
Status: DISCONTINUED | OUTPATIENT
Start: 2024-07-13 | End: 2024-07-13 | Stop reason: HOSPADM

## 2024-07-13 RX ORDER — SODIUM CHLORIDE, SODIUM LACTATE, POTASSIUM CHLORIDE, CALCIUM CHLORIDE 600; 310; 30; 20 MG/100ML; MG/100ML; MG/100ML; MG/100ML
INJECTION, SOLUTION INTRAVENOUS CONTINUOUS
Status: DISCONTINUED | OUTPATIENT
Start: 2024-07-13 | End: 2024-07-13 | Stop reason: HOSPADM

## 2024-07-13 RX ORDER — HYDRALAZINE HYDROCHLORIDE 20 MG/ML
2.5-5 INJECTION INTRAMUSCULAR; INTRAVENOUS
Status: DISCONTINUED | OUTPATIENT
Start: 2024-07-13 | End: 2024-07-13 | Stop reason: HOSPADM

## 2024-07-13 RX ORDER — AMOXICILLIN 250 MG
1 CAPSULE ORAL 2 TIMES DAILY
Status: DISCONTINUED | OUTPATIENT
Start: 2024-07-13 | End: 2024-07-20

## 2024-07-13 RX ORDER — BISACODYL 10 MG
10 SUPPOSITORY, RECTAL RECTAL DAILY PRN
Status: DISCONTINUED | OUTPATIENT
Start: 2024-07-16 | End: 2024-07-20

## 2024-07-13 RX ORDER — DEXAMETHASONE SODIUM PHOSPHATE 4 MG/ML
4 INJECTION, SOLUTION INTRA-ARTICULAR; INTRALESIONAL; INTRAMUSCULAR; INTRAVENOUS; SOFT TISSUE
Status: DISCONTINUED | OUTPATIENT
Start: 2024-07-13 | End: 2024-07-13 | Stop reason: HOSPADM

## 2024-07-13 RX ORDER — OXYCODONE HYDROCHLORIDE 5 MG/1
10 TABLET ORAL EVERY 4 HOURS PRN
Status: DISCONTINUED | OUTPATIENT
Start: 2024-07-13 | End: 2024-07-20

## 2024-07-13 RX ORDER — FENTANYL CITRATE 50 UG/ML
INJECTION, SOLUTION INTRAMUSCULAR; INTRAVENOUS PRN
Status: DISCONTINUED | OUTPATIENT
Start: 2024-07-13 | End: 2024-07-13

## 2024-07-13 RX ORDER — FENTANYL CITRATE 0.05 MG/ML
25 INJECTION, SOLUTION INTRAMUSCULAR; INTRAVENOUS EVERY 5 MIN PRN
Status: DISCONTINUED | OUTPATIENT
Start: 2024-07-13 | End: 2024-07-13 | Stop reason: HOSPADM

## 2024-07-13 RX ORDER — ACETAMINOPHEN 325 MG/1
975 TABLET ORAL EVERY 8 HOURS
Status: COMPLETED | OUTPATIENT
Start: 2024-07-13 | End: 2024-07-16

## 2024-07-13 RX ORDER — PROPOFOL 10 MG/ML
INJECTION, EMULSION INTRAVENOUS PRN
Status: DISCONTINUED | OUTPATIENT
Start: 2024-07-13 | End: 2024-07-13

## 2024-07-13 RX ORDER — ONDANSETRON 4 MG/1
4 TABLET, ORALLY DISINTEGRATING ORAL EVERY 30 MIN PRN
Status: DISCONTINUED | OUTPATIENT
Start: 2024-07-13 | End: 2024-07-13 | Stop reason: HOSPADM

## 2024-07-13 RX ORDER — ONDANSETRON 2 MG/ML
4 INJECTION INTRAMUSCULAR; INTRAVENOUS EVERY 6 HOURS PRN
Status: DISCONTINUED | OUTPATIENT
Start: 2024-07-13 | End: 2024-07-13

## 2024-07-13 RX ORDER — POLYETHYLENE GLYCOL 3350 17 G/17G
17 POWDER, FOR SOLUTION ORAL DAILY
Status: DISCONTINUED | OUTPATIENT
Start: 2024-07-14 | End: 2024-07-20

## 2024-07-13 RX ADMIN — INSULIN ASPART 1 UNITS: 100 INJECTION, SOLUTION INTRAVENOUS; SUBCUTANEOUS at 08:59

## 2024-07-13 RX ADMIN — HEPARIN SODIUM 5000 UNITS: 5000 INJECTION, SOLUTION INTRAVENOUS; SUBCUTANEOUS at 16:16

## 2024-07-13 RX ADMIN — PROPOFOL 50 MCG/KG/MIN: 10 INJECTION, EMULSION INTRAVENOUS at 11:43

## 2024-07-13 RX ADMIN — LIDOCAINE HYDROCHLORIDE 50 MG: 20 INJECTION, SOLUTION INFILTRATION; PERINEURAL at 11:43

## 2024-07-13 RX ADMIN — EMPAGLIFLOZIN 25 MG: 25 TABLET, FILM COATED ORAL at 09:07

## 2024-07-13 RX ADMIN — AMLODIPINE BESYLATE 10 MG: 10 TABLET ORAL at 09:08

## 2024-07-13 RX ADMIN — ONDANSETRON 4 MG: 2 INJECTION INTRAMUSCULAR; INTRAVENOUS at 11:59

## 2024-07-13 RX ADMIN — PROPOFOL 20 MG: 10 INJECTION, EMULSION INTRAVENOUS at 12:00

## 2024-07-13 RX ADMIN — SODIUM CHLORIDE, POTASSIUM CHLORIDE, SODIUM LACTATE AND CALCIUM CHLORIDE: 600; 310; 30; 20 INJECTION, SOLUTION INTRAVENOUS at 11:15

## 2024-07-13 RX ADMIN — CHLORTHALIDONE 50 MG: 25 TABLET ORAL at 09:07

## 2024-07-13 RX ADMIN — ASPIRIN 81 MG CHEWABLE TABLET 81 MG: 81 TABLET CHEWABLE at 09:08

## 2024-07-13 RX ADMIN — FENTANYL CITRATE 25 MCG: 50 INJECTION INTRAMUSCULAR; INTRAVENOUS at 12:02

## 2024-07-13 RX ADMIN — SENNOSIDES AND DOCUSATE SODIUM 1 TABLET: 50; 8.6 TABLET ORAL at 22:25

## 2024-07-13 RX ADMIN — ATORVASTATIN CALCIUM 80 MG: 80 TABLET, FILM COATED ORAL at 22:25

## 2024-07-13 RX ADMIN — CEFTRIAXONE SODIUM 2 G: 2 INJECTION, POWDER, FOR SOLUTION INTRAMUSCULAR; INTRAVENOUS at 09:12

## 2024-07-13 RX ADMIN — EZETIMIBE 10 MG: 10 TABLET ORAL at 09:08

## 2024-07-13 RX ADMIN — CLOPIDOGREL BISULFATE 75 MG: 75 TABLET ORAL at 09:08

## 2024-07-13 RX ADMIN — PROPOFOL 20 MG: 10 INJECTION, EMULSION INTRAVENOUS at 11:46

## 2024-07-13 RX ADMIN — PROPOFOL 30 MG: 10 INJECTION, EMULSION INTRAVENOUS at 11:47

## 2024-07-13 RX ADMIN — METOPROLOL SUCCINATE 100 MG: 100 TABLET, EXTENDED RELEASE ORAL at 09:08

## 2024-07-13 RX ADMIN — DEXTROSE AND SODIUM CHLORIDE: 5; 450 INJECTION, SOLUTION INTRAVENOUS at 07:34

## 2024-07-13 RX ADMIN — HEPARIN SODIUM 5000 UNITS: 5000 INJECTION, SOLUTION INTRAVENOUS; SUBCUTANEOUS at 22:25

## 2024-07-13 RX ADMIN — LOSARTAN POTASSIUM 100 MG: 100 TABLET, FILM COATED ORAL at 09:08

## 2024-07-13 RX ADMIN — HEPARIN SODIUM 5000 UNITS: 5000 INJECTION, SOLUTION INTRAVENOUS; SUBCUTANEOUS at 08:59

## 2024-07-13 ASSESSMENT — ACTIVITIES OF DAILY LIVING (ADL)
ADLS_ACUITY_SCORE: 25
ADLS_ACUITY_SCORE: 26
ADLS_ACUITY_SCORE: 25
ADLS_ACUITY_SCORE: 26
ADLS_ACUITY_SCORE: 25
ADLS_ACUITY_SCORE: 26
ADLS_ACUITY_SCORE: 26
ADLS_ACUITY_SCORE: 25
ADLS_ACUITY_SCORE: 26
ADLS_ACUITY_SCORE: 25
ADLS_ACUITY_SCORE: 26
ADLS_ACUITY_SCORE: 26
ADLS_ACUITY_SCORE: 25
ADLS_ACUITY_SCORE: 26
ADLS_ACUITY_SCORE: 26
ADLS_ACUITY_SCORE: 25
ADLS_ACUITY_SCORE: 26
ADLS_ACUITY_SCORE: 25

## 2024-07-13 ASSESSMENT — ENCOUNTER SYMPTOMS
DYSRHYTHMIAS: 0
SEIZURES: 0

## 2024-07-13 ASSESSMENT — LIFESTYLE VARIABLES: TOBACCO_USE: 0

## 2024-07-13 ASSESSMENT — COPD QUESTIONNAIRES: COPD: 0

## 2024-07-13 NOTE — PROGRESS NOTES
VASCULAR SURGERY    Patient was scheduled to have angiogram left leg by Dr. Hamlin.  Has had previous left great toe amputation that is healed.  Found to have gangrenous changes to the left third toe and thus angiogram canceled.  Placed on IV antibiotics.    Complains of soreness in left foot.  Found to have gangrenous changes to left third toe and left heel ulcer.    Exam: Alert and appropriate.  Comfortable.  Conversant.   Changes to left foot and toes as documented.     Laboratory: K= 3.7 SCr = 1.01 WBC= 6.4 Hgb= 12.3 CRP= 15.1      MRI revealed the ulceration extending to the middle phalanx of third toe with suspicions of osteomyelitis.    PLAN: #1.  Plan toe amputation with podiatry this afternoon.     #2.  Continue with IV antibiotics     #3.  Plan left leg angiogram with Dr. Hamlin this coming week.    Discussed plan with patient.      Parrish Cortés MD

## 2024-07-13 NOTE — ANESTHESIA PREPROCEDURE EVALUATION
Anesthesia Pre-Procedure Evaluation    Patient: Zia Hopkins   MRN: [id number] : 1950        Procedure : Procedure(s):  LEFT 3RD TOE AMPUTATION AND HEEL WOUND DEBRIDEMENT          Past Medical History:   Diagnosis Date    Acute low back pain     Anemia     Bilateral carotid bruits     Cellulitis of left foot     Central spinal stenosis     and Subarticular    Chronic kidney disease     Degenerative lumbar spinal stenosis     multilevel    Diabetes mellitus type 2, noninsulin dependent (H)     Diabetic ulcer of toe (H)     Gangrene of toe (H)     Hyperlipidemia LDL goal <70     Hypertension     Lumbar foraminal stenosis     Macular degeneration (senile) of retina     PAD (peripheral artery disease) (H24)     Pneumonia     Psoriasis       Past Surgical History:   Procedure Laterality Date    AMPUTATE FOOT Left 03/15/2017    Procedure: AMPUTATE FOOT;  Surgeon: Emre Mauricio DPM;  Location: [facility]    AMPUTATE TOE(S) Left 2021    Procedure: LEFT GREAT TOE AMPUTATION.;  Surgeon: Purvi Whelan DPM, Podiatry/Foot and Ankle Surgery;  Location: [facility]    AMPUTATE TOE(S) Right 2022    Procedure: 1.  Partial right great toe amputation. 2.  Right foot fourth toe amputation at the metatarsophalangeal joint. 3.  Right fifth metatarsal amputation at the metatarsophalangeal joint.;  Surgeon: Purvi Whelan DPM, Podiatry/Foot and Ankle Surgery;  Location: [facility]    AMPUTATE TOE(S) Right 2022    Procedure: Right second and third toe amputations;  Surgeon: Purvi Whelan DPM, Podiatry/Foot and Ankle Surgery;  Location: [facility]    ARTHROPLASTY KNEE BILATERAL Bilateral     BIOPSY BONE FOOT Right 2022    Procedure: Partial right fourth and fifth metatarsal bone excision;  Surgeon: Purvi Whelan DPM, Podiatry/Foot and Ankle Surgery;  Location: [facility]    ENDARTERECTOMY FEMORAL Right 2022    Procedure: RIGHT FEMORAL ENDARTERECTOMY, RIGHT FEMORAL ARTERY BALLOON ANGIOPLASTY WITH STENTING;   Surgeon: Surjit Hamlin MD;  Location: [facility]    ENDARTERECTOMY FEMORAL Left 2023    Procedure: LEFT FEMORAL CUTDOWN;  Surgeon: Surjit Hamlin MD;  Location: [facility]    IR LOWER EXTREMITY ANGIOGRAM LEFT  2019    IR LOWER EXTREMITY ANGIOGRAM LEFT  2021    IR LOWER EXTREMITY ANGIOGRAM LEFT  2021    IR LOWER EXTREMITY ANGIOGRAM RIGHT  2022    IR LOWER EXTREMITY ANGIOGRAM RIGHT  06/10/2022    IR OR ANGIOGRAM  2022    IR OR ANGIOGRAM  2023    IRRIGATION AND DEBRIDEMENT FOOT, COMBINED Left 03/15/2017    Procedure: COMBINED IRRIGATION AND DEBRIDEMENT FOOT;  Surgeon: Emre Mauricio DPM;  Location: [facility]    OR ANGIOGRAM, LOWER EXTREMITY Left 2023    Procedure: LEFT LOWER EXTREMITY ANGIOGRAM BALLOON ANGIOPLASTY;  Surgeon: Surjit Hamlin MD;  Location: [facility]    PERONEAL NERVE DECOMPRESSION      VASECTOMY        Allergies   Allergen Reactions    Nickel       Social History     Tobacco Use    Smoking status: Some Days     Current packs/day: 0.00     Types: Cigarettes     Last attempt to quit: 3/3/2017     Years since quittin.3    Smokeless tobacco: Never    Tobacco comments:     2-3 cigarettes per day   Substance Use Topics    Alcohol use: Yes     Comment: 1-2.5 glasses of wine at dinner      Wt Readings from Last 1 Encounters:   24 60.3 kg (133 lb)        Anesthesia Evaluation            ROS/MED HX  ENT/Pulmonary:    (-) tobacco use, asthma, COPD and sleep apnea   Neurologic: Comment: Lumbar foraminal stenosis   (-) no seizures and no CVA   Cardiovascular:     (+) Dyslipidemia hypertension- Peripheral Vascular Disease-   -  - -                                 Previous cardiac testing   Echo: Date:  Results:  The visual ejection fraction is 65-70%.  Left ventricular systolic function is normal.  ______________________________________________________________________________  Left Ventricle  The left ventricle is normal in size. There is normal  left ventricular wall  thickness. Diastolic Doppler findings (E/E' ratio and/or other parameters)  suggest left ventricular filling pressures are indeterminate. The visual  ejection fraction is 65-70%. Left ventricular systolic function is normal.  Grade I or early diastolic dysfunction.     Right Ventricle  The right ventricle is normal in size and function.     Atria  Normal left atrial size. Right atrial size is normal. There is no color  Doppler evidence of an atrial shunt.     Mitral Valve  The mitral papillary muscle appears thickened and/or calcified. The mitral  valve leaflets are mildly thickened. There is mild (1+) mitral regurgitation.     Tricuspid Valve  There is trace tricuspid regurgitation. Right ventricular systolic pressure  could not be approximated due to inadequate tricuspid regurgitation.     Aortic Valve  The aortic valve is trileaflet. There is mild trileaflet aortic sclerosis. No  aortic regurgitation is present. No hemodynamically significant valvular  aortic stenosis.     Pulmonic Valve  There is no pulmonic valvular regurgitation. Normal pulmonic valve velocity.     Vessels  The aortic root is normal size. Normal size ascending aorta. IVC diameter <2.1  cm collapsing >50% with sniff suggests a normal RA pressure of 3 mmHg.     Pericardium  There is no pericardial effusion.    Stress Test:  Date: Results:    ECG Reviewed:  Date: 7/24 Results:  SB at 57 bpm, LAD  Cath:  Date: Results:   (-) CAD, CHF and arrhythmias   METS/Exercise Tolerance:     Hematologic:     (+)      anemia,          Musculoskeletal: Comment: Cellulitis of L foot, complicated by toe gangrene      GI/Hepatic:    (-) GERD and liver disease   Renal/Genitourinary:     (+) renal disease, type: CRI,            Endo:     (+)  type II DM,                 (-) Type I DM   Psychiatric/Substance Use:       Infectious Disease:       Malignancy:       Other:            Physical Exam    Airway        Mallampati: II   TM distance: > 3  "FB   Neck ROM: full   Mouth opening: > 3 cm    Respiratory Devices and Support         Dental  no notable dental history     (+) Modest Abnormalities - crowns, retainers, 1 or 2 missing teeth      Cardiovascular          Rhythm and rate: regular     Pulmonary           breath sounds clear to auscultation           OUTSIDE LABS:  CBC:   Lab Results   Component Value Date    WBC 6.4 07/13/2024    WBC 9.9 07/12/2024    HGB 12.3 (L) 07/13/2024    HGB 13.9 07/12/2024    HCT 36.8 (L) 07/13/2024    HCT 41.5 07/12/2024     07/13/2024     07/12/2024     BMP:   Lab Results   Component Value Date     07/13/2024     07/12/2024    POTASSIUM 3.7 07/13/2024    POTASSIUM 4.3 07/12/2024    CHLORIDE 99 07/13/2024    CHLORIDE 100 07/12/2024    CO2 25 07/13/2024    CO2 26 07/12/2024    BUN 22.8 07/13/2024    BUN 23.0 07/12/2024    CR 1.01 07/13/2024    CR 1.00 07/12/2024    GLC 94 07/13/2024     (H) 07/13/2024     COAGS:   Lab Results   Component Value Date    PTT 32 02/03/2021    INR 0.95 02/03/2021     POC:   Lab Results   Component Value Date     (H) 04/23/2021     HEPATIC: No results found for: \"ALBUMIN\", \"PROTTOTAL\", \"ALT\", \"AST\", \"GGT\", \"ALKPHOS\", \"BILITOTAL\", \"BILIDIRECT\", \"CAMMIE\"  OTHER:   Lab Results   Component Value Date    A1C 7.0 (H) 07/12/2024    TIN 8.8 07/13/2024    SED 32 (H) 07/12/2024       Anesthesia Plan    ASA Status:  3       Anesthesia Type: MAC.              Consents    Anesthesia Plan(s) and associated risks, benefits, and realistic alternatives discussed. Questions answered and patient/representative(s) expressed understanding.     - Discussed:     - Discussed with:  Patient            Postoperative Care    Pain management: Multi-modal analgesia.   PONV prophylaxis: Ondansetron (or other 5HT-3)     Comments:               George Will MD    I have reviewed the pertinent notes and labs in the chart from the past 30 days and (re)examined the patient.  Any updates or " changes from those notes are reflected in this note.

## 2024-07-13 NOTE — PLAN OF CARE
Goal Outcome Evaluation:      Plan of Care Reviewed With: patient    Overall Patient Progress: improvingOverall Patient Progress: improving    Pt here for L common femoral artery cutdown and angiogram but patient presented to preop with wet gangrene and cellulitis, procedure canceled.  A&Ox4. VSS on RA. Denies pain. R food partial amputation, L 1st & 5th toes amputation, 3rd & 4th toes blacks. Up SBA with cane. Tolerating regular diet. PIV SL. BG check. MRI & CT done last night. Blood culture pending. ID/Podiatry following.

## 2024-07-13 NOTE — OP NOTE
Foot & Ankle Surgery  July 13, 2024    Surgeon:   Erik Baez DPM FACFAS FACFAOM    Assistant: None    Pre-op diagnosis:  Diabetes mellitus with peripheral neuropathy  Peripheral arterial disease  Gangrene left third toe with osteomyelitis  Lateral left heel wound with exposed fat    Post-op diagnosis:  Same    Procedure:  Partial left third toe imitation with osteotomy through proximal phalanx  Debridement of left heel wound down to/including subcutaneous fat less than 20 cm.    Pathology:  Tissue from the left third toe was sent off for cultures  Left third toe was sent For gross assessment    Anesthesia: MAC with local    Hemostasis: None    EBL: Less than 5 cc    Materials:    None    Injectables: 15 cc of quarter percent bupivacaine plain preoperatively    Complications: None apparent    Intra-operative findings: Limited bleeding noted at the amputation site and heel wound    Indications for procedure: Gangrene left third toe with lateral left heel wound in setting of diabetes mellitus and PAD    After obtaining verbal consent, the patient was transferred to the operating room placed in supine position on the operating table.  IV sedation was initiated.  The foot was anesthetized with preoperative local.  It was then prepped and draped in normal aseptic fashion.  No tourniquet was utilized.  Patient, procedure and site were correct identified by our staff.    Procedure 1: Left third toe amputation -a fishmouth incision was drawn just proximal to the gangrenous tissue and carried down to bone full-thickness.  Soft tissue was reflected off the proximal phalanx and the through and through osteotomy was performed.  The wound was flushed with copious amounts normal saline.  Limited bleeding was seen but the tissue otherwise appeared viable without signs of ascending infection.  After adequate hemostasis was achieved, single layer closure was performed with 4-0 nylon    Procedure 2: Debridement of lateral  left heel wound down to/including subcutaneous fat less than 20 cm  - Excisional debridement was performed, full-thickness, sharply debriding the wound down to and including exposed sub-cutaneous tissue/fat, excising nonviable tissue to the above dimensions with a 15 blade     A dry sterile dressing was applied and the patient was transferred to the PACU with vital signs stable and vascular status intact.  They appeared to tolerate the procedure well without complications.    Erik Baez DPM FACFAS FACFAOM  Podiatric Foot & Ankle Surgeon  [facility]  [phone]

## 2024-07-13 NOTE — ANESTHESIA CARE TRANSFER NOTE
Patient: Zia Hopkins    Procedure: Procedure(s):  LEFT 3RD TOE AMPUTATION AND HEEL WOUND DEBRIDEMENT       Diagnosis: Gangrene of toe of left foot (H) [I96]  Diagnosis Additional Information: No value filed.    Anesthesia Type:   MAC     Note:    Oropharynx: oropharynx clear of all foreign objects and spontaneously breathing  Level of Consciousness: awake  Oxygen Supplementation: room air    Independent Airway: airway patency satisfactory and stable  Dentition: dentition unchanged  Vital Signs Stable: post-procedure vital signs reviewed and stable  Report to RN Given: handoff report given  Patient transferred to: PACU    Handoff Report: Identifed the Patient, Identified the Reponsible Provider, Reviewed the pertinent medical history, Discussed the surgical course, Reviewed Intra-OP anesthesia mangement and issues during anesthesia, Set expectations for post-procedure period and Allowed opportunity for questions and acknowledgement of understanding      Vitals:  Vitals Value Taken Time   /65    Temp     Pulse 54    Resp     SpO2 93 % 07/13/24 1218   Vitals shown include unfiled device data.    Electronically Signed By: CLAUDINE Erickson CRNA  July 13, 2024  12:19 PM

## 2024-07-13 NOTE — PROGRESS NOTES
"Foot & Ankle Surgery Progress Note  July 13, 2024    S:  Zia was seen at bedside today for continued monitoring of left lower extremity.  The plan was for vascular intervention yesterday but this was canceled secondary to gangrene of the left foot.  An MRI was ordered..    /66 (BP Location: Left arm, Patient Position: Semi-Alexander's, Cuff Size: Adult Regular)   Pulse 60   Temp 98.5  F (36.9  C) (Oral)   Resp 16   Ht 1.6 m (5' 3\")   Wt 60.3 kg (133 lb)   SpO2 96%   BMI 23.56 kg/m      ROS - Pos for CC.  Denies nausea, vomiting, chills, fever, belly pain, calf pain, chest pain or shortness of breath. Complete remainder of ROS is otherwise neg.      PE -malodor and gangrene distal left third toe with mild surrounding erythema.  There is a wound at the posterior lateral left heel.  Skin shows no trophic, color or temperature changes otherwise.  No calf redness, swelling or pain noted otherwise.      Imaging: MRI left foot 7/12/2024 - IMPRESSION:  1.  Postoperative changes of amputation of the great toe and small toe.  2.  Amputation of a portion of the distal phalanx of the second toe.  3.  Soft tissue ulceration at the tip of the third toe appears to extend to the bone margin of the exposed middle phalanx. No significant T1 marrow signal change is present, but there is some T2 signal abnormality and osteomyelitis cannot be excluded.  4.  No additional worrisome areas for osteomyelitis and no evidence for septic arthropathy or abscess.  5.  No evidence for fracture.  6.  Mild fatty infiltration of the interosseous musculature.  7.  Surgical truncation of the hallucis tendons      Assessment:  74 year old male with diabetes mellitus and peripheral neuropathy, PAD with gangrene and developing osteomyelitis left third toe    Plan: I personally interpreted and reviewed the MRI results with the patient today  -to OR this afternoon, tentatively 1300, for left third toe amputation and left heel wound debridement; " the patient is currently n.p.o.     Activity -heel weightbearing surgical shoe   Pain Management -oxycodone, Dilaudid, Tylenol   DVT Prophylaxis -mechanical    Abx therapy -Zosyn, switched to ceftriaxone         Erik Baez DPM FACFAS FACFAOM  Podiatric Foot & Ankle Surgeon  [facility]  [phone]

## 2024-07-13 NOTE — ANESTHESIA POSTPROCEDURE EVALUATION
Patient: Zia Hopkins    Procedure: Procedure(s):  LEFT 3RD TOE AMPUTATION AND HEEL WOUND DEBRIDEMENT       Anesthesia Type:  MAC    Note:     Postop Pain Control: Uneventful            Sign Out: Well controlled pain   PONV:    Neuro/Psych: Uneventful            Sign Out: Acceptable/Baseline neuro status   Airway/Respiratory: Uneventful            Sign Out: Acceptable/Baseline resp. status   CV/Hemodynamics: Uneventful            Sign Out: Acceptable CV status; No obvious hypovolemia; No obvious fluid overload   Other NRE:    DID A NON-ROUTINE EVENT OCCUR?            Last vitals:  Vitals Value Taken Time   /56 07/13/24 1245   Temp 36.8  C (98.2  F) 07/13/24 1245   Pulse 52 07/13/24 1258   Resp 0 07/13/24 1258   SpO2 94 % 07/13/24 1258       Electronically Signed By: George Will MD  July 13, 2024  1:00 PM

## 2024-07-13 NOTE — PLAN OF CARE
Goal Outcome Evaluation:  [id number]  Surgery/POD#: Here for femoral artery cutdown 7/12 - surgery postponed d/t wet gangrene and cellulitis. POD 0 L 3rd toe amputation and heel wound debridement   Behavior & Aggression: Green  Fall Risk:  Yes  Orientation:A&O x4  ABNL VS/O2: VSS on RA  ABNL Labs: Blood cultures growing staph aureus. Blood sugar   Pain Management: Denies. Patient declines scheduled tylenol and does not want it offered. Further education provided on pain management.   Bowel/Bladder: Continent, voiding adequately in BR. No BM. +BS  Drains: PIV x2 SL w/ int abx  Wounds/incisions: L foot wrapped (on wedge); R foot partial amputation. Dried drainage   Diet: Mod Carb  Activity Level: Ax1 w/ cane.   Tests/Procedures: Xray completed, results pending.   Anticipated  DC Date: Pending continued improvement  Significant Information: Rook boot in room. Partial weight bearing to LLE but does not have surgical shoe.

## 2024-07-13 NOTE — PROVIDER NOTIFICATION
MD Notification    Notified Person: MD    Notified Person Name: Dr. Cortés    Notification Date/Time: 07/13/24 8:45 AM    Notification Interaction: In person    Purpose of Notification:   Patient has metformin scheduled, he is going to surgery sometime today. Do we want to hold the metformin? Also patient has HR of 59, do we want parameters on any meds?     Orders Received: Hold metformin, hold BP meds if HR <50    Comments:

## 2024-07-13 NOTE — CONSULTS
[facility]  Consult Note - Hospitalist Service  Date of Admission:  7/12/2024  Consult Requested by: Dr. Cortés  Reason for Consult: Management of diabetes, hypertension, hyperlipidemia, CKD and other medical comorbidities    Assessment & Plan   Zia Hopkins is a 74 year old male with history of DM2, HTN, HLP, CKD 3, PAD, who presented on 7/12/2024 for scheduled left femoral artery cutdown and LLE angiogram but was found to have left 3rd toe wet gangrene with cellulitis and developing osteomyelitis.  He was admitted for IV antibiotics, podiatry and infectious disease consultation.  Blood cultures obtained on admission are growing gram-positive cocci.     Hospitalist medicine consulted on 7/13 for management of diabetes and other comorbidities.      Per vascular surgery, plan would be due to angiogram as outpatient in 2 weeks.      Left foot 3rd digit gangrene with cellulitis and developing osteomyelitis  Left heel ulcer with eschar, s/p partial left third toe amputation with osteotomy through proximal phalanx, 7/13/2024  Left heel ulcer with eschar, s/p debridement down to/including subcutaneous fat, 7/13/2024  Gram-positive cocci bacteremia, likely MSSA - due to diabetic foot infection  S/p multiple previous bilateral toe amputations   -MRI showed ulceration at tip of third toe extending to bone margin exposed middle phalanx.  Some T2 signal abnormality noted.  Osteomyelitis could not be excluded.  -CRP 15.  No leukocytosis  -2 of 2 sets of blood cultures obtained on 7/12 are growing gram-positive cocci, likely MSSA  -Podiatry following.  Underwent partial left third toe amputation with osteotomy through proximal phalanx and debridement of left heel ulcer on 7/13/2024   -Surgical management per podiatry  -Infectious disease following. On IV ceftriaxone.        Peripheral arterial disease with critical limb ischemia of bilateral lower extremities  S/p multiple vascular surgery  interventions to bilateral lower extremities  -CTA A/P with runoff on 7/12 showed occlusion of left above-knee popliteal artery with distal reconstitution, patent bilateral SFA stents, diffuse aortoiliac and lower extremity atherosclerotic disease  -Had presented on 7/12/2024 for scheduled left femoral artery cutdown and LLE angiogram.  This has now been deferred due to diabetic foot infection.  Plan is to do angiogram as outpatient in 2 weeks.   -management as per vascular surgery  -Continue aspirin 81 mg, Plavix 75 mg    Diabetes mellitus type 2, with peripheral neuropathy and CKD 3, controlled with A1c 7 on 7/12/2024  PTA-empagliflozin 25 mg, metformin 1000 mg twice daily  -Blood sugars are well-controlled  -Continue empagliflozin.  Hold metformin  -Continue NovoLog sliding scale    Benign essential hypertension  PTA-amlodipine 10 mg, chlorthalidone 50 mg, losartan 100 mg, Toprol- mg daily  -Blood pressure well-controlled.  -Continue amlodipine, chlorthalidone, losartan    Hyperlipidemia-LDL 62, HDL 62, 7/12/2024  PTA-atorvastatin 80 mg, Zetia 10 mg  -Hyperlipidemia is well-controlled.  Continue atorvastatin and Zetia    Chronic kidney disease stage III  -Baseline creatinine around 1.  Creatinine currently stable and at baseline.  Monitor    Previous vascular surgery procedures  Right common femoral and superficial femoral endarterectomy with patch angioplasty and right femoral artery angioplasty with stenting, 3/2022  2. LLE angiogram and balloon angioplasty of anterior and posterior tibial arteries, 4/2021  3. Left popliteal angioplasty and left SFA stenting 2017 followed by stenting of in-stent stenosis 7/2019   4. LLE arteriogram with balloon angioplasty of the left popliteal artery, recanalization of posterior tibial artery, and extension of the stent in the left superficial femoral artery 2/2021         Clinically Significant Risk Factors                              # DMII: A1C = 7.0 % (Ref range:  <5.7 %) within past 6 months, PRESENT ON ADMISSION             Michelle Mejía MD  Hospitalist Service  Securely message with [employer] (more info)  Text page via [facility] Paging/Directory   ______________________________________________________________________    Chief Complaint     Left diabetic foot infection    History is obtained from the patient    History of Present Illness   Zia Hopkins is a 74 year old male with history of DM2, HTN, HLP, CKD 3, PAD, who presented on 7/12/2024 for scheduled left femoral artery cutdown and LLE angiogram but was found to have left 3rd toe wet gangrene with cellulitis and developing osteomyelitis.  He was admitted for IV antibiotics, podiatry and infectious disease consultation.  Blood cultures obtained on admission are growing gram-positive cocci.     Hospitalist medicine consulted on 7/13 for management of diabetes and other comorbidities.      Per vascular surgery, plan would be due to angiogram as outpatient in 2 weeks.       Past Medical History    Past Medical History:   Diagnosis Date    Acute low back pain     Anemia     Bilateral carotid bruits     Cellulitis of left foot     Central spinal stenosis     and Subarticular    Chronic kidney disease     Degenerative lumbar spinal stenosis     multilevel    Diabetes mellitus type 2, noninsulin dependent (H)     Diabetic ulcer of toe (H)     Gangrene of toe (H)     Hyperlipidemia LDL goal <70     Hypertension     Lumbar foraminal stenosis     Macular degeneration (senile) of retina     PAD (peripheral artery disease) (H24)     Pneumonia     Psoriasis        Past Surgical History   Past Surgical History:   Procedure Laterality Date    AMPUTATE FOOT Left 03/15/2017    Procedure: AMPUTATE FOOT;  Surgeon: Emre Mauricio DPM;  Location: [facility] OR    AMPUTATE TOE(S) Left 04/20/2021    Procedure: LEFT GREAT TOE AMPUTATION.;  Surgeon: Purvi Whelan DPM, Podiatry/Foot and Ankle Surgery;  Location: [facility] OR    AMPUTATE TOE(S) Right  04/18/2022    Procedure: 1.  Partial right great toe amputation. 2.  Right foot fourth toe amputation at the metatarsophalangeal joint. 3.  Right fifth metatarsal amputation at the metatarsophalangeal joint.;  Surgeon: Purvi Whelan DPM, Podiatry/Foot and Ankle Surgery;  Location: [facility] OR    AMPUTATE TOE(S) Right 09/29/2022    Procedure: Right second and third toe amputations;  Surgeon: Purvi Whelan DPM, Podiatry/Foot and Ankle Surgery;  Location: [facility] OR    ARTHROPLASTY KNEE BILATERAL Bilateral     BIOPSY BONE FOOT Right 06/27/2022    Procedure: Partial right fourth and fifth metatarsal bone excision;  Surgeon: Purvi Whelan DPM, Podiatry/Foot and Ankle Surgery;  Location: [facility] OR    ENDARTERECTOMY FEMORAL Right 03/04/2022    Procedure: RIGHT FEMORAL ENDARTERECTOMY, RIGHT FEMORAL ARTERY BALLOON ANGIOPLASTY WITH STENTING;  Surgeon: Surjit Hamlin MD;  Location: [facility]    ENDARTERECTOMY FEMORAL Left 07/12/2023    Procedure: LEFT FEMORAL CUTDOWN;  Surgeon: Surjit Hamlin MD;  Location: [facility]    IR LOWER EXTREMITY ANGIOGRAM LEFT  07/30/2019    IR LOWER EXTREMITY ANGIOGRAM LEFT  02/03/2021    IR LOWER EXTREMITY ANGIOGRAM LEFT  04/21/2021    IR LOWER EXTREMITY ANGIOGRAM RIGHT  03/01/2022    IR LOWER EXTREMITY ANGIOGRAM RIGHT  06/10/2022    IR OR ANGIOGRAM  03/04/2022    IR OR ANGIOGRAM  07/12/2023    IRRIGATION AND DEBRIDEMENT FOOT, COMBINED Left 03/15/2017    Procedure: COMBINED IRRIGATION AND DEBRIDEMENT FOOT;  Surgeon: Emre Mauricio DPM;  Location: [facility]    OR ANGIOGRAM, LOWER EXTREMITY Left 07/12/2023    Procedure: LEFT LOWER EXTREMITY ANGIOGRAM BALLOON ANGIOPLASTY;  Surgeon: Surjit Hamlin MD;  Location: [facility]    PERONEAL NERVE DECOMPRESSION      VASECTOMY         Medications   I have reviewed this patient's current medications  Medications Prior to Admission   Medication Sig Dispense Refill Last Dose    acetaminophen (TYLENOL) 500 MG tablet Take 500-1,000 mg by mouth every 6 hours as  needed for mild pain   Past Month    amLODIPine (NORVASC) 10 MG tablet Take 10 mg by mouth daily   7/11/2024    aspirin 81 MG tablet Take 1 tablet (81 mg) by mouth daily 90 tablet 1 7/11/2024    atorvastatin (LIPITOR) 80 MG tablet TAKE 1 TABLET(80 MG) BY MOUTH AT BEDTIME 90 tablet 3 7/11/2024    chlorthalidone (HYGROTON) 50 MG tablet Take 50 mg by mouth daily    7/11/2024    clopidogrel (PLAVIX) 75 MG tablet TAKE 1 TABLET(75 MG) BY MOUTH DAILY 90 tablet 3 7/11/2024    diclofenac (VOLTAREN) 75 MG EC tablet Take 75 mg by mouth 2 times daily as needed   Unknown    empagliflozin (JARDIANCE) 25 MG TABS tablet Take 25 mg by mouth daily   7/11/2024    ezetimibe (ZETIA) 10 MG tablet TAKE 1 TABLET(10 MG) BY MOUTH DAILY 90 tablet 3 7/11/2024    losartan (COZAAR) 100 MG tablet Take 100 mg by mouth daily    7/11/2024    metFORMIN (GLUCOPHAGE) 1000 MG tablet Take 1,000 mg by mouth 2 times daily (with meals)    7/11/2024    metoprolol succinate ER (TOPROL-XL) 100 MG 24 hr tablet Take 1 tablet (100 mg) by mouth daily 90 tablet 3 7/11/2024    clopidogrel (PLAVIX) 75 MG tablet Take 1 tablet (75 mg) by mouth daily (Patient not taking: Reported on 7/12/2024) 90 tablet 3 Not Taking    oxyCODONE-acetaminophen (PERCOCET) 5-325 MG tablet Take 1 tablet by mouth every 6 hours as needed for severe pain (Patient not taking: Reported on 7/12/2024) 15 tablet 0 Not Taking             Physical Exam   Vital Signs: Temp: 98.5  F (36.9  C) Temp src: Oral BP: 124/62 Pulse: 59   Resp: 16 SpO2: 95 % O2 Device: None (Room air)    Weight: 133 lbs 0 oz    Constitutional - alert, resting in bed, appears comfortable  Head - normocephalic, atraumatic  ENT - normal eye lids and lashes, no conjunctival hyperemia, no icterus, extraocular movements are normal, normal nose, no discharge, moist oral mucosa, no ulcers or exudates, normal external ear  Neck - no thyromegaly or lymphadenopathy. Tracheal is midline  CV - regular rate and rhythm, no murmurs, no  edema  Pulmonary - lungs are clear to auscultation bilaterally, no wheezing or rhonchi  GI - abdomen is soft, non distended, non tender, bowel sounds are present, no organomegaly  Neurological - alert and oriented, normal speech, no focal deficits  Musculoskeletal - no joint erythema or swelling, ROM is ok          Medical Decision Making       70 MINUTES SPENT BY ME on the date of service doing chart review, history, exam, documentation & further activities per the note.      Data     I have personally reviewed the following data over the past 24 hrs:    6.4  \   12.3 (L)   / 227     135 99 22.8 /  119 (H)   3.7 25 1.01 \     Procal: N/A CRP: 15.10 (H) Lactic Acid: N/A         Imaging results reviewed over the past 24 hrs:   Recent Results (from the past 24 hour(s))   MR Foot Left w/o Contrast    Narrative    EXAM: MR FOOT LEFT W/O CONTRAST  LOCATION: [facility]  DATE: 7/12/2024    INDICATION: Swelling, possible infection or osteomyelitis.  COMPARISON: None.  TECHNIQUE: Unenhanced.    FINDINGS:     JOINTS AND BONES:   -Postoperative changes of amputation of the great toe and small toe. Postoperative changes of amputation of the distal phalanx of the second and third toes. The distal margin of the middle phalanx of the third toe appears exposed at the base of an   ulceration making osteomyelitis impossible to exclude, although there is little associated signal abnormality. No additional areas worrisome for osteomyelitis are identified. No significant effusion to suggest septic arthropathy.    TENDONS:   -The flexor and extensor tendons are intact. Surgical truncation of the hallucis tendons but no evidence for tendinopathy.     LIGAMENTS:   -The ligament of Lisfranc is intact. The collateral ligaments at the second through fourth MTP joints are all intact.    MUSCLES AND SOFT TISSUES:   -Fatty infiltration and atrophy of some of the interosseous musculature. No evidence for abscess.       Impression    IMPRESSION:  1.  Postoperative changes of amputation of the great toe and small toe.  2.  Amputation of a portion of the distal phalanx of the second toe.  3.  Soft tissue ulceration at the tip of the third toe appears to extend to the bone margin of the exposed middle phalanx. No significant T1 marrow signal change is present, but there is some T2 signal abnormality and osteomyelitis cannot be excluded.  4.  No additional worrisome areas for osteomyelitis and no evidence for septic arthropathy or abscess.  5.  No evidence for fracture.  6.  Mild fatty infiltration of the interosseous musculature.  7.  Surgical truncation of the hallucis tendons.   CTA Abdomen Pelvis Runoff w Contrast    Narrative    EXAM: CTA ABDOMEN PELVIS RUNOFF W CONTRAST  LOCATION: [facility]  DATE: 7/12/2024    INDICATION: eval LLE vasculature, concern for patency L SFA stent, distal popliteal artery, calcific disease burden  COMPARISON: Lower extremity arterial ultrasound 5/6/2024  TECHNIQUE: Helical acquisition through the abdomen, pelvis, and bilateral lower extremities was performed during the arterial phase of contrast enhancement using IV Contrast. 2D and 3D reconstructions were performed by the CT technologist. Dose reduction   techniques were used.   CONTRAST: 100 mL Isovue 370    FINDINGS:    ABDOMEN ARTERIAL FINDINGS  Abdominal aorta: Patent. Moderate diffuse atherosclerotic disease.  Celiac artery: Mild ostial narrowing.  SMA: Patent.  Renal Arteries: At least moderate atherosclerotic stenosis of the renal artery origins.  ALEX: Mild ostial stenosis.    RIGHT LOWER EXTREMITY ARTERIAL FINDINGS  Common Iliac: Diffuse atherosclerosis without high-grade stenosis.  External Iliac: Diffuse atherosclerosis without high-grade stenosis.  Internal Iliac: Moderate ostial stenosis.  Common Femoral: Patent.  Profunda: Moderate atherosclerotic stenosis proximally.  SFA: Stenting throughout the SFA appears  patent.  Popliteal: Moderate to severe focal atherosclerotic stenosis proximally with multifocal disease distally.  Tibioperoneal trunk: Patent.  Anterior tibial: Occluded in the mid vessel. Reconstitution of the dorsalis pedis artery.  Peroneal: Patent to the level of the ankle.  Posterior tibial: Diffuse atherosclerotic disease. Patent to the level of the foot.    LEFT LOWER EXTREMITY ARTERIAL FINDINGS  Common Iliac: Diffuse atherosclerosis without high-grade stenosis.  External Iliac: Diffuse atherosclerosis without high-grade stenosis.  Internal Iliac: Diffuse atherosclerosis without high-grade stenosis.  Common Femoral: Atherosclerosis results in approximately 50% luminal stenosis.  Profunda: Diffuse atherosclerotic disease.  SFA: SFA stenting appears patent.  Popliteal: Occlusion of the above-knee segment with reconstitution of the below-knee segment, which demonstrated severe atherosclerotic disease.  Tibioperoneal trunk: Severe atherosclerotic disease.  Anterior tibial: Diffuse atherosclerotic disease. Patent to the level of the foot.  Peroneal: Occluded.  Posterior tibial: Diffuse atherosclerotic disease. Patent to the level of the foot.    NON-ARTERIAL FINDINGS  LUNG BASES: Bibasilar atelectasis and/or scarring. A fat-containing left Bochdalek hernia is noted.    ABDOMEN: The liver, spleen, adrenal glands, and pancreas are unremarkable. Simple appearing right renal cysts are benign and requiring no further follow-up. No hydronephrosis. Enhancing focus in the right hepatic lobe is noted which likely represents a   flash filling hemangioma. No ascites.    PELVIS: Normal    MUSCULOSKELETAL: Moderate to marked degenerative changes of the lumbar spine. At least mild to moderate degenerative changes of the bilateral hips. No destructive osseous lesion.        Impression    IMPRESSION:  1.  Occlusion of the left above-the-knee popliteal artery with distal reconstitution.    2.  The bilateral SFA stents are  patent.    3.  Diffuse aortoiliac and lower extremity atherosclerotic disease.

## 2024-07-13 NOTE — PLAN OF CARE
Date & Time: 07/13/24 2342-7620    Surgery/POD#: Here for femoral artery cutdown 7/12 - surgery postponed d/t wet gangrene and cellulitis. POD 0 L 3rd toe amputation and heel wound debridement   Behavior & Aggression: Green  Fall Risk:  Yes  Orientation:A&O x4  ABNL VS/O2: VSS on RA  ABNL Labs: Blood cultures growing staph aureus  Pain Management: Denies. Patient denied scheduled tylenol. Education provided on pain management.   Bowel/Bladder: Continent, voiding adequately in BR. No BM. +BS  Drains: PIV x2 SL w/ int abx  Wounds/incisions: L foot wrapped (on wedge); R foot partial amputation. Drainage on dressing after ambulation to BR.   Diet: Mod Carb  Activity Level: Ax1 w/ cane.   Tests/Procedures: Xray completed, results pending.   Anticipated  DC Date: Pending continued improvement  Significant Information: Rook boot in room. Partial weight bearing to LLE

## 2024-07-14 ENCOUNTER — APPOINTMENT (OUTPATIENT)
Dept: PHYSICAL THERAPY | Facility: CLINIC | Age: 74
DRG: 253 | End: 2024-07-14
Attending: PODIATRIST
Payer: COMMERCIAL

## 2024-07-14 LAB
GLUCOSE BLDC GLUCOMTR-MCNC: 124 MG/DL (ref 70–99)
GLUCOSE BLDC GLUCOMTR-MCNC: 130 MG/DL (ref 70–99)
GLUCOSE BLDC GLUCOMTR-MCNC: 146 MG/DL (ref 70–99)
GLUCOSE BLDC GLUCOMTR-MCNC: 213 MG/DL (ref 70–99)
GLUCOSE BLDC GLUCOMTR-MCNC: 228 MG/DL (ref 70–99)

## 2024-07-14 PROCEDURE — 250N000013 HC RX MED GY IP 250 OP 250 PS 637: Performed by: PODIATRIST

## 2024-07-14 PROCEDURE — 120N000001 HC R&B MED SURG/OB

## 2024-07-14 PROCEDURE — 99231 SBSQ HOSP IP/OBS SF/LOW 25: CPT | Performed by: PODIATRIST

## 2024-07-14 PROCEDURE — 250N000011 HC RX IP 250 OP 636: Performed by: PODIATRIST

## 2024-07-14 PROCEDURE — 97161 PT EVAL LOW COMPLEX 20 MIN: CPT | Mod: GP

## 2024-07-14 PROCEDURE — 97530 THERAPEUTIC ACTIVITIES: CPT | Mod: GP

## 2024-07-14 PROCEDURE — 99232 SBSQ HOSP IP/OBS MODERATE 35: CPT | Performed by: HOSPITALIST

## 2024-07-14 PROCEDURE — 97116 GAIT TRAINING THERAPY: CPT | Mod: GP

## 2024-07-14 RX ADMIN — HEPARIN SODIUM 5000 UNITS: 5000 INJECTION, SOLUTION INTRAVENOUS; SUBCUTANEOUS at 08:40

## 2024-07-14 RX ADMIN — EMPAGLIFLOZIN 25 MG: 25 TABLET, FILM COATED ORAL at 08:46

## 2024-07-14 RX ADMIN — LOSARTAN POTASSIUM 100 MG: 100 TABLET, FILM COATED ORAL at 08:47

## 2024-07-14 RX ADMIN — SENNOSIDES AND DOCUSATE SODIUM 1 TABLET: 50; 8.6 TABLET ORAL at 08:46

## 2024-07-14 RX ADMIN — CHLORTHALIDONE 50 MG: 25 TABLET ORAL at 08:46

## 2024-07-14 RX ADMIN — ATORVASTATIN CALCIUM 80 MG: 80 TABLET, FILM COATED ORAL at 21:24

## 2024-07-14 RX ADMIN — EZETIMIBE 10 MG: 10 TABLET ORAL at 08:47

## 2024-07-14 RX ADMIN — AMLODIPINE BESYLATE 10 MG: 10 TABLET ORAL at 08:46

## 2024-07-14 RX ADMIN — CEFTRIAXONE SODIUM 2 G: 2 INJECTION, POWDER, FOR SOLUTION INTRAMUSCULAR; INTRAVENOUS at 08:45

## 2024-07-14 RX ADMIN — CLOPIDOGREL BISULFATE 75 MG: 75 TABLET ORAL at 08:46

## 2024-07-14 RX ADMIN — HEPARIN SODIUM 5000 UNITS: 5000 INJECTION, SOLUTION INTRAVENOUS; SUBCUTANEOUS at 17:01

## 2024-07-14 RX ADMIN — ASPIRIN 81 MG CHEWABLE TABLET 81 MG: 81 TABLET CHEWABLE at 08:46

## 2024-07-14 RX ADMIN — HEPARIN SODIUM 5000 UNITS: 5000 INJECTION, SOLUTION INTRAVENOUS; SUBCUTANEOUS at 21:24

## 2024-07-14 RX ADMIN — METOPROLOL SUCCINATE 100 MG: 100 TABLET, EXTENDED RELEASE ORAL at 08:46

## 2024-07-14 ASSESSMENT — ACTIVITIES OF DAILY LIVING (ADL)
ADLS_ACUITY_SCORE: 26
ADLS_ACUITY_SCORE: 26
ADLS_ACUITY_SCORE: 25
ADLS_ACUITY_SCORE: 26
ADLS_ACUITY_SCORE: 25
ADLS_ACUITY_SCORE: 26
ADLS_ACUITY_SCORE: 26
ADLS_ACUITY_SCORE: 25
ADLS_ACUITY_SCORE: 26
ADLS_ACUITY_SCORE: 26
ADLS_ACUITY_SCORE: 25
ADLS_ACUITY_SCORE: 26
ADLS_ACUITY_SCORE: 25
ADLS_ACUITY_SCORE: 25
ADLS_ACUITY_SCORE: 26
ADLS_ACUITY_SCORE: 25

## 2024-07-14 NOTE — PROGRESS NOTES
"Foot & Ankle Surgery Progress Note  July 14, 2024    S:  Zia was seen at bedside today for continued monitoring of left foot postop day 1 status post partial third toe amputation and lateral heel wound debridement.  Pain levels low.    /57 (BP Location: Left arm, Patient Position: Semi-Alexander's, Cuff Size: Adult Regular)   Pulse 60   Temp 98.5  F (36.9  C) (Oral)   Resp 17   Ht 1.6 m (5' 3\")   Wt 60.3 kg (133 lb)   SpO2 92%   BMI 23.56 kg/m      ROS - Pos for CC.  Denies nausea, vomiting, chills, fever, belly pain, calf pain, chest pain or shortness of breath. Complete remainder of ROS is otherwise neg.      PE -sutures intact, skin margins well coapted at the third toe amputation site without dusky changes, necrosis or dehiscence.  Minimal erythema/edema are noted.  Lateral heel wound appears stable without necrosis.  Skin shows no trophic, color or temperature changes otherwise.  No calf redness, swelling or pain noted otherwise.    Imaging: Postop x-rays - IMPRESSION:      There are recent postoperative changes from amputation of the third toe to the level of the proximal phalangeal shaft. There are more remote appearing postoperative changes from fifth toe and great toe amputation and amputation of a portion of the second   distal phalanx.      There is diffuse osseous demineralization. Scattered mild degenerative changes..    Cultures: Pending   Gram stain:  4+ Gram positive cocci      1+ Gram positive bacilli      1+ Gram negative bacilli      2+ WBC seen     Pathology: Pending    Assessment:  74 year old male postop day 1 status post above procedure    Plan: Dressing change at bedside  -We reviewed the procedure and intraoperative findings.  Limited bleeding was noted intraoperatively the surgical site appears stable at this point  -All gangrenous tissue and bone infection have been resected  -Timing of vascular intervention per vascular team; will continue to monitor from periphery while " in-house.  Foot specific discharge instructions have been placed.  -PT for heel weightbearing training in surgical shoe     Activity -heel weightbearing   Pain Management -oxycodone, Tylenol   DVT Prophylaxis -mechanical   Abx therapy -ceftriaxone         Erik Baez DPM FACFAS FACFAOM  Podiatric Foot & Ankle Surgeon  [facility]  [phone]

## 2024-07-14 NOTE — PLAN OF CARE
Goal Outcome Evaluation:      Plan of Care Reviewed With: patient    Overall Patient Progress: improvingOverall Patient Progress: improving      POD 1 LEFT 3RD TOE AMPUTATION AND HEEL WOUND DEBRIDEMENT. A&Ox4. VSS on RA. Denies pain, refused schedule tylenol. R food partial amputation, L foot wrapped with some drainage marked, 1st & 5th toes amputation. L foot elevated. Partia weight bearing to LLE with surgical shoe per order but pt doesn't have it yet. Tolerating Mod Carb diet. PIV SL. BG check. Hypo BS, passing flatus, no BM. Void adequately. ID/Podiatry following. Discharge pending.

## 2024-07-14 NOTE — PROGRESS NOTES
"Vascular surgery progress note    No issues overnight. Minimal pain. Tolerating regular diet. S/p partial L third toe amp yesterday.    /70 (BP Location: Left arm)   Pulse 60   Temp 97.9  F (36.6  C) (Oral)   Resp 16   Ht 1.6 m (5' 3\")   Wt 60.3 kg (133 lb)   SpO2 96%   BMI 23.56 kg/m      Exam  Resting comfortably in bed  Palpable b/l femoral pulses. Nonpalpable R pedal pulses.  L foot dressing c/d/I    Glucose 130-267  Intra-op wound cultures pending. Gram statinwith 4+ GPC, 1+ GNB, 1+ GPB    A/P: 75 yo male with LLE CLTI, POD#1 s/p L 3rd toe amputation.   Dressing changed at bedside by podiatry today.   Continue antibiotics  LLE angiogram this coming week  Cont aspirin, high-dose statin, ppx SQH   Appreciate hospital medicine consult and perioperative glycemic management    Jeff Kirby MD    "

## 2024-07-14 NOTE — PROGRESS NOTES
[facility]  Hospitalist Progress Note        Mohit Gould MD   07/14/2024        Interval History:        -No acute issues overnight; denies any active complaints  -podiatry following, dressing change at bedside (7/14), note that all gangrenous tissue and bone infection have been resected; PT for heel weight bearing training in surgical shoe  -remains afebrile; elevated CRP to 15  -blood cultures from 7/12 growing staph aureus, MSSA  -ID following, currently on IV Rocephin         Assessment and Plan:        Zia Hopkins is a 74 year old male with history of DM2, HTN, HLP, CKD 3, PAD, who presented on 7/12/2024 for scheduled left femoral artery cutdown and LLE angiogram but was found to have left 3rd toe wet gangrene with cellulitis and developing osteomyelitis.  He was admitted for IV antibiotics, podiatry and infectious disease consultation.  Blood cultures obtained on admission are growing gram-positive cocci.      Hospitalist medicine consulted on 7/13 for management of diabetes and other comorbidities.       Per vascular surgery, plan would be due to angiogram as outpatient in 2 weeks.        Left foot 3rd digit gangrene with cellulitis and developing osteomyelitis  Left heel ulcer with eschar, s/p partial left third toe amputation with osteotomy through proximal phalanx, 7/13/2024  Left heel ulcer with eschar, s/p debridement down to/including subcutaneous fat, 7/13/2024  Gram-positive cocci bacteremia, likely MSSA - due to diabetic foot infection  S/p multiple previous bilateral toe amputations   - MRI showed ulceration at tip of third toe extending to bone margin exposed middle phalanx.  Some T2 signal abnormality noted.  Osteomyelitis could not be excluded.  -CRP 15.  No leukocytosis, afebrile  -2 of 2 sets of blood cultures obtained on 7/12 are growing gram-positive cocci, likely MSSA  -Podiatry following.  Underwent partial left third toe amputation with osteotomy through  proximal phalanx and debridement of left heel ulcer on 7/13/2024   -Surgical management per podiatry, dressing change at bedside (7/14), note that all gangrenous tissue and bone infection have been resected; PT for heel weight bearing training in surgical shoe  -ID following, currently on IV Rocephin     Peripheral arterial disease with critical limb ischemia of bilateral lower extremities  S/p multiple vascular surgery interventions to bilateral lower extremities  -CTA A/P with runoff on 7/12 showed occlusion of left above-knee popliteal artery with distal reconstitution, patent bilateral SFA stents, diffuse aortoiliac and lower extremity atherosclerotic disease  -Had presented on 7/12/2024 for scheduled left femoral artery cutdown and LLE angiogram.  This has now been deferred due to diabetic foot infection.  Plan is to do angiogram as outpatient in 2 weeks.   -management as per vascular surgery, following as primary  -Continue aspirin 81 mg, Plavix 75 mg daily     Diabetes mellitus type 2, with peripheral neuropathy and CKD 3, controlled with A1c 7 on 7/12/2024  PTA-empagliflozin 25 mg, metformin 1000 mg twice daily  -Blood sugars are well-controlled; A1c 7.0  -Continue empagliflozin.  Hold metformin  -Continue NovoLog sliding scale with hypoglycemia protocol     Benign essential hypertension  PTA-amlodipine 10 mg, chlorthalidone 50 mg, losartan 100 mg, Toprol- mg daily  - Blood pressure well-controlled.  - Continue PTA amlodipine, chlorthalidone, losartan     Hyperlipidemia-LDL 62, HDL 62, 7/12/2024  PTA-atorvastatin 80 mg, Zetia 10 mg  -Hyperlipidemia is well-controlled.  Continue atorvastatin and Zetia     Chronic kidney disease stage III  -Baseline creatinine around 1.  Creatinine currently stable and at baseline.  Monitor     Previous vascular surgery procedures  Right common femoral and superficial femoral endarterectomy with patch angioplasty and right femoral artery angioplasty with stenting,  "3/2022  2. LLE angiogram and balloon angioplasty of anterior and posterior tibial arteries, 4/2021  3. Left popliteal angioplasty and left SFA stenting 2017 followed by stenting of in-stent stenosis 7/2019   4. LLE arteriogram with balloon angioplasty of the left popliteal artery, recanalization of posterior tibial artery, and extension of the stent in the left superficial femoral artery 2/2021       Diet: Advance Diet as Tolerated: Fully Advanced to diet(s) per Provider order; Moderate Consistent Carb (60 g CHO per Meal) Diet           DVT Prophylaxis: subcutaneous heparin TID    Code status: full code    Disposition:   Medically Ready for Discharge: Anticipated in 2-4 Days per primary vascular surgery service, pending podiatry clearance and infectious disease plan for antibiotics       Clinically Significant Risk Factors                                # DMII: A1C = 7.0 % (Ref range: <5.7 %) within past 6 months, PRESENT ON ADMISSION                  Page Me (7 am to 6 pm)    Care plan discussed with patient and his wife present in room along with nursing              Physical Exam:      Blood pressure 137/57, pulse 60, temperature 98.5  F (36.9  C), temperature source Oral, resp. rate 17, height 1.6 m (5' 3\"), weight 60.3 kg (133 lb), SpO2 92%.  Vitals:    07/12/24 0709   Weight: 60.3 kg (133 lb)     Vital Signs with Ranges  Temp:  [97.9  F (36.6  C)-98.5  F (36.9  C)] 98.5  F (36.9  C)  Pulse:  [52-60] 60  Resp:  [0-19] 17  BP: (112-145)/(49-67) 137/57  SpO2:  [92 %-97 %] 92 %  I/O's Last 24 hours  I/O last 3 completed shifts:  In: 400 [I.V.:400]  Out: 2 [Blood:2]    Constitutional: Alert, awake and oriented X 3; resting comfortably in no apparent distress       Oral cavity: Moist mucosa   Cardiovascular: Normal s1 s2, regular rate and rhythm, no murmur   Lungs: B/l clear to auscultation, no wheezes or crepitations   Abdomen: Soft, nt, nd, no guarding, rigidity or rebound; BS +   LE : Left foot in dressing, " partial toe amputation status   Musculoskeletal/Neuro Power 5/5 in all extremities; No focal neurological deficits noted   Psychiatry: normal mood and affect                Medications:        Current Facility-Administered Medications   Medication Dose Route Frequency Provider Last Rate Last Admin    acetaminophen (TYLENOL) tablet 975 mg  975 mg Oral Q8H Erik Baez DPM        amLODIPine (NORVASC) tablet 10 mg  10 mg Oral Daily Erik Baez DPM   10 mg at 07/13/24 0908    aspirin (ASA) chewable tablet 81 mg  81 mg Oral Daily Erik Baez DPM   81 mg at 07/13/24 0908    atorvastatin (LIPITOR) tablet 80 mg  80 mg Oral At Bedtime Erik Baez DPM   80 mg at 07/13/24 2225    cefTRIAXone (ROCEPHIN) 2 g vial to attach to  ml bag for ADULTS or NS 50 ml bag for PEDS  2 g Intravenous Daily Erik Baez DPM   2 g at 07/13/24 0912    chlorthalidone (HYGROTON) tablet 50 mg  50 mg Oral Daily Erik Baez DPM   50 mg at 07/13/24 0907    clopidogrel (PLAVIX) tablet 75 mg  75 mg Oral Daily Erik Baez DPM   75 mg at 07/13/24 0908    empagliflozin (JARDIANCE) tablet 25 mg  25 mg Oral Daily Erik Baez DPM   25 mg at 07/13/24 0907    ezetimibe (ZETIA) tablet 10 mg  10 mg Oral Daily rEik Baez DPM   10 mg at 07/13/24 0908    heparin ANTICOAGULANT injection 5,000 Units  5,000 Units Subcutaneous TID Erik Baez DPM   5,000 Units at 07/13/24 2225    insulin aspart (NovoLOG) injection (RAPID ACTING)  1-10 Units Subcutaneous TID AC Michelle Mjeía MD        insulin aspart (NovoLOG) injection (RAPID ACTING)  1-7 Units Subcutaneous At Bedtime Michelle Mejía MD   3 Units at 07/13/24 2225    losartan (COZAAR) tablet 100 mg  100 mg Oral Daily Erik Baez DPM   100 mg at 07/13/24 0908    [Held by provider] metFORMIN (GLUCOPHAGE) tablet 1,000 mg  1,000 mg Oral BID w/meals Maryann Lewis MD   1,000 mg at 07/12/24 2017    metoprolol succinate  ER (TOPROL XL) 24 hr tablet 100 mg  100 mg Oral Daily Erik Baez DPM   100 mg at 07/13/24 0908    polyethylene glycol (MIRALAX) Packet 17 g  17 g Oral Daily Erik Baez DPM        senna-docusate (SENOKOT-S/PERICOLACE) 8.6-50 MG per tablet 1 tablet  1 tablet Oral BID Erik Baez DPM   1 tablet at 07/13/24 2225    sodium chloride (PF) 0.9% PF flush 3 mL  3 mL Intracatheter Q8H Erik Baez DPM   3 mL at 07/14/24 0658    sodium chloride (PF) 0.9% PF flush 3 mL  3 mL Intracatheter Q8H Erik Baez DPM   3 mL at 07/13/24 2249     PRN Meds:   Current Facility-Administered Medications   Medication Dose Route Frequency Provider Last Rate Last Admin    [START ON 7/16/2024] acetaminophen (TYLENOL) tablet 650 mg  650 mg Oral Q4H PRN Erik Baez DPM        [START ON 7/16/2024] bisacodyl (DULCOLAX) suppository 10 mg  10 mg Rectal Daily PRN Erik Baez DPM        calcium carbonate (TUMS) chewable tablet 1,000 mg  1,000 mg Oral 4x Daily PRN Erik Baez DPM        glucose gel 15-30 g  15-30 g Oral Q15 Min PRN Erik Baez DPM        Or    dextrose 50 % injection 25-50 mL  25-50 mL Intravenous Q15 Min PRN Erik Baez DPM        Or    glucagon injection 1 mg  1 mg Subcutaneous Q15 Min PRN Erik Baez DPM        diclofenac (VOLTAREN) EC tablet 75 mg  75 mg Oral BID PRN Erik Baez DPM        lidocaine (LMX4) cream   Topical Q1H PRN Erik Baez DPM        lidocaine (LMX4) cream   Topical Q1H PRN Erik Baez DPM        lidocaine 1 % 0.1-1 mL  0.1-1 mL Other Q1H PRN Erik Baez DPM        lidocaine 1 % 0.1-1 mL  0.1-1 mL Other Q1H PRN Erik Baez DPM        [START ON 7/15/2024] magnesium hydroxide (MILK OF MAGNESIA) suspension 30 mL  30 mL Oral Daily PRN Erik Baez DPM        naloxone (NARCAN) injection 0.2 mg  0.2 mg Intravenous Q2 Min PRN Erik Baez DPM        Or    naloxone  (NARCAN) injection 0.4 mg  0.4 mg Intravenous Q2 Min PRN Erik Baez DPM        Or    naloxone (NARCAN) injection 0.2 mg  0.2 mg Intramuscular Q2 Min PRN Erik Baez DPM        Or    naloxone (NARCAN) injection 0.4 mg  0.4 mg Intramuscular Q2 Min PRN Erik Baez DPM        ondansetron (ZOFRAN ODT) ODT tab 4 mg  4 mg Oral Q6H PRN Erik Baez DPM        Or    ondansetron (ZOFRAN) injection 4 mg  4 mg Intravenous Q6H PRN Erik Baez DPM        oxyCODONE (ROXICODONE) tablet 5 mg  5 mg Oral Q4H PRN Erik Baez DPM        Or    oxyCODONE (ROXICODONE) tablet 10 mg  10 mg Oral Q4H PRN Erik Baez DPM        oxyCODONE-acetaminophen (PERCOCET) 5-325 MG per tablet 1 tablet  1 tablet Oral Q6H PRN Erik Baez DPM        prochlorperazine (COMPAZINE) injection 5 mg  5 mg Intravenous Q6H PRN Erik Baez DPM        Or    prochlorperazine (COMPAZINE) tablet 5 mg  5 mg Oral Q6H PRN Erik Baez DPM        senna-docusate (SENOKOT-S/PERICOLACE) 8.6-50 MG per tablet 1 tablet  1 tablet Oral BID PRN Erik Baez DPM        Or    senna-docusate (SENOKOT-S/PERICOLACE) 8.6-50 MG per tablet 2 tablet  2 tablet Oral BID PRN Erik Baez DPM        sodium chloride (PF) 0.9% PF flush 3 mL  3 mL Intracatheter q1 min prn Erik Baez DPM        sodium chloride (PF) 0.9% PF flush 3 mL  3 mL Intracatheter q1 min prn Erik Baez DPM                Data:      All new lab and imaging data was reviewed.   Recent Labs   Lab Test 07/13/24  0012 07/12/24  0729 07/12/23  0852 03/08/22  0650 04/19/21  1151 02/03/21  1003 07/30/19  0712 03/14/17  1520 03/08/17  0720   WBC 6.4 9.9  --  10.5   < > 12.0* 5.7   < > 8.4   HGB 12.3* 13.9 13.8 8.7*   < > 12.2* 15.0   < > 15.1   MCV 93 93  --  92   < > 94 92   < > 91    283  --  353   < > 377 215   < > 240   INR  --   --   --   --   --  0.95 0.90  --  0.87    < > = values in this interval not  "displayed.      Recent Labs   Lab Test 07/14/24  0624 07/14/24  0157 07/13/24  2142 07/13/24  0157 07/13/24  0012 07/12/24  1645 07/12/24  0729 07/12/23  0852 06/27/22  1138 06/10/22  0740   NA  --   --   --   --  135  --  138  --   --  139   POTASSIUM  --   --   --   --  3.7  --  4.3 3.7  --  4.0   CHLORIDE  --   --   --   --  99  --  100  --   --  105   CO2  --   --   --   --  25  --  26  --   --  28   BUN  --   --   --   --  22.8  --  23.0  --   --  28   CR  --   --   --   --  1.01  --  1.00  --   --  0.88   ANIONGAP  --   --   --   --  11  --  12  --   --  6   TIN  --   --   --   --  8.8  --  9.6  --   --  8.9   * 130* 267*   < > 118*   < > 152* 127*   < > 128*    < > = values in this interval not displayed.     No lab results found.    Invalid input(s): \"TROP\", \"TROPONINIES\"      "

## 2024-07-14 NOTE — PLAN OF CARE
07/14/24 1407   Appointment Info   Signing Clinician's Name / Credentials (PT) Lori Burton DPT   Living Environment   People in Home spouse   Transportation Anticipated family or friend will provide   Living Environment Comments pt needs to negotiate full flight of stairs in home   Self-Care   Usual Activity Tolerance good   Current Activity Tolerance moderate   Activity/Exercise/Self-Care Comment hx of prior ft surgeries. Pt mod I with SEC at baseline. Has walker and crutches at home as well   General Information   Onset of Illness/Injury or Date of Surgery 07/12/24   Referring Physician Erik Baez DPM   Pertinent History of Current Problem (include personal factors and/or comorbidities that impact the POC) Zia Hopkins is a 74 year old male with history of DM2, HTN, HLP, CKD 3, PAD, who presented on 7/12/2024 for scheduled left femoral artery cutdown and LLE angiogram but was found to have left 3rd toe wet gangrene with cellulitis and developing osteomyelitis.  He was admitted for IV antibiotics, podiatry and infectious disease consultation.  Blood cultures obtained on admission are growing gram-positive cocci.   Existing Precautions/Restrictions fall   Weight-Bearing Status - LLE other (see comments)  (Heel WB in post op shoe)   General Observations off shelf post op shoe provided   Cognition   Affect/Mental Status (Cognition) WNL   Pain Assessment   Patient Currently in Pain No   Integumentary/Edema   Integumentary/Edema Comments Had dressing change to LLE this AM. See chart for details   Posture    Posture Forward head position   Range of Motion (ROM)   ROM Comment BLE WFL   Strength (Manual Muscle Testing)   Strength Comments Gross functional weakness with OOB mobility   Bed Mobility   Comment, (Bed Mobility) IND   Transfers   Comment, (Transfers) STS crutches and CGA   Gait/Stairs (Locomotion)   Comment, (Gait/Stairs) Crutches, CGA, heel WB difficult initially, pt perofrming step  through pattern   Balance   Balance Comments good dynamic balance with use of FWW   Sensory Examination   Sensory Perception Comments intact to light touch   Clinical Impression   Criteria for Skilled Therapeutic Intervention Yes, treatment indicated   PT Diagnosis (PT) impaired IND with mobility from baseline   Influenced by the following impairments Heel WB to LLE, impaired gross functional strength, impaired high level balance   Functional limitations due to impairments impaired IND with mobility from baseline   Clinical Presentation (PT Evaluation Complexity) stable   Clinical Presentation Rationale clinical judgement, good family support   Clinical Decision Making (Complexity) low complexity   Risk & Benefits of therapy have been explained evaluation/treatment results reviewed;care plan/treatment goals reviewed;risks/benefits reviewed;patient   PT Total Evaluation Time   PT Eval, Low Complexity Minutes (42308) 10   Physical Therapy Goals   PT Frequency 4x/week   PT Predicted Duration/Target Date for Goal Attainment 07/16/24   PT Goals Bed Mobility;Transfers;Gait;Stairs   PT: Bed Mobility Independent;Supine to/from sit   PT: Transfers Modified independent;Sit to/from stand;Assistive device;Bed to/from chair;Within precautions   PT: Gait Modified independent;Crutches;Rolling walker;50 feet;Within precautions   PT: Stairs Supervision/stand-by assist;Greater than 10 stairs;Rail on left;Within precautions   Interventions   Interventions Quick Adds Gait Training;Therapeutic Activity   PT Discharge Planning   PT Plan Gait, transfers, stairs   PT Discharge Recommendation (DC Rec) home with assist   PT Rationale for DC Rec Pt mobilizing CGA with crutches, able to maintain heel WB with education. Will continue to follow during IP stay. Recommend home with assist from wife with stair negotiation and household tasks   Total Session Time   Total Session Time (sum of timed and untimed services) 10

## 2024-07-15 DIAGNOSIS — I70.229 CRITICAL ISCHEMIA OF EXTREMITY WITH HISTORY OF REVASCULARIZATION OF SAME EXTREMITY (H): Primary | ICD-10-CM

## 2024-07-15 DIAGNOSIS — Z98.890 CRITICAL ISCHEMIA OF EXTREMITY WITH HISTORY OF REVASCULARIZATION OF SAME EXTREMITY (H): Primary | ICD-10-CM

## 2024-07-15 LAB
BACTERIA BLD CULT: ABNORMAL
GLUCOSE BLDC GLUCOMTR-MCNC: 112 MG/DL (ref 70–99)
GLUCOSE BLDC GLUCOMTR-MCNC: 124 MG/DL (ref 70–99)
GLUCOSE BLDC GLUCOMTR-MCNC: 150 MG/DL (ref 70–99)
GLUCOSE BLDC GLUCOMTR-MCNC: 196 MG/DL (ref 70–99)
PLATELET # BLD AUTO: 292 10E3/UL (ref 150–450)

## 2024-07-15 PROCEDURE — 250N000013 HC RX MED GY IP 250 OP 250 PS 637: Performed by: PODIATRIST

## 2024-07-15 PROCEDURE — 250N000011 HC RX IP 250 OP 636: Performed by: PODIATRIST

## 2024-07-15 PROCEDURE — 87040 BLOOD CULTURE FOR BACTERIA: CPT

## 2024-07-15 PROCEDURE — 85049 AUTOMATED PLATELET COUNT: CPT | Performed by: PODIATRIST

## 2024-07-15 PROCEDURE — 99232 SBSQ HOSP IP/OBS MODERATE 35: CPT | Performed by: PHYSICIAN ASSISTANT

## 2024-07-15 PROCEDURE — 36415 COLL VENOUS BLD VENIPUNCTURE: CPT

## 2024-07-15 PROCEDURE — 120N000001 HC R&B MED SURG/OB

## 2024-07-15 PROCEDURE — 99232 SBSQ HOSP IP/OBS MODERATE 35: CPT | Performed by: HOSPITALIST

## 2024-07-15 PROCEDURE — 36415 COLL VENOUS BLD VENIPUNCTURE: CPT | Performed by: PODIATRIST

## 2024-07-15 PROCEDURE — 250N000011 HC RX IP 250 OP 636: Mod: JZ | Performed by: PHYSICIAN ASSISTANT

## 2024-07-15 RX ORDER — CEFAZOLIN SODIUM 2 G/100ML
2 INJECTION, SOLUTION INTRAVENOUS EVERY 8 HOURS
Status: DISCONTINUED | OUTPATIENT
Start: 2024-07-15 | End: 2024-07-22 | Stop reason: HOSPADM

## 2024-07-15 RX ADMIN — ASPIRIN 81 MG CHEWABLE TABLET 81 MG: 81 TABLET CHEWABLE at 09:18

## 2024-07-15 RX ADMIN — ACETAMINOPHEN 975 MG: 325 TABLET, FILM COATED ORAL at 21:19

## 2024-07-15 RX ADMIN — CLOPIDOGREL BISULFATE 75 MG: 75 TABLET ORAL at 09:18

## 2024-07-15 RX ADMIN — CEFAZOLIN SODIUM 2 G: 2 INJECTION, SOLUTION INTRAVENOUS at 14:04

## 2024-07-15 RX ADMIN — AMLODIPINE BESYLATE 10 MG: 10 TABLET ORAL at 09:19

## 2024-07-15 RX ADMIN — CHLORTHALIDONE 50 MG: 25 TABLET ORAL at 09:18

## 2024-07-15 RX ADMIN — ATORVASTATIN CALCIUM 80 MG: 80 TABLET, FILM COATED ORAL at 21:20

## 2024-07-15 RX ADMIN — SENNOSIDES AND DOCUSATE SODIUM 1 TABLET: 50; 8.6 TABLET ORAL at 21:19

## 2024-07-15 RX ADMIN — METOPROLOL SUCCINATE 100 MG: 100 TABLET, EXTENDED RELEASE ORAL at 09:18

## 2024-07-15 RX ADMIN — CEFTRIAXONE SODIUM 2 G: 2 INJECTION, POWDER, FOR SOLUTION INTRAMUSCULAR; INTRAVENOUS at 09:19

## 2024-07-15 RX ADMIN — CEFAZOLIN SODIUM 2 G: 2 INJECTION, SOLUTION INTRAVENOUS at 21:20

## 2024-07-15 RX ADMIN — HEPARIN SODIUM 5000 UNITS: 5000 INJECTION, SOLUTION INTRAVENOUS; SUBCUTANEOUS at 17:09

## 2024-07-15 RX ADMIN — EMPAGLIFLOZIN 25 MG: 25 TABLET, FILM COATED ORAL at 09:19

## 2024-07-15 RX ADMIN — HEPARIN SODIUM 5000 UNITS: 5000 INJECTION, SOLUTION INTRAVENOUS; SUBCUTANEOUS at 09:19

## 2024-07-15 RX ADMIN — LOSARTAN POTASSIUM 100 MG: 100 TABLET, FILM COATED ORAL at 09:18

## 2024-07-15 RX ADMIN — HEPARIN SODIUM 5000 UNITS: 5000 INJECTION, SOLUTION INTRAVENOUS; SUBCUTANEOUS at 21:19

## 2024-07-15 RX ADMIN — EZETIMIBE 10 MG: 10 TABLET ORAL at 09:18

## 2024-07-15 ASSESSMENT — ACTIVITIES OF DAILY LIVING (ADL)
ADLS_ACUITY_SCORE: 25
ADLS_ACUITY_SCORE: 26
ADLS_ACUITY_SCORE: 25
ADLS_ACUITY_SCORE: 25
ADLS_ACUITY_SCORE: 26
ADLS_ACUITY_SCORE: 26
ADLS_ACUITY_SCORE: 25
ADLS_ACUITY_SCORE: 26
ADLS_ACUITY_SCORE: 26
ADLS_ACUITY_SCORE: 25
ADLS_ACUITY_SCORE: 26
ADLS_ACUITY_SCORE: 25
ADLS_ACUITY_SCORE: 26

## 2024-07-15 NOTE — PROGRESS NOTES
Vascular Surgery Progress Note  07/15/2024       Subjective:  Patient sitting up in chair this morning fully dressed.   Denies fever, chills, reports appetite is good. Afebrile.        Objective:  Temp:  [97.9  F (36.6  C)-98.6  F (37  C)] 97.9  F (36.6  C)  Pulse:  [55-60] 59  Resp:  [16-18] 16  BP: (135-161)/(62-74) 161/74  SpO2:  [93 %-99 %] 99 %    I/O last 3 completed shifts:  In: 360 [P.O.:360]  Out: -       Gen: Awake, alert, NAD  CV: RRR per PT doppler pulse  Resp: NLB on RA  Ext: LLE dressing not removed on third toe, second toe visualized now with eschar present on tip of this toe with some bloody drainage however no obvious purulence. Second toe erythematous, picture as below.   Vascular: Monophasic LLE PT signal.                Labs:  Recent Labs   Lab 07/15/24  0816 07/13/24  0012 07/12/24  0729   WBC  --  6.4 9.9   HGB  --  12.3* 13.9    227 283       Recent Labs   Lab 07/15/24  0612 07/15/24  0156 07/14/24  2102 07/13/24  0157 07/13/24  0012 07/12/24  1645 07/12/24  0729   NA  --   --   --   --  135  --  138   POTASSIUM  --   --   --   --  3.7  --  4.3   CHLORIDE  --   --   --   --  99  --  100   CO2  --   --   --   --  25  --  26   BUN  --   --   --   --  22.8  --  23.0   CR  --   --   --   --  1.01  --  1.00   * 124* 146*   < > 118*   < > 152*   TIN  --   --   --   --  8.8  --  9.6    < > = values in this interval not displayed.       Imaging:  CTA reviewed, patient with      Assessment/Plan:   74 year old male with PMH of T2DM, HTN, HLD, CKD3, and PAD who presented for LLE femoral cutdown and angiogram found to have L 3rd digit gangrene w/ cellulitis and admitted for IV Abx. Now s/p L 3rd toe amputation with podiatry on 7/13. Blood cultures are growing MSSA, remains on IV ceftriaxone. 7/15 noted to have new gangrenous changes now to L 2nd toe with erythema surrounding.     - Patient should be NWB to LLE  - Should have post-op shoe, the one which is open at the toes, on at all times,  should not wear close toed shoes.   - Continue abx, appreciate ID recs re: duration and regimen   - Will repeat Bcx today  - Continue PTA meds   - Podiatry following along, appreciate their assessment of L 2nd toe and need for  debridement  -Continue asa/plavix and statin  - SCDs should be on BOTH lower extremities at all times, especially left.   - Currently planning for L femoral endarterectomy with antegrade angiogram for revascularization on 7/26/24. Patient will be able to discharge in interim.      Discussed with vascular surgery staff, who is in agreement with the above.  - - - - - - - - - - - - - - - - - -  Naeem Lewis, PGY-4  Vascular Surgery Resident

## 2024-07-15 NOTE — PLAN OF CARE
Date & Time: 07/14/24 4395-5241    Surgery/POD#: Here for femoral artery cutdown 7/12 - surgery postponed d/t wet gangrene and cellulitis. POD 1 L 3rd toe amputation and heel wound debridement   Behavior & Aggression: Green  Fall Risk:  Yes  Orientation: A&O x4  ABNL VS/O2: VSS on RA  ABNL Labs: Blood cultures growing staph aureus  Pain Management: Denies. Does not want his scheduled tylenol.   Bowel/Bladder: Continent, voiding adequately in BR. No BM. +BS  Drains: PIV x2 SL w/ int abx  Wounds/incisions: L foot wrapped (on wedge); R foot partial amputation  Diet: Mod Carb  Activity Level: Ax1 w/ cane. Surgical shoe in room for ambulation  Tests/Procedures: Angiogram -date TBD  Anticipated  DC Date: Pending continued improvement  Significant Information: N/A

## 2024-07-15 NOTE — PROGRESS NOTES
VSS. A/O. Ind, NWB L Leg, surgical shoes & crutches in use. . L foot dressing changed. Denies pain. Dusky LE. Tolerating diet. Doppler weak pulses.

## 2024-07-15 NOTE — PLAN OF CARE
Goal Outcome Evaluation:      Plan of Care Reviewed With: patient    Overall Patient Progress: improvingOverall Patient Progress: improving    POD 2 LEFT 3RD TOE AMPUTATION AND HEEL WOUND DEBRIDEMENT. A&Ox4. VSS on RA. Denies pain, refused schedule tylenol. R food partial amputation, L foot wrapped with some drainage marked, 1st & 5th toes amputation. L foot elevated. Partia weight bearing to LLE with surgical shoe. Tolerating Mod Carb diet. PIV SL. BG check. Hypo BS, passing flatus, no BM. Void adequately. ID/Podiatry following. Discharge pending.

## 2024-07-15 NOTE — PROGRESS NOTES
[facility]    Infectious Disease Progress Note    Date of Service (when I saw the patient): 07/15/2024     Assessment & Plan   Zia Hopkins is a 74 year old male who was admitted on 7/12/2024.     Impression:  73 yo male with a history of DM (decently controlled with A1C 7.0), HTN, hyperlipidemia, and PAD s/p numerous vascular interventions to both legs presenting for left femoral artery cutdown with angiogram for probable occluded popliteal artery now found to have wet gangrene of the left 3rd toe as well as mild erythema of the left foot and ankle that started 4 days prior.      -History of right femoral endarterectomy and angioplasty/stenting of bilateral lower extremities for PAD.   -Now with occlusion of left above knee popliteal artery with scheduled left femoral artery cut-down and angiogram put on hold for newly noted wet gangrene of left 3rd toe. MRI suspicious for osteomyelitis.  -S/p partial left 3rd toe amputation and debridement of heel 7/13/24. Operative cultures and pathology pending.   -Afebrile. WBC normal.   -Blood cultures growing MSSA.   -On Ceftriaxone.           Recommendations:   All infection resected per Podiatry note. Pathology is pending.   Change Ceftriaxone to Cefazolin.   Follow blood cultures. Need blood cultures to be negative for at least 48 hours prior to line placement for outpatient IV antibiotics for his MSSA bacteremia.   Follow operative cultures.   Vascular is planning for revascularization left leg on 7/26/24.        Patient and plan discussed with Dr. Moya.         Shaina Norton PA-C      Interval History   Tolerating antibiotics ok   No new rashes or issues with antibiotics   Awaiting follow-up blood culture results.   Some new eschar noted on tip of 2nd toe.   Labs reviewed   No changes to past medical, social or family history         Physical Exam   Temp: 97.9  F (36.6  C) Temp src: Oral BP: (!) 161/74 Pulse: 59   Resp: 16 SpO2: 99 % O2  Device: None (Room air)    Vitals:    07/12/24 0709   Weight: 60.3 kg (133 lb)     Vital Signs with Ranges  Temp:  [97.9  F (36.6  C)-98.6  F (37  C)] 97.9  F (36.6  C)  Pulse:  [55-60] 59  Resp:  [16-18] 16  BP: (135-161)/(62-74) 161/74  SpO2:  [93 %-99 %] 99 %    Constitutional: Awake, alert, cooperative, no apparent distress  Lungs: Clear to auscultation bilaterally, no crackles or wheezing  Cardiovascular: Regular rate and rhythm, normal S1 and S2, and no murmur noted  Abdomen: Normal bowel sounds, soft, non-distended, non-tender  Skin: Left foot with dressing. Some eschar noted on tip of left 2nd toe now.   Other:    Medications   Current Facility-Administered Medications   Medication Dose Route Frequency Provider Last Rate Last Admin    dextrose 5% and 0.45% NaCl infusion   Intravenous Continuous Erik Baez DPM   Stopped at 07/13/24 1015     Current Facility-Administered Medications   Medication Dose Route Frequency Provider Last Rate Last Admin    acetaminophen (TYLENOL) tablet 975 mg  975 mg Oral Q8H Erik Baez DPM        amLODIPine (NORVASC) tablet 10 mg  10 mg Oral Daily Erik Baez DPM   10 mg at 07/15/24 0919    aspirin (ASA) chewable tablet 81 mg  81 mg Oral Daily Erik Baez DPM   81 mg at 07/15/24 0918    atorvastatin (LIPITOR) tablet 80 mg  80 mg Oral At Bedtime Erik Baez DPM   80 mg at 07/14/24 2124    cefTRIAXone (ROCEPHIN) 2 g vial to attach to  ml bag for ADULTS or NS 50 ml bag for PEDS  2 g Intravenous Daily Erik Baez DPM   2 g at 07/15/24 0919    chlorthalidone (HYGROTON) tablet 50 mg  50 mg Oral Daily Erik Baez DPM   50 mg at 07/15/24 0918    clopidogrel (PLAVIX) tablet 75 mg  75 mg Oral Daily Erik Baez DPM   75 mg at 07/15/24 0918    empagliflozin (JARDIANCE) tablet 25 mg  25 mg Oral Daily Erik Baez DPM   25 mg at 07/15/24 0919    ezetimibe (ZETIA) tablet 10 mg  10 mg Oral Daily Nestor  KEMAR Valencia   10 mg at 07/15/24 0918    heparin ANTICOAGULANT injection 5,000 Units  5,000 Units Subcutaneous TID Erik Baez DPM   5,000 Units at 07/15/24 0919    insulin aspart (NovoLOG) injection (RAPID ACTING)  1-10 Units Subcutaneous TID AC Michelle Mejía MD   3 Units at 07/14/24 1941    insulin aspart (NovoLOG) injection (RAPID ACTING)  1-7 Units Subcutaneous At Bedtime Michelle Mejía MD   3 Units at 07/13/24 2225    losartan (COZAAR) tablet 100 mg  100 mg Oral Daily Erik Baez DPM   100 mg at 07/15/24 0918    [Held by provider] metFORMIN (GLUCOPHAGE) tablet 1,000 mg  1,000 mg Oral BID w/meals Maryann Lewis MD   1,000 mg at 07/12/24 2017    metoprolol succinate ER (TOPROL XL) 24 hr tablet 100 mg  100 mg Oral Daily Erik Baez DPM   100 mg at 07/15/24 0918    polyethylene glycol (MIRALAX) Packet 17 g  17 g Oral Daily Erik Baez DPM        senna-docusate (SENOKOT-S/PERICOLACE) 8.6-50 MG per tablet 1 tablet  1 tablet Oral BID Erik Baez DPM   1 tablet at 07/14/24 0846    sodium chloride (PF) 0.9% PF flush 3 mL  3 mL Intracatheter Q8H Erik Baez DPM   3 mL at 07/15/24 0641    sodium chloride (PF) 0.9% PF flush 3 mL  3 mL Intracatheter Q8H Erik Baez DPM   3 mL at 07/15/24 0642       Data   All microbiology laboratory data reviewed.  Recent Labs   Lab Test 07/15/24  0816 07/13/24  0012 07/12/24  0729 07/12/23  0852 03/08/22  0650   WBC  --  6.4 9.9  --  10.5   HGB  --  12.3* 13.9 13.8 8.7*   HCT  --  36.8* 41.5  --  26.9*   MCV  --  93 93  --  92    227 283  --  353     Recent Labs   Lab Test 07/13/24  0012 07/12/24  0729 06/10/22  0740   CR 1.01 1.00 0.88     Recent Labs   Lab Test 07/12/24  0729   SED 32*     CRP Inflammation   Date Value Ref Range Status   07/13/2024 15.10 (H) <5.00 mg/L Final                 07/15/2024 0900 07/15/2024 0908 Blood Culture Arm, Right [[id number]]   Blood from Arm, Right    In process Component  Value   No component results             07/15/2024 0900 07/15/2024 0908 Blood Culture Arm, Right [[id number]]   Blood from Arm, Right    In process Component Value   No component results             07/13/2024 1146 07/14/2024 1632 Anaerobic Bacterial Culture Routine [[id number]]   Tissue from Toe, Left    Preliminary result Component Value   Culture No anaerobic organisms isolated after 1 day P             07/13/2024 1146 07/13/2024 1715 Gram Stain [[id number]]   (Abnormal)   Tissue from Toe, Left    Final result Component Value   GS Culture See corresponding culture for results   Gram Stain Result 4+ Gram positive cocci Abnormal    Gram Stain Result 1+ Gram positive bacilli Abnormal    Gram Stain Result 1+ Gram negative bacilli Abnormal    Gram Stain Result 2+ WBC seen Abnormal           07/13/2024 1146 07/14/2024 1341 Tissue Aerobic Bacterial Culture Routine [[id number]]   (Abnormal)   Tissue from Toe, Left    Preliminary result Component Value   Culture Culture in progress P    4+ Staphylococcus aureus Abnormal  P             07/12/2024 0734 07/15/2024 0735 Blood Culture Arm, Right [[id number]]   (Abnormal)   Blood from Arm, Right    Final result Component Value   Culture Positive on the 1st day of incubation Abnormal     Staphylococcus aureus Panic     2 of 2 bottles  Susceptibilities done on previous cultures             07/12/2024 0729 07/15/2024 0747 Blood Culture Arm, Left [[id number]]    (Abnormal)   Blood from Arm, Left    Final result Component Value   Culture Positive on the 1st day of incubation Abnormal     Staphylococcus aureus Panic     1 of 2 bottles       Susceptibility     Staphylococcus aureus     MENG     Clindamycin  Resistant 1     Daptomycin 0.25 ug/mL Susceptible     Doxycycline <=0.5 ug/mL Susceptible     Erythromycin >=8 ug/mL Resistant     Gentamicin <=0.5 ug/mL Susceptible     Oxacillin 1 ug/mL Susceptible 2     Tetracycline <=1 ug/mL Susceptible      Trimethoprim/Sulfamethoxazole <=0.5/9.5 u... Susceptible     Vancomycin 1 ug/mL Susceptible

## 2024-07-15 NOTE — PROGRESS NOTES
[facility]  Hospitalist Progress Note        Mohit Gould MD   07/15/2024        Interval History:        - Evaluated by PT, rec home with assist; should be NWB to LLE in post op shoe per vascular surgery  - awaiting ID recommendation for antibiotics  - BG in 110s---120s         Assessment and Plan:        Zia Hopkins is a 74 year old male with history of DM2, HTN, HLP, CKD 3, PAD, who presented on 7/12/2024 for scheduled left femoral artery cutdown and LLE angiogram but was found to have left 3rd toe wet gangrene with cellulitis and developing osteomyelitis.  He was admitted for IV antibiotics, podiatry and infectious disease consultation.  Blood cultures obtained on admission are growing gram-positive cocci.      Hospitalist service consulted on 7/13 for management of diabetes and other comorbidities.       Per vascular surgery, plan would be due to angiogram as outpatient in 2 weeks.        Left foot 3rd digit gangrene with cellulitis and developing osteomyelitis  Left heel ulcer with eschar, s/p partial left third toe amputation with osteotomy through proximal phalanx, 7/13/2024  Left heel ulcer with eschar, s/p debridement down to/including subcutaneous fat, 7/13/2024  Gram-positive cocci bacteremia, likely MSSA - due to diabetic foot infection  S/p multiple previous bilateral toe amputations   - MRI showed ulceration at tip of third toe extending to bone margin exposed middle phalanx.  Some T2 signal abnormality noted.  Osteomyelitis could not be excluded.  -CRP 15.  No leukocytosis, afebrile  -2 of 2 sets of blood cultures obtained on 7/12 are growing MSSA  - Podiatry evaluated and he underwent partial left third toe amputation with osteotomy through proximal phalanx and debridement of left heel ulcer on 7/13/2024   - Surgical management per podiatry, dressing change at bedside (7/14), note that all gangrenous tissue and bone infection have been resected; PT for heel weight  bearing training in surgical shoe  - ID following, currently on IV Rocephin     Peripheral arterial disease with critical limb ischemia of bilateral lower extremities  S/p multiple vascular surgery interventions to bilateral lower extremities  - CTA A/P with runoff on 7/12 showed occlusion of left above-knee popliteal artery with distal reconstitution, patent bilateral SFA stents, diffuse aortoiliac and lower extremity atherosclerotic disease  - Had presented on 7/12/2024 for scheduled left femoral artery cutdown and LLE angiogram.  This has now been deferred due to diabetic foot infection.  Plan is to do angiogram as outpatient in 2 weeks (planned for 7/26/24).   - management as per vascular surgery, following as primary  - Continue aspirin 81 mg, Plavix 75 mg daily     Diabetes mellitus type 2, with peripheral neuropathy and CKD 3, controlled with A1c 7 on 7/12/2024  PTA-empagliflozin 25 mg, metformin 1000 mg twice daily  - Blood sugars are well-controlled; A1c 7.0  - Continue empagliflozin.  Hold metformin  - Continue NovoLog sliding scale with hypoglycemia protocol     Benign essential hypertension  PTA-amlodipine 10 mg, chlorthalidone 50 mg, losartan 100 mg, Toprol- mg daily  - Blood pressure well-controlled.  - Continue PTA amlodipine, chlorthalidone, losartan     Hyperlipidemia-LDL 62, HDL 62, 7/12/2024  PTA-atorvastatin 80 mg, Zetia 10 mg  - Hyperlipidemia is well-controlled.  Continue atorvastatin and Zetia     Chronic kidney disease stage III  - Baseline creatinine around 1.  Creatinine currently stable and at baseline.  Monitor     Previous vascular surgery procedures  Right common femoral and superficial femoral endarterectomy with patch angioplasty and right femoral artery angioplasty with stenting, 3/2022  2. LLE angiogram and balloon angioplasty of anterior and posterior tibial arteries, 4/2021  3. Left popliteal angioplasty and left SFA stenting 2017 followed by stenting of in-stent stenosis  "7/2019   4. LLE arteriogram with balloon angioplasty of the left popliteal artery, recanalization of posterior tibial artery, and extension of the stent in the left superficial femoral artery 2/2021       Diet: Moderate Consistent Carb (60 g CHO per Meal) Diet           DVT Prophylaxis: subcutaneous heparin TID    Code status: full code    Disposition:   Medically Ready for Discharge: Anticipated in 2-4 Days per primary vascular surgery service, pending podiatry clearance and infectious disease plan for antibiotics       Clinically Significant Risk Factors                                # DMII: A1C = 7.0 % (Ref range: <5.7 %) within past 6 months, PRESENT ON ADMISSION                  Page Me (7 am to 6 pm)    Care plan discussed with patient and his wife present in room              Physical Exam:      Blood pressure (!) 143/62, pulse 57, temperature 98  F (36.7  C), temperature source Oral, resp. rate 16, height 1.6 m (5' 3\"), weight 60.3 kg (133 lb), SpO2 96%.  Vitals:    07/12/24 0709   Weight: 60.3 kg (133 lb)     Vital Signs with Ranges  Temp:  [97.9  F (36.6  C)-98.6  F (37  C)] 98  F (36.7  C)  Pulse:  [55-60] 57  Resp:  [16-18] 16  BP: (130-143)/(62-70) 143/62  SpO2:  [93 %-96 %] 96 %  I/O's Last 24 hours  I/O last 3 completed shifts:  In: 360 [P.O.:360]  Out: -     Constitutional: Alert, awake and oriented X 3; resting comfortably in no apparent distress       Oral cavity: Moist mucosa   Cardiovascular: Normal s1 s2, regular rate and rhythm, no murmur   Lungs: B/l clear to auscultation, no wheezes or crepitations   Abdomen: Soft, nt, nd, no guarding, rigidity or rebound; BS +   LE : Left foot in dressing, partial toe amputation status   Musculoskeletal/Neuro Power 5/5 in all extremities; No focal neurological deficits noted   Psychiatry: normal mood and affect                Medications:        Current Facility-Administered Medications   Medication Dose Route Frequency Provider Last Rate Last Admin    " acetaminophen (TYLENOL) tablet 975 mg  975 mg Oral Q8H Erik Baez DPM        amLODIPine (NORVASC) tablet 10 mg  10 mg Oral Daily Erik Baez DPM   10 mg at 07/14/24 0846    aspirin (ASA) chewable tablet 81 mg  81 mg Oral Daily Erik Baez DPM   81 mg at 07/14/24 0846    atorvastatin (LIPITOR) tablet 80 mg  80 mg Oral At Bedtime Erik Baez DPM   80 mg at 07/14/24 2124    cefTRIAXone (ROCEPHIN) 2 g vial to attach to  ml bag for ADULTS or NS 50 ml bag for PEDS  2 g Intravenous Daily Erik Baez DPM   2 g at 07/14/24 0845    chlorthalidone (HYGROTON) tablet 50 mg  50 mg Oral Daily Erik Baez DPM   50 mg at 07/14/24 0846    clopidogrel (PLAVIX) tablet 75 mg  75 mg Oral Daily Erik Baez DPM   75 mg at 07/14/24 0846    empagliflozin (JARDIANCE) tablet 25 mg  25 mg Oral Daily Erik Baez DPM   25 mg at 07/14/24 0846    ezetimibe (ZETIA) tablet 10 mg  10 mg Oral Daily Erik Baez DPM   10 mg at 07/14/24 0847    heparin ANTICOAGULANT injection 5,000 Units  5,000 Units Subcutaneous TID Erik Baez DPM   5,000 Units at 07/14/24 2124    insulin aspart (NovoLOG) injection (RAPID ACTING)  1-10 Units Subcutaneous TID AC Michelle Mejía MD   3 Units at 07/14/24 1941    insulin aspart (NovoLOG) injection (RAPID ACTING)  1-7 Units Subcutaneous At Bedtime Michelle Mejía MD   3 Units at 07/13/24 2225    losartan (COZAAR) tablet 100 mg  100 mg Oral Daily Erik Baez DPM   100 mg at 07/14/24 0847    [Held by provider] metFORMIN (GLUCOPHAGE) tablet 1,000 mg  1,000 mg Oral BID w/meals Maryann Lewis MD   1,000 mg at 07/12/24 2017    metoprolol succinate ER (TOPROL XL) 24 hr tablet 100 mg  100 mg Oral Daily Erik Baez DPM   100 mg at 07/14/24 0846    polyethylene glycol (MIRALAX) Packet 17 g  17 g Oral Daily Erik Baez DPM        senna-docusate (SENOKOT-S/PERICOLACE) 8.6-50 MG per tablet 1 tablet  1 tablet  Oral BID Erik Baez DPM   1 tablet at 07/14/24 0846    sodium chloride (PF) 0.9% PF flush 3 mL  3 mL Intracatheter Q8H Erik Baez DPM   3 mL at 07/15/24 0641    sodium chloride (PF) 0.9% PF flush 3 mL  3 mL Intracatheter Q8H Erik Baez DPM   3 mL at 07/15/24 0642     PRN Meds:   Current Facility-Administered Medications   Medication Dose Route Frequency Provider Last Rate Last Admin    [START ON 7/16/2024] acetaminophen (TYLENOL) tablet 650 mg  650 mg Oral Q4H PRN Erik Baez DPM        [START ON 7/16/2024] bisacodyl (DULCOLAX) suppository 10 mg  10 mg Rectal Daily PRN Erik Baez DPM        calcium carbonate (TUMS) chewable tablet 1,000 mg  1,000 mg Oral 4x Daily PRN Erik Baez DPM        glucose gel 15-30 g  15-30 g Oral Q15 Min PRN Erik Baez DPM        Or    dextrose 50 % injection 25-50 mL  25-50 mL Intravenous Q15 Min PRN Erik Baez DPM        Or    glucagon injection 1 mg  1 mg Subcutaneous Q15 Min PRN Erik Baez DPM        diclofenac (VOLTAREN) EC tablet 75 mg  75 mg Oral BID PRN Erik Baez DPM        lidocaine (LMX4) cream   Topical Q1H PRN Erik Baez DPM        lidocaine (LMX4) cream   Topical Q1H PRN Erik Baez DPM        lidocaine 1 % 0.1-1 mL  0.1-1 mL Other Q1H PRN Erik Baez DPM        lidocaine 1 % 0.1-1 mL  0.1-1 mL Other Q1H PRN Erik Baez DPM        magnesium hydroxide (MILK OF MAGNESIA) suspension 30 mL  30 mL Oral Daily PRN Erik Baez DPM        naloxone (NARCAN) injection 0.2 mg  0.2 mg Intravenous Q2 Min PRN Erik Baez DPM        Or    naloxone (NARCAN) injection 0.4 mg  0.4 mg Intravenous Q2 Min PRN Erik Baez DPM        Or    naloxone (NARCAN) injection 0.2 mg  0.2 mg Intramuscular Q2 Min PRN Erik Baez DPM        Or    naloxone (NARCAN) injection 0.4 mg  0.4 mg Intramuscular Q2 Min PRN Erik Baez DPM         ondansetron (ZOFRAN ODT) ODT tab 4 mg  4 mg Oral Q6H PRN Erik Baez DPM        Or    ondansetron (ZOFRAN) injection 4 mg  4 mg Intravenous Q6H PRN Erik Baez DPM        oxyCODONE (ROXICODONE) tablet 5 mg  5 mg Oral Q4H PRN Erik Baez DPM        Or    oxyCODONE (ROXICODONE) tablet 10 mg  10 mg Oral Q4H PRN Erik Baez DPM        oxyCODONE-acetaminophen (PERCOCET) 5-325 MG per tablet 1 tablet  1 tablet Oral Q6H PRN Erik Baez DPM        prochlorperazine (COMPAZINE) injection 5 mg  5 mg Intravenous Q6H PRN Erik Baez DPM        Or    prochlorperazine (COMPAZINE) tablet 5 mg  5 mg Oral Q6H PRN Erik Baez DPM        senna-docusate (SENOKOT-S/PERICOLACE) 8.6-50 MG per tablet 1 tablet  1 tablet Oral BID PRN Erik Baez DPM        Or    senna-docusate (SENOKOT-S/PERICOLACE) 8.6-50 MG per tablet 2 tablet  2 tablet Oral BID PRN Erik Baez DPM        sodium chloride (PF) 0.9% PF flush 3 mL  3 mL Intracatheter q1 min prn Erik Baez DPM        sodium chloride (PF) 0.9% PF flush 3 mL  3 mL Intracatheter q1 min prn Erik Baez DPM                Data:      All new lab and imaging data was reviewed.   Recent Labs   Lab Test 07/13/24  0012 07/12/24  0729 07/12/23  0852 03/08/22  0650 04/19/21  1151 02/03/21  1003 07/30/19  0712 03/14/17  1520 03/08/17  0720   WBC 6.4 9.9  --  10.5   < > 12.0* 5.7   < > 8.4   HGB 12.3* 13.9 13.8 8.7*   < > 12.2* 15.0   < > 15.1   MCV 93 93  --  92   < > 94 92   < > 91    283  --  353   < > 377 215   < > 240   INR  --   --   --   --   --  0.95 0.90  --  0.87    < > = values in this interval not displayed.      Recent Labs   Lab Test 07/15/24  0612 07/15/24  0156 07/14/24  2102 07/13/24  0157 07/13/24  0012 07/12/24  1645 07/12/24  0729 07/12/23  0852 06/27/22  1138 06/10/22  0740   NA  --   --   --   --  135  --  138  --   --  139   POTASSIUM  --   --   --   --  3.7  --  4.3 3.7  --   "4.0   CHLORIDE  --   --   --   --  99  --  100  --   --  105   CO2  --   --   --   --  25  --  26  --   --  28   BUN  --   --   --   --  22.8  --  23.0  --   --  28   CR  --   --   --   --  1.01  --  1.00  --   --  0.88   ANIONGAP  --   --   --   --  11  --  12  --   --  6   TIN  --   --   --   --  8.8  --  9.6  --   --  8.9   * 124* 146*   < > 118*   < > 152* 127*   < > 128*    < > = values in this interval not displayed.     No lab results found.    Invalid input(s): \"TROP\", \"TROPONINIES\"      "

## 2024-07-16 ENCOUNTER — APPOINTMENT (OUTPATIENT)
Dept: PHYSICAL THERAPY | Facility: CLINIC | Age: 74
DRG: 253 | End: 2024-07-16
Attending: SURGERY
Payer: COMMERCIAL

## 2024-07-16 ENCOUNTER — HOME INFUSION (PRE-WILLOW HOME INFUSION) (OUTPATIENT)
Dept: PHARMACY | Facility: CLINIC | Age: 74
End: 2024-07-16
Payer: COMMERCIAL

## 2024-07-16 ENCOUNTER — APPOINTMENT (OUTPATIENT)
Dept: INTERVENTIONAL RADIOLOGY/VASCULAR | Facility: CLINIC | Age: 74
DRG: 253 | End: 2024-07-16
Payer: COMMERCIAL

## 2024-07-16 ENCOUNTER — APPOINTMENT (OUTPATIENT)
Dept: ULTRASOUND IMAGING | Facility: CLINIC | Age: 74
DRG: 253 | End: 2024-07-16
Payer: COMMERCIAL

## 2024-07-16 ENCOUNTER — TELEPHONE (OUTPATIENT)
Dept: OTHER | Facility: CLINIC | Age: 74
End: 2024-07-16

## 2024-07-16 LAB
GLUCOSE BLDC GLUCOMTR-MCNC: 121 MG/DL (ref 70–99)
GLUCOSE BLDC GLUCOMTR-MCNC: 137 MG/DL (ref 70–99)
GLUCOSE BLDC GLUCOMTR-MCNC: 148 MG/DL (ref 70–99)
GLUCOSE BLDC GLUCOMTR-MCNC: 186 MG/DL (ref 70–99)
GLUCOSE BLDC GLUCOMTR-MCNC: 209 MG/DL (ref 70–99)
GLUCOSE BLDC GLUCOMTR-MCNC: 270 MG/DL (ref 70–99)

## 2024-07-16 PROCEDURE — 120N000001 HC R&B MED SURG/OB

## 2024-07-16 PROCEDURE — 250N000011 HC RX IP 250 OP 636: Performed by: PODIATRIST

## 2024-07-16 PROCEDURE — 93970 EXTREMITY STUDY: CPT

## 2024-07-16 PROCEDURE — 97530 THERAPEUTIC ACTIVITIES: CPT | Mod: GP

## 2024-07-16 PROCEDURE — 97116 GAIT TRAINING THERAPY: CPT | Mod: GP

## 2024-07-16 PROCEDURE — 250N000011 HC RX IP 250 OP 636: Performed by: RADIOLOGY

## 2024-07-16 PROCEDURE — B41G1ZZ FLUOROSCOPY OF LEFT LOWER EXTREMITY ARTERIES USING LOW OSMOLAR CONTRAST: ICD-10-PCS | Performed by: RADIOLOGY

## 2024-07-16 PROCEDURE — 99153 MOD SED SAME PHYS/QHP EA: CPT

## 2024-07-16 PROCEDURE — C1769 GUIDE WIRE: HCPCS

## 2024-07-16 PROCEDURE — 99231 SBSQ HOSP IP/OBS SF/LOW 25: CPT | Mod: GC | Performed by: SURGERY

## 2024-07-16 PROCEDURE — 250N000013 HC RX MED GY IP 250 OP 250 PS 637: Performed by: PODIATRIST

## 2024-07-16 PROCEDURE — 250N000011 HC RX IP 250 OP 636: Performed by: NURSE PRACTITIONER

## 2024-07-16 PROCEDURE — 250N000009 HC RX 250: Performed by: NURSE PRACTITIONER

## 2024-07-16 PROCEDURE — 272N000567 HC SHEATH CR4

## 2024-07-16 PROCEDURE — 250N000011 HC RX IP 250 OP 636: Mod: JZ | Performed by: PHYSICIAN ASSISTANT

## 2024-07-16 PROCEDURE — 272N000196 HC ACCESSORY CR5

## 2024-07-16 PROCEDURE — 99233 SBSQ HOSP IP/OBS HIGH 50: CPT | Performed by: PODIATRIST

## 2024-07-16 PROCEDURE — 99232 SBSQ HOSP IP/OBS MODERATE 35: CPT | Performed by: HOSPITALIST

## 2024-07-16 PROCEDURE — 75710 ARTERY X-RAYS ARM/LEG: CPT | Mod: LT

## 2024-07-16 PROCEDURE — 255N000002 HC RX 255 OP 636: Performed by: RADIOLOGY

## 2024-07-16 PROCEDURE — 97162 PT EVAL MOD COMPLEX 30 MIN: CPT | Mod: GP

## 2024-07-16 PROCEDURE — 99152 MOD SED SAME PHYS/QHP 5/>YRS: CPT

## 2024-07-16 RX ORDER — HEPARIN SODIUM 200 [USP'U]/100ML
1 INJECTION, SOLUTION INTRAVENOUS EVERY 5 MIN PRN
Status: DISCONTINUED | OUTPATIENT
Start: 2024-07-16 | End: 2024-07-16 | Stop reason: HOSPADM

## 2024-07-16 RX ORDER — NALOXONE HYDROCHLORIDE 0.4 MG/ML
0.4 INJECTION, SOLUTION INTRAMUSCULAR; INTRAVENOUS; SUBCUTANEOUS
Status: DISCONTINUED | OUTPATIENT
Start: 2024-07-16 | End: 2024-07-16 | Stop reason: HOSPADM

## 2024-07-16 RX ORDER — NALOXONE HYDROCHLORIDE 0.4 MG/ML
0.2 INJECTION, SOLUTION INTRAMUSCULAR; INTRAVENOUS; SUBCUTANEOUS
Status: DISCONTINUED | OUTPATIENT
Start: 2024-07-16 | End: 2024-07-16 | Stop reason: HOSPADM

## 2024-07-16 RX ORDER — FENTANYL CITRATE 50 UG/ML
25-50 INJECTION, SOLUTION INTRAMUSCULAR; INTRAVENOUS EVERY 5 MIN PRN
Status: DISCONTINUED | OUTPATIENT
Start: 2024-07-16 | End: 2024-07-16 | Stop reason: HOSPADM

## 2024-07-16 RX ORDER — IODIXANOL 320 MG/ML
100 INJECTION, SOLUTION INTRAVASCULAR ONCE
Status: COMPLETED | OUTPATIENT
Start: 2024-07-16 | End: 2024-07-16

## 2024-07-16 RX ORDER — FLUMAZENIL 0.1 MG/ML
0.2 INJECTION, SOLUTION INTRAVENOUS
Status: DISCONTINUED | OUTPATIENT
Start: 2024-07-16 | End: 2024-07-16 | Stop reason: HOSPADM

## 2024-07-16 RX ADMIN — FENTANYL CITRATE 50 MCG: 50 INJECTION, SOLUTION INTRAMUSCULAR; INTRAVENOUS at 16:20

## 2024-07-16 RX ADMIN — LOSARTAN POTASSIUM 100 MG: 100 TABLET, FILM COATED ORAL at 08:23

## 2024-07-16 RX ADMIN — EZETIMIBE 10 MG: 10 TABLET ORAL at 08:23

## 2024-07-16 RX ADMIN — FENTANYL CITRATE 50 MCG: 50 INJECTION, SOLUTION INTRAMUSCULAR; INTRAVENOUS at 16:43

## 2024-07-16 RX ADMIN — CHLORTHALIDONE 50 MG: 25 TABLET ORAL at 08:23

## 2024-07-16 RX ADMIN — EMPAGLIFLOZIN 25 MG: 25 TABLET, FILM COATED ORAL at 08:23

## 2024-07-16 RX ADMIN — ASPIRIN 81 MG CHEWABLE TABLET 81 MG: 81 TABLET CHEWABLE at 08:22

## 2024-07-16 RX ADMIN — METOPROLOL SUCCINATE 100 MG: 100 TABLET, EXTENDED RELEASE ORAL at 08:23

## 2024-07-16 RX ADMIN — FENTANYL CITRATE 50 MCG: 50 INJECTION, SOLUTION INTRAMUSCULAR; INTRAVENOUS at 16:26

## 2024-07-16 RX ADMIN — CEFAZOLIN SODIUM 2 G: 2 INJECTION, SOLUTION INTRAVENOUS at 22:25

## 2024-07-16 RX ADMIN — LIDOCAINE HYDROCHLORIDE 10 ML: 10 INJECTION, SOLUTION INFILTRATION; PERINEURAL at 16:18

## 2024-07-16 RX ADMIN — ATORVASTATIN CALCIUM 80 MG: 80 TABLET, FILM COATED ORAL at 22:25

## 2024-07-16 RX ADMIN — HEPARIN SODIUM 5000 UNITS: 5000 INJECTION, SOLUTION INTRAVENOUS; SUBCUTANEOUS at 08:23

## 2024-07-16 RX ADMIN — CEFAZOLIN SODIUM 2 G: 2 INJECTION, SOLUTION INTRAVENOUS at 06:00

## 2024-07-16 RX ADMIN — FENTANYL CITRATE 50 MCG: 50 INJECTION, SOLUTION INTRAMUSCULAR; INTRAVENOUS at 16:15

## 2024-07-16 RX ADMIN — CEFAZOLIN SODIUM 2 G: 2 INJECTION, SOLUTION INTRAVENOUS at 13:52

## 2024-07-16 RX ADMIN — CLOPIDOGREL BISULFATE 75 MG: 75 TABLET ORAL at 08:23

## 2024-07-16 RX ADMIN — HEPARIN SODIUM 5000 UNITS: 5000 INJECTION, SOLUTION INTRAVENOUS; SUBCUTANEOUS at 22:25

## 2024-07-16 RX ADMIN — AMLODIPINE BESYLATE 10 MG: 10 TABLET ORAL at 08:23

## 2024-07-16 RX ADMIN — HEPARIN SODIUM 4 BAG: 200 INJECTION, SOLUTION INTRAVENOUS at 16:16

## 2024-07-16 RX ADMIN — IODIXANOL 44 ML: 320 INJECTION, SOLUTION INTRAVASCULAR at 16:43

## 2024-07-16 ASSESSMENT — ACTIVITIES OF DAILY LIVING (ADL)
ADLS_ACUITY_SCORE: 24
ADLS_ACUITY_SCORE: 28
ADLS_ACUITY_SCORE: 24
ADLS_ACUITY_SCORE: 25
ADLS_ACUITY_SCORE: 25
DEPENDENT_IADLS:: INDEPENDENT
ADLS_ACUITY_SCORE: 28
ADLS_ACUITY_SCORE: 28
ADLS_ACUITY_SCORE: 25
ADLS_ACUITY_SCORE: 28
ADLS_ACUITY_SCORE: 25
ADLS_ACUITY_SCORE: 24
ADLS_ACUITY_SCORE: 25
ADLS_ACUITY_SCORE: 24
ADLS_ACUITY_SCORE: 28
ADLS_ACUITY_SCORE: 24
ADLS_ACUITY_SCORE: 25
ADLS_ACUITY_SCORE: 24
ADLS_ACUITY_SCORE: 24
ADLS_ACUITY_SCORE: 25

## 2024-07-16 NOTE — PROGRESS NOTES
"Foot & Ankle Surgery Progress Note  July 16, 2024    S:  Zia was seen at bedside today for continued monitoring of left lower extremity.  He had a third digit amputation and a heel debridement on 7/13. Call was received today to evaluate his second digit which has been showing some ischemic changes. No pain to site. Plan also now for angiogram today and further vascular optimization on left side while inpatient.     Vital signs:  Temp: 98.5  F (36.9  C) Temp src: Oral BP: (!) 149/69 Pulse: 56   Resp: 16 SpO2: 94 % O2 Device: None (Room air)   Height: 160 cm (5' 3\") Weight: 60.3 kg (133 lb)  Estimated body mass index is 23.56 kg/m  as calculated from the following:    Height as of this encounter: 1.6 m (5' 3\").    Weight as of this encounter: 60.3 kg (133 lb).      ROS - Pos for CC.  Denies nausea, vomiting, chills, fever, belly pain, calf pain, chest pain or shortness of breath. Complete remainder of ROS is otherwise neg.      PE -Left foot: incision site to amputation intact. No cellulitis or ischemia  Second digit eschar debrided. No purulence or gross necrosis. Site does probe to bone though. Periwound appears viable.       Imaging: MRI left foot 7/12/2024 - IMPRESSION:  1.  Postoperative changes of amputation of the great toe and small toe.  2.  Amputation of a portion of the distal phalanx of the second toe.  3.  Soft tissue ulceration at the tip of the third toe appears to extend to the bone margin of the exposed middle phalanx. No significant T1 marrow signal change is present, but there is some T2 signal abnormality and osteomyelitis cannot be excluded.  4.  No additional worrisome areas for osteomyelitis and no evidence for septic arthropathy or abscess.  5.  No evidence for fracture.  6.  Mild fatty infiltration of the interosseous musculature.  7.  Surgical truncation of the hallucis tendons    Toe, Left; Tissue   0 Result Notes  Culture Culture in progress      4+ Staphylococcus aureus Abnormal     "   4+ Normal ervin           Resulting Agency: [facility]     Susceptibility     Staphylococcus aureus     MENG (Preliminary)     Clindamycin  Resistant 1     Daptomycin 0.5 ug/mL Susceptible     Doxycycline <=0.5 ug/mL Susceptible     Erythromycin >=8 ug/mL Resistant     Gentamicin <=0.5 ug/mL Susceptible     Tetracycline <=1 ug/mL Susceptible     Trimethoprim/Sulfamethoxazole <=0.5/9.5 u... Susceptible     Vancomycin 1 ug/mL Susceptible                          PROCEDURE:   Verbal consent was obtained for debridement. A 15 blade was used to excise the nonivable tissue to the ulcer, down to and including subcutaneous tissue. No noted purulence afterwards. Debrided site  measures 1cm x .5 x .4 cm. Probes to bone. Well tolerated. Site was cleaned with alcohol.       Assessment:  74 year old male s/p left foot third digit amputation on 7/13   Peripheral Arterial Disease   Diabetes Mellitus --> hba1c: 7.0  Staph Bacteremia     Plan:   -Received call to re-evaluated second digit. Debrided eschar as above.   -Following debridement, no significant purulence or signs of infection. Wound bed does have some amount of exposed bone though.   -Discussed with patient and family, recommend further vascular evaluation and possible debridement vs partial toe amputation. Discussed with patient that exposed bone is difficult to heal over and is at high risk for bone infection, so would likely benefit from aggressive management to prevent infection and further tissue loss in the future. They agree to this. Will continue to monitor this.   -New dressing applied. Can stay intact.       Negin Mina DPM  Podiatric Foot & Ankle Surgeon  [facility]  [phone]

## 2024-07-16 NOTE — PLAN OF CARE
July 15, 2024  Shift:PARKER  Zia Hopkins  74 year old  YOB: 1950    Reason for admission:Critical ischemia of lower extremity (H) [I70.229]    Cognition/Mentation:A/Ox4  Neuros/CMS: LLE numbness/ tingling  VS:stable   Cardiac:wdl  GI:no BM this shift   :intact  Pulmonary:RA  Pain:no pain   Drains/Lines:PIV SL  Skin:L toe dressing on. UTV. No drainage on dressing.    Activity:Ind w/ crutches   Diet:mod carb   Discharge:potential discharge tomorrow     Shift summary:Pt makes needs known. L toe has dressing per care plan. No complaints of pain.

## 2024-07-16 NOTE — TELEPHONE ENCOUNTER
CASE REQUEST RECEIVED 7/15/24 FOR: LEFT COMMON FEMORAL ARTERY CUTDOWN; LEFT LOWER EXTREMITY ARTERIOGRAM AND ANGIOPLASTY    CASE ID: [id number]    Patient is currently inpatient and to be discharged prior to this surgery.

## 2024-07-16 NOTE — PROGRESS NOTES
[facility]  Hospitalist Progress Note        Mohit Gould MD   07/16/2024        Interval History:        - ID repeated blood cultures 7/15-- no growth so far; to wait 48 hours prior to line placement for outpatient IV antibiotics for MSSA bacteremia  -IR consulted by vascular surgery for diagnostic angiogram LLE due to new gangrenous changes to L 2nd toe         Assessment and Plan:        Zia Hopkins is a 74 year old male with history of DM2, HTN, HLP, CKD 3, PAD, who presented on 7/12/2024 for scheduled left femoral artery cutdown and LLE angiogram but was found to have left 3rd toe wet gangrene with cellulitis and developing osteomyelitis.  He was admitted for IV antibiotics, podiatry and infectious disease consultation.  Blood cultures obtained on admission are growing gram-positive cocci.      Hospitalist service consulted on 7/13 for management of diabetes and other comorbidities.       Per vascular surgery, plan would be due to angiogram as outpatient in 2 weeks.        Left foot 3rd digit gangrene with cellulitis and developing osteomyelitis  Left heel ulcer with eschar, s/p partial left third toe amputation with osteotomy through proximal phalanx, 7/13/2024  Left heel ulcer with eschar, s/p debridement down to/including subcutaneous fat, 7/13/2024  Gram-positive cocci bacteremia, likely MSSA - due to diabetic foot infection  S/p multiple previous bilateral toe amputations   - MRI showed ulceration at tip of third toe extending to bone margin exposed middle phalanx.  Some T2 signal abnormality noted.  Osteomyelitis could not be excluded.  -CRP 15.  No leukocytosis, afebrile  -2 of 2 sets of blood cultures obtained on 7/12 are growing MSSA  - Podiatry evaluated and he underwent partial left third toe amputation with osteotomy through proximal phalanx and debridement of left heel ulcer on 7/13/2024   - Surgical management per podiatry, dressing change at bedside (7/14), note  that all gangrenous tissue and bone infection have been resected; PT for heel weight bearing training in surgical shoe  - ID following, currently on IV Rocephin; repeated blood cultures 7/15-- no growth so far; to wait 48 hours prior to line placement for outpatient IV antibiotics for MSSA bacteremia; likely PICC 7/17     Peripheral arterial disease with critical limb ischemia of bilateral lower extremities  S/p multiple vascular surgery interventions to bilateral lower extremities    Left 2nd toe gangrenous changes  - CTA A/P with runoff on 7/12 showed occlusion of left above-knee popliteal artery with distal reconstitution, patent bilateral SFA stents, diffuse aortoiliac and lower extremity atherosclerotic disease  - Had presented on 7/12/2024 for scheduled left femoral artery cutdown and LLE angiogram.  This has now been deferred due to diabetic foot infection.  Plan is to do angiogram as outpatient in 2 weeks (planned for 7/26/24).   - management as per vascular surgery, following as primary  - Continue aspirin 81 mg, Plavix 75 mg daily    - 7/16: IR consulted by vascular surgery for diagnostic angiogram LLE due to new gangrenous changes to L 2nd toe     Diabetes mellitus type 2, with peripheral neuropathy and CKD 3, controlled with A1c 7 on 7/12/2024  PTA-empagliflozin 25 mg, metformin 1000 mg twice daily  - Blood sugars reasonably well-controlled; A1c 7.0  - Continue empagliflozin; will continue to hold metformin for now given plans for angiogram  - Continue NovoLog sliding scale with hypoglycemia protocol     Benign essential hypertension  PTA-amlodipine 10 mg, chlorthalidone 50 mg, losartan 100 mg, Toprol- mg daily  - Blood pressure well-controlled.  - Continue PTA amlodipine, chlorthalidone, losartan     Hyperlipidemia-LDL 62, HDL 62, 7/12/2024  PTA-atorvastatin 80 mg, Zetia 10 mg  - Hyperlipidemia is well-controlled.  Continue atorvastatin and Zetia     Chronic kidney disease stage III  - Baseline  "creatinine around 1.  Creatinine currently stable and at baseline.  Monitor     Previous vascular surgery procedures  Right common femoral and superficial femoral endarterectomy with patch angioplasty and right femoral artery angioplasty with stenting, 3/2022  2. LLE angiogram and balloon angioplasty of anterior and posterior tibial arteries, 4/2021  3. Left popliteal angioplasty and left SFA stenting 2017 followed by stenting of in-stent stenosis 7/2019   4. LLE arteriogram with balloon angioplasty of the left popliteal artery, recanalization of posterior tibial artery, and extension of the stent in the left superficial femoral artery 2/2021       Diet: Moderate Consistent Carb (60 g CHO per Meal) Diet           DVT Prophylaxis: subcutaneous heparin TID    Code status: full code    Disposition:   Medically Ready for Discharge: Anticipated in 2-4 Days per primary vascular surgery service, pending podiatry clearance and infectious disease plan for antibiotics  - planned for LLE angiogram and PICC 7/17       Clinically Significant Risk Factors                                # DMII: A1C = 7.0 % (Ref range: <5.7 %) within past 6 months                   Page Me (7 am to 6 pm)    Care plan discussed with patient and his wife               Physical Exam:      Blood pressure (!) 149/69, pulse 56, temperature 98.5  F (36.9  C), temperature source Oral, resp. rate 16, height 1.6 m (5' 3\"), weight 60.3 kg (133 lb), SpO2 94%.  Vitals:    07/12/24 0709   Weight: 60.3 kg (133 lb)     Vital Signs with Ranges  Temp:  [97.9  F (36.6  C)-98.5  F (36.9  C)] 98.5  F (36.9  C)  Pulse:  [55-60] 56  Resp:  [16-18] 16  BP: (130-161)/(61-74) 149/69  SpO2:  [93 %-99 %] 94 %  I/O's Last 24 hours  I/O last 3 completed shifts:  In: 800 [P.O.:800]  Out: -     Constitutional: Alert, awake and oriented X 3; resting comfortably in no apparent distress       Oral cavity: Moist mucosa   Cardiovascular: Normal s1 s2, regular rate and rhythm, no " murmur   Lungs: B/l clear to auscultation, no wheezes or crepitations   Abdomen: Soft, nt, nd, no guarding, rigidity or rebound; BS +   LE : Left foot in dressing, partial toe amputation status   Musculoskeletal/Neuro Power 5/5 in all extremities; No focal neurological deficits noted   Psychiatry: normal mood and affect                Medications:        Current Facility-Administered Medications   Medication Dose Route Frequency Provider Last Rate Last Admin    amLODIPine (NORVASC) tablet 10 mg  10 mg Oral Daily Erik Baez DPM   10 mg at 07/15/24 0919    aspirin (ASA) chewable tablet 81 mg  81 mg Oral Daily Erik Baez DPM   81 mg at 07/15/24 0918    atorvastatin (LIPITOR) tablet 80 mg  80 mg Oral At Bedtime Erik Baez DPM   80 mg at 07/15/24 2120    ceFAZolin (ANCEF) 2 g in 100 mL D5W intermittent infusion  2 g Intravenous Q8H Shaina Norton PA-C 200 mL/hr at 07/16/24 0600 2 g at 07/16/24 0600    chlorthalidone (HYGROTON) tablet 50 mg  50 mg Oral Daily Erik Baez DPM   50 mg at 07/15/24 0918    clopidogrel (PLAVIX) tablet 75 mg  75 mg Oral Daily Erik Baez DPM   75 mg at 07/15/24 0918    empagliflozin (JARDIANCE) tablet 25 mg  25 mg Oral Daily Erik Baez DPM   25 mg at 07/15/24 0919    ezetimibe (ZETIA) tablet 10 mg  10 mg Oral Daily Erik Baez DPM   10 mg at 07/15/24 0918    heparin ANTICOAGULANT injection 5,000 Units  5,000 Units Subcutaneous TID Erik Baez DPM   5,000 Units at 07/15/24 2119    insulin aspart (NovoLOG) injection (RAPID ACTING)  1-10 Units Subcutaneous TID AC Michelle Mejía MD   1 Units at 07/15/24 1709    insulin aspart (NovoLOG) injection (RAPID ACTING)  1-7 Units Subcutaneous At Bedtime Michelle Mejía MD   3 Units at 07/13/24 2225    losartan (COZAAR) tablet 100 mg  100 mg Oral Daily Erik Baez DPM   100 mg at 07/15/24 0918    [Held by provider] metFORMIN (GLUCOPHAGE) tablet 1,000 mg  1,000 mg Oral  BID w/meals Maryann Lewis MD   1,000 mg at 07/12/24 2017    metoprolol succinate ER (TOPROL XL) 24 hr tablet 100 mg  100 mg Oral Daily Erik Baez DPM   100 mg at 07/15/24 0918    polyethylene glycol (MIRALAX) Packet 17 g  17 g Oral Daily Erik Baez DPM        senna-docusate (SENOKOT-S/PERICOLACE) 8.6-50 MG per tablet 1 tablet  1 tablet Oral BID Erik Baez DPM   1 tablet at 07/15/24 2119    sodium chloride (PF) 0.9% PF flush 3 mL  3 mL Intracatheter Q8H Erik Baez DPM   3 mL at 07/16/24 0600    sodium chloride (PF) 0.9% PF flush 3 mL  3 mL Intracatheter Q8H Erik Baez DPM   3 mL at 07/15/24 1710     PRN Meds:   Current Facility-Administered Medications   Medication Dose Route Frequency Provider Last Rate Last Admin    acetaminophen (TYLENOL) tablet 650 mg  650 mg Oral Q4H PRN Erik Baez DPM        bisacodyl (DULCOLAX) suppository 10 mg  10 mg Rectal Daily PRN Erik Baez DPM        calcium carbonate (TUMS) chewable tablet 1,000 mg  1,000 mg Oral 4x Daily PRN Erik Baez DPM        glucose gel 15-30 g  15-30 g Oral Q15 Min PRN Erik Baez DPM        Or    dextrose 50 % injection 25-50 mL  25-50 mL Intravenous Q15 Min PRN Erik Baez DPM        Or    glucagon injection 1 mg  1 mg Subcutaneous Q15 Min PRN Erik Baez DPM        diclofenac (VOLTAREN) EC tablet 75 mg  75 mg Oral BID PRN Erik Baez DPM        lidocaine (LMX4) cream   Topical Q1H PRN Erik Baez DPM        lidocaine (LMX4) cream   Topical Q1H PRN Erik Baez DPM        lidocaine 1 % 0.1-1 mL  0.1-1 mL Other Q1H PRN Erik Baez DPM        lidocaine 1 % 0.1-1 mL  0.1-1 mL Other Q1H PRN Erik Baez DPM        magnesium hydroxide (MILK OF MAGNESIA) suspension 30 mL  30 mL Oral Daily PRN Erik Baez DPM        naloxone (NARCAN) injection 0.2 mg  0.2 mg Intravenous Q2 Min PRN Erik Baez DPM         Or    naloxone (NARCAN) injection 0.4 mg  0.4 mg Intravenous Q2 Min PRN Erik Baez DPM        Or    naloxone (NARCAN) injection 0.2 mg  0.2 mg Intramuscular Q2 Min PRN Erik Baez DPM        Or    naloxone (NARCAN) injection 0.4 mg  0.4 mg Intramuscular Q2 Min PRN Erik Baez DPM        ondansetron (ZOFRAN ODT) ODT tab 4 mg  4 mg Oral Q6H PRN Erik Baez DPM        Or    ondansetron (ZOFRAN) injection 4 mg  4 mg Intravenous Q6H PRN Erik Baez DPM        oxyCODONE (ROXICODONE) tablet 5 mg  5 mg Oral Q4H PRN Erik Baez DPM        Or    oxyCODONE (ROXICODONE) tablet 10 mg  10 mg Oral Q4H PRN Erik Baez DPM        oxyCODONE-acetaminophen (PERCOCET) 5-325 MG per tablet 1 tablet  1 tablet Oral Q6H PRN Erik Baez DPM        prochlorperazine (COMPAZINE) injection 5 mg  5 mg Intravenous Q6H PRN Erik Baez DPM        Or    prochlorperazine (COMPAZINE) tablet 5 mg  5 mg Oral Q6H PRN Erik Baez DPM        senna-docusate (SENOKOT-S/PERICOLACE) 8.6-50 MG per tablet 1 tablet  1 tablet Oral BID PRN Erik Baez DPM        Or    senna-docusate (SENOKOT-S/PERICOLACE) 8.6-50 MG per tablet 2 tablet  2 tablet Oral BID PRN Erik Baez DPM        sodium chloride (PF) 0.9% PF flush 3 mL  3 mL Intracatheter q1 min prn Erik Baez DPM                Data:      All new lab and imaging data was reviewed.   Recent Labs   Lab Test 07/15/24  0816 07/13/24  0012 07/12/24  0729 07/12/23  0852 03/08/22  0650 04/19/21  1151 02/03/21  1003 07/30/19  0712 03/14/17  1520 03/08/17  0720   WBC  --  6.4 9.9  --  10.5   < > 12.0* 5.7   < > 8.4   HGB  --  12.3* 13.9 13.8 8.7*   < > 12.2* 15.0   < > 15.1   MCV  --  93 93  --  92   < > 94 92   < > 91    227 283  --  353   < > 377 215   < > 240   INR  --   --   --   --   --   --  0.95 0.90  --  0.87    < > = values in this interval not displayed.      Recent Labs   Lab Test  "07/16/24  0218 07/15/24  2115 07/15/24  1708 07/13/24  0157 07/13/24  0012 07/12/24  1645 07/12/24  0729 07/12/23  0852 06/27/22  1138 06/10/22  0740   NA  --   --   --   --  135  --  138  --   --  139   POTASSIUM  --   --   --   --  3.7  --  4.3 3.7  --  4.0   CHLORIDE  --   --   --   --  99  --  100  --   --  105   CO2  --   --   --   --  25  --  26  --   --  28   BUN  --   --   --   --  22.8  --  23.0  --   --  28   CR  --   --   --   --  1.01  --  1.00  --   --  0.88   ANIONGAP  --   --   --   --  11  --  12  --   --  6   TIN  --   --   --   --  8.8  --  9.6  --   --  8.9   * 186* 150*   < > 118*   < > 152* 127*   < > 128*    < > = values in this interval not displayed.     No lab results found.    Invalid input(s): \"TROP\", \"TROPONINIES\"      "

## 2024-07-16 NOTE — PROGRESS NOTES
ID Chart Check    Noted follow-up blood cultures are still no growth from 7/15. Continue to follow. If cultures remain negative tomorrow, could place midline for IV antibiotics. Continue Cefazolin. All infection from foot removed per Podiatry. To get angiogram of his LLE today. ID will follow-up with patient again tomorrow.     Shaina Norton PA-C

## 2024-07-16 NOTE — PROGRESS NOTES
Therapy: IV abx  Insurance: [employer]     Pt does not have coverage for IV abx with their [employer] plan.  Drug would be billed to the part D and supplies would be self-pay. Based on Cefazolin 2gm q8h, total cost is $31.80/day for drug and supplies.      For nursing, patient should have coverage if homebound, however [facility] is not contracted with Medicare and an outside nursing agency would be utilized instead. If patient is not homebound, there is no coverage and [facility] can see patient if patient agrees to self-pay for $90 per visit.      In reference to admission on 7/12/24 to check IV abx coverage     Please contact Intake with any questions, [phone] or In Basket pool,  Home Infusion ([phone]).    What to expect when you have contrast    During your exam, we will inject  contrast  into your vein or artery. (Contrast is a clear liquid with iodine in it. It shows up on X-rays.)    You may feel warm or hot. You may have a metal taste in your mouth and a slight upset stomach. You may also feel pressure near the kidneys and bladder. These effects will last about 1 to 3 minutes.    Please tell us if you have:    Sneezing     Itching    Hives     Swelling in the face    A hoarse voice    Breathing problems    Other new symptoms    Serious problems are rare.  They may include:    Irregular heartbeat     Seizures    Kidney failure              Tissue damage    Shock      Death    If you have any problems during the exam, we  will treat them right away.    When you get home    Call your hospital if you have any new symptoms in the next 2 days, like hives or swelling. (Phone numbers are at the bottom of this page.) Or call your family doctor.     If you have wheezing or trouble breathing, call 911.    Self-care  -Drink at least 4 extra glasses of water today.   This reduces the stress on your kidneys.  -Keep taking your regular medicines.    The contrast will pass out of your body in your  Urine(pee). This will happen in the next 24 hours. You  will not feel this. Your urine will not  change color.    If you have kidney problems or take metformin    Drink 4 to 8 large glasses of water for the next  2 days, if you are not on a fluid restriction.    ?If you take metformin (Glucophage or Glucovance) for diabetes, keep taking it.      ?Your kidney function tests are abnormal.  If you take Metformin, do not take it for 48 hours. Please go to your clinic for a blood test within 3 days after your exam before the restarting this medicine.     (Note to provider:please give patient prescription for lab tests.)    ?Special instructions: ***    I have read and understand the above information.    Patient Sign  Here:______________________________________Date:________Time:______    Staff Sign Here:________________________________________Date:_______Time:______      Radiology Departments:     ?[facility]: [phone] ?[place]: [phone]     ?[place]: [phone] ?[place]:[phone]      ?[place]: [phone]  ?[place]: [phone]  ?[place]:[phone]    ?[facility]:[phone]  ?[facility]:[phone]

## 2024-07-16 NOTE — PROGRESS NOTES
Vascular Surgery Progress Note  07/16/2024       Subjective:  Patient resting in bed this morning     Objective:  Temp:  [98.3  F (36.8  C)-98.5  F (36.9  C)] 98.5  F (36.9  C)  Pulse:  [55-60] 56  Resp:  [16-18] 16  BP: (130-149)/(61-70) 149/69  SpO2:  [93 %-97 %] 94 %    I/O last 3 completed shifts:  In: 800 [P.O.:800]  Out: -       Gen: Awake, alert, NAD  CV: RRR per PT doppler pulse  Resp: NLB on RA  Ext: LLE dressing in tact, 2nd toe remains erythematous.   Vascular: Monophasic LLE PT signal.     Labs:  Recent Labs   Lab 07/15/24  0816 07/13/24  0012 07/12/24  0729   WBC  --  6.4 9.9   HGB  --  12.3* 13.9    227 283       Recent Labs   Lab 07/16/24  0740 07/16/24  0218 07/15/24  2115 07/13/24  0157 07/13/24  0012 07/12/24  1645 07/12/24  0729   NA  --   --   --   --  135  --  138   POTASSIUM  --   --   --   --  3.7  --  4.3   CHLORIDE  --   --   --   --  99  --  100   CO2  --   --   --   --  25  --  26   BUN  --   --   --   --  22.8  --  23.0   CR  --   --   --   --  1.01  --  1.00   * 148* 186*   < > 118*   < > 152*   TIN  --   --   --   --  8.8  --  9.6    < > = values in this interval not displayed.       Imaging:  CTA reviewed, patient with      Assessment/Plan:   74 year old male with PMH of T2DM, HTN, HLD, CKD3, and PAD who presented for LLE femoral cutdown and angiogram found to have L 3rd digit gangrene w/ cellulitis and admitted for IV Abx. Now s/p L 3rd toe amputation with podiatry on 7/13. Blood cultures are growing MSSA, remains on IV ceftriaxone. 7/15 noted to have new gangrenous changes now to L 2nd toe with erythema surrounding.     - IR consult today for LLE diagnostic angiogram to assess runoff to the foot   - Vein mapping of LLE to eval if GSV adequate for bypass.   - Patient should be NWB to LLE  - Should have post-op shoe, the one which is open at the toes, on at all times, should not wear close toed shoes.   - Continue abx, appreciate ID recs re: duration and regimen  - will  need PICC for outpt abx, will plan to hold off until Bcx negative.  - No growth on  Bcx today - continue to monitor  - Continue PTA meds   - Podiatry following along, appreciate their assessment of L 2nd toe and need for  debridement - will page today  -Continue asa/plavix and statin  - SCDs should be on BOTH lower extremities at all times, especially left.        Discussed with vascular surgery staff, who is in agreement with the above.  - - - - - - - - - - - - - - - - - -  Naeem Lewis, PGY-4  Vascular Surgery Resident    VASCULAR SURGICAL STAFF:  Today's events reviewed including bedside debridement of the left second toe by the podiatry service.  Left leg vein mapping shows apparently adequate left greater saphenous vein.  Left leg angiogram today shows long segment occlusion of the left above-knee popliteal artery with some tibial involvement.  Continue antibiotics and left second toe wound care.  He will likely be best served by a left femoral endarterectomy and a left femoral to distal tibial (either posterior tibial or anterior tibial artery) bypass.  The timing of this procedure is to be determined depending upon staff availability.    Juan Alicea MD

## 2024-07-16 NOTE — IR NOTE
Interventional Radiology Intra-procedural Nursing Note    Patient Name: Zia Hopkins  Medical Record Number: [id number]  Today's Date: July 16, 2024    Procedure: Left Leg Diagnostic Angiogram with Fentanyl    Start time: 1615  End time: 1644  Report provided to: Eric Rodriguez RN      Note: Patient entered Interventional Radiology Suite number 2 via cart. Patient awake, alert and oriented. Assisted onto procedural table in Supine position. Prepped and draped.  Dr. Luna in room. Time out and procedure started. Vital signs stable. Telemetry reading [id number].    Procedure well tolerated by patient without complications. Procedure end with debrief by Dr. Luna.  Pressure held until hemostasis achieved. Gauze, Tegaderm dressing applied to Left Femoral access site.    6  HR BR flat HOB until 2305    Administered medication totals:  Lidocaine 1% 10 mL Intradermal    Fentanyl 200 mcg IVP    Only Fentanyl for pain control during diagnostic angiogram, no Sedation...     Last Fentanyl 1643

## 2024-07-16 NOTE — PROGRESS NOTES
[facility]    Received request for benefit check should pt require home IV abx. Pt does not have coverage for IV abx with their [employer] plan.  Drug would be billed to the part D and supplies would be self-pay.     Based on Cefazolin 2gm q8h, total cost is $31.80/day for drug and supplies.      For nursing, patient should have coverage if homebound, however [facility] is not contracted with Medicare and an outside nursing agency would be utilized instead. If patient is not homebound, there is no coverage and [facility] can see patient if patient agrees to self-pay for $90 per visit.    I met with Zia and his wife, Le to introduce home infusion services and review benefits. They are okay with the out of pocket cost and would like to proceed with [facility]. I will coordinate IV teaching with pt and his wife on day of discharge after his line has been placed. [facility] has accepted for home RN. Pt's care coordinator, Nisa was updated via email.     Thank you for the referral.     Nayely Duran RN  [facility] Liaison  [phone] (Mon thru Fri 8am - 5pm)  [phone] Office

## 2024-07-16 NOTE — TELEPHONE ENCOUNTER
Nallely Trujillo NP will let Surgery Scheduling know when they can move forward scheduling the patient's surgery.

## 2024-07-16 NOTE — CONSULTS
Patient is on IR schedule today Tuesday 7/16/24 for a Left leg diagnostic angiogram with IV Fentanyl. Left brachial or left antegrade approach requested.     Zia Hopkins is a 74 year old male with PMH of T2DM, HTN, HLD, CKD3, and PAD who presented for LLE femoral cutdown and angiogram found to have L 3rd digit gangrene w/ cellulitis and admitted for IV Abx. Now s/p L 3rd toe amputation with podiatry on 7/13. Blood cultures are growing MSSA, remains on IV ceftriaxone. 7/15 noted to have new gangrenous changes now to L 2nd toe with erythema surrounding.     Vascular surgery has asked that IR perform a left leg dx angiogram to evaluate run off below the knee for possible bypass targets.     -Labs WNL for procedure.    -Orders for NPO have been entered.   -No NPO required.     -Procedural education reviewed with patient and his wife in detail including, but not limited to risks, benefits and alternatives, questions answered with understanding verbalized by them and consent is in IR.     Please contact the IR department at [phone] for procedural related questions.     Reviewed imaging and d/w IR Dr Luna and Vascular Fellow Dr Lewis today.    Total time: 30 minutes     Thanks, Opal [facility] Interventional Radiology CNP ([phone]) (phone [phone])

## 2024-07-16 NOTE — PLAN OF CARE
Date & Time: 07/16/24 1820-6571  Surgery/POD#3: Here for femoral artery cutdown 7/12 - surgery postponed d/t wet gangrene and cellulitis. 7/13: L 3rd toe amputation and heel wound debridement   Behavior & Aggression: Green  Fall Risk: Yes  Orientation: Aox4  ABNL VS/O2: VSS on RA  ABNL Labs: Operative cultures/pathology pending. ID following blood cultures.  Pain Management: Denies   Bowel/Bladder: Continent, voiding adequately in BR  Drains: PIV x1 SL with int abx   Wounds/incisions: L foot wrapped (on wedge); R foot partial amputation  Diet: Mod Carb  Activity Level: Independent w/ crutches. Non-weight-bearing on LLE. Surgical shoe in room for ambulation  Tests/Procedures: US Vein mapping 7/16, Diagnostic Angiogram to follow In afternoon   Anticipated  DC Date: Pending  Significant Information: Pt refusing PCDs and wedge pillow, Education done and charted refusal. Ultrasound for vein mapping for diagnostic angio per vascular. Podiatry changed food dressing.

## 2024-07-16 NOTE — PROCEDURES
Interventional Radiology Post-Procedure Note     Patient name:  Zia Hopkins  MRN:  [id number]   Date:  7/16/2024     Procedure(s): LLE angiogram    Attending:  Jeremy    Sedation:  Minimal (fentanyl only)    Pre/Post Procedure Diagnosis: CTLI    Drains/Lines:  None    Specimen(s):  None    Estimated Blood Loss:  Minimal    Complications:  None     Findings:  Uneventful procedure, please see dictation in PACS or under the Imaging tab in EPIC for detailed procedure note.    Plan:  Bedrest with LLE straight for 6 hours post-procedure.      Ezekiel Luna MD  Vascular & Interventional Radiology  [facility]  7/16/2024  4:53 PM

## 2024-07-16 NOTE — PLAN OF CARE
Goal Outcome Evaluation:      Plan of Care Reviewed With: patient, spouse          Outcome Evaluation: Initial consult done with patient and spouse, Le. They live independently in their own 3 story home. Patient can manage needs on ground level if needed. Reviewed IV antibiotics at discharge and they are agreeable with referral to [facility] to do IV antibiotics in the home. Referral sent to [facility] via standard process. Care management will follow along for disposition planning.

## 2024-07-16 NOTE — PLAN OF CARE
Date & Time: 07/15/24 3237-2438    Surgery/POD#2/3: Here for femoral artery cutdown 7/12 - surgery postponed d/t wet gangrene and cellulitis. 7/13: L 3rd toe amputation and heel wound debridement   Behavior & Aggression: Green  Fall Risk:  Yes  Orientation: A&O x4  ABNL VS/O2: VSS on RA  ABNL Labs: Operative cultures/pathology pending. ID following blood cultures.  Pain Management: Denies. Does not want his scheduled tylenol.   Bowel/Bladder: Continent, voiding adequately in BR. No BM. +BS  Drains: PIV x2 SL with int abx   Wounds/incisions: L foot wrapped (on wedge); R foot partial amputation  Diet: Mod Carb  Activity Level: Independent w/ crutches. Non-weight-bearing on LLE. Surgical shoe in room for ambulation  Tests/Procedures: Angiogram on 7/26  Anticipated  DC Date: Pending continued improvement  Significant Information: Pt removed wedge/PCDs this AM. Wants to take a break from wearing them. Tried to educate patient on importance of wearing PCDs and using wedge, pt still declined.

## 2024-07-16 NOTE — CONSULTS
Care Management Initial Consult    General Information  Assessment completed with: Patient, Spouse or significant other, Bobbi  Type of CM/SW Visit: Initial Assessment    Primary Care Provider verified and updated as needed: Yes   Readmission within the last 30 days: no previous admission in last 30 days      Reason for Consult: discharge planning, other (see comments) (will need IV antibiotics)  Advance Care Planning: Advance Care Planning Reviewed: verified with patient, no concerns identified          Communication Assessment  Patient's communication style: spoken language (English or Bilingual)    Hearing Difficulty or Deaf: no   Wear Glasses or Blind: yes    Cognitive  Cognitive/Neuro/Behavioral: WDL                      Living Environment:   People in home: spouse     Current living Arrangements: house      Able to return to prior arrangements: yes       Family/Social Support:  Care provided by: self, spouse/significant other  Provides care for: no one  Marital Status:   Wife  Le       Description of Support System: Supportive, Involved         Current Resources:   Patient receiving home care services: No     Community Resources: None  Equipment currently used at home: crutches, cane, straight  Supplies currently used at home: Wound Care Supplies    Employment/Financial:  Employment Status: retired        Financial Concerns: none   Referral to Financial Worker: No       Does the patient's insurance plan have a 3 day qualifying hospital stay waiver?  No    Lifestyle & Psychosocial Needs:  Social Determinants of Health     Food Insecurity: Not on file   Depression: Not at risk (5/30/2023)    PHQ-2     PHQ-2 Score: 0   Housing Stability: Not on file   Tobacco Use: High Risk (7/12/2024)    Patient History     Smoking Tobacco Use: Some Days     Smokeless Tobacco Use: Never     Passive Exposure: Not on file   Financial Resource Strain: Not on file   Alcohol Use: Not on file   Transportation  Needs: Not on file   Physical Activity: Not on file   Interpersonal Safety: Not on file   Stress: Not on file   Social Connections: Not on file   Health Literacy: Not on file       Functional Status:  Prior to admission patient needed assistance:   Dependent ADLs:: Ambulation-cane  Dependent IADLs:: Independent       Mental Health Status:  Mental Health Status: No Current Concerns       Chemical Dependency Status:  Chemical Dependency Status: No Current Concerns             Values/Beliefs:  Spiritual, Cultural Beliefs, Caodaism Practices, Values that affect care: no               Additional Information:  Initial consult done with patient and spouse, Le. They live independently in their own 3 story home. Patient can manage needs on ground level if needed. Reviewed IV antibiotics at discharge and they are agreeable with referral to [facility] to do IV antibiotics in the home. Referral sent to [facility] via standard process. Care management will follow along for disposition planning.     Nisa Viveros RN   [facility]   Phone [phone], [employer] or [phone]

## 2024-07-17 ENCOUNTER — ANESTHESIA (OUTPATIENT)
Dept: SURGERY | Facility: CLINIC | Age: 74
DRG: 253 | End: 2024-07-17
Payer: COMMERCIAL

## 2024-07-17 ENCOUNTER — APPOINTMENT (OUTPATIENT)
Dept: PHYSICAL THERAPY | Facility: CLINIC | Age: 74
DRG: 253 | End: 2024-07-17
Attending: SURGERY
Payer: COMMERCIAL

## 2024-07-17 ENCOUNTER — ANESTHESIA EVENT (OUTPATIENT)
Dept: SURGERY | Facility: CLINIC | Age: 74
DRG: 253 | End: 2024-07-17
Payer: COMMERCIAL

## 2024-07-17 LAB
BACTERIA TISS BX CULT: ABNORMAL
GLUCOSE BLDC GLUCOMTR-MCNC: 141 MG/DL (ref 70–99)
GLUCOSE BLDC GLUCOMTR-MCNC: 150 MG/DL (ref 70–99)
GLUCOSE BLDC GLUCOMTR-MCNC: 168 MG/DL (ref 70–99)
GLUCOSE BLDC GLUCOMTR-MCNC: 202 MG/DL (ref 70–99)
GLUCOSE BLDC GLUCOMTR-MCNC: 214 MG/DL (ref 70–99)

## 2024-07-17 PROCEDURE — 99232 SBSQ HOSP IP/OBS MODERATE 35: CPT | Performed by: HOSPITALIST

## 2024-07-17 PROCEDURE — 250N000013 HC RX MED GY IP 250 OP 250 PS 637: Performed by: PODIATRIST

## 2024-07-17 PROCEDURE — 99232 SBSQ HOSP IP/OBS MODERATE 35: CPT | Mod: GC | Performed by: SURGERY

## 2024-07-17 PROCEDURE — 97530 THERAPEUTIC ACTIVITIES: CPT | Mod: GP | Performed by: PHYSICAL THERAPIST

## 2024-07-17 PROCEDURE — 97116 GAIT TRAINING THERAPY: CPT | Mod: GP | Performed by: PHYSICAL THERAPIST

## 2024-07-17 PROCEDURE — 250N000011 HC RX IP 250 OP 636: Mod: JZ | Performed by: PHYSICIAN ASSISTANT

## 2024-07-17 PROCEDURE — 99231 SBSQ HOSP IP/OBS SF/LOW 25: CPT | Mod: 24

## 2024-07-17 PROCEDURE — 120N000001 HC R&B MED SURG/OB

## 2024-07-17 PROCEDURE — 99231 SBSQ HOSP IP/OBS SF/LOW 25: CPT | Performed by: PHYSICIAN ASSISTANT

## 2024-07-17 PROCEDURE — 250N000011 HC RX IP 250 OP 636: Performed by: PODIATRIST

## 2024-07-17 RX ADMIN — CLOPIDOGREL BISULFATE 75 MG: 75 TABLET ORAL at 09:00

## 2024-07-17 RX ADMIN — EMPAGLIFLOZIN 25 MG: 25 TABLET, FILM COATED ORAL at 08:52

## 2024-07-17 RX ADMIN — EZETIMIBE 10 MG: 10 TABLET ORAL at 08:51

## 2024-07-17 RX ADMIN — ATORVASTATIN CALCIUM 80 MG: 80 TABLET, FILM COATED ORAL at 22:03

## 2024-07-17 RX ADMIN — CHLORTHALIDONE 50 MG: 25 TABLET ORAL at 08:51

## 2024-07-17 RX ADMIN — AMLODIPINE BESYLATE 10 MG: 10 TABLET ORAL at 08:52

## 2024-07-17 RX ADMIN — HEPARIN SODIUM 5000 UNITS: 5000 INJECTION, SOLUTION INTRAVENOUS; SUBCUTANEOUS at 08:53

## 2024-07-17 RX ADMIN — ASPIRIN 81 MG CHEWABLE TABLET 81 MG: 81 TABLET CHEWABLE at 08:51

## 2024-07-17 RX ADMIN — LOSARTAN POTASSIUM 100 MG: 100 TABLET, FILM COATED ORAL at 08:52

## 2024-07-17 RX ADMIN — CEFAZOLIN SODIUM 2 G: 2 INJECTION, SOLUTION INTRAVENOUS at 13:49

## 2024-07-17 RX ADMIN — HEPARIN SODIUM 5000 UNITS: 5000 INJECTION, SOLUTION INTRAVENOUS; SUBCUTANEOUS at 22:03

## 2024-07-17 RX ADMIN — METOPROLOL SUCCINATE 100 MG: 100 TABLET, EXTENDED RELEASE ORAL at 08:52

## 2024-07-17 RX ADMIN — CEFAZOLIN SODIUM 2 G: 2 INJECTION, SOLUTION INTRAVENOUS at 06:28

## 2024-07-17 RX ADMIN — CEFAZOLIN SODIUM 2 G: 2 INJECTION, SOLUTION INTRAVENOUS at 22:03

## 2024-07-17 RX ADMIN — HEPARIN SODIUM 5000 UNITS: 5000 INJECTION, SOLUTION INTRAVENOUS; SUBCUTANEOUS at 16:18

## 2024-07-17 ASSESSMENT — ACTIVITIES OF DAILY LIVING (ADL)
ADLS_ACUITY_SCORE: 26
ADLS_ACUITY_SCORE: 25
ADLS_ACUITY_SCORE: 26
ADLS_ACUITY_SCORE: 26
ADLS_ACUITY_SCORE: 28
ADLS_ACUITY_SCORE: 26
ADLS_ACUITY_SCORE: 28
ADLS_ACUITY_SCORE: 26
ADLS_ACUITY_SCORE: 25
ADLS_ACUITY_SCORE: 25
ADLS_ACUITY_SCORE: 26
ADLS_ACUITY_SCORE: 25
ADLS_ACUITY_SCORE: 26
ADLS_ACUITY_SCORE: 25
ADLS_ACUITY_SCORE: 25
ADLS_ACUITY_SCORE: 26
ADLS_ACUITY_SCORE: 25
ADLS_ACUITY_SCORE: 26

## 2024-07-17 NOTE — PROGRESS NOTES
Vascular Surgery Progress Note    Patient seen at bedside this am with Dr. Cortés  No acute complaints    Erythema to L 2nd toe much improved.   Discussed with patient that we would recommend a bypass procedure for LLE    We may be able to do this as soon as this afternoon.     Patient should be NPO after breakfast this am.   Will decide formally by noon if patient will be going to OR this afternoon, depending on how am cases proceed.     Naeem Lewis MD  Vascular Surgery PGY4     To reach vascular surgery linad team on call, please go to [facility] and from the drop down find the following:   VASCULAR SURGERY/[facility]  Then page the person listed to the right of day/night call. Can click the pager to page.

## 2024-07-17 NOTE — PROGRESS NOTES
"[facility]  Hospitalist Progress Note        Mohit Gould MD   07/17/2024        Interval History:        - podiatry noted some ischemic changes to left 2 nd toe as well, debrided, noted some exposed bone at wound bed; might need further debridement versus partial toe amputation  - completed LLE angiogram 7/16/24 and noted per vascular surgery \"long segment occlusion of the left above-knee popliteal artery with some tibial involvement\"  - planned for LLE bypass (7/17/24)  -repeat routine labs for 7/18         Assessment and Plan:        Zia Hopkins is a 74 year old male with history of DM2, HTN, HLP, CKD 3, PAD, who presented on 7/12/2024 for scheduled left femoral artery cutdown and LLE angiogram but was found to have left 3rd toe wet gangrene with cellulitis and developing osteomyelitis.  He was admitted for IV antibiotics, podiatry and infectious disease consultation.  Blood cultures obtained on admission are growing gram-positive cocci.      Hospitalist service consulted on 7/13 for management of diabetes and other comorbidities.        Left foot 3rd digit gangrene with cellulitis and developing osteomyelitis  Left heel ulcer with eschar, s/p partial left third toe amputation with osteotomy through proximal phalanx, 7/13/2024  Left heel ulcer with eschar, s/p debridement down to/including subcutaneous fat, 7/13/2024  MSSA bacteremia  S/p multiple previous bilateral toe amputations   - MRI showed ulceration at tip of third toe extending to bone margin exposed middle phalanx.  Some T2 signal abnormality noted.  Osteomyelitis could not be excluded.  -CRP 15.  No leukocytosis, afebrile  -2 of 2 sets of blood cultures obtained on 7/12 are growing MSSA  - Podiatry evaluated and he underwent partial left third toe amputation with osteotomy through proximal phalanx and debridement of left heel ulcer on 7/13/2024   - on 7/16: podiatry noted some ischemic changes to left 2 nd toe as well, " "debrided, noted some exposed bone at wound bed; might need further debridement versus partial toe amputation  - PT for heel weight bearing training in surgical shoe  - ID following, currently on IV Rocephin; repeated blood cultures 7/15-- no growth so far; to have PICC prior to discharge for outpatient IV antibiotics for MSSA bacteremia;     Peripheral arterial disease with critical limb ischemia of bilateral lower extremities  S/p multiple vascular surgery interventions to bilateral lower extremities    Left 2nd toe ischemic changes  - CTA A/P with runoff on 7/12 showed occlusion of left above-knee popliteal artery with distal reconstitution, patent bilateral SFA stents, diffuse aortoiliac and lower extremity atherosclerotic disease  - Had presented on 7/12/2024 for scheduled left femoral artery cutdown and LLE angiogram.  This has now been deferred due to diabetic foot infection  - was initially planned for outpatient angiogram, however given concern for ischemic changes to 2nd toe, completed LLE angiogram 7/16/24 and noted per vascular surgery \"long segment occlusion of the left above-knee popliteal artery with some tibial involvement\"  - planned for LLE bypass (7/17/24)  - management as per vascular surgery, following as primary  - Continue aspirin 81 mg, Plavix 75 mg daily     Diabetes mellitus type 2, with peripheral neuropathy and CKD 3, controlled with A1c 7 on 7/12/2024  PTA-empagliflozin 25 mg, metformin 1000 mg twice daily  - Blood sugars reasonably well-controlled; A1c 7.0  - Continue empagliflozin; will continue to hold metformin for now given recent angiogram  - Continue NovoLog sliding scale with hypoglycemia protocol     Benign essential hypertension  PTA-amlodipine 10 mg, chlorthalidone 50 mg, losartan 100 mg, Toprol- mg daily  - Blood pressure well-controlled.  - Continue PTA amlodipine, chlorthalidone, losartan     Hyperlipidemia-LDL 62, HDL 62, 7/12/2024  PTA-atorvastatin 80 mg, Zetia 10 " "mg  - Hyperlipidemia is well-controlled.  Continue atorvastatin and Zetia     Chronic kidney disease stage III  - Baseline creatinine around 1.  Creatinine currently stable and at baseline.  Monitor     Previous vascular surgery procedures  Right common femoral and superficial femoral endarterectomy with patch angioplasty and right femoral artery angioplasty with stenting, 3/2022  2. LLE angiogram and balloon angioplasty of anterior and posterior tibial arteries, 4/2021  3. Left popliteal angioplasty and left SFA stenting 2017 followed by stenting of in-stent stenosis 7/2019   4. LLE arteriogram with balloon angioplasty of the left popliteal artery, recanalization of posterior tibial artery, and extension of the stent in the left superficial femoral artery 2/2021       Diet: Moderate Consistent Carb (60 g CHO per Meal) Diet      DVT Prophylaxis: subcutaneous heparin TID    Code status: full code    Disposition:   Medically Ready for Discharge: Anticipated in 2-4 Days per primary vascular surgery service, pending podiatry clearance and infectious disease plan for antibiotics  - will need PICC prior to discharge        Clinically Significant Risk Factors                                # DMII: A1C = 7.0 % (Ref range: <5.7 %) within past 6 months       # Financial/Environmental Concerns: none              Page Me (7 am to 6 pm)    Care plan discussed with patient and his wife               Physical Exam:      Blood pressure (!) 141/68, pulse 58, temperature 98.4  F (36.9  C), temperature source Oral, resp. rate 16, height 1.6 m (5' 3\"), weight 60.3 kg (133 lb), SpO2 98%.  Vitals:    07/12/24 0709   Weight: 60.3 kg (133 lb)     Vital Signs with Ranges  Temp:  [97.7  F (36.5  C)-98.4  F (36.9  C)] 98.4  F (36.9  C)  Pulse:  [53-68] 58  Resp:  [10-22] 16  BP: (115-168)/(55-82) 141/68  SpO2:  [95 %-99 %] 98 %  I/O's Last 24 hours  I/O last 3 completed shifts:  In: 180 [P.O.:180]  Out: -     Constitutional: Alert, awake and " oriented X 3; resting comfortably in no apparent distress       Oral cavity: Moist mucosa   Cardiovascular: Normal s1 s2, regular rate and rhythm, no murmur   Lungs: B/l clear to auscultation, no wheezes or crepitations   Abdomen: Soft, nt, nd, no guarding, rigidity or rebound; BS +   LE : Left foot in dressing, partial toe amputation status   Musculoskeletal/Neuro Power 5/5 in all extremities; No focal neurological deficits noted   Psychiatry: normal mood and affect                Medications:        Current Facility-Administered Medications   Medication Dose Route Frequency Provider Last Rate Last Admin    amLODIPine (NORVASC) tablet 10 mg  10 mg Oral Daily Erik Baez DPM   10 mg at 07/16/24 0823    aspirin (ASA) chewable tablet 81 mg  81 mg Oral Daily Erik Baez DPM   81 mg at 07/16/24 0822    atorvastatin (LIPITOR) tablet 80 mg  80 mg Oral At Bedtime Erik Baez DPM   80 mg at 07/16/24 2225    ceFAZolin (ANCEF) 2 g in 100 mL D5W intermittent infusion  2 g Intravenous Q8H Shaina Norton PA-C 200 mL/hr at 07/17/24 0628 2 g at 07/17/24 0628    chlorthalidone (HYGROTON) tablet 50 mg  50 mg Oral Daily Erik Baez DPM   50 mg at 07/16/24 0823    clopidogrel (PLAVIX) tablet 75 mg  75 mg Oral Daily Erik Baez DPM   75 mg at 07/16/24 0823    empagliflozin (JARDIANCE) tablet 25 mg  25 mg Oral Daily Erik Baez DPM   25 mg at 07/16/24 0823    ezetimibe (ZETIA) tablet 10 mg  10 mg Oral Daily Erik Baez DPM   10 mg at 07/16/24 0823    heparin ANTICOAGULANT injection 5,000 Units  5,000 Units Subcutaneous TID Erik Baez DPM   5,000 Units at 07/16/24 2225    insulin aspart (NovoLOG) injection (RAPID ACTING)  1-10 Units Subcutaneous TID AC Michelle Mejía MD   6 Units at 07/16/24 1237    insulin aspart (NovoLOG) injection (RAPID ACTING)  1-7 Units Subcutaneous At Bedtime Michelle Mejía MD   1 Units at 07/16/24 9610    losartan (COZAAR) tablet 100  mg  100 mg Oral Daily Erik Baez DPM   100 mg at 07/16/24 0823    [Held by provider] metFORMIN (GLUCOPHAGE) tablet 1,000 mg  1,000 mg Oral BID w/meals Maryann Lewis MD   1,000 mg at 07/12/24 2017    metoprolol succinate ER (TOPROL XL) 24 hr tablet 100 mg  100 mg Oral Daily Erik Baez DPM   100 mg at 07/16/24 0823    polyethylene glycol (MIRALAX) Packet 17 g  17 g Oral Daily Erik Baez DPM        senna-docusate (SENOKOT-S/PERICOLACE) 8.6-50 MG per tablet 1 tablet  1 tablet Oral BID Erik Baez DPM   1 tablet at 07/15/24 2119    sodium chloride (PF) 0.9% PF flush 3 mL  3 mL Intracatheter Q8H Erik Baez DPM   3 mL at 07/17/24 0628     PRN Meds:   Current Facility-Administered Medications   Medication Dose Route Frequency Provider Last Rate Last Admin    acetaminophen (TYLENOL) tablet 650 mg  650 mg Oral Q4H PRN Erik Baez DPM        bisacodyl (DULCOLAX) suppository 10 mg  10 mg Rectal Daily PRN Erik Baez DPM        calcium carbonate (TUMS) chewable tablet 1,000 mg  1,000 mg Oral 4x Daily PRN Erik Baez DPM        Continuing statin from home medication list OR statin order already placed during this visit   Does not apply DOES NOT GO TO Sharon Garcia APRN CNP        Continuing statin from home medication list OR statin order already placed during this visit   Does not apply DOES NOT GO TO Ezekiel Koo MD        glucose gel 15-30 g  15-30 g Oral Q15 Min PRN Erik Baez DPM        Or    dextrose 50 % injection 25-50 mL  25-50 mL Intravenous Q15 Min PRN Erik Baez DPM        Or    glucagon injection 1 mg  1 mg Subcutaneous Q15 Min PRN Erik Baez DPM        diclofenac (VOLTAREN) EC tablet 75 mg  75 mg Oral BID PRN Fleischmann, Erik, DPM        lidocaine (LMX4) cream   Topical Q1H PRN Erik Baez DPM        lidocaine 1 % 0.1-1 mL  0.1-1 mL Other Q1H PRN Erik Baez DPM         magnesium hydroxide (MILK OF MAGNESIA) suspension 30 mL  30 mL Oral Daily PRN Erik Baez DPM        naloxone (NARCAN) injection 0.2 mg  0.2 mg Intravenous Q2 Min PRN Erik Baez DPM        Or    naloxone (NARCAN) injection 0.4 mg  0.4 mg Intravenous Q2 Min PRN Erik Baez DPM        Or    naloxone (NARCAN) injection 0.2 mg  0.2 mg Intramuscular Q2 Min PRN Erik Baez DPM        Or    naloxone (NARCAN) injection 0.4 mg  0.4 mg Intramuscular Q2 Min PRN Erik Baez DPM        ondansetron (ZOFRAN ODT) ODT tab 4 mg  4 mg Oral Q6H PRN Erik Baez DPM        Or    ondansetron (ZOFRAN) injection 4 mg  4 mg Intravenous Q6H PRN Erik Baez DPM        oxyCODONE (ROXICODONE) tablet 5 mg  5 mg Oral Q4H PRN Erik Baez DPM        Or    oxyCODONE (ROXICODONE) tablet 10 mg  10 mg Oral Q4H PRN Erik Baez DPM        oxyCODONE-acetaminophen (PERCOCET) 5-325 MG per tablet 1 tablet  1 tablet Oral Q6H PRN Erik Baez DPM        prochlorperazine (COMPAZINE) injection 5 mg  5 mg Intravenous Q6H PRN Erik Baez DPM        Or    prochlorperazine (COMPAZINE) tablet 5 mg  5 mg Oral Q6H PRN Erik Baez DPM        senna-docusate (SENOKOT-S/PERICOLACE) 8.6-50 MG per tablet 1 tablet  1 tablet Oral BID PRN Erik Baez DPM        Or    senna-docusate (SENOKOT-S/PERICOLACE) 8.6-50 MG per tablet 2 tablet  2 tablet Oral BID PRN Erik Baez DPM        sodium chloride (PF) 0.9% PF flush 3 mL  3 mL Intracatheter q1 min prn Erik Baez DPM                Data:      All new lab and imaging data was reviewed.   Recent Labs   Lab Test 07/15/24  0816 07/13/24  0012 07/12/24  0729 07/12/23  0852 03/08/22  0650 04/19/21  1151 02/03/21  1003 07/30/19  0712 03/14/17  1520 03/08/17  0720   WBC  --  6.4 9.9  --  10.5   < > 12.0* 5.7   < > 8.4   HGB  --  12.3* 13.9 13.8 8.7*   < > 12.2* 15.0   < > 15.1   MCV  --  93 93  --   "92   < > 94 92   < > 91    227 283  --  353   < > 377 215   < > 240   INR  --   --   --   --   --   --  0.95 0.90  --  0.87    < > = values in this interval not displayed.      Recent Labs   Lab Test 07/17/24  0634 07/17/24  0212 07/16/24  2150 07/13/24  0157 07/13/24  0012 07/12/24  1645 07/12/24  0729 07/12/23  0852 06/27/22  1138 06/10/22  0740   NA  --   --   --   --  135  --  138  --   --  139   POTASSIUM  --   --   --   --  3.7  --  4.3 3.7  --  4.0   CHLORIDE  --   --   --   --  99  --  100  --   --  105   CO2  --   --   --   --  25  --  26  --   --  28   BUN  --   --   --   --  22.8  --  23.0  --   --  28   CR  --   --   --   --  1.01  --  1.00  --   --  0.88   ANIONGAP  --   --   --   --  11  --  12  --   --  6   TIN  --   --   --   --  8.8  --  9.6  --   --  8.9   * 202* 209*   < > 118*   < > 152* 127*   < > 128*    < > = values in this interval not displayed.     No lab results found.    Invalid input(s): \"TROP\", \"TROPONINIES\"      "

## 2024-07-17 NOTE — PLAN OF CARE
Goal Outcome Evaluation:      Plan of Care Reviewed With: patient    Overall Patient Progress: improvingOverall Patient Progress: improving    POD 4 LEFT 3RD TOE AMPUTATION AND HEEL WOUND DEBRIDEMENT. POD 1 LLE angiogram. A&Ox4, forgetful at time. VSS on RA. Denies pain. L groin incision CDI. R food partial amputation, L foot dressing intact, L foot elevated. Non-weight bearing to LLE with surgical shoe, pt refused to wear, educated and encouraged him. Tolerating Mod Carb diet. PIV SL with int abx. BG check. Audible BS, passing flatus, + BM. Void adequately. Up SBA with crutches. ID/Podiatry following. Discharge pending.

## 2024-07-17 NOTE — PROGRESS NOTES
[facility]    Infectious Disease Progress Note    Date of Service (when I saw the patient): 07/17/2024     Assessment & Plan   Zia Hopkins is a 74 year old male who was admitted on 7/12/2024.     Impression:  75 yo male with a history of DM (decently controlled with A1C 7.0), HTN, hyperlipidemia, and PAD s/p numerous vascular interventions to both legs presenting for left femoral artery cutdown with angiogram for probable occluded popliteal artery now found to have wet gangrene of the left 3rd toe as well as mild erythema of the left foot and ankle that started 4 days prior.      -History of right femoral endarterectomy and angioplasty/stenting of bilateral lower extremities for PAD.   -Now with occlusion of left above knee popliteal artery with scheduled left femoral artery cut-down and angiogram put on hold for newly noted wet gangrene of left 3rd toe. MRI suspicious for osteomyelitis.  -S/p partial left 3rd toe amputation and debridement of heel 7/13/24. Operative cultures with MSSA and anaerobes, pathology pending.   -Afebrile. WBC normal.   -Blood cultures growing MSSA from 7/12. Repeat blood cultures from 7/15 no growth to date.   -On Cefazolin.           Recommendations:   All infection resected per Podiatry note. Pathology is pending.   Continue Cefazolin.   Follow blood cultures.    Place midline closer to time of discharge. It appears that he may undergo LLE bypass in the next day or two first.        Patient and plan discussed with Dr. Moya.         Shaina Norton PA-C      Interval History   Tolerating antibiotics ok   No new rashes or issues with antibiotics   Follow-up blood culture still no growth.   Some new eschar noted on tip of 2nd toe. Possibly going for revascularization of left leg today (LLE bypass).   Labs reviewed   No changes to past medical, social or family history         Physical Exam   Temp: 98.4  F (36.9  C) Temp src: Oral BP: (!) 141/68 Pulse: 58    Resp: 16 SpO2: 98 % O2 Device: None (Room air)    Vitals:    07/12/24 0709   Weight: 60.3 kg (133 lb)     Vital Signs with Ranges  Temp:  [97.7  F (36.5  C)-98.4  F (36.9  C)] 98.4  F (36.9  C)  Pulse:  [53-68] 58  Resp:  [10-22] 16  BP: (115-168)/(55-82) 141/68  SpO2:  [95 %-99 %] 98 %    Constitutional: Awake, alert, cooperative, no apparent distress  Lungs: Clear to auscultation bilaterally, no crackles or wheezing  Cardiovascular: Regular rate and rhythm, normal S1 and S2, and no murmur noted  Abdomen: Normal bowel sounds, soft, non-distended, non-tender  Skin: Left foot with dressing. Some eschar noted on tip of left 2nd toe now.   Other:    Medications   Current Facility-Administered Medications   Medication Dose Route Frequency Provider Last Rate Last Admin    Continuing statin from home medication list OR statin order already placed during this visit   Does not apply DOES NOT GO TO Sharon Garcia APRN CNP        Continuing statin from home medication list OR statin order already placed during this visit   Does not apply DOES NOT GO TO Ezekiel Koo MD        dextrose 5% and 0.45% NaCl infusion   Intravenous Continuous Eirk Baez DPM   Stopped at 07/13/24 1015     Current Facility-Administered Medications   Medication Dose Route Frequency Provider Last Rate Last Admin    amLODIPine (NORVASC) tablet 10 mg  10 mg Oral Daily Erik Baez DPM   10 mg at 07/17/24 0852    aspirin (ASA) chewable tablet 81 mg  81 mg Oral Daily Erik Baez DPM   81 mg at 07/17/24 0851    atorvastatin (LIPITOR) tablet 80 mg  80 mg Oral At Bedtime Erik Baez DPM   80 mg at 07/16/24 2225    ceFAZolin (ANCEF) 2 g in 100 mL D5W intermittent infusion  2 g Intravenous Q8H Shaina Norton PA-C 200 mL/hr at 07/17/24 0628 2 g at 07/17/24 0628    chlorthalidone (HYGROTON) tablet 50 mg  50 mg Oral Daily Erik Baez DPM   50 mg at 07/17/24 0851    clopidogrel (PLAVIX) tablet  75 mg  75 mg Oral Daily Erik Baez DPM   75 mg at 07/17/24 0900    empagliflozin (JARDIANCE) tablet 25 mg  25 mg Oral Daily Erik Baez DPM   25 mg at 07/17/24 0852    ezetimibe (ZETIA) tablet 10 mg  10 mg Oral Daily Erik Baez DPM   10 mg at 07/17/24 0851    heparin ANTICOAGULANT injection 5,000 Units  5,000 Units Subcutaneous TID Erik Baez DPM   5,000 Units at 07/17/24 0853    insulin aspart (NovoLOG) injection (RAPID ACTING)  1-10 Units Subcutaneous TID AC Michelle Mejía MD   1 Units at 07/17/24 0853    insulin aspart (NovoLOG) injection (RAPID ACTING)  1-7 Units Subcutaneous At Bedtime Michelle Mejía MD   1 Units at 07/16/24 2230    losartan (COZAAR) tablet 100 mg  100 mg Oral Daily Erik Baez DPM   100 mg at 07/17/24 0852    [Held by provider] metFORMIN (GLUCOPHAGE) tablet 1,000 mg  1,000 mg Oral BID w/meals Maryann Lewis MD   1,000 mg at 07/12/24 2017    metoprolol succinate ER (TOPROL XL) 24 hr tablet 100 mg  100 mg Oral Daily Erik Baez DPM   100 mg at 07/17/24 0852    polyethylene glycol (MIRALAX) Packet 17 g  17 g Oral Daily Erik Baez DPM        senna-docusate (SENOKOT-S/PERICOLACE) 8.6-50 MG per tablet 1 tablet  1 tablet Oral BID Erik Baez DPM   1 tablet at 07/15/24 2119    sodium chloride (PF) 0.9% PF flush 3 mL  3 mL Intracatheter Q8H Erik Baez DPM   3 mL at 07/17/24 0628       Data   All microbiology laboratory data reviewed.  Recent Labs   Lab Test 07/15/24  0816 07/13/24  0012 07/12/24  0729 07/12/23  0852 03/08/22  0650   WBC  --  6.4 9.9  --  10.5   HGB  --  12.3* 13.9 13.8 8.7*   HCT  --  36.8* 41.5  --  26.9*   MCV  --  93 93  --  92    227 283  --  353     Recent Labs   Lab Test 07/13/24  0012 07/12/24  0729 06/10/22  0740   CR 1.01 1.00 0.88     Recent Labs   Lab Test 07/12/24  0729   SED 32*     CRP Inflammation   Date Value Ref Range Status   07/13/2024 15.10 (H) <5.00 mg/L Final       07/15/2024 0900 07/17/2024 0916 Blood Culture Arm, Right [[id number]]   Blood from Arm, Right    Preliminary result Component Value   Culture No growth after 2 days P             07/15/2024 0900 07/17/2024 0916 Blood Culture Arm, Right [[id number]]   Blood from Arm, Right    Preliminary result Component Value   Culture No growth after 2 days P             07/13/2024 1146 07/16/2024 1142 Anaerobic Bacterial Culture Routine [[id number]]   (Abnormal)   Tissue from Toe, Left    Preliminary result Component Value   Culture 4+ Finegoldia magna Abnormal  P    Susceptibilities not routinely done, refer to antibiogram to view typical susceptibility profiles    4+ Anaerococcus species Abnormal  P    Identification obtained by MALDI-TOF mass spectrometry research use only database. Test characteristics determined and verified by the Infectious Diseases Diagnostic Laboratory.  Susceptibilities not routinely done, refer to antibiogram to view typical susceptibility profiles             07/13/2024 1146 07/13/2024 1715 Gram Stain [[id number]]   (Abnormal)   Tissue from Toe, Left    Final result Component Value   GS Culture See corresponding culture for results   Gram Stain Result 4+ Gram positive cocci Abnormal    Gram Stain Result 1+ Gram positive bacilli Abnormal    Gram Stain Result 1+ Gram negative bacilli Abnormal    Gram Stain Result 2+ WBC seen Abnormal           07/13/2024 1146 07/17/2024 0938 Tissue Aerobic Bacterial Culture Routine [[id number]]    (Abnormal)   Tissue from Toe, Left    Final result Component Value   Culture 4+ Staphylococcus aureus Abnormal     4+ Normal ervin       Susceptibility     Staphylococcus aureus     MENG     Clindamycin  Resistant 1     Daptomycin 0.5 ug/mL Susceptible     Doxycycline <=0.5 ug/mL Susceptible     Erythromycin >=8 ug/mL Resistant     Gentamicin <=0.5 ug/mL Susceptible     Oxacillin 2 ug/mL Susceptible 2     Tetracycline <=1 ug/mL Susceptible      Trimethoprim/Sulfamethoxazole <=0.5/9.5 u... Susceptible     Vancomycin 1 ug/mL Susceptible              1 This isolate is presumed to be clindamycin resistant based on detection of inducible clindamycin resistance. Erythromycin and clindamycin are resistant; therefore, they are not recommended for use.   2 Oxacillin susceptible isolates are susceptible to cephalosporins (example: cefazolin and cephalexin) and beta lactam combination agents. Oxacillin resistant isolates are resistant to these agents.         Susceptibility Comments    Staphylococcus aureus            07/12/2024 0734 07/15/2024 0735 Blood Culture Arm, Right [[id number]]   (Abnormal)   Blood from Arm, Right    Final result Component Value   Culture Positive on the 1st day of incubation Abnormal     Staphylococcus aureus Panic     2 of 2 bottles  Susceptibilities done on previous cultures             07/12/2024 0729 07/15/2024 0747 Blood Culture Arm, Left [[id number]]    (Abnormal)   Blood from Arm, Left    Final result Component Value   Culture Positive on the 1st day of incubation Abnormal     Staphylococcus aureus Panic     1 of 2 bottles       Susceptibility     Staphylococcus aureus     MENG     Clindamycin  Resistant 1     Daptomycin 0.25 ug/mL Susceptible     Doxycycline <=0.5 ug/mL Susceptible     Erythromycin >=8 ug/mL Resistant     Gentamicin <=0.5 ug/mL Susceptible     Oxacillin 1 ug/mL Susceptible 2     Tetracycline <=1 ug/mL Susceptible     Trimethoprim/Sulfamethoxazole <=0.5/9.5 u... Susceptible     Vancomycin 1 ug/mL Susceptible

## 2024-07-17 NOTE — PLAN OF CARE
Date & Time: 07/17/24 5332-8092    Surgery/POD#3: Here for femoral artery cutdown 7/12 - surgery postponed d/t wet gangrene and cellulitis. 7/13: L 3rd toe amputation and heel wound debridement   Behavior & Aggression: Green  Fall Risk: No  Orientation: Aox4  ABNL VS/O2: VSS on RA ex bradycardic  ABNL Labs: Operative cultures/pathology pending. ID following blood cultures.  Pain Management: Denies   Bowel/Bladder: Continent, voiding adequately in BR  Drains: PIV x1 SL with int abx   Wounds/incisions: L foot wrapped (on wedge) NWB w/ surgical shoe; R foot partial amputation  Diet: Mod Carb  Activity Level: Independent w/ crutches. Non-weight-bearing on LLE. Surgical shoe in room for ambulation  Tests/Procedures: Fem to tibial bypass possibly Saturday 7/20. Unable to get patient on OR board today  Anticipated  DC Date: Pending ability to get a spot in the OR  Significant Information: Pt refusing PCDs and wedge pillow, reiterated education.

## 2024-07-17 NOTE — PROGRESS NOTES
"VASCULAR SURGERY PROGRESS NOTE    Subjective:  Resting comfortably in bed. No acute events overnight.    Objective:  Intake/Output Summary (Last 24 hours) at 7/17/2024 0802  Last data filed at 7/16/2024 1400  Gross per 24 hour   Intake 180 ml   Output --   Net 180 ml     PHYSICAL EXAM:  BP (!) 141/68 (BP Location: Right arm)   Pulse 58   Temp 98.4  F (36.9  C) (Oral)   Resp 16   Ht 1.6 m (5' 3\")   Wt 60.3 kg (133 lb)   SpO2 98%   BMI 23.56 kg/m    Gen: Awake, alert, NAD  CV: RRR per PT doppler pulse  Resp: NLB on RA  Ext: LLE dressing in tact, 2nd toe remains erythematous, slightly improved  Vascular: Monophasic LLE PT signal.       ASSESSMENT:  74 year old male with PMH of T2DM, HTN, HLD, CKD3, and PAD who presented for LLE femoral cutdown and angiogram found to have L 3rd digit gangrene w/ cellulitis and admitted for IV Abx. Now s/p L 3rd toe amputation with podiatry on 7/13. Blood cultures are growing MSSA, remains on IV ceftriaxone. 7/15 noted to have new gangrenous changes now to L 2nd toe with erythema surrounding.           PLAN:  -NWB on LLE, continue to use post op shoe at all times  -continue IV antibiotics, will require PICC, hold off until BC negative  -continue PTA medications-aspirin,plavix, statin  -NPO after breakfast today, possible OR this afternoon for LLE bypass with GSV, will update if delayed until Saturday  -no growth on blood cultures today-will continue to monitor    Discussed pt history, exam, assessment and plan with Dr. Cortés of the vascular surgery service, who is in agreement with the above.    Nallely Trujillo NP  VASCULAR SURGERY     STAFF: With ongoing toe issues and now ischemia of the other toe we feel that bypass graft is necessary.  Would plan a left femoral to tibial bypass using the GSV.  Most likely to the mid posterior tibial artery.  Tibial arteries are markedly calcified and hoping we can find a spot that would be amenable to the distal anastomosis.  Discussed with " patient and his wife and they are willing to proceed.  Possibly surgery later this afternoon but will depend on the OR schedule in cases that we have prior to this.    I did review and discussed the angiogram performed yesterday with patient and wife.       Parrish Cortés MD

## 2024-07-17 NOTE — PROGRESS NOTES
Vascular Surgery Progress Note    Patient seen in room, notably standing up on both feet not on crutches.     Discussed with patient that he was to remain bedrest for 6 hours post procedure which ended around 445pm and thus he should return to bedrest at this time. Also reminded him of NWB to LLE and that he should have postop shoe on at all times.     On exam:   L groin access site soft, no hematoma, no drainage.   Monophasic doppler AT signal in LLE.       LLE diagnostic angiogram reviewed, patient with patent CFA with some evidence of narrowing, patent SFA and profunda although SFA with diffuse areas of stenosis, SFA stents patent however. Occlusion of L distal SFA and above knee popliteal artery with significant collaterals here with reconstitution in the distal above knee, narrowing of the P2 segment.   Takeoff of AT is small however AT is main runoff to the foot, peroneal occluded proximally, PT with mid calf occlusion with reconstitution and flow to the foot as well.     Vein mapping reveals LLE with GSV of at least 2.8mm throughout entire LLE including to the ankle, majority >3mm.     Given above, patient has adequate conduit for possible LLE CFA to AT bypass with GSV if plan is to proceed with this. Formal plans for OR  ongoing pending staff discussion.     - Continue 6 hour bedrest per IR  - Monitor L groin for swelling, bleeding, hematoma - alert vascular surgery stat per below if swelling develops   - NWB LLE  - Pt should have postop shoe on at all times if out of bed for protection of foot.     Naeem Lewis MD  Vascular Surgery PGY4  To reach vascular surgery claude team on call, please go to [facility] and from the drop down find the following:   VASCULAR SURGERY/CLAUDE [facility]  Then page the person listed to the right of day/night call. Can click the pager to page.

## 2024-07-18 ENCOUNTER — APPOINTMENT (OUTPATIENT)
Dept: PHYSICAL THERAPY | Facility: CLINIC | Age: 74
DRG: 253 | End: 2024-07-18
Attending: SURGERY
Payer: COMMERCIAL

## 2024-07-18 DIAGNOSIS — I70.222 CRITICAL LIMB ISCHEMIA OF LEFT LOWER EXTREMITY (H): Primary | ICD-10-CM

## 2024-07-18 LAB
ANION GAP SERPL CALCULATED.3IONS-SCNC: 12 MMOL/L (ref 7–15)
BASOPHILS # BLD AUTO: 0.1 10E3/UL (ref 0–0.2)
BASOPHILS NFR BLD AUTO: 1 %
BUN SERPL-MCNC: 28.7 MG/DL (ref 8–23)
CALCIUM SERPL-MCNC: 9.2 MG/DL (ref 8.8–10.4)
CHLORIDE SERPL-SCNC: 102 MMOL/L (ref 98–107)
CREAT SERPL-MCNC: 0.99 MG/DL (ref 0.67–1.17)
EGFRCR SERPLBLD CKD-EPI 2021: 79 ML/MIN/1.73M2
EOSINOPHIL # BLD AUTO: 0.6 10E3/UL (ref 0–0.7)
EOSINOPHIL NFR BLD AUTO: 8 %
ERYTHROCYTE [DISTWIDTH] IN BLOOD BY AUTOMATED COUNT: 12.3 % (ref 10–15)
GLUCOSE BLDC GLUCOMTR-MCNC: 121 MG/DL (ref 70–99)
GLUCOSE BLDC GLUCOMTR-MCNC: 145 MG/DL (ref 70–99)
GLUCOSE BLDC GLUCOMTR-MCNC: 145 MG/DL (ref 70–99)
GLUCOSE BLDC GLUCOMTR-MCNC: 155 MG/DL (ref 70–99)
GLUCOSE BLDC GLUCOMTR-MCNC: 218 MG/DL (ref 70–99)
GLUCOSE SERPL-MCNC: 136 MG/DL (ref 70–99)
HCO3 SERPL-SCNC: 23 MMOL/L (ref 22–29)
HCT VFR BLD AUTO: 37.9 % (ref 40–53)
HGB BLD-MCNC: 12.4 G/DL (ref 13.3–17.7)
IMM GRANULOCYTES # BLD: 0 10E3/UL
IMM GRANULOCYTES NFR BLD: 0 %
LYMPHOCYTES # BLD AUTO: 1.2 10E3/UL (ref 0.8–5.3)
LYMPHOCYTES NFR BLD AUTO: 15 %
MCH RBC QN AUTO: 30.2 PG (ref 26.5–33)
MCHC RBC AUTO-ENTMCNC: 32.7 G/DL (ref 31.5–36.5)
MCV RBC AUTO: 92 FL (ref 78–100)
MONOCYTES # BLD AUTO: 0.6 10E3/UL (ref 0–1.3)
MONOCYTES NFR BLD AUTO: 8 %
NEUTROPHILS # BLD AUTO: 5.6 10E3/UL (ref 1.6–8.3)
NEUTROPHILS NFR BLD AUTO: 69 %
NRBC # BLD AUTO: 0 10E3/UL
NRBC BLD AUTO-RTO: 0 /100
PATH REPORT.COMMENTS IMP SPEC: NORMAL
PATH REPORT.COMMENTS IMP SPEC: NORMAL
PATH REPORT.FINAL DX SPEC: NORMAL
PATH REPORT.GROSS SPEC: NORMAL
PATH REPORT.MICROSCOPIC SPEC OTHER STN: NORMAL
PATH REPORT.RELEVANT HX SPEC: NORMAL
PHOTO IMAGE: NORMAL
PLATELET # BLD AUTO: 277 10E3/UL (ref 150–450)
POTASSIUM SERPL-SCNC: 3.6 MMOL/L (ref 3.4–5.3)
RBC # BLD AUTO: 4.11 10E6/UL (ref 4.4–5.9)
SODIUM SERPL-SCNC: 137 MMOL/L (ref 135–145)
WBC # BLD AUTO: 8.1 10E3/UL (ref 4–11)

## 2024-07-18 PROCEDURE — 120N000001 HC R&B MED SURG/OB

## 2024-07-18 PROCEDURE — 88311 DECALCIFY TISSUE: CPT | Mod: 26 | Performed by: STUDENT IN AN ORGANIZED HEALTH CARE EDUCATION/TRAINING PROGRAM

## 2024-07-18 PROCEDURE — 250N000011 HC RX IP 250 OP 636: Performed by: PODIATRIST

## 2024-07-18 PROCEDURE — 85025 COMPLETE CBC W/AUTO DIFF WBC: CPT | Performed by: HOSPITALIST

## 2024-07-18 PROCEDURE — 99232 SBSQ HOSP IP/OBS MODERATE 35: CPT | Performed by: HOSPITALIST

## 2024-07-18 PROCEDURE — 99231 SBSQ HOSP IP/OBS SF/LOW 25: CPT | Performed by: SURGERY

## 2024-07-18 PROCEDURE — 250N000013 HC RX MED GY IP 250 OP 250 PS 637: Performed by: PODIATRIST

## 2024-07-18 PROCEDURE — 88305 TISSUE EXAM BY PATHOLOGIST: CPT | Mod: 26 | Performed by: STUDENT IN AN ORGANIZED HEALTH CARE EDUCATION/TRAINING PROGRAM

## 2024-07-18 PROCEDURE — 99233 SBSQ HOSP IP/OBS HIGH 50: CPT | Performed by: PODIATRIST

## 2024-07-18 PROCEDURE — 97116 GAIT TRAINING THERAPY: CPT | Mod: GP | Performed by: PHYSICAL THERAPIST

## 2024-07-18 PROCEDURE — 99232 SBSQ HOSP IP/OBS MODERATE 35: CPT | Performed by: PHYSICIAN ASSISTANT

## 2024-07-18 PROCEDURE — 250N000011 HC RX IP 250 OP 636: Mod: JZ | Performed by: PHYSICIAN ASSISTANT

## 2024-07-18 PROCEDURE — 36415 COLL VENOUS BLD VENIPUNCTURE: CPT | Performed by: HOSPITALIST

## 2024-07-18 PROCEDURE — 80048 BASIC METABOLIC PNL TOTAL CA: CPT | Performed by: HOSPITALIST

## 2024-07-18 RX ADMIN — EZETIMIBE 10 MG: 10 TABLET ORAL at 09:29

## 2024-07-18 RX ADMIN — HEPARIN SODIUM 5000 UNITS: 5000 INJECTION, SOLUTION INTRAVENOUS; SUBCUTANEOUS at 09:28

## 2024-07-18 RX ADMIN — CEFAZOLIN SODIUM 2 G: 2 INJECTION, SOLUTION INTRAVENOUS at 22:24

## 2024-07-18 RX ADMIN — HEPARIN SODIUM 5000 UNITS: 5000 INJECTION, SOLUTION INTRAVENOUS; SUBCUTANEOUS at 16:34

## 2024-07-18 RX ADMIN — CEFAZOLIN SODIUM 2 G: 2 INJECTION, SOLUTION INTRAVENOUS at 05:51

## 2024-07-18 RX ADMIN — LOSARTAN POTASSIUM 100 MG: 100 TABLET, FILM COATED ORAL at 09:29

## 2024-07-18 RX ADMIN — ASPIRIN 81 MG CHEWABLE TABLET 81 MG: 81 TABLET CHEWABLE at 09:28

## 2024-07-18 RX ADMIN — CEFAZOLIN SODIUM 2 G: 2 INJECTION, SOLUTION INTRAVENOUS at 14:33

## 2024-07-18 RX ADMIN — CHLORTHALIDONE 50 MG: 25 TABLET ORAL at 09:29

## 2024-07-18 RX ADMIN — HEPARIN SODIUM 5000 UNITS: 5000 INJECTION, SOLUTION INTRAVENOUS; SUBCUTANEOUS at 22:24

## 2024-07-18 RX ADMIN — ATORVASTATIN CALCIUM 80 MG: 80 TABLET, FILM COATED ORAL at 22:24

## 2024-07-18 RX ADMIN — AMLODIPINE BESYLATE 10 MG: 10 TABLET ORAL at 09:29

## 2024-07-18 RX ADMIN — METOPROLOL SUCCINATE 100 MG: 100 TABLET, EXTENDED RELEASE ORAL at 09:29

## 2024-07-18 RX ADMIN — EMPAGLIFLOZIN 25 MG: 25 TABLET, FILM COATED ORAL at 09:29

## 2024-07-18 ASSESSMENT — ACTIVITIES OF DAILY LIVING (ADL)
ADLS_ACUITY_SCORE: 26
ADLS_ACUITY_SCORE: 23
ADLS_ACUITY_SCORE: 26
ADLS_ACUITY_SCORE: 23
ADLS_ACUITY_SCORE: 23
ADLS_ACUITY_SCORE: 26
ADLS_ACUITY_SCORE: 25
ADLS_ACUITY_SCORE: 26
ADLS_ACUITY_SCORE: 23
ADLS_ACUITY_SCORE: 26
ADLS_ACUITY_SCORE: 23
ADLS_ACUITY_SCORE: 26
ADLS_ACUITY_SCORE: 25

## 2024-07-18 NOTE — PLAN OF CARE
Goal Outcome Evaluation:      Plan of Care Reviewed With: patient    Overall Patient Progress: improvingOverall Patient Progress: improving    POD 5 LEFT 3RD TOE AMPUTATION AND HEEL WOUND DEBRIDEMENT. POD 2 LLE angiogram. A&Ox4. VSS on RA. Denies pain. L groin incision CDI. R food partial amputation, L foot dressing inplace, L foot elevated. Non-weight bearing to LLE with surgical shoe. Tolerating Mod Carb diet. PIV SL with int abx. BG check. Audible BS, passing flatus, + BM. Void adequately. Up independantly with crutches. ID/Podiatry following. Discharge pending.

## 2024-07-18 NOTE — PROGRESS NOTES
Date & Time: 1500-1930 7/18/2024  Surgery/POD#:3 LLE angiogram. Here for femoral artery cutdown 7/12 - surgery postponed d/t wet gangrene and cellulitis. 7/13: L 3rd toe amputation and heel wound debridement   Behavior & Aggression: green  Fall Risk: No  Orientation:Aox4   ABNL VS/O2: VSS on RA, except bradycardic  ABNL Labs: blood cultures pending , ID following   Pain Management:denies  Bowel/Bladder: continent , voiding in BR   Drains: R PIV SL, int abx  Wounds/incisions: L foot wraped NWB w/ surgical shoe, R foot partial amputation, L groin inc CDI  Diet: mod carb  Activity Level: independent w crutches. Non weight bearing on LLE  Tests/Procedures: fem to tibial bypass scheduled for sat 7/20  Anticipated  DC Date: TBD surgery on sat   Significant Information: ACHS BG checks - 218 - 4 units given

## 2024-07-18 NOTE — PROGRESS NOTES
VASCULAR SURGERY.      No complaints.  Up out of bed and tolerating diet    Unable to get on the OR schedule last evening.  Tentatively plan left femoral to tibial in situ bypass graft for Saturday since unlikely to be on the OR schedule at a reasonable time tomorrow and this was discussed with patient this morning and also patient and family last evening infectious disease wants longer-term antibiotics.       Will place PICC line which will give us better IV access.    Will place Plavix on hold due to upcoming surgery and start postoperatively    Parrish Cortés MD

## 2024-07-18 NOTE — PROGRESS NOTES
[facility]    Infectious Disease Progress Note    Date of Service (when I saw the patient): 07/18/2024     Assessment & Plan   Zia Hopkins is a 74 year old male who was admitted on 7/12/2024.     Impression:  75 yo male with a history of DM (decently controlled with A1C 7.0), HTN, hyperlipidemia, and PAD s/p numerous vascular interventions to both legs presenting for left femoral artery cutdown with angiogram for probable occluded popliteal artery now found to have wet gangrene of the left 3rd toe as well as mild erythema of the left foot and ankle that started 4 days prior.      -History of right femoral endarterectomy and angioplasty/stenting of bilateral lower extremities for PAD.   -Now with occlusion of left above knee popliteal artery with scheduled left femoral artery cut-down and angiogram put on hold for newly noted wet gangrene of left 3rd toe. MRI suspicious for osteomyelitis.  -S/p partial left 3rd toe amputation and debridement of heel 7/13/24. Operative cultures with MSSA and anaerobes, pathology pending.   -Afebrile. WBC normal.   -Blood cultures growing MSSA from 7/12. Repeat blood cultures from 7/15 no growth to date.   -On Cefazolin.           Recommendations:   All infection resected per Podiatry note. Pathology is pending.   Continue Cefazolin. Will need 4 weeks total for bacteremia (last day of therapy will be 8/12/24).  OK to place midline at anytime.  He is scheduled to undergo LLE bypass this Saturday.       Patient and plan discussed with Dr. Moya.         Shaina Norton PA-C      Interval History   Tolerating antibiotics ok   No new rashes or issues with antibiotics   Follow-up blood culture still no growth.   Some new eschar noted on tip of 2nd toe. Awaiting LLE bypass Saturday. Feeling well today.   Labs reviewed   No changes to past medical, social or family history         Physical Exam   Temp: 97.8  F (36.6  C) Temp src: Oral BP: 137/65 Pulse: 54   Resp:  16 SpO2: 96 % O2 Device: None (Room air)    Vitals:    07/12/24 0709   Weight: 60.3 kg (133 lb)     Vital Signs with Ranges  Temp:  [97.8  F (36.6  C)-98.4  F (36.9  C)] 97.8  F (36.6  C)  Pulse:  [50-54] 54  Resp:  [16-17] 16  BP: (113-137)/(57-65) 137/65  SpO2:  [95 %-96 %] 96 %    Constitutional: Awake, alert, cooperative, no apparent distress  Lungs: Clear to auscultation bilaterally, no crackles or wheezing  Cardiovascular: Regular rate and rhythm, normal S1 and S2, and no murmur noted  Abdomen: Normal bowel sounds, soft, non-distended, non-tender  Skin: Left foot with dressing. Some eschar noted on tip of left 2nd toe now.   Other:    Medications   Current Facility-Administered Medications   Medication Dose Route Frequency Provider Last Rate Last Admin    Continuing statin from home medication list OR statin order already placed during this visit   Does not apply DOES NOT GO TO Sharon Garcia APRN CNP        Continuing statin from home medication list OR statin order already placed during this visit   Does not apply DOES NOT GO TO Ezekiel Koo MD        dextrose 5% and 0.45% NaCl infusion   Intravenous Continuous Erik Baez DPM   Stopped at 07/13/24 1015     Current Facility-Administered Medications   Medication Dose Route Frequency Provider Last Rate Last Admin    amLODIPine (NORVASC) tablet 10 mg  10 mg Oral Daily Erik Baez DPM   10 mg at 07/18/24 0929    aspirin (ASA) chewable tablet 81 mg  81 mg Oral Daily Erik Baez DPM   81 mg at 07/18/24 0928    atorvastatin (LIPITOR) tablet 80 mg  80 mg Oral At Bedtime Erik Baez DPM   80 mg at 07/17/24 2203    ceFAZolin (ANCEF) 2 g in 100 mL D5W intermittent infusion  2 g Intravenous Q8H Shaina Norton PA-C 200 mL/hr at 07/18/24 0551 2 g at 07/18/24 0551    chlorthalidone (HYGROTON) tablet 50 mg  50 mg Oral Daily Erik Baez DPM   50 mg at 07/18/24 0929    empagliflozin (JARDIANCE) tablet 25  mg  25 mg Oral Daily Erik Baez DPM   25 mg at 07/18/24 0929    ezetimibe (ZETIA) tablet 10 mg  10 mg Oral Daily Erik Baez DPM   10 mg at 07/18/24 0929    heparin ANTICOAGULANT injection 5,000 Units  5,000 Units Subcutaneous TID Erik Baez DPM   5,000 Units at 07/18/24 0928    insulin aspart (NovoLOG) injection (RAPID ACTING)  1-10 Units Subcutaneous TID AC Michelle Mejía MD   1 Units at 07/18/24 0929    insulin aspart (NovoLOG) injection (RAPID ACTING)  1-7 Units Subcutaneous At Bedtime Michelle Mejía MD   1 Units at 07/16/24 2230    losartan (COZAAR) tablet 100 mg  100 mg Oral Daily Erik Baez DPM   100 mg at 07/18/24 0929    [Held by provider] metFORMIN (GLUCOPHAGE) tablet 1,000 mg  1,000 mg Oral BID w/meals Maryann Lewis MD   1,000 mg at 07/12/24 2017    metoprolol succinate ER (TOPROL XL) 24 hr tablet 100 mg  100 mg Oral Daily Erik Baez DPM   100 mg at 07/18/24 0929    polyethylene glycol (MIRALAX) Packet 17 g  17 g Oral Daily Erik Baez DPM        senna-docusate (SENOKOT-S/PERICOLACE) 8.6-50 MG per tablet 1 tablet  1 tablet Oral BID Erik Baez DPM   1 tablet at 07/15/24 2119    sodium chloride (PF) 0.9% PF flush 3 mL  3 mL Intracatheter Q8H Erik Baze DPM   3 mL at 07/18/24 0552       Data   All microbiology laboratory data reviewed.  Recent Labs   Lab Test 07/18/24  0709 07/15/24  0816 07/13/24  0012 07/12/24  0729   WBC 8.1  --  6.4 9.9   HGB 12.4*  --  12.3* 13.9   HCT 37.9*  --  36.8* 41.5   MCV 92  --  93 93    292 227 283     Recent Labs   Lab Test 07/18/24  0709 07/13/24  0012 07/12/24  0729   CR 0.99 1.01 1.00     Recent Labs   Lab Test 07/12/24  0729   SED 32*     CRP Inflammation   Date Value Ref Range Status   07/13/2024 15.10 (H) <5.00 mg/L Final             07/15/2024 0900 07/18/2024 0917 Blood Culture Arm, Right [[id number]]   Blood from Arm, Right    Preliminary result Component Value   Culture No  growth after 3 days P             07/15/2024 0900 07/18/2024 0917 Blood Culture Arm, Right [[id number]]   Blood from Arm, Right    Preliminary result Component Value   Culture No growth after 3 days P             07/13/2024 1146 07/17/2024 1324 Anaerobic Bacterial Culture Routine [[id number]]   (Abnormal)   Tissue from Toe, Left    Final result Component Value   Culture 4+ Finegoldia magna Abnormal     Susceptibilities not routinely done, refer to antibiogram to view typical susceptibility profiles  This organism is considered part of Normal ervin    4+ Anaerococcus species Abnormal     Identification obtained by MALDI-TOF mass spectrometry research use only database. Test characteristics determined and verified by the Infectious Diseases Diagnostic Laboratory.  Susceptibilities not routinely done, refer to antibiogram to view typical susceptibility profiles  This organism is considered part of Normal ervin    4+ Mixed Aerobic and Anaerobic ervin             07/13/2024 1146 07/13/2024 1715 Gram Stain [[id number]]   (Abnormal)   Tissue from Toe, Left    Final result Component Value   GS Culture See corresponding culture for results   Gram Stain Result 4+ Gram positive cocci Abnormal    Gram Stain Result 1+ Gram positive bacilli Abnormal    Gram Stain Result 1+ Gram negative bacilli Abnormal    Gram Stain Result 2+ WBC seen Abnormal           07/13/2024 1146 07/17/2024 0938 Tissue Aerobic Bacterial Culture Routine [[id number]]    (Abnormal)   Tissue from Toe, Left    Final result Component Value   Culture 4+ Staphylococcus aureus Abnormal     4+ Normal ervin       Susceptibility     Staphylococcus aureus     MENG     Clindamycin  Resistant 1     Daptomycin 0.5 ug/mL Susceptible     Doxycycline <=0.5 ug/mL Susceptible     Erythromycin >=8 ug/mL Resistant     Gentamicin <=0.5 ug/mL Susceptible     Oxacillin 2 ug/mL Susceptible 2     Tetracycline <=1 ug/mL Susceptible     Trimethoprim/Sulfamethoxazole <=0.5/9.5  u... Susceptible     Vancomycin 1 ug/mL Susceptible              1 This isolate is presumed to be clindamycin resistant based on detection of inducible clindamycin resistance. Erythromycin and clindamycin are resistant; therefore, they are not recommended for use.   2 Oxacillin susceptible isolates are susceptible to cephalosporins (example: cefazolin and cephalexin) and beta lactam combination agents. Oxacillin resistant isolates are resistant to these agents.         Susceptibility Comments    Staphylococcus aureus            07/12/2024 0734 07/15/2024 0735 Blood Culture Arm, Right [[id number]]   (Abnormal)   Blood from Arm, Right    Final result Component Value   Culture Positive on the 1st day of incubation Abnormal     Staphylococcus aureus Panic     2 of 2 bottles  Susceptibilities done on previous cultures             07/12/2024 0729 07/15/2024 0747 Blood Culture Arm, Left [[id number]]    (Abnormal)   Blood from Arm, Left    Final result Component Value   Culture Positive on the 1st day of incubation Abnormal     Staphylococcus aureus Panic     1 of 2 bottles       Susceptibility     Staphylococcus aureus     MENG     Clindamycin  Resistant 1     Daptomycin 0.25 ug/mL Susceptible     Doxycycline <=0.5 ug/mL Susceptible     Erythromycin >=8 ug/mL Resistant     Gentamicin <=0.5 ug/mL Susceptible     Oxacillin 1 ug/mL Susceptible 2     Tetracycline <=1 ug/mL Susceptible     Trimethoprim/Sulfamethoxazole <=0.5/9.5 u... Susceptible     Vancomycin 1 ug/mL Susceptible                        complains of pain/discomfort

## 2024-07-18 NOTE — PROGRESS NOTES
[facility]  Hospitalist Progress Note        Mohit Gould MD   07/18/2024        Interval History:        - bypass surgery per vascular rescheduled for 7/20/24; Plavix held; resume when okay with surgery  - ID plans for total 4 weeks of IV antibiotics for bacteremia (until 8/12/24) and okay to place midline at any time         Assessment and Plan:        Zia Hopkins is a 74 year old male with history of DM2, HTN, HLP, CKD 3, PAD, who presented on 7/12/2024 for scheduled left femoral artery cutdown and LLE angiogram but was found to have left 3rd toe wet gangrene with cellulitis and developing osteomyelitis.  He was admitted for IV antibiotics, podiatry and infectious disease consultation.  Blood cultures obtained on admission are growing gram-positive cocci.      Hospitalist service consulted on 7/13 for management of diabetes and other comorbidities.        Left foot 3rd digit gangrene with cellulitis and developing osteomyelitis  Left heel ulcer with eschar, s/p partial left third toe amputation with osteotomy through proximal phalanx, 7/13/2024  Left heel ulcer with eschar, s/p debridement down to/including subcutaneous fat, 7/13/2024  MSSA bacteremia  S/p multiple previous bilateral toe amputations   - MRI showed ulceration at tip of third toe extending to bone margin exposed middle phalanx.  Some T2 signal abnormality noted.  Osteomyelitis could not be excluded.  - CRP 15.  No leukocytosis, afebrile  - 2 of 2 sets of blood cultures obtained on 7/12 are growing MSSA  - Podiatry following, s/p partial left 3rd toe amputation with osteotomy through proximal phalanx and debridement of left heel ulcer on 7/13/2024   - on 7/16: podiatry noted some ischemic changes to left 2 nd toe as well, debrided, noted some exposed bone at wound bed; might need further debridement versus partial toe amputation  - PT for heel weight bearing training in surgical shoe  - ID following, currently on IV  "Cefazolin; repeated blood cultures 7/15-- no growth so far  - ID plans for total 4 weeks of IV antibiotics for bacteremia (until 8/12/24) and okay to place midline at any time     Peripheral arterial disease with critical limb ischemia of bilateral lower extremities  S/p multiple vascular surgery interventions to bilateral lower extremities    Left 2nd toe ischemic changes  - CTA A/P with runoff on 7/12 showed occlusion of left above-knee popliteal artery with distal reconstitution, patent bilateral SFA stents, diffuse aortoiliac and lower extremity atherosclerotic disease  - completed LLE angiogram 7/16/24 and noted per vascular surgery \"long segment occlusion of the left above-knee popliteal artery with some tibial involvement\"  - planned for LLE bypass 7/20/24  - management as per vascular surgery, following as primary  - Continue aspirin 81 mg, Plavix held; resume when okay with surgery     Diabetes mellitus type 2, with peripheral neuropathy and CKD 3, controlled with A1c 7 on 7/12/2024  PTA-empagliflozin 25 mg, metformin 1000 mg twice daily  - Blood sugars reasonably well-controlled; A1c 7.0  - Continue empagliflozin; will continue to hold metformin for now given recent angiogram  - Continue NovoLog sliding scale with hypoglycemia protocol     Benign essential hypertension  PTA-amlodipine 10 mg, chlorthalidone 50 mg, losartan 100 mg, Toprol- mg daily  - Blood pressure well-controlled.  - Continue PTA amlodipine, chlorthalidone, losartan     Hyperlipidemia-LDL 62, HDL 62, 7/12/2024  PTA-atorvastatin 80 mg, Zetia 10 mg  - Hyperlipidemia is well-controlled.  Continue atorvastatin and Zetia     Chronic kidney disease stage III  - Baseline creatinine around 1.  Creatinine currently stable and at baseline.  Monitor     Previous vascular surgery procedures  Right common femoral and superficial femoral endarterectomy with patch angioplasty and right femoral artery angioplasty with stenting, 3/2022  2. LLE " "angiogram and balloon angioplasty of anterior and posterior tibial arteries, 4/2021  3. Left popliteal angioplasty and left SFA stenting 2017 followed by stenting of in-stent stenosis 7/2019   4. LLE arteriogram with balloon angioplasty of the left popliteal artery, recanalization of posterior tibial artery, and extension of the stent in the left superficial femoral artery 2/2021       Diet: Moderate Consistent Carb (60 g CHO per Meal) Diet      DVT Prophylaxis: subcutaneous heparin TID    Code status: full code    Disposition:   Medically Ready for Discharge: Anticipated in 2-4 Days per primary vascular surgery service, pending podiatry clearance and infectious disease plan for antibiotics  - planned for LLE bypass on 7/20/24  - will need midline prior to discharge   -  following for disposition       Clinically Significant Risk Factors                                # DMII: A1C = 7.0 % (Ref range: <5.7 %) within past 6 months       # Financial/Environmental Concerns: none              Page Me (7 am to 6 pm)    Care plan discussed with patient and his wife               Physical Exam:      Blood pressure 137/65, pulse 54, temperature 97.8  F (36.6  C), temperature source Oral, resp. rate 16, height 1.6 m (5' 3\"), weight 60.3 kg (133 lb), SpO2 96%.  Vitals:    07/12/24 0709   Weight: 60.3 kg (133 lb)     Vital Signs with Ranges  Temp:  [97.8  F (36.6  C)-98.4  F (36.9  C)] 97.8  F (36.6  C)  Pulse:  [50-54] 54  Resp:  [16-17] 16  BP: (113-137)/(57-65) 137/65  SpO2:  [95 %-96 %] 96 %  I/O's Last 24 hours  I/O last 3 completed shifts:  In: 360 [P.O.:360]  Out: -     Constitutional: Alert, awake and oriented X 3; resting comfortably in no apparent distress       Oral cavity: Moist mucosa   Cardiovascular: Normal s1 s2, regular rate and rhythm, no murmur   Lungs: B/l clear to auscultation, no wheezes or crepitations   Abdomen: Soft, nt, nd, no guarding, rigidity or rebound; BS +   LE : Left foot in " dressing, partial toe amputation status   Musculoskeletal/Neuro Power 5/5 in all extremities; No focal neurological deficits noted   Psychiatry: normal mood and affect                Medications:        Current Facility-Administered Medications   Medication Dose Route Frequency Provider Last Rate Last Admin    amLODIPine (NORVASC) tablet 10 mg  10 mg Oral Daily Erik Baez DPM   10 mg at 07/17/24 0852    aspirin (ASA) chewable tablet 81 mg  81 mg Oral Daily Erik Baez DPM   81 mg at 07/17/24 0851    atorvastatin (LIPITOR) tablet 80 mg  80 mg Oral At Bedtime Erik Baez DPM   80 mg at 07/17/24 2203    ceFAZolin (ANCEF) 2 g in 100 mL D5W intermittent infusion  2 g Intravenous Q8H Shaina Norton PA-C 200 mL/hr at 07/18/24 0551 2 g at 07/18/24 0551    chlorthalidone (HYGROTON) tablet 50 mg  50 mg Oral Daily Erik Baez DPM   50 mg at 07/17/24 0851    empagliflozin (JARDIANCE) tablet 25 mg  25 mg Oral Daily Erik Baez DPM   25 mg at 07/17/24 0852    ezetimibe (ZETIA) tablet 10 mg  10 mg Oral Daily Erik Baez DPM   10 mg at 07/17/24 0851    heparin ANTICOAGULANT injection 5,000 Units  5,000 Units Subcutaneous TID Erik Baez DPM   5,000 Units at 07/17/24 2203    insulin aspart (NovoLOG) injection (RAPID ACTING)  1-10 Units Subcutaneous TID AC Michelle Mejía MD   2 Units at 07/17/24 1704    insulin aspart (NovoLOG) injection (RAPID ACTING)  1-7 Units Subcutaneous At Bedtime Michelle Mejía MD   1 Units at 07/16/24 2230    losartan (COZAAR) tablet 100 mg  100 mg Oral Daily Erik Baez DPM   100 mg at 07/17/24 0852    [Held by provider] metFORMIN (GLUCOPHAGE) tablet 1,000 mg  1,000 mg Oral BID w/meals Maryann Lewis MD   1,000 mg at 07/12/24 2017    metoprolol succinate ER (TOPROL XL) 24 hr tablet 100 mg  100 mg Oral Daily Erik Baez DPM   100 mg at 07/17/24 0852    polyethylene glycol (MIRALAX) Packet 17 g  17 g Oral Daily  Erik Baez DPM        senna-docusate (SENOKOT-S/PERICOLACE) 8.6-50 MG per tablet 1 tablet  1 tablet Oral BID Erik Baez DPM   1 tablet at 07/15/24 2119    sodium chloride (PF) 0.9% PF flush 3 mL  3 mL Intracatheter Q8H Erik Baez DPM   3 mL at 07/18/24 0552     PRN Meds:   Current Facility-Administered Medications   Medication Dose Route Frequency Provider Last Rate Last Admin    acetaminophen (TYLENOL) tablet 650 mg  650 mg Oral Q4H PRN Erik Baez DPM        bisacodyl (DULCOLAX) suppository 10 mg  10 mg Rectal Daily PRN Erik Baez DPM        calcium carbonate (TUMS) chewable tablet 1,000 mg  1,000 mg Oral 4x Daily PRN Erik Baez DPM        Continuing statin from home medication list OR statin order already placed during this visit   Does not apply DOES NOT GO TO Sharon Garcia APRN CNP        Continuing statin from home medication list OR statin order already placed during this visit   Does not apply DOES NOT GO TO Ezekiel Koo MD        glucose gel 15-30 g  15-30 g Oral Q15 Min PRN Erik Baez DPM        Or    dextrose 50 % injection 25-50 mL  25-50 mL Intravenous Q15 Min PRN Erik Baez DPM        Or    glucagon injection 1 mg  1 mg Subcutaneous Q15 Min PRN Erik Baez DPM        diclofenac (VOLTAREN) EC tablet 75 mg  75 mg Oral BID PRN Erik Baez DPM        lidocaine (LMX4) cream   Topical Q1H PRN Erik Baez DPM        lidocaine 1 % 0.1-1 mL  0.1-1 mL Other Q1H PRN Erik Baez DPM        magnesium hydroxide (MILK OF MAGNESIA) suspension 30 mL  30 mL Oral Daily PRN Erik Baez DPM        naloxone (NARCAN) injection 0.2 mg  0.2 mg Intravenous Q2 Min PRN Erik Baez DPM        Or    naloxone (NARCAN) injection 0.4 mg  0.4 mg Intravenous Q2 Min PRN Erik Baez DPM        Or    naloxone (NARCAN) injection 0.2 mg  0.2 mg Intramuscular Q2 Min PRN  Erik Baez DPM        Or    naloxone (NARCAN) injection 0.4 mg  0.4 mg Intramuscular Q2 Min PRN Erik Baez DPM        ondansetron (ZOFRAN ODT) ODT tab 4 mg  4 mg Oral Q6H PRN Erik Baez DPM        Or    ondansetron (ZOFRAN) injection 4 mg  4 mg Intravenous Q6H PRN Erik Baez DPM        oxyCODONE (ROXICODONE) tablet 5 mg  5 mg Oral Q4H PRN Erik Baez DPM        Or    oxyCODONE (ROXICODONE) tablet 10 mg  10 mg Oral Q4H PRN Erik Baez DPM        prochlorperazine (COMPAZINE) injection 5 mg  5 mg Intravenous Q6H PRN Erik Baez DPM        Or    prochlorperazine (COMPAZINE) tablet 5 mg  5 mg Oral Q6H PRN Erik Baez DPM        senna-docusate (SENOKOT-S/PERICOLACE) 8.6-50 MG per tablet 1 tablet  1 tablet Oral BID PRN Erik Baez DPM        Or    senna-docusate (SENOKOT-S/PERICOLACE) 8.6-50 MG per tablet 2 tablet  2 tablet Oral BID PRN Erik Baez DPM        sodium chloride (PF) 0.9% PF flush 3 mL  3 mL Intracatheter q1 min prn Erik Baez DPM                Data:      All new lab and imaging data was reviewed.   Recent Labs   Lab Test 07/18/24  0709 07/15/24  0816 07/13/24  0012 07/12/24  0729 04/19/21  1151 02/03/21  1003 07/30/19  0712 03/14/17  1520 03/08/17  0720   WBC 8.1  --  6.4 9.9   < > 12.0* 5.7   < > 8.4   HGB 12.4*  --  12.3* 13.9   < > 12.2* 15.0   < > 15.1   MCV 92  --  93 93   < > 94 92   < > 91    292 227 283   < > 377 215   < > 240   INR  --   --   --   --   --  0.95 0.90  --  0.87    < > = values in this interval not displayed.      Recent Labs   Lab Test 07/18/24  0601 07/18/24  0216 07/17/24  2129 07/13/24  0157 07/13/24  0012 07/12/24  1645 07/12/24  0729 07/12/23  0852 06/27/22  1138 06/10/22  0740   NA  --   --   --   --  135  --  138  --   --  139   POTASSIUM  --   --   --   --  3.7  --  4.3 3.7  --  4.0   CHLORIDE  --   --   --   --  99  --  100  --   --  105   CO2  --   --   --   --  25   "--  26  --   --  28   BUN  --   --   --   --  22.8  --  23.0  --   --  28   CR  --   --   --   --  1.01  --  1.00  --   --  0.88   ANIONGAP  --   --   --   --  11  --  12  --   --  6   TIN  --   --   --   --  8.8  --  9.6  --   --  8.9   * 121* 150*   < > 118*   < > 152* 127*   < > 128*    < > = values in this interval not displayed.     No lab results found.    Invalid input(s): \"TROP\", \"TROPONINIES\"      "

## 2024-07-18 NOTE — PROGRESS NOTES
Care Management Follow Up    Length of Stay (days): 6    Expected Discharge Date: 07/19/2024     Concerns to be Addressed: discharge planning     Patient plan of care discussed at interdisciplinary rounds: Yes    Anticipated Discharge Disposition: Home, Home Infusion     Anticipated Discharge Services: None  Anticipated Discharge DME: None    Patient/family educated on Medicare website which has current facility and service quality ratings:    Education Provided on the Discharge Plan: Yes  Patient/Family in Agreement with the Plan: yes    Referrals Placed by CM/SW: Home Infusion  Private pay costs discussed: discussed per home infusion services    Additional Information:  Writer updated home infusion liaison that OR planning for over weekend. Home Infusion is following for the IV antibiotics needed at discharge.     Nisa Viveros RN   [facility]   Phone [phone], [place] or [phone]

## 2024-07-18 NOTE — PROGRESS NOTES
"Foot & Ankle Surgery Progress Note  July 18, 2024    S:  Zia was seen at bedside today for continued monitoring of left lower extremity.  He had a third digit amputation and a heel debridement on 7/13. Seen in follow up today. Plan is for LLE bypass, likely on Saturday.     Vital signs:  Temp: 97.8  F (36.6  C) Temp src: Oral BP: 137/65 Pulse: 54   Resp: 16 SpO2: 96 % O2 Device: None (Room air)   Height: 160 cm (5' 3\") Weight: 60.3 kg (133 lb)  Estimated body mass index is 23.56 kg/m  as calculated from the following:    Height as of this encounter: 1.6 m (5' 3\").    Weight as of this encounter: 60.3 kg (133 lb).      ROS - Pos for CC.  Denies nausea, vomiting, chills, fever, belly pain, calf pain, chest pain or shortness of breath. Complete remainder of ROS is otherwise neg.      PE -Left foot: incision site to amputation intact. No cellulitis or ischemia  Second digit eschar debrided yesterday, recurrent eschar to site. Heel ulcer also with necrotic eschar. No signs of infection or significant drainage.   Right foot evaluated: no open lesions.    Imaging: MRI left foot 7/12/2024 - IMPRESSION:  1.  Postoperative changes of amputation of the great toe and small toe.  2.  Amputation of a portion of the distal phalanx of the second toe.  3.  Soft tissue ulceration at the tip of the third toe appears to extend to the bone margin of the exposed middle phalanx. No significant T1 marrow signal change is present, but there is some T2 signal abnormality and osteomyelitis cannot be excluded.  4.  No additional worrisome areas for osteomyelitis and no evidence for septic arthropathy or abscess.  5.  No evidence for fracture.  6.  Mild fatty infiltration of the interosseous musculature.  7.  Surgical truncation of the hallucis tendons    Toe, Left; Tissue   0 Result Notes  Culture Culture in progress      4+ Staphylococcus aureus Abnormal       4+ Normal ervin           Resulting Agency: [facility]     Susceptibility     " Staphylococcus aureus     MENG (Preliminary)     Clindamycin  Resistant 1     Daptomycin 0.5 ug/mL Susceptible     Doxycycline <=0.5 ug/mL Susceptible     Erythromycin >=8 ug/mL Resistant     Gentamicin <=0.5 ug/mL Susceptible     Tetracycline <=1 ug/mL Susceptible     Trimethoprim/Sulfamethoxazole <=0.5/9.5 u... Susceptible     Vancomycin 1 ug/mL Susceptible                          Assessment:  74 year old male s/p left foot third digit amputation on 7/13   Peripheral Arterial Disease   Diabetes Mellitus --> hba1c: 7.0  Staph Bacteremia     Plan:   -Discussed all findings with patient. Chart and imaging reviewed.   -Left foot evaluated: incision site intact and viable to third digit amputation. Second digit with recurrent eschar. Heel also with eschar.   -Any further podiatry plans following vascular intervention. Discussed also outpatient follow up and management for second toe.   -Will place dressing change orders.   -Will follow peripherally. Call with questions       Negin Mina DPM  Podiatric Foot & Ankle Surgeon  [facility]  [phone]

## 2024-07-19 ENCOUNTER — APPOINTMENT (OUTPATIENT)
Dept: ULTRASOUND IMAGING | Facility: CLINIC | Age: 74
DRG: 253 | End: 2024-07-19
Payer: COMMERCIAL

## 2024-07-19 LAB
GLUCOSE BLDC GLUCOMTR-MCNC: 136 MG/DL (ref 70–99)
GLUCOSE BLDC GLUCOMTR-MCNC: 189 MG/DL (ref 70–99)
GLUCOSE BLDC GLUCOMTR-MCNC: 257 MG/DL (ref 70–99)
GLUCOSE BLDC GLUCOMTR-MCNC: 271 MG/DL (ref 70–99)

## 2024-07-19 PROCEDURE — 250N000011 HC RX IP 250 OP 636: Performed by: PODIATRIST

## 2024-07-19 PROCEDURE — 250N000013 HC RX MED GY IP 250 OP 250 PS 637: Performed by: INTERNAL MEDICINE

## 2024-07-19 PROCEDURE — 99232 SBSQ HOSP IP/OBS MODERATE 35: CPT | Performed by: PHYSICIAN ASSISTANT

## 2024-07-19 PROCEDURE — 250N000013 HC RX MED GY IP 250 OP 250 PS 637: Performed by: PODIATRIST

## 2024-07-19 PROCEDURE — 250N000011 HC RX IP 250 OP 636: Mod: JZ | Performed by: PHYSICIAN ASSISTANT

## 2024-07-19 PROCEDURE — 120N000001 HC R&B MED SURG/OB

## 2024-07-19 PROCEDURE — 93971 EXTREMITY STUDY: CPT | Mod: LT

## 2024-07-19 PROCEDURE — 999N000040 HC STATISTIC CONSULT NO CHARGE VASC ACCESS

## 2024-07-19 PROCEDURE — 36569 INSJ PICC 5 YR+ W/O IMAGING: CPT

## 2024-07-19 PROCEDURE — 250N000009 HC RX 250

## 2024-07-19 PROCEDURE — 99232 SBSQ HOSP IP/OBS MODERATE 35: CPT | Performed by: INTERNAL MEDICINE

## 2024-07-19 PROCEDURE — 272N000433 HC KIT CATH IV 18 OR 20G CM, POWERGLIDE W MAX BARRIER

## 2024-07-19 RX ORDER — METOPROLOL SUCCINATE 50 MG/1
50 TABLET, EXTENDED RELEASE ORAL DAILY
Status: DISCONTINUED | OUTPATIENT
Start: 2024-07-19 | End: 2024-07-22 | Stop reason: HOSPADM

## 2024-07-19 RX ORDER — SODIUM CHLORIDE, SODIUM LACTATE, POTASSIUM CHLORIDE, CALCIUM CHLORIDE 600; 310; 30; 20 MG/100ML; MG/100ML; MG/100ML; MG/100ML
INJECTION, SOLUTION INTRAVENOUS CONTINUOUS
Status: DISCONTINUED | OUTPATIENT
Start: 2024-07-20 | End: 2024-07-21

## 2024-07-19 RX ORDER — LIDOCAINE 40 MG/G
CREAM TOPICAL
Status: DISCONTINUED | OUTPATIENT
Start: 2024-07-19 | End: 2024-07-20

## 2024-07-19 RX ORDER — CEFAZOLIN SODIUM 2 G/100ML
2 INJECTION, SOLUTION INTRAVENOUS EVERY 8 HOURS
DISCHARGE
Start: 2024-07-19 | End: 2024-08-12

## 2024-07-19 RX ADMIN — CEFAZOLIN SODIUM 2 G: 2 INJECTION, SOLUTION INTRAVENOUS at 06:12

## 2024-07-19 RX ADMIN — HEPARIN SODIUM 5000 UNITS: 5000 INJECTION, SOLUTION INTRAVENOUS; SUBCUTANEOUS at 15:16

## 2024-07-19 RX ADMIN — AMLODIPINE BESYLATE 10 MG: 10 TABLET ORAL at 11:01

## 2024-07-19 RX ADMIN — CHLORTHALIDONE 50 MG: 25 TABLET ORAL at 08:18

## 2024-07-19 RX ADMIN — CEFAZOLIN SODIUM 2 G: 2 INJECTION, SOLUTION INTRAVENOUS at 21:32

## 2024-07-19 RX ADMIN — CEFAZOLIN SODIUM 2 G: 2 INJECTION, SOLUTION INTRAVENOUS at 14:05

## 2024-07-19 RX ADMIN — ATORVASTATIN CALCIUM 80 MG: 80 TABLET, FILM COATED ORAL at 21:32

## 2024-07-19 RX ADMIN — EMPAGLIFLOZIN 25 MG: 25 TABLET, FILM COATED ORAL at 08:18

## 2024-07-19 RX ADMIN — LOSARTAN POTASSIUM 100 MG: 100 TABLET, FILM COATED ORAL at 08:46

## 2024-07-19 RX ADMIN — HEPARIN SODIUM 5000 UNITS: 5000 INJECTION, SOLUTION INTRAVENOUS; SUBCUTANEOUS at 08:18

## 2024-07-19 RX ADMIN — EZETIMIBE 10 MG: 10 TABLET ORAL at 08:18

## 2024-07-19 RX ADMIN — ASPIRIN 81 MG CHEWABLE TABLET 81 MG: 81 TABLET CHEWABLE at 10:51

## 2024-07-19 RX ADMIN — LIDOCAINE HYDROCHLORIDE ANHYDROUS 1 ML: 10 INJECTION, SOLUTION INFILTRATION at 11:38

## 2024-07-19 RX ADMIN — METOPROLOL SUCCINATE 50 MG: 50 TABLET, EXTENDED RELEASE ORAL at 11:08

## 2024-07-19 RX ADMIN — HEPARIN SODIUM 5000 UNITS: 5000 INJECTION, SOLUTION INTRAVENOUS; SUBCUTANEOUS at 21:32

## 2024-07-19 ASSESSMENT — ACTIVITIES OF DAILY LIVING (ADL)
ADLS_ACUITY_SCORE: 23

## 2024-07-19 NOTE — PROGRESS NOTES
Date & Time: 07/18/24 2927-4250  Surgery/POD: POD#3/4 LLE angiogram. Here for femoral artery cutdown 7/12 postponed d/t wet gangrene and cellulitis. 7/13: L 3rd toe amputation and heel wound debridement.  Behavior & Aggression: Green  Fall Risk: No  Orientation: AO4  ABNL VS/O2: VSS on RA ex occasional bradycardia  ABNL Labs: ACHS BG checks.  Pain Management: Denies  Bowel/Bladder: Continent BB, adequate UOP in BR  Drains: PIV x1 SL with int abx  Wounds/incisions: L foot wrapped CDI, NWB w/ surgical shoe; R foot partial amputation w/ old scab. Groin inc CDI.  Diet: Mod Carb  Activity Level: Independent w/ walker/crutches. NWB on LLE. Surgical shoe in room for ambulation, pt refuses use.  Tests/Procedures: Fem-tib bypass Sat 7/20  Anticipated DC Date: Pending surgery currently planned Sat 7/20  Significant Information: Pt refuses PCDs, wedge pillow, surgical boot

## 2024-07-19 NOTE — PROGRESS NOTES
Vascular surgery Progress Note    No complaints, eager to have surgery. No fever/chills.     Resting comfortably in bed, remains NWB LLE.     Plan for L femoral to tibial in situ GSV bypass Saturday am at 0900.   NPO at midnight  Continue IV abx.   Ordered midline placement for today     Naeem Lewis MD  Vascular Surgery PGY4  To reach vascular surgery claude team on call, please go to [facility] and from the drop down find the following:   VASCULAR SURGERY/CLAUDE [facility]  Then page the person listed to the right of day/night call. Can click the pager to page.

## 2024-07-19 NOTE — PROGRESS NOTES
[facility]    Medicine Progress Note - Hospitalist Service    Date of Admission:  7/12/2024    -hospitalist consulted for comorbidity management   - bypass surgery per vascular rescheduled for 7/20/24; Plavix held; resume when okay with surgery  - ID plans for total 4 weeks of IV antibiotics for bacteremia (until 8/12/24) and okay to place midline at any time    Assessment & Plan      Zia Hopkins is a 74 year old male with history of DM2, HTN, HLP, CKD 3, PAD, who presented on 7/12/2024 for scheduled left femoral artery cutdown and LLE angiogram but was found to have left 3rd toe wet gangrene with cellulitis and developing osteomyelitis.  He was admitted for IV antibiotics, podiatry and infectious disease consultation.  Blood cultures obtained on admission are growing gram-positive cocci.      Hospitalist service consulted on 7/13 for management of diabetes and other comorbidities.        Left foot 3rd digit gangrene with cellulitis and developing osteomyelitis  Left heel ulcer with eschar, s/p partial left third toe amputation with osteotomy through proximal phalanx, 7/13/2024  Left heel ulcer with eschar, s/p debridement down to/including subcutaneous fat, 7/13/2024  MSSA bacteremia  S/p multiple previous bilateral toe amputations   - MRI showed ulceration at tip of third toe extending to bone margin exposed middle phalanx.  Some T2 signal abnormality noted.  Osteomyelitis could not be excluded.  - CRP 15.  No leukocytosis, afebrile  - 2 of 2 sets of blood cultures obtained on 7/12 are growing MSSA  - Podiatry following, s/p partial left 3rd toe amputation with osteotomy through proximal phalanx and debridement of left heel ulcer on 7/13/2024   - on 7/16: podiatry noted some ischemic changes to left 2 nd toe as well, debrided, noted some exposed bone at wound bed; might need further debridement versus partial toe amputation  - PT for heel weight bearing training in surgical shoe  -  "ID following, currently on IV Cefazolin; repeated blood cultures 7/15-- no growth so far  - ID plans for total 4 weeks of IV antibiotics for bacteremia (until 8/12/24)   S/p midline placement on 7/19/2024      Peripheral arterial disease with critical limb ischemia of bilateral lower extremities  S/p multiple vascular surgery interventions to bilateral lower extremities     Left 2nd toe ischemic changes  - CTA A/P with runoff on 7/12 showed occlusion of left above-knee popliteal artery with distal reconstitution, patent bilateral SFA stents, diffuse aortoiliac and lower extremity atherosclerotic disease  - completed LLE angiogram 7/16/24 and noted per vascular surgery \"long segment occlusion of the left above-knee popliteal artery with some tibial involvement\"  - planned for LLE bypass 7/20/24  - management as per vascular surgery, following as primary  - Continue aspirin 81 mg, Plavix held; resume when okay with surgery     Diabetes mellitus type 2, with peripheral neuropathy and CKD 3, controlled with A1c 7 on 7/12/2024  PTA-empagliflozin 25 mg, metformin 1000 mg twice daily  - Blood sugars reasonably well-controlled; A1c 7.0  - Continue empagliflozin; will continue to hold metformin for now given recent angiogram  - Continue NovoLog sliding scale with hypoglycemia protocol     Benign essential hypertension  PTA-amlodipine 10 mg, chlorthalidone 50 mg, losartan 100 mg, Toprol- mg daily  - Blood pressure well-controlled.  - Continue PTA amlodipine, chlorthalidone, losartan  -HR 50-60 hence Metoprolol reduced to 50 mg on 7/19.monitor and readdress based on BP      Hyperlipidemia-LDL 62, HDL 62, 7/12/2024  PTA-atorvastatin 80 mg, Zetia 10 mg  - Hyperlipidemia is well-controlled.  Continue atorvastatin and Zetia     Chronic kidney disease stage III  - Baseline creatinine around 1.  Creatinine currently stable and at baseline.  Monitor     Previous vascular surgery procedures  Right common femoral and superficial " femoral endarterectomy with patch angioplasty and right femoral artery angioplasty with stenting, 3/2022  2. LLE angiogram and balloon angioplasty of anterior and posterior tibial arteries, 4/2021  3. Left popliteal angioplasty and left SFA stenting 2017 followed by stenting of in-stent stenosis 7/2019   4. LLE arteriogram with balloon angioplasty of the left popliteal artery, recanalization of posterior tibial artery, and extension of the stent in the left superficial femoral artery 2/2021        Diet: Moderate Consistent Carb (60 g CHO per Meal) Diet    DVT Prophylaxis: Pneumatic Compression Devices  Velazquez Catheter: Not present  Lines: PRESENT             Cardiac Monitoring: None  Code Status: Full Code      Clinically Significant Risk Factors                              # DMII: A1C = 7.0 % (Ref range: <5.7 %) within past 6 months       # Financial/Environmental Concerns: none         Disposition Plan     Medically Ready for Discharge: Anticipated in 2-4 Days             Clara Brown MD  Hospitalist Service  [facility]  Securely message with [employer] (more info)  Text page via [employer] Paging/Directory   ______________________________________________________________________    Interval History   Pt is resting in bed,denies chest pain/n/v/abdominal pain/fever/chills.hopeful for recovery soon.wife at bedside.    Physical Exam   Vital Signs: Temp: 98  F (36.7  C) Temp src: Oral BP: 116/60 Pulse: 55   Resp: 18 SpO2: 97 % O2 Device: None (Room air)    Weight: 133 lbs 0 oz    Constitutional: Awake, alert, cooperative, no apparent distress  Respiratory: Clear to auscultation bilaterally, no crackles or wheezing  Cardiovascular: Regular rate and rhythm, normal S1 and S2, and no murmur noted  GI: Normal bowel sounds, soft, non-distended, non-tender  Skin/Integumen: dressing in place no discharge    Medical Decision Making

## 2024-07-19 NOTE — PLAN OF CARE
Goal Outcome Evaluation:    Date & Time: 7/19/2024 0700 - 1900   Surgery/POD#4: LLE angiogram. Here for femoral artery cutdown 7/12 postponed d/t wet gangrene and cellulitis. 7/13: L 3rd toe amputation and heel wound debridement.  Behavior & Aggression: Green  Fall Risk: No, IND   Orientation: A&O x4   ABNL VS/O2: VSS on RA, Hx of HTN   ABNL Labs: See results   Pain Management: Scheduled Tylenol   Bowel/Bladder: BS active, 1x BM, voids in BR  Drains: PIV R arm SL, R Midline   Wounds/incisions: L foot wrapped CDI, L groin incision dressing CDI, R foot partial amputation   Diet: Tolerating moderate carb diet  Activity Level: IND w/ walker, NWB on LLE. Surgical shoe in room for ambulation  Tests/Procedures: Fem-tib bypass Sat 7/20  Anticipated DC Date: N/A  Significant Information: CMS is intact

## 2024-07-19 NOTE — PROGRESS NOTES
[facility]    Referral for home IV abx received and benefits reviewed with pt on Tuesday 7/16/24. Based off the pt's current medication (cefazolin 2g q8h), [facility] does not have a line preference.     Thank you for the referral.    Nayely Duran RN  [facility] Liaison  [phone] (Mon thru Fri 8am - 5pm)  [phone] Office

## 2024-07-19 NOTE — PROGRESS NOTES
[facility]    Infectious Disease Progress Note    Date of Service (when I saw the patient): 07/19/2024     Assessment & Plan   Zia Hopkins is a 74 year old male who was admitted on 7/12/2024.     Impression:  75 yo male with a history of DM (decently controlled with A1C 7.0), HTN, hyperlipidemia, and PAD s/p numerous vascular interventions to both legs presenting for left femoral artery cutdown with angiogram for probable occluded popliteal artery now found to have wet gangrene of the left 3rd toe as well as mild erythema of the left foot and ankle that started 4 days prior.      -History of right femoral endarterectomy and angioplasty/stenting of bilateral lower extremities for PAD.   -Now with occlusion of left above knee popliteal artery with scheduled left femoral artery cut-down and angiogram put on hold for newly noted wet gangrene of left 3rd toe. MRI suspicious for osteomyelitis.  -S/p partial left 3rd toe amputation and debridement of heel 7/13/24. Operative cultures with MSSA and anaerobes. All infection removed.  -Afebrile. WBC normal.   -Blood cultures growing MSSA from 7/12. Repeat blood cultures from 7/15 no growth to date.   -On Cefazolin.           Recommendations:   All infection resected per Podiatry note. Pathology with clean margin.  Continue Cefazolin. Will need 4 weeks total for bacteremia (last day of therapy will be 8/12/24). Orders placed in discharge navigator.   OK to place midline at anytime.  He is scheduled to undergo LLE bypass tomorrow  Will need blood cultures repeated one week after stopping IV antibiotics.   ID will follow peripherally over the weekend. Please call us if needed.        Patient and plan discussed with Dr. Moya.         Shaina Norton PA-C      Interval History   Tolerating antibiotics ok   No new rashes or issues with antibiotics   Follow-up blood culture still no growth.   Some new eschar noted on tip of 2nd toe. Awaiting LLE bypass  tomorrow. Feeling well today.   Labs reviewed   No changes to past medical, social or family history         Physical Exam   Temp: 98  F (36.7  C) Temp src: Oral BP: 132/61 Pulse: 50   Resp: 18 SpO2: 97 % O2 Device: None (Room air)    Vitals:    07/12/24 0709   Weight: 60.3 kg (133 lb)     Vital Signs with Ranges  Temp:  [98  F (36.7  C)-98.6  F (37  C)] 98  F (36.7  C)  Pulse:  [50-53] 50  Resp:  [16-18] 18  BP: (120-135)/(59-66) 132/61  SpO2:  [93 %-97 %] 97 %    Constitutional: Awake, alert, cooperative, no apparent distress  Lungs: Clear to auscultation bilaterally, no crackles or wheezing  Cardiovascular: Regular rate and rhythm, normal S1 and S2, and no murmur noted  Abdomen: Normal bowel sounds, soft, non-distended, non-tender  Skin: Left foot with dressing. Some eschar noted on tip of left 2nd toe now.   Other:    Medications   Current Facility-Administered Medications   Medication Dose Route Frequency Provider Last Rate Last Admin    Continuing statin from home medication list OR statin order already placed during this visit   Does not apply DOES NOT GO TO Sharon Garcia APRN CNP        Continuing statin from home medication list OR statin order already placed during this visit   Does not apply DOES NOT GO TO Ezekiel Koo MD        [START ON 7/20/2024] lactated ringers infusion   Intravenous Continuous Maryann Lewis MD         Current Facility-Administered Medications   Medication Dose Route Frequency Provider Last Rate Last Admin    amLODIPine (NORVASC) tablet 10 mg  10 mg Oral Daily Erik Baez DPM   10 mg at 07/18/24 0929    aspirin (ASA) chewable tablet 81 mg  81 mg Oral Daily Erik Baez DPM   81 mg at 07/18/24 0928    atorvastatin (LIPITOR) tablet 80 mg  80 mg Oral At Bedtime Erik Baez DPM   80 mg at 07/18/24 2224    ceFAZolin (ANCEF) 2 g in 100 mL D5W intermittent infusion  2 g Intravenous Q8H Shaina Norton PA-C 200 mL/hr at 07/19/24  0612 2 g at 07/19/24 0612    chlorthalidone (HYGROTON) tablet 50 mg  50 mg Oral Daily Erik Baez DPM   50 mg at 07/19/24 0818    empagliflozin (JARDIANCE) tablet 25 mg  25 mg Oral Daily Erik Baez DPM   25 mg at 07/19/24 0818    ezetimibe (ZETIA) tablet 10 mg  10 mg Oral Daily Erik Baez DPM   10 mg at 07/19/24 0818    heparin ANTICOAGULANT injection 5,000 Units  5,000 Units Subcutaneous TID Erik Baez DPM   5,000 Units at 07/19/24 0818    insulin aspart (NovoLOG) injection (RAPID ACTING)  1-10 Units Subcutaneous TID AC Michelle Mejía MD   4 Units at 07/18/24 1817    insulin aspart (NovoLOG) injection (RAPID ACTING)  1-7 Units Subcutaneous At Bedtime Michelle Mejía MD   1 Units at 07/16/24 2230    losartan (COZAAR) tablet 100 mg  100 mg Oral Daily Erik Baez DPM   100 mg at 07/19/24 0846    [Held by provider] metFORMIN (GLUCOPHAGE) tablet 1,000 mg  1,000 mg Oral BID w/meals Maryann Lewis MD   1,000 mg at 07/12/24 2017    metoprolol succinate ER (TOPROL XL) 24 hr tablet 50 mg  50 mg Oral Daily Clara Brown MD        polyethylene glycol (MIRALAX) Packet 17 g  17 g Oral Daily Erik Baez DPM        senna-docusate (SENOKOT-S/PERICOLACE) 8.6-50 MG per tablet 1 tablet  1 tablet Oral BID Erik Baez DPM   1 tablet at 07/15/24 2119    sodium chloride (PF) 0.9% PF flush 10 mL  10 mL Intracatheter Q8H Maryann Lewis MD   10 mL at 07/19/24 0824    sodium chloride (PF) 0.9% PF flush 10 mL  10 mL Intracatheter Q8H Maryann Lewis MD        sodium chloride (PF) 0.9% PF flush 3 mL  3 mL Intracatheter Q8H Erik Baez DPM   3 mL at 07/19/24 0613       Data   All microbiology laboratory data reviewed.  Recent Labs   Lab Test 07/18/24  0709 07/15/24  0816 07/13/24  0012 07/12/24  0729   WBC 8.1  --  6.4 9.9   HGB 12.4*  --  12.3* 13.9   HCT 37.9*  --  36.8* 41.5   MCV 92  --  93 93    292 227 283     Recent Labs   Lab Test  07/18/24  0709 07/13/24  0012 07/12/24  0729   CR 0.99 1.01 1.00     Recent Labs   Lab Test 07/12/24  0729   SED 32*     CRP Inflammation   Date Value Ref Range Status   07/13/2024 15.10 (H) <5.00 mg/L Final             07/15/2024 0900 07/19/2024 0917 Blood Culture Arm, Right [[id number]]   Blood from Arm, Right    Preliminary result Component Value   Culture No growth after 4 days P             07/15/2024 0900 07/19/2024 0917 Blood Culture Arm, Right [[id number]]   Blood from Arm, Right    Preliminary result Component Value   Culture No growth after 4 days P             07/13/2024 1146 07/17/2024 1324 Anaerobic Bacterial Culture Routine [[id number]]   (Abnormal)   Tissue from Toe, Left    Final result Component Value   Culture 4+ Finegoldia magna Abnormal     Susceptibilities not routinely done, refer to antibiogram to view typical susceptibility profiles  This organism is considered part of Normal ervin    4+ Anaerococcus species Abnormal     Identification obtained by MALDI-TOF mass spectrometry research use only database. Test characteristics determined and verified by the Infectious Diseases Diagnostic Laboratory.  Susceptibilities not routinely done, refer to antibiogram to view typical susceptibility profiles  This organism is considered part of Normal ervin    4+ Mixed Aerobic and Anaerobic ervin             07/13/2024 1146 07/13/2024 1715 Gram Stain [[id number]]   (Abnormal)   Tissue from Toe, Left    Final result Component Value   GS Culture See corresponding culture for results   Gram Stain Result 4+ Gram positive cocci Abnormal    Gram Stain Result 1+ Gram positive bacilli Abnormal    Gram Stain Result 1+ Gram negative bacilli Abnormal    Gram Stain Result 2+ WBC seen Abnormal           07/13/2024 1146 07/17/2024 0938 Tissue Aerobic Bacterial Culture Routine [[id number]]    (Abnormal)   Tissue from Toe, Left    Final result Component Value   Culture 4+ Staphylococcus aureus Abnormal     4+  Normal ervin       Susceptibility     Staphylococcus aureus     MENG     Clindamycin  Resistant 1     Daptomycin 0.5 ug/mL Susceptible     Doxycycline <=0.5 ug/mL Susceptible     Erythromycin >=8 ug/mL Resistant     Gentamicin <=0.5 ug/mL Susceptible     Oxacillin 2 ug/mL Susceptible 2     Tetracycline <=1 ug/mL Susceptible     Trimethoprim/Sulfamethoxazole <=0.5/9.5 u... Susceptible     Vancomycin 1 ug/mL Susceptible              1 This isolate is presumed to be clindamycin resistant based on detection of inducible clindamycin resistance. Erythromycin and clindamycin are resistant; therefore, they are not recommended for use.   2 Oxacillin susceptible isolates are susceptible to cephalosporins (example: cefazolin and cephalexin) and beta lactam combination agents. Oxacillin resistant isolates are resistant to these agents.         Susceptibility Comments    Staphylococcus aureus            07/12/2024 0734 07/15/2024 0735 Blood Culture Arm, Right [[id number]]   (Abnormal)   Blood from Arm, Right    Final result Component Value   Culture Positive on the 1st day of incubation Abnormal     Staphylococcus aureus Panic     2 of 2 bottles  Susceptibilities done on previous cultures             07/12/2024 0729 07/15/2024 0747 Blood Culture Arm, Left [[id number]]    (Abnormal)   Blood from Arm, Left    Final result Component Value   Culture Positive on the 1st day of incubation Abnormal     Staphylococcus aureus Panic     1 of 2 bottles       Susceptibility     Staphylococcus aureus     MENG     Clindamycin  Resistant 1     Daptomycin 0.25 ug/mL Susceptible     Doxycycline <=0.5 ug/mL Susceptible     Erythromycin >=8 ug/mL Resistant     Gentamicin <=0.5 ug/mL Susceptible     Oxacillin 1 ug/mL Susceptible 2     Tetracycline <=1 ug/mL Susceptible     Trimethoprim/Sulfamethoxazole <=0.5/9.5 u... Susceptible     Vancomycin 1 ug/mL Susceptible

## 2024-07-19 NOTE — PROCEDURES
[facility]    Single Lumen Midline Placement    Date/Time: 7/19/2024 11:30 AM    Performed by: Mayra Luong RN  Authorized by: Shaina Norton PA-C  Indications: vascular access      UNIVERSAL PROTOCOL   Site Marked: Yes  Prior Images Obtained and Reviewed:  Yes  Required items: Required blood products, implants, devices and special equipment available    Patient identity confirmed:  Verbally with patient, arm band and hospital-assigned identification number  NA - No sedation, light sedation, or local anesthesia  Confirmation Checklist:  Patient's identity using two indicators, relevant allergies, procedure was appropriate and matched the consent or emergent situation and correct equipment/implants were available  Time out: Immediately prior to the procedure a time out was called    Universal Protocol: the Joint Commission Universal Protocol was followed    Preparation: Patient was prepped and draped in usual sterile fashion    ESBL (mL):  0.5     ANESTHESIA    Local Anesthetic:  Lidocaine 1% without epinephrine  Anesthetic Total (mL):  1      SEDATION    Patient Sedated: No        Preparation: skin prepped with ChloraPrep  Skin prep agent: skin prep agent completely dried prior to procedure  Sterile barriers: maximum sterile barriers were used: cap, mask, sterile gown, sterile gloves, and large sterile sheet  Hand hygiene: hand hygiene performed prior to central venous catheter insertion  Type of line used: Midline  Catheter type: single lumen  Lumen type: non-valved  Catheter size: 4 Fr  Brand: Bard  Lot number: [id number]  Placement method: ultrasound, venipuncture and MST  Number of attempts: 1  Difficulty threading catheter: no  Successful placement: yes  Orientation: right    Location: basilic vein  Tip Location: distal to axillary vein  Arm circumference: adults 10 cm  Extremity circumference: 24  Visible catheter length: 2  Total catheter length: 15  Dressing and securement:  blood cleaned with CHG, blood removed, chlorhexidine disc applied, occlusive dressing applied, site cleansed and statlock  Post procedure assessment: blood return through all ports  PROCEDURE   Patient Tolerance:  Patient tolerated the procedure well with no immediate complicationsDescribe Procedure: Patient instruct on Midline placement. Patient verbalized an understanding. Patient gave verbal consent for placement. VAT RN placed 4 FR midline catheter in Right basilic. Patient tolerated well. Blood return noted. RN caring for patient updated that Midline is ok to use.

## 2024-07-19 NOTE — PROGRESS NOTES
Received order for midline placement for long term IV antbx.  Patient will be going home and [facility] is following .  Will wait to place midline until [facility] has checked benefits and determines patient is ok to have a midline versus a picc.  Discussed with bedside RN.. VAT will follow over the weekend.

## 2024-07-20 ENCOUNTER — APPOINTMENT (OUTPATIENT)
Dept: SURGERY | Facility: PHYSICIAN GROUP | Age: 74
End: 2024-07-20
Payer: COMMERCIAL

## 2024-07-20 PROBLEM — R78.81 MSSA BACTEREMIA: Status: ACTIVE | Noted: 2024-07-20

## 2024-07-20 PROBLEM — B95.61 MSSA BACTEREMIA: Status: ACTIVE | Noted: 2024-07-20

## 2024-07-20 PROBLEM — N18.30 CKD (CHRONIC KIDNEY DISEASE) STAGE 3, GFR 30-59 ML/MIN (H): Status: ACTIVE | Noted: 2024-07-20

## 2024-07-20 LAB
ABO/RH(D): NORMAL
ANTIBODY SCREEN: NEGATIVE
BACTERIA BLD CULT: NO GROWTH
BACTERIA BLD CULT: NO GROWTH
GLUCOSE BLDC GLUCOMTR-MCNC: 121 MG/DL (ref 70–99)
GLUCOSE BLDC GLUCOMTR-MCNC: 122 MG/DL (ref 70–99)
GLUCOSE BLDC GLUCOMTR-MCNC: 129 MG/DL (ref 70–99)
GLUCOSE BLDC GLUCOMTR-MCNC: 135 MG/DL (ref 70–99)
GLUCOSE BLDC GLUCOMTR-MCNC: 142 MG/DL (ref 70–99)
GLUCOSE BLDC GLUCOMTR-MCNC: 161 MG/DL (ref 70–99)
GLUCOSE BLDC GLUCOMTR-MCNC: 196 MG/DL (ref 70–99)
SPECIMEN EXPIRATION DATE: NORMAL

## 2024-07-20 PROCEDURE — 360N000075 HC SURGERY LEVEL 2, PER MIN: Performed by: SURGERY

## 2024-07-20 PROCEDURE — 250N000011 HC RX IP 250 OP 636

## 2024-07-20 PROCEDURE — 04CL0ZZ EXTIRPATION OF MATTER FROM LEFT FEMORAL ARTERY, OPEN APPROACH: ICD-10-PCS | Performed by: SURGERY

## 2024-07-20 PROCEDURE — 120N000001 HC R&B MED SURG/OB

## 2024-07-20 PROCEDURE — 250N000009 HC RX 250: Performed by: NURSE ANESTHETIST, CERTIFIED REGISTERED

## 2024-07-20 PROCEDURE — 35585 VEIN BYP FEM-TIBIAL PERONEAL: CPT | Performed by: NURSE ANESTHETIST, CERTIFIED REGISTERED

## 2024-07-20 PROCEDURE — 250N000009 HC RX 250: Performed by: SURGERY

## 2024-07-20 PROCEDURE — 35585 VEIN BYP FEM-TIBIAL PERONEAL: CPT | Performed by: ANESTHESIOLOGY

## 2024-07-20 PROCEDURE — 250N000012 HC RX MED GY IP 250 OP 636 PS 637: Performed by: SURGERY

## 2024-07-20 PROCEDURE — 99100 ANES PT EXTEME AGE<1 YR&>70: CPT | Mod: QX | Performed by: NURSE ANESTHETIST, CERTIFIED REGISTERED

## 2024-07-20 PROCEDURE — 250N000011 HC RX IP 250 OP 636: Mod: JZ | Performed by: PHYSICIAN ASSISTANT

## 2024-07-20 PROCEDURE — 86900 BLOOD TYPING SEROLOGIC ABO: CPT

## 2024-07-20 PROCEDURE — 35585 VEIN BYP FEM-TIBIAL PERONEAL: CPT | Mod: 22 | Performed by: SURGERY

## 2024-07-20 PROCEDURE — 258N000003 HC RX IP 258 OP 636: Performed by: ANESTHESIOLOGY

## 2024-07-20 PROCEDURE — 272N000001 HC OR GENERAL SUPPLY STERILE: Performed by: SURGERY

## 2024-07-20 PROCEDURE — 250N000011 HC RX IP 250 OP 636: Performed by: SURGERY

## 2024-07-20 PROCEDURE — 258N000003 HC RX IP 258 OP 636

## 2024-07-20 PROCEDURE — 36415 COLL VENOUS BLD VENIPUNCTURE: CPT

## 2024-07-20 PROCEDURE — 250N000025 HC SEVOFLURANE, PER MIN: Performed by: SURGERY

## 2024-07-20 PROCEDURE — 250N000011 HC RX IP 250 OP 636: Performed by: NURSE ANESTHETIST, CERTIFIED REGISTERED

## 2024-07-20 PROCEDURE — 041L09N BYPASS LEFT FEMORAL ARTERY TO POSTERIOR TIBIAL ARTERY WITH AUTOLOGOUS VENOUS TISSUE, OPEN APPROACH: ICD-10-PCS | Performed by: SURGERY

## 2024-07-20 PROCEDURE — 06BQ0ZZ EXCISION OF LEFT SAPHENOUS VEIN, OPEN APPROACH: ICD-10-PCS | Performed by: SURGERY

## 2024-07-20 PROCEDURE — 258N000003 HC RX IP 258 OP 636: Performed by: NURSE ANESTHETIST, CERTIFIED REGISTERED

## 2024-07-20 PROCEDURE — 370N000017 HC ANESTHESIA TECHNICAL FEE, PER MIN: Performed by: SURGERY

## 2024-07-20 PROCEDURE — 710N000009 HC RECOVERY PHASE 1, LEVEL 1, PER MIN: Performed by: SURGERY

## 2024-07-20 PROCEDURE — 999N000141 HC STATISTIC PRE-PROCEDURE NURSING ASSESSMENT: Performed by: SURGERY

## 2024-07-20 PROCEDURE — 250N000013 HC RX MED GY IP 250 OP 250 PS 637: Performed by: PODIATRIST

## 2024-07-20 PROCEDURE — 258N000003 HC RX IP 258 OP 636: Performed by: SURGERY

## 2024-07-20 PROCEDURE — 250N000013 HC RX MED GY IP 250 OP 250 PS 637

## 2024-07-20 PROCEDURE — 99232 SBSQ HOSP IP/OBS MODERATE 35: CPT | Performed by: HOSPITALIST

## 2024-07-20 RX ORDER — NALOXONE HYDROCHLORIDE 0.4 MG/ML
0.1 INJECTION, SOLUTION INTRAMUSCULAR; INTRAVENOUS; SUBCUTANEOUS
Status: DISCONTINUED | OUTPATIENT
Start: 2024-07-20 | End: 2024-07-20 | Stop reason: HOSPADM

## 2024-07-20 RX ORDER — LIDOCAINE 40 MG/G
CREAM TOPICAL
Status: DISCONTINUED | OUTPATIENT
Start: 2024-07-20 | End: 2024-07-22 | Stop reason: HOSPADM

## 2024-07-20 RX ORDER — FENTANYL CITRATE 50 UG/ML
25 INJECTION, SOLUTION INTRAMUSCULAR; INTRAVENOUS EVERY 5 MIN PRN
Status: DISCONTINUED | OUTPATIENT
Start: 2024-07-20 | End: 2024-07-20 | Stop reason: HOSPADM

## 2024-07-20 RX ORDER — MEPERIDINE HYDROCHLORIDE 25 MG/ML
12.5 INJECTION INTRAMUSCULAR; INTRAVENOUS; SUBCUTANEOUS EVERY 5 MIN PRN
Status: DISCONTINUED | OUTPATIENT
Start: 2024-07-20 | End: 2024-07-20 | Stop reason: HOSPADM

## 2024-07-20 RX ORDER — HYDROMORPHONE HCL IN WATER/PF 6 MG/30 ML
0.2 PATIENT CONTROLLED ANALGESIA SYRINGE INTRAVENOUS EVERY 5 MIN PRN
Status: DISCONTINUED | OUTPATIENT
Start: 2024-07-20 | End: 2024-07-20 | Stop reason: HOSPADM

## 2024-07-20 RX ORDER — HYDROMORPHONE HCL IN WATER/PF 6 MG/30 ML
0.4 PATIENT CONTROLLED ANALGESIA SYRINGE INTRAVENOUS
Status: DISCONTINUED | OUTPATIENT
Start: 2024-07-20 | End: 2024-07-21

## 2024-07-20 RX ORDER — LIDOCAINE HYDROCHLORIDE 20 MG/ML
INJECTION, SOLUTION INFILTRATION; PERINEURAL PRN
Status: DISCONTINUED | OUTPATIENT
Start: 2024-07-20 | End: 2024-07-20

## 2024-07-20 RX ORDER — HYDROMORPHONE HCL IN WATER/PF 6 MG/30 ML
0.4 PATIENT CONTROLLED ANALGESIA SYRINGE INTRAVENOUS EVERY 5 MIN PRN
Status: DISCONTINUED | OUTPATIENT
Start: 2024-07-20 | End: 2024-07-20 | Stop reason: HOSPADM

## 2024-07-20 RX ORDER — ONDANSETRON 2 MG/ML
INJECTION INTRAMUSCULAR; INTRAVENOUS PRN
Status: DISCONTINUED | OUTPATIENT
Start: 2024-07-20 | End: 2024-07-20

## 2024-07-20 RX ORDER — SODIUM CHLORIDE 9 MG/ML
INJECTION, SOLUTION INTRAVENOUS CONTINUOUS
Status: DISCONTINUED | OUTPATIENT
Start: 2024-07-20 | End: 2024-07-21

## 2024-07-20 RX ORDER — PROCHLORPERAZINE MALEATE 5 MG
5 TABLET ORAL EVERY 6 HOURS PRN
Status: DISCONTINUED | OUTPATIENT
Start: 2024-07-20 | End: 2024-07-21

## 2024-07-20 RX ORDER — ACETAMINOPHEN 325 MG/1
975 TABLET ORAL EVERY 8 HOURS
Status: DISCONTINUED | OUTPATIENT
Start: 2024-07-20 | End: 2024-07-22 | Stop reason: HOSPADM

## 2024-07-20 RX ORDER — OXYCODONE HYDROCHLORIDE 5 MG/1
10 TABLET ORAL EVERY 4 HOURS PRN
Status: DISCONTINUED | OUTPATIENT
Start: 2024-07-20 | End: 2024-07-22 | Stop reason: HOSPADM

## 2024-07-20 RX ORDER — FENTANYL CITRATE 50 UG/ML
50 INJECTION, SOLUTION INTRAMUSCULAR; INTRAVENOUS EVERY 5 MIN PRN
Status: DISCONTINUED | OUTPATIENT
Start: 2024-07-20 | End: 2024-07-20 | Stop reason: HOSPADM

## 2024-07-20 RX ORDER — HYDROMORPHONE HYDROCHLORIDE 1 MG/ML
INJECTION, SOLUTION INTRAMUSCULAR; INTRAVENOUS; SUBCUTANEOUS PRN
Status: DISCONTINUED | OUTPATIENT
Start: 2024-07-20 | End: 2024-07-20

## 2024-07-20 RX ORDER — MAGNESIUM HYDROXIDE 1200 MG/15ML
LIQUID ORAL PRN
Status: DISCONTINUED | OUTPATIENT
Start: 2024-07-20 | End: 2024-07-20 | Stop reason: HOSPADM

## 2024-07-20 RX ORDER — ONDANSETRON 4 MG/1
4 TABLET, ORALLY DISINTEGRATING ORAL EVERY 30 MIN PRN
Status: DISCONTINUED | OUTPATIENT
Start: 2024-07-20 | End: 2024-07-20 | Stop reason: HOSPADM

## 2024-07-20 RX ORDER — ONDANSETRON 2 MG/ML
4 INJECTION INTRAMUSCULAR; INTRAVENOUS EVERY 6 HOURS PRN
Status: DISCONTINUED | OUTPATIENT
Start: 2024-07-20 | End: 2024-07-22 | Stop reason: HOSPADM

## 2024-07-20 RX ORDER — ONDANSETRON 4 MG/1
4 TABLET, ORALLY DISINTEGRATING ORAL EVERY 6 HOURS PRN
Status: DISCONTINUED | OUTPATIENT
Start: 2024-07-20 | End: 2024-07-22 | Stop reason: HOSPADM

## 2024-07-20 RX ORDER — PROPOFOL 10 MG/ML
INJECTION, EMULSION INTRAVENOUS PRN
Status: DISCONTINUED | OUTPATIENT
Start: 2024-07-20 | End: 2024-07-20

## 2024-07-20 RX ORDER — SODIUM CHLORIDE, SODIUM LACTATE, POTASSIUM CHLORIDE, CALCIUM CHLORIDE 600; 310; 30; 20 MG/100ML; MG/100ML; MG/100ML; MG/100ML
INJECTION, SOLUTION INTRAVENOUS CONTINUOUS
Status: DISCONTINUED | OUTPATIENT
Start: 2024-07-20 | End: 2024-07-20 | Stop reason: HOSPADM

## 2024-07-20 RX ORDER — SODIUM CHLORIDE, SODIUM LACTATE, POTASSIUM CHLORIDE, CALCIUM CHLORIDE 600; 310; 30; 20 MG/100ML; MG/100ML; MG/100ML; MG/100ML
INJECTION, SOLUTION INTRAVENOUS CONTINUOUS PRN
Status: DISCONTINUED | OUTPATIENT
Start: 2024-07-20 | End: 2024-07-20

## 2024-07-20 RX ORDER — HEPARIN SODIUM 1000 [USP'U]/ML
INJECTION, SOLUTION INTRAVENOUS; SUBCUTANEOUS PRN
Status: DISCONTINUED | OUTPATIENT
Start: 2024-07-20 | End: 2024-07-20 | Stop reason: HOSPADM

## 2024-07-20 RX ORDER — HYDROMORPHONE HCL IN WATER/PF 6 MG/30 ML
0.2 PATIENT CONTROLLED ANALGESIA SYRINGE INTRAVENOUS
Status: DISCONTINUED | OUTPATIENT
Start: 2024-07-20 | End: 2024-07-21

## 2024-07-20 RX ORDER — ONDANSETRON 2 MG/ML
4 INJECTION INTRAMUSCULAR; INTRAVENOUS EVERY 30 MIN PRN
Status: DISCONTINUED | OUTPATIENT
Start: 2024-07-20 | End: 2024-07-20 | Stop reason: HOSPADM

## 2024-07-20 RX ORDER — CEFAZOLIN SODIUM/WATER 2 G/20 ML
2 SYRINGE (ML) INTRAVENOUS
Status: DISCONTINUED | OUTPATIENT
Start: 2024-07-20 | End: 2024-07-20 | Stop reason: HOSPADM

## 2024-07-20 RX ORDER — POLYETHYLENE GLYCOL 3350 17 G/17G
17 POWDER, FOR SOLUTION ORAL DAILY
Status: DISCONTINUED | OUTPATIENT
Start: 2024-07-21 | End: 2024-07-22 | Stop reason: HOSPADM

## 2024-07-20 RX ORDER — BISACODYL 10 MG
10 SUPPOSITORY, RECTAL RECTAL DAILY PRN
Status: DISCONTINUED | OUTPATIENT
Start: 2024-07-23 | End: 2024-07-22 | Stop reason: HOSPADM

## 2024-07-20 RX ORDER — CEFAZOLIN SODIUM/WATER 2 G/20 ML
2 SYRINGE (ML) INTRAVENOUS SEE ADMIN INSTRUCTIONS
Status: DISCONTINUED | OUTPATIENT
Start: 2024-07-20 | End: 2024-07-20 | Stop reason: HOSPADM

## 2024-07-20 RX ORDER — AMOXICILLIN 250 MG
1 CAPSULE ORAL 2 TIMES DAILY
Status: DISCONTINUED | OUTPATIENT
Start: 2024-07-20 | End: 2024-07-22 | Stop reason: HOSPADM

## 2024-07-20 RX ORDER — ACETAMINOPHEN 325 MG/1
650 TABLET ORAL EVERY 4 HOURS PRN
Status: DISCONTINUED | OUTPATIENT
Start: 2024-07-23 | End: 2024-07-22 | Stop reason: HOSPADM

## 2024-07-20 RX ORDER — HEPARIN SODIUM 10000 [USP'U]/100ML
500 INJECTION, SOLUTION INTRAVENOUS CONTINUOUS
Status: DISCONTINUED | OUTPATIENT
Start: 2024-07-20 | End: 2024-07-22

## 2024-07-20 RX ORDER — DEXAMETHASONE SODIUM PHOSPHATE 4 MG/ML
4 INJECTION, SOLUTION INTRA-ARTICULAR; INTRALESIONAL; INTRAMUSCULAR; INTRAVENOUS; SOFT TISSUE
Status: DISCONTINUED | OUTPATIENT
Start: 2024-07-20 | End: 2024-07-20 | Stop reason: HOSPADM

## 2024-07-20 RX ORDER — HEPARIN SODIUM 1000 [USP'U]/ML
INJECTION, SOLUTION INTRAVENOUS; SUBCUTANEOUS PRN
Status: DISCONTINUED | OUTPATIENT
Start: 2024-07-20 | End: 2024-07-20

## 2024-07-20 RX ORDER — CLOPIDOGREL BISULFATE 75 MG/1
75 TABLET ORAL DAILY
Status: DISCONTINUED | OUTPATIENT
Start: 2024-07-20 | End: 2024-07-22 | Stop reason: HOSPADM

## 2024-07-20 RX ORDER — LABETALOL 20 MG/4 ML (5 MG/ML) INTRAVENOUS SYRINGE
PRN
Status: DISCONTINUED | OUTPATIENT
Start: 2024-07-20 | End: 2024-07-20

## 2024-07-20 RX ORDER — FENTANYL CITRATE 50 UG/ML
INJECTION, SOLUTION INTRAMUSCULAR; INTRAVENOUS PRN
Status: DISCONTINUED | OUTPATIENT
Start: 2024-07-20 | End: 2024-07-20

## 2024-07-20 RX ORDER — OXYCODONE HYDROCHLORIDE 5 MG/1
5 TABLET ORAL EVERY 4 HOURS PRN
Status: DISCONTINUED | OUTPATIENT
Start: 2024-07-20 | End: 2024-07-22 | Stop reason: HOSPADM

## 2024-07-20 RX ADMIN — SODIUM CHLORIDE, POTASSIUM CHLORIDE, SODIUM LACTATE AND CALCIUM CHLORIDE: 600; 310; 30; 20 INJECTION, SOLUTION INTRAVENOUS at 12:51

## 2024-07-20 RX ADMIN — ROCURONIUM BROMIDE 50 MG: 50 INJECTION, SOLUTION INTRAVENOUS at 09:13

## 2024-07-20 RX ADMIN — PROPOFOL 120 MG: 10 INJECTION, EMULSION INTRAVENOUS at 09:13

## 2024-07-20 RX ADMIN — CEFAZOLIN SODIUM 2 G: 2 INJECTION, SOLUTION INTRAVENOUS at 22:46

## 2024-07-20 RX ADMIN — ATORVASTATIN CALCIUM 80 MG: 80 TABLET, FILM COATED ORAL at 21:58

## 2024-07-20 RX ADMIN — HEPARIN SODIUM 5000 UNITS: 1000 INJECTION, SOLUTION INTRAVENOUS; SUBCUTANEOUS at 10:20

## 2024-07-20 RX ADMIN — HEPARIN SODIUM 3000 UNITS: 1000 INJECTION, SOLUTION INTRAVENOUS; SUBCUTANEOUS at 11:19

## 2024-07-20 RX ADMIN — ROCURONIUM BROMIDE 30 MG: 50 INJECTION, SOLUTION INTRAVENOUS at 10:30

## 2024-07-20 RX ADMIN — Medication 2 G: at 10:28

## 2024-07-20 RX ADMIN — SODIUM CHLORIDE, POTASSIUM CHLORIDE, SODIUM LACTATE AND CALCIUM CHLORIDE: 600; 310; 30; 20 INJECTION, SOLUTION INTRAVENOUS at 14:29

## 2024-07-20 RX ADMIN — SODIUM CHLORIDE, POTASSIUM CHLORIDE, SODIUM LACTATE AND CALCIUM CHLORIDE: 600; 310; 30; 20 INJECTION, SOLUTION INTRAVENOUS at 01:37

## 2024-07-20 RX ADMIN — SUGAMMADEX 150 MG: 100 INJECTION, SOLUTION INTRAVENOUS at 13:19

## 2024-07-20 RX ADMIN — PHENYLEPHRINE HYDROCHLORIDE 50 MCG: 10 INJECTION INTRAVENOUS at 09:13

## 2024-07-20 RX ADMIN — ROCURONIUM BROMIDE 20 MG: 50 INJECTION, SOLUTION INTRAVENOUS at 11:45

## 2024-07-20 RX ADMIN — FENTANYL CITRATE 100 MCG: 50 INJECTION INTRAMUSCULAR; INTRAVENOUS at 09:13

## 2024-07-20 RX ADMIN — FENTANYL CITRATE 50 MCG: 50 INJECTION INTRAMUSCULAR; INTRAVENOUS at 09:43

## 2024-07-20 RX ADMIN — FENTANYL CITRATE 50 MCG: 50 INJECTION INTRAMUSCULAR; INTRAVENOUS at 11:30

## 2024-07-20 RX ADMIN — SODIUM CHLORIDE, POTASSIUM CHLORIDE, SODIUM LACTATE AND CALCIUM CHLORIDE: 600; 310; 30; 20 INJECTION, SOLUTION INTRAVENOUS at 09:06

## 2024-07-20 RX ADMIN — SODIUM CHLORIDE: 9 INJECTION, SOLUTION INTRAVENOUS at 16:00

## 2024-07-20 RX ADMIN — HYDROMORPHONE HYDROCHLORIDE 0.5 MG: 1 INJECTION, SOLUTION INTRAMUSCULAR; INTRAVENOUS; SUBCUTANEOUS at 09:44

## 2024-07-20 RX ADMIN — HEPARIN SODIUM 500 UNITS/HR: 10000 INJECTION, SOLUTION INTRAVENOUS at 14:24

## 2024-07-20 RX ADMIN — ACETAMINOPHEN 975 MG: 325 TABLET, FILM COATED ORAL at 15:43

## 2024-07-20 RX ADMIN — PHENYLEPHRINE HYDROCHLORIDE 0.5 MCG/KG/MIN: 10 INJECTION INTRAVENOUS at 09:49

## 2024-07-20 RX ADMIN — ACETAMINOPHEN 975 MG: 325 TABLET, FILM COATED ORAL at 22:40

## 2024-07-20 RX ADMIN — LABETALOL 20 MG/4 ML (5 MG/ML) INTRAVENOUS SYRINGE 20 MG: at 13:47

## 2024-07-20 RX ADMIN — CLOPIDOGREL BISULFATE 75 MG: 75 TABLET ORAL at 18:04

## 2024-07-20 RX ADMIN — CEFAZOLIN SODIUM 2 G: 2 INJECTION, SOLUTION INTRAVENOUS at 06:37

## 2024-07-20 RX ADMIN — ONDANSETRON 4 MG: 2 INJECTION INTRAMUSCULAR; INTRAVENOUS at 12:28

## 2024-07-20 RX ADMIN — PHENYLEPHRINE HYDROCHLORIDE 100 MCG: 10 INJECTION INTRAVENOUS at 12:10

## 2024-07-20 RX ADMIN — LIDOCAINE HYDROCHLORIDE 100 MG: 20 INJECTION, SOLUTION INFILTRATION; PERINEURAL at 09:13

## 2024-07-20 ASSESSMENT — ACTIVITIES OF DAILY LIVING (ADL)
ADLS_ACUITY_SCORE: 24
ADLS_ACUITY_SCORE: 23
ADLS_ACUITY_SCORE: 24
ADLS_ACUITY_SCORE: 23
ADLS_ACUITY_SCORE: 24
ADLS_ACUITY_SCORE: 23
ADLS_ACUITY_SCORE: 23
ADLS_ACUITY_SCORE: 24
ADLS_ACUITY_SCORE: 23
ADLS_ACUITY_SCORE: 24
ADLS_ACUITY_SCORE: 23
ADLS_ACUITY_SCORE: 24
ADLS_ACUITY_SCORE: 24
ADLS_ACUITY_SCORE: 23

## 2024-07-20 ASSESSMENT — ENCOUNTER SYMPTOMS
SEIZURES: 0
DYSRHYTHMIAS: 0

## 2024-07-20 ASSESSMENT — LIFESTYLE VARIABLES: TOBACCO_USE: 0

## 2024-07-20 ASSESSMENT — COPD QUESTIONNAIRES: COPD: 0

## 2024-07-20 NOTE — OP NOTE
OPERATIVE NOTE    PROCEDURE DATE: 7/20/2024      PRE-OP DIAGNOSIS: #1.  Severe peripheral artery disease with nonhealing left toe amputations and ischemic toes with heel ulcer      #2.  History previous left SFA stenting and femoral open exposures    POST-OP DIAGNOSES: Same      PROCEDURE PERFORMED: #1.  Left common femoral to distal one third posterior tibial in situ greater saphenous vein bypass     #2.  Partial proximal SFA endarterectomy     (D-5)      SURGEON:  Parrish Cortés M.D.      ASSISTANT: Maryann Lewis MD (Vascular Resident)      ANESTHESIA:  General-endotracheal      PRE-OP MEDICATIONS: Scheduled IV Ancef      INDICATIONS FOR PROCEDURE: 74-year-old patient with previous left SFA stenting and open femoral exposures multiple times for angiograms as had prior toe amputations.  Recently underwent amputation for infected toe that is healing poorly due to his vascular insufficiency and now with ischemic changes to another toe and heel ulcer.  Repeat angiogram revealed occlusion of the distal RIM-[id number]-rxteswwf tibial vessels.  Collaterals reconstitute the mid posterior tibial artery and anterior tibial artery which are quite calcified.  We felt the distal bypass was necessary for limb salvage.  Vein mapping revealed a dual channel starting in the proximal groin and rejoining to a single channel in the proximal calf.  Vein was of adequate caliber on both branches and distally for the distal bypass if appropriate target could be found interoperatively.  Risk benefits discussed with the patient      DESCRIPTION OF PROCEDURE: Brought the op room to schedule anesthesia oral and 2 at the difficulty.  Velazquez catheter was placed.  Appropriate padding was performed on the right leg with a pneumatic compression boot.  The entire left leg and groin were prepped and draped isolating of the foot ulcers.    VASCULAR EXPOSURE: Simultaneous left groin and left mid calf incision was made.  In the groin we dissected  down to the greater saphenous vein which is dissected free.  We preserved both the anterior and posterior channels not being sure which is the dominant channel for our bypass graft.  We then exposed the vein in the mid calf and this was dissected free ligating several sidebranches.  Multiple branches were noted in the more proximal calf incision was made over the use ligating the 2 distal branches.  The branch which may have been the more anterior channel was preserved.  Vein was of good quality and caliber and Moist with a papaverine-Plasma-Lyte solution with minimal manipulation.    We then exposed the femoral vessels.  There was significant scar tissue from the prior open exposures and stent placements.  SFA was markedly diseased and calcified.  Profundofemoral artery had minimal disease.  There was plaque in the common femoral artery more on the medial aspect and relatively soft anteriorly.  External iliac artery was dissected free encircled with Silastic Vesseloops and noted to be relatively disease-free.    DISTAL  COMMON  FEMORAL--MID POSTERIOR TIBIAL BYPASS: 5000 units intravenous heparin given.  Vein was clamped at the saphenofemoral junction and transected and oversewn with running 5-0 Prolene suture.  First valve in the GSV was cut it was incompetent.  Both channels were controlled with a microvascular bulldog and the padded bulldog of the GSV.  Vesseloops were tightened around the external iliac and profundus femoral with a vascular clamp to the diseased SFA.  Arteriotomy was made in the very distal common femoral artery extended onto the superficial femoral artery slightly.  It was necessary to perform a partial endarterectomy of the superficial femoral artery more on the medial aspect so we could sew our graft in place.  No endarterectomy is necessary in the common femoral artery with the profundus femoral origin widely patent.  Greater saphenous vein was sewn to the arteriotomy with running 6-0  Prolene suture and loupe magnification.  Metal ligaclips were used to naz the anastomosis.  Release of our vascular clamps we had excellent hemostasis and a very strong pulse.    POSTERIOR TIBIAL EXPOSURE: We initially had dissected the posterior tibial artery.  This was quite calcified.  It was unsure whether there was a patent lumen.  Once the proximal graft anastomosis was completed I ligated the obviously did diseased and occluded more proximal posterior tibial artery and transected the artery.  We did not find a lumen for several centimeters.  We therefore slightly extended our incision and did find a soft area with a very small sidebranch being preserved.  Vesseloops were placed around these.  We transected the proximal artery and it was spatulated.  We did have a lumen that was diseased but fairly smooth.  This is irrigated with heparinized saline solution with backbleeding noted.  Probably measured approximately 2 mm in diameter and was relatively soft at this area.    We had not ligated the distal vein since I want to make sure we had the appropriate portion of the artery for outflow which we did find as described above.  Vein was then ligated distally.  This was irrigated with a papaverine solution.  The LeMaitre valvulotome was used and passed up to the distal vein.  This went into both the anterior and posterior branches and all valves were cut passing this up twice both branches.  We had excellent inflow.  No obvious competing sidebranches.  The distal end of the vein was spatulated.  Additional 2000 units of interest heparin were given.    An end-to-end spatulated anastomosis with our vein graft to the posterior tibial artery preserving a small sidebranch with 7-0 Prolene suture removed medication.  Upon completion release of the padded bulldog and vessels latest excellent pulse within the graft and PT artery adjacent and also in the ankle and the foot pinked up nicely.    We initially had some  difficulty determining where the anterior and posterior branches came together.  Initially I thought in the knee this was a sidebranch it was temporary ligated with 3-0 silk suture however this was the anterior branch and I did want to preserve both since it was difficult to determine which was the dominant branch so I suspect the posterior branch clinically appears to be larger on the exposed segment.  High was released and we had an excellent Doppler flow in both anterior and posterior thigh and proximal calf branches and also in the single-channel in the calf going to her anastomosis.  There was no obvious competing sidebranches by Doppler and we avoided any thigh incisions.    Good hemostasis was noted.  Small amount of Surgicel was left in the groin at the dissection side due to the multiple procedures where the tissue was irritated.  The groin was closed in multiple layers with interrupted 3-0 Vicryl suture.  Skin was closed with 4-0 Monocryl in suppurative fashion.  The small proximal calf incision closed interrupted 3-0 Vicryl and the skin with 4-0 Monocryl in subcu fashion.  The calf incision was closed with several interrupted 3-0 Vicryl's and the skin with interrupted 4-0 nylon simple and mattress sutures.  Wounds were infiltrated with 0.5% Marcaine.    Surgical adhesive applied to the groin and proximal calf and gauze and Yamileth to the mid calf anastomotic incision.  Also redressed the foot ulcers.    Patient tolerated procedure well.  Needle and sponge counts correct x 2.      EBL: 150 mL      COMPLICATIONS: None      OPERATIVE FINDINGS: Very adequate inflow with no luminal narrowing of the calcified disease common femoral artery.  Excellent GSV conduit being a dual system from the proximal groin to the proximal calf both of the somewhat equal diameter the posterior slightly larger.  Diseased posterior tibial artery but soft at the anastomosis and adequate outflow to the foot.    Procedure was more  difficult particular in the groin due to the multiple procedures.  Operative time 5 hours.      Parrish Cortés MD

## 2024-07-20 NOTE — ANESTHESIA PREPROCEDURE EVALUATION
Anesthesia Pre-Procedure Evaluation    Patient: Zia Hopkins   MRN: [id number] : 1950        Procedure : Procedure(s):  LEFT FEMORAL TO TIBIAL ARTERY IN SITU BYPASS GRAFT          Past Medical History:   Diagnosis Date    Acute low back pain     Anemia     Bilateral carotid bruits     Cellulitis of left foot     Central spinal stenosis     and Subarticular    Chronic kidney disease     Degenerative lumbar spinal stenosis     multilevel    Diabetes mellitus type 2, noninsulin dependent (H)     Diabetic ulcer of toe (H)     Gangrene of toe (H)     Hyperlipidemia LDL goal <70     Hypertension     Lumbar foraminal stenosis     Macular degeneration (senile) of retina     PAD (peripheral artery disease) (H24)     Pneumonia     Psoriasis       Past Surgical History:   Procedure Laterality Date    AMPUTATE FOOT Left 03/15/2017    Procedure: AMPUTATE FOOT;  Surgeon: Emre Mauricio DPM;  Location: [facility] OR    AMPUTATE TOE(S) Left 2021    Procedure: LEFT GREAT TOE AMPUTATION.;  Surgeon: Purvi Whelan DPM, Podiatry/Foot and Ankle Surgery;  Location:  OR    AMPUTATE TOE(S) Right 2022    Procedure: 1.  Partial right great toe amputation. 2.  Right foot fourth toe amputation at the metatarsophalangeal joint. 3.  Right fifth metatarsal amputation at the metatarsophalangeal joint.;  Surgeon: Purvi Whelan DPM, Podiatry/Foot and Ankle Surgery;  Location: [facility] OR    AMPUTATE TOE(S) Right 2022    Procedure: Right second and third toe amputations;  Surgeon: Purvi Whelan DPM, Podiatry/Foot and Ankle Surgery;  Location: [facility] OR    AMPUTATE TOE(S) Left 2024    Procedure: LEFT 3RD TOE AMPUTATION AND HEEL WOUND DEBRIDEMENT;  Surgeon: Erik Baez DPM;  Location:  OR    ARTHROPLASTY KNEE BILATERAL Bilateral     BIOPSY BONE FOOT Right 2022    Procedure: Partial right fourth and fifth metatarsal bone excision;  Surgeon: Purvi Whelan DPM, Podiatry/Foot and Ankle Surgery;  Location:   OR    ENDARTERECTOMY FEMORAL Right 2022    Procedure: RIGHT FEMORAL ENDARTERECTOMY, RIGHT FEMORAL ARTERY BALLOON ANGIOPLASTY WITH STENTING;  Surgeon: Surjit Hamlin MD;  Location: [facility] OR    ENDARTERECTOMY FEMORAL Left 2023    Procedure: LEFT FEMORAL CUTDOWN;  Surgeon: Surjit Hamlin MD;  Location: [facility] OR    IR LOWER EXTREMITY ANGIOGRAM LEFT  2019    IR LOWER EXTREMITY ANGIOGRAM LEFT  2021    IR LOWER EXTREMITY ANGIOGRAM LEFT  2021    IR LOWER EXTREMITY ANGIOGRAM LEFT  2024    IR LOWER EXTREMITY ANGIOGRAM RIGHT  2022    IR LOWER EXTREMITY ANGIOGRAM RIGHT  06/10/2022    IR OR ANGIOGRAM  2022    IR OR ANGIOGRAM  2023    IRRIGATION AND DEBRIDEMENT FOOT, COMBINED Left 03/15/2017    Procedure: COMBINED IRRIGATION AND DEBRIDEMENT FOOT;  Surgeon: Emre Mauricio DPM;  Location: [facility] OR    OR ANGIOGRAM, LOWER EXTREMITY Left 2023    Procedure: LEFT LOWER EXTREMITY ANGIOGRAM BALLOON ANGIOPLASTY;  Surgeon: Surjit Hamlin MD;  Location: [facility] OR    PERONEAL NERVE DECOMPRESSION      VASECTOMY        No Known Allergies   Social History     Tobacco Use    Smoking status: Some Days     Current packs/day: 0.00     Types: Cigarettes     Last attempt to quit: 3/3/2017     Years since quittin.3    Smokeless tobacco: Never    Tobacco comments:     2-3 cigarettes per day   Substance Use Topics    Alcohol use: Yes     Comment: 1-2.5 glasses of wine at dinner      Wt Readings from Last 1 Encounters:   24 60.3 kg (133 lb)        Anesthesia Evaluation            ROS/MED HX  ENT/Pulmonary:    (-) tobacco use, asthma, COPD and sleep apnea   Neurologic: Comment: Lumbar foraminal stenosis   (-) no seizures and no CVA   Cardiovascular:     (+) Dyslipidemia hypertension- Peripheral Vascular Disease-   -  - -                                 Previous cardiac testing   Echo: Date:  Results:  The visual ejection fraction is 65-70%.  Left ventricular systolic  function is normal.  ______________________________________________________________________________  Left Ventricle  The left ventricle is normal in size. There is normal left ventricular wall  thickness. Diastolic Doppler findings (E/E' ratio and/or other parameters)  suggest left ventricular filling pressures are indeterminate. The visual  ejection fraction is 65-70%. Left ventricular systolic function is normal.  Grade I or early diastolic dysfunction.     Right Ventricle  The right ventricle is normal in size and function.     Atria  Normal left atrial size. Right atrial size is normal. There is no color  Doppler evidence of an atrial shunt.     Mitral Valve  The mitral papillary muscle appears thickened and/or calcified. The mitral  valve leaflets are mildly thickened. There is mild (1+) mitral regurgitation.     Tricuspid Valve  There is trace tricuspid regurgitation. Right ventricular systolic pressure  could not be approximated due to inadequate tricuspid regurgitation.     Aortic Valve  The aortic valve is trileaflet. There is mild trileaflet aortic sclerosis. No  aortic regurgitation is present. No hemodynamically significant valvular  aortic stenosis.     Pulmonic Valve  There is no pulmonic valvular regurgitation. Normal pulmonic valve velocity.     Vessels  The aortic root is normal size. Normal size ascending aorta. IVC diameter <2.1  cm collapsing >50% with sniff suggests a normal RA pressure of 3 mmHg.     Pericardium  There is no pericardial effusion.    Stress Test:  Date: Results:    ECG Reviewed:  Date: 7/24 Results:  SB at 57 bpm, LAD  Cath:  Date: Results:   (-) CAD, CHF and arrhythmias   METS/Exercise Tolerance:     Hematologic:     (+)      anemia,          Musculoskeletal: Comment: Cellulitis of L foot, complicated by toe gangrene      GI/Hepatic:    (-) GERD and liver disease   Renal/Genitourinary:     (+) renal disease, type: CRI,            Endo:     (+)  type II DM,                 (-)  "Type I DM   Psychiatric/Substance Use:       Infectious Disease:       Malignancy:       Other:            Physical Exam    Airway        Mallampati: II   TM distance: > 3 FB   Neck ROM: full   Mouth opening: > 3 cm    Respiratory Devices and Support         Dental       (+) Modest Abnormalities - crowns, retainers, 1 or 2 missing teeth      Cardiovascular          Rhythm and rate: regular and normal     Pulmonary           (+) decreased breath sounds           OUTSIDE LABS:  CBC:   Lab Results   Component Value Date    WBC 8.1 07/18/2024    WBC 6.4 07/13/2024    HGB 12.4 (L) 07/18/2024    HGB 12.3 (L) 07/13/2024    HCT 37.9 (L) 07/18/2024    HCT 36.8 (L) 07/13/2024     07/18/2024     07/15/2024     BMP:   Lab Results   Component Value Date     07/18/2024     07/13/2024    POTASSIUM 3.6 07/18/2024    POTASSIUM 3.7 07/13/2024    CHLORIDE 102 07/18/2024    CHLORIDE 99 07/13/2024    CO2 23 07/18/2024    CO2 25 07/13/2024    BUN 28.7 (H) 07/18/2024    BUN 22.8 07/13/2024    CR 0.99 07/18/2024    CR 1.01 07/13/2024     (H) 07/20/2024     (H) 07/20/2024     COAGS:   Lab Results   Component Value Date    PTT 32 02/03/2021    INR 0.95 02/03/2021     POC:   Lab Results   Component Value Date     (H) 04/23/2021     HEPATIC: No results found for: \"ALBUMIN\", \"PROTTOTAL\", \"ALT\", \"AST\", \"GGT\", \"ALKPHOS\", \"BILITOTAL\", \"BILIDIRECT\", \"CAMMIE\"  OTHER:   Lab Results   Component Value Date    A1C 7.0 (H) 07/12/2024    TIN 9.2 07/18/2024    SED 32 (H) 07/12/2024       Anesthesia Plan    ASA Status:  3    NPO Status:  NPO Appropriate    Anesthesia Type: General.     - Airway: ETT         Techniques and Equipment:     - Lines/Monitors: 2nd IV, Arterial Line     Consents    Anesthesia Plan(s) and associated risks, benefits, and realistic alternatives discussed. Questions answered and patient/representative(s) expressed understanding.     - Discussed: Risks, Benefits and Alternatives for BOTH " SEDATION and the PROCEDURE were discussed     - Discussed with:  Patient            Postoperative Care            Comments:    Other Comments: Has 18G and midline.  Arterial line after induction           Kristen Ragland MD    I have reviewed the pertinent notes and labs in the chart from the past 30 days and (re)examined the patient.  Any updates or changes from those notes are reflected in this note.

## 2024-07-20 NOTE — ANESTHESIA PROCEDURE NOTES
Arterial Line Procedure Note    Pre-Procedure   Staff -        Anesthesiologist:  Kristen Ragland MD       Performed By: anesthesiologist       Location: OR       Pre-Anesthestic Checklist: patient identified, IV checked, risks and benefits discussed, informed consent, monitors and equipment checked, pre-op evaluation and at physician/surgeon's request  Timeout:       Correct Patient: Yes        Correct Procedure: Yes        Correct Site: Yes        Correct Position: Yes   Line Placement:   This line was placed Pre Induction starting at 7/20/2024 9:17 AM and ending at 7/20/2024 9:20 AM  Procedure   Procedure: arterial line, new line and elective       Laterality: left       Insertion Site: radial.  Sterile Prep        Standard elements of sterile barrier followed       Skin prep: Chloraprep  Insertion/Injection        Technique: ultrasound guided        1. Ultrasound was used to evaluate the access site.       2. Artery evaluated via ultrasound for patency/adequacy.       3. Using real-time ultrasound the needle/catheter was observed entering the artery/vein.       Catheter Type/Size: 20 G, 12 cm  Narrative        Tegaderm dressing used.       Complications: None apparent,        Arterial waveform: Yes        IBP within 10% of NIBP: Yes   Comments:  No image saved

## 2024-07-20 NOTE — ANESTHESIA PROCEDURE NOTES
Airway       Patient location during procedure: OR ([facility] - Operating Room or Procedural Area)       Procedure Start/Stop Times: 7/20/2024 9:15 AM  Staff -        Anesthesiologist:  Kristen Ragland MD       CRNA: Geetha Ly APRN CRNA       Performed By: CRNAIndications and Patient Condition       Indications for airway management: lashawn-procedural       Induction type:intravenous       Mask difficulty assessment: 2 - vent by mask + OA or adjuvant +/- NMBA    Final Airway Details       Final airway type: endotracheal airway       Successful airway: ETT - single  Endotracheal Airway Details        ETT size (mm): 8.0       Cuffed: yes       Successful intubation technique: video laryngoscopy       VL Blade Size: Watson 3       Grade View of Cords: 1       Adjucts: stylet       Position: Right       Measured from: lips       Secured at (cm): 23       Bite block used: None    Post intubation assessment        Placement verified by: capnometry, equal breath sounds and chest rise        Number of attempts at approach: 1       Number of other approaches attempted: 0       Secured with: tape       Ease of procedure: easy       Dentition: Intact and Unchanged    Medication(s) Administered   Medication Administration Time: 7/20/2024 9:15 AM

## 2024-07-20 NOTE — PROGRESS NOTES
[facility]    Patient in OR:  L femoral to tibial in situ GSV bypass Saturday am at 0900.     Left 3rd toe amputation and heel wound debridement 7/13/24  Left 2nd toe and left heel wound debridement 7/16/24    Assessment:  74 year old male s/p left foot third digit amputation on 7/13   Peripheral Arterial Disease   Diabetes Mellitus --> hba1c: 7.0  Staph Bacteremia      Plan:   Podiatry will follow up tomorrow to determine ongoing plan for left foot.  Possible repeat debridement    Emre Mauricio DPM, FACFAS, MS  [facility] Department of Podiatry/Foot & Ankle Surgery

## 2024-07-20 NOTE — PROGRESS NOTES
VASCULAR SURGERY: Postop check     Patient back in his room on second floor.  Wife is present.  Left leg is elevated.  He is very lucid and reports that his left foot feels very good.    +3 graft pulse.  Excellent Doppler signals in graft and ankle PT.    IMPRESSION: Doing very well following revascularization.  Will place on aspirin and Plavix.  Due to very diseased limited runoff will place on scheduled low-dose heparin at 500 units an hour for the next 24 to 48 hours.     Dr. Mauricio a podiatry will reevaluate his foot tomorrow to see what if any further interventions indicated at this time now that we have maximized his blood flow.     May remove Velazquez catheter later this afternoon.  Advance diet as tolerated      Discussed with patient and wife.      Parrish Cortés MD

## 2024-07-20 NOTE — ANESTHESIA POSTPROCEDURE EVALUATION
Patient: Zia Hopkins    Procedure: Procedure(s):  LEFT FEMORAL TO TIBIAL ARTERY IN SITU BYPASS GRAFT. superficial femoral artery endarterectomy.       Anesthesia Type:  General    Note:  Disposition: Inpatient   Postop Pain Control: Uneventful            Sign Out: Well controlled pain   PONV: No   Neuro/Psych: Uneventful            Sign Out: Acceptable/Baseline neuro status   Airway/Respiratory: Uneventful            Sign Out: Acceptable/Baseline resp. status   CV/Hemodynamics: Uneventful            Sign Out: Acceptable CV status; No obvious hypovolemia; No obvious fluid overload   Other NRE:    DID A NON-ROUTINE EVENT OCCUR?            Last vitals:  Vitals Value Taken Time   /56 07/20/24 1430   Temp 36.7  C (98  F) 07/20/24 1430   Pulse 59 07/20/24 1439   Resp 11 07/20/24 1439   SpO2 97 % 07/20/24 1439   Vitals shown include unfiled device data.    Electronically Signed By: Kristen Ragland MD  July 20, 2024  2:40 PM

## 2024-07-20 NOTE — PROGRESS NOTES
[facility]    Medicine Progress Note - Hospitalist Service    Date of Admission:  7/12/2024    Assessment & Plan   Principal Problem:    MSSA bacteremia  Active Problems:    Gangrene of toe (H)    Acute osteomyelitis of left foot (H)    PAD (peripheral artery disease) (H24)    Diabetes mellitus, type 2 (H)    Critical limb ischemia of both lower extremities (H)    Atheroscler of native artery of left leg with intermit claudication (H24)    CKD (chronic kidney disease) stage 3, GFR 30-59 ml/min (H)      Zia Hopkins is a 74 year old male with history of DM2, HTN, HLP, CKD 3, PAD, who presented on 7/12/2024 for scheduled left femoral artery cutdown and LLE angiogram but was found to have left 3rd toe wet gangrene with cellulitis and developing osteomyelitis.  He was admitted for IV antibiotics, podiatry and infectious disease consultation.  Blood cultures obtained on admission are growing gram-positive cocci.      Hospitalist service consulted on for management of diabetes and other comorbidities.      7/20/2024- I have updated the patient's problem list as above and reviewed the labs/other findings today   s/p PICC placed. Seen prior to OR today for angiogram  Maintained on Ancef for MSSA bacteremia.  Will continue to follow postoperatively  Maintain on DVT prophylaxis  Sugars appear well-controlled-baseline A1c is 7 as well.  Vitals stable, will trend renal functions.      Left foot 3rd digit gangrene with cellulitis and developing osteomyelitis  Left heel ulcer with eschar, s/p partial left third toe amputation with osteotomy through proximal phalanx, 7/13/2024  Left heel ulcer with eschar, s/p debridement down to/including subcutaneous fat, 7/13/2024  MSSA bacteremia  S/p multiple previous bilateral toe amputations   - MRI showed ulceration at tip of third toe extending to bone margin exposed middle phalanx.  Some T2 signal abnormality noted.  Osteomyelitis could not be excluded.  -  "CRP 15.  No leukocytosis, afebrile  - 2 of 2 sets of blood cultures obtained on 7/12 are growing MSSA  - Podiatry following, s/p partial left 3rd toe amputation with osteotomy through proximal phalanx and debridement of left heel ulcer on 7/13/2024   - on 7/16: podiatry noted some ischemic changes to left 2 nd toe as well, debrided, noted some exposed bone at wound bed; might need further debridement versus partial toe amputation  - PT for heel weight bearing training in surgical shoe  - ID following, currently on IV Cefazolin; repeated blood cultures 7/15-- no growth so far  - ID plans for total 4 weeks of IV antibiotics for bacteremia (until 8/12/24)   S/p midline placement on 7/19/2024         Peripheral arterial disease with critical limb ischemia of bilateral lower extremities  S/p multiple vascular surgery interventions to bilateral lower extremities     Left 2nd toe ischemic changes  - CTA A/P with runoff on 7/12 showed occlusion of left above-knee popliteal artery with distal reconstitution, patent bilateral SFA stents, diffuse aortoiliac and lower extremity atherosclerotic disease  - completed LLE angiogram 7/16/24 and noted per vascular surgery \"long segment occlusion of the left above-knee popliteal artery with some tibial involvement\"  - planned for LLE bypass 7/20/24  - management as per vascular surgery, following as primary  - Continue aspirin 81 mg, Plavix held; resume when okay with surgery     Diabetes mellitus type 2, with peripheral neuropathy and CKD 3, controlled with A1c 7 on 7/12/2024  PTA-empagliflozin 25 mg, metformin 1000 mg twice daily  - Blood sugars reasonably well-controlled; A1c 7.0  - Continue empagliflozin; will continue to hold metformin for now given recent angiogram  - Continue NovoLog sliding scale with hypoglycemia protocol     Benign essential hypertension  PTA-amlodipine 10 mg, chlorthalidone 50 mg, losartan 100 mg, Toprol- mg daily  - Blood pressure well-controlled.  - " Continue PTA amlodipine, chlorthalidone, losartan  -HR 50-60 hence Metoprolol reduced to 50 mg on 7/19.monitor and readdress based on BP      Hyperlipidemia-LDL 62, HDL 62, 7/12/2024  PTA-atorvastatin 80 mg, Zetia 10 mg  - Hyperlipidemia is well-controlled.  Continue atorvastatin and Zetia     Chronic kidney disease stage III  - Baseline creatinine around 1.  Creatinine currently stable and at baseline.  Monitor     Previous vascular surgery procedures  Right common femoral and superficial femoral endarterectomy with patch angioplasty and right femoral artery angioplasty with stenting, 3/2022  2. LLE angiogram and balloon angioplasty of anterior and posterior tibial arteries, 4/2021  3. Left popliteal angioplasty and left SFA stenting 2017 followed by stenting of in-stent stenosis 7/2019   4. LLE arteriogram with balloon angioplasty of the left popliteal artery, recanalization of posterior tibial artery, and extension of the stent in the left superficial femoral artery 2/2021          Diet: NPO per Anesthesia Guidelines for Procedure/Surgery Except for: Meds    DVT Prophylaxis: Heparin SQ  Velazquez Catheter: PRESENT, indication:    Lines: PRESENT             Cardiac Monitoring: None  Code Status: Full Code      Clinically Significant Risk Factors                              # DMII: A1C = 7.0 % (Ref range: <5.7 %) within past 6 months         # Financial/Environmental Concerns: none         Disposition Plan     Medically Ready for Discharge:              Nicholas Delgado MD  Hospitalist Service  [facility]  Securely message with "[employer]" (more info)  Text page via [employer] Paging/Directory   ______________________________________________________________________    Interval History   No CP/SOB    Physical Exam   Vital Signs: Temp: 97.7  F (36.5  C) Temp src: Oral BP: 131/61 Pulse: 55   Resp: 16 SpO2: 95 % O2 Device: None (Room air)    Weight: 133 lbs 0 oz    Alert awake  Vision Baseline  Neck  supple  Oral mucosa moist  bilateral air entry heard,  S1-S2 normal  Abdomen is soft no tenderness  Extremities are fully mobile , left foot wound dressed  Neurologically- no new Gross deficits from baseline-Moving all 4 extremities  Psych-mood okay and appropriate to circumstances      Medical Decision Making       40 MINUTES SPENT BY ME on the date of service doing chart review, history, exam, documentation & further activities per the note.      Data         Imaging results reviewed over the past 24 hrs:   No results found for this or any previous visit (from the past 24 hour(s)).

## 2024-07-20 NOTE — PLAN OF CARE
Goal Outcome Evaluation:      Date & Time: 7/20/24 0700 - 1900   Surgery/POD# 0: LEFT FEMORAL TO TIBIAL ARTERY IN SITU BYPASS GRAFT. superficial femoral artery endarterectomy.   Behavior & Aggression: Green   Fall Risk: Yes   Orientation: A&O x4  ABNL VS/O2: VSS on RA, Hx of HTN   ABNL Labs: See results   Pain Management: Scheduled tylenol   Bowel/Bladder: Velazquez cath, BS active, Passing gas, No BM   Drains: PIV R arm infusing heparin, R midline infusing NS @100mL/hr   Wounds/incisions: LLE surgical incisions CDI, L groin incision dressing CDI  Diet: Tolerating regular diet   Activity Level: Ax1 w/ walker, partial weightbearing on LLE   Tests/Procedures: N/A  Anticipated DC Date: N/A  Significant Information: CMS is intact, M arked pulses to check by Dr. Cortés on LLE.

## 2024-07-20 NOTE — ANESTHESIA CARE TRANSFER NOTE
Patient: Zia Hopkins    Procedure: Procedure(s):  LEFT FEMORAL TO TIBIAL ARTERY IN SITU BYPASS GRAFT. superficial femoral artery endarterectomy.       Diagnosis: Critical ischemia of lower extremity (H) [I70.229]  Diagnosis Additional Information: No value filed.    Anesthesia Type:   General     Note:    Oropharynx: oropharynx clear of all foreign objects and spontaneously breathing  Level of Consciousness: awake  Oxygen Supplementation: face mask    Independent Airway: airway patency satisfactory and stable  Dentition: dentition unchanged  Vital Signs Stable: post-procedure vital signs reviewed and stable  Report to RN Given: handoff report given  Patient transferred to: PACU    Handoff Report: Identifed the Patient, Identified the Reponsible Provider, Reviewed the pertinent medical history, Discussed the surgical course, Reviewed Intra-OP anesthesia mangement and issues during anesthesia, Set expectations for post-procedure period and Allowed opportunity for questions and acknowledgement of understanding      Vitals:  Vitals Value Taken Time   /65 07/20/24 1350   Temp     Pulse 65 07/20/24 1353   Resp 8 07/20/24 1353   SpO2 99 % 07/20/24 1353   Vitals shown include unfiled device data.    Electronically Signed By: CLAUDINE Boss CRNA  July 20, 2024  1:54 PM

## 2024-07-20 NOTE — PLAN OF CARE
Goal Outcome Evaluation:      Plan of Care Reviewed With: patient    Overall Patient Progress: improvingOverall Patient Progress: improving       Date & Time: 7/202/2024 7p-7a  Surgery/POD#5: LLE angiogram. Here for femoral artery cutdown 7/12 postponed d/t wet gangrene and cellulitis. 7/13: L 3rd toe amputation and heel wound debridement.  Behavior & Aggression: Green  Fall Risk: No, IND   Orientation: A&O x4   ABNL VS/O2: VSS on RA  ABNL Labs: See results   Pain Management: denies  Bowel/Bladder: BS active, Bmx1, voids in BR  Drains: PIV R arm SL, R Midline infusing   Wounds/incisions: L foot wrapped CDI, NWB w/ surgical shoe, L groin incision dressing CDI, R foot partial amputation   Diet: NPO  Activity Level: IND w/ walker, NWB on LLE. Surgical shoe in room for ambulation  Tests/Procedures: Fem-tib bypass Sat 7/20  Anticipated DC Date: N/A  Significant Information: CMS is intact. Int ABx

## 2024-07-21 PROBLEM — I99.8 ISCHEMIA OF LEFT LOWER EXTREMITY: Status: ACTIVE | Noted: 2024-07-21

## 2024-07-21 PROBLEM — I70.448: Status: ACTIVE | Noted: 2023-07-12

## 2024-07-21 LAB
BASOPHILS # BLD AUTO: 0.1 10E3/UL (ref 0–0.2)
BASOPHILS NFR BLD AUTO: 1 %
CREAT SERPL-MCNC: 1.02 MG/DL (ref 0.67–1.17)
EGFRCR SERPLBLD CKD-EPI 2021: 76 ML/MIN/1.73M2
EOSINOPHIL # BLD AUTO: 0.5 10E3/UL (ref 0–0.7)
EOSINOPHIL NFR BLD AUTO: 6 %
ERYTHROCYTE [DISTWIDTH] IN BLOOD BY AUTOMATED COUNT: 12.5 % (ref 10–15)
GLUCOSE BLDC GLUCOMTR-MCNC: 153 MG/DL (ref 70–99)
GLUCOSE BLDC GLUCOMTR-MCNC: 193 MG/DL (ref 70–99)
GLUCOSE BLDC GLUCOMTR-MCNC: 235 MG/DL (ref 70–99)
GLUCOSE BLDC GLUCOMTR-MCNC: 260 MG/DL (ref 70–99)
GLUCOSE BLDC GLUCOMTR-MCNC: 294 MG/DL (ref 70–99)
HCT VFR BLD AUTO: 35.4 % (ref 40–53)
HGB BLD-MCNC: 11.6 G/DL (ref 13.3–17.7)
IMM GRANULOCYTES # BLD: 0 10E3/UL
IMM GRANULOCYTES NFR BLD: 0 %
LYMPHOCYTES # BLD AUTO: 1.2 10E3/UL (ref 0.8–5.3)
LYMPHOCYTES NFR BLD AUTO: 13 %
MCH RBC QN AUTO: 30.8 PG (ref 26.5–33)
MCHC RBC AUTO-ENTMCNC: 32.8 G/DL (ref 31.5–36.5)
MCV RBC AUTO: 94 FL (ref 78–100)
MONOCYTES # BLD AUTO: 0.7 10E3/UL (ref 0–1.3)
MONOCYTES NFR BLD AUTO: 7 %
NEUTROPHILS # BLD AUTO: 6.7 10E3/UL (ref 1.6–8.3)
NEUTROPHILS NFR BLD AUTO: 73 %
NRBC # BLD AUTO: 0 10E3/UL
NRBC BLD AUTO-RTO: 0 /100
PLATELET # BLD AUTO: 265 10E3/UL (ref 150–450)
RBC # BLD AUTO: 3.77 10E6/UL (ref 4.4–5.9)
UFH PPP CHRO-ACNC: <0.1 IU/ML
WBC # BLD AUTO: 9.2 10E3/UL (ref 4–11)

## 2024-07-21 PROCEDURE — 36415 COLL VENOUS BLD VENIPUNCTURE: CPT | Performed by: SURGERY

## 2024-07-21 PROCEDURE — 250N000013 HC RX MED GY IP 250 OP 250 PS 637: Performed by: PODIATRIST

## 2024-07-21 PROCEDURE — 250N000011 HC RX IP 250 OP 636: Mod: JZ | Performed by: PHYSICIAN ASSISTANT

## 2024-07-21 PROCEDURE — 99232 SBSQ HOSP IP/OBS MODERATE 35: CPT | Performed by: PODIATRIST

## 2024-07-21 PROCEDURE — 250N000013 HC RX MED GY IP 250 OP 250 PS 637

## 2024-07-21 PROCEDURE — 85520 HEPARIN ASSAY: CPT

## 2024-07-21 PROCEDURE — 99232 SBSQ HOSP IP/OBS MODERATE 35: CPT | Performed by: HOSPITALIST

## 2024-07-21 PROCEDURE — 85025 COMPLETE CBC W/AUTO DIFF WBC: CPT

## 2024-07-21 PROCEDURE — 82565 ASSAY OF CREATININE: CPT | Performed by: SURGERY

## 2024-07-21 PROCEDURE — 120N000001 HC R&B MED SURG/OB

## 2024-07-21 PROCEDURE — 250N000013 HC RX MED GY IP 250 OP 250 PS 637: Performed by: INTERNAL MEDICINE

## 2024-07-21 RX ORDER — METOPROLOL SUCCINATE 100 MG/1
50 TABLET, EXTENDED RELEASE ORAL DAILY
COMMUNITY
Start: 2024-07-21

## 2024-07-21 RX ADMIN — ACETAMINOPHEN 975 MG: 325 TABLET, FILM COATED ORAL at 23:24

## 2024-07-21 RX ADMIN — SENNOSIDES AND DOCUSATE SODIUM 1 TABLET: 50; 8.6 TABLET ORAL at 08:48

## 2024-07-21 RX ADMIN — CEFAZOLIN SODIUM 2 G: 2 INJECTION, SOLUTION INTRAVENOUS at 14:21

## 2024-07-21 RX ADMIN — ASPIRIN 81 MG CHEWABLE TABLET 81 MG: 81 TABLET CHEWABLE at 08:48

## 2024-07-21 RX ADMIN — METOPROLOL SUCCINATE 50 MG: 50 TABLET, EXTENDED RELEASE ORAL at 08:48

## 2024-07-21 RX ADMIN — EMPAGLIFLOZIN 25 MG: 25 TABLET, FILM COATED ORAL at 08:48

## 2024-07-21 RX ADMIN — EZETIMIBE 10 MG: 10 TABLET ORAL at 08:48

## 2024-07-21 RX ADMIN — CHLORTHALIDONE 50 MG: 25 TABLET ORAL at 08:48

## 2024-07-21 RX ADMIN — LOSARTAN POTASSIUM 100 MG: 100 TABLET, FILM COATED ORAL at 08:58

## 2024-07-21 RX ADMIN — CEFAZOLIN SODIUM 2 G: 2 INJECTION, SOLUTION INTRAVENOUS at 06:04

## 2024-07-21 RX ADMIN — CEFAZOLIN SODIUM 2 G: 2 INJECTION, SOLUTION INTRAVENOUS at 21:48

## 2024-07-21 RX ADMIN — CLOPIDOGREL BISULFATE 75 MG: 75 TABLET ORAL at 08:48

## 2024-07-21 RX ADMIN — ATORVASTATIN CALCIUM 80 MG: 80 TABLET, FILM COATED ORAL at 21:48

## 2024-07-21 RX ADMIN — AMLODIPINE BESYLATE 10 MG: 10 TABLET ORAL at 08:59

## 2024-07-21 ASSESSMENT — ACTIVITIES OF DAILY LIVING (ADL)
ADLS_ACUITY_SCORE: 26

## 2024-07-21 NOTE — PROGRESS NOTES
[facility]    Medicine Progress Note - Hospitalist Service    Date of Admission:  7/12/2024    Assessment & Plan   Principal Problem:    Ischemia of left lower extremity  Active Problems:    Gangrene of toe (H)    Acute osteomyelitis of left foot (H)    PAD (peripheral artery disease) (H24)    Diabetes mellitus, type 2 (H)    Atheroscler of native artery of left leg with intermit claudication (H24)    MSSA bacteremia    CKD (chronic kidney disease) stage 3, GFR 30-59 ml/min (H)      Zia Hopkins is a 74 year old male with history of DM2, HTN, HLP, CKD 3, PAD, who presented on 7/12/2024 for scheduled left femoral artery cutdown and LLE angiogram but was found to have left 3rd toe wet gangrene with cellulitis and developing osteomyelitis.  He was admitted for IV antibiotics, podiatry and infectious disease consultation.  Blood cultures obtained on admission are growing gram-positive cocci.      Hospitalist service consulted on for management of diabetes and other comorbidities.      7/20/2024- I have updated the patient's problem list as above and reviewed the labs/other findings today   s/p PICC placed. Seen prior to OR today for angiogram  Maintained on Ancef for MSSA bacteremia.  Will continue to follow postoperatively  Maintain on DVT prophylaxis  Sugars appear well-controlled  Vitals stable, will trend renal functions.    7/21/2024  Doing wellpost operatively. Labs stable  D/w primary team -okay to sign off from medical standpoint.   -baseline A1c is 7 as well suggested adequate control. Resume home DM regimen with PCP follow up.-HR 50-60 hence Metoprolol reduced to 50 mg on 7/19.monitor and readdress based on BP/HR on outpatient basis  - Antibiotics per ID and wound care/post op cares per primary team        Left foot 3rd digit gangrene with cellulitis and developing osteomyelitis  Left heel ulcer with eschar, s/p partial left third toe amputation with osteotomy through proximal  "phalanx, 7/13/2024  Left heel ulcer with eschar, s/p debridement down to/including subcutaneous fat, 7/13/2024  MSSA bacteremia  S/p multiple previous bilateral toe amputations   - MRI showed ulceration at tip of third toe extending to bone margin exposed middle phalanx.  Some T2 signal abnormality noted.  Osteomyelitis could not be excluded.  - CRP 15.  No leukocytosis, afebrile  - 2 of 2 sets of blood cultures obtained on 7/12 are growing MSSA  - Podiatry following, s/p partial left 3rd toe amputation with osteotomy through proximal phalanx and debridement of left heel ulcer on 7/13/2024   - on 7/16: podiatry noted some ischemic changes to left 2 nd toe as well, debrided, noted some exposed bone at wound bed; might need further debridement versus partial toe amputation  - PT for heel weight bearing training in surgical shoe  - ID following, currently on IV Cefazolin; repeated blood cultures 7/15-- no growth so far  - ID plans for total 4 weeks of IV antibiotics for bacteremia (until 8/12/24)   S/p midline placement on 7/19/2024         Peripheral arterial disease with critical limb ischemia of bilateral lower extremities  S/p multiple vascular surgery interventions to bilateral lower extremities     Left 2nd toe ischemic changes  - CTA A/P with runoff on 7/12 showed occlusion of left above-knee popliteal artery with distal reconstitution, patent bilateral SFA stents, diffuse aortoiliac and lower extremity atherosclerotic disease  - completed LLE angiogram 7/16/24 and noted per vascular surgery \"long segment occlusion of the left above-knee popliteal artery with some tibial involvement\"  - planned for LLE bypass 7/20/24  - management as per vascular surgery, following as primary  - Continue aspirin 81 mg, Plavix resumed when okay with surgery     Diabetes mellitus type 2, with peripheral neuropathy and CKD 3, controlled with A1c 7 on 7/12/2024  PTA-empagliflozin 25 mg, metformin 1000 mg twice daily  - Blood sugars " reasonably well-controlled; A1c 7.0  - Continue empagliflozin; will continue to hold metformin for now given recent angiogram  - Continue NovoLog sliding scale with hypoglycemia protocol     Benign essential hypertension  PTA-amlodipine 10 mg, chlorthalidone 50 mg, losartan 100 mg, Toprol- mg daily  - Blood pressure well-controlled.  - Continue PTA amlodipine, chlorthalidone, losartan  -HR 50-60 hence Metoprolol reduced to 50 mg on 7/19.monitor and readdress based on BP/HR on outpatient basis     Hyperlipidemia-LDL 62, HDL 62, 7/12/2024  PTA-atorvastatin 80 mg, Zetia 10 mg  - Hyperlipidemia is well-controlled.  Continue atorvastatin and Zetia     Chronic kidney disease stage III  - Baseline creatinine around 1.  Creatinine currently stable and at baseline.  Monitor     Previous vascular surgery procedures  Right common femoral and superficial femoral endarterectomy with patch angioplasty and right femoral artery angioplasty with stenting, 3/2022  2. LLE angiogram and balloon angioplasty of anterior and posterior tibial arteries, 4/2021  3. Left popliteal angioplasty and left SFA stenting 2017 followed by stenting of in-stent stenosis 7/2019   4. LLE arteriogram with balloon angioplasty of the left popliteal artery, recanalization of posterior tibial artery, and extension of the stent in the left superficial femoral artery 2/2021          Diet: Advance Diet as Tolerated: Regular Diet Adult; Regular Diet Adult    DVT Prophylaxis: Heparin SQ  Velazquez Catheter: Not present  Lines: PRESENT      [REMOVED] Arterial Line 07/20/24 Radial-Site Assessment: WDL      Cardiac Monitoring: None  Code Status: Full Code      Clinically Significant Risk Factors                              # DMII: A1C = 7.0 % (Ref range: <5.7 %) within past 6 months         # Financial/Environmental Concerns: none         Disposition Plan     Medically Ready for Discharge:              Nicholas Delgado MD  Hospitalist Service  [facility]  [facility]  Securely message with [employer] (more info)  Text page via [employer] Paging/Directory   ______________________________________________________________________    Interval History   No CP/SOB    Physical Exam   Vital Signs: Temp: 98.7  F (37.1  C) Temp src: Oral BP: 136/55 Pulse: 60   Resp: 16 SpO2: 96 % O2 Device: None (Room air) Oxygen Delivery: 2 LPM  Weight: 134 lbs 11.2 oz    Alert awake  Vision Baseline  Neck supple  Oral mucosa moist  bilateral air entry heard,  S1-S2 normal  Abdomen is soft no tenderness  Extremities are fully mobile , left foot wound dressed  Neurologically- no new Gross deficits from baseline-Moving all 4 extremities  Psych-mood okay and appropriate to circumstances      Medical Decision Making       40 MINUTES SPENT BY ME on the date of service doing chart review, history, exam, documentation & further activities per the note.      Data     I have personally reviewed the following data over the past 24 hrs:    9.2  \   11.6 (L)   / 265     N/A N/A N/A /  153 (H)   N/A N/A 1.02 \       Imaging results reviewed over the past 24 hrs:   No results found for this or any previous visit (from the past 24 hour(s)).

## 2024-07-21 NOTE — PLAN OF CARE
Goal Outcome Evaluation:    Date & Time: 7/21/2024 0700 - 1900   Surgery/POD# 1: LEFT FEMORAL TO TIBIAL ARTERY IN SITU BYPASS GRAFT. superficial femoral artery endarterectomy.   Behavior & Aggression: Green   Fall Risk: Yes   Orientation: A&O x4   ABNL VS/O2: VSS on RA, ex HTN   ABNL Labs: See results   Pain Management: Refused scheduled tylenol   Bowel/Bladder: Voids adequately, BS active, passing gas, 1xBM   Drains: R arm PIV  infusing heparin at 500 units/hr, R midline SL   Wounds/incisions: LLE surgical incision CDI, L groin incision dressing CDI  Diet: Tolerating regular diet   Activity Level: IND w/ walker, partial weightbearing on LLE  Tests/Procedures: N/A  Anticipated  DC Date: N/A  Significant Information: CMS is intact. 7/13: L 3rd toe amputation and heel wound debridement.  LLE incision dressing changed today by podiatry.

## 2024-07-21 NOTE — PLAN OF CARE
Goal Outcome Evaluation:  Surgery/POD 1 LEFT FEMORAL TO TIBIAL ARTERY IN SITU BYPASS GRAFT. superficial femoral artery endarterectomy.   Behavior & Aggression: Green   Fall Risk: Yes   Orientation: A&O x4  ABNL VS/O2: VSS on RA  ABNL Labs: See results   Pain Management: Scheduled tylenol   Bowel/Bladder: Removed rivas cath overnight, voiding well, BS active, Passing gas, No BM   Drains:  R arm PIV  infusing heparin at 500 units/hr, RT midline SL  Wounds/incisions: LLE surgical incisions CDI, L groin incision dressing CDI  Diet: Tolerating reg diet  Activity Level: Ax1 w/ walker, partial weightbearing on LLE   Tests/Procedures: N/A  Anticipated DC Date: TBD, podiatry will reevaluate his foot tomorrow to see what if any further interventions indicated at this time now that we have maximized his blood flow.

## 2024-07-21 NOTE — PROGRESS NOTES
"Vascular surgery progress note    Subjective : patient seen resting comfortably in bed this morning, with no acute complaints pain is well-controlled.       Objective:   /55 (BP Location: Left arm)   Pulse 60   Temp 98.7  F (37.1  C) (Oral)   Resp 16   Ht 1.6 m (5' 3\")   Wt 61.1 kg (134 lb 11.2 oz)   SpO2 96%   BMI 23.86 kg/m    Resting comfortably in bed  Breathing well on room air  Regular rate per PT signal  Left groin incision clean dry and intact  Left calf incision remain wrapped however dressing is clean dry and intact  Palpable bypass graft pulse in the proximal calf  Strong monophasic with forward diastolic flow Doppler signal in left PT.     Adequate urine output, 900 out over past 24 hours +2 times uncharted.  Velazquez removed    74 year old male with PMH of T2DM, HTN, HLD, CKD3, and PAD who presented for LLE femoral cutdown and angiogram found to have L 3rd digit gangrene w/ cellulitis and admitted for IV Abx. Now s/p L 3rd toe amputation with podiatry on 7/13.  He is bacteremic with MSSA and has been maintained on IV antibiotics.  He is now s/p left common femoral to distal PT bypass with in situ GSV on 7/20.  Bypass with excellent palpable pulse and strong Doppler signal and PT.     -Pain control as needed  -Encourage aggressive I-S, pulmonary toilet  -Continue heparin drip 500 units an hour  -Continue aspirin/Plavix, statin  -Okay for regular diet  -Continue IV antibiotics per ID, and line in place for outpatient home antibiotics  -Okay for patient to be partial weightbearing on heel of left lower extremity  -Up out of bed should sit in chair is much as possible today as well as walk  -PT OT consults  - Dressing change to left foot daily, with Betadine paint of toes followed by wrap with Kerlix.   -Patient should be encouraged to walk with postop shoe which is open toe to decrease pressure on his toes    Naeem Lewis MD  Vascular Surgery PGY4  To reach vascular surgery [facility] team on " call, please go to [facility] and from the drop down find the following:   VASCULAR SURGERY/CLAUDE [facility]  Then page the person listed to the right of day/night call. Can click the pager to page.

## 2024-07-21 NOTE — PROGRESS NOTES
[place] [facility]      ASSESSMENT:  74-year-old male with past medical history significant for type 2 diabetes, PAD, hypertension, hyperlipidemia, chronic kidney disease stage III, status post left femoral to tibial great saphenous vein bypass on 7/20/2024, blood flow considered maximized, status post left third toe amputation heel wound debridement 7/13/2024, status post left second toe and left heel wound debridement 7/16/2024.    The left third toe amputation site is stable and appears viable.  The left heel wound and distal second toe wound stable.    MEDICAL DECISION MAKING:  No new or acute findings.  We discussed the option of repeat excisional debridement involving the left heel and left second toe.  Given his discomfort, this would likely done in the OR setting with sedation.  Given the overall stable appearance of his foot, I also think it is reasonable to treat more conservatively, keeping the areas painted with Betadine, dry, and allow things to demarcate.  Given the improved blood flow, healing might occur yet slow.  There was no MRI evidence to suggest any bony involvement of the left second toe.  Excising the eschars to convert to a healthier wound base is an option.  The patient will consider his options and seems to be leaning towards the conservative approach.  Will defer's further decision making to Dr. Mina, will follow-up tomorrow..    Emre Mauricio DPM, FACFAS, MS  [facility] Department of Podiatry/Foot & Ankle Surgery  [phone]      _______________________________________________________________________      UPDATED HISTORY:  status post  L femoral to tibial in situ GSV bypass Saturday.   Per Dr. Cortés, LLE blood flow is maximed.   Patent's wife was present.  Reported left post operative calf pain    EXAM:    VITALS: B/P: 136/55, T: 98.7, P: 60, R: 16    DERM:  see photographs above  Stable ischemic wound with dry blackened eschar, posterior left heel.  Left  "third toe amputation site is stable, dry, viable appearing.  Blackened eschar presentation of the distal left second toe.  No clinical signs of infection.    MSK: previous 1st, 3rd and 5th toe amputation, left    7/12/2024 MRI of left foot without contrast:  1.  Postoperative changes of amputation of the great toe and small toe.  2.  Amputation of a portion of the distal phalanx of the second toe.  3.  Soft tissue ulceration at the tip of the third toe appears to extend to the bone margin of the exposed middle phalanx. No significant T1 marrow signal change is present, but there is some T2 signal abnormality and osteomyelitis cannot be excluded.  4.  No additional worrisome areas for osteomyelitis and no evidence for septic arthropathy or abscess.  5.  No evidence for fracture.  6.  Mild fatty infiltration of the interosseous musculature.  7.  Surgical truncation of the hallucis tendons.    Labs:     Lab Results   Component Value Date    INR 0.95 02/03/2021     No components found for: \"ESR\"  @BREIFLAB(crp)@    Lab Results   Component Value Date    WBC 9.2 07/21/2024    WBC 7.7 04/22/2021     Lab Results   Component Value Date    RBC 3.77 07/21/2024    RBC 3.55 04/22/2021     Lab Results   Component Value Date    HGB 11.6 07/21/2024    HGB 10.9 04/22/2021     Lab Results   Component Value Date    HCT 35.4 07/21/2024    HCT 33.2 04/22/2021     No components found for: \"MCT\"  Lab Results   Component Value Date    MCV 94 07/21/2024    MCV 94 04/22/2021     Lab Results   Component Value Date    MCH 30.8 07/21/2024    MCH 30.7 04/22/2021     Lab Results   Component Value Date    MCHC 32.8 07/21/2024    MCHC 32.8 04/22/2021     Lab Results   Component Value Date    RDW 12.5 07/21/2024    RDW 12.9 04/22/2021     Lab Results   Component Value Date     07/21/2024     04/22/2021       All cultures:  No results for input(s): \"CULT\" in the last 168 hours.    Hemoglobin   Date Value Ref Range Status   07/21/2024 " 11.6 (L) 13.3 - 17.7 g/dL Final   04/22/2021 10.9 (L) 13.3 - 17.7 g/dL Final

## 2024-07-22 VITALS
DIASTOLIC BLOOD PRESSURE: 57 MMHG | HEIGHT: 63 IN | TEMPERATURE: 98.5 F | BODY MASS INDEX: 23.87 KG/M2 | RESPIRATION RATE: 18 BRPM | OXYGEN SATURATION: 96 % | HEART RATE: 63 BPM | WEIGHT: 134.7 LBS | SYSTOLIC BLOOD PRESSURE: 120 MMHG

## 2024-07-22 LAB
GLUCOSE BLDC GLUCOMTR-MCNC: 148 MG/DL (ref 70–99)
GLUCOSE BLDC GLUCOMTR-MCNC: 151 MG/DL (ref 70–99)
GLUCOSE BLDC GLUCOMTR-MCNC: 252 MG/DL (ref 70–99)
UFH PPP CHRO-ACNC: <0.1 IU/ML

## 2024-07-22 PROCEDURE — 85520 HEPARIN ASSAY: CPT

## 2024-07-22 PROCEDURE — 250N000013 HC RX MED GY IP 250 OP 250 PS 637: Performed by: PODIATRIST

## 2024-07-22 PROCEDURE — 36415 COLL VENOUS BLD VENIPUNCTURE: CPT

## 2024-07-22 PROCEDURE — 250N000013 HC RX MED GY IP 250 OP 250 PS 637

## 2024-07-22 PROCEDURE — 250N000011 HC RX IP 250 OP 636: Mod: JZ | Performed by: PHYSICIAN ASSISTANT

## 2024-07-22 PROCEDURE — 250N000013 HC RX MED GY IP 250 OP 250 PS 637: Performed by: INTERNAL MEDICINE

## 2024-07-22 PROCEDURE — 99232 SBSQ HOSP IP/OBS MODERATE 35: CPT | Performed by: INTERNAL MEDICINE

## 2024-07-22 PROCEDURE — 99233 SBSQ HOSP IP/OBS HIGH 50: CPT | Performed by: PODIATRIST

## 2024-07-22 RX ORDER — CLOPIDOGREL BISULFATE 75 MG/1
75 TABLET ORAL DAILY
Qty: 90 TABLET | Refills: 3 | Status: SHIPPED | OUTPATIENT
Start: 2024-07-23

## 2024-07-22 RX ADMIN — CLOPIDOGREL BISULFATE 75 MG: 75 TABLET ORAL at 09:05

## 2024-07-22 RX ADMIN — EZETIMIBE 10 MG: 10 TABLET ORAL at 09:05

## 2024-07-22 RX ADMIN — AMLODIPINE BESYLATE 10 MG: 10 TABLET ORAL at 09:04

## 2024-07-22 RX ADMIN — CEFAZOLIN SODIUM 2 G: 2 INJECTION, SOLUTION INTRAVENOUS at 06:06

## 2024-07-22 RX ADMIN — ASPIRIN 81 MG CHEWABLE TABLET 81 MG: 81 TABLET CHEWABLE at 09:04

## 2024-07-22 RX ADMIN — CHLORTHALIDONE 50 MG: 25 TABLET ORAL at 09:04

## 2024-07-22 RX ADMIN — METOPROLOL SUCCINATE 50 MG: 50 TABLET, EXTENDED RELEASE ORAL at 09:04

## 2024-07-22 RX ADMIN — EMPAGLIFLOZIN 25 MG: 25 TABLET, FILM COATED ORAL at 09:05

## 2024-07-22 RX ADMIN — LOSARTAN POTASSIUM 100 MG: 100 TABLET, FILM COATED ORAL at 09:05

## 2024-07-22 RX ADMIN — CEFAZOLIN SODIUM 2 G: 2 INJECTION, SOLUTION INTRAVENOUS at 13:13

## 2024-07-22 RX ADMIN — SENNOSIDES AND DOCUSATE SODIUM 1 TABLET: 50; 8.6 TABLET ORAL at 09:05

## 2024-07-22 ASSESSMENT — ACTIVITIES OF DAILY LIVING (ADL)
ADLS_ACUITY_SCORE: 26
ADLS_ACUITY_SCORE: 24
ADLS_ACUITY_SCORE: 26
ADLS_ACUITY_SCORE: 24
ADLS_ACUITY_SCORE: 24

## 2024-07-22 NOTE — DISCHARGE SUMMARY
[facility]  Discharge Summary  Vascular Surgery     Zia Hopkins MRN# [id number]   YOB: 1950 Age: 74 year old     Date of Admission:  7/12/2024  Date of Discharge::  7/22/2024  Admitting Physician:  Surjit Hamlin MD  Discharge Physician:  Parrish Cortés MD  Primary Care Physician:        Reid Reese          Admission Diagnoses:   Critical ischemia of lower extremity (H) [I70.229]          Discharge Diagnosis:   Same as above         Procedures:     Procedure(s):  Angiogram LLE  LEFT FEMORAL TO TIBIAL ARTERY IN SITU BYPASS GRAFT. superficial femoral artery endarterectomy.          Consultations:   PODIATRY IP CONSULT  INFECTIOUS DISEASES IP CONSULT  HOSPITALIST IP CONSULT  PHYSICAL THERAPY ADULT IP CONSULT  PHARMACY TO DOSE VANCO  CARE MANAGEMENT / SOCIAL WORK IP CONSULT  INTERVENTIONAL RADIOLOGY ADULT/PEDS IP CONSULT  VASCULAR ACCESS ADULT IP CONSULT  VASCULAR ACCESS ADULT IP CONSULT  SMOKING CESSATION PROGRAM IP CONSULT          Brief History of Illness:   74-year-old patient with previous left SFA stenting and open femoral exposures multiple times for angiograms as had prior toe amputations.  He was initially planned for angiogram on 7/12 however presented that morning with wet gangrene of L 3rd toe, thus was admitted for IV abx, ID workup, possible toe amputation.            Hospital Course:     Mr. Hopkins was admitted for IV abx, debridement of 3rd toe which had wet gangrene. He was found to be bacteremic, he was started on broad spectrum abx, and underwent amputation of his second toe on 7/13 with Dr. Horvath. Infectious disease was consulted for bacteremia and recommended a long course of IV abx with ancef including home IV abx. He developed ischemic changes to his second toe as well the day after amputation of 3rd toe. Given progression of ischemic changes he underwent angiogram on  7/16 which demonstrated occlusion of popliteal artery distal to SFA stents with  disease in SFA stents and single vessel in line flow to the L foot with AT, however PT open with reconstitution at the mid calf. He was thus planned for bypass procedure as he was found to have excellent vein, blood cultures were clear, and there was no remaining evidence of infection in his feet.   The patient underwent L CFA to PT bypass with in situ GSV on 7/20/24, which he tolerated well without immediate complications. He was transferred to the PACU and then floor for routine postoperative care. The remainder of his postoperative course was unremarkable. Velazquez was removed on POD#0. On the day of discharge, he was tolerating regular diet, his pain was well controlled with oral pain medications, he was voiding spontaneously, and ambulating independently. Patient with follow up with Nallely Trujillo in vascular surgery clinic in 2 weeks.           Imaging Studies:     Results for orders placed or performed during the hospital encounter of 07/12/24   MR Foot Left w/o Contrast    Narrative    EXAM: MR FOOT LEFT W/O CONTRAST  LOCATION: [facility]  DATE: 7/12/2024    INDICATION: Swelling, possible infection or osteomyelitis.  COMPARISON: None.  TECHNIQUE: Unenhanced.    FINDINGS:     JOINTS AND BONES:   -Postoperative changes of amputation of the great toe and small toe. Postoperative changes of amputation of the distal phalanx of the second and third toes. The distal margin of the middle phalanx of the third toe appears exposed at the base of an   ulceration making osteomyelitis impossible to exclude, although there is little associated signal abnormality. No additional areas worrisome for osteomyelitis are identified. No significant effusion to suggest septic arthropathy.    TENDONS:   -The flexor and extensor tendons are intact. Surgical truncation of the hallucis tendons but no evidence for tendinopathy.     LIGAMENTS:   -The ligament of Lisfranc is intact. The collateral ligaments at the  second through fourth MTP joints are all intact.    MUSCLES AND SOFT TISSUES:   -Fatty infiltration and atrophy of some of the interosseous musculature. No evidence for abscess.      Impression    IMPRESSION:  1.  Postoperative changes of amputation of the great toe and small toe.  2.  Amputation of a portion of the distal phalanx of the second toe.  3.  Soft tissue ulceration at the tip of the third toe appears to extend to the bone margin of the exposed middle phalanx. No significant T1 marrow signal change is present, but there is some T2 signal abnormality and osteomyelitis cannot be excluded.  4.  No additional worrisome areas for osteomyelitis and no evidence for septic arthropathy or abscess.  5.  No evidence for fracture.  6.  Mild fatty infiltration of the interosseous musculature.  7.  Surgical truncation of the hallucis tendons.   CTA Abdomen Pelvis Runoff w Contrast    Narrative    EXAM: CTA ABDOMEN PELVIS RUNOFF W CONTRAST  LOCATION: [facility]  DATE: 7/12/2024    INDICATION: eval LLE vasculature, concern for patency L SFA stent, distal popliteal artery, calcific disease burden  COMPARISON: Lower extremity arterial ultrasound 5/6/2024  TECHNIQUE: Helical acquisition through the abdomen, pelvis, and bilateral lower extremities was performed during the arterial phase of contrast enhancement using IV Contrast. 2D and 3D reconstructions were performed by the CT technologist. Dose reduction   techniques were used.   CONTRAST: 100 mL Isovue 370    FINDINGS:    ABDOMEN ARTERIAL FINDINGS  Abdominal aorta: Patent. Moderate diffuse atherosclerotic disease.  Celiac artery: Mild ostial narrowing.  SMA: Patent.  Renal Arteries: At least moderate atherosclerotic stenosis of the renal artery origins.  ALEX: Mild ostial stenosis.    RIGHT LOWER EXTREMITY ARTERIAL FINDINGS  Common Iliac: Diffuse atherosclerosis without high-grade stenosis.  External Iliac: Diffuse atherosclerosis without  high-grade stenosis.  Internal Iliac: Moderate ostial stenosis.  Common Femoral: Patent.  Profunda: Moderate atherosclerotic stenosis proximally.  SFA: Stenting throughout the SFA appears patent.  Popliteal: Moderate to severe focal atherosclerotic stenosis proximally with multifocal disease distally.  Tibioperoneal trunk: Patent.  Anterior tibial: Occluded in the mid vessel. Reconstitution of the dorsalis pedis artery.  Peroneal: Patent to the level of the ankle.  Posterior tibial: Diffuse atherosclerotic disease. Patent to the level of the foot.    LEFT LOWER EXTREMITY ARTERIAL FINDINGS  Common Iliac: Diffuse atherosclerosis without high-grade stenosis.  External Iliac: Diffuse atherosclerosis without high-grade stenosis.  Internal Iliac: Diffuse atherosclerosis without high-grade stenosis.  Common Femoral: Atherosclerosis results in approximately 50% luminal stenosis.  Profunda: Diffuse atherosclerotic disease.  SFA: SFA stenting appears patent.  Popliteal: Occlusion of the above-knee segment with reconstitution of the below-knee segment, which demonstrated severe atherosclerotic disease.  Tibioperoneal trunk: Severe atherosclerotic disease.  Anterior tibial: Diffuse atherosclerotic disease. Patent to the level of the foot.  Peroneal: Occluded.  Posterior tibial: Diffuse atherosclerotic disease. Patent to the level of the foot.    NON-ARTERIAL FINDINGS  LUNG BASES: Bibasilar atelectasis and/or scarring. A fat-containing left Bochdalek hernia is noted.    ABDOMEN: The liver, spleen, adrenal glands, and pancreas are unremarkable. Simple appearing right renal cysts are benign and requiring no further follow-up. No hydronephrosis. Enhancing focus in the right hepatic lobe is noted which likely represents a   flash filling hemangioma. No ascites.    PELVIS: Normal    MUSCULOSKELETAL: Moderate to marked degenerative changes of the lumbar spine. At least mild to moderate degenerative changes of the bilateral hips. No  destructive osseous lesion.        Impression    IMPRESSION:  1.  Occlusion of the left above-the-knee popliteal artery with distal reconstitution.    2.  The bilateral SFA stents are patent.    3.  Diffuse aortoiliac and lower extremity atherosclerotic disease.   XR Foot Left 2 Views    Narrative    EXAM: XR FOOT LEFT 2 VIEWS  LOCATION: [facility]  DATE: 7/13/2024    INDICATION: sp partial 3rd toe amp  COMPARISON: None.      Impression    IMPRESSION:     There are recent postoperative changes from amputation of the third toe to the level of the proximal phalangeal shaft. There are more remote appearing postoperative changes from fifth toe and great toe amputation and amputation of a portion of the second   distal phalanx.     There is diffuse osseous demineralization. Scattered mild degenerative changes..   US Lower Extremity Venous Mapping Bilateral    Narrative    [facility]  DATE: 7/16/2024    EXAM: BILATERAL LOWER EXTREMITY VENOUS MAPPING    INDICATION: Evaluation for possible left lower extremity arterial  bypass graft.    TECHNIQUE: Duplex imaging was performed utilizing gray-scale,  compression, augmentation as appropriate, color-flow, Doppler waveform  analysis, and spectral Doppler imaging. The cephalic and basilic veins  were measured bilaterally.    COMPARISON: None.    FINDINGS:  Widely patent bilateral great saphenous veins with measurements as  listed below.    RIGHT GREAT SAPHENOUS VEIN (mm):  SFJ: 8.5  Prox thigh: 3.6  Mid thigh: 3.7  Dist thigh: 3.3  Knee: 3.2  Prox calf: 3.0  Mid calf: 2.5  Distal calf: 2.5  Ankle: 3.8    LEFT GREAT SAPHENOUS VEIN (mm):  SFJ: 7.8  Prox thigh: 3.5  Mid thigh: 3.1  Dist thigh: 2.8  Knee: 2.9  Prox calf: 3.1  Mid calf: 3.3  Distal calf: 3.0  Ankle: 3.3      Impression    IMPRESSION:   Bilateral lower extremity venous mapping for surgical planning  purposes, with measurements as above.    NICK LARKIN MD         SYSTEM ID:   [id number]   IR Lower Extremity Angiogram Left    Narrative    [facility]  LOCATION: [facility]  DATE: 7/16/2024    PROCEDURE:   1. US-GUIDED ANTEGRADE ACCESS - LEFT COMMON FEMORAL ARTERY  2. LEFT LOWER EXTREMITY DIAGNOSTIC ANGIOGRAPHY    INTERVENTIONAL RADIOLOGIST: Ezekiel Luna MD    INDICATION: 74-year-old male with history of peripheral arterial  disease status post multiple interventions, admitted for left third  digit gangrene and cellulitis status post toe amputation 7/13/2024.  Interventional radiology consulted for diagnostic left lower extremity  angiography per vascular surgery for bypass graft planning purposes.    CONSENT: The risks, benefits and alternatives of dialysis  arteriovenous shunt evaluation with possible angioplasty, stent  placement, thrombolysis and/or tunneled dialysis catheter placement  were discussed with the patient in detail. All questions were  answered. Informed consent was given to proceed with the procedure.    MODERATE SEDATION: Versed 0 mg IV; Fentanyl 200 mcg IV.  Under  physician supervision, Versed and fentanyl were administered for  moderate sedation. Pulse oximetry, heart rate and blood pressure were  continuously monitored by an independent trained observer. The  physician spent 30 minutes of face-to-face sedation time with the  patient.    CONTRAST: 44 mL Visipaque  ANTIBIOTICS: None.  ADDITIONAL MEDICATIONS: None.    FLUOROSCOPIC TIME: 2.8 minutes.  RADIATION DOSE: Air Kerma: 22 mGy.    COMPLICATIONS: No immediate complications.    STERILE BARRIER TECHNIQUE: Maximum sterile barrier technique was used.  Cutaneous antisepsis was performed at the operative site with  application of 2% chlorhexidine and large sterile drape. Prior to the  procedure, the  and assistant performed hand hygiene and wore  hat, mask, sterile gown, and sterile gloves during the entire  procedure.    PROCEDURE:    Patient was positioned supine on the procedure table in  the bilateral  groins are prepped and draped in usual sterile fashion. Using 1%  lidocaine for local anesthesia and ultrasound guidance, the left  common femoral artery was accessed in antegrade fashion with a  micropuncture system. Permanent ultrasound images were stored.  Arterial access was upsized in standard fashion for a 5 Belgian  vascular sheath. Diagnostic left lower extremity angiography was  performed in multiple stations. The sheath was removed and hemostasis  achieved with manual pressure.    FINDINGS:  1. Left common femoral artery ultrasound demonstrates patency and  antegrade flow, with prominent peripheral calcifications.    2. LEFT LOWER EXTREMITY ANGIOGRAPHY: Moderate stenosis of the CFA and  proximal PFA. Mild stenosis of the proximal SFA. Patent SFA stents  extending just above the abductor canal. SFA stents are largely  patent, with mild in-stent stenosis along the mid femur. Moderate  approximately 10 cm length occlusion of the distal most SFA and  above-knee popliteal artery starting at the adductor canal.  Reconstitution of flow secondary to prominent arterial collaterals  along the distal above-knee popliteal artery. Mild narrowing of the  popliteal artery along the knee joint. Patent below-knee popliteal  artery and tibioperoneal trunk. Patent 2 vessel tibial runoff via the  anterior tibial and posterior tibial arteries. The peroneal artery is  largely occluded. Moderate approximately 7 cm length occlusion of the  posterior tibial artery with distal reconstitution via collaterals.  The PT continues below the ankle and supplies the lateral and medial  plantar arteries. The AT terminates at the level of the ankle into  small branches, with likely occlusion of the native dorsalis pedis  artery.      Impression    IMPRESSION:    Diagnostic left lower extremity angiography for surgical planning  purposes, with findings as above.    NICK LARKIN MD         SYSTEM ID:  [id number]   US Lower  Extremity Venous Mapping Left    Narrative    [facility]  DATE: 7/19/2024    EXAM: LEFT LOWER EXTREMITY VENOUS ULTRASOUND    INDICATION: Presurgical evaluation prior to lower extremity bypass  with mapping    TECHNIQUE: Duplex imaging was performed utilizing gray-scale imaging.    COMPARISON: 7/16/2024.    FINDINGS:   The left greater saphenous vein is patent throughout its visualized  course and mapped along the skin including the side branches by the  ultrasound technologist.      Impression    IMPRESSION:   The left greater saphenous vein is patent with side branches marked.  Of note, a prominent posterior branch is seen in the proximal thigh  which reconnects with the greater saphenous vein in the proximal calf.    REINIER ROSENTHAL MD         SYSTEM ID:  [id number]              Medications Prior to Admission:     Medications Prior to Admission   Medication Sig Dispense Refill Last Dose    acetaminophen (TYLENOL) 500 MG tablet Take 500-1,000 mg by mouth every 6 hours as needed for mild pain   Past Month    amLODIPine (NORVASC) 10 MG tablet Take 10 mg by mouth daily   7/11/2024    aspirin 81 MG tablet Take 1 tablet (81 mg) by mouth daily 90 tablet 1 7/11/2024    atorvastatin (LIPITOR) 80 MG tablet TAKE 1 TABLET(80 MG) BY MOUTH AT BEDTIME 90 tablet 3 7/11/2024    chlorthalidone (HYGROTON) 50 MG tablet Take 50 mg by mouth daily    7/11/2024    diclofenac (VOLTAREN) 75 MG EC tablet Take 75 mg by mouth 2 times daily as needed   Unknown    empagliflozin (JARDIANCE) 25 MG TABS tablet Take 25 mg by mouth daily   7/11/2024    ezetimibe (ZETIA) 10 MG tablet TAKE 1 TABLET(10 MG) BY MOUTH DAILY 90 tablet 3 7/11/2024    losartan (COZAAR) 100 MG tablet Take 100 mg by mouth daily    7/11/2024    metFORMIN (GLUCOPHAGE) 1000 MG tablet Take 1,000 mg by mouth 2 times daily (with meals)    7/11/2024    [DISCONTINUED] clopidogrel (PLAVIX) 75 MG tablet Take 1 tablet (75 mg) by mouth daily (Patient not taking: Reported on 7/12/2024)  90 tablet 3 Not Taking    [DISCONTINUED] clopidogrel (PLAVIX) 75 MG tablet TAKE 1 TABLET(75 MG) BY MOUTH DAILY 90 tablet 3 7/11/2024    [DISCONTINUED] metoprolol succinate ER (TOPROL-XL) 100 MG 24 hr tablet Take 1 tablet (100 mg) by mouth daily 90 tablet 3 7/11/2024    [DISCONTINUED] oxyCODONE-acetaminophen (PERCOCET) 5-325 MG tablet Take 1 tablet by mouth every 6 hours as needed for severe pain (Patient not taking: Reported on 7/12/2024) 15 tablet 0 Not Taking              Discharge Medications:     Current Discharge Medication List        START taking these medications    Details   ceFAZolin (ANCEF) intermittent infusion 2 g in 100 mL dextrose PRE-MIX Inject 100 mLs (2 g) into the vein every 8 hours for 24 days Please draw weekly CBC with diff, creatinine, AST, and CRP and fax results to [facility].    Associated Diagnoses: Bacteremia           CONTINUE these medications which have CHANGED    Details   clopidogrel (PLAVIX) 75 MG tablet Take 1 tablet (75 mg) by mouth daily  Qty: 90 tablet, Refills: 3    Associated Diagnoses: Critical limb ischemia of both lower extremities (H)      metoprolol succinate ER (TOPROL XL) 100 MG 24 hr tablet Take 0.5 tablets (50 mg) by mouth daily    Associated Diagnoses: PAD (peripheral artery disease) (H24)           CONTINUE these medications which have NOT CHANGED    Details   acetaminophen (TYLENOL) 500 MG tablet Take 500-1,000 mg by mouth every 6 hours as needed for mild pain      amLODIPine (NORVASC) 10 MG tablet Take 10 mg by mouth daily      aspirin 81 MG tablet Take 1 tablet (81 mg) by mouth daily  Qty: 90 tablet, Refills: 1    Associated Diagnoses: Atherosclerosis of native arteries of left leg with ulceration of other part of foot (H)      atorvastatin (LIPITOR) 80 MG tablet TAKE 1 TABLET(80 MG) BY MOUTH AT BEDTIME  Qty: 90 tablet, Refills: 3    Associated Diagnoses: Hyperlipidemia LDL goal <70      chlorthalidone (HYGROTON) 50 MG tablet Take 50 mg by mouth daily        diclofenac (VOLTAREN) 75 MG EC tablet Take 75 mg by mouth 2 times daily as needed      empagliflozin (JARDIANCE) 25 MG TABS tablet Take 25 mg by mouth daily      ezetimibe (ZETIA) 10 MG tablet TAKE 1 TABLET(10 MG) BY MOUTH DAILY  Qty: 90 tablet, Refills: 3    Associated Diagnoses: Hyperlipidemia LDL goal <70      losartan (COZAAR) 100 MG tablet Take 100 mg by mouth daily       metFORMIN (GLUCOPHAGE) 1000 MG tablet Take 1,000 mg by mouth 2 times daily (with meals)            STOP taking these medications       oxyCODONE-acetaminophen (PERCOCET) 5-325 MG tablet Comments:   Reason for Stopping:                       Medications Discontinued or Adjusted During This Hospitalization:   Asa/plavix daily           Antibiotics Prescribed at Discharge:   IV ancef until last dose 8/12/24.,           Day of Discharge Physical Exam:   Temp:  [98.5  F (36.9  C)-100.3  F (37.9  C)] 98.5  F (36.9  C)  Pulse:  [63-68] 63  Resp:  [16-18] 18  BP: (120-147)/(57-63) 120/57  SpO2:  [93 %-96 %] 96 %    General: awake, alert, no acute distress, laying comfortably in bed   CV: warm, well perfused   Pulm: breathing comfortably on room air       Extremities: no edema, moving all extremities spontaneously and without apparent deficit \     Vascular:    Palpable LLE PT pulse     Groin ncision clean, dry, in tact. Mid leg incisions clean, dry, in tact. Mid calf incision closed with nylons and clean, dry, and intact. L foot wrapped.          Discharge Instructions and Follow-Up:     Discharge Procedure Orders   Primary Care - Care Coordination Referral   Standing Status: Future   Referral Priority: Routine: Next available opening Referral Type: Care Coordination   Number of Visits Requested: 1     Follow-up and recommended labs and tests    Order Comments: Thank you for choosing [facility]!    Follow up with Dr Casas at one of the clinic locations within 5-8 days of discharge.    [facility]  LOCATIONS:      [address]   [place] [address]  [phone]  Clinic hours:  Monday, Tuesday morning, Thursday    [facility]  [address] [facility] [place] [address]  [phone]  Clinic hours:  Friday     Activity   Order Comments: 1.  Perform the following activities every 2 hours x 5 minutes:  -ankle ROM/calf massaging bilateral lower extremity.  If you are not comfortable moving the surgical ankle, you can wiggle the toes on that foot  -deep breathing/coughing exercises  -ambulation; keep in mind your weightbearing restrictions    2.  Heel weight bearing in surgical shoe    3.  Elevate surgical limb at/above hip level 23/24 hours per day for aggressive swelling control, pain control, and to help facilitate incision healing.  This is to be done until sutures are removed.    4. Apply ice pack to surgical site and behind left knee every 2 hours x 20 minutes.  Do not apply ice pack directly to skin.     Order Specific Question Answer Comments   Is discharge order? Yes      Wound care and dressings   Order Comments: You can take a shower or bath, but cover the bandage. Keep surgical dressing clean, dry and intact until your first post-operative appointment.     Follow-up and recommended labs and tests    Order Comments: Follow up with Podiatry at [address] [place] [address], [place] [address]. Phone number is [phone]     Reason for your hospital stay   Order Comments: Infection in your foot and bloodstream, for which you required surgery and IV antibiotics     Discharge Instructions   Order Comments: Post-Operative Instructions    You will be in the hospital for 1-3 days.  Plan on having someone available to drive you home and stay with you for approximately 1 week.    Incision Care  You have  sutures holding your  lower leg incision closed.  These will be removed at your 2 week post-operative appointment.  Once they are removed, you may have white strips of tape, called steri-strips,  applied over the incision.  These will curl up on the ends after 5-7 days, at which time they can be removed. Your groin incision is closed with dissolvable sutures and surgical glue, this will flake off on its own in 2-3 weeks. Call our office to discuss this further if this area looks red or has an odor.   Leg swelling is common after surgery and must be controlled by elevating your legs above your heart.  Propping your legs up on a stool or sitting in a recliner chair will not relieve the swelling, but it will prevent it from getting worse.  Avoid hanging your leg in a dependent position.  For the first few weeks after surgery, you will need to really work at controlling this swelling and will likely need to spend a fair amount of time with your leg elevated.  Eventually, you should be able to manage the swelling by elevating 3-4 times a day for 20-30 minutes.  If the swelling does not decrease after elevating your legs above your heart for at least 2 hours, you should call our office.  You may shower 2-3 days after your surgery - pat the incision dry to prevent irritation from moisture.  You do not need to cover with a bandage unless you have drainage.  You may develop a bruising and firmness near your incision.  This will soften and resolve over the next 1-3 weeks.  Call our office if it enlarges, becomes red, or painful.  On occasion a soft, fluid-filled bulge can develop near a surgical incision - this is called a seroma.  It will likely resolve on its own, but occasionally requires your doctor to drain it in clinic.  You should call our office if you have questions about this.  You may notice numbness around your incision.  This is due to nerve irritation from the surgery and will gradually improve over the next several weeks.  Occasionally, an incisional wound vac may be used.  This is disposable.  If this is used, you will be provided additional details before you leave the hospital.    Activity  Walking  is important after surgery.  You will begin walking before you leave the hospital, and then you should gradually increase your distance as tolerated.  You should be taking at least 3-4 short walks a day if leg swelling is not a problem.  You can climb stairs when you feel strong enough.  You should not drive for 1 week.  In addition, you can not be taking prescription strength pain medication and you must feel strong enough to drive safely.  You may return to work once you feel ready - this can be decided at your 2 week post-operative visit.  You should avoid strenuous activity, including running, biking, or weight-lifting until discussed at your post-operative appointment.    Diet  You may resume a regular diet before you leave the hospital.  You may find that it is best to try smaller, more frequent meals until your appetite returns.    Medications  You may be started on a blood thinner after surgery.  If you are taking Coumadin, you need to have your blood monitored regularly.  You may be given a prescription for pain medication after surgery.  If you need to have this refilled, please call your pharmacy where you had it filled.  They will then call us for approval.  Please do not call after hours for a refill on pain medication.    Post-Operative Appointments  You will need to see your doctor in clinic 10-14 days after surgery.    You will then be monitored regularly with an ultrasound test.  This will be discussed further at your post operative appointment and scheduled accordingly.    When to Call our Office  Temperature greater than 101.  When you are unable to feel a pulse in your foot or leg, when you have been monitoring regularly.  If you notice new onset of numbness, tingling, coolness or pain in your leg or foot.  Severe pain, especially if it is new and preventing sleep.  If you will be having an invasive procedure, such as a colonoscopy or dental work, within one year of your surgery, you may need to  take an antibiotic prior to the procedure if you had an artificial graft placed with your surgery; please call us so we can assist you with this.    Call our main number, [phone], for assistance with any concerns or questions.  [facility]  [address]  [place] [address]     Diet   Order Comments: Follow this diet upon discharge: Regular     Order Specific Question Answer Comments   Is discharge order? Yes               Home Health Care:     Arranged           Discharge Disposition:     Discharged to home      Condition at discharge: Good    Patient was discussed with staff, Dr. Cortés, on the day of discharge.    Naeem Lewis MD  Surgery Resident    STAFF:  Doing excellent at discharge.  Good blood flow to left foot should aid in healing.  On  ASA? Plavix.  IV Ancef via PICC due to infection.  RTO 2 weeks.       Parrish Cortés MD

## 2024-07-22 NOTE — PROGRESS NOTES
Vascular Surgery Progress Note    S: Very comfortable yesterday and through the night.  Wife at bedside.    O: Alert and appropriate.  Comfortable.  Conversant.  Vitals:  BP  Min: 135/57  Max: 147/63  Temp  Av.2  F (37.3  C)  Min: 98.6  F (37  C)  Max: 100.3  F (37.9  C)  Pulse  Av.3  Min: 65  Max: 68  I/O last 3 completed shifts:  In: -   Out: 1320 [Urine:1320]    Physical Exam: Left leg incisions=A    +3 left graft pulse including both branches and thigh.    Good Doppler signal to graft and ankle PT     We did not removed foot dressings      Assessment/Plan: #1.  Doing very well following left femoral to mid posterior tibial in situ bypass graft.  Calcified PT artery but adequate blood flow to his foot which should help healing.  On aspirin and Plavix.  On low-dose heparin which will be discontinued.     #2.  Awaiting evaluation of Dr. Mina on toe and heel ulcers along with weightbearing status.     #3.  On IV antibiotics.  Infectious disease recommended longer course and this will be clarified prior to discharge.      Discussed with patient and wife today.      Wm. Moo MD

## 2024-07-22 NOTE — PLAN OF CARE
Physical Therapy Discharge Summary    Reason for therapy discharge:    Discharged to home.    Progress towards therapy goal(s). See goals on Care Plan in [facility] electronic health record for goal details.  Goals partially met.  Barriers to achieving goals:   discharge from facility.    Therapy recommendation(s):    Continued therapy is recommended.  Rationale/Recommendations:  Recommend Home PT to progress independence and safety with functional mobility.

## 2024-07-22 NOTE — PROGRESS NOTES
Home Infusion  Zia will be discharging today and going home on IV cefazolin q8 .  I met with Zia and his wife, Le  at bedside and instructed in IV administration via SL midline with SAS flushing.   Had Le perform hands on with practice equipment and teaching sheets. Provided information about supplies and supply delivery, storage of medication, checking of label, dosing times, plan for SNV and 24/7 availability of [facility] staff. [facility] will follow for home RN.   Zia and Le verbalized understanding of information given. Le demonstrated very good technique with practice and verbalized good understanding of process.  Le and Zia would like a home RN visit tonight for first home dose, which [facility] will coordinate.   Dosing schedule: Pt will continue to dose 0600, 1400, 2200h in the home.   Delivery: medication and supplies will be delivered to pt's home this evening before his nurse visit.   Zia is ready for discharge from [facility] perspective as soon as he receives his 1400h cefazolin dose.     Nayely Duran RN  [facility] Liaison  [phone] (M-F 8a-5p)  [phone] Office

## 2024-07-22 NOTE — PLAN OF CARE
Patient discharging home with family. AVS gone over with patient in room and prescriptions given to patient. Pt received his 1400 dose of ancef. All Questions answered.     Jennifer Monaco RN

## 2024-07-22 NOTE — PLAN OF CARE
Goal Outcome Evaluation:      Plan of Care Reviewed With: patient    Overall Patient Progress: improving    Reason for Admission: POD2 Femoral to tibial bypass-incisions LLE, Pulses dopplerable     Cognitive/Mentation:A&OX4   Neuros/CMS: Intact  VS:VSS on RA, low grade temp overnight, scheduled Tyl given after much encouragement, now afebrile  Tele:n/a  GI:continent, no BM this shift   : continet, voiding o.k in urinal  Pulmonary:LS clear  Pain: Denies pain, refusing scheduled Tyl, agreed to take just one time because of low grade temp     Drains/Lines:RT upper arm midline s/l, Rt PIV on AC with heparin drip cont@  Skin: scattered bruise and scabs, several incision on LLE,  Activity: SBA, NWB LLE  Diet: Reg diet, carb count     Therapies recs:TBD  Anticipated Discharge date & active delays: TBD     Aggression Stoplight Tool:Green  Other important info for next shift -Heparin drip infusing@500 units/hr

## 2024-07-22 NOTE — PROGRESS NOTES
"[facility]      ASSESSMENT:  74-year-old male with past medical history significant for type 2 diabetes, PAD, hypertension, hyperlipidemia, chronic kidney disease stage III, status post left femoral to tibial great saphenous vein bypass on 7/20/2024, blood flow considered maximized, status post left third toe amputation heel wound debridement 7/13/2024, status post left second toe and left heel wound debridement 7/16/2024.      MEDICAL DECISION MAKING:  -Discussed all findings with patient. Chart and imaging reviewed.   -Discussed left foot: small eschar to second toe and heel. Recommend continue to monitor these sites. No signs of infection currently and now that blood flow is improved, may see healing of sites. Any further procedures can be done outpatient. Patient and his wife agree to this.   -Plan for ancef for discharge for bacteremia   -WB to heel of LLE  -Discussed dressing plan. Can leave current dressing on until follow up. Should keep clean and dry and out of the shower.   -Okay to discharge. Will provide follow up information.         Negin Mina DPM   [facility] Department of Podiatry/Foot & Ankle Surgery  [phone]      _______________________________________________________________________      UPDATED HISTORY:  status post  L femoral to tibial in situ GSV bypass Saturday.   -Patient without significant pain to left foot. States feels well and relieved. Denies N/F/V/C/D.     EXAM:    Vital signs:  Temp: 98.6  F (37  C) Temp src: Oral BP: (!) 147/63 Pulse: 68   Resp: 16 SpO2: 95 % O2 Device: None (Room air) Oxygen Delivery: 2 LPM Height: 160 cm (5' 3\") Weight: 61.1 kg (134 lb 11.2 oz)  Estimated body mass index is 23.86 kg/m  as calculated from the following:    Height as of this encounter: 1.6 m (5' 3\").    Weight as of this encounter: 61.1 kg (134 lb 11.2 oz).        DERM:  see photographs above  Stable ischemic wound with dry blackened eschar, posterior " "left heel.  Left third toe amputation site is stable, dry, viable appearing.  Blackened eschar presentation of the distal left second toe.  No clinical signs of infection.    MSK: previous 1st, 3rd and 5th toe amputation, left    7/12/2024 MRI of left foot without contrast:  1.  Postoperative changes of amputation of the great toe and small toe.  2.  Amputation of a portion of the distal phalanx of the second toe.  3.  Soft tissue ulceration at the tip of the third toe appears to extend to the bone margin of the exposed middle phalanx. No significant T1 marrow signal change is present, but there is some T2 signal abnormality and osteomyelitis cannot be excluded.  4.  No additional worrisome areas for osteomyelitis and no evidence for septic arthropathy or abscess.  5.  No evidence for fracture.  6.  Mild fatty infiltration of the interosseous musculature.  7.  Surgical truncation of the hallucis tendons.    Labs:     Lab Results   Component Value Date    INR 0.95 02/03/2021     No components found for: \"ESR\"  @BREIFLAB(crp)@    Lab Results   Component Value Date    WBC 9.2 07/21/2024    WBC 7.7 04/22/2021     Lab Results   Component Value Date    RBC 3.77 07/21/2024    RBC 3.55 04/22/2021     Lab Results   Component Value Date    HGB 11.6 07/21/2024    HGB 10.9 04/22/2021     Lab Results   Component Value Date    HCT 35.4 07/21/2024    HCT 33.2 04/22/2021     No components found for: \"MCT\"  Lab Results   Component Value Date    MCV 94 07/21/2024    MCV 94 04/22/2021     Lab Results   Component Value Date    MCH 30.8 07/21/2024    MCH 30.7 04/22/2021     Lab Results   Component Value Date    MCHC 32.8 07/21/2024    MCHC 32.8 04/22/2021     Lab Results   Component Value Date    RDW 12.5 07/21/2024    RDW 12.9 04/22/2021     Lab Results   Component Value Date     07/21/2024     04/22/2021       All cultures:  No results for input(s): \"CULT\" in the last 168 hours.    Hemoglobin   Date Value Ref Range " Status   07/21/2024 11.6 (L) 13.3 - 17.7 g/dL Final   04/22/2021 10.9 (L) 13.3 - 17.7 g/dL Final

## 2024-07-22 NOTE — PROGRESS NOTES
[facility]    Hospitalist Progress Note    Date of Admission: 7/12/2024    Assessment & Plan   Zia Hopkins is a 74 year old male with PMMHx of hypertension, hyperlipidemia, PAD, DM II, stage III CKD among other medical problems who was scheduled to undergo a left femoral artery cutdown and LLE angiogram on 7/12/2024, but was found to have wet gangrene of his 3rd toe with skin erythema of the foot and ankle in the preoperative area.  Surgery was subsequently canceled, he was admitted to the hospital, started on antibiotics. ID and podiatry were further consulted.  Blood cultures obtained on admission subsequently grew MSSA.    MRI could not exclude presence of osteomyelitis. He subsequently underwent a partial left third toe amputation with osteotomy through the proximal phalanx and debridement of the left heel wound per podiatry on 7/13/24.   Hospitalist service was consulted on 7/13/24 to assist with comorbid medical issues.    He subsequently underwent left common femoral to distal one third posterior tibial in situ greater saphenous by a vein bypass and partial proximal SFA endarterectomy on 7/20/24 per vascular surgery.      Left 3rd toe gangrene with cellulitis and developing osteomyelitis  Left heel ulcer with eschar  s/p partial left third toe amputation with osteotomy through proximal phalanx and heel ulcer debridement on  7/13/24  MSSA bacteremia, dt above  Hx of multiple previous bilateral toe amputations   PAD with critical limb ischemia of bilateral lower extremities, s/p vascular bypass and endarterectomy on 7/20/24  Hx of numerous prior vascular surgeries  *With presentation and surgeries as above. Found to have MSSA bacteremia this stay, on Ancef per ID.   *On 7/6, podiatry noted some ischemic changes to left 2nd toe, debrided at bedside, noted some exposed bone at wound bed; might need further debridement versus partial toe amputation  *Blood cultures drawn 7/15  remained neg, conts on IV Ancef per ID -- anticipate 4wk course (thru 8/12/24), has midline  -- conts on ASA, Plavix and statin  -- routine postop cares per podiatry and vascular surgery    Hypertension  Hyperlipidemia  *PTA meds: amlodipine, chlorthalidone, losartan, metoprolol XL 100mg daily, statin, Zetia.   *Chronic and stable on home meds  -- metoprolol dose reduced to 50mg this stay dt bradycardia with HR 50-60s; HRs improved -- cont 50mg daily dosing at discharge    Stage III CKD  *Baseline Cr is around 1.0. Renal function stable this stay.      DM II, with peripheral neuropathy and CKD  *A1c 7.0 on 7/12/24. PTA meds include Jardiance, metformin.   *Conts on Jardiance. Meformin held this stay for procedures/angiogram, will resume at discharge.    *Using sliding scale insulin while hospitalized    Recent Labs   Lab 07/22/24  0811 07/22/24  0230 07/21/24  2105 07/21/24  1657 07/21/24  1301 07/21/24  0849   * 151* 260* 294* 235* 153*       FEN: no IVFs, lytes stable, regular diet  DVT Prophylaxis: per surgery  Code Status: Full Code    Clinically Significant Risk Factors                              # DMII: A1C = 7.0 % (Ref range: <5.7 %) within past 6 months       # Financial/Environmental Concerns: none           Disposition: Medical issues remained stable at this time.  Surgical service is planning for discharge home today with IV antibiotics.  No concerns from hospitalist standpoint.  Reviewed with patient and spouse plans to decrease dose of metoprolol.  Discharge orders reconciled from hospitalist standpoint.    Medically Ready for Discharge: Anticipated Today      Leonor Balbuena, DO    Medical Decision Making       Please see A&P for additional details of medical decision making.           Interval History   Chart reviewed. Seen this morning. Resting comfortably. Hopeful for discharge home today. Denies complaints. VS stable.     -Data reviewed today: I reviewed all new labs and imaging  results over the last 24 hours. I personally reviewed no images or EKG's today.    Physical Exam   Temp: 98.6  F (37  C) Temp src: Oral BP: (!) 147/63 Pulse: 68   Resp: 16 SpO2: 95 % O2 Device: None (Room air)    Vitals:    07/12/24 0709 07/21/24 0615   Weight: 60.3 kg (133 lb) 61.1 kg (134 lb 11.2 oz)     Vital Signs with Ranges  Temp:  [98.6  F (37  C)-100.3  F (37.9  C)] 98.6  F (37  C)  Pulse:  [65-68] 68  Resp:  [16-18] 16  BP: (135-147)/(57-63) 147/63  SpO2:  [93 %-95 %] 95 %  I/O last 3 completed shifts:  In: -   Out: 1320 [Urine:1320]    Constitutional: Resting comfortably, alert and conversing appropriately, NAD  Respiratory: CTAB, no wheeze, no increased work of breathing  Cardiovascular: HRRR, no MGR  GI: S, NT, ND  Skin/Integumen: Warm/dry, left foot wrapped and not examined by me, LLE incisions appear to be well-healed  Other:      Medications   Current Facility-Administered Medications   Medication Dose Route Frequency Provider Last Rate Last Admin    Continuing statin from home medication list OR statin order already placed during this visit   Does not apply DOES NOT GO TO JUAN F MendozaSharon sevilla APRN CNP        heparin infusion 25,000 units in 0.45% NaCl 250 mL ANTICOAGULANT  500 Units/hr Intravenous Continuous Maryann Lewis MD 5 mL/hr at 07/21/24 0156 500 Units/hr at 07/21/24 0156     Current Facility-Administered Medications   Medication Dose Route Frequency Provider Last Rate Last Admin    acetaminophen (TYLENOL) tablet 975 mg  975 mg Oral Q8H Maryann Lewis MD   975 mg at 07/21/24 2324    amLODIPine (NORVASC) tablet 10 mg  10 mg Oral Daily Erik Baez DPM   10 mg at 07/21/24 0859    aspirin (ASA) chewable tablet 81 mg  81 mg Oral Daily Erik Baez DPM   81 mg at 07/21/24 0848    atorvastatin (LIPITOR) tablet 80 mg  80 mg Oral At Bedtime Erik Baez DPM   80 mg at 07/21/24 2651    ceFAZolin (ANCEF) 2 g in 100 mL D5W intermittent infusion  2 g  Intravenous Q8H Shaina Norton PA-C 200 mL/hr at 07/22/24 0606 2 g at 07/22/24 0606    chlorthalidone (HYGROTON) tablet 50 mg  50 mg Oral Daily Erik Baez DPM   50 mg at 07/21/24 0848    clopidogrel (PLAVIX) tablet 75 mg  75 mg Oral Daily Maryann Lewis MD   75 mg at 07/21/24 0848    empagliflozin (JARDIANCE) tablet 25 mg  25 mg Oral Daily Erik Baez DPM   25 mg at 07/21/24 0848    ezetimibe (ZETIA) tablet 10 mg  10 mg Oral Daily Erik Baez DPM   10 mg at 07/21/24 0848    insulin aspart (NovoLOG) injection (RAPID ACTING)  1-10 Units Subcutaneous TID AC Michelle Mejía MD   7 Units at 07/21/24 1711    insulin aspart (NovoLOG) injection (RAPID ACTING)  1-7 Units Subcutaneous At Bedtime Michelle Mejía MD   3 Units at 07/21/24 2227    losartan (COZAAR) tablet 100 mg  100 mg Oral Daily Erik Baez DPM   100 mg at 07/21/24 0858    [Held by provider] metFORMIN (GLUCOPHAGE) tablet 1,000 mg  1,000 mg Oral BID w/meals Maryann Lewis MD   1,000 mg at 07/12/24 2017    metoprolol succinate ER (TOPROL XL) 24 hr tablet 50 mg  50 mg Oral Daily Clara Brown MD   50 mg at 07/21/24 0848    polyethylene glycol (MIRALAX) Packet 17 g  17 g Oral Daily Maryann Lewis MD        senna-docusate (SENOKOT-S/PERICOLACE) 8.6-50 MG per tablet 1 tablet  1 tablet Oral BID Maryann Lewis MD   1 tablet at 07/21/24 0848    sodium chloride (PF) 0.9% PF flush 10 mL  10 mL Intracatheter Q8H Maryann Lewis MD   10 mL at 07/22/24 0606    sodium chloride (PF) 0.9% PF flush 3 mL  3 mL Intracatheter Q8H Maryann Lewis MD   3 mL at 07/21/24 2227       Data   Recent Labs   Lab 07/22/24  0230 07/21/24  2105 07/21/24  1657 07/21/24  0849 07/21/24  0657 07/18/24  1119 07/18/24  0709   WBC  --   --   --   --  9.2  --  8.1   HGB  --   --   --   --  11.6*  --  12.4*   MCV  --   --   --   --  94  --  92   PLT  --   --   --   --  265  --  277   NA  --   --   --   --   --   --  137   POTASSIUM   --   --   --   --   --   --  3.6   CHLORIDE  --   --   --   --   --   --  102   CO2  --   --   --   --   --   --  23   BUN  --   --   --   --   --   --  28.7*   CR  --   --   --   --  1.02  --  0.99   ANIONGAP  --   --   --   --   --   --  12   TIN  --   --   --   --   --   --  9.2   * 260* 294*   < >  --    < > 136*    < > = values in this interval not displayed.       No results found for this or any previous visit (from the past 24 hour(s)).

## 2024-07-22 NOTE — PROGRESS NOTES
Care Management Discharge Note    Discharge Date: 07/22/2024       Discharge Disposition: Home, Home Infusion    Discharge Services: None    Discharge DME: None    Discharge Transportation: family or friend will provide    Private pay costs discussed:  per home infusion benefit check see their notes  7/16/2024    Does the patient's insurance plan have a 3 day qualifying hospital stay waiver?  No    PAS Confirmation Code:    Patient/family educated on Medicare website which has current facility and service quality ratings:      Education Provided on the Discharge Plan: Yes  Persons Notified of Discharge Plans: RN, Home INfusion team, patient and wife  Patient/Family in Agreement with the Plan: yes    Handoff Referral Completed: Yes    Additional Information:  Discharge to home today with home infusion services provided by [facility]. Per Liaison will need to receive 1400 dose prior to leaving and liaison will do teaching prior to discharge.     Nisa Viveros RN   [facility]   Phone [phone], [employer] or [phone]

## 2024-07-25 ENCOUNTER — OFFICE VISIT (OUTPATIENT)
Dept: PODIATRY | Facility: CLINIC | Age: 74
End: 2024-07-25
Payer: COMMERCIAL

## 2024-07-25 DIAGNOSIS — E11.42 DIABETIC POLYNEUROPATHY ASSOCIATED WITH TYPE 2 DIABETES MELLITUS (H): ICD-10-CM

## 2024-07-25 DIAGNOSIS — L97.522 DIABETIC ULCER OF TOE OF LEFT FOOT ASSOCIATED WITH TYPE 2 DIABETES MELLITUS, WITH FAT LAYER EXPOSED (H): ICD-10-CM

## 2024-07-25 DIAGNOSIS — E11.621 DIABETIC ULCER OF TOE OF LEFT FOOT ASSOCIATED WITH TYPE 2 DIABETES MELLITUS, WITH FAT LAYER EXPOSED (H): ICD-10-CM

## 2024-07-25 DIAGNOSIS — I73.9 PAD (PERIPHERAL ARTERY DISEASE) (H): Primary | ICD-10-CM

## 2024-07-25 DIAGNOSIS — Z89.421 H/O AMPUTATION OF LESSER TOE, RIGHT (H): ICD-10-CM

## 2024-07-25 PROCEDURE — 99213 OFFICE O/P EST LOW 20 MIN: CPT | Performed by: PODIATRIST

## 2024-07-25 NOTE — PATIENT INSTRUCTIONS
Thank you for choosing [facility] Podiatry / Foot & Ankle Surgery!    DR. CASAS'S CLINIC LOCATIONS:     [facility] (Friday) TRIAGE LINE: [phone] [address]  APPOINTMENTS: [phone]   [place] [address] RADIOLOGY: [phone]    PHYSICAL THERAPY: [phone]    SET UP SURGERY: [phone]   [place] (Mon-Tues AM-Thurs) BILLING QUESTIONS: [phone] [address] FAX: [phone]   [place] [address] [facility]: [phone]     You were seen today approximately 2 weeks out from left third toe amputation and heel debridement surgery.  The left second toe presents with a wound as well.  It all is stable today.  Recommendations:    1.  Washing the area, such as a sponge bath, every few days is reasonable.  Avoid soaking/submerging;    2.  Apply Betadine to the end of the left second toe, the third toe amputation site, and the posterior left heel wound every other day and cover with a bandage;    3.  Continue weightbearing as tolerated in the surgical shoe, focusing the weight more on the heel and away from the third toe amputation site.    Follow-up in 1 week for reassessment.    Wound care supplies: Betadine (can be purchased in single swab packs, triple swab packs or bottle; 2.  Gauze 4 x 4's; 3.  Gauze or cast padding roll; 4.  4 inch Ace wrap's.

## 2024-07-25 NOTE — PROGRESS NOTES
Foot & Ankle Surgery  July 25, 2024    S:  Patient in today sp   Partial left third toe imitation with osteotomy through proximal phalanx  Debridement of left heel wound down to/including subcutaneous fat less than 20 cm on 7/13/2024.  Pain levels minimal.  He was discharged 3 days ago and the current bandage has been in place since.  He was discharged on antibiotics for bacteremia.  He has had a distal second toe wound as well    There were no vitals taken for this visit.      ROS - positive for CC.  Patient denies current nausea, vomiting, chills, fevers, belly pain, calf pain, chest pain or SOB.  Complete remainder of ROS is otherwise neg.    PE -the amputation site presents with dried blood and slight maceration.  The distal second toe wound presents with mild serous drainage but no exposed bone.  The posterior lateral left heel ulceration presents with a dry eschar.  No signs of infection are seen at any of the 3 sites.  Skin shows no trophic, color or temperature changes otherwise.  No calf redness, swelling or pain noted otherwise.    Imaging - IMPRESSION:      There are recent postoperative changes from amputation of the third toe to the level of the proximal phalangeal shaft. There are more remote appearing postoperative changes from fifth toe and great toe amputation and amputation of a portion of the second   distal phalanx.      There is diffuse osseous demineralization. Scattered mild degenerative changes..    Pathology -   Final Diagnosis   A.  Toe, left third, amputation:  -Skin ulceration with underlying soft tissue necrosis and acute inflammation.  -Acute osteomyelitis.  -Margin appears viable.     Cultures - MSSA;   4+ Finegoldia magna Abnormal    Susceptibilities not routinely done, refer to antibiogram to view typical susceptibility profiles  This organism is considered part of Normal ervin   4+ Anaerococcus species Abnormal        A/P - 74 year old yo patient approx 12 days sp above procedure,  with distal left second toe ulceration  -Wound care: Sponge bath, avoid soaking/submerging.  Apply Betadine to the distal second toe, the third toe amputation site, and the posterior heel wound.  Cover with gauze and wrapped with cast padding/Ace every other day  -Heel weightbearing in surgical shoe  -Wound care for bypass site per vascular  -Continue current antibiotics; I see no indication for new or extended course  -Weekly follow-up until suture removal    Follow up  -1 week or sooner with acute issues    Plan for [id number] -retired      Erik Baez DPM FACFAS FACFAOM  Podiatric Foot & Ankle Surgeon  [facility]  [phone]    Disclaimer: This note consists of symbols derived from keyboarding, dictation and/or voice recognition software. As a result, there may be errors in the script that have gone undetected. Please consider this when interpreting information found in this chart.

## 2024-07-25 NOTE — LETTER
7/25/2024      Zia Hopkins  [address] [place] [address]      Dear Colleague,    Thank you for referring your patient, Zia Hopkins, to the [facility]. Please see a copy of my visit note below.    Foot & Ankle Surgery  July 25, 2024    S:  Patient in today sp   Partial left third toe imitation with osteotomy through proximal phalanx  Debridement of left heel wound down to/including subcutaneous fat less than 20 cm on 7/13/2024.  Pain levels minimal.  He was discharged 3 days ago and the current bandage has been in place since.  He was discharged on antibiotics for bacteremia.  He has had a distal second toe wound as well    There were no vitals taken for this visit.      ROS - positive for CC.  Patient denies current nausea, vomiting, chills, fevers, belly pain, calf pain, chest pain or SOB.  Complete remainder of ROS is otherwise neg.    PE -the amputation site presents with dried blood and slight maceration.  The distal second toe wound presents with mild serous drainage but no exposed bone.  The posterior lateral left heel ulceration presents with a dry eschar.  No signs of infection are seen at any of the 3 sites.  Skin shows no trophic, color or temperature changes otherwise.  No calf redness, swelling or pain noted otherwise.    Imaging - IMPRESSION:      There are recent postoperative changes from amputation of the third toe to the level of the proximal phalangeal shaft. There are more remote appearing postoperative changes from fifth toe and great toe amputation and amputation of a portion of the second   distal phalanx.      There is diffuse osseous demineralization. Scattered mild degenerative changes..    Pathology -   Final Diagnosis   A.  Toe, left third, amputation:  -Skin ulceration with underlying soft tissue necrosis and acute inflammation.  -Acute osteomyelitis.  -Margin appears viable.     Cultures - MSSA;   4+ Finegoldia magna Abnormal     Susceptibilities not routinely done, refer to antibiogram to view typical susceptibility profiles  This organism is considered part of Normal ervin   4+ Anaerococcus species Abnormal        A/P - 74 year old yo patient approx 12 days sp above procedure, with distal left second toe ulceration  -Wound care: Sponge bath, avoid soaking/submerging.  Apply Betadine to the distal second toe, the third toe amputation site, and the posterior heel wound.  Cover with gauze and wrapped with cast padding/Ace every other day  -Heel weightbearing in surgical shoe  -Wound care for bypass site per vascular  -Continue current antibiotics; I see no indication for new or extended course  -Weekly follow-up until suture removal    Follow up  -1 week or sooner with acute issues    Plan for [id number] -retired      Erik Baez DPM FACFAS FACFAOM  Podiatric Foot & Ankle Surgeon  [facility]  [phone]    Disclaimer: This note consists of symbols derived from keyboarding, dictation and/or voice recognition software. As a result, there may be errors in the script that have gone undetected. Please consider this when interpreting information found in this chart.      Again, thank you for allowing me to participate in the care of your patient.        Sincerely,        Erik Baez DPM, KEMAR

## 2024-07-31 ENCOUNTER — OFFICE VISIT (OUTPATIENT)
Dept: OTHER | Facility: CLINIC | Age: 74
End: 2024-07-31
Payer: COMMERCIAL

## 2024-07-31 VITALS — DIASTOLIC BLOOD PRESSURE: 63 MMHG | HEART RATE: 67 BPM | SYSTOLIC BLOOD PRESSURE: 159 MMHG

## 2024-07-31 DIAGNOSIS — I70.222 CRITICAL LIMB ISCHEMIA OF LEFT LOWER EXTREMITY (H): Primary | ICD-10-CM

## 2024-07-31 PROCEDURE — G0463 HOSPITAL OUTPT CLINIC VISIT: HCPCS

## 2024-07-31 PROCEDURE — 99024 POSTOP FOLLOW-UP VISIT: CPT

## 2024-07-31 NOTE — PROGRESS NOTES
VASCULAR SURGERY PROGRESS NOTE    [facility]    Zia Hopkins  Medical Record #: [id number]  YOB: 1950  Age: 74 year old     Date of Service: 7/31/2024    PRIMARY CARE PROVIDER: Reid Reese    Reason for visit:  post-operative appointment    Zia Hopkins is a 74 year old male status post left femoral to tibial artery in situ bypass with SFA endarterectomy for critical limb ischemia on 7/20/2024 with Dr. Cortés.     Hx of previous left SFA stenting and open femoral exposures multiple times for angiograms as had prior toe amputations.  He was initially planned for angiogram on 7/12 however presented that morning with wet gangrene of L 3rd toe, thus was admitted for IV abx, ID workup, possible toe amputation.     Today Zia is doing well, without complaint  /63 P 67  Incisions are well healed, 1+ edema with more distal incision at the distal aspect with more edema  No signs of infection  Excellent graft pulse with biphasic pedal pulses           RECOMMENDATION/RISKS: Continue on Plavix and aspirin 81 mg, in addition to Atorvastatin, zetia.     In 4 weeks time we should do ABIs with toe pressures in addition to left lower extremity arterial duplex to assess patency of bypass graft.     Will have Zia come back next week for remaining suture removal of distal bypass incision.     REVIEW OF SYSTEMS:    A 12 point ROS was reviewed and is negative except for what is listed above in HPI.    PHH:    Past Medical History:   Diagnosis Date    Acute low back pain     Anemia     Bilateral carotid bruits     Cellulitis of left foot     Central spinal stenosis     and Subarticular    Chronic kidney disease     Degenerative lumbar spinal stenosis     multilevel    Diabetes mellitus type 2, noninsulin dependent (H)     Diabetic ulcer of toe (H)     Gangrene of toe (H)     Hyperlipidemia LDL goal <70     Hypertension     Lumbar foraminal stenosis     Macular degeneration  (senile) of retina     PAD (peripheral artery disease) (H24)     Pneumonia     Psoriasis           Past Surgical History:   Procedure Laterality Date    AMPUTATE FOOT Left 03/15/2017    Procedure: AMPUTATE FOOT;  Surgeon: Emre Mauricio DPM;  Location: [facility]    AMPUTATE TOE(S) Left 04/20/2021    Procedure: LEFT GREAT TOE AMPUTATION.;  Surgeon: Purvi Whelan DPM, Podiatry/Foot and Ankle Surgery;  Location: [facility]    AMPUTATE TOE(S) Right 04/18/2022    Procedure: 1.  Partial right great toe amputation. 2.  Right foot fourth toe amputation at the metatarsophalangeal joint. 3.  Right fifth metatarsal amputation at the metatarsophalangeal joint.;  Surgeon: Purvi Whelan DPM, Podiatry/Foot and Ankle Surgery;  Location: [facility]    AMPUTATE TOE(S) Right 09/29/2022    Procedure: Right second and third toe amputations;  Surgeon: Purvi Whelan DPM, Podiatry/Foot and Ankle Surgery;  Location: [facility]    AMPUTATE TOE(S) Left 7/13/2024    Procedure: LEFT 3RD TOE AMPUTATION AND HEEL WOUND DEBRIDEMENT;  Surgeon: Erik Baez DPM;  Location: [facility]    ARTHROPLASTY KNEE BILATERAL Bilateral     BIOPSY BONE FOOT Right 06/27/2022    Procedure: Partial right fourth and fifth metatarsal bone excision;  Surgeon: Purvi Whelan DPM, Podiatry/Foot and Ankle Surgery;  Location: [facility]    BYPASS GRAFT FEMOROTIBIAL Left 7/20/2024    Procedure: LEFT FEMORAL TO TIBIAL ARTERY IN SITU BYPASS GRAFT. superficial femoral artery endarterectomy.;  Surgeon: Parrish Cortés MD;  Location: [facility]    ENDARTERECTOMY FEMORAL Right 03/04/2022    Procedure: RIGHT FEMORAL ENDARTERECTOMY, RIGHT FEMORAL ARTERY BALLOON ANGIOPLASTY WITH STENTING;  Surgeon: Surjit Hamlin MD;  Location: [facility]    ENDARTERECTOMY FEMORAL Left 07/12/2023    Procedure: LEFT FEMORAL CUTDOWN;  Surgeon: Surjit Hamlin MD;  Location: [facility]    IR LOWER EXTREMITY ANGIOGRAM LEFT  07/30/2019    IR LOWER EXTREMITY ANGIOGRAM LEFT  02/03/2021    IR LOWER EXTREMITY  ANGIOGRAM LEFT  04/21/2021    IR LOWER EXTREMITY ANGIOGRAM LEFT  7/16/2024    IR LOWER EXTREMITY ANGIOGRAM RIGHT  03/01/2022    IR LOWER EXTREMITY ANGIOGRAM RIGHT  06/10/2022    IR OR ANGIOGRAM  03/04/2022    IR OR ANGIOGRAM  07/12/2023    IRRIGATION AND DEBRIDEMENT FOOT, COMBINED Left 03/15/2017    Procedure: COMBINED IRRIGATION AND DEBRIDEMENT FOOT;  Surgeon: Emre Mauricio DPM;  Location: [facility]    OR ANGIOGRAM, LOWER EXTREMITY Left 07/12/2023    Procedure: LEFT LOWER EXTREMITY ANGIOGRAM BALLOON ANGIOPLASTY;  Surgeon: Surjit Hamlin MD;  Location: [facility]    PERONEAL NERVE DECOMPRESSION      VASECTOMY         ALLERGIES:  Patient has no known allergies.    MEDS:    Current Outpatient Medications:     acetaminophen (TYLENOL) 500 MG tablet, Take 500-1,000 mg by mouth every 6 hours as needed for mild pain, Disp: , Rfl:     amLODIPine (NORVASC) 10 MG tablet, Take 10 mg by mouth daily, Disp: , Rfl:     aspirin 81 MG tablet, Take 1 tablet (81 mg) by mouth daily, Disp: 90 tablet, Rfl: 1    atorvastatin (LIPITOR) 80 MG tablet, TAKE 1 TABLET(80 MG) BY MOUTH AT BEDTIME, Disp: 90 tablet, Rfl: 3    ceFAZolin (ANCEF) intermittent infusion 2 g in 100 mL dextrose PRE-MIX, Inject 100 mLs (2 g) into the vein every 8 hours for 24 days Please draw weekly CBC with diff, creatinine, AST, and CRP and fax results to [facility]., Disp: , Rfl:     chlorthalidone (HYGROTON) 50 MG tablet, Take 50 mg by mouth daily , Disp: , Rfl:     clopidogrel (PLAVIX) 75 MG tablet, Take 1 tablet (75 mg) by mouth daily, Disp: 90 tablet, Rfl: 3    diclofenac (VOLTAREN) 75 MG EC tablet, Take 75 mg by mouth 2 times daily as needed, Disp: , Rfl:     empagliflozin (JARDIANCE) 25 MG TABS tablet, Take 25 mg by mouth daily, Disp: , Rfl:     ezetimibe (ZETIA) 10 MG tablet, TAKE 1 TABLET(10 MG) BY MOUTH DAILY, Disp: 90 tablet, Rfl: 3    losartan (COZAAR) 100 MG tablet, Take 100 mg by mouth daily , Disp: , Rfl:     metFORMIN (GLUCOPHAGE) 1000 MG  tablet, Take 1,000 mg by mouth 2 times daily (with meals) , Disp: , Rfl:     metoprolol succinate ER (TOPROL XL) 100 MG 24 hr tablet, Take 0.5 tablets (50 mg) by mouth daily, Disp: , Rfl:     SOCIAL HABITS:    History   Smoking Status    Some Days    Types: Cigarettes   Smokeless Tobacco    Never     Social History    Substance and Sexual Activity      Alcohol use: Yes        Comment: 1-2.5 glasses of wine at dinner      History   Drug Use Unknown       FAMILY HISTORY:    Family History   Problem Relation Age of Onset    Diabetes Father        PE:  There were no vitals taken for this visit.  Wt Readings from Last 1 Encounters:   07/21/24 134 lb 11.2 oz (61.1 kg)     There is no height or weight on file to calculate BMI.    DIAGNOSTIC STUDIES:     Images:  US Lower Extremity Venous Mapping Left    Result Date: 7/19/2024  [place] [facility] DATE: 7/19/2024 EXAM: LEFT LOWER EXTREMITY VENOUS ULTRASOUND INDICATION: Presurgical evaluation prior to lower extremity bypass with mapping TECHNIQUE: Duplex imaging was performed utilizing gray-scale imaging. COMPARISON: 7/16/2024. FINDINGS: The left greater saphenous vein is patent throughout its visualized course and mapped along the skin including the side branches by the ultrasound technologist.     IMPRESSION: The left greater saphenous vein is patent with side branches marked. Of note, a prominent posterior branch is seen in the proximal thigh which reconnects with the greater saphenous vein in the proximal calf. REINIER ROSENTHAL MD   SYSTEM ID:  [id number]    IR Lower Extremity Angiogram Left    Result Date: 7/16/2024  [place] [facility] LOCATION: [facility] DATE: 7/16/2024 PROCEDURE: 1. US-GUIDED ANTEGRADE ACCESS - LEFT COMMON FEMORAL ARTERY 2. LEFT LOWER EXTREMITY DIAGNOSTIC ANGIOGRAPHY INTERVENTIONAL RADIOLOGIST: Ezekiel Luna MD INDICATION: 74-year-old male with history of peripheral arterial disease status post multiple interventions, admitted for left  third digit gangrene and cellulitis status post toe amputation 7/13/2024. Interventional radiology consulted for diagnostic left lower extremity angiography per vascular surgery for bypass graft planning purposes. CONSENT: The risks, benefits and alternatives of dialysis arteriovenous shunt evaluation with possible angioplasty, stent placement, thrombolysis and/or tunneled dialysis catheter placement were discussed with the patient in detail. All questions were answered. Informed consent was given to proceed with the procedure. MODERATE SEDATION: Versed 0 mg IV; Fentanyl 200 mcg IV.  Under physician supervision, Versed and fentanyl were administered for moderate sedation. Pulse oximetry, heart rate and blood pressure were continuously monitored by an independent trained observer. The physician spent 30 minutes of face-to-face sedation time with the patient. CONTRAST: 44 mL Visipaque ANTIBIOTICS: None. ADDITIONAL MEDICATIONS: None. FLUOROSCOPIC TIME: 2.8 minutes. RADIATION DOSE: Air Kerma: 22 mGy. COMPLICATIONS: No immediate complications. STERILE BARRIER TECHNIQUE: Maximum sterile barrier technique was used. Cutaneous antisepsis was performed at the operative site with application of 2% chlorhexidine and large sterile drape. Prior to the procedure, the  and assistant performed hand hygiene and wore hat, mask, sterile gown, and sterile gloves during the entire procedure. PROCEDURE:  Patient was positioned supine on the procedure table in the bilateral groins are prepped and draped in usual sterile fashion. Using 1% lidocaine for local anesthesia and ultrasound guidance, the left common femoral artery was accessed in antegrade fashion with a micropuncture system. Permanent ultrasound images were stored. Arterial access was upsized in standard fashion for a 5 Pashto vascular sheath. Diagnostic left lower extremity angiography was performed in multiple stations. The sheath was removed and hemostasis achieved  with manual pressure. FINDINGS: 1. Left common femoral artery ultrasound demonstrates patency and antegrade flow, with prominent peripheral calcifications. 2. LEFT LOWER EXTREMITY ANGIOGRAPHY: Moderate stenosis of the CFA and proximal PFA. Mild stenosis of the proximal SFA. Patent SFA stents extending just above the abductor canal. SFA stents are largely patent, with mild in-stent stenosis along the mid femur. Moderate approximately 10 cm length occlusion of the distal most SFA and above-knee popliteal artery starting at the adductor canal. Reconstitution of flow secondary to prominent arterial collaterals along the distal above-knee popliteal artery. Mild narrowing of the popliteal artery along the knee joint. Patent below-knee popliteal artery and tibioperoneal trunk. Patent 2 vessel tibial runoff via the anterior tibial and posterior tibial arteries. The peroneal artery is largely occluded. Moderate approximately 7 cm length occlusion of the posterior tibial artery with distal reconstitution via collaterals. The PT continues below the ankle and supplies the lateral and medial plantar arteries. The AT terminates at the level of the ankle into small branches, with likely occlusion of the native dorsalis pedis artery.     IMPRESSION:  Diagnostic left lower extremity angiography for surgical planning purposes, with findings as above. NICK LARKIN MD   SYSTEM ID:  [id number]    US Lower Extremity Venous Mapping Bilateral    Result Date: 7/16/2024  [place] [facility] DATE: 7/16/2024 EXAM: BILATERAL LOWER EXTREMITY VENOUS MAPPING INDICATION: Evaluation for possible left lower extremity arterial bypass graft. TECHNIQUE: Duplex imaging was performed utilizing gray-scale, compression, augmentation as appropriate, color-flow, Doppler waveform analysis, and spectral Doppler imaging. The cephalic and basilic veins were measured bilaterally. COMPARISON: None. FINDINGS: Widely patent bilateral great saphenous veins with  measurements as listed below. RIGHT GREAT SAPHENOUS VEIN (mm): SFJ: 8.5 Prox thigh: 3.6 Mid thigh: 3.7 Dist thigh: 3.3 Knee: 3.2 Prox calf: 3.0 Mid calf: 2.5 Distal calf: 2.5 Ankle: 3.8 LEFT GREAT SAPHENOUS VEIN (mm): SFJ: 7.8 Prox thigh: 3.5 Mid thigh: 3.1 Dist thigh: 2.8 Knee: 2.9 Prox calf: 3.1 Mid calf: 3.3 Distal calf: 3.0 Ankle: 3.3     IMPRESSION: Bilateral lower extremity venous mapping for surgical planning purposes, with measurements as above. NICK LARKIN MD   SYSTEM ID:  [id number]    XR Foot Left 2 Views    Result Date: 7/13/2024  EXAM: XR FOOT LEFT 2 VIEWS LOCATION: [facility] DATE: 7/13/2024 INDICATION: sp partial 3rd toe amp COMPARISON: None.     IMPRESSION: There are recent postoperative changes from amputation of the third toe to the level of the proximal phalangeal shaft. There are more remote appearing postoperative changes from fifth toe and great toe amputation and amputation of a portion of the second  distal phalanx. There is diffuse osseous demineralization. Scattered mild degenerative changes..    CTA Abdomen Pelvis Runoff w Contrast    Result Date: 7/12/2024  EXAM: CTA ABDOMEN PELVIS RUNOFF W CONTRAST LOCATION: [facility] DATE: 7/12/2024 INDICATION: eval LLE vasculature, concern for patency L SFA stent, distal popliteal artery, calcific disease burden COMPARISON: Lower extremity arterial ultrasound 5/6/2024 TECHNIQUE: Helical acquisition through the abdomen, pelvis, and bilateral lower extremities was performed during the arterial phase of contrast enhancement using IV Contrast. 2D and 3D reconstructions were performed by the CT technologist. Dose reduction  techniques were used. CONTRAST: 100 mL Isovue 370 FINDINGS: ABDOMEN ARTERIAL FINDINGS Abdominal aorta: Patent. Moderate diffuse atherosclerotic disease. Celiac artery: Mild ostial narrowing. SMA: Patent. Renal Arteries: At least moderate atherosclerotic stenosis of the renal artery  origins. ALEX: Mild ostial stenosis. RIGHT LOWER EXTREMITY ARTERIAL FINDINGS Common Iliac: Diffuse atherosclerosis without high-grade stenosis. External Iliac: Diffuse atherosclerosis without high-grade stenosis. Internal Iliac: Moderate ostial stenosis. Common Femoral: Patent. Profunda: Moderate atherosclerotic stenosis proximally. SFA: Stenting throughout the SFA appears patent. Popliteal: Moderate to severe focal atherosclerotic stenosis proximally with multifocal disease distally. Tibioperoneal trunk: Patent. Anterior tibial: Occluded in the mid vessel. Reconstitution of the dorsalis pedis artery. Peroneal: Patent to the level of the ankle. Posterior tibial: Diffuse atherosclerotic disease. Patent to the level of the foot. LEFT LOWER EXTREMITY ARTERIAL FINDINGS Common Iliac: Diffuse atherosclerosis without high-grade stenosis. External Iliac: Diffuse atherosclerosis without high-grade stenosis. Internal Iliac: Diffuse atherosclerosis without high-grade stenosis. Common Femoral: Atherosclerosis results in approximately 50% luminal stenosis. Profunda: Diffuse atherosclerotic disease. SFA: SFA stenting appears patent. Popliteal: Occlusion of the above-knee segment with reconstitution of the below-knee segment, which demonstrated severe atherosclerotic disease. Tibioperoneal trunk: Severe atherosclerotic disease. Anterior tibial: Diffuse atherosclerotic disease. Patent to the level of the foot. Peroneal: Occluded. Posterior tibial: Diffuse atherosclerotic disease. Patent to the level of the foot. NON-ARTERIAL FINDINGS LUNG BASES: Bibasilar atelectasis and/or scarring. A fat-containing left Bochdalek hernia is noted. ABDOMEN: The liver, spleen, adrenal glands, and pancreas are unremarkable. Simple appearing right renal cysts are benign and requiring no further follow-up. No hydronephrosis. Enhancing focus in the right hepatic lobe is noted which likely represents a flash filling hemangioma. No ascites. PELVIS: Normal  MUSCULOSKELETAL: Moderate to marked degenerative changes of the lumbar spine. At least mild to moderate degenerative changes of the bilateral hips. No destructive osseous lesion.     IMPRESSION: 1.  Occlusion of the left above-the-knee popliteal artery with distal reconstitution. 2.  The bilateral SFA stents are patent. 3.  Diffuse aortoiliac and lower extremity atherosclerotic disease.    MR Foot Left w/o Contrast    Result Date: 7/12/2024  EXAM: MR FOOT LEFT W/O CONTRAST LOCATION: [facility] DATE: 7/12/2024 INDICATION: Swelling, possible infection or osteomyelitis. COMPARISON: None. TECHNIQUE: Unenhanced. FINDINGS: JOINTS AND BONES: -Postoperative changes of amputation of the great toe and small toe. Postoperative changes of amputation of the distal phalanx of the second and third toes. The distal margin of the middle phalanx of the third toe appears exposed at the base of an ulceration making osteomyelitis impossible to exclude, although there is little associated signal abnormality. No additional areas worrisome for osteomyelitis are identified. No significant effusion to suggest septic arthropathy. TENDONS: -The flexor and extensor tendons are intact. Surgical truncation of the hallucis tendons but no evidence for tendinopathy. LIGAMENTS: -The ligament of Lisfranc is intact. The collateral ligaments at the second through fourth MTP joints are all intact. MUSCLES AND SOFT TISSUES: -Fatty infiltration and atrophy of some of the interosseous musculature. No evidence for abscess.     IMPRESSION: 1.  Postoperative changes of amputation of the great toe and small toe. 2.  Amputation of a portion of the distal phalanx of the second toe. 3.  Soft tissue ulceration at the tip of the third toe appears to extend to the bone margin of the exposed middle phalanx. No significant T1 marrow signal change is present, but there is some T2 signal abnormality and osteomyelitis cannot be excluded. 4.  No  additional worrisome areas for osteomyelitis and no evidence for septic arthropathy or abscess. 5.  No evidence for fracture. 6.  Mild fatty infiltration of the interosseous musculature. 7.  Surgical truncation of the hallucis tendons.    Echocardiogram Complete    Result Date: 2024  [id number]^GÉNESIS^THANG  [facility] [address] [place] [address] Phone [phone] Fax [phone]  Name: SKY GAYTAN MRN: [id number] : 1950 Study Date: 2024 11:08 AM Age: 74 yrs Gender: Male Patient Location: [facility] Reason For Study: Abnormal electrocardiogram, Preoperative clearance Ordering Physician: THANG CAPPS Referring Physician: THANG CAPPS Performed By: Lori Bishop RDCS  BSA: 1.6 m2 Height: 64 in Weight: 130 lb HR: 64 BP: 175/68 mmHg ______________________________________________________________________________ Procedure Complete Echo Adult.  ______________________________________________________________________________ Interpretation Summary  The visual ejection fraction is 65-70%. Left ventricular systolic function is normal. ______________________________________________________________________________ Left Ventricle The left ventricle is normal in size. There is normal left ventricular wall thickness. Diastolic Doppler findings (E/E' ratio and/or other parameters) suggest left ventricular filling pressures are indeterminate. The visual ejection fraction is 65-70%. Left ventricular systolic function is normal. Grade I or early diastolic dysfunction.  Right Ventricle The right ventricle is normal in size and function.  Atria Normal left atrial size. Right atrial size is normal. There is no color Doppler evidence of an atrial shunt.  Mitral Valve The mitral papillary muscle appears thickened and/or calcified. The mitral valve leaflets are mildly thickened.  There is mild (1+) mitral regurgitation.  Tricuspid Valve There is trace tricuspid regurgitation. Right ventricular systolic pressure could not be approximated due to inadequate tricuspid regurgitation.  Aortic Valve The aortic valve is trileaflet. There is mild trileaflet aortic sclerosis. No aortic regurgitation is present. No hemodynamically significant valvular aortic stenosis.  Pulmonic Valve There is no pulmonic valvular regurgitation. Normal pulmonic valve velocity.  Vessels The aortic root is normal size. Normal size ascending aorta. IVC diameter <2.1 cm collapsing >50% with sniff suggests a normal RA pressure of 3 mmHg.  Pericardium There is no pericardial effusion.  Rhythm Sinus rhythm was noted.  ______________________________________________________________________________ MMode/2D Measurements & Calculations IVSd: 1.1 cm LVIDd: 4.1 cm LVIDs: 2.3 cm LVPWd: 1.1 cm FS: 43.6 % LV mass(C)d: 148.1 grams LV mass(C)dI: 90.9 grams/m2 Ao root diam: 3.0 cm LA dimension: 3.7 cm  asc Aorta Diam: 3.5 cm LA/Ao: 1.2 Ao root diam index Ht(cm/m): 1.8 Ao root diam index BSA (cm/m2): 1.8 Asc Ao diam index BSA (cm/m2): 2.1 Asc Ao diam index Ht(cm/m): 2.2 LA Volume (BP): 40.2 ml LA Volume Index (BP): 24.7 ml/m2 RV Base: 3.3 cm RWT: 0.51  TAPSE: 2.8 cm  Doppler Measurements & Calculations MV E max rui: 52.9 cm/sec MV A max rui: 74.9 cm/sec MV E/A: 0.71 MV dec time: 0.29 sec PA acc time: 0.11 sec E/E' av.9 Lateral E/e': 8.4 Medial E/e': 9.3 RV S Rui: 20.9 cm/sec  ______________________________________________________________________________ Report approved by: Adrienne Vital 2024 11:42 AM       LABS:      Sodium   Date Value Ref Range Status   2024 137 135 - 145 mmol/L Final   2024 135 135 - 145 mmol/L Final   2024 138 135 - 145 mmol/L Final   2021 138 133 - 144 mmol/L Final   2021 137 133 - 144 mmol/L Final   2021 138 133 - 144 mmol/L Final     Urea Nitrogen   Date Value  Ref Range Status   07/18/2024 28.7 (H) 8.0 - 23.0 mg/dL Final   07/13/2024 22.8 8.0 - 23.0 mg/dL Final   07/12/2024 23.0 8.0 - 23.0 mg/dL Final   06/10/2022 28 7 - 30 mg/dL Final   03/08/2022 27 7 - 30 mg/dL Final   03/07/2022 23 7 - 30 mg/dL Final   04/23/2021 14 7 - 30 mg/dL Final   04/22/2021 15 7 - 30 mg/dL Final   04/20/2021 22 7 - 30 mg/dL Final     Hemoglobin   Date Value Ref Range Status   07/21/2024 11.6 (L) 13.3 - 17.7 g/dL Final   07/18/2024 12.4 (L) 13.3 - 17.7 g/dL Final   07/13/2024 12.3 (L) 13.3 - 17.7 g/dL Final   04/22/2021 10.9 (L) 13.3 - 17.7 g/dL Final   04/20/2021 11.0 (L) 13.3 - 17.7 g/dL Final   04/19/2021 12.2 (L) 13.3 - 17.7 g/dL Final     Platelet Count   Date Value Ref Range Status   07/21/2024 265 150 - 450 10e3/uL Final   07/18/2024 277 150 - 450 10e3/uL Final   07/15/2024 292 150 - 450 10e3/uL Final   04/22/2021 237 150 - 450 10e9/L Final   04/20/2021 239 150 - 450 10e9/L Final   04/19/2021 310 150 - 450 10e9/L Final     INR   Date Value Ref Range Status   02/03/2021 0.95 0.86 - 1.14 Final   07/30/2019 0.90 0.86 - 1.14 Final   03/08/2017 0.87 0.86 - 1.14 Final       20 minutes spent on the day of encounter doing chart review, history and exam, documentation, and further activities as noted.     Nallely Trujillo NP  VASCULAR SURGERY

## 2024-07-31 NOTE — PROGRESS NOTES
[facility]        Patient is here for a post-op.    Pt is currently taking Aspirin, Statin, and Plavix.    BP (!) 159/63 (BP Location: Left arm, Patient Position: Chair, Cuff Size: Adult Regular)   Pulse 67     The provider has been notified that the patient has no concerns.     Questions patient would like addressed today are: N/A.    Refills are needed: N/A    Has homecare services and agency name:  Codie Cavazos MA

## 2024-08-01 ENCOUNTER — TELEPHONE (OUTPATIENT)
Dept: OTHER | Facility: CLINIC | Age: 74
End: 2024-08-01

## 2024-08-01 NOTE — TELEPHONE ENCOUNTER
4 week follow up to 07/31/2024 STEFAN nicholson/ Nallely Trujillo NP      Routing to scheduling to coordinate the following:  US DAVID w/ TBI  US Left Lower Extremity Arterial  RETURN VASCULAR PATIENT consult with CLAUDINE Montalvo CNP  Please schedule this  4 weeks from the date of 07/31/2024    Appt note:  4 week follow up to 07/31/2024 STEFAN nicholson/ Nallely Trujillo NP

## 2024-08-06 ENCOUNTER — LAB (OUTPATIENT)
Dept: LAB | Facility: CLINIC | Age: 74
End: 2024-08-06
Payer: COMMERCIAL

## 2024-08-06 ENCOUNTER — HOSPITAL ENCOUNTER (OUTPATIENT)
Facility: CLINIC | Age: 74
Discharge: HOME | End: 2024-08-06
Payer: COMMERCIAL

## 2024-08-06 DIAGNOSIS — I70.229 CRITICAL ISCHEMIA OF LOWER EXTREMITY (H): ICD-10-CM

## 2024-08-06 DIAGNOSIS — R78.81 BACTEREMIA: ICD-10-CM

## 2024-08-06 DIAGNOSIS — I70.223 CRITICAL LIMB ISCHEMIA OF BOTH LOWER EXTREMITIES (H): ICD-10-CM

## 2024-08-06 DIAGNOSIS — I73.9 PERIPHERAL VASCULAR DISEASE, UNSPECIFIED (H): ICD-10-CM

## 2024-08-06 DIAGNOSIS — R78.81 BACTEREMIA: Primary | ICD-10-CM

## 2024-08-06 DIAGNOSIS — I73.9 PERIPHERAL VASCULAR DISEASE, UNSPECIFIED: ICD-10-CM

## 2024-08-06 DIAGNOSIS — I70.212: ICD-10-CM

## 2024-08-06 LAB
AST SERPL W P-5'-P-CCNC: 18 U/L (ref 0–45)
BASOPHILS # BLD AUTO: 0 10E3/UL (ref 0–0.2)
BASOPHILS NFR BLD AUTO: 1 %
CREAT SERPL-MCNC: 0.97 MG/DL (ref 0.67–1.17)
CRP SERPL-MCNC: <3 MG/L
EGFRCR SERPLBLD CKD-EPI 2021: 81 ML/MIN/1.73M2
EOSINOPHIL # BLD AUTO: 0.5 10E3/UL (ref 0–0.7)
EOSINOPHIL NFR BLD AUTO: 7 %
ERYTHROCYTE [DISTWIDTH] IN BLOOD BY AUTOMATED COUNT: 13.3 % (ref 10–15)
HCT VFR BLD AUTO: 29 % (ref 40–53)
HGB BLD-MCNC: 9.1 G/DL (ref 13.3–17.7)
IMM GRANULOCYTES # BLD: 0 10E3/UL
IMM GRANULOCYTES NFR BLD: 1 %
LYMPHOCYTES # BLD AUTO: 1.2 10E3/UL (ref 0.8–5.3)
LYMPHOCYTES NFR BLD AUTO: 17 %
MCH RBC QN AUTO: 29.7 PG (ref 26.5–33)
MCHC RBC AUTO-ENTMCNC: 31.4 G/DL (ref 31.5–36.5)
MCV RBC AUTO: 95 FL (ref 78–100)
MONOCYTES # BLD AUTO: 0.6 10E3/UL (ref 0–1.3)
MONOCYTES NFR BLD AUTO: 8 %
NEUTROPHILS # BLD AUTO: 4.7 10E3/UL (ref 1.6–8.3)
NEUTROPHILS NFR BLD AUTO: 66 %
NRBC # BLD AUTO: 0 10E3/UL
NRBC BLD AUTO-RTO: 0 /100
PLATELET # BLD AUTO: 320 10E3/UL (ref 150–450)
RBC # BLD AUTO: 3.06 10E6/UL (ref 4.4–5.9)
WBC # BLD AUTO: 7.1 10E3/UL (ref 4–11)

## 2024-08-06 PROCEDURE — 272N000433 HC KIT CATH IV 18 OR 20G CM, POWERGLIDE W MAX BARRIER

## 2024-08-06 PROCEDURE — 999N000040 HC STATISTIC CONSULT NO CHARGE VASC ACCESS

## 2024-08-06 PROCEDURE — 36415 COLL VENOUS BLD VENIPUNCTURE: CPT

## 2024-08-06 PROCEDURE — 85025 COMPLETE CBC W/AUTO DIFF WBC: CPT

## 2024-08-06 PROCEDURE — 250N000009 HC RX 250: Performed by: INTERNAL MEDICINE

## 2024-08-06 PROCEDURE — 84450 TRANSFERASE (AST) (SGOT): CPT

## 2024-08-06 PROCEDURE — 86140 C-REACTIVE PROTEIN: CPT

## 2024-08-06 PROCEDURE — 82565 ASSAY OF CREATININE: CPT

## 2024-08-06 PROCEDURE — 36569 INSJ PICC 5 YR+ W/O IMAGING: CPT

## 2024-08-06 RX ADMIN — LIDOCAINE HYDROCHLORIDE ANHYDROUS 1 ML: 10 INJECTION, SOLUTION INFILTRATION at 12:37

## 2024-08-06 ASSESSMENT — ACTIVITIES OF DAILY LIVING (ADL)
ADLS_ACUITY_SCORE: 37
ADLS_ACUITY_SCORE: 35
ADLS_ACUITY_SCORE: 37
ADLS_ACUITY_SCORE: 37

## 2024-08-06 NOTE — PROGRESS NOTES
Pt here to have current right upper arm midline replaced as it was occluded  Right midline removed with tip intact and total of 15cm present  New midline replaced in right upper arm without difficulty  Line ok for immediate use

## 2024-08-06 NOTE — PROCEDURES
[facility]    Single Lumen Midline Placement    Date/Time: 8/6/2024 12:15 PM    Performed by: Mayra Luong RN  Authorized by: Rodrigue Garcia MD  Indications: vascular access      UNIVERSAL PROTOCOL   Site Marked: Yes  Prior Images Obtained and Reviewed:  Yes  Required items: Required blood products, implants, devices and special equipment available    Patient identity confirmed:  Verbally with patient, arm band and hospital-assigned identification number  NA - No sedation, light sedation, or local anesthesia  Confirmation Checklist:  Patient's identity using two indicators, relevant allergies, procedure was appropriate and matched the consent or emergent situation and correct equipment/implants were available  Time out: Immediately prior to the procedure a time out was called    Universal Protocol: the Joint Commission Universal Protocol was followed    Preparation: Patient was prepped and draped in usual sterile fashion    ESBL (mL):  0     ANESTHESIA    Anesthesia:  Local infiltration  Local Anesthetic:  Lidocaine 1% without epinephrine  Anesthetic Total (mL):  1      SEDATION    Patient Sedated: No        Preparation: skin prepped with ChloraPrep  Skin prep agent: skin prep agent completely dried prior to procedure  Sterile barriers: maximum sterile barriers were used: cap, mask, sterile gown, sterile gloves, and large sterile sheet  Hand hygiene: hand hygiene performed prior to central venous catheter insertion  Type of line used: Midline  Catheter type: single lumen  Catheter size: 4 Fr  Brand: Bard  Lot number: [id number]  Placement method: venipuncture, MST and ultrasound  Number of attempts: 1  Difficulty threading catheter: no  Successful placement: yes  Orientation: right    Location: basilic vein  Tip Location: distal to axillary vein  Arm circumference: adults 10 cm  Extremity circumference: 24  Visible catheter length: 0  Total catheter length: 13  Dressing and securement:  chlorhexidine disc applied, occlusive dressing applied, securement device, statlock, site cleansed, sterile dressing applied and transparent dressing  Post procedure assessment: blood return through all ports and free fluid flow  PROCEDURE   Patient Tolerance:  Patient tolerated the procedure well with no immediate complicationsDescribe Procedure: Removed existing midline that is occluded. Successful placement of single lumen midline RUE basilic vein. Good blood return and line flushes readily. Midline is ready for immediate use. Charge RN notified. Pt does not need to stay and is able to discharge post procedure with educational handouts from midline kit. Pt already has [facility] set up.

## 2024-08-07 ENCOUNTER — OFFICE VISIT (OUTPATIENT)
Dept: OTHER | Facility: CLINIC | Age: 74
End: 2024-08-07
Payer: COMMERCIAL

## 2024-08-07 VITALS — HEART RATE: 67 BPM | SYSTOLIC BLOOD PRESSURE: 174 MMHG | DIASTOLIC BLOOD PRESSURE: 69 MMHG

## 2024-08-07 DIAGNOSIS — I70.222 CRITICAL LIMB ISCHEMIA OF LEFT LOWER EXTREMITY (H): Primary | ICD-10-CM

## 2024-08-07 PROCEDURE — G0463 HOSPITAL OUTPT CLINIC VISIT: HCPCS

## 2024-08-07 PROCEDURE — 99024 POSTOP FOLLOW-UP VISIT: CPT

## 2024-08-07 NOTE — PROGRESS NOTES
[facility]        Patient is here for a post-op to discuss suture removal.    Pt is currently taking Aspirin, Statin, and Plavix.    BP (!) 174/69 (BP Location: Left arm, Patient Position: Chair, Cuff Size: Adult Regular)   Pulse 67     The provider has been notified that the patient has no concerns.     Questions patient would like addressed today are: N/A.    Refills are needed: N/A    Has homecare services and agency name:  Codie Cavazos MA

## 2024-08-07 NOTE — PROGRESS NOTES
Vascular Surgery Progress Note    Zia Hopkins is a 74 year old male status post left femoral to tibial artery in situ bypass with SFA endarterectomy for critical limb ischemia on 7/20/2024 with Dr. Cortés.     Incision sites are intact, well healed, no signs of infection.     Remaining 2 sutures pulled without incident with steri-strip applied.     Follow-up as previously indicated in 3 weeks time with imaging.     Total time: 10 minutes    Nallely Trujillo, CNP

## 2024-08-15 ENCOUNTER — OFFICE VISIT (OUTPATIENT)
Dept: PODIATRY | Facility: CLINIC | Age: 74
End: 2024-08-15
Payer: COMMERCIAL

## 2024-08-15 ENCOUNTER — ANCILLARY PROCEDURE (OUTPATIENT)
Dept: GENERAL RADIOLOGY | Facility: CLINIC | Age: 74
End: 2024-08-15
Attending: PODIATRIST
Payer: COMMERCIAL

## 2024-08-15 VITALS — DIASTOLIC BLOOD PRESSURE: 65 MMHG | WEIGHT: 134 LBS | BODY MASS INDEX: 23.74 KG/M2 | SYSTOLIC BLOOD PRESSURE: 135 MMHG

## 2024-08-15 DIAGNOSIS — E11.42 DIABETIC POLYNEUROPATHY ASSOCIATED WITH TYPE 2 DIABETES MELLITUS (H): ICD-10-CM

## 2024-08-15 DIAGNOSIS — I73.9 PAD (PERIPHERAL ARTERY DISEASE) (H): Primary | ICD-10-CM

## 2024-08-15 DIAGNOSIS — I73.9 PAD (PERIPHERAL ARTERY DISEASE) (H): ICD-10-CM

## 2024-08-15 DIAGNOSIS — M86.172 ACUTE OSTEOMYELITIS OF TOE, LEFT (H): ICD-10-CM

## 2024-08-15 PROCEDURE — 99213 OFFICE O/P EST LOW 20 MIN: CPT | Performed by: PODIATRIST

## 2024-08-15 PROCEDURE — 73660 X-RAY EXAM OF TOE(S): CPT | Mod: TC | Performed by: RADIOLOGY

## 2024-08-15 NOTE — PATIENT INSTRUCTIONS
Thank you for choosing [facility] Podiatry / Foot & Ankle Surgery!    DR. CASAS'S CLINIC LOCATIONS:     [facility] (Friday) TRIAGE LINE: [phone] [address] [facility]  APPOINTMENTS: [phone]   [place] [address] RADIOLOGY: [phone]    PHYSICAL THERAPY: [phone]    SET UP SURGERY: [phone]   [place] (Mon-Tues AM-Thurs) BILLING QUESTIONS: [phone] [address] FAX: [phone]   [place] [address] [place] [facility]: [phone]     You were seen today approximate 5 weeks out from left third toe amputation surgery.  All sutures were removed.  Continue with Betadine dressings to keep the area dry and stable as healing progresses.    The wound on the second toe is now concerning as this probes to bone.  I do not see any signs of infection, this is likely due to your recent IV antibiotic therapy.  I recommend following up with Dr. Whelan next week to discuss partial toe amputation as a same-day procedure.  However, if the toe becomes unstable (redness, swelling, drainage), I would go to the emergency department for more acute care.  Call prior with any questions.

## 2024-08-15 NOTE — LETTER
8/15/2024      Zia Hopkins  [address] [place] [address]      Dear Colleague,    Thank you for referring your patient, Zia Hopkins, to the [facility] PODIATRY. Please see a copy of my visit note below.    Foot & Ankle Surgery  August 15, 2024    S:  Patient in today sp   Partial left third toe imitation with osteotomy through proximal phalanx  Debridement of left heel wound down to/including subcutaneous fat less than 20 cm on 7/13/24.  Pain levels minimal.  He was not able to be seen 2 weeks ago because a tree fell and he was not able to drive into clinic.  He has been using Betadine (he states it is clear and without odor).  He states he recently finished his PICC line antibiotics    /65   Wt 60.8 kg (134 lb)   BMI 23.74 kg/m        ROS - positive for CC.  Patient denies current nausea, vomiting, chills, fevers, belly pain, calf pain, chest pain or SOB.  Complete remainder of ROS is otherwise neg.    PE -all the sutures were removed from the left third toe amputation site.  This has not fully healed, presents the dry scab and open area inferiorly, but there are no signs of infection.  The second toe now is enlarged distally and there is a wound that probes to the distal phalanx without local signs of infection.  Skin shows no trophic, color or temperature changes otherwise.  No calf redness, swelling or pain noted otherwise.    Imaging - IMPRESSION: Status post amputation of the great toe at the level of  the first MTP joint, partial amputation of the second toe at the DIP  joint, and partial amputation of the third toe at the proximal  phalangeal diaphysis. Skin ulcer overlying the residual second middle  phalanx. Question an additional skin ulcer over the first metatarsal  bony bunion. Soft tissue swelling throughout the forefoot. Small  erosions at the second middle phalangeal head may suggest  osteomyelitis. Normal joint alignment. Chronic  irregularity/fracture  at the fourth distal phalanx. Stable sclerosis involving the second  metatarsal.    A/P - 74 year old yo patient approx 5 weeks sp above procedure; left second toe wound with exposed bone distal phalanx in setting of PAD and diabetes  -All the sutures were removed from the third toe amputation site.  He will continue using Betadine to the area to keep it dry and stable.  I see no signs of infection to warrant further antibiotics    2.  Left second toe wound with exposed bone distal phalanx in setting of PAD and diabetes  -I personally interpreted and reviewed the x-rays today.  Osteomyelitis suspected.  The distal phalanx is not present but there are erosions and fragments of bone adjacent to this.  -Will follow-up with Dr. Whelan next week to discuss possible partial toe amputation as a same-day procedure.  I advised to go to the emergency department if the toe becomes unstable, including redness, swelling, pain  -He indicates that he will be traveling to [place] in approximately 3 weeks.  We discussed that the wound may not be healed at that point.  I do not recommend delaying surgery because of his trip however    Follow up  -1 week or sooner with acute issues    Plan for [id number] - retired       Erik Baez DPM FACFAS FACFAOM  Podiatric Foot & Ankle Surgeon  [facility]  [phone]    Disclaimer: This note consists of symbols derived from keyboarding, dictation and/or voice recognition software. As a result, there may be errors in the script that have gone undetected. Please consider this when interpreting information found in this chart.      Again, thank you for allowing me to participate in the care of your patient.        Sincerely,        Erik Baez DPM, KEMAR

## 2024-08-15 NOTE — PROGRESS NOTES
Foot & Ankle Surgery  August 15, 2024    S:  Patient in today sp   Partial left third toe imitation with osteotomy through proximal phalanx  Debridement of left heel wound down to/including subcutaneous fat less than 20 cm on 7/13/24.  Pain levels minimal.  He was not able to be seen 2 weeks ago because a tree fell and he was not able to drive into clinic.  He has been using Betadine (he states it is clear and without odor).  He states he recently finished his PICC line antibiotics    /65   Wt 60.8 kg (134 lb)   BMI 23.74 kg/m        ROS - positive for CC.  Patient denies current nausea, vomiting, chills, fevers, belly pain, calf pain, chest pain or SOB.  Complete remainder of ROS is otherwise neg.    PE -all the sutures were removed from the left third toe amputation site.  This has not fully healed, presents the dry scab and open area inferiorly, but there are no signs of infection.  The second toe now is enlarged distally and there is a wound that probes to the distal phalanx without local signs of infection.  Skin shows no trophic, color or temperature changes otherwise.  No calf redness, swelling or pain noted otherwise.    Imaging - IMPRESSION: Status post amputation of the great toe at the level of  the first MTP joint, partial amputation of the second toe at the DIP  joint, and partial amputation of the third toe at the proximal  phalangeal diaphysis. Skin ulcer overlying the residual second middle  phalanx. Question an additional skin ulcer over the first metatarsal  bony bunion. Soft tissue swelling throughout the forefoot. Small  erosions at the second middle phalangeal head may suggest  osteomyelitis. Normal joint alignment. Chronic irregularity/fracture  at the fourth distal phalanx. Stable sclerosis involving the second  metatarsal.    A/P - 74 year old yo patient approx 5 weeks sp above procedure; left second toe wound with exposed bone distal phalanx in setting of PAD and diabetes  -All the  Quality 226: Preventive Care And Screening: Tobacco Use: Screening And Cessation Intervention: Patient screened for tobacco use and is an ex/non-smoker Detail Level: Detailed sutures were removed from the third toe amputation site.  He will continue using Betadine to the area to keep it dry and stable.  I see no signs of infection to warrant further antibiotics    2.  Left second toe wound with exposed bone distal phalanx in setting of PAD and diabetes  -I personally interpreted and reviewed the x-rays today.  Osteomyelitis suspected.  The distal phalanx is not present but there are erosions and fragments of bone adjacent to this.  -Will follow-up with Dr. Whelan next week to discuss possible partial toe amputation as a same-day procedure.  I advised to go to the emergency department if the toe becomes unstable, including redness, swelling, pain  -He indicates that he will be traveling to [place] in approximately 3 weeks.  We discussed that the wound may not be healed at that point.  I do not recommend delaying surgery because of his trip however    Follow up  -1 week or sooner with acute issues    Plan for [id number] - retired       Erik Baez, KEMAR FACDec[facility] FACFAOM  Podiatric Foot & Ankle Surgeon  [facility]  [phone]    Disclaimer: This note consists of symbols derived from keyboarding, dictation and/or voice recognition software. As a result, there may be errors in the script that have gone undetected. Please consider this when interpreting information found in this chart.

## 2024-09-03 ENCOUNTER — OFFICE VISIT (OUTPATIENT)
Dept: OTHER | Facility: CLINIC | Age: 74
End: 2024-09-03
Payer: COMMERCIAL

## 2024-09-03 ENCOUNTER — HOSPITAL ENCOUNTER (OUTPATIENT)
Dept: ULTRASOUND IMAGING | Facility: CLINIC | Age: 74
Discharge: HOME | End: 2024-09-03
Payer: COMMERCIAL

## 2024-09-03 VITALS — DIASTOLIC BLOOD PRESSURE: 68 MMHG | HEART RATE: 60 BPM | SYSTOLIC BLOOD PRESSURE: 168 MMHG

## 2024-09-03 DIAGNOSIS — I70.222 CRITICAL LIMB ISCHEMIA OF LEFT LOWER EXTREMITY (H): ICD-10-CM

## 2024-09-03 DIAGNOSIS — I70.222 CRITICAL LIMB ISCHEMIA OF LEFT LOWER EXTREMITY (H): Primary | ICD-10-CM

## 2024-09-03 PROCEDURE — 93922 UPR/L XTREMITY ART 2 LEVELS: CPT

## 2024-09-03 PROCEDURE — 93926 LOWER EXTREMITY STUDY: CPT | Mod: LT

## 2024-09-03 PROCEDURE — G0463 HOSPITAL OUTPT CLINIC VISIT: HCPCS | Mod: 25

## 2024-09-03 PROCEDURE — 99024 POSTOP FOLLOW-UP VISIT: CPT

## 2024-09-03 NOTE — PROGRESS NOTES
VASCULAR SURGERY PROGRESS NOTE    [facility]    Zia Hopkins  Medical Record #: [id number]  YOB: 1950  Age: 74 year old     Date of Service: 9/3/2024    PRIMARY CARE PROVIDER: Reid Reese    Reason for visit:  PAD surveillance    Zia Hopkins is a 74 year old male status post left femoral to tibial artery in situ bypass with SFA endarterectomy for critical limb ischemia on 7/20/2024 with Dr. Cortés.      Hx of previous left SFA stenting and open femoral exposures multiple times for angiograms as had prior toe amputations.  He was initially planned for angiogram on 7/12 however presented that morning with wet gangrene of L 3rd toe, thus was admitted for IV abx, ID workup, possible toe amputation.     He comes into clinic today for follow-up.   Alert and oriented x4, neurologically intact  Incisions are well healed with superficial opening of the periwound on the distal incision  Excellent graft pulse noted, with biphasic DP And PT   Denies pain  Minimal edema noted    Imaging shows significantly improved ABIs, with elevated velocities at DAG/native anastamosis has elevated velocities.         RECOMMENDATION/RISKS: Follow-up in 3 months with ABIs and exercise and left arterial duplex. Continue on Plavix and Aspirin 81 mg. Continue high intensity statin. Discussed imaging with Dr. Cortés.      REVIEW OF SYSTEMS:    A 12 point ROS was reviewed and is negative except for what is listed above in HPI.    PHH:    Past Medical History:   Diagnosis Date    Acute low back pain     Anemia     Bilateral carotid bruits     Cellulitis of left foot     Central spinal stenosis     and Subarticular    Chronic kidney disease     Degenerative lumbar spinal stenosis     multilevel    Diabetes mellitus type 2, noninsulin dependent (H)     Diabetic ulcer of toe (H)     Gangrene of toe (H)     Hyperlipidemia LDL goal <70     Hypertension     Lumbar foraminal stenosis     Macular degeneration  (senile) of retina     PAD (peripheral artery disease) (H24)     Pneumonia     Psoriasis           Past Surgical History:   Procedure Laterality Date    AMPUTATE FOOT Left 03/15/2017    Procedure: AMPUTATE FOOT;  Surgeon: Emre Mauricio DPM;  Location: [facility] OR    AMPUTATE TOE(S) Left 04/20/2021    Procedure: LEFT GREAT TOE AMPUTATION.;  Surgeon: Purvi Whelan DPM, Podiatry/Foot and Ankle Surgery;  Location: [facility] OR    AMPUTATE TOE(S) Right 04/18/2022    Procedure: 1.  Partial right great toe amputation. 2.  Right foot fourth toe amputation at the metatarsophalangeal joint. 3.  Right fifth metatarsal amputation at the metatarsophalangeal joint.;  Surgeon: Purvi Whelan DPM, Podiatry/Foot and Ankle Surgery;  Location: [facility] OR    AMPUTATE TOE(S) Right 09/29/2022    Procedure: Right second and third toe amputations;  Surgeon: Purvi Whelan DPM, Podiatry/Foot and Ankle Surgery;  Location: [facility] OR    AMPUTATE TOE(S) Left 7/13/2024    Procedure: LEFT 3RD TOE AMPUTATION AND HEEL WOUND DEBRIDEMENT;  Surgeon: Erik Baez DPM;  Location: [facility] OR    ARTHROPLASTY KNEE BILATERAL Bilateral     BIOPSY BONE FOOT Right 06/27/2022    Procedure: Partial right fourth and fifth metatarsal bone excision;  Surgeon: Purvi Whelan DPM, Podiatry/Foot and Ankle Surgery;  Location: [facility] OR    BYPASS GRAFT FEMOROTIBIAL Left 7/20/2024    Procedure: LEFT FEMORAL TO TIBIAL ARTERY IN SITU BYPASS GRAFT. superficial femoral artery endarterectomy.;  Surgeon: Parrish Cortés MD;  Location: [facility] OR    ENDARTERECTOMY FEMORAL Right 03/04/2022    Procedure: RIGHT FEMORAL ENDARTERECTOMY, RIGHT FEMORAL ARTERY BALLOON ANGIOPLASTY WITH STENTING;  Surgeon: Surjit Hamlin MD;  Location: [facility] OR    ENDARTERECTOMY FEMORAL Left 07/12/2023    Procedure: LEFT FEMORAL CUTDOWN;  Surgeon: Surjit Hamlin MD;  Location: [facility] OR    IR LOWER EXTREMITY ANGIOGRAM LEFT  07/30/2019    IR LOWER EXTREMITY ANGIOGRAM LEFT  02/03/2021    IR LOWER EXTREMITY  ANGIOGRAM LEFT  04/21/2021    IR LOWER EXTREMITY ANGIOGRAM LEFT  7/16/2024    IR LOWER EXTREMITY ANGIOGRAM RIGHT  03/01/2022    IR LOWER EXTREMITY ANGIOGRAM RIGHT  06/10/2022    IR OR ANGIOGRAM  03/04/2022    IR OR ANGIOGRAM  07/12/2023    IRRIGATION AND DEBRIDEMENT FOOT, COMBINED Left 03/15/2017    Procedure: COMBINED IRRIGATION AND DEBRIDEMENT FOOT;  Surgeon: Emre Mauricio DPM;  Location: [facility] OR    OR ANGIOGRAM, LOWER EXTREMITY Left 07/12/2023    Procedure: LEFT LOWER EXTREMITY ANGIOGRAM BALLOON ANGIOPLASTY;  Surgeon: Surjit Hamlin MD;  Location: [facility] OR    PERONEAL NERVE DECOMPRESSION      VASECTOMY         ALLERGIES:  Patient has no known allergies.    MEDS:    Current Outpatient Medications:     acetaminophen (TYLENOL) 500 MG tablet, Take 500-1,000 mg by mouth every 6 hours as needed for mild pain, Disp: , Rfl:     amLODIPine (NORVASC) 10 MG tablet, Take 10 mg by mouth daily, Disp: , Rfl:     aspirin 81 MG tablet, Take 1 tablet (81 mg) by mouth daily, Disp: 90 tablet, Rfl: 1    atorvastatin (LIPITOR) 80 MG tablet, TAKE 1 TABLET(80 MG) BY MOUTH AT BEDTIME, Disp: 90 tablet, Rfl: 3    chlorthalidone (HYGROTON) 50 MG tablet, Take 50 mg by mouth daily , Disp: , Rfl:     clopidogrel (PLAVIX) 75 MG tablet, Take 1 tablet (75 mg) by mouth daily, Disp: 90 tablet, Rfl: 3    diclofenac (VOLTAREN) 75 MG EC tablet, Take 75 mg by mouth 2 times daily as needed, Disp: , Rfl:     empagliflozin (JARDIANCE) 25 MG TABS tablet, Take 25 mg by mouth daily, Disp: , Rfl:     ezetimibe (ZETIA) 10 MG tablet, TAKE 1 TABLET(10 MG) BY MOUTH DAILY, Disp: 90 tablet, Rfl: 3    losartan (COZAAR) 100 MG tablet, Take 100 mg by mouth daily , Disp: , Rfl:     metFORMIN (GLUCOPHAGE) 1000 MG tablet, Take 1,000 mg by mouth 2 times daily (with meals) , Disp: , Rfl:     metoprolol succinate ER (TOPROL XL) 100 MG 24 hr tablet, Take 0.5 tablets (50 mg) by mouth daily, Disp: , Rfl:     SOCIAL HABITS:    History   Smoking Status    Former     Types: Cigarettes   Smokeless Tobacco    Never     Social History    Substance and Sexual Activity      Alcohol use: Yes        Comment: 1-2.5 glasses of wine at dinner      History   Drug Use Unknown       FAMILY HISTORY:    Family History   Problem Relation Age of Onset    Diabetes Father        PE:  There were no vitals taken for this visit.  Wt Readings from Last 1 Encounters:   08/15/24 134 lb (60.8 kg)     There is no height or weight on file to calculate BMI.    EXAM:  GENERAL: well-developed 74 year old male who appears his stated age  CARDIAC: normal   CHEST/LUNG: normal respiratory effort   MUSCULOSKELETAL: grossly normal and both lower extremities are intact, no lower extremity edema  NEUROLOGIC: focally intact, alert and oriented x 3  PSYCH: appropriate affect  VASCULAR:     DIAGNOSTIC STUDIES:     Images:  XR Toe Left G/E 2 Views    Result Date: 8/15/2024  XR TOE LEFT G/E 2 VIEWS  8/15/2024 7:55 AM HISTORY: wound 2nd toe probes to distal phalanx; PAD (peripheral artery disease) (H24); Diabetic polyneuropathy associated with type 2 diabetes mellitus (H); Acute osteomyelitis of toe, left (H) COMPARISON: 7/13/2024     IMPRESSION: Status post amputation of the great toe at the level of the first MTP joint, partial amputation of the second toe at the DIP joint, and partial amputation of the third toe at the proximal phalangeal diaphysis. Skin ulcer overlying the residual second middle phalanx. Question an additional skin ulcer over the first metatarsal bony bunion. Soft tissue swelling throughout the forefoot. Small erosions at the second middle phalangeal head may suggest osteomyelitis. Normal joint alignment. Chronic irregularity/fracture at the fourth distal phalanx. Stable sclerosis involving the second metatarsal. ISABEL DE ANDA MD   SYSTEM ID:  [id number]      LABS:      Sodium   Date Value Ref Range Status   07/18/2024 137 135 - 145 mmol/L Final   07/13/2024 135 135 - 145 mmol/L Final   07/12/2024  138 135 - 145 mmol/L Final   04/23/2021 138 133 - 144 mmol/L Final   04/22/2021 137 133 - 144 mmol/L Final   04/20/2021 138 133 - 144 mmol/L Final     Urea Nitrogen   Date Value Ref Range Status   07/18/2024 28.7 (H) 8.0 - 23.0 mg/dL Final   07/13/2024 22.8 8.0 - 23.0 mg/dL Final   07/12/2024 23.0 8.0 - 23.0 mg/dL Final   06/10/2022 28 7 - 30 mg/dL Final   03/08/2022 27 7 - 30 mg/dL Final   03/07/2022 23 7 - 30 mg/dL Final   04/23/2021 14 7 - 30 mg/dL Final   04/22/2021 15 7 - 30 mg/dL Final   04/20/2021 22 7 - 30 mg/dL Final     Hemoglobin   Date Value Ref Range Status   08/06/2024 9.1 (L) 13.3 - 17.7 g/dL Final   07/21/2024 11.6 (L) 13.3 - 17.7 g/dL Final   07/18/2024 12.4 (L) 13.3 - 17.7 g/dL Final   04/22/2021 10.9 (L) 13.3 - 17.7 g/dL Final   04/20/2021 11.0 (L) 13.3 - 17.7 g/dL Final   04/19/2021 12.2 (L) 13.3 - 17.7 g/dL Final     Platelet Count   Date Value Ref Range Status   08/06/2024 320 150 - 450 10e3/uL Final   07/21/2024 265 150 - 450 10e3/uL Final   07/18/2024 277 150 - 450 10e3/uL Final   04/22/2021 237 150 - 450 10e9/L Final   04/20/2021 239 150 - 450 10e9/L Final   04/19/2021 310 150 - 450 10e9/L Final     INR   Date Value Ref Range Status   02/03/2021 0.95 0.86 - 1.14 Final   07/30/2019 0.90 0.86 - 1.14 Final   03/08/2017 0.87 0.86 - 1.14 Final       30 minutes spent on the day of encounter doing chart review, history and exam, documentation, and further activities as noted.       Nallely Trujillo NP  VASCULAR SURGERY

## 2024-09-03 NOTE — PROGRESS NOTES
[facility]        Patient is here for a  follow up.    Pt is currently taking Aspirin, Statin, and Plavix.    BP (!) 168/68 (BP Location: Right arm, Patient Position: Chair, Cuff Size: Adult Regular)   Pulse 60     The provider has been notified that the patient has no concerns.     Questions patient would like addressed today are: N/A.    Refills are needed: N/A    Has homecare services and agency name:  Codie Cavazos MA

## 2024-10-20 ENCOUNTER — HEALTH MAINTENANCE LETTER (OUTPATIENT)
Age: 74
End: 2024-10-20

## 2024-11-14 ENCOUNTER — TELEPHONE (OUTPATIENT)
Dept: OTHER | Facility: CLINIC | Age: 74
End: 2024-11-14
Payer: COMMERCIAL

## 2024-11-14 DIAGNOSIS — I70.223 CRITICAL LIMB ISCHEMIA OF BOTH LOWER EXTREMITIES (H): ICD-10-CM

## 2024-11-14 RX ORDER — CLOPIDOGREL BISULFATE 75 MG/1
75 TABLET ORAL DAILY
Qty: 90 TABLET | Refills: 1 | Status: SHIPPED | OUTPATIENT
Start: 2024-11-14

## 2024-11-14 NOTE — TELEPHONE ENCOUNTER
Patient has 2 refills remaining of Plavix - RX re-sent to [facility]'s with remaining refills.    Deepali LEOS, RN    [facility]  Vascular [facility]  Office: [phone]  Fax: [phone]

## 2024-11-14 NOTE — TELEPHONE ENCOUNTER
[facility]    Who is the name of the provider?:  IAN YATES   What is the location you see this provider at/preferred location?: [place]  Person calling / Facility: Zia Hopkins  Phone number:  [phone] (home) [phone] (work)  Nurse call back needed:  no     Reason for call:  Patient is calling to get a refill on Clopidogrel 75mg sent to his pharmacy.    Pharm[facility]  [id number]  [place] [address] [address] AT [facility] [address]  Outside Imaging: n/a   Can we leave a detailed message on this number?  YES     11/14/2024, 11:08 AM

## 2024-12-09 ENCOUNTER — HOSPITAL ENCOUNTER (OUTPATIENT)
Dept: ULTRASOUND IMAGING | Facility: CLINIC | Age: 74
Discharge: HOME | End: 2024-12-09
Payer: COMMERCIAL

## 2024-12-09 ENCOUNTER — OFFICE VISIT (OUTPATIENT)
Dept: OTHER | Facility: CLINIC | Age: 74
End: 2024-12-09
Attending: SURGERY
Payer: COMMERCIAL

## 2024-12-09 VITALS — HEART RATE: 61 BPM | SYSTOLIC BLOOD PRESSURE: 161 MMHG | DIASTOLIC BLOOD PRESSURE: 69 MMHG

## 2024-12-09 DIAGNOSIS — I70.222 CRITICAL LIMB ISCHEMIA OF LEFT LOWER EXTREMITY (H): ICD-10-CM

## 2024-12-09 PROCEDURE — 93926 LOWER EXTREMITY STUDY: CPT | Mod: LT

## 2024-12-09 PROCEDURE — 93924 LWR XTR VASC STDY BILAT: CPT

## 2024-12-09 PROCEDURE — 99213 OFFICE O/P EST LOW 20 MIN: CPT | Performed by: SURGERY

## 2024-12-09 PROCEDURE — G0463 HOSPITAL OUTPT CLINIC VISIT: HCPCS | Performed by: SURGERY

## 2024-12-09 NOTE — PROGRESS NOTES
It was a pleasure again to see Mr. Zia Hopkins in the vascular surgery clinic today.  He is a very pleasant 74-year-old gentleman of Urdu origin who has had multiple bilateral lower extremity arterial intervention for limb salvage.  His last 1 was a common femoral artery to posterior tibial artery bypass with great saphenous vein.  This was done on 20 July 2024 by my , Dr. Cortés.  Since that time patient has gone on to heal his wounds in the foot.    He has no complaints.  And he has been traveling without any difficulty.    Imaging shows elevated velocities at the native outflow vessel with luminal reduction in the posterior tibial artery.    My proposal would be to do a cutdown on the bypass graft and do an angiogram of the left lower extremity with balloon angioplasty to improve primary assisted patency.    He has requested sometime in February of this year and I think that is reasonable.

## 2024-12-09 NOTE — PROGRESS NOTES
[facility]        Patient is here for a  follow up.    Pt is currently taking Aspirin, Statin, and Plavix.    BP (!) 161/69 (BP Location: Left arm, Patient Position: Chair, Cuff Size: Adult Regular)   Pulse 61     The provider has been notified that the patient has no concerns.     Questions patient would like addressed today are: N/A.    Refills are needed: N/A    Has homecare services and agency name:  Codie Cavazos MA

## 2025-01-07 ENCOUNTER — TELEPHONE (OUTPATIENT)
Dept: OTHER | Facility: CLINIC | Age: 75
End: 2025-01-07
Payer: COMMERCIAL

## 2025-01-07 NOTE — TELEPHONE ENCOUNTER
CASE RECEIVED ON 01/07/25 FOR:LEFT FEMORAL CUTDOWN, LEFT LOWER EXTREMITY ARTERIOGRAM AND ANGIOPLASTY     CASE ID:[id number]

## 2025-01-07 NOTE — TELEPHONE ENCOUNTER
Patient education completed with patient on phone on 01/07/2025.    Procedure: LEFT FEMORAL CUTDOWN - LEFT LOWER EXTREMITY ARTERIOGRAM AND ANGIOPLASTY  Diagnosis: Critical limb ischemia of left lower extremity   Anticoagulation Instruction: Cont ASA & Plavix  GLP-1 Agonists Instruction: Cont Metformin  Pre-Operative Physical Exam: Patient instructed they will need to have a pre-op physical exam within 30 days of your procedure.   Allergies:  Updated in Epic  Bowel Prep: NPO after midnight ; okay for sips of water with morning medications  Post Procedure Education: [facility] post-procedure fact sheet reviewed with patient.  Showering instructions provided: Yes  Learner(s): patient  Method: Listening, Reading  Barriers to Learning: No Barrier  Outcome: Patient did verbalize understanding of above education.           CAIN Amanda   [facility]   [phone]

## 2025-01-08 NOTE — TELEPHONE ENCOUNTER
Spoke to patient states he would like to coordinate surgery for early part of March as he has previously discussed with timeframe with Dr Hamlin. Informed patient once surgery is coordinated he will be contacted to review surgery information.

## 2025-01-09 NOTE — TELEPHONE ENCOUNTER
Reviewed patient surgery date/time with patient informed that pre-op is needed and to continue ASA, Plavix and metformin. Patient scheduled for post-op visit with Nallely Trujillo on 03/18/25. Mailed surgery packet to patient on 01/09/25.

## 2025-01-26 ENCOUNTER — HEALTH MAINTENANCE LETTER (OUTPATIENT)
Age: 75
End: 2025-01-26

## 2025-02-18 ENCOUNTER — TRANSFERRED RECORDS (OUTPATIENT)
Dept: MULTI SPECIALTY CLINIC | Facility: CLINIC | Age: 75
End: 2025-02-18
Payer: COMMERCIAL

## 2025-02-18 LAB
CHOLESTEROL (EXTERNAL): 133 MG/DL (ref 100–199)
CREATININE (EXTERNAL): 1.16 MG/DL (ref 0.76–1.27)
GFR ESTIMATED (EXTERNAL): 65 ML/MIN/1.73
GLUCOSE (EXTERNAL): 113 MG/DL (ref 70–99)
HBA1C MFR BLD: 7.1 % (ref 4.8–5.6)
HDLC SERPL-MCNC: 65 MG/DL
LDL CHOLESTEROL CALCULATED (EXTERNAL): 52 MG/DL (ref 0–99)
POTASSIUM (EXTERNAL): 4.9 MMOL/L (ref 3.5–5.2)
TRIGLYCERIDES (EXTERNAL): 84 MG/DL (ref 0–149)

## 2025-02-26 NOTE — PROGRESS NOTES
PTA medications updated by Medication Scribe prior to surgery via phone call with patient (last doses completed by Nurse)     Medication history sources: Patient, Surescripts, H&P, and Patient's home med list  In the past week, patient estimated taking medication this percent of the time: Greater than 90%      Significant changes made to the medication list:  Patient reports no longer taking the following meds (med scribe removed from PTA med list): Tylenol, Voltaren      Additional medication history information:   None    Medication reconciliation completed by provider prior to medication history? No    Time spent in this activity: 30 minutes    The information provided in this note is only as accurate as the sources available at the time of update(s)      Prior to Admission medications    Medication Sig Last Dose Taking? Auth Provider Long Term End Date   amLODIPine (NORVASC) 10 MG tablet Take 10 mg by mouth daily Evening Yes Reported, Patient Yes    aspirin 81 MG tablet Take 1 tablet (81 mg) by mouth daily Evening Yes Dm Monroe MD     atorvastatin (LIPITOR) 80 MG tablet TAKE 1 TABLET(80 MG) BY MOUTH AT BEDTIME Bedtime Yes Surjit Hamlin MD Yes    chlorthalidone (HYGROTON) 50 MG tablet Take 50 mg by mouth daily  Evening Yes Reported, Patient Yes    clopidogrel (PLAVIX) 75 MG tablet Take 1 tablet (75 mg) by mouth daily. Evening Yes Surjit Hamlin MD Yes    empagliflozin (JARDIANCE) 25 MG TABS tablet Take 25 mg by mouth daily Evening Yes Reported, Patient     ezetimibe (ZETIA) 10 MG tablet TAKE 1 TABLET(10 MG) BY MOUTH DAILY Evening Yes Surjit Hamlin MD Yes    losartan (COZAAR) 100 MG tablet Take 100 mg by mouth daily  Evening Yes Reported, Patient Yes    metFORMIN (GLUCOPHAGE) 1000 MG tablet Take 1,000 mg by mouth 2 times daily (with meals)  Bedtime Yes Reported, Patient No    metoprolol succinate ER (TOPROL XL) 100 MG 24 hr tablet Take 0.5 tablets (50 mg) by mouth daily  Evening Yes Nicholas Delgado MD Yes        Medication history completed by: Newton Corbin

## 2025-03-03 ENCOUNTER — ANESTHESIA EVENT (OUTPATIENT)
Dept: SURGERY | Facility: CLINIC | Age: 75
End: 2025-03-03
Payer: COMMERCIAL

## 2025-03-03 ASSESSMENT — LIFESTYLE VARIABLES: TOBACCO_USE: 1

## 2025-03-03 NOTE — ANESTHESIA PREPROCEDURE EVALUATION
Anesthesia Pre-Procedure Evaluation    Patient: Zia Hopkins   MRN: [id number] : 1950        Procedure : Procedure(s):  LEFT FEMORAL CUTDOWN  LEFT LOWER EXTREMITY ARTERIOGRAM AND ANGIOPLASTY          Past Medical History:   Diagnosis Date    Acute low back pain     GENESIS (acute kidney injury)     Anemia     Bilateral carotid bruits     Cellulitis of left foot     Central spinal stenosis     and Subarticular    Chronic kidney disease     Degenerative lumbar spinal stenosis     multilevel    Diabetic ulcer of toe (H)     Gangrene of toe (H)     Hyperlipidemia LDL goal <70     Hypertension     Lumbar foraminal stenosis     Macular degeneration (senile) of retina     PAD (peripheral artery disease)     Pneumonia     Psoriasis     Type 2 diabetes mellitus (H)       Past Surgical History:   Procedure Laterality Date    AMPUTATE FOOT Left 03/15/2017    Procedure: AMPUTATE FOOT;  Surgeon: Emre Mauricio DPM;  Location: [facility] OR    AMPUTATE TOE(S) Left 2021    Procedure: LEFT GREAT TOE AMPUTATION.;  Surgeon: Purvi Whelan DPM, Podiatry/Foot and Ankle Surgery;  Location: [facility] OR    AMPUTATE TOE(S) Right 2022    Procedure: 1.  Partial right great toe amputation. 2.  Right foot fourth toe amputation at the metatarsophalangeal joint. 3.  Right fifth metatarsal amputation at the metatarsophalangeal joint.;  Surgeon: Purvi Whelan DPM, Podiatry/Foot and Ankle Surgery;  Location: [facility] OR    AMPUTATE TOE(S) Right 2022    Procedure: Right second and third toe amputations;  Surgeon: Purvi Whelan DPM, Podiatry/Foot and Ankle Surgery;  Location: [facility]    AMPUTATE TOE(S) Left 2024    Procedure: LEFT 3RD TOE AMPUTATION AND HEEL WOUND DEBRIDEMENT;  Surgeon: Erik Baez DPM;  Location: [facility]    ARTHROPLASTY KNEE BILATERAL Bilateral     BIOPSY BONE FOOT Right 2022    Procedure: Partial right fourth and fifth metatarsal bone excision;  Surgeon: Purvi Whelan DPM, Podiatry/Foot and Ankle  Surgery;  Location: [facility] OR    BYPASS GRAFT FEMOROTIBIAL Left 2024    Procedure: LEFT FEMORAL TO TIBIAL ARTERY IN SITU BYPASS GRAFT. superficial femoral artery endarterectomy.;  Surgeon: Parrish Cortés MD;  Location: [facility]    COMMON FEMORAL ARTERY TO POSTERIOR TIBIAL ARTERY BYPASS W/ SAPH VEIN GRAFT  2024    ENDARTERECTOMY FEMORAL Right 2022    Procedure: RIGHT FEMORAL ENDARTERECTOMY, RIGHT FEMORAL ARTERY BALLOON ANGIOPLASTY WITH STENTING;  Surgeon: Surjit Hamlin MD;  Location: [facility]    ENDARTERECTOMY FEMORAL Left 2023    Procedure: LEFT FEMORAL CUTDOWN;  Surgeon: Surjit Hamlin MD;  Location: [facility]    IR LOWER EXTREMITY ANGIOGRAM LEFT  2019    IR LOWER EXTREMITY ANGIOGRAM LEFT  2021    IR LOWER EXTREMITY ANGIOGRAM LEFT  2021    IR LOWER EXTREMITY ANGIOGRAM LEFT  2024    IR LOWER EXTREMITY ANGIOGRAM RIGHT  2022    IR LOWER EXTREMITY ANGIOGRAM RIGHT  06/10/2022    IR OR ANGIOGRAM  2022    IR OR ANGIOGRAM  2023    IRRIGATION AND DEBRIDEMENT FOOT, COMBINED Left 03/15/2017    Procedure: COMBINED IRRIGATION AND DEBRIDEMENT FOOT;  Surgeon: Emre Mauricio DPM;  Location: [facility]    OR ANGIOGRAM, LOWER EXTREMITY Left 2023    Procedure: LEFT LOWER EXTREMITY ANGIOGRAM BALLOON ANGIOPLASTY;  Surgeon: Surjit Hamlin MD;  Location: [facility]    PERONEAL NERVE DECOMPRESSION      VASECTOMY        No Known Allergies   Social History     Tobacco Use    Smoking status: Former     Current packs/day: 0.00     Types: Cigarettes     Quit date: 3/3/2017     Years since quittin.0    Smokeless tobacco: Never    Tobacco comments:     2-3 cigarettes per day   Substance Use Topics    Alcohol use: Yes     Comment: 1-2.5 glasses of wine at dinner      Wt Readings from Last 1 Encounters:   08/15/24 60.8 kg (134 lb)        Anesthesia Evaluation            ROS/MED HX  ENT/Pulmonary:     (+)                tobacco use, Past use,                     (-) sleep apnea   Neurologic:       Cardiovascular:     (+)  hypertension- Peripheral Vascular Disease-   -  - -                                 Previous cardiac testing   Echo: Date: 7/5/24 Results:  Interpretation Summary     The visual ejection fraction is 65-70%.  Left ventricular systolic function is normal.  ______________________________________________________________________________  Left Ventricle  The left ventricle is normal in size. There is normal left ventricular wall  thickness. Diastolic Doppler findings (E/E' ratio and/or other parameters)  suggest left ventricular filling pressures are indeterminate. The visual  ejection fraction is 65-70%. Left ventricular systolic function is normal.  Grade I or early diastolic dysfunction.     Right Ventricle  The right ventricle is normal in size and function.     Atria  Normal left atrial size. Right atrial size is normal. There is no color  Doppler evidence of an atrial shunt.     Mitral Valve  The mitral papillary muscle appears thickened and/or calcified. The mitral  valve leaflets are mildly thickened. There is mild (1+) mitral regurgitation.     Tricuspid Valve  There is trace tricuspid regurgitation. Right ventricular systolic pressure  could not be approximated due to inadequate tricuspid regurgitation.     Aortic Valve  The aortic valve is trileaflet. There is mild trileaflet aortic sclerosis. No  aortic regurgitation is present. No hemodynamically significant valvular  aortic stenosis.     Pulmonic Valve  There is no pulmonic valvular regurgitation. Normal pulmonic valve velocity.     Vessels  The aortic root is normal size. Normal size ascending aorta. IVC diameter <2.1  cm collapsing >50% with sniff suggests a normal RA pressure of 3 mmHg.     Pericardium  There is no pericardial effusion.     Rhythm  Sinus rhythm was noted.    Stress Test:  Date: Results:    ECG Reviewed:  Date: Results:  NSR, LAD  Cath:  Date: Results:      METS/Exercise  Tolerance:     Hematologic:       Musculoskeletal:       GI/Hepatic:    (-) GERD   Renal/Genitourinary:     (+) renal disease, type: ARF and CRI,            Endo:     (+)  type II DM,                    Psychiatric/Substance Use:       Infectious Disease:       Malignancy:       Other:            Physical Exam    Airway        Mallampati: II   TM distance: > 3 FB   Neck ROM: full   Mouth opening: > 3 cm    Respiratory Devices and Support         Dental       (+) Modest Abnormalities - crowns, retainers, 1 or 2 missing teeth      Cardiovascular   cardiovascular exam normal          Pulmonary   pulmonary exam normal                OUTSIDE LABS:  CBC:   Lab Results   Component Value Date    WBC 7.1 08/06/2024    WBC 9.2 07/21/2024    HGB 9.1 (L) 08/06/2024    HGB 11.6 (L) 07/21/2024    HCT 29.0 (L) 08/06/2024    HCT 35.4 (L) 07/21/2024     08/06/2024     07/21/2024     BMP:   Lab Results   Component Value Date     07/18/2024     07/13/2024    POTASSIUM 3.6 07/18/2024    POTASSIUM 3.7 07/13/2024    CHLORIDE 102 07/18/2024    CHLORIDE 99 07/13/2024    CO2 23 07/18/2024    CO2 25 07/13/2024    BUN 28.7 (H) 07/18/2024    BUN 22.8 07/13/2024    CR 0.97 08/06/2024    CR 1.02 07/21/2024     (H) 07/22/2024     (H) 07/22/2024     COAGS:   Lab Results   Component Value Date    PTT 32 02/03/2021    INR 0.95 02/03/2021     POC:   Lab Results   Component Value Date     (H) 04/23/2021     HEPATIC:   Lab Results   Component Value Date    AST 18 08/06/2024     OTHER:   Lab Results   Component Value Date    A1C 7.0 (H) 07/12/2024    TIN 9.2 07/18/2024    SED 32 (H) 07/12/2024       Anesthesia Plan    ASA Status:  3    NPO Status:  NPO Appropriate    Anesthesia Type: General.     - Airway: ETT   Induction: Intravenous, Propofol.   Maintenance: Inhalation.   Techniques and Equipment:     - Lines/Monitors: 2nd IV     Consents    Anesthesia Plan(s) and associated risks, benefits, and realistic  alternatives discussed. Questions answered and patient/representative(s) expressed understanding.     - Discussed:     - Discussed with:  Patient            Postoperative Care    Pain management: IV analgesics, Oral pain medications.   PONV prophylaxis: Ondansetron (or other 5HT-3), Dexamethasone or Solumedrol     Comments:               Reid Guerra MD    I have reviewed the pertinent notes and labs in the chart from the past 30 days and (re)examined the patient.  Any updates or changes from those notes are reflected in this note.    Clinically Significant Risk Factors Present on Admission                                 # Financial/Environmental Concerns:

## 2025-03-04 ENCOUNTER — HOSPITAL ENCOUNTER (OUTPATIENT)
Facility: CLINIC | Age: 75
Setting detail: OBSERVATION
Discharge: HOME | End: 2025-03-04
Attending: SURGERY | Admitting: SURGERY
Payer: COMMERCIAL

## 2025-03-04 ENCOUNTER — APPOINTMENT (OUTPATIENT)
Dept: INTERVENTIONAL RADIOLOGY/VASCULAR | Facility: CLINIC | Age: 75
End: 2025-03-04
Attending: SURGERY
Payer: COMMERCIAL

## 2025-03-04 ENCOUNTER — ANESTHESIA (OUTPATIENT)
Dept: SURGERY | Facility: CLINIC | Age: 75
End: 2025-03-04
Payer: COMMERCIAL

## 2025-03-04 VITALS
WEIGHT: 138.5 LBS | TEMPERATURE: 98 F | HEART RATE: 69 BPM | OXYGEN SATURATION: 94 % | RESPIRATION RATE: 14 BRPM | BODY MASS INDEX: 24.54 KG/M2 | DIASTOLIC BLOOD PRESSURE: 62 MMHG | SYSTOLIC BLOOD PRESSURE: 136 MMHG | HEIGHT: 63 IN

## 2025-03-04 DIAGNOSIS — I96 GANGRENE OF TOE (H): Primary | ICD-10-CM

## 2025-03-04 DIAGNOSIS — I70.222 CRITICAL LIMB ISCHEMIA OF LEFT LOWER EXTREMITY (H): ICD-10-CM

## 2025-03-04 LAB
ABO + RH BLD: NORMAL
BLD GP AB SCN SERPL QL: NEGATIVE
CHOLEST SERPL-MCNC: 130 MG/DL
CREAT SERPL-MCNC: 0.95 MG/DL (ref 0.67–1.17)
EGFRCR SERPLBLD CKD-EPI 2021: 82 ML/MIN/1.73M2
EST. AVERAGE GLUCOSE BLD GHB EST-MCNC: 154 MG/DL
GLUCOSE BLDC GLUCOMTR-MCNC: 107 MG/DL (ref 70–99)
GLUCOSE SERPL-MCNC: 126 MG/DL (ref 70–99)
HBA1C MFR BLD: 7 %
HDLC SERPL-MCNC: 57 MG/DL
LDLC SERPL CALC-MCNC: 50 MG/DL
NONHDLC SERPL-MCNC: 73 MG/DL
POTASSIUM SERPL-SCNC: 4.1 MMOL/L (ref 3.4–5.3)
SPECIMEN EXP DATE BLD: NORMAL
TRIGL SERPL-MCNC: 113 MG/DL

## 2025-03-04 PROCEDURE — C1887 CATHETER, GUIDING: HCPCS | Performed by: SURGERY

## 2025-03-04 PROCEDURE — 36415 COLL VENOUS BLD VENIPUNCTURE: CPT

## 2025-03-04 PROCEDURE — 250N000009 HC RX 250: Performed by: SURGERY

## 2025-03-04 PROCEDURE — 272N000001 HC OR GENERAL SUPPLY STERILE: Performed by: SURGERY

## 2025-03-04 PROCEDURE — 86850 RBC ANTIBODY SCREEN: CPT

## 2025-03-04 PROCEDURE — 80061 LIPID PANEL: CPT

## 2025-03-04 PROCEDURE — C1753 CATH, INTRAVAS ULTRASOUND: HCPCS

## 2025-03-04 PROCEDURE — 360N000077 HC SURGERY LEVEL 4, PER MIN: Performed by: SURGERY

## 2025-03-04 PROCEDURE — 82947 ASSAY GLUCOSE BLOOD QUANT: CPT | Performed by: ANESTHESIOLOGY

## 2025-03-04 PROCEDURE — 75710 ARTERY X-RAYS ARM/LEG: CPT | Mod: 26 | Performed by: SURGERY

## 2025-03-04 PROCEDURE — 82565 ASSAY OF CREATININE: CPT | Performed by: ANESTHESIOLOGY

## 2025-03-04 PROCEDURE — C1769 GUIDE WIRE: HCPCS | Performed by: SURGERY

## 2025-03-04 PROCEDURE — C1769 GUIDE WIRE: HCPCS

## 2025-03-04 PROCEDURE — 83036 HEMOGLOBIN GLYCOSYLATED A1C: CPT

## 2025-03-04 PROCEDURE — 258N000003 HC RX IP 258 OP 636: Performed by: ANESTHESIOLOGY

## 2025-03-04 PROCEDURE — 86900 BLOOD TYPING SEROLOGIC ABO: CPT

## 2025-03-04 PROCEDURE — 82465 ASSAY BLD/SERUM CHOLESTEROL: CPT

## 2025-03-04 PROCEDURE — 999N000141 HC STATISTIC PRE-PROCEDURE NURSING ASSESSMENT: Performed by: SURGERY

## 2025-03-04 PROCEDURE — 37252 INTRVASC US NONCORONARY 1ST: CPT | Mod: LT | Performed by: SURGERY

## 2025-03-04 PROCEDURE — 250N000025 HC SEVOFLURANE, PER MIN: Performed by: SURGERY

## 2025-03-04 PROCEDURE — C1725 CATH, TRANSLUMIN NON-LASER: HCPCS

## 2025-03-04 PROCEDURE — 250N000011 HC RX IP 250 OP 636: Performed by: NURSE ANESTHETIST, CERTIFIED REGISTERED

## 2025-03-04 PROCEDURE — 258N000003 HC RX IP 258 OP 636: Performed by: NURSE ANESTHETIST, CERTIFIED REGISTERED

## 2025-03-04 PROCEDURE — 370N000017 HC ANESTHESIA TECHNICAL FEE, PER MIN: Performed by: SURGERY

## 2025-03-04 PROCEDURE — 250N000011 HC RX IP 250 OP 636: Performed by: ANESTHESIOLOGY

## 2025-03-04 PROCEDURE — 250N000009 HC RX 250: Performed by: ANESTHESIOLOGY

## 2025-03-04 PROCEDURE — 250N000009 HC RX 250: Performed by: NURSE ANESTHETIST, CERTIFIED REGISTERED

## 2025-03-04 PROCEDURE — 710N000012 HC RECOVERY PHASE 2, PER MINUTE: Performed by: SURGERY

## 2025-03-04 PROCEDURE — 250N000011 HC RX IP 250 OP 636

## 2025-03-04 PROCEDURE — 84132 ASSAY OF SERUM POTASSIUM: CPT | Performed by: ANESTHESIOLOGY

## 2025-03-04 PROCEDURE — C1894 INTRO/SHEATH, NON-LASER: HCPCS | Performed by: SURGERY

## 2025-03-04 PROCEDURE — 37228 PR REVASC TIB/PERON ART, ANGIOPLASTY INIT VESSEL: CPT | Mod: LT | Performed by: SURGERY

## 2025-03-04 PROCEDURE — 710N000009 HC RECOVERY PHASE 1, LEVEL 1, PER MIN: Performed by: SURGERY

## 2025-03-04 RX ORDER — VECURONIUM BROMIDE 1 MG/ML
INJECTION, POWDER, LYOPHILIZED, FOR SOLUTION INTRAVENOUS PRN
Status: DISCONTINUED | OUTPATIENT
Start: 2025-03-04 | End: 2025-03-04

## 2025-03-04 RX ORDER — ONDANSETRON 4 MG/1
4 TABLET, ORALLY DISINTEGRATING ORAL EVERY 30 MIN PRN
Status: DISCONTINUED | OUTPATIENT
Start: 2025-03-04 | End: 2025-03-04 | Stop reason: HOSPADM

## 2025-03-04 RX ORDER — PROTAMINE SULFATE 10 MG/ML
INJECTION, SOLUTION INTRAVENOUS PRN
Status: DISCONTINUED | OUTPATIENT
Start: 2025-03-04 | End: 2025-03-04

## 2025-03-04 RX ORDER — OXYCODONE HYDROCHLORIDE 5 MG/1
10 TABLET ORAL EVERY 4 HOURS PRN
Status: DISCONTINUED | OUTPATIENT
Start: 2025-03-04 | End: 2025-03-04 | Stop reason: HOSPADM

## 2025-03-04 RX ORDER — EPHEDRINE SULFATE 50 MG/ML
INJECTION, SOLUTION INTRAMUSCULAR; INTRAVENOUS; SUBCUTANEOUS PRN
Status: DISCONTINUED | OUTPATIENT
Start: 2025-03-04 | End: 2025-03-04

## 2025-03-04 RX ORDER — LIDOCAINE 40 MG/G
CREAM TOPICAL
Status: DISCONTINUED | OUTPATIENT
Start: 2025-03-04 | End: 2025-03-04 | Stop reason: HOSPADM

## 2025-03-04 RX ORDER — HEPARIN SODIUM 1000 [USP'U]/ML
INJECTION, SOLUTION INTRAVENOUS; SUBCUTANEOUS PRN
Status: DISCONTINUED | OUTPATIENT
Start: 2025-03-04 | End: 2025-03-04

## 2025-03-04 RX ORDER — ONDANSETRON 4 MG/1
4 TABLET, ORALLY DISINTEGRATING ORAL EVERY 6 HOURS PRN
Status: CANCELLED | OUTPATIENT
Start: 2025-03-04

## 2025-03-04 RX ORDER — FENTANYL CITRATE 0.05 MG/ML
25 INJECTION, SOLUTION INTRAMUSCULAR; INTRAVENOUS EVERY 5 MIN PRN
Status: DISCONTINUED | OUTPATIENT
Start: 2025-03-04 | End: 2025-03-04 | Stop reason: HOSPADM

## 2025-03-04 RX ORDER — HYDROMORPHONE HCL IN WATER/PF 6 MG/30 ML
0.4 PATIENT CONTROLLED ANALGESIA SYRINGE INTRAVENOUS EVERY 5 MIN PRN
Status: DISCONTINUED | OUTPATIENT
Start: 2025-03-04 | End: 2025-03-04 | Stop reason: HOSPADM

## 2025-03-04 RX ORDER — DEXAMETHASONE SODIUM PHOSPHATE 4 MG/ML
4 INJECTION, SOLUTION INTRA-ARTICULAR; INTRALESIONAL; INTRAMUSCULAR; INTRAVENOUS; SOFT TISSUE
Status: DISCONTINUED | OUTPATIENT
Start: 2025-03-04 | End: 2025-03-04 | Stop reason: HOSPADM

## 2025-03-04 RX ORDER — ACETAMINOPHEN 325 MG/1
975 TABLET ORAL EVERY 8 HOURS
Status: DISCONTINUED | OUTPATIENT
Start: 2025-03-04 | End: 2025-03-04 | Stop reason: HOSPADM

## 2025-03-04 RX ORDER — LOSARTAN POTASSIUM 100 MG/1
100 TABLET ORAL DAILY
Status: CANCELLED | OUTPATIENT
Start: 2025-03-04

## 2025-03-04 RX ORDER — CEFAZOLIN SODIUM/WATER 2 G/20 ML
2 SYRINGE (ML) INTRAVENOUS
Status: COMPLETED | OUTPATIENT
Start: 2025-03-04 | End: 2025-03-04

## 2025-03-04 RX ORDER — OXYCODONE HYDROCHLORIDE 5 MG/1
5-10 TABLET ORAL EVERY 4 HOURS PRN
Qty: 4 TABLET | Refills: 0 | Status: SHIPPED | OUTPATIENT
Start: 2025-03-04

## 2025-03-04 RX ORDER — ATORVASTATIN CALCIUM 80 MG/1
80 TABLET, FILM COATED ORAL AT BEDTIME
Status: CANCELLED | OUTPATIENT
Start: 2025-03-04

## 2025-03-04 RX ORDER — NALOXONE HYDROCHLORIDE 0.4 MG/ML
0.1 INJECTION, SOLUTION INTRAMUSCULAR; INTRAVENOUS; SUBCUTANEOUS
Status: DISCONTINUED | OUTPATIENT
Start: 2025-03-04 | End: 2025-03-04 | Stop reason: HOSPADM

## 2025-03-04 RX ORDER — ONDANSETRON 2 MG/ML
4 INJECTION INTRAMUSCULAR; INTRAVENOUS EVERY 6 HOURS PRN
Status: CANCELLED | OUTPATIENT
Start: 2025-03-04

## 2025-03-04 RX ORDER — CEFAZOLIN SODIUM/WATER 2 G/20 ML
2 SYRINGE (ML) INTRAVENOUS SEE ADMIN INSTRUCTIONS
Status: DISCONTINUED | OUTPATIENT
Start: 2025-03-04 | End: 2025-03-04 | Stop reason: HOSPADM

## 2025-03-04 RX ORDER — SODIUM CHLORIDE, SODIUM LACTATE, POTASSIUM CHLORIDE, CALCIUM CHLORIDE 600; 310; 30; 20 MG/100ML; MG/100ML; MG/100ML; MG/100ML
INJECTION, SOLUTION INTRAVENOUS CONTINUOUS
Status: DISCONTINUED | OUTPATIENT
Start: 2025-03-04 | End: 2025-03-04 | Stop reason: HOSPADM

## 2025-03-04 RX ORDER — HYDROMORPHONE HCL IN WATER/PF 6 MG/30 ML
0.2 PATIENT CONTROLLED ANALGESIA SYRINGE INTRAVENOUS EVERY 5 MIN PRN
Status: DISCONTINUED | OUTPATIENT
Start: 2025-03-04 | End: 2025-03-04 | Stop reason: HOSPADM

## 2025-03-04 RX ORDER — OXYCODONE HYDROCHLORIDE 5 MG/1
5 TABLET ORAL EVERY 4 HOURS PRN
Status: DISCONTINUED | OUTPATIENT
Start: 2025-03-04 | End: 2025-03-04 | Stop reason: HOSPADM

## 2025-03-04 RX ORDER — PROCHLORPERAZINE MALEATE 5 MG/1
5 TABLET ORAL EVERY 6 HOURS PRN
Status: CANCELLED | OUTPATIENT
Start: 2025-03-04

## 2025-03-04 RX ORDER — BISACODYL 10 MG
10 SUPPOSITORY, RECTAL RECTAL DAILY PRN
Status: DISCONTINUED | OUTPATIENT
Start: 2025-03-07 | End: 2025-03-04 | Stop reason: HOSPADM

## 2025-03-04 RX ORDER — ONDANSETRON 2 MG/ML
4 INJECTION INTRAMUSCULAR; INTRAVENOUS EVERY 30 MIN PRN
Status: DISCONTINUED | OUTPATIENT
Start: 2025-03-04 | End: 2025-03-04 | Stop reason: HOSPADM

## 2025-03-04 RX ORDER — CHLORTHALIDONE 25 MG/1
50 TABLET ORAL DAILY
Status: CANCELLED | OUTPATIENT
Start: 2025-03-04

## 2025-03-04 RX ORDER — ONDANSETRON 4 MG/1
4 TABLET, ORALLY DISINTEGRATING ORAL
Status: DISCONTINUED | OUTPATIENT
Start: 2025-03-04 | End: 2025-03-04 | Stop reason: HOSPADM

## 2025-03-04 RX ORDER — EZETIMIBE 10 MG/1
10 TABLET ORAL DAILY
Status: CANCELLED | OUTPATIENT
Start: 2025-03-04

## 2025-03-04 RX ORDER — ACETAMINOPHEN 325 MG/1
650 TABLET ORAL
Status: DISCONTINUED | OUTPATIENT
Start: 2025-03-04 | End: 2025-03-04 | Stop reason: HOSPADM

## 2025-03-04 RX ORDER — AMOXICILLIN 250 MG
1 CAPSULE ORAL 2 TIMES DAILY
Status: DISCONTINUED | OUTPATIENT
Start: 2025-03-04 | End: 2025-03-04 | Stop reason: HOSPADM

## 2025-03-04 RX ORDER — FENTANYL CITRATE 0.05 MG/ML
50 INJECTION, SOLUTION INTRAMUSCULAR; INTRAVENOUS EVERY 5 MIN PRN
Status: DISCONTINUED | OUTPATIENT
Start: 2025-03-04 | End: 2025-03-04 | Stop reason: HOSPADM

## 2025-03-04 RX ORDER — ONDANSETRON 2 MG/ML
INJECTION INTRAMUSCULAR; INTRAVENOUS PRN
Status: DISCONTINUED | OUTPATIENT
Start: 2025-03-04 | End: 2025-03-04

## 2025-03-04 RX ORDER — CLOPIDOGREL BISULFATE 75 MG/1
75 TABLET ORAL DAILY
Status: CANCELLED | OUTPATIENT
Start: 2025-03-04

## 2025-03-04 RX ORDER — CEFAZOLIN SODIUM 1 G/3ML
1 INJECTION, POWDER, FOR SOLUTION INTRAMUSCULAR; INTRAVENOUS EVERY 8 HOURS
Status: DISCONTINUED | OUTPATIENT
Start: 2025-03-04 | End: 2025-03-04 | Stop reason: HOSPADM

## 2025-03-04 RX ORDER — POLYETHYLENE GLYCOL 3350 17 G/17G
17 POWDER, FOR SOLUTION ORAL DAILY
Status: DISCONTINUED | OUTPATIENT
Start: 2025-03-05 | End: 2025-03-04 | Stop reason: HOSPADM

## 2025-03-04 RX ORDER — HYDROMORPHONE HCL IN WATER/PF 6 MG/30 ML
0.2 PATIENT CONTROLLED ANALGESIA SYRINGE INTRAVENOUS
Status: DISCONTINUED | OUTPATIENT
Start: 2025-03-04 | End: 2025-03-04 | Stop reason: HOSPADM

## 2025-03-04 RX ORDER — SODIUM CHLORIDE, SODIUM LACTATE, POTASSIUM CHLORIDE, CALCIUM CHLORIDE 600; 310; 30; 20 MG/100ML; MG/100ML; MG/100ML; MG/100ML
INJECTION, SOLUTION INTRAVENOUS CONTINUOUS PRN
Status: DISCONTINUED | OUTPATIENT
Start: 2025-03-04 | End: 2025-03-04

## 2025-03-04 RX ORDER — BUPIVACAINE HYDROCHLORIDE AND EPINEPHRINE 2.5; 5 MG/ML; UG/ML
INJECTION, SOLUTION INFILTRATION; PERINEURAL PRN
Status: DISCONTINUED | OUTPATIENT
Start: 2025-03-04 | End: 2025-03-04 | Stop reason: HOSPADM

## 2025-03-04 RX ORDER — LIDOCAINE HYDROCHLORIDE 20 MG/ML
INJECTION, SOLUTION INFILTRATION; PERINEURAL PRN
Status: DISCONTINUED | OUTPATIENT
Start: 2025-03-04 | End: 2025-03-04

## 2025-03-04 RX ORDER — ASPIRIN 81 MG/1
81 TABLET ORAL DAILY
Status: DISCONTINUED | OUTPATIENT
Start: 2025-03-05 | End: 2025-03-04 | Stop reason: HOSPADM

## 2025-03-04 RX ORDER — ACETAMINOPHEN 325 MG/1
650 TABLET ORAL EVERY 4 HOURS PRN
COMMUNITY
Start: 2025-03-04

## 2025-03-04 RX ORDER — PROPOFOL 10 MG/ML
INJECTION, EMULSION INTRAVENOUS PRN
Status: DISCONTINUED | OUTPATIENT
Start: 2025-03-04 | End: 2025-03-04

## 2025-03-04 RX ORDER — METOPROLOL SUCCINATE 50 MG/1
50 TABLET, EXTENDED RELEASE ORAL DAILY
Status: CANCELLED | OUTPATIENT
Start: 2025-03-04

## 2025-03-04 RX ORDER — AMLODIPINE BESYLATE 10 MG/1
10 TABLET ORAL DAILY
Status: CANCELLED | OUTPATIENT
Start: 2025-03-04

## 2025-03-04 RX ORDER — HYDROMORPHONE HCL IN WATER/PF 6 MG/30 ML
0.4 PATIENT CONTROLLED ANALGESIA SYRINGE INTRAVENOUS
Status: DISCONTINUED | OUTPATIENT
Start: 2025-03-04 | End: 2025-03-04 | Stop reason: HOSPADM

## 2025-03-04 RX ORDER — OXYCODONE HYDROCHLORIDE 5 MG/1
5 TABLET ORAL
Status: DISCONTINUED | OUTPATIENT
Start: 2025-03-04 | End: 2025-03-04 | Stop reason: HOSPADM

## 2025-03-04 RX ORDER — FENTANYL CITRATE 50 UG/ML
INJECTION, SOLUTION INTRAMUSCULAR; INTRAVENOUS PRN
Status: DISCONTINUED | OUTPATIENT
Start: 2025-03-04 | End: 2025-03-04

## 2025-03-04 RX ORDER — AMOXICILLIN 250 MG
1-2 CAPSULE ORAL 2 TIMES DAILY
Qty: 30 TABLET | Refills: 0 | Status: SHIPPED | OUTPATIENT
Start: 2025-03-04

## 2025-03-04 RX ADMIN — Medication 200 MG: at 11:12

## 2025-03-04 RX ADMIN — PHENYLEPHRINE HYDROCHLORIDE 100 MCG: 10 INJECTION INTRAVENOUS at 10:09

## 2025-03-04 RX ADMIN — ROCURONIUM BROMIDE 50 MG: 50 INJECTION, SOLUTION INTRAVENOUS at 08:05

## 2025-03-04 RX ADMIN — HEPARIN SODIUM 7000 ML: 1000 INJECTION, SOLUTION INTRAVENOUS; SUBCUTANEOUS at 09:17

## 2025-03-04 RX ADMIN — FENTANYL CITRATE 100 MCG: 50 INJECTION INTRAMUSCULAR; INTRAVENOUS at 08:03

## 2025-03-04 RX ADMIN — ONDANSETRON 4 MG: 2 INJECTION INTRAMUSCULAR; INTRAVENOUS at 10:44

## 2025-03-04 RX ADMIN — SODIUM CHLORIDE, POTASSIUM CHLORIDE, SODIUM LACTATE AND CALCIUM CHLORIDE: 600; 310; 30; 20 INJECTION, SOLUTION INTRAVENOUS at 07:18

## 2025-03-04 RX ADMIN — Medication 10 MG: at 10:11

## 2025-03-04 RX ADMIN — HEPARIN SODIUM 3000 ML: 1000 INJECTION, SOLUTION INTRAVENOUS; SUBCUTANEOUS at 10:02

## 2025-03-04 RX ADMIN — PHENYLEPHRINE HYDROCHLORIDE 0.7 MCG/KG/MIN: 10 INJECTION INTRAVENOUS at 10:05

## 2025-03-04 RX ADMIN — SODIUM CHLORIDE, POTASSIUM CHLORIDE, SODIUM LACTATE AND CALCIUM CHLORIDE: 600; 310; 30; 20 INJECTION, SOLUTION INTRAVENOUS at 10:38

## 2025-03-04 RX ADMIN — VECURONIUM BROMIDE 2 MG: 1 INJECTION, POWDER, LYOPHILIZED, FOR SOLUTION INTRAVENOUS at 09:19

## 2025-03-04 RX ADMIN — SODIUM CHLORIDE, POTASSIUM CHLORIDE, SODIUM LACTATE AND CALCIUM CHLORIDE: 600; 310; 30; 20 INJECTION, SOLUTION INTRAVENOUS at 07:55

## 2025-03-04 RX ADMIN — Medication 2 G: at 08:15

## 2025-03-04 RX ADMIN — FENTANYL CITRATE 50 MCG: 50 INJECTION INTRAMUSCULAR; INTRAVENOUS at 11:02

## 2025-03-04 RX ADMIN — PROTAMINE SULFATE 20 MG: 10 INJECTION, SOLUTION INTRAVENOUS at 10:39

## 2025-03-04 RX ADMIN — PHENYLEPHRINE HYDROCHLORIDE 0.5 MCG/KG/MIN: 10 INJECTION INTRAVENOUS at 08:34

## 2025-03-04 RX ADMIN — LIDOCAINE HYDROCHLORIDE 100 MG: 20 INJECTION, SOLUTION INFILTRATION; PERINEURAL at 08:03

## 2025-03-04 RX ADMIN — PROPOFOL 40 MG: 10 INJECTION, EMULSION INTRAVENOUS at 10:18

## 2025-03-04 RX ADMIN — PROPOFOL 160 MG: 10 INJECTION, EMULSION INTRAVENOUS at 08:03

## 2025-03-04 RX ADMIN — VECURONIUM BROMIDE 1 MG: 1 INJECTION, POWDER, LYOPHILIZED, FOR SOLUTION INTRAVENOUS at 10:05

## 2025-03-04 RX ADMIN — Medication 5 MG: at 10:34

## 2025-03-04 RX ADMIN — FENTANYL CITRATE 50 MCG: 50 INJECTION INTRAMUSCULAR; INTRAVENOUS at 11:06

## 2025-03-04 ASSESSMENT — ACTIVITIES OF DAILY LIVING (ADL)
ADLS_ACUITY_SCORE: 35
ADLS_ACUITY_SCORE: 58
ADLS_ACUITY_SCORE: 35
ADLS_ACUITY_SCORE: 58
ADLS_ACUITY_SCORE: 35
ADLS_ACUITY_SCORE: 35

## 2025-03-04 NOTE — ANESTHESIA POSTPROCEDURE EVALUATION
Patient: Zia Hopkins    Procedure: Procedure(s):  LEFT FEMORAL CUTDOWN  LEFT LOWER EXTREMITY ARTERIOGRAM AND ANGIOPLASTY       Anesthesia Type:  General    Note:  Disposition: Outpatient   Postop Pain Control: Uneventful            Sign Out: Well controlled pain   PONV: No   Neuro/Psych: Uneventful            Sign Out: Acceptable/Baseline neuro status   Airway/Respiratory: Uneventful            Sign Out: Acceptable/Baseline resp. status   CV/Hemodynamics: Uneventful            Sign Out: Acceptable CV status   Other NRE: NONE   DID A NON-ROUTINE EVENT OCCUR? No           Last vitals:  Vitals Value Taken Time   /55 03/04/25 1145   Temp 36.9  C (98.42  F) 03/04/25 1149   Pulse 64 03/04/25 1149   Resp 13 03/04/25 1149   SpO2 100 % 03/04/25 1149   Vitals shown include unfiled device data.    Electronically Signed By: Reid Guerra MD  March 4, 2025  2:33 PM

## 2025-03-04 NOTE — IR NOTE
Procedure Note for IR Procedure Dictation  Fluoro time: 9.6 minutes  Total Fluoro Dose: 21.5  mGy (Air Kerma)  Contrast: 44 mL Isovue    Sedation: None - Case done with General Anesthesia

## 2025-03-04 NOTE — DISCHARGE INSTRUCTIONS
Same Day Surgery Discharge Instructions for  Sedation and General Anesthesia     It's not unusual to feel dizzy, light-headed or faint for up to 24 hours after surgery or while taking pain medication.  If you have these symptoms: sit for a few minutes before standing and have someone assist you when you get up to walk or use the bathroom.    You should rest and relax for the next 24 hours. We recommend you make arrangements to have an adult stay with you for at least 24 hours after your discharge.  Avoid hazardous and strenuous activity.    DO NOT DRIVE any vehicle or operate mechanical equipment for 24 hours following the end of your surgery.  Even though you may feel normal, your reactions may be affected by the medication you have received.    Do not drink alcoholic beverages for 24 hours following surgery.     Slowly progress to your regular diet as you feel able. It's not unusual to feel nauseated and/or vomit after receiving anesthesia.  If you develop these symptoms, drink clear liquids (apple juice, ginger ale, broth, 7-up, etc. ) until you feel better.  If your nausea and vomiting persists for 24 hours, please notify your surgeon.      All narcotic pain medications, along with inactivity and anesthesia, can cause constipation. Drinking plenty of liquids and increasing fiber intake will help.    For any questions of a medical nature, call your surgeon.    Do not make important decisions for 24 hours.    If you had general anesthesia, you may have a sore throat for a couple of days related to the breathing tube used during surgery.  You may use Cepacol lozenges to help with this discomfort.  If it worsens or if you develop a fever, contact your surgeon.     If you feel your pain is not well managed with the pain medications prescribed by your surgeon, please contact your surgeon's office to let them know so they can address your concerns.     FYI: ok to start Aspirin and Plavix today     **If you have  questions or concerns about your procedure  call Dr. Hamlin at [phone]**

## 2025-03-04 NOTE — ANESTHESIA CARE TRANSFER NOTE
Patient: Zia Hopkins    Procedure: Procedure(s):  LEFT FEMORAL CUTDOWN  LEFT LOWER EXTREMITY ARTERIOGRAM AND ANGIOPLASTY       Diagnosis: Critical limb ischemia of left lower extremity (H) [I70.222]  Diagnosis Additional Information: No value filed.    Anesthesia Type:   General     Note:    Oropharynx: oropharynx clear of all foreign objects  Level of Consciousness: awake  Oxygen Supplementation: face mask  Level of Supplemental Oxygen (L/min / FiO2): 6  Independent Airway: airway patency satisfactory and stable  Dentition: dentition unchanged  Vital Signs Stable: post-procedure vital signs reviewed and stable  Report to RN Given: handoff report given  Patient transferred to: PACU    Handoff Report: Identifed the Patient, Identified the Reponsible Provider, Reviewed the pertinent medical history, Discussed the surgical course, Reviewed Intra-OP anesthesia mangement and issues during anesthesia, Set expectations for post-procedure period and Allowed opportunity for questions and acknowledgement of understanding          Electronically Signed By: CLAUDINE Starks CRNA  March 4, 2025  11:23 AM

## 2025-03-04 NOTE — ANESTHESIA PROCEDURE NOTES
Airway       Patient location during procedure: OR       Procedure Start/Stop Times: 3/4/2025 8:08 AM  Staff -        Anesthesiologist:  Reid Guerra MD       CRNA: Ger Cox APRN CRNA       Other Anesthesia Staff: Mayra Sanchez       Performed By: SRNA  Consent for Airway        Urgency: elective  Indications and Patient Condition       Indications for airway management: lashawn-procedural       Induction type:intravenous       Mask difficulty assessment: 2 - vent by mask + OA or adjuvant +/- NMBA    Final Airway Details       Final airway type: endotracheal airway       Successful airway: ETT - single  Endotracheal Airway Details        ETT size (mm): 8.0       Cuffed: yes       Successful intubation technique: direct laryngoscopy       DL Blade Type: MAC 3       Grade View of Cords: 1       Adjucts: stylet       Position: Right       Measured from: gums/teeth       Secured at (cm): 23       Bite block used: None    Post intubation assessment        Placement verified by: capnometry, equal breath sounds and chest rise        Number of attempts at approach: 1       Number of other approaches attempted: 0       Secured with: tape       Ease of procedure: easy       Dentition: Intact and Unchanged    Medication(s) Administered   Medication Administration Time: 3/4/2025 8:08 AM

## 2025-03-04 NOTE — OP NOTE
Pre operative diagnosis:   1.  Left lower extremity critical limb ischemia.  2.  High-grade stenosis of distal anastomosis and native posterior tibial artery and left common femoral artery to posterior tibial artery bypass.    Postoperative diagnosis: Same.    Procedure:  1.  Left common femoral artery and left femoral-posterior tibial artery bypass cutdown with primary closure.  2.  Left lower extremity arteriogram with runoff.  3.  Left common femoral artery to posterior tibial artery bypass intravascular ultrasonography.  4.  Left native posterior tibial artery intravascular ultrasonography.  5.  Left common femoral artery to posterior tibial artery bypass distal anastomosis with balloon angioplasty using 2.5 mm x 10 cm cutting balloon with completion arteriogram.  6.  Left native mid posterior tibial artery balloon angioplasty using 3 mm x 6 cm angioplasty balloon  7.  Left common femoral to posterior tibial artery bypass and native posterior tibial artery balloon angioplasty using 3 mm x 6 cm angioplasty balloon.  8.  Intra-arterial administration of nitroglycerin vasodilator.  9.  Completion arteriogram.    Surgeon: Surjit Hamlin MD  Assistant: Jeff Kirby MD  Assistant: Kyleigh Herbert PA-C. It should be noted that Kyleigh's help was paramount in obtaining surgical exposure, performing of endovascular part of the procedure and closure of surgical wound.    Anesthesia: General Plus local.  Estimated blood loss: About 25 mL.  Heparin: 10,000 units.  Protamine: 20 mg.  Fluoroscopy time: 9.6 minutes.  Fluoroscopy dose: 21.5 mGy.  Contrast: 44 mL of Isovue was used.    Indication for procedure: This a very pleasant yet complicated 75-year-old gentleman with critical limb ischemia of the left lower extremity.  He had undergone a left common femoral artery to posterior tibial artery bypass.  On sonography there was high-grade stenosis of the distal anastomosis/native posterior tibial artery.  Intervention is advised  for primary assisted patency.  Patient has had a right common femoral endarterectomy with patch angioplasty with significant scar in the previous surgical site.  We want to access the left common femoral artery to posterior tibial artery conduit in antegrade fashion and therefore we will proceed with cutdown.    Procedure details: Patient was identified and then taken to the operating room and placed in supine position.  General anesthesia was induced.  Preoperative intravenous antibiotics were administered.  The lower abdomen, left lower extremity and right groin were prepped and a sterile surgical field was created.    Preprocedure timeout was conducted.  An oblique incision was made in the left groin and the native distal common femoral artery as well as the bypass conduit were dissected out and prepared for clamping.  7000 units of heparin were given.  Pursestring suture of 5-0 Prolene was placed and the conduit was accessed with an micropuncture needle in an antegrade fashion.  This was followed by placement of a microwire.  Then we converted to a Glidewire advantage then a 6 Cambodian sheath was placed.  Arteriogram was performed which showed that the bypass was patent up to the distal leg where it had a large outflow branch but it had antegrade flow going into the native posterior tibial artery.  There appeared to be high-grade stenosis at the level of the distal end of the bypass and the native posterior tibial artery.  There was an additional area of high-grade stenosis in the mid posterior tibial artery.  There was an additional area of high-grade stenosis in the common plantar artery.  We decided to intervene on the posterior tibial artery and the the 2 segments as well as the anastomosis.  With the help of a 0.014 inch Glidewire advantage the area of the stenosis in the bypass and native vessels were traversed and catheter was positioned in the distal posterior tibial artery.  Arteriogram confirmed  intraluminal position.      We then went in with the intravascular ultrasonography and did intravascular ultrasonography of the native posterior tibial artery as well as the native posterior tibial artery immediately distal to the distal anastomosis as well as the distal segment of the bypass conduit.  On intravascular ultrasonography there appeared to be high-grade stenosis based on the fact that the flow lumen was completely collapsed around the intravascular ultrasonography catheter which was an 014 wire based platform.  The distal end of the held the bypass conduit measured 3.1 mm.      The native posterior tibial artery immediately distal to the anastomosis as well as distal end of the conduit and the anastomosis itself were treated with the 2.5 mm x 2 cm cutting balloon which was inflated to supra nominal pressure for 2 minutes in 3 overlapping segments.  Balloon was deflated and removed and showed some improvement but we decided that there is still residual stenosis and decided to then treated with a 3 mm x 6 cm balloon.  This balloon was advanced first into the native posterior tibial artery where it was inflated to nominal pressures for 3 minutes.  And then across the distal bypass and the native proximal posterior tibial artery.  There it was also inflated for 3 minutes to nominal pressures.  Balloon was deflated and removed.  There was excellent improvement in the luminal gain but there was significant spasm.  Here we gave 400 mcg of intra-arterial nitroglycerin.  This was then followed by completion arteriogram which showed significant improvement and significant improvement of flow into the foot.    We had given additional 3000 units of heparin.  Procedure was concluded.  Sheath was removed from the common femoral artery and access site was closed with the previously placed 5 oh posting suture and then additionally with a single 6-0 Prolene suture.  Then the surgical site was closed with 2-0 Vicryl, 3-0  Vicryl and skin was approximated with 4-0 Monocryl.    Counts of instruments, sponges and needle was noted to be correct.    Patient was awakened and extubated and taken to the recovery room in stable condition.

## 2025-03-17 NOTE — PROGRESS NOTES
VASCULAR SURGERY PROGRESS NOTE    LOCATION: Vascular [facility]    Zia Hopkins  Medical Record #: [id number]  YOB: 1950  Age: 75 year old     Date of Service: 3/18/2025    PRIMARY CARE PROVIDER: Reid Reese    Reason for visit:  post-op    Zia Hopkins is a 75 year old male who underwent a left femoral artery cutdown with angioplasty of the left distal anastomosis of femoral to PT bypass on 3/4/2025 with Dr. Hamlin.    Alert and oriented x4, denies pain  Biphasic DP pulse noted  Incision fully healed, no issues  VSS      RECOMMENDATION/RISKS: Follow-up in 1 month with a left lower extremity arterial duplex, to see Dr. Hamlin. Continue on aspirin 81 mg indefinitely, continue on plavix for 3 months post-op. No activity restrictions.     REVIEW OF SYSTEMS:    A 12 point ROS was reviewed and is negative except for what is listed above in HPI.    PHH:    Past Medical History:   Diagnosis Date    Acute low back pain     GENESIS (acute kidney injury)     Anemia     Bilateral carotid bruits     Cellulitis of left foot     Central spinal stenosis     and Subarticular    Chronic kidney disease     Degenerative lumbar spinal stenosis     multilevel    Diabetic ulcer of toe (H)     Gangrene of toe (H)     Hyperlipidemia LDL goal <70     Hypertension     Lumbar foraminal stenosis     Macular degeneration (senile) of retina     PAD (peripheral artery disease)     Pneumonia     Psoriasis     Type 2 diabetes mellitus (H)           Past Surgical History:   Procedure Laterality Date    AMPUTATE FOOT Left 03/15/2017    Procedure: AMPUTATE FOOT;  Surgeon: Emre Mauricio DPM;  Location: [facility] OR    AMPUTATE TOE(S) Left 04/20/2021    Procedure: LEFT GREAT TOE AMPUTATION.;  Surgeon: Purvi Whelan DPM, Podiatry/Foot and Ankle Surgery;  Location: [facility] OR    AMPUTATE TOE(S) Right 04/18/2022    Procedure: 1.  Partial right great toe amputation. 2.  Right foot fourth toe amputation at the metatarsophalangeal  joint. 3.  Right fifth metatarsal amputation at the metatarsophalangeal joint.;  Surgeon: Purvi Whelan DPM, Podiatry/Foot and Ankle Surgery;  Location: [facility] OR    AMPUTATE TOE(S) Right 09/29/2022    Procedure: Right second and third toe amputations;  Surgeon: Purvi Whelan DPM, Podiatry/Foot and Ankle Surgery;  Location: [facility] OR    AMPUTATE TOE(S) Left 07/13/2024    Procedure: LEFT 3RD TOE AMPUTATION AND HEEL WOUND DEBRIDEMENT;  Surgeon: Erik Baez DPM;  Location: [facility]    ARTHROPLASTY KNEE BILATERAL Bilateral     BIOPSY BONE FOOT Right 06/27/2022    Procedure: Partial right fourth and fifth metatarsal bone excision;  Surgeon: Purvi Whelan DPM, Podiatry/Foot and Ankle Surgery;  Location: [facility] OR    BYPASS GRAFT FEMOROTIBIAL Left 07/20/2024    Procedure: LEFT FEMORAL TO TIBIAL ARTERY IN SITU BYPASS GRAFT. superficial femoral artery endarterectomy.;  Surgeon: Parrish Cortés MD;  Location:  OR    COMMON FEMORAL ARTERY TO POSTERIOR TIBIAL ARTERY BYPASS W/ SAPH VEIN GRAFT  07/2024    ENDARTERECTOMY FEMORAL Right 03/04/2022    Procedure: RIGHT FEMORAL ENDARTERECTOMY, RIGHT FEMORAL ARTERY BALLOON ANGIOPLASTY WITH STENTING;  Surgeon: Surjit Hamlin MD;  Location: [facility]    ENDARTERECTOMY FEMORAL Left 07/12/2023    Procedure: LEFT FEMORAL CUTDOWN;  Surgeon: Surjit Hamlin MD;  Location: [facility]    ENDARTERECTOMY FEMORAL Left 3/4/2025    Procedure: LEFT FEMORAL CUTDOWN;  Surgeon: Surjit Hamlin MD;  Location: [facility]    IR LOWER EXTREMITY ANGIOGRAM LEFT  07/30/2019    IR LOWER EXTREMITY ANGIOGRAM LEFT  02/03/2021    IR LOWER EXTREMITY ANGIOGRAM LEFT  04/21/2021    IR LOWER EXTREMITY ANGIOGRAM LEFT  07/16/2024    IR LOWER EXTREMITY ANGIOGRAM RIGHT  03/01/2022    IR LOWER EXTREMITY ANGIOGRAM RIGHT  06/10/2022    IR OR ANGIOGRAM  03/04/2022    IR OR ANGIOGRAM  07/12/2023    IR OR ANGIOGRAM  3/4/2025    IRRIGATION AND DEBRIDEMENT FOOT, COMBINED Left 03/15/2017    Procedure: COMBINED  IRRIGATION AND DEBRIDEMENT FOOT;  Surgeon: Emre Mauricio DPM;  Location: [facility]    OR ANGIOGRAM, LOWER EXTREMITY Left 07/12/2023    Procedure: LEFT LOWER EXTREMITY ANGIOGRAM BALLOON ANGIOPLASTY;  Surgeon: Surjit Hamlin MD;  Location: [facility]    OR ANGIOGRAM, LOWER EXTREMITY Left 3/4/2025    Procedure: LEFT LOWER EXTREMITY ARTERIOGRAM AND ANGIOPLASTY;  Surgeon: Surjit Hamlin MD;  Location: [facility]    PERONEAL NERVE DECOMPRESSION      VASECTOMY         ALLERGIES:  Patient has no known allergies.    MEDS:    Current Outpatient Medications:     acetaminophen (TYLENOL) 325 MG tablet, Take 2 tablets (650 mg) by mouth every 4 hours as needed for mild pain., Disp: , Rfl:     amLODIPine (NORVASC) 10 MG tablet, Take 10 mg by mouth daily, Disp: , Rfl:     aspirin 81 MG tablet, Take 1 tablet (81 mg) by mouth daily, Disp: 90 tablet, Rfl: 1    atorvastatin (LIPITOR) 80 MG tablet, TAKE 1 TABLET(80 MG) BY MOUTH AT BEDTIME, Disp: 90 tablet, Rfl: 3    chlorthalidone (HYGROTON) 50 MG tablet, Take 50 mg by mouth daily , Disp: , Rfl:     clopidogrel (PLAVIX) 75 MG tablet, Take 1 tablet (75 mg) by mouth daily., Disp: 90 tablet, Rfl: 1    empagliflozin (JARDIANCE) 25 MG TABS tablet, Take 25 mg by mouth daily, Disp: , Rfl:     ezetimibe (ZETIA) 10 MG tablet, TAKE 1 TABLET(10 MG) BY MOUTH DAILY, Disp: 90 tablet, Rfl: 3    losartan (COZAAR) 100 MG tablet, Take 100 mg by mouth daily , Disp: , Rfl:     metFORMIN (GLUCOPHAGE) 1000 MG tablet, Take 1,000 mg by mouth 2 times daily (with meals) , Disp: , Rfl:     metoprolol succinate ER (TOPROL XL) 100 MG 24 hr tablet, Take 0.5 tablets (50 mg) by mouth daily, Disp: , Rfl:     oxyCODONE (ROXICODONE) 5 MG tablet, Take 1-2 tablets (5-10 mg) by mouth every 4 hours as needed for moderate to severe pain., Disp: 4 tablet, Rfl: 0    senna-docusate (SENOKOT-S/PERICOLACE) 8.6-50 MG tablet, Take 1-2 tablets by mouth 2 times daily., Disp: 30 tablet, Rfl: 0    SOCIAL HABITS:    History    Smoking Status    Former    Types: Cigarettes   Smokeless Tobacco    Never     Social History    Substance and Sexual Activity      Alcohol use: Yes        Comment: 1-2.5 glasses of wine at dinner      History   Drug Use Unknown       FAMILY HISTORY:    Family History   Problem Relation Age of Onset    Diabetes Father        PE:  There were no vitals taken for this visit.  Wt Readings from Last 1 Encounters:   03/04/25 138 lb 8 oz (62.8 kg)     There is no height or weight on file to calculate BMI.    LABS:      Sodium   Date Value Ref Range Status   07/18/2024 137 135 - 145 mmol/L Final   07/13/2024 135 135 - 145 mmol/L Final   07/12/2024 138 135 - 145 mmol/L Final   04/23/2021 138 133 - 144 mmol/L Final   04/22/2021 137 133 - 144 mmol/L Final   04/20/2021 138 133 - 144 mmol/L Final     Urea Nitrogen   Date Value Ref Range Status   07/18/2024 28.7 (H) 8.0 - 23.0 mg/dL Final   07/13/2024 22.8 8.0 - 23.0 mg/dL Final   07/12/2024 23.0 8.0 - 23.0 mg/dL Final   06/10/2022 28 7 - 30 mg/dL Final   03/08/2022 27 7 - 30 mg/dL Final   03/07/2022 23 7 - 30 mg/dL Final   04/23/2021 14 7 - 30 mg/dL Final   04/22/2021 15 7 - 30 mg/dL Final   04/20/2021 22 7 - 30 mg/dL Final     Hemoglobin   Date Value Ref Range Status   08/06/2024 9.1 (L) 13.3 - 17.7 g/dL Final   07/21/2024 11.6 (L) 13.3 - 17.7 g/dL Final   07/18/2024 12.4 (L) 13.3 - 17.7 g/dL Final   04/22/2021 10.9 (L) 13.3 - 17.7 g/dL Final   04/20/2021 11.0 (L) 13.3 - 17.7 g/dL Final   04/19/2021 12.2 (L) 13.3 - 17.7 g/dL Final     Platelet Count   Date Value Ref Range Status   08/06/2024 320 150 - 450 10e3/uL Final   07/21/2024 265 150 - 450 10e3/uL Final   07/18/2024 277 150 - 450 10e3/uL Final   04/22/2021 237 150 - 450 10e9/L Final   04/20/2021 239 150 - 450 10e9/L Final   04/19/2021 310 150 - 450 10e9/L Final     INR   Date Value Ref Range Status   02/03/2021 0.95 0.86 - 1.14 Final   07/30/2019 0.90 0.86 - 1.14 Final   03/08/2017 0.87 0.86 - 1.14 Final       20  minutes spent on the day of encounter doing chart review, history and exam, documentation, and further activities as noted.     Nallely Trujillo NP  VASCULAR SURGERY

## 2025-03-18 ENCOUNTER — OFFICE VISIT (OUTPATIENT)
Dept: OTHER | Facility: CLINIC | Age: 75
End: 2025-03-18
Payer: COMMERCIAL

## 2025-03-18 ENCOUNTER — TELEPHONE (OUTPATIENT)
Dept: OTHER | Facility: CLINIC | Age: 75
End: 2025-03-18

## 2025-03-18 VITALS — SYSTOLIC BLOOD PRESSURE: 185 MMHG | HEART RATE: 62 BPM | DIASTOLIC BLOOD PRESSURE: 73 MMHG

## 2025-03-18 DIAGNOSIS — I70.229 CRITICAL ISCHEMIA OF LOWER EXTREMITY (H): ICD-10-CM

## 2025-03-18 DIAGNOSIS — I70.222 CRITICAL LIMB ISCHEMIA OF LEFT LOWER EXTREMITY (H): Primary | ICD-10-CM

## 2025-03-18 DIAGNOSIS — I70.223 CRITICAL LIMB ISCHEMIA OF BOTH LOWER EXTREMITIES (H): ICD-10-CM

## 2025-03-18 DIAGNOSIS — I73.9 PAD (PERIPHERAL ARTERY DISEASE): ICD-10-CM

## 2025-03-18 PROCEDURE — G0463 HOSPITAL OUTPT CLINIC VISIT: HCPCS

## 2025-03-18 NOTE — TELEPHONE ENCOUNTER
Per 3/18/2025 visit with CLAUDINE Montalvo CNP, pt needs the following:     US LLE arterial duplex prior to in clinic visit with Dr. Hamlin  In clinic appt with Dr. Hamlin to discuss results  Please schedule this in approximately 4 weeks    Routing to scheduling to contact patient to coordinate above.    Appt note in order comments.    Nisa Colby RN  [facility]

## 2025-03-18 NOTE — PROGRESS NOTES
[facility]        Patient is here for a post-op.    Pt is currently taking Aspirin, Statin, and Plavix.    BP (!) 185/73 (BP Location: Right arm, Patient Position: Chair, Cuff Size: Adult Regular)   Pulse 62     The provider has been notified that the patient has high blood pressure.    Questions patient would like addressed today are: N/A.    Refills are needed: N/A    Has homecare services and agency name:  Codie Cavazos MA

## 2025-04-21 ENCOUNTER — HOSPITAL ENCOUNTER (OUTPATIENT)
Dept: ULTRASOUND IMAGING | Facility: CLINIC | Age: 75
Discharge: HOME | End: 2025-04-21
Payer: COMMERCIAL

## 2025-04-21 ENCOUNTER — OFFICE VISIT (OUTPATIENT)
Dept: OTHER | Facility: CLINIC | Age: 75
End: 2025-04-21
Attending: SURGERY
Payer: COMMERCIAL

## 2025-04-21 VITALS — SYSTOLIC BLOOD PRESSURE: 174 MMHG | HEART RATE: 58 BPM | DIASTOLIC BLOOD PRESSURE: 73 MMHG

## 2025-04-21 DIAGNOSIS — I70.222 CRITICAL LIMB ISCHEMIA OF LEFT LOWER EXTREMITY (H): ICD-10-CM

## 2025-04-21 DIAGNOSIS — I70.223 CRITICAL LIMB ISCHEMIA OF BOTH LOWER EXTREMITIES (H): Primary | ICD-10-CM

## 2025-04-21 PROCEDURE — 99212 OFFICE O/P EST SF 10 MIN: CPT | Performed by: SURGERY

## 2025-04-21 PROCEDURE — 93926 LOWER EXTREMITY STUDY: CPT | Mod: LT

## 2025-04-21 PROCEDURE — 3078F DIAST BP <80 MM HG: CPT | Performed by: SURGERY

## 2025-04-21 PROCEDURE — 93926 LOWER EXTREMITY STUDY: CPT | Mod: 26 | Performed by: SURGERY

## 2025-04-21 PROCEDURE — G0463 HOSPITAL OUTPT CLINIC VISIT: HCPCS | Performed by: SURGERY

## 2025-04-21 PROCEDURE — 3077F SYST BP >= 140 MM HG: CPT | Performed by: SURGERY

## 2025-04-21 NOTE — PROGRESS NOTES
[facility]        Patient is here for a  follow up.    Pt is currently taking Aspirin, Statin, and Plavix.    BP (!) 174/73 (BP Location: Left arm, Patient Position: Chair, Cuff Size: Adult Regular)   Pulse 58     The provider has been notified that the patient has no concerns.     Questions patient would like addressed today are: N/A.    Refills are needed: N/A    Has homecare services and agency name:  Codie Cavazos MA

## 2025-04-22 NOTE — PROGRESS NOTES
Mr. Zia Hopkins is a very pleasant 75 year old gentleman with bilateral lower extremity peripheral arterial disease.     He has had multiple arterial interventions/surgeries on both lower extremities. Most recent was left fem-PT bypass cutdown and balloon angioplasty of the distal anastomosis on 03/04/2025.     He has no complaints.    Sonography shows improved outflow.     We will see him back in 6 months with repeat sonography of bilateral lower extremities.

## 2025-05-13 DIAGNOSIS — I70.223 CRITICAL LIMB ISCHEMIA OF BOTH LOWER EXTREMITIES (H): ICD-10-CM

## 2025-05-13 RX ORDER — CLOPIDOGREL BISULFATE 75 MG/1
75 TABLET ORAL DAILY
Qty: 90 TABLET | Refills: 3 | Status: SHIPPED | OUTPATIENT
Start: 2025-05-13

## 2025-05-13 NOTE — TELEPHONE ENCOUNTER
Unable to fill per [facility] protocol.  Medication and pharmacy loaded.  Routing to covering provider     Deepali LEOS, CAIN    [facility]  Office: [phone]  Fax: [phone]

## 2025-06-14 ENCOUNTER — HEALTH MAINTENANCE LETTER (OUTPATIENT)
Age: 75
End: 2025-06-14

## (undated) DEVICE — SYR 10ML SLIP TIP W/O NDL

## (undated) DEVICE — SU SILK 3-0 TIE 24" SA74H

## (undated) DEVICE — PREP SKIN SCRUB TRAY 4461A

## (undated) DEVICE — Device

## (undated) DEVICE — CAST PADDING 4" STERILE 9044S

## (undated) DEVICE — PUMP UNIT NEGATIVE PRESSURE PREVENA PLUS PRE4010.S

## (undated) DEVICE — GLOVE PROTEXIS POWDER FREE 6.5 ORTHOPEDIC 2D73ET65

## (undated) DEVICE — SOL NACL 0.9% INJ 250ML BAG 2B1322Q

## (undated) DEVICE — GLOVE BIOGEL PI MICRO INDICATOR UNDERGLOVE SZ 8.0 48980

## (undated) DEVICE — GOWN IMPERVIOUS ZONED LG

## (undated) DEVICE — SU PROLENE 3-0 PS-2 18" 8687H

## (undated) DEVICE — DRSG KERLIX 4 1/2"X4YDS ROLL 6730

## (undated) DEVICE — RAD INTRODUCER KIT MICRO 5FRX10CM .018 NITINOL G/W

## (undated) DEVICE — SU PROLENE 6-0 C-1DA 30" 8706H

## (undated) DEVICE — ESU GROUND PAD UNIVERSAL W/O CORD

## (undated) DEVICE — CAST STOCKINETTE 4" BIAS CUT

## (undated) DEVICE — SOL NACL 0.9% IRRIG 3000ML BAG 2B7477

## (undated) DEVICE — DECANTER BAG 2002S

## (undated) DEVICE — SYR 10ML FINGER CONTROL W/O NDL 309695

## (undated) DEVICE — GUIDEWIRE TERUMO ADVANTAGE .035X260CM ANG GA3502

## (undated) DEVICE — SOL NACL 0.9% INJ 1000ML BAG 07983-09

## (undated) DEVICE — LINEN TOWEL PACK X10 5473

## (undated) DEVICE — PREP POVIDONE IODINE SOLUTION 10% 4OZ BOTTLE 29906-004

## (undated) DEVICE — RAD G/W INQWIRE .035X260CM J-TIP EXCHANGE IQ35F260J1O5RS

## (undated) DEVICE — SYR 30ML LL W/O NDL 302832

## (undated) DEVICE — LINEN TOWEL PACK X5 5464

## (undated) DEVICE — BLADE KNIFE SURG 15 371115

## (undated) DEVICE — SU PROLENE 6-0 RB-2DA 30" 8711H

## (undated) DEVICE — ESU GROUND PAD ADULT W/CORD E7507

## (undated) DEVICE — GLOVE PROTEXIS BLUE W/NEU-THERA 6.5  2D73EB65

## (undated) DEVICE — NDL ECLIPSE 25GA 1.5"

## (undated) DEVICE — VESSEL LOOPS RED MAXI

## (undated) DEVICE — DRSG STERI STRIP 1/2X4" R1547

## (undated) DEVICE — INTRO TERUMO 6FRX25CM W/MARKER RSB603

## (undated) DEVICE — SPONGE SURGIFOAM 50

## (undated) DEVICE — DRAPE IOBAN INCISE 23X17" 6650EZ

## (undated) DEVICE — BLADE SAW SAGITTAL 25.5X9.5X.4MM FINE LINVATEC 5023-138

## (undated) DEVICE — SURGICEL HEMOSTAT 2X3" 1953

## (undated) DEVICE — CLIP APPLIER LIGACLIP 11" MED SHORT 20 CT MSM20

## (undated) DEVICE — DRAPE SHEET REV FOLD 3/4 9349

## (undated) DEVICE — SURGICEL HEMOSTAT 3X4" NUKNIT 1943

## (undated) DEVICE — PACK VASCULAR SCV15VAFSB

## (undated) DEVICE — COVER C-ARM BAG SNAP 30X30" LF 01-3030

## (undated) DEVICE — SU SILK 2-0 TIE 24" SA75H

## (undated) DEVICE — DEVICE MULTI TORQUE TD01

## (undated) DEVICE — PREP CHLORAPREP 26ML TINTED HI-LITE ORANGE 930815

## (undated) DEVICE — NDL 18GA 1.5" 305196

## (undated) DEVICE — SU MONOCRYL 4-0 PS-2 18" UND Y496G

## (undated) DEVICE — RAD RX ISOVUE 300 (50ML) 61% IOPAMIDOL CHARGE PER ML

## (undated) DEVICE — DRSG GAUZE 4X4" 3033

## (undated) DEVICE — COVER LIGHT HANDLE  HILL-ROM C LT-C02

## (undated) DEVICE — CATH FOLEY COUDE 14FR 5ML LUBRICATH LATEX 0168L14

## (undated) DEVICE — GLOVE BIOGEL PI ULTRATOUCH SZ 7.5 41175

## (undated) DEVICE — DRSG KERLIX 4 1/2"X4YDS ROLL 6715

## (undated) DEVICE — SYR 05ML SLIP TIP W/O NDL 309647

## (undated) DEVICE — INTRO SHEATH 5FRX10CM PINNACLE MARKER RSB512

## (undated) DEVICE — GLOVE PROTEXIS W/NEU-THERA 7.5  2D73TE75

## (undated) DEVICE — IMM LEG ELEVATOR 79-90191

## (undated) DEVICE — DRSG TEGADERM 4X4 3/4" 1626

## (undated) DEVICE — CATH ANGIO OMNI FLUSH 5FRX65CM CVD 10732201

## (undated) DEVICE — BLADE CLIPPER 4406

## (undated) DEVICE — CATH FOGARTY EMBOLECTOMY 3FR 80CM LATEX 120803FP

## (undated) DEVICE — BNDG ELASTIC 3"X5YDS UNSTERILE 6611-30

## (undated) DEVICE — SU VICRYL 3-0 SH 27" J316H

## (undated) DEVICE — STOPCOCK HIGH PRESSURE 3-WAY

## (undated) DEVICE — LINEN FULL SHEET 5511

## (undated) DEVICE — DRSG ADAPTIC 3X8" 6113

## (undated) DEVICE — PACK SPECIAL PROCEDURE CUSTOM

## (undated) DEVICE — NDL 19GA 1.5"

## (undated) DEVICE — PACK EXTREMITY SOP15EXFSD

## (undated) DEVICE — SU PROLENE 5-0 RB-1DA 36"  8556H

## (undated) DEVICE — NDL 22GA 1.5"

## (undated) DEVICE — SOL WATER IRRIG 1000ML BOTTLE 2F7114

## (undated) DEVICE — SU VICRYL 2-0 CT-1 18' J739D

## (undated) DEVICE — GUIDEWIRE TERUMO .035X260 3CM STR TIP GS3504

## (undated) DEVICE — DRAPE C-ARM 60X42" 1013

## (undated) DEVICE — TUBING ANGIOGRAPHY 1200PSI 30"

## (undated) DEVICE — SYR MEDRAD 150ML MARK 7 ARTERION ART700SYR

## (undated) DEVICE — SU PROLENE 5-0 BBDA 36"  8580H

## (undated) DEVICE — LINEN LEG ROLL 5489

## (undated) DEVICE — TUBING EXTENSION SET 20" B1327

## (undated) DEVICE — SU PROLENE 4-0 PS-2 18" 8682G

## (undated) DEVICE — MANIFOLD NEPTUNE 4 PORT 700-20

## (undated) DEVICE — SU VICRYL 3-0 TIE 12X18" J904T

## (undated) DEVICE — ESU PENCIL W/HOLSTER E2350H

## (undated) DEVICE — SPONGE RAY-TEC 4X8" 7318

## (undated) DEVICE — SOL NACL 0.9% IRRIG 1000ML BOTTLE 2F7124

## (undated) DEVICE — BNDG ELASTIC 4"X5YDS UNSTERILE 6611-40

## (undated) DEVICE — PACK LOWER EXTREMITY RIDGES

## (undated) DEVICE — SPONGE PEANUT

## (undated) DEVICE — NDL 27GA 1.25" 305136

## (undated) DEVICE — INTRO SHEATH 6FRX10CM PINNACLE MARKER RSB612

## (undated) DEVICE — SOL NACL 0.9% INJ 1000ML BAG 2B1324X

## (undated) DEVICE — GW VASC 300CM .018IN 35D GLDWR ADV TRK PERI

## (undated) DEVICE — CONTRAST MEDIA ISOVUE 300 61% IOPAMIDOL  CHRG PER 1ML 131535

## (undated) DEVICE — DRSG KERLIX FLUFFS X5

## (undated) DEVICE — PREP CHLORAPREP 26ML TINTED ORANGE  260815

## (undated) DEVICE — BAG DECANTER STERILE WHITE DYNJDEC09

## (undated) DEVICE — CAST PADDING 6" STERILE 9046S

## (undated) DEVICE — DRAPE STERI TOWEL LG 1010

## (undated) DEVICE — PREP POVIDONE-IODINE 7.5% SCRUB 4OZ BOTTLE MDS093945

## (undated) DEVICE — PACK UNIVERSAL SPLIT 29131

## (undated) DEVICE — RAD INFLATOR BASIC COMPAK  IN4130

## (undated) DEVICE — NDL 25GA 1.5" 305127

## (undated) DEVICE — SU PROLENE 5-0 RB-2DA 30" 8710H

## (undated) DEVICE — GLOVE PROTEXIS W/NEU-THERA 6.5  2D73TE65

## (undated) DEVICE — SUCTION CANISTER MEDIVAC LINER 3000ML W/LID 65651-530

## (undated) DEVICE — SUCTION MANIFOLD NEPTUNE 2 SYS 4 PORT 0702-020-000

## (undated) DEVICE — ESU GROUND PAD E7506

## (undated) DEVICE — PACK AAA SBA15AAFS3

## (undated) DEVICE — DRSG ABDOMINAL 07 1/2X8" 7197D

## (undated) DEVICE — NEEDLE HYPO MONOJECT STANDARD 22GA 1 1/2IN BLUE 1188822112

## (undated) DEVICE — DRAPE IOBAN INCISE 36X23" 6651EZ

## (undated) DEVICE — SYR 03ML LL W/O NDL 309657

## (undated) DEVICE — CATH ANGIO IMPRESS 5FRX65CM BERENSTEIN 56538BER

## (undated) DEVICE — BLADE SAW OSCILLATING STRYK MED 9.0X25X0.38MM 2296-003-111

## (undated) DEVICE — SYR 10ML LL W/O NDL 302995

## (undated) DEVICE — BNDG ROLLER GAUZE CONFORM 4"X4YD 41-54

## (undated) DEVICE — NDL ANGIOCATH 18GA 1.25" 4055

## (undated) DEVICE — PAD CHUX UNDERPAD 23X24" 7136

## (undated) DEVICE — COVER PROBE ULTRASOUND W/GEL 6X96" 20-P3D696

## (undated) DEVICE — SU PROLENE 6-0 C-1DA 30" M8706

## (undated) DEVICE — BLADE KNIFE SURG 11 371111

## (undated) DEVICE — BAG CLEAR TRASH 1.3M 39X33" P4040C

## (undated) DEVICE — CATH TRAY FOLEY COUDE SURESTEP 16FR W/URNE MTR STLK A304716A

## (undated) DEVICE — CLIP ETHICON LIGACLIP SM BLUE LT100

## (undated) DEVICE — VESSEL LOOPS YELLOW MINI 31145660

## (undated) DEVICE — DRAPE POUCH IRR 1016

## (undated) DEVICE — GLOVE PROTEXIS BLUE W/NEU-THERA 8.0  2D73EB80

## (undated) DEVICE — SYR 20ML LL W/O NDL 302830

## (undated) DEVICE — LINEN HALF SHEET 5512

## (undated) DEVICE — SYR 50ML LL W/O NDL 309653

## (undated) DEVICE — NDL BLUNT 18GA 1" W/O FILTER 305181

## (undated) DEVICE — SU ETHILON 4-0 PS-4 18" 1662G

## (undated) DEVICE — GLOVE PROTEXIS MICRO 8.0  2D73PM80

## (undated) DEVICE — PREP POVIDONE IODINE SCRUB 7.5% 4OZ APL82212

## (undated) DEVICE — VALVULOTOME LEMAITRE 1.5MMX98CM HYDRO 1009-00

## (undated) DEVICE — LINEN TOWEL PACK X6 WHITE 5487

## (undated) DEVICE — SYR BULB IRRIG DOVER 60 ML LATEX FREE 67000

## (undated) DEVICE — GLOVE PROTEXIS W/NEU-THERA 8.0  2D73TE80

## (undated) DEVICE — SOL NACL 0.9% IRRIG 1000ML BOTTLE 07138-09

## (undated) DEVICE — SU SILK 4-0 TIE 12X30" A303H

## (undated) DEVICE — DRSG PREVENA NEGATIVE PRESSURE WND 13CM SGL LF PRE1101US

## (undated) DEVICE — LINEN DRAPE 54X72" 5467

## (undated) DEVICE — SU CHROMIC 4-0 SH 27" G121H

## (undated) DEVICE — TOURNIQUET SGL BLADDER 18"X4" RED 5921-218-135

## (undated) RX ORDER — ONDANSETRON 2 MG/ML
INJECTION INTRAMUSCULAR; INTRAVENOUS
Status: DISPENSED
Start: 2025-03-04

## (undated) RX ORDER — BUPIVACAINE HYDROCHLORIDE 5 MG/ML
INJECTION, SOLUTION EPIDURAL; INTRACAUDAL
Status: DISPENSED
Start: 2024-07-12

## (undated) RX ORDER — PROPOFOL 10 MG/ML
INJECTION, EMULSION INTRAVENOUS
Status: DISPENSED
Start: 2022-06-27

## (undated) RX ORDER — HEPARIN SODIUM 1000 [USP'U]/ML
INJECTION, SOLUTION INTRAVENOUS; SUBCUTANEOUS
Status: DISPENSED
Start: 2021-02-03

## (undated) RX ORDER — CEFAZOLIN SODIUM/WATER 2 G/20 ML
SYRINGE (ML) INTRAVENOUS
Status: DISPENSED
Start: 2024-07-20

## (undated) RX ORDER — GLYCOPYRROLATE 0.2 MG/ML
INJECTION INTRAMUSCULAR; INTRAVENOUS
Status: DISPENSED
Start: 2022-06-27

## (undated) RX ORDER — HEPARIN SODIUM 200 [USP'U]/100ML
INJECTION, SOLUTION INTRAVENOUS
Status: DISPENSED
Start: 2024-07-16

## (undated) RX ORDER — LIDOCAINE HYDROCHLORIDE 10 MG/ML
INJECTION, SOLUTION INFILTRATION; PERINEURAL
Status: DISPENSED
Start: 2022-03-01

## (undated) RX ORDER — BUPIVACAINE HYDROCHLORIDE AND EPINEPHRINE 2.5; 5 MG/ML; UG/ML
INJECTION, SOLUTION EPIDURAL; INFILTRATION; INTRACAUDAL; PERINEURAL
Status: DISPENSED
Start: 2025-03-04

## (undated) RX ORDER — FENTANYL CITRATE 50 UG/ML
INJECTION, SOLUTION INTRAMUSCULAR; INTRAVENOUS
Status: DISPENSED
Start: 2021-02-03

## (undated) RX ORDER — LIDOCAINE HYDROCHLORIDE 10 MG/ML
INJECTION, SOLUTION INFILTRATION; PERINEURAL
Status: DISPENSED
Start: 2024-07-16

## (undated) RX ORDER — PROTAMINE SULFATE 10 MG/ML
INJECTION, SOLUTION INTRAVENOUS
Status: DISPENSED
Start: 2021-02-03

## (undated) RX ORDER — FENTANYL CITRATE-0.9 % NACL/PF 10 MCG/ML
PLASTIC BAG, INJECTION (ML) INTRAVENOUS
Status: DISPENSED
Start: 2022-04-18

## (undated) RX ORDER — HEPARIN SODIUM 1000 [USP'U]/ML
INJECTION, SOLUTION INTRAVENOUS; SUBCUTANEOUS
Status: DISPENSED
Start: 2024-07-20

## (undated) RX ORDER — PAPAVERINE HYDROCHLORIDE 30 MG/ML
INJECTION INTRAMUSCULAR; INTRAVENOUS
Status: DISPENSED
Start: 2024-07-20

## (undated) RX ORDER — FENTANYL CITRATE 50 UG/ML
INJECTION, SOLUTION INTRAMUSCULAR; INTRAVENOUS
Status: DISPENSED
Start: 2022-03-04

## (undated) RX ORDER — HYDROMORPHONE HYDROCHLORIDE 1 MG/ML
INJECTION, SOLUTION INTRAMUSCULAR; INTRAVENOUS; SUBCUTANEOUS
Status: DISPENSED
Start: 2022-03-04

## (undated) RX ORDER — LIDOCAINE HYDROCHLORIDE 10 MG/ML
INJECTION, SOLUTION EPIDURAL; INFILTRATION; INTRACAUDAL; PERINEURAL
Status: DISPENSED
Start: 2022-06-27

## (undated) RX ORDER — HEPARIN SODIUM 200 [USP'U]/100ML
INJECTION, SOLUTION INTRAVENOUS
Status: DISPENSED
Start: 2021-02-03

## (undated) RX ORDER — HEPARIN SODIUM 200 [USP'U]/100ML
INJECTION, SOLUTION INTRAVENOUS
Status: DISPENSED
Start: 2022-03-01

## (undated) RX ORDER — PROPOFOL 10 MG/ML
INJECTION, EMULSION INTRAVENOUS
Status: DISPENSED
Start: 2022-03-04

## (undated) RX ORDER — FENTANYL CITRATE 50 UG/ML
INJECTION, SOLUTION INTRAMUSCULAR; INTRAVENOUS
Status: DISPENSED
Start: 2022-06-27

## (undated) RX ORDER — VECURONIUM BROMIDE 1 MG/ML
INJECTION, POWDER, LYOPHILIZED, FOR SOLUTION INTRAVENOUS
Status: DISPENSED
Start: 2025-03-04

## (undated) RX ORDER — LIDOCAINE HYDROCHLORIDE 10 MG/ML
INJECTION, SOLUTION INFILTRATION; PERINEURAL
Status: DISPENSED
Start: 2022-03-04

## (undated) RX ORDER — FENTANYL CITRATE 50 UG/ML
INJECTION, SOLUTION INTRAMUSCULAR; INTRAVENOUS
Status: DISPENSED
Start: 2022-04-18

## (undated) RX ORDER — PROPOFOL 10 MG/ML
INJECTION, EMULSION INTRAVENOUS
Status: DISPENSED
Start: 2024-07-20

## (undated) RX ORDER — LABETALOL HYDROCHLORIDE 5 MG/ML
INJECTION, SOLUTION INTRAVENOUS
Status: DISPENSED
Start: 2024-07-20

## (undated) RX ORDER — ONDANSETRON 2 MG/ML
INJECTION INTRAMUSCULAR; INTRAVENOUS
Status: DISPENSED
Start: 2017-03-15

## (undated) RX ORDER — EPHEDRINE SULFATE 50 MG/ML
INJECTION, SOLUTION INTRAMUSCULAR; INTRAVENOUS; SUBCUTANEOUS
Status: DISPENSED
Start: 2023-07-12

## (undated) RX ORDER — HEPARIN SODIUM 1000 [USP'U]/ML
INJECTION, SOLUTION INTRAVENOUS; SUBCUTANEOUS
Status: DISPENSED
Start: 2023-07-12

## (undated) RX ORDER — CEFAZOLIN SODIUM/WATER 2 G/20 ML
SYRINGE (ML) INTRAVENOUS
Status: DISPENSED
Start: 2022-04-18

## (undated) RX ORDER — CEFAZOLIN SODIUM 1 G/3ML
INJECTION, POWDER, FOR SOLUTION INTRAMUSCULAR; INTRAVENOUS
Status: DISPENSED
Start: 2025-03-04

## (undated) RX ORDER — FENTANYL CITRATE 50 UG/ML
INJECTION, SOLUTION INTRAMUSCULAR; INTRAVENOUS
Status: DISPENSED
Start: 2021-04-21

## (undated) RX ORDER — ONDANSETRON 2 MG/ML
INJECTION INTRAMUSCULAR; INTRAVENOUS
Status: DISPENSED
Start: 2022-06-27

## (undated) RX ORDER — FENTANYL CITRATE 50 UG/ML
INJECTION, SOLUTION INTRAMUSCULAR; INTRAVENOUS
Status: DISPENSED
Start: 2017-03-15

## (undated) RX ORDER — HYDRALAZINE HYDROCHLORIDE 20 MG/ML
INJECTION INTRAMUSCULAR; INTRAVENOUS
Status: DISPENSED
Start: 2017-03-15

## (undated) RX ORDER — DEXAMETHASONE SODIUM PHOSPHATE 4 MG/ML
INJECTION, SOLUTION INTRA-ARTICULAR; INTRALESIONAL; INTRAMUSCULAR; INTRAVENOUS; SOFT TISSUE
Status: DISPENSED
Start: 2024-07-20

## (undated) RX ORDER — HEPARIN SODIUM 200 [USP'U]/100ML
INJECTION, SOLUTION INTRAVENOUS
Status: DISPENSED
Start: 2022-06-10

## (undated) RX ORDER — PROTAMINE SULFATE 10 MG/ML
INJECTION, SOLUTION INTRAVENOUS
Status: DISPENSED
Start: 2022-06-10

## (undated) RX ORDER — DEXAMETHASONE SODIUM PHOSPHATE 4 MG/ML
INJECTION, SOLUTION INTRA-ARTICULAR; INTRALESIONAL; INTRAMUSCULAR; INTRAVENOUS; SOFT TISSUE
Status: DISPENSED
Start: 2025-03-04

## (undated) RX ORDER — BUPIVACAINE HYDROCHLORIDE 2.5 MG/ML
INJECTION, SOLUTION EPIDURAL; INFILTRATION; INTRACAUDAL
Status: DISPENSED
Start: 2017-03-15

## (undated) RX ORDER — BUPIVACAINE HYDROCHLORIDE 5 MG/ML
INJECTION, SOLUTION EPIDURAL; INTRACAUDAL; PERINEURAL
Status: DISPENSED
Start: 2025-03-04

## (undated) RX ORDER — HEPARIN SODIUM 1000 [USP'U]/ML
INJECTION, SOLUTION INTRAVENOUS; SUBCUTANEOUS
Status: DISPENSED
Start: 2022-06-10

## (undated) RX ORDER — LIDOCAINE HYDROCHLORIDE 10 MG/ML
INJECTION, SOLUTION INFILTRATION; PERINEURAL
Status: DISPENSED
Start: 2021-04-21

## (undated) RX ORDER — PROPOFOL 10 MG/ML
INJECTION, EMULSION INTRAVENOUS
Status: DISPENSED
Start: 2024-07-13

## (undated) RX ORDER — NITROGLYCERIN 5 MG/ML
VIAL (ML) INTRAVENOUS
Status: DISPENSED
Start: 2021-04-21

## (undated) RX ORDER — LIDOCAINE HYDROCHLORIDE 10 MG/ML
INJECTION, SOLUTION EPIDURAL; INFILTRATION; INTRACAUDAL; PERINEURAL
Status: DISPENSED
Start: 2022-04-18

## (undated) RX ORDER — PROTAMINE SULFATE 10 MG/ML
INJECTION, SOLUTION INTRAVENOUS
Status: DISPENSED
Start: 2021-04-21

## (undated) RX ORDER — FENTANYL CITRATE 50 UG/ML
INJECTION, SOLUTION INTRAMUSCULAR; INTRAVENOUS
Status: DISPENSED
Start: 2025-03-04

## (undated) RX ORDER — ALBUMIN, HUMAN INJ 5% 5 %
SOLUTION INTRAVENOUS
Status: DISPENSED
Start: 2022-03-04

## (undated) RX ORDER — BUPIVACAINE HYDROCHLORIDE 2.5 MG/ML
INJECTION, SOLUTION EPIDURAL; INFILTRATION; INTRACAUDAL
Status: DISPENSED
Start: 2024-07-13

## (undated) RX ORDER — FENTANYL CITRATE 50 UG/ML
INJECTION, SOLUTION INTRAMUSCULAR; INTRAVENOUS
Status: DISPENSED
Start: 2024-07-16

## (undated) RX ORDER — BUPIVACAINE HYDROCHLORIDE 5 MG/ML
INJECTION, SOLUTION EPIDURAL; INTRACAUDAL
Status: DISPENSED
Start: 2021-04-20

## (undated) RX ORDER — SODIUM CHLORIDE, SODIUM GLUCONATE, SODIUM ACETATE, POTASSIUM CHLORIDE AND MAGNESIUM CHLORIDE 526; 502; 368; 37; 30 MG/100ML; MG/100ML; MG/100ML; MG/100ML; MG/100ML
INJECTION, SOLUTION INTRAVENOUS
Status: DISPENSED
Start: 2024-07-20

## (undated) RX ORDER — PROPOFOL 10 MG/ML
INJECTION, EMULSION INTRAVENOUS
Status: DISPENSED
Start: 2024-07-12

## (undated) RX ORDER — BUPIVACAINE HYDROCHLORIDE 5 MG/ML
INJECTION, SOLUTION EPIDURAL; INTRACAUDAL
Status: DISPENSED
Start: 2024-07-20

## (undated) RX ORDER — PROTAMINE SULFATE 10 MG/ML
INJECTION, SOLUTION INTRAVENOUS
Status: DISPENSED
Start: 2022-03-04

## (undated) RX ORDER — CEFAZOLIN SODIUM 1 G/3ML
INJECTION, POWDER, FOR SOLUTION INTRAMUSCULAR; INTRAVENOUS
Status: DISPENSED
Start: 2022-03-04

## (undated) RX ORDER — DEXAMETHASONE SODIUM PHOSPHATE 4 MG/ML
INJECTION, SOLUTION INTRA-ARTICULAR; INTRALESIONAL; INTRAMUSCULAR; INTRAVENOUS; SOFT TISSUE
Status: DISPENSED
Start: 2022-06-27

## (undated) RX ORDER — FENTANYL CITRATE 50 UG/ML
INJECTION, SOLUTION INTRAMUSCULAR; INTRAVENOUS
Status: DISPENSED
Start: 2021-04-20

## (undated) RX ORDER — BUPIVACAINE HYDROCHLORIDE 5 MG/ML
INJECTION, SOLUTION EPIDURAL; INTRACAUDAL
Status: DISPENSED
Start: 2022-04-18

## (undated) RX ORDER — HEPARIN SODIUM 1000 [USP'U]/ML
INJECTION, SOLUTION INTRAVENOUS; SUBCUTANEOUS
Status: DISPENSED
Start: 2022-03-04

## (undated) RX ORDER — CEFAZOLIN SODIUM/WATER 2 G/20 ML
SYRINGE (ML) INTRAVENOUS
Status: DISPENSED
Start: 2023-07-12

## (undated) RX ORDER — CEFAZOLIN SODIUM 2 G/100ML
INJECTION, SOLUTION INTRAVENOUS
Status: DISPENSED
Start: 2022-06-10

## (undated) RX ORDER — FENTANYL CITRATE 50 UG/ML
INJECTION, SOLUTION INTRAMUSCULAR; INTRAVENOUS
Status: DISPENSED
Start: 2023-07-12

## (undated) RX ORDER — PROPOFOL 10 MG/ML
INJECTION, EMULSION INTRAVENOUS
Status: DISPENSED
Start: 2025-03-04

## (undated) RX ORDER — FENTANYL CITRATE 50 UG/ML
INJECTION, SOLUTION INTRAMUSCULAR; INTRAVENOUS
Status: DISPENSED
Start: 2022-06-10

## (undated) RX ORDER — ONDANSETRON 2 MG/ML
INJECTION INTRAMUSCULAR; INTRAVENOUS
Status: DISPENSED
Start: 2024-07-20

## (undated) RX ORDER — FENTANYL CITRATE 50 UG/ML
INJECTION, SOLUTION INTRAMUSCULAR; INTRAVENOUS
Status: DISPENSED
Start: 2022-03-01

## (undated) RX ORDER — DEXAMETHASONE SODIUM PHOSPHATE 4 MG/ML
INJECTION, SOLUTION INTRA-ARTICULAR; INTRALESIONAL; INTRAMUSCULAR; INTRAVENOUS; SOFT TISSUE
Status: DISPENSED
Start: 2022-03-04

## (undated) RX ORDER — BUPIVACAINE HYDROCHLORIDE 5 MG/ML
INJECTION, SOLUTION EPIDURAL; INTRACAUDAL
Status: DISPENSED
Start: 2024-07-13

## (undated) RX ORDER — BUPIVACAINE HYDROCHLORIDE 5 MG/ML
INJECTION, SOLUTION EPIDURAL; INTRACAUDAL
Status: DISPENSED
Start: 2022-06-27

## (undated) RX ORDER — FENTANYL CITRATE 50 UG/ML
INJECTION, SOLUTION INTRAMUSCULAR; INTRAVENOUS
Status: DISPENSED
Start: 2024-07-20

## (undated) RX ORDER — LIDOCAINE HYDROCHLORIDE 10 MG/ML
INJECTION, SOLUTION INFILTRATION; PERINEURAL
Status: DISPENSED
Start: 2024-07-13

## (undated) RX ORDER — FENTANYL CITRATE 50 UG/ML
INJECTION, SOLUTION INTRAMUSCULAR; INTRAVENOUS
Status: DISPENSED
Start: 2024-07-13

## (undated) RX ORDER — LIDOCAINE HYDROCHLORIDE 20 MG/ML
INJECTION, SOLUTION EPIDURAL; INFILTRATION; INTRACAUDAL; PERINEURAL
Status: DISPENSED
Start: 2017-03-15

## (undated) RX ORDER — PROPOFOL 10 MG/ML
INJECTION, EMULSION INTRAVENOUS
Status: DISPENSED
Start: 2022-04-18

## (undated) RX ORDER — ONDANSETRON 2 MG/ML
INJECTION INTRAMUSCULAR; INTRAVENOUS
Status: DISPENSED
Start: 2024-07-12

## (undated) RX ORDER — HEPARIN SODIUM 1000 [USP'U]/ML
INJECTION, SOLUTION INTRAVENOUS; SUBCUTANEOUS
Status: DISPENSED
Start: 2025-03-04

## (undated) RX ORDER — HEPARIN SODIUM 1000 [USP'U]/ML
INJECTION, SOLUTION INTRAVENOUS; SUBCUTANEOUS
Status: DISPENSED
Start: 2022-03-01

## (undated) RX ORDER — LIDOCAINE HYDROCHLORIDE 10 MG/ML
INJECTION, SOLUTION INFILTRATION; PERINEURAL
Status: DISPENSED
Start: 2021-02-03

## (undated) RX ORDER — PIPERACILLIN SODIUM, TAZOBACTAM SODIUM 3; .375 G/15ML; G/15ML
INJECTION, POWDER, LYOPHILIZED, FOR SOLUTION INTRAVENOUS
Status: DISPENSED
Start: 2024-07-12

## (undated) RX ORDER — PROPOFOL 10 MG/ML
INJECTION, EMULSION INTRAVENOUS
Status: DISPENSED
Start: 2021-04-20

## (undated) RX ORDER — LIDOCAINE HYDROCHLORIDE 20 MG/ML
INJECTION, SOLUTION EPIDURAL; INFILTRATION; INTRACAUDAL; PERINEURAL
Status: DISPENSED
Start: 2022-03-04

## (undated) RX ORDER — HEPARIN SODIUM 1000 [USP'U]/ML
INJECTION, SOLUTION INTRAVENOUS; SUBCUTANEOUS
Status: DISPENSED
Start: 2021-04-21

## (undated) RX ORDER — CEFAZOLIN SODIUM 2 G/100ML
INJECTION, SOLUTION INTRAVENOUS
Status: DISPENSED
Start: 2017-03-15

## (undated) RX ORDER — LIDOCAINE HYDROCHLORIDE 10 MG/ML
INJECTION, SOLUTION INFILTRATION; PERINEURAL
Status: DISPENSED
Start: 2017-03-15

## (undated) RX ORDER — ONDANSETRON 2 MG/ML
INJECTION INTRAMUSCULAR; INTRAVENOUS
Status: DISPENSED
Start: 2022-03-04

## (undated) RX ORDER — CEFAZOLIN SODIUM/WATER 2 G/20 ML
SYRINGE (ML) INTRAVENOUS
Status: DISPENSED
Start: 2024-07-12

## (undated) RX ORDER — LIDOCAINE HYDROCHLORIDE 10 MG/ML
INJECTION, SOLUTION INFILTRATION; PERINEURAL
Status: DISPENSED
Start: 2022-06-10

## (undated) RX ORDER — EPHEDRINE SULFATE 50 MG/ML
INJECTION, SOLUTION INTRAMUSCULAR; INTRAVENOUS; SUBCUTANEOUS
Status: DISPENSED
Start: 2025-03-04

## (undated) RX ORDER — HYDROMORPHONE HYDROCHLORIDE 1 MG/ML
INJECTION, SOLUTION INTRAMUSCULAR; INTRAVENOUS; SUBCUTANEOUS
Status: DISPENSED
Start: 2024-07-20

## (undated) RX ORDER — ONDANSETRON 2 MG/ML
INJECTION INTRAMUSCULAR; INTRAVENOUS
Status: DISPENSED
Start: 2024-07-13

## (undated) RX ORDER — DEXAMETHASONE SODIUM PHOSPHATE 4 MG/ML
INJECTION, SOLUTION INTRA-ARTICULAR; INTRALESIONAL; INTRAMUSCULAR; INTRAVENOUS; SOFT TISSUE
Status: DISPENSED
Start: 2024-07-12

## (undated) RX ORDER — BUPIVACAINE HYDROCHLORIDE 5 MG/ML
INJECTION, SOLUTION EPIDURAL; INTRACAUDAL
Status: DISPENSED
Start: 2023-07-12

## (undated) RX ORDER — PROTAMINE SULFATE 10 MG/ML
INJECTION, SOLUTION INTRAVENOUS
Status: DISPENSED
Start: 2025-03-04

## (undated) RX ORDER — BUPIVACAINE HYDROCHLORIDE 5 MG/ML
INJECTION, SOLUTION EPIDURAL; INTRACAUDAL
Status: DISPENSED
Start: 2017-03-15

## (undated) RX ORDER — HEPARIN SODIUM 200 [USP'U]/100ML
INJECTION, SOLUTION INTRAVENOUS
Status: DISPENSED
Start: 2021-04-21

## (undated) RX ORDER — HEPARIN SODIUM 1000 [USP'U]/ML
INJECTION, SOLUTION INTRAVENOUS; SUBCUTANEOUS
Status: DISPENSED
Start: 2024-07-12

## (undated) RX ORDER — ASPIRIN 81 MG/1
TABLET ORAL
Status: DISPENSED
Start: 2023-07-12

## (undated) RX ORDER — HYDROMORPHONE HYDROCHLORIDE 1 MG/ML
INJECTION, SOLUTION INTRAMUSCULAR; INTRAVENOUS; SUBCUTANEOUS
Status: DISPENSED
Start: 2023-07-12

## (undated) RX ORDER — PROPOFOL 10 MG/ML
INJECTION, EMULSION INTRAVENOUS
Status: DISPENSED
Start: 2017-03-15

## (undated) RX ORDER — FENTANYL CITRATE 50 UG/ML
INJECTION, SOLUTION INTRAMUSCULAR; INTRAVENOUS
Status: DISPENSED
Start: 2024-07-12